# Patient Record
Sex: FEMALE | Race: WHITE | NOT HISPANIC OR LATINO
[De-identification: names, ages, dates, MRNs, and addresses within clinical notes are randomized per-mention and may not be internally consistent; named-entity substitution may affect disease eponyms.]

---

## 2017-09-22 ENCOUNTER — APPOINTMENT (OUTPATIENT)
Dept: RHEUMATOLOGY | Facility: CLINIC | Age: 82
End: 2017-09-22
Payer: MEDICARE

## 2017-09-22 VITALS — HEIGHT: 59.25 IN | BODY MASS INDEX: 31.65 KG/M2 | HEART RATE: 59 BPM | WEIGHT: 157 LBS | OXYGEN SATURATION: 96 %

## 2017-09-22 DIAGNOSIS — Z78.9 OTHER SPECIFIED HEALTH STATUS: ICD-10-CM

## 2017-09-22 DIAGNOSIS — M25.50 PAIN IN UNSPECIFIED JOINT: ICD-10-CM

## 2017-09-22 DIAGNOSIS — Z86.19 PERSONAL HISTORY OF OTHER INFECTIOUS AND PARASITIC DISEASES: ICD-10-CM

## 2017-09-22 DIAGNOSIS — M75.22 BICIPITAL TENDINITIS, LEFT SHOULDER: ICD-10-CM

## 2017-09-22 PROCEDURE — 99204 OFFICE O/P NEW MOD 45 MIN: CPT | Mod: 25

## 2017-09-22 PROCEDURE — 20610 DRAIN/INJ JOINT/BURSA W/O US: CPT | Mod: LT

## 2017-09-24 PROBLEM — M75.22 BICEPS TENDINITIS OF LEFT UPPER EXTREMITY: Status: ACTIVE | Noted: 2017-09-24

## 2017-09-25 ENCOUNTER — OUTPATIENT (OUTPATIENT)
Dept: OUTPATIENT SERVICES | Facility: HOSPITAL | Age: 82
LOS: 1 days | End: 2017-09-25
Payer: MEDICARE

## 2017-09-25 ENCOUNTER — APPOINTMENT (OUTPATIENT)
Dept: RADIOLOGY | Facility: CLINIC | Age: 82
End: 2017-09-25
Payer: MEDICARE

## 2017-09-25 DIAGNOSIS — Z00.8 ENCOUNTER FOR OTHER GENERAL EXAMINATION: ICD-10-CM

## 2017-09-25 PROCEDURE — 73030 X-RAY EXAM OF SHOULDER: CPT

## 2017-09-25 PROCEDURE — 73030 X-RAY EXAM OF SHOULDER: CPT | Mod: 26,LT

## 2017-10-24 ENCOUNTER — APPOINTMENT (OUTPATIENT)
Dept: RHEUMATOLOGY | Facility: CLINIC | Age: 82
End: 2017-10-24
Payer: MEDICARE

## 2017-10-24 VITALS
WEIGHT: 158 LBS | HEIGHT: 59.2 IN | OXYGEN SATURATION: 96 % | HEART RATE: 55 BPM | SYSTOLIC BLOOD PRESSURE: 159 MMHG | BODY MASS INDEX: 31.85 KG/M2 | DIASTOLIC BLOOD PRESSURE: 77 MMHG

## 2017-10-24 DIAGNOSIS — M79.1 MYALGIA: ICD-10-CM

## 2017-10-24 DIAGNOSIS — Z00.00 ENCOUNTER FOR GENERAL ADULT MEDICAL EXAMINATION W/OUT ABNORMAL FINDINGS: ICD-10-CM

## 2017-10-24 PROCEDURE — 20610 DRAIN/INJ JOINT/BURSA W/O US: CPT | Mod: 50

## 2017-10-24 PROCEDURE — 99214 OFFICE O/P EST MOD 30 MIN: CPT | Mod: 25

## 2017-10-24 RX ORDER — METHYLPREDNISOLONE ACETATE 80 MG/ML
80 INJECTION, SUSPENSION INTRA-ARTICULAR; INTRALESIONAL; INTRAMUSCULAR; SOFT TISSUE
Refills: 0 | Status: COMPLETED | OUTPATIENT
Start: 2017-10-24

## 2017-10-24 RX ADMIN — METHYLPREDNISOLONE ACETATE MG/ML: 80 INJECTION, SUSPENSION INTRA-ARTICULAR; INTRALESIONAL; INTRAMUSCULAR; SOFT TISSUE at 00:00

## 2017-12-04 ENCOUNTER — OUTPATIENT (OUTPATIENT)
Dept: OUTPATIENT SERVICES | Facility: HOSPITAL | Age: 82
LOS: 1 days | End: 2017-12-04
Payer: MEDICARE

## 2017-12-04 ENCOUNTER — APPOINTMENT (OUTPATIENT)
Dept: RADIOLOGY | Facility: CLINIC | Age: 82
End: 2017-12-04
Payer: MEDICARE

## 2017-12-04 DIAGNOSIS — Z00.8 ENCOUNTER FOR OTHER GENERAL EXAMINATION: ICD-10-CM

## 2017-12-04 PROCEDURE — 77080 DXA BONE DENSITY AXIAL: CPT

## 2017-12-04 PROCEDURE — 77080 DXA BONE DENSITY AXIAL: CPT | Mod: 26

## 2017-12-07 ENCOUNTER — APPOINTMENT (OUTPATIENT)
Dept: RHEUMATOLOGY | Facility: CLINIC | Age: 82
End: 2017-12-07

## 2018-01-26 ENCOUNTER — APPOINTMENT (OUTPATIENT)
Dept: RHEUMATOLOGY | Facility: CLINIC | Age: 83
End: 2018-01-26

## 2018-06-01 ENCOUNTER — APPOINTMENT (OUTPATIENT)
Dept: RHEUMATOLOGY | Facility: CLINIC | Age: 83
End: 2018-06-01
Payer: MEDICARE

## 2018-06-01 ENCOUNTER — RX RENEWAL (OUTPATIENT)
Age: 83
End: 2018-06-01

## 2018-06-01 VITALS
HEIGHT: 59 IN | WEIGHT: 158 LBS | OXYGEN SATURATION: 95 % | HEART RATE: 56 BPM | SYSTOLIC BLOOD PRESSURE: 142 MMHG | BODY MASS INDEX: 31.85 KG/M2 | DIASTOLIC BLOOD PRESSURE: 68 MMHG

## 2018-06-01 PROCEDURE — 20610 DRAIN/INJ JOINT/BURSA W/O US: CPT | Mod: LT

## 2018-06-01 PROCEDURE — 99214 OFFICE O/P EST MOD 30 MIN: CPT | Mod: 25

## 2018-06-01 RX ORDER — TRAMADOL HYDROCHLORIDE 50 MG/1
50 TABLET, COATED ORAL
Qty: 60 | Refills: 0 | Status: DISCONTINUED | COMMUNITY
Start: 2018-06-01 | End: 2018-06-01

## 2018-06-02 RX ORDER — VALSARTAN 40 MG/1
40 TABLET, COATED ORAL
Qty: 90 | Refills: 0 | Status: DISCONTINUED | COMMUNITY
Start: 2017-01-04 | End: 2018-06-02

## 2018-06-02 RX ORDER — METHYLPREDNISOLONE ACETATE 40 MG/ML
40 INJECTION, SUSPENSION INTRA-ARTICULAR; INTRALESIONAL; INTRAMUSCULAR; SOFT TISSUE
Qty: 1 | Refills: 0 | Status: COMPLETED | OUTPATIENT
Start: 2018-06-02

## 2018-06-02 RX ORDER — DOCUSATE SODIUM 100 MG/1
CAPSULE ORAL
Refills: 0 | Status: DISCONTINUED | COMMUNITY
End: 2018-06-02

## 2018-06-02 RX ORDER — METHYLPREDNISOLONE ACETATE 80 MG/ML
80 INJECTION, SUSPENSION INTRA-ARTICULAR; INTRALESIONAL; INTRAMUSCULAR; SOFT TISSUE
Qty: 1 | Refills: 0 | Status: COMPLETED | OUTPATIENT
Start: 2018-06-02

## 2018-06-02 RX ORDER — FUROSEMIDE 20 MG/1
20 TABLET ORAL
Qty: 180 | Refills: 0 | Status: DISCONTINUED | COMMUNITY
Start: 2017-01-17 | End: 2018-06-02

## 2018-06-03 ENCOUNTER — FORM ENCOUNTER (OUTPATIENT)
Age: 83
End: 2018-06-03

## 2018-06-04 ENCOUNTER — APPOINTMENT (OUTPATIENT)
Dept: RADIOLOGY | Facility: CLINIC | Age: 83
End: 2018-06-04
Payer: MEDICARE

## 2018-06-04 ENCOUNTER — OUTPATIENT (OUTPATIENT)
Dept: OUTPATIENT SERVICES | Facility: HOSPITAL | Age: 83
LOS: 1 days | End: 2018-06-04
Payer: MEDICARE

## 2018-06-04 DIAGNOSIS — Z00.8 ENCOUNTER FOR OTHER GENERAL EXAMINATION: ICD-10-CM

## 2018-06-04 PROCEDURE — 73564 X-RAY EXAM KNEE 4 OR MORE: CPT | Mod: 26,50

## 2018-06-04 PROCEDURE — 73502 X-RAY EXAM HIP UNI 2-3 VIEWS: CPT

## 2018-06-04 PROCEDURE — 73564 X-RAY EXAM KNEE 4 OR MORE: CPT

## 2018-06-04 PROCEDURE — 73502 X-RAY EXAM HIP UNI 2-3 VIEWS: CPT | Mod: 26,RT

## 2018-06-04 PROCEDURE — 73502 X-RAY EXAM HIP UNI 2-3 VIEWS: CPT | Mod: 26,76,LT

## 2018-06-14 ENCOUNTER — MEDICATION RENEWAL (OUTPATIENT)
Age: 83
End: 2018-06-14

## 2018-06-18 ENCOUNTER — OTHER (OUTPATIENT)
Age: 83
End: 2018-06-18

## 2018-06-28 ENCOUNTER — LABORATORY RESULT (OUTPATIENT)
Age: 83
End: 2018-06-28

## 2018-06-28 ENCOUNTER — APPOINTMENT (OUTPATIENT)
Dept: RHEUMATOLOGY | Facility: CLINIC | Age: 83
End: 2018-06-28
Payer: MEDICARE

## 2018-06-28 VITALS
SYSTOLIC BLOOD PRESSURE: 118 MMHG | HEART RATE: 58 BPM | DIASTOLIC BLOOD PRESSURE: 64 MMHG | HEIGHT: 59 IN | BODY MASS INDEX: 31.85 KG/M2 | OXYGEN SATURATION: 93 % | WEIGHT: 158 LBS

## 2018-06-28 PROCEDURE — 20610 DRAIN/INJ JOINT/BURSA W/O US: CPT | Mod: 50

## 2018-06-28 PROCEDURE — 99214 OFFICE O/P EST MOD 30 MIN: CPT | Mod: 25

## 2018-06-28 RX ORDER — SODIUM HYALURONATE INTRA-ARTICULAR SOLN PREF SYR 25 MG/2.5ML 25/2.5 MG/ML
25 SOLUTION PREFILLED SYRINGE INTRAARTICULAR
Refills: 0 | Status: COMPLETED | OUTPATIENT
Start: 2018-06-28

## 2018-06-28 RX ADMIN — METHYLPREDNISOLONE ACETATE 0 MG/ML: 80 INJECTION, SUSPENSION INTRA-ARTICULAR; INTRALESIONAL; INTRAMUSCULAR; SOFT TISSUE at 00:00

## 2018-06-29 LAB
B PERT IGG+IGM PNL SER: ABNORMAL
COLOR FLD: YELLOW
CRYSTALS SNV QL MICRO: NORMAL
FLUID INTAKE SUBSTANCE CLASS: NORMAL
LYMPHOCYTES # FLD MANUAL: 20 %
MONOS+MACROS NFR FLD MANUAL: 69 %
NEUTS SEG # FLD MANUAL: 11 %
RBC # FLD MANUAL: 1000 /UL
TOTAL CELLS COUNTED FLD: 376 /UL
TUBE TYPE: NORMAL

## 2018-07-05 ENCOUNTER — APPOINTMENT (OUTPATIENT)
Dept: RHEUMATOLOGY | Facility: CLINIC | Age: 83
End: 2018-07-05
Payer: MEDICARE

## 2018-07-05 VITALS
SYSTOLIC BLOOD PRESSURE: 102 MMHG | HEIGHT: 59 IN | DIASTOLIC BLOOD PRESSURE: 61 MMHG | WEIGHT: 158 LBS | BODY MASS INDEX: 31.85 KG/M2 | HEART RATE: 58 BPM | OXYGEN SATURATION: 94 %

## 2018-07-05 DIAGNOSIS — M81.0 AGE-RELATED OSTEOPOROSIS W/OUT CURRENT PATHOLOGICAL FRACTURE: ICD-10-CM

## 2018-07-05 PROCEDURE — 20610 DRAIN/INJ JOINT/BURSA W/O US: CPT | Mod: 50

## 2018-07-05 PROCEDURE — 99213 OFFICE O/P EST LOW 20 MIN: CPT | Mod: 25

## 2018-07-05 RX ORDER — TOBRAMYCIN AND DEXAMETHASONE 3; 1 MG/ML; MG/ML
0.3-0.1 SUSPENSION/ DROPS OPHTHALMIC
Qty: 5 | Refills: 0 | Status: DISCONTINUED | COMMUNITY
Start: 2017-06-15 | End: 2018-07-05

## 2018-07-05 RX ORDER — SODIUM HYALURONATE INTRA-ARTICULAR SOLN PREF SYR 25 MG/2.5ML 25/2.5 MG/ML
25 SOLUTION PREFILLED SYRINGE INTRAARTICULAR
Refills: 0 | Status: COMPLETED | OUTPATIENT
Start: 2018-07-05

## 2018-07-05 RX ORDER — ECONAZOLE NITRATE 10 MG/G
1 CREAM TOPICAL
Qty: 85 | Refills: 0 | Status: DISCONTINUED | COMMUNITY
Start: 2018-02-20 | End: 2018-07-05

## 2018-07-12 ENCOUNTER — APPOINTMENT (OUTPATIENT)
Dept: RHEUMATOLOGY | Facility: CLINIC | Age: 83
End: 2018-07-12
Payer: MEDICARE

## 2018-07-12 VITALS
BODY MASS INDEX: 31.85 KG/M2 | WEIGHT: 158 LBS | SYSTOLIC BLOOD PRESSURE: 122 MMHG | HEART RATE: 55 BPM | HEIGHT: 59 IN | DIASTOLIC BLOOD PRESSURE: 50 MMHG | OXYGEN SATURATION: 99 %

## 2018-07-12 PROCEDURE — 20610 DRAIN/INJ JOINT/BURSA W/O US: CPT | Mod: 50

## 2018-07-12 RX ORDER — SODIUM HYALURONATE INTRA-ARTICULAR SOLN PREF SYR 25 MG/2.5ML 25/2.5 MG/ML
25 SOLUTION PREFILLED SYRINGE INTRAARTICULAR
Refills: 0 | Status: COMPLETED | OUTPATIENT
Start: 2018-07-12

## 2018-07-12 RX ADMIN — SODIUM HYALURONATE INTRA-ARTICULAR SOLN PREF SYR 25 MG/2.5ML MG/2.5ML: 25/2.5 SOLUTION PREFILLED SYRINGE at 00:00

## 2018-07-17 RX ORDER — METHYLPRED ACET/NACL,ISO-OS/PF 80 MG/ML
80 VIAL (ML) INJECTION
Qty: 1 | Refills: 0 | Status: COMPLETED | OUTPATIENT
Start: 2018-06-28

## 2018-07-26 ENCOUNTER — APPOINTMENT (OUTPATIENT)
Dept: RHEUMATOLOGY | Facility: CLINIC | Age: 83
End: 2018-07-26
Payer: MEDICARE

## 2018-07-26 VITALS
BODY MASS INDEX: 31.25 KG/M2 | HEART RATE: 65 BPM | HEIGHT: 59 IN | WEIGHT: 155 LBS | OXYGEN SATURATION: 95 % | DIASTOLIC BLOOD PRESSURE: 74 MMHG | SYSTOLIC BLOOD PRESSURE: 144 MMHG

## 2018-07-26 PROCEDURE — 20610 DRAIN/INJ JOINT/BURSA W/O US: CPT | Mod: LT

## 2018-07-26 RX ORDER — SODIUM HYALURONATE INTRA-ARTICULAR SOLN PREF SYR 25 MG/2.5ML 25/2.5 MG/ML
25 SOLUTION PREFILLED SYRINGE INTRAARTICULAR
Refills: 0 | Status: COMPLETED | OUTPATIENT
Start: 2018-07-26

## 2018-07-26 RX ADMIN — SODIUM HYALURONATE INTRA-ARTICULAR SOLN PREF SYR 25 MG/2.5ML MG/2.5ML: 25/2.5 SOLUTION PREFILLED SYRINGE at 00:00

## 2018-08-13 ENCOUNTER — APPOINTMENT (OUTPATIENT)
Dept: ULTRASOUND IMAGING | Facility: CLINIC | Age: 83
End: 2018-08-13
Payer: MEDICARE

## 2018-08-13 ENCOUNTER — OUTPATIENT (OUTPATIENT)
Dept: OUTPATIENT SERVICES | Facility: HOSPITAL | Age: 83
LOS: 1 days | End: 2018-08-13
Payer: MEDICARE

## 2018-08-13 DIAGNOSIS — Z00.8 ENCOUNTER FOR OTHER GENERAL EXAMINATION: ICD-10-CM

## 2018-08-13 PROCEDURE — 76700 US EXAM ABDOM COMPLETE: CPT

## 2018-08-13 PROCEDURE — 76700 US EXAM ABDOM COMPLETE: CPT | Mod: 26

## 2018-09-06 ENCOUNTER — APPOINTMENT (OUTPATIENT)
Dept: RHEUMATOLOGY | Facility: CLINIC | Age: 83
End: 2018-09-06
Payer: MEDICARE

## 2018-09-06 VITALS
BODY MASS INDEX: 32.46 KG/M2 | SYSTOLIC BLOOD PRESSURE: 152 MMHG | DIASTOLIC BLOOD PRESSURE: 72 MMHG | HEART RATE: 58 BPM | WEIGHT: 161 LBS | OXYGEN SATURATION: 95 % | HEIGHT: 59 IN

## 2018-09-06 DIAGNOSIS — M70.62 TROCHANTERIC BURSITIS, LEFT HIP: ICD-10-CM

## 2018-09-06 PROCEDURE — 20610 DRAIN/INJ JOINT/BURSA W/O US: CPT | Mod: LT

## 2018-09-06 RX ORDER — TRAMADOL HYDROCHLORIDE 50 MG/1
50 TABLET, COATED ORAL
Qty: 60 | Refills: 0 | Status: DISCONTINUED | COMMUNITY
Start: 2018-06-01 | End: 2018-09-06

## 2018-09-06 RX ORDER — TIZANIDINE 2 MG/1
2 TABLET ORAL
Qty: 180 | Refills: 0 | Status: DISCONTINUED | COMMUNITY
Start: 2017-10-24 | End: 2018-09-06

## 2018-09-06 RX ORDER — METHYLPREDNISOLONE ACETATE 80 MG/ML
80 INJECTION, SUSPENSION INTRA-ARTICULAR; INTRALESIONAL; INTRAMUSCULAR; SOFT TISSUE
Qty: 1 | Refills: 0 | Status: COMPLETED | OUTPATIENT
Start: 2018-09-06

## 2018-10-09 ENCOUNTER — APPOINTMENT (OUTPATIENT)
Dept: HEMATOLOGY ONCOLOGY | Facility: CLINIC | Age: 83
End: 2018-10-09

## 2019-01-04 ENCOUNTER — APPOINTMENT (OUTPATIENT)
Dept: RHEUMATOLOGY | Facility: CLINIC | Age: 84
End: 2019-01-04
Payer: MEDICARE

## 2019-01-04 VITALS
BODY MASS INDEX: 32.46 KG/M2 | DIASTOLIC BLOOD PRESSURE: 70 MMHG | HEIGHT: 59 IN | HEART RATE: 54 BPM | OXYGEN SATURATION: 91 % | WEIGHT: 161 LBS | SYSTOLIC BLOOD PRESSURE: 122 MMHG

## 2019-01-04 PROCEDURE — 20610 DRAIN/INJ JOINT/BURSA W/O US: CPT | Mod: LT

## 2019-01-04 PROCEDURE — 99213 OFFICE O/P EST LOW 20 MIN: CPT | Mod: 25

## 2019-01-05 RX ORDER — METHYLPREDNISOLONE ACETATE 40 MG/ML
40 INJECTION, SUSPENSION INTRA-ARTICULAR; INTRALESIONAL; INTRAMUSCULAR; SOFT TISSUE
Refills: 0 | Status: COMPLETED | OUTPATIENT
Start: 2019-01-05

## 2019-01-05 RX ORDER — METHYLPREDNISOLONE ACETATE 80 MG/ML
80 INJECTION, SUSPENSION INTRA-ARTICULAR; INTRALESIONAL; INTRAMUSCULAR; SOFT TISSUE
Refills: 0 | Status: COMPLETED | OUTPATIENT
Start: 2019-01-05

## 2019-01-05 RX ADMIN — METHYLPREDNISOLONE ACETATE MG/ML: 80 INJECTION, SUSPENSION INTRA-ARTICULAR; INTRALESIONAL; INTRAMUSCULAR; SOFT TISSUE at 00:00

## 2019-01-05 RX ADMIN — METHYLPREDNISOLONE ACETATE MG/ML: 40 INJECTION, SUSPENSION INTRA-ARTICULAR; INTRALESIONAL; INTRAMUSCULAR; SOFT TISSUE at 00:00

## 2019-01-24 ENCOUNTER — APPOINTMENT (OUTPATIENT)
Dept: RHEUMATOLOGY | Facility: CLINIC | Age: 84
End: 2019-01-24
Payer: MEDICARE

## 2019-01-24 VITALS
BODY MASS INDEX: 32.46 KG/M2 | SYSTOLIC BLOOD PRESSURE: 147 MMHG | WEIGHT: 161 LBS | HEART RATE: 85 BPM | DIASTOLIC BLOOD PRESSURE: 70 MMHG | HEIGHT: 59 IN | OXYGEN SATURATION: 98 %

## 2019-01-24 PROCEDURE — 20610 DRAIN/INJ JOINT/BURSA W/O US: CPT | Mod: RT

## 2019-01-24 RX ORDER — METHYLPREDNISOLONE ACETATE 80 MG/ML
80 INJECTION, SUSPENSION INTRA-ARTICULAR; INTRALESIONAL; INTRAMUSCULAR; SOFT TISSUE
Refills: 0 | Status: COMPLETED | OUTPATIENT
Start: 2019-01-24

## 2019-01-24 RX ADMIN — METHYLPREDNISOLONE ACETATE MG/ML: 80 INJECTION, SUSPENSION INTRA-ARTICULAR; INTRALESIONAL; INTRAMUSCULAR; SOFT TISSUE at 00:00

## 2019-01-24 NOTE — PROCEDURE
[FreeTextEntry1] : Procedure: Right pes bursa injection\par Date: 01/24/2019\par The procedure risks was discussed with the patient.\par Indications: therapeutic purposes \par #1 site identified in the Right pes anserine bursa . Verbal consent was obtained. \par Anesthesia was with ethyl chloride.\par The patient was prepped with betadine solution. \par A 25 gauge 1.5 inch needle was used.\par Injectables: Depomedrol 80 mg was injected into the site The Depomedrol was mixed with 1mL of xylocaine 1%. \par The patient tolerated the procedure well..\par There were no complications. Handouts/patient instructions were given to patient . patient was instructed to call if redness at site, a decrease in range of motion or an increase in pain is noted after procedure. \par  \par \par

## 2019-02-26 ENCOUNTER — APPOINTMENT (OUTPATIENT)
Dept: RHEUMATOLOGY | Facility: CLINIC | Age: 84
End: 2019-02-26

## 2019-02-26 ENCOUNTER — APPOINTMENT (OUTPATIENT)
Dept: RHEUMATOLOGY | Facility: CLINIC | Age: 84
End: 2019-02-26
Payer: MEDICARE

## 2019-02-26 VITALS
DIASTOLIC BLOOD PRESSURE: 75 MMHG | HEART RATE: 59 BPM | BODY MASS INDEX: 32.25 KG/M2 | WEIGHT: 160 LBS | OXYGEN SATURATION: 95 % | SYSTOLIC BLOOD PRESSURE: 129 MMHG | HEIGHT: 59 IN

## 2019-02-26 PROCEDURE — 20610 DRAIN/INJ JOINT/BURSA W/O US: CPT | Mod: 50

## 2019-02-26 RX ORDER — SODIUM HYALURONATE INTRA-ARTICULAR SOLN PREF SYR 25 MG/2.5ML 25/2.5 MG/ML
25 SOLUTION PREFILLED SYRINGE INTRAARTICULAR
Refills: 0 | Status: COMPLETED | OUTPATIENT
Start: 2019-02-26

## 2019-02-26 RX ADMIN — SODIUM HYALURONATE INTRA-ARTICULAR SOLN PREF SYR 25 MG/2.5ML MG/2.5ML: 25/2.5 SOLUTION PREFILLED SYRINGE at 00:00

## 2019-02-26 NOTE — ASSESSMENT
[FreeTextEntry1] : f/u in 1 week for 2nd of 3 supartz injetctions\par 8ccs of clear yellow fluid aspirated.  Will send for cell count and crystal analysis.  Specimen contaminated in handling, not sent for culture analysis.

## 2019-02-26 NOTE — PROCEDURE
[Today's Date:] : Date: [unfilled] [FreeTextEntry1] : \par Date: 02/26/2019 \par The procedure risks was discussed with the patient. \par \par # 1 site identified in the Left knee \par Procedure Note: \par Anesthesia was with ethyl chloride The patient was prepped with betadine solution. A 21 gauge 1.5 inch needle was used. Injectables: Xylocaine 1% 1mL was injected into the site, 8ccs of clear yellow fluid was aspirated.  Supartz 25mg injected to right knee. the patient tolerated the procedure well. there were no complications. handouts/patient instructions were given to patient. patient was instructed to call if redness at site, a decrease in range of motion or an increase in pain is noted after procedure. \par \par # 2 site identified in the Right knee \par Procedure Note: \par Anesthesia was with ethyl chloride The patient was prepped with betadine solution. A 21 gauge 1.5 inch needle was used. Injectables: Xylocaine 1% 1mL was injected into the site, followed by Supartz 25mg injected to right knee. the patient tolerated the procedure well. there were no complications. handouts/patient instructions were given to patient. patient was instructed to call if redness at site, a decrease in range of motion or an increase in pain is noted after procedure. \par \par

## 2019-02-27 LAB
B PERT IGG+IGM PNL SER: CLEAR
COLOR FLD: YELLOW
EOSINOPHIL # FLD MANUAL: 0 %
FLUID INTAKE SUBSTANCE CLASS: NORMAL
LYMPHOCYTES # FLD MANUAL: 2 %
MESOTHL CELL NFR FLD: 4 %
MONOS+MACROS NFR FLD MANUAL: 93 %
NEUTS SEG # FLD MANUAL: 1 %
RBC # FLD MANUAL: 2000 /UL
SYCRY CLARITY: ABNORMAL
SYCRY COLOR: YELLOW
SYCRY ID: NORMAL
SYCRY TUBE: NORMAL
TOTAL CELLS COUNTED FLD: 126 /UL
TUBE TYPE: NORMAL
UNIDENT CELLS NFR FLD MANUAL: 0 %
VARIANT LYMPHS # FLD MANUAL: 0 %

## 2019-03-05 ENCOUNTER — APPOINTMENT (OUTPATIENT)
Dept: RHEUMATOLOGY | Facility: CLINIC | Age: 84
End: 2019-03-05
Payer: MEDICARE

## 2019-03-05 VITALS
BODY MASS INDEX: 32.25 KG/M2 | SYSTOLIC BLOOD PRESSURE: 154 MMHG | DIASTOLIC BLOOD PRESSURE: 70 MMHG | WEIGHT: 160 LBS | HEIGHT: 59 IN | OXYGEN SATURATION: 92 % | HEART RATE: 58 BPM

## 2019-03-05 PROCEDURE — 20610 DRAIN/INJ JOINT/BURSA W/O US: CPT | Mod: 50

## 2019-03-05 RX ORDER — METHYLPREDNISOLONE ACETATE 40 MG/ML
40 INJECTION, SUSPENSION INTRA-ARTICULAR; INTRALESIONAL; INTRAMUSCULAR; SOFT TISSUE
Refills: 0 | Status: COMPLETED | OUTPATIENT
Start: 2019-03-05

## 2019-03-05 RX ADMIN — METHYLPREDNISOLONE ACETATE MG/ML: 40 INJECTION, SUSPENSION INTRA-ARTICULAR; INTRALESIONAL; INTRAMUSCULAR; SOFT TISSUE at 00:00

## 2019-03-05 NOTE — PROCEDURE
[FreeTextEntry1] : Date: 03/05/2019 \par The procedure risks was discussed with the patient. \par \par # 1 site identified in the Left knee \par Procedure Note: \par Anesthesia was with ethyl chloride The patient was prepped with betadine solution. A 21 gauge 1.5 inch needle was used. Injectables: Xylocaine 1% 1mL was injected into the site, 18ccs of clear yellow fluid was aspirated. Supartz 25mg injected to right knee. the patient tolerated the procedure well. there were no complications. handouts/patient instructions were given to patient. patient was instructed to call if redness at site, a decrease in range of motion or an increase in pain is noted after procedure. \par \par # 2 site identified in the Right knee \par Procedure Note: \par Anesthesia was with ethyl chloride The patient was prepped with betadine solution. A 21 gauge 1.5 inch needle was used. Injectables: Xylocaine 1% 1mL was injected into the site, 5cc of clear yellow fluid was aspirated, followed by Supartz 25mg injected to right knee. the patient tolerated the procedure well. there were no complications. handouts/patient instructions were given to patient. patient was instructed to call if redness at site, a decrease in range of motion or an increase in pain is noted after procedure. \par \par #3 site identified right pes anserine bursa\par The procedure risks was discussed with the patient.\par Indications: therapeutic purposes \par  Verbal consent was obtained. \par Anesthesia was with ethyl chloride.\par The patient was prepped with betadine solution. \par A 25 gauge 1.5 inch needle was used.\par Injectables: Depomedrol 40 mg was injected into the site The Depomedrol was mixed with 1mL of xylocaine 1%. \par The patient tolerated the procedure well..\par There were no complications. Handouts/patient instructions were given to patient . patient was instructed to call if redness at site, a decrease in range of motion or an increase in pain is noted after procedure. \par  \par \par

## 2019-03-05 NOTE — ASSESSMENT
[FreeTextEntry1] : f/u in 1 week for 3rd of 3 supartz injecttions\par Specimen was thrown out prior to being sent for cell count/crystal testing.  If fluid obtained next visit, will send fort testing.  The fluid from last week was non-inflammatory.  \par

## 2019-03-12 ENCOUNTER — APPOINTMENT (OUTPATIENT)
Dept: RHEUMATOLOGY | Facility: CLINIC | Age: 84
End: 2019-03-12
Payer: MEDICARE

## 2019-03-12 VITALS
HEART RATE: 60 BPM | BODY MASS INDEX: 32.25 KG/M2 | SYSTOLIC BLOOD PRESSURE: 128 MMHG | WEIGHT: 160 LBS | OXYGEN SATURATION: 98 % | HEIGHT: 59 IN | DIASTOLIC BLOOD PRESSURE: 60 MMHG

## 2019-03-12 PROCEDURE — 20610 DRAIN/INJ JOINT/BURSA W/O US: CPT | Mod: 50

## 2019-03-12 RX ORDER — SODIUM HYALURONATE INTRA-ARTICULAR SOLN PREF SYR 25 MG/2.5ML 25/2.5 MG/ML
25 SOLUTION PREFILLED SYRINGE INTRAARTICULAR
Refills: 0 | Status: COMPLETED | OUTPATIENT
Start: 2019-03-12

## 2019-03-12 NOTE — PROCEDURE
[FreeTextEntry1] : Date: 03/12/2019 \par The procedure risks was discussed with the patient. \par \par # 1 site identified in the Left knee \par Procedure Note: b/L IA knee injections\par Anesthesia was with ethyl chloride The patient was prepped with betadine solution. A 21 gauge 1.5 inch needle was used. Injectables: Xylocaine 1% 1mL was injected into the site, 13ccs of clear yellow fluid was aspirated. Supartz 25mg injected to right knee. the patient tolerated the procedure well. there were no complications. handouts/patient instructions were given to patient. patient was instructed to call if redness at site, a decrease in range of motion or an increase in pain is noted after procedure. \par \par # 2 site identified in the Right knee \par Procedure Note: \par Anesthesia was with ethyl chloride The patient was prepped with betadine solution. A 21 gauge 1.5 inch needle was used. Injectables: Xylocaine 1% 1mL was injected into the site, 0cc of clear yellow fluid was aspirated, followed by Supartz 25mg injected to right knee. the patient tolerated the procedure well. there were no complications. handouts/patient instructions were given to patient. patient was instructed to call if redness at site, a decrease in range of motion or an increase in pain is noted after procedure. \par \par \par

## 2019-03-12 NOTE — ASSESSMENT
[FreeTextEntry1] : Will send fluid for synovial testing\par Will be able to have next set of HA injections 9/12/19\par f/u 3 months

## 2019-03-18 ENCOUNTER — MEDICATION RENEWAL (OUTPATIENT)
Age: 84
End: 2019-03-18

## 2019-03-18 LAB
B PERT IGG+IGM PNL SER: ABNORMAL
COLOR FLD: YELLOW
EOSINOPHIL # FLD MANUAL: 0 %
FLUID INTAKE SUBSTANCE CLASS: NORMAL
LYMPHOCYTES # FLD MANUAL: 60 %
MESOTHL CELL NFR FLD: 2 %
MONOS+MACROS NFR FLD MANUAL: 34 %
NEUTS SEG # FLD MANUAL: 4 %
NRBC # FLD: 0
RBC # FLD MANUAL: 73 /UL
SYCRY CLARITY: ABNORMAL
SYCRY COLOR: YELLOW
SYCRY ID: ABNORMAL
SYCRY TUBE: NORMAL
TOTAL CELLS COUNTED FLD: 350 /UL
TUBE TYPE: NORMAL
UNIDENT CELLS NFR FLD MANUAL: 0 %
VARIANT LYMPHS # FLD MANUAL: 0 %

## 2019-03-28 LAB — BACTERIA FLD CULT: NORMAL

## 2019-06-12 ENCOUNTER — FORM ENCOUNTER (OUTPATIENT)
Age: 84
End: 2019-06-12

## 2019-06-13 ENCOUNTER — APPOINTMENT (OUTPATIENT)
Dept: RHEUMATOLOGY | Facility: CLINIC | Age: 84
End: 2019-06-13
Payer: MEDICARE

## 2019-06-13 ENCOUNTER — APPOINTMENT (OUTPATIENT)
Dept: RADIOLOGY | Facility: CLINIC | Age: 84
End: 2019-06-13
Payer: MEDICARE

## 2019-06-13 ENCOUNTER — RX RENEWAL (OUTPATIENT)
Age: 84
End: 2019-06-13

## 2019-06-13 ENCOUNTER — OUTPATIENT (OUTPATIENT)
Dept: OUTPATIENT SERVICES | Facility: HOSPITAL | Age: 84
LOS: 1 days | End: 2019-06-13
Payer: MEDICARE

## 2019-06-13 VITALS
OXYGEN SATURATION: 94 % | WEIGHT: 159 LBS | HEIGHT: 59 IN | DIASTOLIC BLOOD PRESSURE: 69 MMHG | HEART RATE: 70 BPM | BODY MASS INDEX: 32.05 KG/M2 | SYSTOLIC BLOOD PRESSURE: 148 MMHG

## 2019-06-13 DIAGNOSIS — M25.50 PAIN IN UNSPECIFIED JOINT: ICD-10-CM

## 2019-06-13 DIAGNOSIS — M10.9 GOUT, UNSPECIFIED: ICD-10-CM

## 2019-06-13 PROCEDURE — 99215 OFFICE O/P EST HI 40 MIN: CPT | Mod: 25

## 2019-06-13 PROCEDURE — 20610 DRAIN/INJ JOINT/BURSA W/O US: CPT | Mod: 50

## 2019-06-13 PROCEDURE — 73630 X-RAY EXAM OF FOOT: CPT

## 2019-06-13 PROCEDURE — 73630 X-RAY EXAM OF FOOT: CPT | Mod: 26,RT

## 2019-06-13 RX ORDER — METHYLPRED ACET/NACL,ISO-OS/PF 80 MG/ML
80 VIAL (ML) INJECTION
Qty: 2 | Refills: 0 | Status: COMPLETED | OUTPATIENT
Start: 2019-06-13

## 2019-06-13 RX ORDER — VALSARTAN 80 MG/1
80 TABLET, COATED ORAL
Qty: 90 | Refills: 0 | Status: DISCONTINUED | COMMUNITY
Start: 2017-12-19 | End: 2019-06-13

## 2019-06-13 RX ADMIN — METHYLPREDNISOLONE ACETATE 0 MG/ML: 80 INJECTION, SUSPENSION INTRA-ARTICULAR; INTRALESIONAL; INTRAMUSCULAR; SOFT TISSUE at 00:00

## 2019-06-14 LAB
B PERT IGG+IGM PNL SER: ABNORMAL
B PERT IGG+IGM PNL SER: ABNORMAL
COLOR FLD: YELLOW
COLOR FLD: YELLOW
EOSINOPHIL # FLD MANUAL: 1 %
FLUID INTAKE SUBSTANCE CLASS: NORMAL
FLUID INTAKE SUBSTANCE CLASS: NORMAL
LYMPHOCYTES # FLD MANUAL: 56 %
LYMPHOCYTES # FLD MANUAL: 64 %
MESOTHL CELL NFR FLD: 13 %
MESOTHL CELL NFR FLD: 6 %
MONOS+MACROS NFR FLD MANUAL: 24 %
MONOS+MACROS NFR FLD MANUAL: 27 %
NEUTS SEG # FLD MANUAL: 4 %
NEUTS SEG # FLD MANUAL: 5 %
RBC # FLD MANUAL: 1000 /UL
RBC # FLD MANUAL: 1000 /UL
SYCRY CLARITY: ABNORMAL
SYCRY CLARITY: ABNORMAL
SYCRY COLOR: YELLOW
SYCRY COLOR: YELLOW
SYCRY ID: NORMAL
SYCRY ID: NORMAL
SYCRY TUBE: NORMAL
SYCRY TUBE: NORMAL
TOTAL CELLS COUNTED FLD: 105 /UL
TOTAL CELLS COUNTED FLD: 61 /UL
TUBE TYPE: NORMAL
TUBE TYPE: NORMAL

## 2019-06-20 LAB — BACTERIA FLD CULT: NORMAL

## 2019-06-28 LAB — BACTERIA FLD CULT: NORMAL

## 2019-08-15 ENCOUNTER — APPOINTMENT (OUTPATIENT)
Dept: RHEUMATOLOGY | Facility: CLINIC | Age: 84
End: 2019-08-15
Payer: MEDICARE

## 2019-08-15 VITALS
WEIGHT: 165 LBS | OXYGEN SATURATION: 92 % | HEART RATE: 59 BPM | DIASTOLIC BLOOD PRESSURE: 72 MMHG | BODY MASS INDEX: 33.26 KG/M2 | SYSTOLIC BLOOD PRESSURE: 125 MMHG | HEIGHT: 59 IN

## 2019-08-15 PROCEDURE — 99214 OFFICE O/P EST MOD 30 MIN: CPT | Mod: 25

## 2019-08-15 PROCEDURE — 20610 DRAIN/INJ JOINT/BURSA W/O US: CPT | Mod: 50

## 2019-08-15 NOTE — HISTORY OF PRESENT ILLNESS
[FreeTextEntry1] : 89 year old woman with hx of HTN, CKD, HLD, shingles, Left GTB/ left bicipital tendinitis presents for f/u of knee pain and for new right 2nd and 3rd toe pain.  \par \par The pain is a 9/10 in intensity, She had gone to an urgent care, and they told her it might have been a stress fracture that hadn't been seen on XR.  She then went to a podiatrist who told her it was gout, and gave her CS injections locally.  It may have helped.  Her uric acid at the time was 9.6.  She thinks her knees might be swollen.  I have aspirated her knees in the past, +MSU intra and extracellular crystals seen. B/L knees actually bother her, but L more than right. She denies fevers, chills, or rash. She denies trauma to the knees, or recent fall. She also has c/o back pain. \par \par She feels she responded well to the Supartz injections previously.  \par

## 2019-08-15 NOTE — ASSESSMENT
[FreeTextEntry1] : \par 88 y/o woman here for f/u of OA and gout. \par \par Plan:\par \par Crystal proven gout, uric acid 9.6\par -Fluid aspirated from both knees today\par -80mg depomedrol injected b/L knees 6/13/19\par -Start colchicine 0.6mg QOD renally dosed.\par -Start allopurinol 100mg daily, check uric acid at 4 weeks.  \par -Patient would like to check with nephrologist first about these 2 meds before starting.  \par -Risks and benefits of both colchicine and allopurinol were discussed, including but not limited to severe allergic reaction/rash, LFT elevation, GI bloating/diarrhea.  Patient was advised to decrease freq of colchicine if diarrhea/abd pain ensues.  \par -Standby Rx of prednisone given in event gout flare occurs upon starting allopurinol.  \par \par OA knees\par -Injected b/L knees with Supartz completed 3/12/19\par -Advised prn analgesics if she has pain after today's procedure. \par -Consider PT for knee osteoarthritis, a Requisition for this was given. \par \par Osteoporosis\par -Patient and her daughter would like to discuss prolia with her son (a physician), and Dr. Collins her PCP. \par -Baseline pre-treatment labs if they change their mind and choose to go ahead with it. \par -XR right foot to eval for fracture\par \par f/u 2 months\par \par \par \par

## 2019-08-15 NOTE — PROCEDURE
[FreeTextEntry1] : Date: 08/15/19\par Right knee aspiration\par The procedure risks was discussed with the patient.\par Indications: therapeutic purposes \par #1 site identified in the Right knee . Verbal consent was obtained. \par Anesthesia was with ethyl chloride.\par The patient was prepped with betadine solution. \par A 21 gauge 1.5 inch needle was used.\par Injectables:  1mL of xylocaine 1% was used for local anesthetic \par Unable to obtain fluid in right knee.  \par The patient tolerated the procedure well..\par There were no complications. Handouts/patient instructions were given to patient . patient was instructed to call if redness at site, a decrease in range of motion or an increase in pain is noted after procedure.\par \par \par Identical procedure repeated in left knee. 10cc of clear yellow fluid aspirated, and sent for testing.  \par cell count, crystals, and culture testing. \par

## 2019-08-15 NOTE — PROCEDURE
[FreeTextEntry1] : Date: 06/13/19\par Right knee aspiration and CS injection\par The procedure risks was discussed with the patient.\par Indications: therapeutic purposes \par #1 site identified in the Right knee . Verbal consent was obtained. \par Anesthesia was with ethyl chloride.\par The patient was prepped with betadine solution. \par A 21 gauge 1.5 inch needle was used.\par 23 cc of straw/ yellow fluid aspirated. \par Injectables: Depomedrol 80 mg was injected into the site The Depomedrol was mixed with 1mL of xylocaine 1%. \par The patient tolerated the procedure well..\par There were no complications. Handouts/patient instructions were given to patient . patient was instructed to call if redness at site, a decrease in range of motion or an increase in pain is noted after procedure.\par \par \par Identical procedure repeated in left knee, but instead 15cc of lighter colored clear yellow fluid was aspirated.\par \par All fluid sent for cell count, crystals, and culture testing.

## 2019-08-15 NOTE — HISTORY OF PRESENT ILLNESS
[FreeTextEntry1] : 89 year old woman with hx of HTN, CKD, HLD, shingles, Left GTB/ left bicipital tendinitis presents for f/u of knee pain and for new right 2nd and 3rd toe pain. \par \par The pain is a 9/10 in intensity.  Her biggest issue today is right knee pain/swelling.  B/L knees actually bother her, but R more than L. She felt the pain in the right back, felt it lock, heard it click.  She has not gone to ortho.  She is asking about a bandage/brace.  She has not gone to PT.  \par She feels she responded well to the Supartz injections previously. \par She did not start the allopurinol or colchicine.  She does not feel she has had a gout attack since last visit.  \par She plans on modifying her diet.  \par \par She probably had gone to an urgent care, and they told her it might have been a stress fracture that hadn't been seen on XR. She then went to a podiatrist who told her it was gout, and gave her CS injections locally. It may have helped. Her uric acid at the time was 9.6. She thinks her knees might be swollen. I have aspirated her knees in the past, +MSU intra and extracellular crystals seen.\par \par She denies fevers, chills, or rash. She denies trauma to the knees, or recent fall. She also has c/o back pain. \par \par \par \par No fractures since last visit.  \par Still does not want osteoporosis therapy.

## 2019-08-15 NOTE — ASSESSMENT
[FreeTextEntry1] : 89 y/o woman here for f/u of OA and gout. \par \par Plan:\par \par Crystal proven gout, uric acid 9.6\par -Fluid aspirated from both knees 6/13/19.  Today, 8/15/19, was only able to obtain fluid from left knee.  Sent for testing.  \par -Rx given for MSK US guided right knee aspiration.  \par -80mg depomedrol injected b/L knees 6/13/19.  As too early, I did not inject CS to knees today.  \par -Never started colchicine 0.6mg QOD renally dosed.\par -She never started allopurinol 100mg daily\par -Patient would like to check with nephrologist first about these 2 meds before starting. \par -Risks and benefits of both colchicine and allopurinol were discussed, including but not limited to severe allergic reaction/rash, LFT elevation, GI bloating/diarrhea. Patient was advised to decrease freq of colchicine if diarrhea/abd pain ensues. \par -Standby Rx of prednisone given in event gout flare occurs upon starting allopurinol. \par \par OA knees\par -Injected b/L knees with Supartz completed 3/12/19.  Next due 9/13/19\par -Rx Voltaren gel for b/L knees TID\par -Advised PT for knee osteoarthritis, a Requisition for this was given. \par -Can use bandage/brace for a few hours if planning to walk around  a lot that day, but advised not to wear it 24/7.  Advised further comment from ortho given likely meniscal damage.  \par \par Osteoporosis\par -Patient and her daughter would like to discuss prolia with her son (a physician), and Dr. Collins her PCP. \par -Baseline pre-treatment labs if they change their mind and choose to go ahead with it. \par -XR right foot to eval for fracture was negative.  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  Small plantar and posterior calcaneal enthesophytes present. \par \par f/u in September for next Supartz injection.  \par next routine follow up 3 months.  \par \par \par

## 2019-08-16 LAB
B PERT IGG+IGM PNL SER: CLEAR
COLOR FLD: YELLOW
FLUID INTAKE SUBSTANCE CLASS: NORMAL
LYMPHOCYTES # FLD MANUAL: 22 %
MESOTHL CELL NFR FLD: 5 %
MONOS+MACROS NFR FLD MANUAL: 72 %
NEUTS SEG # FLD MANUAL: 1 %
RBC # FLD MANUAL: 100 /UL
SYCRY CLARITY: CLEAR
SYCRY COLOR: YELLOW
SYCRY ID: NORMAL
SYCRY TUBE: NORMAL
TOTAL CELLS COUNTED FLD: 62 /UL
TUBE TYPE: NORMAL

## 2019-08-21 LAB — BACTERIA FLD CULT: NORMAL

## 2019-08-27 RX ORDER — HYLAN G-F 20 16MG/2ML
16 SYRINGE (ML) INTRAARTICULAR
Qty: 2 | Refills: 0 | Status: DISCONTINUED | COMMUNITY
Start: 2018-06-14 | End: 2019-08-27

## 2019-09-10 ENCOUNTER — FORM ENCOUNTER (OUTPATIENT)
Age: 84
End: 2019-09-10

## 2019-09-11 ENCOUNTER — APPOINTMENT (OUTPATIENT)
Dept: ULTRASOUND IMAGING | Facility: CLINIC | Age: 84
End: 2019-09-11
Payer: MEDICARE

## 2019-09-11 ENCOUNTER — OUTPATIENT (OUTPATIENT)
Dept: OUTPATIENT SERVICES | Facility: HOSPITAL | Age: 84
LOS: 1 days | End: 2019-09-11
Payer: MEDICARE

## 2019-09-11 ENCOUNTER — APPOINTMENT (OUTPATIENT)
Dept: MRI IMAGING | Facility: CLINIC | Age: 84
End: 2019-09-11
Payer: MEDICARE

## 2019-09-11 DIAGNOSIS — M17.12 UNILATERAL PRIMARY OSTEOARTHRITIS, LEFT KNEE: ICD-10-CM

## 2019-09-11 DIAGNOSIS — Z00.8 ENCOUNTER FOR OTHER GENERAL EXAMINATION: ICD-10-CM

## 2019-09-11 PROCEDURE — 20611 DRAIN/INJ JOINT/BURSA W/US: CPT | Mod: RT

## 2019-09-11 PROCEDURE — 20611 DRAIN/INJ JOINT/BURSA W/US: CPT

## 2019-09-26 ENCOUNTER — MEDICATION RENEWAL (OUTPATIENT)
Age: 84
End: 2019-09-26

## 2019-09-26 ENCOUNTER — APPOINTMENT (OUTPATIENT)
Dept: RHEUMATOLOGY | Facility: CLINIC | Age: 84
End: 2019-09-26
Payer: MEDICARE

## 2019-09-26 VITALS
BODY MASS INDEX: 32.66 KG/M2 | DIASTOLIC BLOOD PRESSURE: 70 MMHG | HEART RATE: 55 BPM | SYSTOLIC BLOOD PRESSURE: 164 MMHG | WEIGHT: 162 LBS | HEIGHT: 59 IN | OXYGEN SATURATION: 95 %

## 2019-09-26 PROCEDURE — 99214 OFFICE O/P EST MOD 30 MIN: CPT | Mod: 25

## 2019-09-26 PROCEDURE — 20610 DRAIN/INJ JOINT/BURSA W/O US: CPT | Mod: 50,59

## 2019-09-26 RX ORDER — SODIUM HYALURONATE INTRA-ARTICULAR SOLN PREF SYR 25 MG/2.5ML 25/2.5 MG/ML
25 SOLUTION PREFILLED SYRINGE INTRAARTICULAR
Refills: 0 | Status: COMPLETED | OUTPATIENT
Start: 2019-09-26

## 2019-09-26 RX ORDER — METHYLPRED ACET/NACL,ISO-OS/PF 80 MG/ML
80 VIAL (ML) INJECTION
Qty: 2 | Refills: 0 | Status: COMPLETED | OUTPATIENT
Start: 2019-09-26

## 2019-09-26 RX ADMIN — SODIUM HYALURONATE INTRA-ARTICULAR SOLN PREF SYR 25 MG/2.5ML 0 MG/2.5ML: 25/2.5 SOLUTION PREFILLED SYRINGE at 00:00

## 2019-09-26 RX ADMIN — METHYLPREDNISOLONE ACETATE 0 MG/ML: 80 INJECTION, SUSPENSION INTRA-ARTICULAR; INTRALESIONAL; INTRAMUSCULAR; SOFT TISSUE at 00:00

## 2019-09-27 LAB
B PERT IGG+IGM PNL SER: CLEAR
COLOR FLD: YELLOW
FLUID INTAKE SUBSTANCE CLASS: NORMAL
LYMPHOCYTES # FLD MANUAL: 30 %
MESOTHL CELL NFR FLD: 4 %
MONOS+MACROS NFR FLD MANUAL: 63 %
NEUTS SEG # FLD MANUAL: 3 %
RBC # FLD MANUAL: 1000 /UL
SYCRY CLARITY: ABNORMAL
SYCRY COLOR: ABNORMAL
SYCRY ID: ABNORMAL
SYCRY TUBE: NORMAL
TOTAL CELLS COUNTED FLD: 150 /UL
TUBE TYPE: NORMAL

## 2019-10-01 ENCOUNTER — MEDICATION RENEWAL (OUTPATIENT)
Age: 84
End: 2019-10-01

## 2019-10-04 ENCOUNTER — APPOINTMENT (OUTPATIENT)
Dept: RHEUMATOLOGY | Facility: CLINIC | Age: 84
End: 2019-10-04
Payer: MEDICARE

## 2019-10-04 VITALS
HEART RATE: 54 BPM | BODY MASS INDEX: 32.66 KG/M2 | HEIGHT: 59 IN | WEIGHT: 162 LBS | OXYGEN SATURATION: 95 % | DIASTOLIC BLOOD PRESSURE: 60 MMHG | SYSTOLIC BLOOD PRESSURE: 130 MMHG

## 2019-10-04 PROCEDURE — 20610 DRAIN/INJ JOINT/BURSA W/O US: CPT | Mod: 50

## 2019-10-04 RX ORDER — SODIUM HYALURONATE INTRA-ARTICULAR SOLN PREF SYR 25 MG/2.5ML 25/2.5 MG/ML
25 SOLUTION PREFILLED SYRINGE INTRAARTICULAR
Refills: 0 | Status: COMPLETED | OUTPATIENT
Start: 2019-10-04

## 2019-10-04 RX ADMIN — SODIUM HYALURONATE INTRA-ARTICULAR SOLN PREF SYR 25 MG/2.5ML 0 MG/2.5ML: 25/2.5 SOLUTION PREFILLED SYRINGE at 00:00

## 2019-10-04 NOTE — PROCEDURE
[FreeTextEntry1] : Date: 09/26/19\par Right knee aspiration, CS and Supartz injection\par The procedure risks was discussed with the patient.\par Indications: therapeutic purposes \par #1 site identified in the Right knee . Verbal consent was obtained. \par Anesthesia was with ethyl chloride.\par The patient was prepped with betadine solution. \par A 21 gauge 1.5 inch needle was used.\par 13 cc of straw/ blood tinged fluid aspirated. \par Injectables: Depomedrol 80 mg was injected into the site The Depomedrol was mixed with 1mL of xylocaine 1%.   Supartz injected.  \par The patient tolerated the procedure well..\par There were no complications. Handouts/patient instructions were given to patient . patient was instructed to call if redness at site, a decrease in range of motion or an increase in pain is noted after procedure.\par \par \par Identical procedure repeated in left knee, but instead 23 cc of clear yellow fluid was aspirated.  \par \par All fluid sent for cell count, crystals testing.  Syringe tips were not maintained sterile on the field during post injection handling, and so fluid was not sent for culture.  I do not suspect any infection in knees.  \par

## 2019-10-04 NOTE — ASSESSMENT
[FreeTextEntry1] : 89 y/o woman here for f/u of OA and gout. \par \par Plan:\par \par Crystal proven gout, uric acid 9.6\par -Fluid aspirated from both knees today 9/26/19. Injected b/L knees with 80mg depomedrol and she received first dose of supartz b/L.\par -MSU crystals were also seen in MSK guided right knee aspiration.   \par -Was advised by Dr. Collins to increase to BID.  She states she tolerated it well.  \par -She was on and off allopurinol 100mg daily.  Has been off for past few weeks.  \par -Check uric acid in 4 weeks.  \par -Patient would like to check with nephrologist first about these 2 meds before starting.  She was given the OK by Dr. Thompson.   \par -Risks and benefits of both colchicine and allopurinol were discussed, including but not limited to severe allergic reaction/rash, LFT elevation, GI bloating/diarrhea. Patient was advised to decrease freq of colchicine if diarrhea/abd pain ensues. \par -Standby Rx of prednisone given in event gout flare occurs upon starting allopurinol. \par \par OA knees\par -Injected b/L knees with Supartz 9/26/19 first dose.  \par -Rx Voltaren gel for b/L knees TID\par -Advised PT for knee osteoarthritis, a Requisition for this was given. \par -Can use bandage/brace for a few hours if planning to walk around a lot that day, but advised not to wear it 24/7. Advised further comment from ortho given likely meniscal damage. \par \par Osteoporosis\par -Patient and her daughter would like to discuss prolia with her son (a physician), and Dr. Collins her PCP. \par -Baseline pre-treatment labs if they change their mind and choose to go ahead with it. \par -XR right foot to eval for fracture was negative. 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 Small plantar and posterior calcaneal enthesophytes present. \par \par \par next routine follow up 3 months. \par \par

## 2019-10-04 NOTE — HISTORY OF PRESENT ILLNESS
[FreeTextEntry1] : 89 year old woman with hx of HTN, CKD, HLD, shingles, Left GTB/ left bicipital tendinitis presents for f/u of knee pain and hyperuricemia.  She has crystal proven gout, +MSU crystals in synovial fluid from knee.\par \par She is here for her Supartz injections, but also wants to discuss gout medications. \par \par She was advised by Dr. Collins to increase colchicine to BID dosing.  She thinks it has helped with the swelling and overall inflammation.  But she has now run out.  She remains on a statin.  She denies any muscle issues.  \par \par She was on the allopurinol for a few days, but then stopped.  \par \par \par She probably had gone to an urgent care, and they told her it might have been a stress fracture that hadn't been seen on XR. She then went to a podiatrist who told her it was gout, and gave her CS injections locally. It may have helped. Her uric acid at the time was 9.6. She thinks her knees might be swollen. I have aspirated her knees in the past, +MSU intra and extracellular crystals seen.\par \par She denies fevers, chills, or rash. She denies trauma to the knees, or recent fall. She also has c/o back pain. \par \par \par \par No fractures since last visit. \par Still does not want osteoporosis therapy. \par  \par \par

## 2019-10-04 NOTE — PROCEDURE
[FreeTextEntry1] : Date: 10/4/19\par Right knee Supartz injection\par The procedure risks was discussed with the patient.\par Indications: therapeutic purposes \par #1 site identified in the Right knee . Verbal consent was obtained. \par Anesthesia was with ethyl chloride.\par The patient was prepped with betadine solution. \par A 21 gauge 1.5 inch needle was used.  1mL 1% xylocaine injected local infiltration . \par Injectables:  Supartz injected. \par The patient tolerated the procedure well..\par There were no complications. Handouts/patient instructions were given to patient . patient was instructed to call if redness at site, a decrease in range of motion or an increase in pain is noted after procedure.\par \par Procedure #2: Left knee\par Identical procedure repeated in left knee, except 4 cc of clear yellow fluid was aspirated before injected Supartz.  \par

## 2019-10-04 NOTE — ASSESSMENT
[FreeTextEntry1] : 89 y/o woman here for f/u of OA and gout. \par \par Plan:\par \par Crystal proven gout, uric acid 9.6\par -Fluid aspirated from both knees today 9/26/19. Injected b/L knees with 80mg depomedrol and she received first dose of supartz b/L.\par -MSU crystals were also seen in MSK guided right knee aspiration.   \par -Was advised by Dr. Collins to increase to BID.  She states she tolerated it well.  \par -She was on and off allopurinol 100mg daily.  Has been off for past few weeks.  \par -Check uric acid in 4 weeks.  \par -Patient would like to check with nephrologist first about these 2 meds before starting.  She was given the OK by Dr. Thompson.   \par -Risks and benefits of both colchicine and allopurinol were discussed, including but not limited to severe allergic reaction/rash, LFT elevation, GI bloating/diarrhea. Patient was advised to decrease freq of colchicine if diarrhea/abd pain ensues. \par -Standby Rx of prednisone given in event gout flare occurs upon starting allopurinol. \par \par OA knees\par -Injected b/L knees with Supartz 9/26/19 first dose.  \par -Rx Voltaren gel for b/L knees TID\par -Advised PT for knee osteoarthritis, a Requisition for this was given. \par -Can use bandage/brace for a few hours if planning to walk around a lot that day, but advised not to wear it 24/7. Advised further comment from ortho given likely meniscal damage. \par \par Osteoporosis\par -Patient and her daughter would like to discuss prolia with her son (a physician), and Dr. Collins her PCP. \par -Baseline pre-treatment labs if they change their mind and choose to go ahead with it. \par -XR right foot to eval for fracture was negative. 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 Small plantar and posterior calcaneal enthesophytes present. \par \par \par next routine follow up 3 months. \par \par

## 2019-10-15 ENCOUNTER — APPOINTMENT (OUTPATIENT)
Dept: RHEUMATOLOGY | Facility: CLINIC | Age: 84
End: 2019-10-15
Payer: MEDICARE

## 2019-10-15 VITALS
SYSTOLIC BLOOD PRESSURE: 100 MMHG | BODY MASS INDEX: 31.45 KG/M2 | HEART RATE: 61 BPM | OXYGEN SATURATION: 93 % | WEIGHT: 156 LBS | DIASTOLIC BLOOD PRESSURE: 60 MMHG | HEIGHT: 59 IN

## 2019-10-15 VITALS — SYSTOLIC BLOOD PRESSURE: 144 MMHG | DIASTOLIC BLOOD PRESSURE: 74 MMHG

## 2019-10-15 PROCEDURE — 90662 IIV NO PRSV INCREASED AG IM: CPT

## 2019-10-15 PROCEDURE — 99214 OFFICE O/P EST MOD 30 MIN: CPT | Mod: 25

## 2019-10-15 PROCEDURE — G0008: CPT

## 2019-10-15 PROCEDURE — 20610 DRAIN/INJ JOINT/BURSA W/O US: CPT | Mod: 50

## 2019-10-15 RX ORDER — SODIUM HYALURONATE INTRA-ARTICULAR SOLN PREF SYR 25 MG/2.5ML 25/2.5 MG/ML
25 SOLUTION PREFILLED SYRINGE INTRAARTICULAR
Refills: 0 | Status: COMPLETED | OUTPATIENT
Start: 2019-10-15

## 2019-10-15 RX ADMIN — SODIUM HYALURONATE INTRA-ARTICULAR SOLN PREF SYR 25 MG/2.5ML 0 MG/2.5ML: 25/2.5 SOLUTION PREFILLED SYRINGE at 00:00

## 2019-10-15 NOTE — PROCEDURE
[FreeTextEntry1] : Date: 10/15/19\par Right knee aspiration, CS and Supartz injection\par The procedure risks was discussed with the patient.\par Indications: therapeutic purposes \par #1 site identified in the Right knee . Verbal consent was obtained. \par Anesthesia was with ethyl chloride.\par The patient was prepped with betadine solution. \par A 21 gauge 1.5 inch needle was used.\par 12 cc of straw/ blood tinged fluid aspirated. \par Injectables:Supartz\par The patient tolerated the procedure well..\par There were no complications. Handouts/patient instructions were given to patient . patient was instructed to call if redness at site, a decrease in range of motion or an increase in pain is noted after procedure.\par \par \par Procedure #2\par Identical procedure repeated in left knee, but instead 17 cc of clear yellow fluid was aspirated. \par \par Fluid sent for cell count, crystals testing. Syringe tips were not maintained sterile on the field during post injection handling, and so fluid was not sent for culture. I do not suspect any infection in knees. \par \par \par Procedure #3\par Fluzone 0.5mL injected to right upper arm after alcohol prep. Patient tolerated injection well.\par

## 2019-10-15 NOTE — ASSESSMENT
[FreeTextEntry1] : 89 y/o woman here for f/u of OA, gout, and pseudogout.\par \par Plan:\par \par Crystal proven gout, uric acid 9.6\par -Fluid aspirated from both knees today 10/15/19\par  Injected b/L knees with 80mg depomedrol 9/26/19\par -She received 3rd dose of supartz b/L.\par -MSU crystals were also seen in MSK guided right knee aspiration. \par -Contiue allopurinol 100mg daily. She is tolerating it well.\par -Check uric acid in 4 weeks. \par -Continue colchicine 0.6mg QOD.  Tolerating this well.  \par -Risks and benefits of both colchicine and allopurinol were discussed, including but not limited to severe allergic reaction/rash, LFT elevation, GI bloating/diarrhea. Patient was advised to decrease freq of colchicine if diarrhea/abd pain ensues. \par -Standby Rx of prednisone given in event gout flare occurs upon starting allopurinol. \par \par OA knees\par -Injected b/L knees with Supartz today 10/15/19\par -Rx Voltaren gel for b/L knees TID\par -Advised PT for knee osteoarthritis, a Requisition for this was given. \par -Can use bandage/brace for a few hours if planning to walk around a lot that day, but advised not to wear it 24/7. Advised further comment from ortho given likely meniscal damage. \par \par Osteoporosis\par -Again declining treatment for osteoporosis.  Understands this is to help prevent fracture.  \par -XR right foot to eval for fracture was negative. 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 Small plantar and posterior calcaneal enthesophytes present. \par \par Need for flu shot\par -Administered high dose fluzone today to right deltoid.  \par \par \par next routine follow up 2 months, sooner if needed.  \par \par  \par

## 2019-10-15 NOTE — HISTORY OF PRESENT ILLNESS
[FreeTextEntry1] : 89 year old woman with hx of HTN, CKD, HLD, shingles, Left GTB/ left bicipital tendinitis presents for f/u of knee pain and hyperuricemia. She has crystal proven gout, +MSU crystals in synovial fluid from knee.\par \par She is currently on colchicine.  She takes it QOD.  \par She denies any abdominal pain or bloating.  \par She is tolerating the allouprinol 100mg well.  She has completed 2 weeks worth of it.  \par She denies any rash or any issues on the allopurinol. \par \par She denies fevers, chills, or rash. She denies trauma to the knees, or recent fall.\par \par No fractures since last visit. \par Still does not want osteoporosis therapy. \par

## 2019-10-16 ENCOUNTER — MEDICATION RENEWAL (OUTPATIENT)
Age: 84
End: 2019-10-16

## 2019-10-16 LAB
B PERT IGG+IGM PNL SER: CLEAR
B PERT IGG+IGM PNL SER: CLEAR
COLOR FLD: YELLOW
COLOR FLD: YELLOW
EOSINOPHIL # FLD MANUAL: 0 %
FLUID INTAKE SUBSTANCE CLASS: NORMAL
FLUID INTAKE SUBSTANCE CLASS: NORMAL
LYMPHOCYTES # FLD MANUAL: 14 %
LYMPHOCYTES # FLD MANUAL: 4 %
MESOTHL CELL NFR FLD: 0 %
MONOS+MACROS NFR FLD MANUAL: 22 %
MONOS+MACROS NFR FLD MANUAL: 84 %
NEUTS SEG # FLD MANUAL: 2 %
NEUTS SEG # FLD MANUAL: 74 %
NRBC # FLD: 0
RBC # FLD MANUAL: 10 /UL
RBC # FLD MANUAL: 44 /UL
SYCRY CLARITY: ABNORMAL
SYCRY COLOR: COLORLESS
SYCRY ID: ABNORMAL
SYCRY TUBE: NORMAL
TOTAL CELLS COUNTED FLD: 19 /UL
TOTAL CELLS COUNTED FLD: 87 /UL
TUBE TYPE: NORMAL
TUBE TYPE: NORMAL
UNIDENT CELLS NFR FLD MANUAL: 0 %
VARIANT LYMPHS # FLD MANUAL: 0 %

## 2019-11-15 ENCOUNTER — APPOINTMENT (OUTPATIENT)
Dept: RHEUMATOLOGY | Facility: CLINIC | Age: 84
End: 2019-11-15
Payer: MEDICARE

## 2019-11-15 VITALS
DIASTOLIC BLOOD PRESSURE: 70 MMHG | BODY MASS INDEX: 32.66 KG/M2 | WEIGHT: 162 LBS | SYSTOLIC BLOOD PRESSURE: 150 MMHG | TEMPERATURE: 94.6 F | HEIGHT: 59 IN | OXYGEN SATURATION: 95 % | HEART RATE: 64 BPM

## 2019-11-15 PROCEDURE — 20610 DRAIN/INJ JOINT/BURSA W/O US: CPT | Mod: RT

## 2019-11-15 RX ORDER — METHYLPRED ACET/NACL,ISO-OS/PF 80 MG/ML
80 VIAL (ML) INJECTION
Qty: 1 | Refills: 0 | Status: COMPLETED | OUTPATIENT
Start: 2019-11-15

## 2019-11-15 RX ADMIN — METHYLPREDNISOLONE ACETATE 0 MG/ML: 80 INJECTION, SUSPENSION INTRA-ARTICULAR; INTRALESIONAL; INTRAMUSCULAR; SOFT TISSUE at 00:00

## 2019-11-15 NOTE — PROCEDURE
[FreeTextEntry1] : Date: 11/15/19\par Right knee aspiration, CS injection\par The procedure risks was discussed with the patient.\par Indications: therapeutic purposes \par #1 site identified in the Right knee . Verbal consent was obtained. \par Anesthesia was with ethyl chloride.\par The patient was prepped with betadine solution. \par A 21 gauge 1.5 inch needle was used.\par 15 cc of straw/ blood tinged fluid aspirated. \par Injectables:depomedrol 80mg\par The patient tolerated the procedure well..\par There were no complications. Handouts/patient instructions were given to patient . patient was instructed to call if redness at site, a decrease in range of motion or an increase in pain is noted after procedure.\par \par Fluid sent for cell count, crystals testing. Syringe tips were not maintained sterile on the field during post injection handling, and so fluid was not sent for culture. I do not suspect any infection in knees. \par \par \par Procedure #2\par \par The procedure risks was discussed with the patient.\par Indications: therapeutic purposes \par #1 site identified in the Right PAB . Verbal consent was obtained. \par Anesthesia was with ethyl chloride.\par The patient was prepped with betadine solution. \par A 25 gauge 1.5 inch needle was used.\par Injectables: Depomedrol 80 mg was injected into the site The Depomedrol was mixed with 1mL of xylocaine 1%. \par The patient tolerated the procedure well..\par There were no complications. Handouts/patient instructions were given to patient . patient was instructed to call if redness at site, a decrease in range of motion or an increase in pain is noted after procedure. \par  \par \par \par \par

## 2019-11-15 NOTE — ASSESSMENT
[FreeTextEntry1] : Sent fluid for testing\par XRs b/L knees\par MRI right knee\par If fluid +for MSU crystals, will increase allopurinol.  Uric acid was 6.4

## 2019-11-16 LAB
B PERT IGG+IGM PNL SER: ABNORMAL
COLOR FLD: NORMAL
FLUID INTAKE SUBSTANCE CLASS: NORMAL
LYMPHOCYTES # FLD MANUAL: 12 %
MONOS+MACROS NFR FLD MANUAL: 82 %
NEUTS SEG # FLD MANUAL: 6 %
RBC # FLD MANUAL: ABNORMAL /UL
SYCRY CLARITY: ABNORMAL
SYCRY COLOR: ABNORMAL
SYCRY ID: ABNORMAL
SYCRY TUBE: NORMAL
TOTAL CELLS COUNTED FLD: 220 /UL
TUBE TYPE: NORMAL

## 2019-11-19 ENCOUNTER — RX RENEWAL (OUTPATIENT)
Age: 84
End: 2019-11-19

## 2019-12-03 ENCOUNTER — MEDICATION RENEWAL (OUTPATIENT)
Age: 84
End: 2019-12-03

## 2019-12-05 ENCOUNTER — FORM ENCOUNTER (OUTPATIENT)
Age: 84
End: 2019-12-05

## 2019-12-06 ENCOUNTER — APPOINTMENT (OUTPATIENT)
Dept: RADIOLOGY | Facility: CLINIC | Age: 84
End: 2019-12-06
Payer: MEDICARE

## 2019-12-06 ENCOUNTER — OUTPATIENT (OUTPATIENT)
Dept: OUTPATIENT SERVICES | Facility: HOSPITAL | Age: 84
LOS: 1 days | End: 2019-12-06
Payer: MEDICARE

## 2019-12-06 ENCOUNTER — APPOINTMENT (OUTPATIENT)
Dept: MRI IMAGING | Facility: CLINIC | Age: 84
End: 2019-12-06
Payer: MEDICARE

## 2019-12-06 DIAGNOSIS — M17.0 BILATERAL PRIMARY OSTEOARTHRITIS OF KNEE: ICD-10-CM

## 2019-12-06 DIAGNOSIS — M25.461 EFFUSION, RIGHT KNEE: ICD-10-CM

## 2019-12-06 DIAGNOSIS — Z00.8 ENCOUNTER FOR OTHER GENERAL EXAMINATION: ICD-10-CM

## 2019-12-06 PROCEDURE — 73721 MRI JNT OF LWR EXTRE W/O DYE: CPT | Mod: 26,RT

## 2019-12-06 PROCEDURE — 73564 X-RAY EXAM KNEE 4 OR MORE: CPT

## 2019-12-06 PROCEDURE — 73564 X-RAY EXAM KNEE 4 OR MORE: CPT | Mod: 26,50

## 2019-12-06 PROCEDURE — 73721 MRI JNT OF LWR EXTRE W/O DYE: CPT

## 2019-12-12 ENCOUNTER — APPOINTMENT (OUTPATIENT)
Dept: RHEUMATOLOGY | Facility: CLINIC | Age: 84
End: 2019-12-12
Payer: MEDICARE

## 2019-12-12 VITALS
HEIGHT: 59 IN | DIASTOLIC BLOOD PRESSURE: 64 MMHG | HEART RATE: 62 BPM | WEIGHT: 159 LBS | OXYGEN SATURATION: 94 % | SYSTOLIC BLOOD PRESSURE: 108 MMHG | BODY MASS INDEX: 32.05 KG/M2

## 2019-12-12 DIAGNOSIS — E79.0 HYPERURICEMIA W/OUT SIGNS OF INFLAMMATORY ARTHRITIS AND TOPHACEOUS DISEASE: ICD-10-CM

## 2019-12-12 PROCEDURE — 99214 OFFICE O/P EST MOD 30 MIN: CPT

## 2019-12-12 RX ORDER — DENOSUMAB 60 MG/ML
60 INJECTION SUBCUTANEOUS
Qty: 1 | Refills: 3 | Status: DISCONTINUED | COMMUNITY
Start: 2018-06-01 | End: 2019-12-12

## 2019-12-13 NOTE — ASSESSMENT
[FreeTextEntry1] : 89 y/o woman here for f/u of OA, gout, and pseudogout.\par \par Plan:\par \par Crystal proven gout, uric acid 9.6\par -Fluid aspirated from both knees  10/15/19\par  Injected left knee with 80mg depomedrol 9/26/19, right knee with 80mg depomedrol 11/15/19.\par -She received 3rd dose of supartz b/L last dose 10/15/19.  Can repeat 4/16/2020\par -MSU crystals were also seen in MSK guided right knee aspiration. \par -Contiue allopurinol 100mg daily. She is tolerating it well.\par -Check uric acid in 3 months\par -Continue colchicine 0.6mg QOD. Tolerating this well. \par -Risks and benefits of both colchicine and allopurinol were discussed, including but not limited to severe allergic reaction/rash, LFT elevation, GI bloating/diarrhea. Patient was advised to decrease freq of colchicine if diarrhea/abd pain ensues. \par -Standby Rx of prednisone given in event gout flare occurs upon starting allopurinol. \par \par OA knees\par -Injected b/L knees with Supartz 10/15/19\par -Reviewed R knee MRI.  Advised orthopedic consultation.  \par -Rx Voltaren gel for b/L knees TID\par -Advised PT for knee osteoarthritis, a Requisition for this was given. \par -Can use bandage/brace for a few hours if planning to walk around a lot that day, but advised not to wear it 24/7. Advised further comment from ortho given likely meniscal damage. \par -Advised to consider pain management.  She reports she had no issue with morphine in the hospital, but did feel loopy with Tramadol.  \par \par Osteoporosis\par -Again declining treatment for osteoporosis. Understands this is to help prevent fracture. \par -XR right foot to eval for fracture was negative. 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 Small plantar and posterior calcaneal enthesophytes present. \par \par Need for flu shot\par -Administered high dose fluzone 10/15/19 to right deltoid. \par \par \par next routine follow up 3months, sooner if needed for injection visit.  \par \par  \par  \par \par \par \par \par

## 2019-12-13 NOTE — HISTORY OF PRESENT ILLNESS
[FreeTextEntry1] : 89 year old woman with hx of HTN, CKD, HLD, shingles, Left GTB/ left bicipital tendinitis presents for f/u of knee pain and hyperuricemia. She has crystal proven gout, +MSU crystals as well as intra/extracellular CPPD crystals in synovial fluid from knee.\par \par She is currently on colchicine. She takes it QOD. \par She denies any abdominal pain or bloating. \par She is tolerating the allouprinol 100mg well. She has completed 2 weeks worth of it. \par She denies any rash or any issues on the allopurinol. \par \par Today, her left knee bothers her more than the right.  Both knees were injected with depomedrol last on 9/26/19.  She had MRI right knee which showed severe arthritis, meniscal tearing, large effusion.  \par Her pain level is 7-8/10 in intensity with 30-45 minutes of AM stiffness.  \par \par She denies fevers, chills, or rash. She denies trauma to the knees, or recent fall.\par \par \par No fractures since last visit. \par Still does not want osteoporosis therapy. \par \par \par

## 2019-12-16 ENCOUNTER — MEDICATION RENEWAL (OUTPATIENT)
Age: 84
End: 2019-12-16

## 2020-01-07 ENCOUNTER — MEDICATION RENEWAL (OUTPATIENT)
Age: 85
End: 2020-01-07

## 2020-01-21 ENCOUNTER — OUTPATIENT (OUTPATIENT)
Dept: OUTPATIENT SERVICES | Facility: HOSPITAL | Age: 85
LOS: 1 days | End: 2020-01-21
Payer: MEDICARE

## 2020-01-21 ENCOUNTER — APPOINTMENT (OUTPATIENT)
Dept: RADIOLOGY | Facility: CLINIC | Age: 85
End: 2020-01-21
Payer: MEDICARE

## 2020-01-21 DIAGNOSIS — Z00.8 ENCOUNTER FOR OTHER GENERAL EXAMINATION: ICD-10-CM

## 2020-01-21 PROCEDURE — 73521 X-RAY EXAM HIPS BI 2 VIEWS: CPT

## 2020-01-21 PROCEDURE — 73521 X-RAY EXAM HIPS BI 2 VIEWS: CPT | Mod: 26

## 2020-01-30 ENCOUNTER — APPOINTMENT (OUTPATIENT)
Dept: RHEUMATOLOGY | Facility: CLINIC | Age: 85
End: 2020-01-30

## 2020-02-07 ENCOUNTER — APPOINTMENT (OUTPATIENT)
Dept: RHEUMATOLOGY | Facility: CLINIC | Age: 85
End: 2020-02-07
Payer: MEDICARE

## 2020-02-07 VITALS
WEIGHT: 159 LBS | HEART RATE: 67 BPM | SYSTOLIC BLOOD PRESSURE: 132 MMHG | OXYGEN SATURATION: 97 % | DIASTOLIC BLOOD PRESSURE: 68 MMHG | BODY MASS INDEX: 32.11 KG/M2 | TEMPERATURE: 97.8 F

## 2020-02-07 DIAGNOSIS — R70.0 ELEVATED ERYTHROCYTE SEDIMENTATION RATE: ICD-10-CM

## 2020-02-07 DIAGNOSIS — R89.4 ABNORMAL IMMUNOLOGICAL FINDINGS IN SPECIMENS FROM OTHER ORGANS, SYSTEMS AND TISSUES: ICD-10-CM

## 2020-02-07 DIAGNOSIS — M16.0 BILATERAL PRIMARY OSTEOARTHRITIS OF HIP: ICD-10-CM

## 2020-02-07 PROCEDURE — 99215 OFFICE O/P EST HI 40 MIN: CPT

## 2020-02-07 RX ORDER — NEBIVOLOL HYDROCHLORIDE 10 MG/1
10 TABLET ORAL
Qty: 90 | Refills: 0 | Status: DISCONTINUED | COMMUNITY
Start: 2017-01-04 | End: 2020-02-07

## 2020-02-07 RX ORDER — SODIUM HYALURONATE INTRA-ARTICULAR SOLN PREF SYR 25 MG/2.5ML 25/2.5 MG/ML
25 SOLUTION PREFILLED SYRINGE INTRAARTICULAR
Qty: 6 | Refills: 0 | Status: DISCONTINUED | COMMUNITY
Start: 2019-01-05 | End: 2020-02-07

## 2020-02-07 RX ORDER — DICLOFENAC SODIUM 10 MG/G
1 GEL TOPICAL
Qty: 2 | Refills: 1 | Status: DISCONTINUED | COMMUNITY
Start: 2019-08-15 | End: 2020-02-07

## 2020-02-07 RX ORDER — CHROMIUM 200 MCG
TABLET ORAL
Refills: 0 | Status: DISCONTINUED | COMMUNITY
End: 2020-02-07

## 2020-02-07 RX ORDER — ATORVASTATIN CALCIUM 20 MG/1
20 TABLET, FILM COATED ORAL
Qty: 90 | Refills: 0 | Status: DISCONTINUED | COMMUNITY
Start: 2016-10-14 | End: 2020-02-07

## 2020-02-07 RX ORDER — SENNOSIDES 8.6 MG
TABLET ORAL
Refills: 0 | Status: DISCONTINUED | COMMUNITY
End: 2020-02-07

## 2020-02-07 RX ORDER — GABAPENTIN 300 MG/1
300 CAPSULE ORAL
Qty: 540 | Refills: 0 | Status: DISCONTINUED | COMMUNITY
Start: 2016-12-27 | End: 2020-02-07

## 2020-02-07 RX ORDER — ASPIRIN 325 MG/1
TABLET, FILM COATED ORAL
Refills: 0 | Status: DISCONTINUED | COMMUNITY
End: 2020-02-07

## 2020-02-07 NOTE — ASSESSMENT
[FreeTextEntry1] : 89 y/o woman with hx of OA, gout, and pseudogout, presents for visit to evaluate for PMR/TA.  Patient has according to labs, EBV IGM.  Her grandaughter currently has an active mononucleosis infection.  The weakness, anemia/thrombocytopenia could very well all be related to EBV infection.  Regarding PMR, she has good ROM in hips and shoulders now, but given the story presented to me, the possibility of PMR exists.  There was question about drug induced lupus in the context of a +JAN 1:80 homogenous and hydralazine use, but my suspicion is lower for this as the story better fits EBV or PMR.  I will, however, further test with histone antibody.  \par \par Plan:\par \par Weakness\par -Possibly related to acute EBV infection.  Will check EBV PCR\par -PMR also on differential, there is consideration for PMR triggered by the viral infection\par -She does not report headache now, but her daughter states she has had some discomfort in head.  \par -We discussed TA biopsy, but for now she would like to hold off.  I also then offered a temporal artery US which they would like to have done first.\par -Remain on prednisone 20mg daily for now, plan to decr to 15mg daily after 1 week.  \par   \par Crystal proven gout, uric acid 9.6.\par -Fluid aspirated from both knees 10/15/19\par  Injected left knee with 80mg depomedrol 9/26/19, right knee with 80mg depomedrol 11/15/19.\par -She received 3rd dose of supartz b/L last dose 10/15/19. Can repeat 4/16/2020\par -MSU crystals were also seen in MSK guided right knee aspiration. \par -Contiue allopurinol 100mg daily. She is tolerating it well.\par -Check uric acid in 3 months\par -Continue colchicine 0.6mg QOD. Tolerating this well. Consider discontinuing if she will remain on prednisone long term.  \par -Risks and benefits of both colchicine and allopurinol were discussed, including but not limited to severe allergic reaction/rash, LFT elevation, GI bloating/diarrhea. Patient was advised to decrease freq of colchicine if diarrhea/abd pain ensues. \par \par \par OA knees\par -Injected b/L knees with Supartz 10/15/19\par -Reviewed R knee MRI. Advised orthopedic consultation. \par -Rx Voltaren gel for b/L knees TID\par -Advised PT for knee osteoarthritis, a Requisition for this was given. \par -Can use bandage/brace for a few hours if planning to walk around a lot that day, but advised not to wear it 24/7. Advised further comment from ortho given likely meniscal damage. \par -Advised to consider pain management. She reports she had no issue with morphine in the hospital, but did feel loopy with Tramadol. \par \par Osteoporosis\par -Again declining treatment for osteoporosis. Understands this is to help prevent fracture. \par -XR right foot to eval for fracture was negative. 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 Small plantar and posterior calcaneal enthesophytes present. \par \par Need for flu shot\par -Administered high dose fluzone 10/15/19 to right deltoid. \par \par f/u after TA US performed and labs drawn

## 2020-02-07 NOTE — HISTORY OF PRESENT ILLNESS
[FreeTextEntry1] : 89 year old woman with hx of HTN, CKD, HLD, shingles, Left GTB/ left bicipital tendinitis presents for f/u of knee pain and hyperuricemia. She has crystal proven gout, +MSU crystals as well as intra/extracellular CPPD crystals in synovial fluid from knee.\par \par She is currently on colchicine. She takes it QOD. \par She denies any abdominal pain or bloating. \par She is tolerating the allouprinol 100mg well.\par She denies any rash or any issues on the allopurinol. \par \par Patient comes to evaluate recent symptoms.  On 1/18/20 at the end of an event, her daughter states she had generalized weakness, legs felt like lead.  She needed to be helped out of the event.  The next morning, she couldn't get out of bed.  \par She doesn't recall joint pain, but did have heavy/stiff feeling in legs.  She had a hard time lifting her arms overhead.  \par \par 2 days later she went to Dr. Collins, was Rx'd macrobid for possible UTI, and prednisone for possible PMR.  She had good response to prednisone.  She took a full week of prednisone, but then she missed 3 days worth and she couldn't get out of bed.  Her daughter resumed the prednisone at 20mg daily (had been Rx'd 10mg daily by Dr. Collins), and she felt well again.  She has some sinus infection and cough, but overall she is physically doing much better. \par \par Patient herself denies headache, scalp sensitivity, jaw tenderness.  She states she has some blurriness when reading at night, and plans to go to eye doctor.  \par \par Labs 1/20/20 show\par Decr H/H, PLT mildly low at 133, ESR 79, \par EBV IgM + and  > 160\par EBV IgG +\par JAN 1:80 homogenous\par \par Patient's grandaughter currently has Mono infection  \par She has some left sided hip pain, about 30 min in morning, then it gets better. \par Denies rash\par \par  She had MRI right knee which showed severe arthritis, meniscal tearing, large effusion. \par Her pain level is 7-8/10 in intensity with 30-45 minutes of AM stiffness. \par \par XR hip 1/20/20: Left hip with mild joint space narrowing

## 2020-02-12 ENCOUNTER — OUTPATIENT (OUTPATIENT)
Dept: OUTPATIENT SERVICES | Facility: HOSPITAL | Age: 85
LOS: 1 days | End: 2020-02-12

## 2020-02-12 ENCOUNTER — APPOINTMENT (OUTPATIENT)
Dept: CV DIAGNOSITCS | Facility: HOSPITAL | Age: 85
End: 2020-02-12
Payer: MEDICARE

## 2020-02-12 ENCOUNTER — APPOINTMENT (OUTPATIENT)
Dept: CARDIOLOGY | Facility: CLINIC | Age: 85
End: 2020-02-12
Payer: MEDICARE

## 2020-02-12 VITALS
BODY MASS INDEX: 32.05 KG/M2 | WEIGHT: 159 LBS | SYSTOLIC BLOOD PRESSURE: 123 MMHG | DIASTOLIC BLOOD PRESSURE: 73 MMHG | HEART RATE: 51 BPM | OXYGEN SATURATION: 97 % | RESPIRATION RATE: 16 BRPM | HEIGHT: 59 IN

## 2020-02-12 DIAGNOSIS — R51 HEADACHE: ICD-10-CM

## 2020-02-12 DIAGNOSIS — I10 ESSENTIAL (PRIMARY) HYPERTENSION: ICD-10-CM

## 2020-02-12 DIAGNOSIS — E78.5 HYPERLIPIDEMIA, UNSPECIFIED: ICD-10-CM

## 2020-02-12 DIAGNOSIS — Z13.6 ENCOUNTER FOR SCREENING FOR CARDIOVASCULAR DISORDERS: ICD-10-CM

## 2020-02-12 PROCEDURE — 93880 EXTRACRANIAL BILAT STUDY: CPT | Mod: 26

## 2020-02-12 PROCEDURE — 99205 OFFICE O/P NEW HI 60 MIN: CPT

## 2020-02-12 PROCEDURE — 93000 ELECTROCARDIOGRAM COMPLETE: CPT

## 2020-02-13 ENCOUNTER — TRANSCRIPTION ENCOUNTER (OUTPATIENT)
Age: 85
End: 2020-02-13

## 2020-02-20 ENCOUNTER — TRANSCRIPTION ENCOUNTER (OUTPATIENT)
Age: 85
End: 2020-02-20

## 2020-02-21 PROBLEM — Z13.6 ENCOUNTER FOR SCREENING FOR VASCULAR DISEASE: Status: ACTIVE | Noted: 2020-02-21

## 2020-02-21 PROBLEM — E78.5 HYPERLIPIDEMIA: Status: ACTIVE | Noted: 2017-09-22

## 2020-02-21 PROBLEM — R51 HEADACHE: Status: ACTIVE | Noted: 2020-02-11

## 2020-02-21 NOTE — PHYSICAL EXAM
[Normal Appearance] : normal appearance [General Appearance - Well Developed] : well developed [Well Groomed] : well groomed [General Appearance - Well Nourished] : well nourished [No Deformities] : no deformities [General Appearance - In No Acute Distress] : no acute distress [Normal Conjunctiva] : the conjunctiva exhibited no abnormalities [Normal Oral Mucosa] : normal oral mucosa [Eyelids - No Xanthelasma] : the eyelids demonstrated no xanthelasmas [No Oral Pallor] : no oral pallor [No Oral Cyanosis] : no oral cyanosis [Normal Jugular Venous A Waves Present] : normal jugular venous A waves present [No Jugular Venous Cavazos A Waves] : no jugular venous cavazos A waves [Normal Jugular Venous V Waves Present] : normal jugular venous V waves present [Heart Rate And Rhythm] : heart rate and rhythm were normal [Heart Sounds] : normal S1 and S2 [Murmurs] : no murmurs present [Respiration, Rhythm And Depth] : normal respiratory rhythm and effort [Exaggerated Use Of Accessory Muscles For Inspiration] : no accessory muscle use [Abdomen Soft] : soft [Abdomen Tenderness] : non-tender [Auscultation Breath Sounds / Voice Sounds] : lungs were clear to auscultation bilaterally [Abdomen Mass (___ Cm)] : no abdominal mass palpated [Abnormal Walk] : normal gait [Gait - Sufficient For Exercise Testing] : the gait was sufficient for exercise testing [Nail Clubbing] : no clubbing of the fingernails [Cyanosis, Localized] : no localized cyanosis [Petechial Hemorrhages (___cm)] : no petechial hemorrhages [Skin Color & Pigmentation] : normal skin color and pigmentation [] : no rash [Skin Lesions] : no skin lesions [No Venous Stasis] : no venous stasis [No Xanthoma] : no  xanthoma was observed [No Skin Ulcers] : no skin ulcer [Affect] : the affect was normal [No Anxiety] : not feeling anxious [Mood] : the mood was normal [Oriented To Time, Place, And Person] : oriented to person, place, and time

## 2020-02-21 NOTE — REASON FOR VISIT
[FreeTextEntry1] : Dear Dr. Silver:\par \par I had the pleasure of evaluating your patient, Ms. Karla Escoto.  As you know, she is a 89 year old woman with HTN, CKD, HLD, prior bout of shingles, knee pain/hyperuricemia/crystal proven gout on colchicine.\par \par Review of recent symptoms include events of 01/18/20 when she had complained of having generalized weakness.  She was treated for a possible UTI with macrobid and predisone for possible PMR.  She took a full week of prednisone, but then she missed 3 days worth and she couldn't get out of bed. Her daughter resumed the prednisone at 20mg daily (had been Rx'd 10mg daily by her PMD Dr. Collins), and she felt well again. She has some sinus infection and cough, but overall she is physically doing much better. \par \par Patient herself denies headache, scalp sensitivity, jaw tenderness. Because she reported having symptoms of visual blurriness, she is now being evaluated for possible GCA/TA. ESR was 40 as of 2/10/20 (while on prednisone).\par \par Physical examination was unremarkable. No temporal or scalp area tenderness.\par \par Carotid artery US performed in our vascular laboratory at [facility] noted mild atherosclerotic plaque in the right ICA and moderate atherosclerotic disease in the left ICA.  WIth regards to the axillary and temporal arteries, no significant intimal medial thickening was noted.  Patient and daughter informed of findings from ultrasound. She was advised to contact and see you for follow-up.\par \par Thank you for entrusting her care with us.\par \par Warmest regards,\par Ulises London\par \par  \par

## 2020-02-25 ENCOUNTER — LABORATORY RESULT (OUTPATIENT)
Age: 85
End: 2020-02-25

## 2020-02-27 ENCOUNTER — RX RENEWAL (OUTPATIENT)
Age: 85
End: 2020-02-27

## 2020-02-28 ENCOUNTER — TRANSCRIPTION ENCOUNTER (OUTPATIENT)
Age: 85
End: 2020-02-28

## 2020-03-03 ENCOUNTER — TRANSCRIPTION ENCOUNTER (OUTPATIENT)
Age: 85
End: 2020-03-03

## 2020-03-09 ENCOUNTER — TRANSCRIPTION ENCOUNTER (OUTPATIENT)
Age: 85
End: 2020-03-09

## 2020-03-09 ENCOUNTER — NON-APPOINTMENT (OUTPATIENT)
Age: 85
End: 2020-03-09

## 2020-03-13 ENCOUNTER — APPOINTMENT (OUTPATIENT)
Dept: RHEUMATOLOGY | Facility: CLINIC | Age: 85
End: 2020-03-13

## 2020-03-16 ENCOUNTER — TRANSCRIPTION ENCOUNTER (OUTPATIENT)
Age: 85
End: 2020-03-16

## 2020-03-19 ENCOUNTER — RX RENEWAL (OUTPATIENT)
Age: 85
End: 2020-03-19

## 2020-04-15 ENCOUNTER — RX RENEWAL (OUTPATIENT)
Age: 85
End: 2020-04-15

## 2020-05-19 ENCOUNTER — RX RENEWAL (OUTPATIENT)
Age: 85
End: 2020-05-19

## 2020-05-19 ENCOUNTER — APPOINTMENT (OUTPATIENT)
Dept: RHEUMATOLOGY | Facility: CLINIC | Age: 85
End: 2020-05-19
Payer: MEDICARE

## 2020-05-19 PROCEDURE — ZZZZZ: CPT

## 2020-05-29 LAB
ALBUMIN SERPL ELPH-MCNC: 4.6 G/DL
ALP BLD-CCNC: 52 U/L
ALT SERPL-CCNC: 18 U/L
ANION GAP SERPL CALC-SCNC: 13 MMOL/L
AST SERPL-CCNC: 22 U/L
BILIRUB SERPL-MCNC: 0.5 MG/DL
BUN SERPL-MCNC: 48 MG/DL
CALCIUM SERPL-MCNC: 9.3 MG/DL
CHLORIDE SERPL-SCNC: 100 MMOL/L
CO2 SERPL-SCNC: 26 MMOL/L
CREAT SERPL-MCNC: 2.16 MG/DL
CRP SERPL-MCNC: 0.2 MG/DL
ERYTHROCYTE [SEDIMENTATION RATE] IN BLOOD BY WESTERGREN METHOD: 40 MM/HR
GLUCOSE SERPL-MCNC: 105 MG/DL
POTASSIUM SERPL-SCNC: 4.1 MMOL/L
PROT SERPL-MCNC: 6.8 G/DL
SODIUM SERPL-SCNC: 140 MMOL/L
URATE SERPL-MCNC: 6.2 MG/DL

## 2020-06-01 LAB
SARS-COV-2 IGG SERPL IA-ACNC: 0.01 INDEX
SARS-COV-2 IGG SERPL QL IA: NEGATIVE

## 2020-06-03 ENCOUNTER — TRANSCRIPTION ENCOUNTER (OUTPATIENT)
Age: 85
End: 2020-06-03

## 2020-06-05 ENCOUNTER — APPOINTMENT (OUTPATIENT)
Dept: RHEUMATOLOGY | Facility: CLINIC | Age: 85
End: 2020-06-05
Payer: MEDICARE

## 2020-06-05 ENCOUNTER — RX RENEWAL (OUTPATIENT)
Age: 85
End: 2020-06-05

## 2020-06-05 VITALS
WEIGHT: 165 LBS | DIASTOLIC BLOOD PRESSURE: 68 MMHG | HEART RATE: 57 BPM | TEMPERATURE: 95.9 F | OXYGEN SATURATION: 97 % | BODY MASS INDEX: 33.33 KG/M2 | SYSTOLIC BLOOD PRESSURE: 120 MMHG

## 2020-06-05 DIAGNOSIS — M48.061 SPINAL STENOSIS, LUMBAR REGION WITHOUT NEUROGENIC CLAUDICATION: ICD-10-CM

## 2020-06-05 PROCEDURE — 99214 OFFICE O/P EST MOD 30 MIN: CPT | Mod: 25

## 2020-06-05 PROCEDURE — 20610 DRAIN/INJ JOINT/BURSA W/O US: CPT | Mod: 50

## 2020-06-05 RX ORDER — METHYLPRED ACET/NACL,ISO-OS/PF 80 MG/ML
80 VIAL (ML) INJECTION
Qty: 2 | Refills: 0 | Status: COMPLETED | OUTPATIENT
Start: 2020-06-05

## 2020-06-05 RX ADMIN — METHYLPREDNISOLONE ACETATE 0 MG/ML: 80 INJECTION, SUSPENSION INTRA-ARTICULAR; INTRALESIONAL; INTRAMUSCULAR; SOFT TISSUE at 00:00

## 2020-06-05 NOTE — PROCEDURE
[FreeTextEntry1] : Date: 6/5/20\par Right knee aspiration\par The procedure risks was discussed with the patient.\par Indications: therapeutic purposes \par #1 site identified in the Right knee . Verbal consent was obtained. \par Anesthesia was with ethyl chloride.\par The patient was prepped with betadine solution. \par A 21 gauge 1.5 inch needle was used.\par Injectables: 1mL of xylocaine 1% was used for local anesthetic \par 3 cc fluid aspirated from right knee. Syringe was mixed with xylocaine, so no fluid aspirated. \par The patient tolerated the procedure well..\par There were no complications. Handouts/patient instructions were given to patient . patient was instructed to call if redness at site, a decrease in range of motion or an increase in pain is noted after procedure.\par \par \par Identical procedure repeated in left knee. 2cc of clear yellow fluid aspirated, and sent for testing. \par cell count, crystals.\par

## 2020-06-05 NOTE — ASSESSMENT
[FreeTextEntry1] : b/L knee 80 depo\par left 2cc, right 3 cc\par decr pred to 6mg daily in 1 week\par f/u eye\par when below 5mg pred, restart colchicine QOD\par f/u 2-3 months\par \par 89 y/o woman with hx of OA, gout, and pseudogout, presents for visit to evaluate for PMR/TA. Patient has according to labs, EBV IGM. Her grandaughter currently had an active mononucleosis infection. The weakness, anemia/thrombocytopenia could very well all be related to EBV infection. Regarding PMR, she had good ROM in hips and shoulders now, but given the story presented to me, the possibility of PMR did exist. There was question about drug induced lupus in the context of a +JAN 1:80 homogenous and hydralazine use, but my suspicion is lower for this as the story better fits EBV or PMR.\par \par Plan:\par \par Weakness\par -Possibly related to acute EBV infection. \par -PMR also on differential, there is consideration for PMR triggered by the viral infection\par -She does not report headache now, but her daughter states she has had some discomfort in head. \par -TA US was negative. They declined TA biopsy.\par -Remain on prednisone 7mg daily.  In one week decrease to 6mg daily.\par  \par Crystal proven gout, uric acid 6.2.  If MSU crystals seen on this synovial fluid, will incr allopurinol.  \par -Fluid aspirated from both knees today 6/5/20.\par  Injected  b/L knee today 6/5/20 with 80mg depomedrol.  \par -She received 3rd dose of supartz b/L last dose 10/15/19. Can repeat 4/16/2020, but they say this wasn't helpful.  \par -MSU crystals were also seen in MSK guided right knee aspiration. \par -Contiue allopurinol 100mg daily. She is tolerating it well.  May need to increase to 200mg daily.\par -Check uric acid in 3 months\par -Restart colchicine 0.6mg QOD once prednisone is down below 5mg daily.  \par -Risks and benefits of both colchicine and allopurinol were discussed, including but not limited to severe allergic reaction/rash, LFT elevation, GI bloating/diarrhea. Patient was advised to decrease freq of colchicine if diarrhea/abd pain ensues. \par \par \par OA knees\par -Injected b/L knees with Supartz 10/15/19. Delcining a new series.  \par -Reviewed R knee MRI. Advised orthopedic consultation. \par -Rx Voltaren gel for b/L knees TID\par -Advised PT for knee osteoarthritis, a Requisition for this was given. \par -Can use bandage/brace for a few hours if planning to walk around a lot that day, but advised not to wear it 24/7. Advised further comment from ortho given likely meniscal damage. \par -Advised to consider pain management. She reports she had no issue with morphine in the hospital, but did feel loopy with Tramadol. \par \par Osteoporosis\par -Declined treatment for osteoporosis. Understands this is to help prevent fracture. \par -XR right foot to eval for fracture was negative. 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 Small plantar and posterior calcaneal enthesophytes present. \par \par Need for flu shot\par -Administered high dose fluzone 10/15/19 to right deltoid. \par \par f/u after TA US performed and labs drawn. \par

## 2020-06-05 NOTE — HISTORY OF PRESENT ILLNESS
[FreeTextEntry1] : 89 year old woman with hx of HTN, CKD, HLD, shingles, Left GTB/ left bicipital tendinitis presents for f/u of knee pain and hyperuricemia. She has crystal proven gout, +MSU crystals as well as intra/extracellular CPPD crystals in synovial fluid from knee.\par \par She is currently off colchicine. She was takin it QOD. \par She denies any abdominal pain or bloating. \par She is tolerating the allouprinol 100mg well.\par She denies any rash or any issues on the allopurinol.\par She currently takes prednisone 7mg daily. \par TA US was negative for GCA changes.  She had previously declined TA biopsy.  \par \par Today she complains of 8/10 pain, b/L knees, hamstrings.  The pain is worsened upon weight bearing.  She feels fullness in left knee.\par She feels malaise/achy around 3pm.  \par AM stiffness is 30-45 minutes.  \par She tries to do stretching exercises which help.  \par She takes tylenol from time to time.  \par States the gel shots didn't really help last time.  \par Patient herself denies headache, scalp sensitivity, jaw tenderness. \par \par ESR 5/29/20 40, CRP WNL\par Uric acid 6.2\par Labs 1/20/20 show\par Decr H/H, PLT mildly low at 133, ESR 79, \par EBV IgM + and > 160\par EBV IgG +\par JAN 1:80 homogenous\par \par

## 2020-06-09 LAB
B PERT IGG+IGM PNL SER: CLEAR
COLOR FLD: NORMAL
FLUID INTAKE SUBSTANCE CLASS: NORMAL
NEUTS SEG # FLD MANUAL: NORMAL
RBC # FLD MANUAL: 206 /UL
SYCRY CLARITY: ABNORMAL
SYCRY COLOR: YELLOW
SYCRY ID: NORMAL
SYCRY TUBE: NORMAL
TOTAL CELLS COUNTED FLD: 30 /UL
TUBE TYPE: NORMAL

## 2020-07-07 LAB
CRP SERPL-MCNC: <0.1 MG/DL
ERYTHROCYTE [SEDIMENTATION RATE] IN BLOOD BY WESTERGREN METHOD: 32 MM/HR
ERYTHROCYTE [SEDIMENTATION RATE] IN BLOOD BY WESTERGREN METHOD: 32 MM/HR
HISTONE AB SER QL: 0.5 UNITS

## 2020-07-28 ENCOUNTER — INPATIENT (INPATIENT)
Facility: HOSPITAL | Age: 85
LOS: 2 days | Discharge: NURSING, REHAB OR LONG-TERM CARE | DRG: 536 | End: 2020-07-31
Attending: INTERNAL MEDICINE | Admitting: HOSPITALIST
Payer: MEDICARE

## 2020-07-28 VITALS
TEMPERATURE: 98 F | HEART RATE: 72 BPM | WEIGHT: 160.06 LBS | DIASTOLIC BLOOD PRESSURE: 84 MMHG | OXYGEN SATURATION: 99 % | RESPIRATION RATE: 17 BRPM | HEIGHT: 61 IN | SYSTOLIC BLOOD PRESSURE: 156 MMHG

## 2020-07-28 DIAGNOSIS — Z88.8 ALLERGY STATUS TO OTHER DRUGS, MEDICAMENTS AND BIOLOGICAL SUBSTANCES STATUS: ICD-10-CM

## 2020-07-28 LAB
ALBUMIN SERPL ELPH-MCNC: 3.9 G/DL — SIGNIFICANT CHANGE UP (ref 3.3–5)
ALP SERPL-CCNC: 62 U/L — SIGNIFICANT CHANGE UP (ref 40–120)
ALT FLD-CCNC: 33 U/L — SIGNIFICANT CHANGE UP (ref 12–78)
ANION GAP SERPL CALC-SCNC: 6 MMOL/L — SIGNIFICANT CHANGE UP (ref 5–17)
APTT BLD: 24.9 SEC — LOW (ref 27.5–35.5)
AST SERPL-CCNC: 26 U/L — SIGNIFICANT CHANGE UP (ref 15–37)
BILIRUB SERPL-MCNC: 0.5 MG/DL — SIGNIFICANT CHANGE UP (ref 0.2–1.2)
BUN SERPL-MCNC: 48 MG/DL — HIGH (ref 7–23)
CALCIUM SERPL-MCNC: 9 MG/DL — SIGNIFICANT CHANGE UP (ref 8.5–10.1)
CHLORIDE SERPL-SCNC: 108 MMOL/L — SIGNIFICANT CHANGE UP (ref 96–108)
CO2 SERPL-SCNC: 27 MMOL/L — SIGNIFICANT CHANGE UP (ref 22–31)
CREAT SERPL-MCNC: 2.34 MG/DL — HIGH (ref 0.5–1.3)
GLUCOSE SERPL-MCNC: 135 MG/DL — HIGH (ref 70–99)
HCT VFR BLD CALC: 31.7 % — LOW (ref 34.5–45)
HGB BLD-MCNC: 10.3 G/DL — LOW (ref 11.5–15.5)
INR BLD: 0.95 RATIO — SIGNIFICANT CHANGE UP (ref 0.88–1.16)
MCHC RBC-ENTMCNC: 30.7 PG — SIGNIFICANT CHANGE UP (ref 27–34)
MCHC RBC-ENTMCNC: 32.5 GM/DL — SIGNIFICANT CHANGE UP (ref 32–36)
MCV RBC AUTO: 94.6 FL — SIGNIFICANT CHANGE UP (ref 80–100)
PLATELET # BLD AUTO: 107 K/UL — LOW (ref 150–400)
POTASSIUM SERPL-MCNC: 3.9 MMOL/L — SIGNIFICANT CHANGE UP (ref 3.5–5.3)
POTASSIUM SERPL-SCNC: 3.9 MMOL/L — SIGNIFICANT CHANGE UP (ref 3.5–5.3)
PROT SERPL-MCNC: 7.6 GM/DL — SIGNIFICANT CHANGE UP (ref 6–8.3)
PROTHROM AB SERPL-ACNC: 11.1 SEC — SIGNIFICANT CHANGE UP (ref 10.6–13.6)
RBC # BLD: 3.35 M/UL — LOW (ref 3.8–5.2)
RBC # FLD: 15.2 % — HIGH (ref 10.3–14.5)
SODIUM SERPL-SCNC: 141 MMOL/L — SIGNIFICANT CHANGE UP (ref 135–145)
WBC # BLD: 7.14 K/UL — SIGNIFICANT CHANGE UP (ref 3.8–10.5)
WBC # FLD AUTO: 7.14 K/UL — SIGNIFICANT CHANGE UP (ref 3.8–10.5)

## 2020-07-28 PROCEDURE — 72125 CT NECK SPINE W/O DYE: CPT | Mod: 26

## 2020-07-28 PROCEDURE — 71045 X-RAY EXAM CHEST 1 VIEW: CPT | Mod: 26

## 2020-07-28 PROCEDURE — 73030 X-RAY EXAM OF SHOULDER: CPT | Mod: 26,RT

## 2020-07-28 PROCEDURE — 73502 X-RAY EXAM HIP UNI 2-3 VIEWS: CPT | Mod: 26,LT

## 2020-07-28 PROCEDURE — 70450 CT HEAD/BRAIN W/O DYE: CPT | Mod: 26

## 2020-07-28 RX ORDER — ACETAMINOPHEN 500 MG
975 TABLET ORAL ONCE
Refills: 0 | Status: COMPLETED | OUTPATIENT
Start: 2020-07-28 | End: 2020-07-28

## 2020-07-28 RX ORDER — FENTANYL CITRATE 50 UG/ML
50 INJECTION INTRAVENOUS ONCE
Refills: 0 | Status: DISCONTINUED | OUTPATIENT
Start: 2020-07-28 | End: 2020-07-31

## 2020-07-28 RX ADMIN — Medication 975 MILLIGRAM(S): at 23:16

## 2020-07-28 NOTE — ED PROVIDER NOTE - CLINICAL SUMMARY MEDICAL DECISION MAKING FREE TEXT BOX
89 y/o female s/p mechanical fall c/o of right shoulder and left hip pain. concern for left hip fracture, will get pre-op labs and imaging.

## 2020-07-28 NOTE — ED PROVIDER NOTE - PHYSICAL EXAMINATION
GENERALIZED APPEARANCE:  Comfortable, no acute distress.  SKIN:  Warm and dry.  HEAD:  Normocephalic.  EYES:  ROCHELLE, Conjunctiva pink, no icterus.  ENMT:  Mucus membranes moist.  CHEST AND RESPIRATORY:  Clear to auscultation with good air entry bilaterally.  HEART AND CARDIOVASCULAR:  Regular rate, no obvious murmur.  ABDOMEN AND GI:  Soft, non-tender, non-distended.  No rebound, no guarding.  EXTREMITIES: (+) tenderness and decreased ROM of right shoulder, (+) Left hip hematoma/Ecchymosis with associated tenderness to palpation, and distal dorsals pedis pulses intact  No deformity, edema, or calf tenderness.  NEURO: AAOx3, gross motor and sensory intact.  PSYCH: Normal affect.  SKIN: (+) Area of ecchymosis over left eyebrow

## 2020-07-28 NOTE — ED ADULT TRIAGE NOTE - CHIEF COMPLAINT QUOTE
BIBEMS from home s/p mechanical fall, denies anticoagulation, hitting her head or LOC. Patient reports L sided hip/elbow pain, bruise to the L eye and R shoulder pain. No S/S of physical injury noted. VSS. BIBEMS from home s/p mechanical fall, denies anticoagulation, hitting her head or LOC. Patient reports L sided hip/elbow pain, bruise to the L eye and R shoulder pain. No S/S of physical injury noted. VSS. Daughter followed the ambulance, Pippa Valdovinosrancho, [phone]

## 2020-07-28 NOTE — ED PROVIDER NOTE - PROGRESS NOTE DETAILS
spoke to daughter danilo Kulkarni  and updated her on pt being admitted. Pt failed ambulation trial. ZEFERINO Mustafa DO spoke with Dr. Rajat orellana to obs. will get ct hip to r/o occult fracture ZEFERINO Mustafa DO pt having pvc's will admit to telemetry ZEFERINO Mustafa DO

## 2020-07-28 NOTE — ED ADULT NURSE NOTE - CHIEF COMPLAINT QUOTE
BIBEMS from home s/p mechanical fall, denies anticoagulation, hitting her head or LOC. Patient reports L sided hip/elbow pain, bruise to the L eye and R shoulder pain. No S/S of physical injury noted. VSS. Daughter followed the ambulance, Pippa Valdovinosrancho, [phone]

## 2020-07-28 NOTE — ED ADULT NURSE NOTE - OBJECTIVE STATEMENT
Pt presents to er with complaints of falling while at the refrigerator, pt was holding the door with her right hand and fell backwards landing on her left hip, pt with right shoulder pain and left hip pain with ecchymosis/swelling, pt also hit her head, ecchymosis to left eye, denies loc or use of blood thinners,   pt with full sensation to upper/lower extremities bilaterally with +2 radial/pedal pulses, respirations unlabored, pt denies chest pain, SOB, safety maintained, will continue to monitor.

## 2020-07-28 NOTE — ED PROVIDER NOTE - OBJECTIVE STATEMENT
89 y/o female with unknown PMHx presents to ED c/o fall. Patient reports she was holding on to the refrigerator door with her right hand, the door opened and she slipped, falling onto her left side. Patient is complaining of right shoulder and left hip.     Denies dizziness, LOC, SOB 89 y/o female with PMHx of [place], and Decrased kidney function presents to ED c/o fall. Patient reports she was holding on to the refrigerator door with her right hand, the door opened and she slipped, falling onto her left side. Patient is complaining of right shoulder and left hip.     Denies dizziness, LOC, SOB

## 2020-07-28 NOTE — ED PROVIDER NOTE - NS_ ATTENDINGSCRIBEDETAILS _ED_A_ED_FT
I, Xiomara Jurado DO,  performed the initial face to face bedside interview with this patient regarding history of present illness, review of symptoms and relevant past medical, social and family history.  I completed an independent physical examination.  I was the initial provider who evaluated this patient.  The history, relevant review of systems, past medical and surgical history, medical decision making, and physical examination was documented by the scribe in my presence and I attest to the accuracy of the documentation.

## 2020-07-28 NOTE — ED PROVIDER NOTE - NS ED ROS FT
Constitutional: No fever.  Eyes: No vision changes.    Ears, Nose, Mouth, Throat: No sore throat.  Cardiovascular: No chest pain.  Respiratory: No difficulty breathing.  Gastrointestinal: No nausea or vomiting.  Genitourinary: No dysuria.  Musculoskeletal: (+) Right shoulder pain, (+) Left hip call, no joint pain.  Integumentary (skin and/or breast): No rash.  Neurological: No headache.  Psychiatric: No depression.  Endocrine:   No heat / cold intolerance.  Hematologic/Lymphatic: No easy bruising    Allergic/Immunologic:   No current allergic reactions.

## 2020-07-28 NOTE — ED PROVIDER NOTE - CARE PLAN
Principal Discharge DX:	Weakness generalized  Secondary Diagnosis:	Fall, initial encounter  Secondary Diagnosis:	CRI (chronic renal insufficiency)

## 2020-07-28 NOTE — ED ADULT NURSE NOTE - ED STAT RN HANDOFF DETAILS
Report given to 3N, made aware pt was straight cathed at 1130 and to monitor for retention and bladder scan in 6 hours. Also made aware pt was given PO lasix. Multiple attempts made to contact adithya Adame on cell and home number-no answer.

## 2020-07-29 DIAGNOSIS — N18.9 CHRONIC KIDNEY DISEASE, UNSPECIFIED: ICD-10-CM

## 2020-07-29 DIAGNOSIS — R53.1 WEAKNESS: ICD-10-CM

## 2020-07-29 LAB
ALBUMIN SERPL ELPH-MCNC: 3.5 G/DL — SIGNIFICANT CHANGE UP (ref 3.3–5)
ALP SERPL-CCNC: 48 U/L — SIGNIFICANT CHANGE UP (ref 40–120)
ALT FLD-CCNC: 25 U/L — SIGNIFICANT CHANGE UP (ref 12–78)
ANION GAP SERPL CALC-SCNC: 6 MMOL/L — SIGNIFICANT CHANGE UP (ref 5–17)
APPEARANCE UR: CLEAR — SIGNIFICANT CHANGE UP
AST SERPL-CCNC: 24 U/L — SIGNIFICANT CHANGE UP (ref 15–37)
BASOPHILS # BLD AUTO: 0.01 K/UL — SIGNIFICANT CHANGE UP (ref 0–0.2)
BASOPHILS # BLD AUTO: 0.01 K/UL — SIGNIFICANT CHANGE UP (ref 0–0.2)
BASOPHILS NFR BLD AUTO: 0.1 % — SIGNIFICANT CHANGE UP (ref 0–2)
BASOPHILS NFR BLD AUTO: 0.1 % — SIGNIFICANT CHANGE UP (ref 0–2)
BILIRUB SERPL-MCNC: 0.4 MG/DL — SIGNIFICANT CHANGE UP (ref 0.2–1.2)
BILIRUB UR-MCNC: NEGATIVE — SIGNIFICANT CHANGE UP
BUN SERPL-MCNC: 45 MG/DL — HIGH (ref 7–23)
CALCIUM SERPL-MCNC: 8.3 MG/DL — LOW (ref 8.5–10.1)
CHLORIDE SERPL-SCNC: 111 MMOL/L — HIGH (ref 96–108)
CO2 SERPL-SCNC: 26 MMOL/L — SIGNIFICANT CHANGE UP (ref 22–31)
COLOR SPEC: YELLOW — SIGNIFICANT CHANGE UP
CREAT SERPL-MCNC: 1.92 MG/DL — HIGH (ref 0.5–1.3)
DIFF PNL FLD: NEGATIVE — SIGNIFICANT CHANGE UP
EOSINOPHIL # BLD AUTO: 0 K/UL — SIGNIFICANT CHANGE UP (ref 0–0.5)
EOSINOPHIL # BLD AUTO: 0.02 K/UL — SIGNIFICANT CHANGE UP (ref 0–0.5)
EOSINOPHIL NFR BLD AUTO: 0 % — SIGNIFICANT CHANGE UP (ref 0–6)
EOSINOPHIL NFR BLD AUTO: 0.3 % — SIGNIFICANT CHANGE UP (ref 0–6)
GLUCOSE SERPL-MCNC: 107 MG/DL — HIGH (ref 70–99)
GLUCOSE UR QL: NEGATIVE MG/DL — SIGNIFICANT CHANGE UP
HCT VFR BLD CALC: 28.4 % — LOW (ref 34.5–45)
HGB BLD-MCNC: 9.1 G/DL — LOW (ref 11.5–15.5)
IMM GRANULOCYTES NFR BLD AUTO: 1.6 % — HIGH (ref 0–1.5)
IMM GRANULOCYTES NFR BLD AUTO: 2.4 % — HIGH (ref 0–1.5)
KETONES UR-MCNC: NEGATIVE — SIGNIFICANT CHANGE UP
LEUKOCYTE ESTERASE UR-ACNC: NEGATIVE — SIGNIFICANT CHANGE UP
LYMPHOCYTES # BLD AUTO: 1.01 K/UL — SIGNIFICANT CHANGE UP (ref 1–3.3)
LYMPHOCYTES # BLD AUTO: 1.06 K/UL — SIGNIFICANT CHANGE UP (ref 1–3.3)
LYMPHOCYTES # BLD AUTO: 14.5 % — SIGNIFICANT CHANGE UP (ref 13–44)
LYMPHOCYTES # BLD AUTO: 14.8 % — SIGNIFICANT CHANGE UP (ref 13–44)
MCHC RBC-ENTMCNC: 30.7 PG — SIGNIFICANT CHANGE UP (ref 27–34)
MCHC RBC-ENTMCNC: 32 GM/DL — SIGNIFICANT CHANGE UP (ref 32–36)
MCV RBC AUTO: 95.9 FL — SIGNIFICANT CHANGE UP (ref 80–100)
MONOCYTES # BLD AUTO: 0.48 K/UL — SIGNIFICANT CHANGE UP (ref 0–0.9)
MONOCYTES # BLD AUTO: 0.66 K/UL — SIGNIFICANT CHANGE UP (ref 0–0.9)
MONOCYTES NFR BLD AUTO: 6.7 % — SIGNIFICANT CHANGE UP (ref 2–14)
MONOCYTES NFR BLD AUTO: 9.5 % — SIGNIFICANT CHANGE UP (ref 2–14)
NEUTROPHILS # BLD AUTO: 5.16 K/UL — SIGNIFICANT CHANGE UP (ref 1.8–7.4)
NEUTROPHILS # BLD AUTO: 5.42 K/UL — SIGNIFICANT CHANGE UP (ref 1.8–7.4)
NEUTROPHILS NFR BLD AUTO: 74 % — SIGNIFICANT CHANGE UP (ref 43–77)
NEUTROPHILS NFR BLD AUTO: 76 % — SIGNIFICANT CHANGE UP (ref 43–77)
NITRITE UR-MCNC: NEGATIVE — SIGNIFICANT CHANGE UP
PH UR: 6.5 — SIGNIFICANT CHANGE UP (ref 5–8)
PLATELET # BLD AUTO: 88 K/UL — LOW (ref 150–400)
POTASSIUM SERPL-MCNC: 3.4 MMOL/L — LOW (ref 3.5–5.3)
POTASSIUM SERPL-SCNC: 3.4 MMOL/L — LOW (ref 3.5–5.3)
PROT SERPL-MCNC: 6.6 GM/DL — SIGNIFICANT CHANGE UP (ref 6–8.3)
PROT UR-MCNC: NEGATIVE MG/DL — SIGNIFICANT CHANGE UP
RBC # BLD: 2.96 M/UL — LOW (ref 3.8–5.2)
RBC # FLD: 14.9 % — HIGH (ref 10.3–14.5)
SARS-COV-2 IGG SERPL QL IA: NEGATIVE — SIGNIFICANT CHANGE UP
SARS-COV-2 IGM SERPL IA-ACNC: 0.09 INDEX — SIGNIFICANT CHANGE UP
SARS-COV-2 RNA SPEC QL NAA+PROBE: SIGNIFICANT CHANGE UP
SARS-COV-2 RNA SPEC QL NAA+PROBE: SIGNIFICANT CHANGE UP
SODIUM SERPL-SCNC: 143 MMOL/L — SIGNIFICANT CHANGE UP (ref 135–145)
SP GR SPEC: 1.01 — SIGNIFICANT CHANGE UP (ref 1.01–1.02)
TSH SERPL-MCNC: 1.28 UU/ML — SIGNIFICANT CHANGE UP (ref 0.34–4.82)
UROBILINOGEN FLD QL: NEGATIVE MG/DL — SIGNIFICANT CHANGE UP
WBC # BLD: 6.97 K/UL — SIGNIFICANT CHANGE UP (ref 3.8–10.5)
WBC # FLD AUTO: 6.97 K/UL — SIGNIFICANT CHANGE UP (ref 3.8–10.5)

## 2020-07-29 PROCEDURE — 80048 BASIC METABOLIC PNL TOTAL CA: CPT

## 2020-07-29 PROCEDURE — 85379 FIBRIN DEGRADATION QUANT: CPT

## 2020-07-29 PROCEDURE — 82607 VITAMIN B-12: CPT

## 2020-07-29 PROCEDURE — 83540 ASSAY OF IRON: CPT

## 2020-07-29 PROCEDURE — 83735 ASSAY OF MAGNESIUM: CPT

## 2020-07-29 PROCEDURE — 72192 CT PELVIS W/O DYE: CPT | Mod: 26

## 2020-07-29 PROCEDURE — 73221 MRI JOINT UPR EXTREM W/O DYE: CPT | Mod: RT

## 2020-07-29 PROCEDURE — 12345: CPT | Mod: NC

## 2020-07-29 PROCEDURE — 72195 MRI PELVIS W/O DYE: CPT

## 2020-07-29 PROCEDURE — 97116 GAIT TRAINING THERAPY: CPT | Mod: GP

## 2020-07-29 PROCEDURE — 36415 COLL VENOUS BLD VENIPUNCTURE: CPT

## 2020-07-29 PROCEDURE — 73221 MRI JOINT UPR EXTREM W/O DYE: CPT | Mod: 26,RT

## 2020-07-29 PROCEDURE — 72195 MRI PELVIS W/O DYE: CPT | Mod: 26

## 2020-07-29 PROCEDURE — 83550 IRON BINDING TEST: CPT

## 2020-07-29 PROCEDURE — 72170 X-RAY EXAM OF PELVIS: CPT

## 2020-07-29 PROCEDURE — 85027 COMPLETE CBC AUTOMATED: CPT

## 2020-07-29 PROCEDURE — 82746 ASSAY OF FOLIC ACID SERUM: CPT

## 2020-07-29 PROCEDURE — 93010 ELECTROCARDIOGRAM REPORT: CPT

## 2020-07-29 PROCEDURE — 97162 PT EVAL MOD COMPLEX 30 MIN: CPT | Mod: GP

## 2020-07-29 PROCEDURE — 82728 ASSAY OF FERRITIN: CPT

## 2020-07-29 PROCEDURE — 76376 3D RENDER W/INTRP POSTPROCES: CPT | Mod: 26

## 2020-07-29 RX ORDER — ACETAMINOPHEN 500 MG
650 TABLET ORAL EVERY 6 HOURS
Refills: 0 | Status: DISCONTINUED | OUTPATIENT
Start: 2020-07-29 | End: 2020-07-31

## 2020-07-29 RX ORDER — POTASSIUM CHLORIDE 20 MEQ
40 PACKET (EA) ORAL ONCE
Refills: 0 | Status: COMPLETED | OUTPATIENT
Start: 2020-07-29 | End: 2020-07-29

## 2020-07-29 RX ORDER — HYDRALAZINE HCL 50 MG
25 TABLET ORAL DAILY
Refills: 0 | Status: DISCONTINUED | OUTPATIENT
Start: 2020-07-29 | End: 2020-07-30

## 2020-07-29 RX ORDER — GABAPENTIN 400 MG/1
300 CAPSULE ORAL
Refills: 0 | Status: DISCONTINUED | OUTPATIENT
Start: 2020-07-29 | End: 2020-07-31

## 2020-07-29 RX ORDER — FUROSEMIDE 40 MG
20 TABLET ORAL DAILY
Refills: 0 | Status: DISCONTINUED | OUTPATIENT
Start: 2020-07-29 | End: 2020-07-31

## 2020-07-29 RX ORDER — ALLOPURINOL 300 MG
100 TABLET ORAL DAILY
Refills: 0 | Status: DISCONTINUED | OUTPATIENT
Start: 2020-07-29 | End: 2020-07-31

## 2020-07-29 RX ORDER — ATORVASTATIN CALCIUM 80 MG/1
20 TABLET, FILM COATED ORAL AT BEDTIME
Refills: 0 | Status: DISCONTINUED | OUTPATIENT
Start: 2020-07-29 | End: 2020-07-31

## 2020-07-29 RX ORDER — OXYCODONE AND ACETAMINOPHEN 5; 325 MG/1; MG/1
1 TABLET ORAL EVERY 4 HOURS
Refills: 0 | Status: DISCONTINUED | OUTPATIENT
Start: 2020-07-29 | End: 2020-07-31

## 2020-07-29 RX ADMIN — Medication 650 MILLIGRAM(S): at 21:41

## 2020-07-29 RX ADMIN — GABAPENTIN 300 MILLIGRAM(S): 400 CAPSULE ORAL at 11:10

## 2020-07-29 RX ADMIN — Medication 5 MILLIGRAM(S): at 11:16

## 2020-07-29 RX ADMIN — Medication 20 MILLIGRAM(S): at 11:10

## 2020-07-29 RX ADMIN — GABAPENTIN 300 MILLIGRAM(S): 400 CAPSULE ORAL at 21:41

## 2020-07-29 RX ADMIN — Medication 975 MILLIGRAM(S): at 00:23

## 2020-07-29 RX ADMIN — Medication 40 MILLIEQUIVALENT(S): at 21:41

## 2020-07-29 RX ADMIN — Medication 100 MILLIGRAM(S): at 11:10

## 2020-07-29 RX ADMIN — Medication 25 MILLIGRAM(S): at 11:11

## 2020-07-29 RX ADMIN — Medication 650 MILLIGRAM(S): at 22:40

## 2020-07-29 RX ADMIN — ATORVASTATIN CALCIUM 20 MILLIGRAM(S): 80 TABLET, FILM COATED ORAL at 21:41

## 2020-07-29 NOTE — PHYSICAL THERAPY INITIAL EVALUATION ADULT - RANGE OF MOTION EXAMINATION, REHAB EVAL
bilateral lower extremity ROM was WFL (within functional limits)/excpet right shoulder elevation limited 0-80 deg due to pain/guarding/bilateral upper extremity ROM was WFL (within functional limits)

## 2020-07-29 NOTE — H&P ADULT - HISTORY OF PRESENT ILLNESS
89 y/o F with PMH of HTN, CKD, dyslipidemia, shingles, OA, osteoporosis, h/o greater tronchanteric bursitis, gout, h/o EBV, p/w fall. Patient states she was going to open the refrigerator door, and she thought she was holding on to the handle and went to open it, and fell on her L side. C/o L hip pain radiating down LLE and RUE shoulder pain radiating to elbow. Patient states she lives at home with her granddaughter and at baseline walks with a cane. However, patient was not able to get up in the ED 2/2 pain. Patient states she also hit her forehead on the floor and has a bruise. Patient denies cough, runny nose, sore throat, nausea, vomiting, abdominal pain, fever, chills, CP, SOB.     PSH: Bladder surgery, cataract surgery     Social Hx: Denies x 3    Family Hx: two sisters had cancer

## 2020-07-29 NOTE — PROGRESS NOTE ADULT - ASSESSMENT
89 y/o F with PMH of HTN, CKD, dyslipidemia, shingles, OA, osteoporosis, h/o greater tronchanteric bursitis, gout, h/o EBV, p/w fall    *Mechanical fall w/ inability to ambulate 2/2 hip pain  -PT consult   -Pain control  -CT - does not report fracture  -WILL GET MRI HIP to R/O OCCULT FRACTURE     *CKD vs ANDREW?  -Will hold lasix temporarily and trend creatinine to check for improvement  -Avoid nephrotoxic agents     *Sinus bradycardia in the 50s at bedside  -Tele monitoring  -TSH   -If stable -> f/u outpatient w/ cardiology    *Incidental finding on CT - enlarged lymph node 2.1x1.8cm in R submandibular gland  -F/u outpatient for further work up, may need biopsy to r/o malignancy. Discussed findings and recommendation with patient     *Frequent PVCs  -Patient admitted to tele for monitoring     *Anemia / Thrombocytopenia   -Trend and if stable -> f/u outpatient for further management    *Medication reconcilliation  -Patient doesn't recall meds she is on, reconciled based on DrFirst.     *Advance Directives  -Patient states HCP is her daughter Pippa.   Patient states she wishes to have cardiac resuscitation and mechanical intubation / ventilation at this point as she still feels well.    *DVT ppx  -SCDs (as patient has thrombocytopenia)     discussed with RN and will call adithya Das Vahid  89 y/o F with PMH of HTN, CKD, dyslipidemia, shingles, OA, osteoporosis, h/o greater tronchanteric bursitis, gout, h/o EBV, p/w fall    *Mechanical fall w/ inability to ambulate 2/2 hip pain  Rt shoulder limited ROM  -PT consult   -Pain control  -CT - does not report fracture  -WILL GET MRI HIP to R/O OCCULT FRACTURE   - will get MRI Rt shoulder r/o RC tear     *CKD vs ANDREW?  -Will hold lasix temporarily and trend creatinine to check for improvement  -Avoid nephrotoxic agents   will discuss with Dr Thompson    *Sinus bradycardia in the 50s at bedside  Freq PVCs, new RBBB per EKG   -Tele monitoring f/u  w/ cardiology    *Incidental finding on CT - enlarged lymph node 2.1x1.8cm in R submandibular gland  -F/u outpatient for further work up, may need biopsy to r/o malignancy. Discussed findings and recommendation with patient  *Anemia / Thrombocytopenia   -Trend and if stable -> f/u outpatient for further management  Family request Dr Crump    *Advance Directives  -Patient states HCP is her daughter Pippa.   Patient states she wishes to have cardiac resuscitation and mechanical intubation / ventilation at this point as she still feels well.    *DVT ppx  -SCDs (as patient has thrombocytopenia)     discussed with RN and daughter Pippa Vahid

## 2020-07-29 NOTE — H&P ADULT - ASSESSMENT
89 y/o F with PMH of HTN, CKD, dyslipidemia, shingles, OA, osteoporosis, h/o greater tronchanteric bursitis, gout, h/o EBV, p/w fall    *Mechanical fall w/ inability to ambulate 2/2 hip pain  -PT consult   -Pain control  -CT - does not report fracture  -If patient continues to have pain -> may need MRI for further evaluation     *CKD vs ANDREW?  -IVF and trend creatinine to check for improvement  -UA  -I/Os   -Avoid nephrotoxic agents     *Incidental finding on CT - enlarged lymph node 2.1x1.8cm in R submandibular gland  -F/u outpatient for further work up, may need biopsy to r/o malignancy. Discussed findings and recommendation with patient     *Frequent PVCs  -Patient admitted to tele for monitoring     *Anemia / Thrombocyotpenia   -Trend and if stable -> f/u outpatient for further management    *Medication reconcilliation  -Patient doesn't recall meds she is on, reconciled based on DrFirst. Will need to confirm in AM     *Advance Directives  -Patient states HCP is her daughter Pippa. Patient states she wishes to have cardiac resuscitation and mechanical intubation / ventilation at this point as she still feels well. Advance care planning <30 mins     *DVT ppx  -SCDs (as patient has thrombocytopenia) 89 y/o F with PMH of HTN, CKD, dyslipidemia, shingles, OA, osteoporosis, h/o greater tronchanteric bursitis, gout, h/o EBV, p/w fall    *Mechanical fall w/ inability to ambulate 2/2 hip pain  -PT consult   -Pain control  -CT - does not report fracture  -If patient continues to have pain -> may need MRI for further evaluation     *CKD vs ANDREW?  -Will hold lasix temporarily and trend creatinine to check for improvement  -UA  -I/Os   -Avoid nephrotoxic agents     *Sinus bradycardia in the 50s at bedside  -Tele monitoring  -TSH   -If stable -> f/u outpatient w/ cardiology    *Incidental finding on CT - enlarged lymph node 2.1x1.8cm in R submandibular gland  -F/u outpatient for further work up, may need biopsy to r/o malignancy. Discussed findings and recommendation with patient     *Frequent PVCs  -Patient admitted to tele for monitoring     *Anemia / Thrombocyotpenia   -Trend and if stable -> f/u outpatient for further management    *Medication reconcilliation  -Patient doesn't recall meds she is on, reconciled based on Fredrick. Will need to confirm in AM     *Advance Directives  -Patient states HCP is her daughter Pippa. Patient states she wishes to have cardiac resuscitation and mechanical intubation / ventilation at this point as she still feels well. Advance care planning <30 mins     *DVT ppx  -SCDs (as patient has thrombocytopenia)

## 2020-07-29 NOTE — PHYSICAL THERAPY INITIAL EVALUATION ADULT - PERTINENT HX OF CURRENT PROBLEM, REHAB EVAL
89 y/o F with PMH of HTN, CKD, dyslipidemia, shingles, OA, osteoporosis, h/o greater tronchanteric bursitis, gout, h/o EBV, p/w fall. Patient states she was going to open the refrigerator door, and she thought she was holding on to the handle and went to open it, and fell on her L side. C/o L hip pain radiating down LLE and RUE shoulder pain radiating to elbow. Patient states she lives at home with her granddaughter and at baseline walks with a cane.

## 2020-07-29 NOTE — ED ADULT NURSE REASSESSMENT NOTE - NS ED NURSE REASSESS COMMENT FT1
Assumed care of patient. Patient resting comfortably. Patient remains on cardiac monitor. Breakfast ordered for patient. No signs of acute distress noted.

## 2020-07-29 NOTE — PHYSICAL THERAPY INITIAL EVALUATION ADULT - MANUAL MUSCLE TESTING RESULTS, REHAB EVAL
except LLE grossly 2+/5, but likely due to apprehension and guarding in  anticipation of pain./no strength deficits were identified

## 2020-07-29 NOTE — ED ADULT NURSE REASSESSMENT NOTE - NS ED NURSE REASSESS COMMENT FT1
Pt straight cathed as per Verbal order from Dr.Beverly JOHNSON, 1100cc's of clear/yellow urine returned, pt states she feels relief after straight cath. Procedure performed under sterile technique.

## 2020-07-29 NOTE — ED ADULT NURSE REASSESSMENT NOTE - NS ED NURSE REASSESS COMMENT FT1
Pt has purewick in place with no output. Per pt  they "straight cathed her" this morning. Pt bladder scanned and >500, Dr. Aleman called and made aware. Pt straight cathed and 620 mls of clear yellow urine removed.

## 2020-07-29 NOTE — ED ADULT NURSE REASSESSMENT NOTE - NS ED NURSE REASSESS COMMENT FT1
Ambulation trial attempted at this time, pt was able to stand and take 2 steps and could no longer walk due to pain, pt assisted back to bed,  notified at this time. Pt's safety maintained and made comfortable in bed, will continue to monitor.

## 2020-07-29 NOTE — PHYSICAL THERAPY INITIAL EVALUATION ADULT - GENERAL OBSERVATIONS, REHAB EVAL
Pt rec'd supine in bed with dtr at bedside, pt endorses minimal left hip pain at rest, mildly apprehensive to move in anticipation of pain, however cooperative with PT.

## 2020-07-29 NOTE — ED ADULT NURSE REASSESSMENT NOTE - NS ED NURSE REASSESS COMMENT FT1
Pt offers no complaints at this time, still awaiting urine sample at this time, pt resting comfortably at this time, NSR on cm, safety maintained, will continue to monitor.

## 2020-07-29 NOTE — ED ADULT NURSE REASSESSMENT NOTE - NS ED NURSE REASSESS COMMENT FT1
reviewing EKG at this time, pt's safety maintained, updated on plan of care, will continue to monitor.
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
## 2020-07-30 DIAGNOSIS — I10 ESSENTIAL (PRIMARY) HYPERTENSION: ICD-10-CM

## 2020-07-30 DIAGNOSIS — R53.1 WEAKNESS: ICD-10-CM

## 2020-07-30 DIAGNOSIS — N18.3 CHRONIC KIDNEY DISEASE, STAGE 3 (MODERATE): ICD-10-CM

## 2020-07-30 DIAGNOSIS — E78.00 PURE HYPERCHOLESTEROLEMIA, UNSPECIFIED: ICD-10-CM

## 2020-07-30 DIAGNOSIS — I45.10 UNSPECIFIED RIGHT BUNDLE-BRANCH BLOCK: ICD-10-CM

## 2020-07-30 LAB
ANION GAP SERPL CALC-SCNC: 10 MMOL/L — SIGNIFICANT CHANGE UP (ref 5–17)
BUN SERPL-MCNC: 45 MG/DL — HIGH (ref 7–23)
CALCIUM SERPL-MCNC: 9 MG/DL — SIGNIFICANT CHANGE UP (ref 8.5–10.1)
CHLORIDE SERPL-SCNC: 108 MMOL/L — SIGNIFICANT CHANGE UP (ref 96–108)
CO2 SERPL-SCNC: 25 MMOL/L — SIGNIFICANT CHANGE UP (ref 22–31)
CREAT SERPL-MCNC: 2.01 MG/DL — HIGH (ref 0.5–1.3)
D DIMER BLD IA.RAPID-MCNC: 1012 NG/ML DDU — HIGH
GLUCOSE SERPL-MCNC: 110 MG/DL — HIGH (ref 70–99)
HCT VFR BLD CALC: 31.8 % — LOW (ref 34.5–45)
HGB BLD-MCNC: 10.2 G/DL — LOW (ref 11.5–15.5)
MAGNESIUM SERPL-MCNC: 2.6 MG/DL — SIGNIFICANT CHANGE UP (ref 1.6–2.6)
MCHC RBC-ENTMCNC: 30.4 PG — SIGNIFICANT CHANGE UP (ref 27–34)
MCHC RBC-ENTMCNC: 32.1 GM/DL — SIGNIFICANT CHANGE UP (ref 32–36)
MCV RBC AUTO: 94.9 FL — SIGNIFICANT CHANGE UP (ref 80–100)
PLATELET # BLD AUTO: 99 K/UL — LOW (ref 150–400)
POTASSIUM SERPL-MCNC: 4.3 MMOL/L — SIGNIFICANT CHANGE UP (ref 3.5–5.3)
POTASSIUM SERPL-SCNC: 4.3 MMOL/L — SIGNIFICANT CHANGE UP (ref 3.5–5.3)
RBC # BLD: 3.35 M/UL — LOW (ref 3.8–5.2)
RBC # FLD: 15.5 % — HIGH (ref 10.3–14.5)
SODIUM SERPL-SCNC: 143 MMOL/L — SIGNIFICANT CHANGE UP (ref 135–145)
WBC # BLD: 6.18 K/UL — SIGNIFICANT CHANGE UP (ref 3.8–10.5)
WBC # FLD AUTO: 6.18 K/UL — SIGNIFICANT CHANGE UP (ref 3.8–10.5)

## 2020-07-30 PROCEDURE — 99232 SBSQ HOSP IP/OBS MODERATE 35: CPT

## 2020-07-30 PROCEDURE — 99233 SBSQ HOSP IP/OBS HIGH 50: CPT

## 2020-07-30 RX ORDER — HEPARIN SODIUM 5000 [USP'U]/ML
5000 INJECTION INTRAVENOUS; SUBCUTANEOUS EVERY 8 HOURS
Refills: 0 | Status: DISCONTINUED | OUTPATIENT
Start: 2020-07-30 | End: 2020-07-31

## 2020-07-30 RX ORDER — HYDRALAZINE HCL 50 MG
25 TABLET ORAL EVERY 12 HOURS
Refills: 0 | Status: DISCONTINUED | OUTPATIENT
Start: 2020-07-30 | End: 2020-07-31

## 2020-07-30 RX ORDER — HYDRALAZINE HCL 50 MG
25 TABLET ORAL ONCE
Refills: 0 | Status: COMPLETED | OUTPATIENT
Start: 2020-07-30 | End: 2020-07-30

## 2020-07-30 RX ADMIN — GABAPENTIN 300 MILLIGRAM(S): 400 CAPSULE ORAL at 21:29

## 2020-07-30 RX ADMIN — GABAPENTIN 300 MILLIGRAM(S): 400 CAPSULE ORAL at 10:32

## 2020-07-30 RX ADMIN — HEPARIN SODIUM 5000 UNIT(S): 5000 INJECTION INTRAVENOUS; SUBCUTANEOUS at 21:29

## 2020-07-30 RX ADMIN — Medication 20 MILLIGRAM(S): at 16:06

## 2020-07-30 RX ADMIN — Medication 5 MILLIGRAM(S): at 10:32

## 2020-07-30 RX ADMIN — Medication 650 MILLIGRAM(S): at 23:30

## 2020-07-30 RX ADMIN — Medication 650 MILLIGRAM(S): at 14:00

## 2020-07-30 RX ADMIN — HEPARIN SODIUM 5000 UNIT(S): 5000 INJECTION INTRAVENOUS; SUBCUTANEOUS at 13:23

## 2020-07-30 RX ADMIN — Medication 650 MILLIGRAM(S): at 13:23

## 2020-07-30 RX ADMIN — Medication 650 MILLIGRAM(S): at 22:55

## 2020-07-30 RX ADMIN — Medication 100 MILLIGRAM(S): at 10:32

## 2020-07-30 RX ADMIN — Medication 25 MILLIGRAM(S): at 21:29

## 2020-07-30 RX ADMIN — ATORVASTATIN CALCIUM 20 MILLIGRAM(S): 80 TABLET, FILM COATED ORAL at 21:29

## 2020-07-30 RX ADMIN — Medication 25 MILLIGRAM(S): at 16:06

## 2020-07-30 NOTE — CONSULT NOTE ADULT - ASSESSMENT
7/30/20:  Pt with above history s/p mechanical fall and no LOC or syncope with resultant occult pelvic fx as above.  No new cardiac issues so far.  Continue as outlined by medicine and ortho etal.  Will follow as treatment progresses.  No indication for further cardiac testing at this time.  Will follow.

## 2020-07-30 NOTE — CONSULT NOTE ADULT - SUBJECTIVE AND OBJECTIVE BOX
CHIEF COMPLAINT:  Patient is a 89y old  Female who presents with a chief complaint of fall (2020 17:39)      HPI: 20:  89 y/o F with well known to me with PMH of HTN, CKD, dyslipidemia, shingles, OA, osteoporosis, h/o greater trochanteric bursitis, gout, h/o EBV, p/w fall. Patient states she was going to open the refrigerator door, and she thought she was holding on to the handle and went to open it, and fell on her L side. C/o L hip pain radiating down LLE and RUE shoulder pain radiating to elbow. Patient states she lives at home with her granddaughter and at baseline walks with a cane. However, patient was not able to get up in the ED 2/2 pain. Patient states she also hit her forehead on the floor and has a bruise. Patient denies cough, runny nose, sore throat, nausea, vomiting, abdominal pain, fever, chills, CP, SOB.  She has maintained NSR on the monitor without AF or heart block seen.  Has chronic RBBB as before.      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 No nausea, vomiting, or hematemesis; No diarrhea or constipation. No melena or hematochezia.  GENITOURINARY: No dysuria, frequency or hematuria  NEUROLOGICAL: No numbness   SKIN: No itching, burning, rashes, or lesions   All other review of systems is negative unless indicated above    Vital Signs Last 24 Hrs  T(C): 36.9 (2020 06:11), Max: 37.7 (2020 23:06)  T(F): 98.5 (2020 06:11), Max: 99.8 (2020 23:06)  HR: 67 (2020 23:06) (53 - 67)  BP: 123/51 (2020 23:06) (123/51 - 179/48)  BP(mean): 84 (2020 07:12) (84 - 84)  RR: 15 (2020 23:06) (13 - 15)  SpO2: 96% (2020 23:06) (96% - 100%)    I&O's Summary    2020 07:01  -  2020 06:55  --------------------------------------------------------  IN: 0 mL / OUT: 620 mL / NET: -620 mL      PHYSICAL EXAM:   Constitutional: NAD, awake and alert, well-developed  HEENT: PERR, EOMI, Normal Hearing, MMM  Neck: Soft and supple, No LAD, No JVD  Respiratory: Breath sounds are clear bilaterally, No wheezing, rales or rhonchi  Cardiovascular: S1 and S2, regular rate and rhythm, soft LIANA at LLSB and base as before, no gallops or rubs  Gastrointestinal: Bowel Sounds present, soft, nontender, nondistended, no guarding, no rebound  Extremities: No peripheral edema  Vascular: 2+ peripheral pulses  Neurological: A/O x 3, no focal deficits  Skin: No rashes      MEDICATIONS  (STANDING):  allopurinol 100 milliGRAM(s) Oral daily  atorvastatin 20 milliGRAM(s) Oral at bedtime  furosemide    Tablet 20 milliGRAM(s) Oral daily  gabapentin 300 milliGRAM(s) Oral two times a day  hydrALAZINE 25 milliGRAM(s) Oral daily  predniSONE   Tablet 5 milliGRAM(s) Oral daily      LABS: All Labs Reviewed:                        9.1    6.97  )-----------( 88       ( 2020 06:31 )             28.4     07-29    143  |  111<H>  |  45<H>  ----------------------------<  107<H>  3.4<L>   |  26  |  1.92<H>    Ca    8.3<L>      2020 06:31    TPro  6.6  /  Alb  3.5  /  TBili  0.4  /  DBili  x   /  AST  24  /  ALT  25  /  AlkPhos  48  07-29    PT/INR - ( 2020 22:28 )   PT: 11.1 sec;   INR: 0.95 ratio         PTT - ( 2020 22:28 )  PTT:24.9 sec      BLOOD CULTURES:   LIPID PROFILE     RADIOLOGY:    MRI of Pelvis: 20:  FINDINGS:  Bladder: Urinary bladder is distended.  Bones/joints: Mildly displaced acute fractures of the left pubic body, inferior pubic ramus and left pubic root/anterior acetabulum. Acute Zone 1 left sacral ala fracture. No widening of the symphysis or sacroiliac joints. No acute fracture or dislocation of the hips. Levo curvature of the partially imaged lumbar spine with multilevel degenerative change.  IMPRESSION:  1. Mildly displaced acute fractures of the left pubic body, inferior pubic ramus and left pubic root/anterior acetabulum.  2. Acute zone 1 left sacral ala fracture.    MRI of Shoulder: 20:   FINDINGS:  Bones and cartilage: No acute fracture is identified. Minimal cystic change along the posterolateral aspect of the humeral head. Minimal cystic change at the acromioclavicular joint. Os acromiale is noted.  Joint spaces: Moderate joint effusion.  Glenoid labrum: No evidence of tear.  Supraspinatus tendon: Near-full-thickness, near-full-width bursal-sided tear.  Infraspinatus tendon: Tendinosis. No evidence of tear.  Subscapularis tendon: Moderate-grade articular-side partial thickness tear in the upper fibers.  Teres minor tendon: Tendinosis. No evidence of tear.  Tendon of biceps brachii: No evidence of tear.  IMPRESSION:  1. No acute fracture is identified.  2. Near-full-thickness, near-full-width bursal-sided tear of the supraspinatus tendon.  3. Moderate-grade articular-sided partial thickness tear in the upper fibers of the subscapularis tendon.    CT of Pelvis: 20:  Findings:  Arterial calcifications. Distended urinary bladder.  No acute pelvic fracture or dislocation. The femora are intact as visualized. Moderate degenerative changes are present visualized lumbar spine.  Impression: No acute osseous findings.    X-Ray of Hip: 20:  Findings:  Degenerative changes. No pelvic or left hip fractures. Vascular calcification.  Impression:  No fracture    CXR: 20:  FINDINGS:  The heart is mildly enlarged. Mild bilateral increased interstitial markings new from prior exam may be chronic in nature versus artifact however clinically correlate for less likely viral pneumonia.. No focal consolidations. Apices and hemidiaphragms are unremarkable. Visualized osseous structures are within normal limits.  IMPRESSION:  Mild bilateral increased interstitial markings new from prior exam may be chronic in nature versus artifact however clinically correlate for less likely viral pneumonia.. No focal consolidations    X-Ray of Shoulder: 20:  Findings:  Degenerative changes. No definite fracture or dislocation. Partially visualized right upper lobe is unremarkable.  Impression:  No fracture or dislocation    CT of C-spine and Head: 20:  FINDINGS:  Head:  Parenchymal volume loss is noted with prominent ventricles and sulci. No acute territorial infarct is demonstrated.  There is no evidence of an acute hemorrhage or mass-effect in the posterior fossa or in the supratentorial region. Senescent left basal ganglia calcifications are noted.  Evaluation of the osseous structures with the appropriate window appears unremarkable. The visualized paranasal sinuses and mastoid air cells are clear.  Cervical spine:  Bones: Straightening of the normal cervical lordosis is seen whichis likely due to muscle spasm versus patient positioning. The cervical vertebral body heights and alignment are maintained. No fracture or spondylolisthesis is demonstrated. Mild to moderate disc space narrowing and marginal osteophyte formation is seen at C5-C6 and C6-C7. Multilevel posterior disc osteophyte complexes are seen without evidence of severe spinal canal stenosis. Multilevel neural foraminal stenosis is noted.  Soft tissues: The prevertebral soft tissues are unremarkable. The thyroid is unremarkable. An enlarged lymph node measuring 2.1 x 1.8 cm is partially visualized inferior to the right submandibular gland.  Lung apices: Clear.  IMPRESSION:  1. No acute intracranial hemorrhage, territorial infarct, mass effect or calvarial fracture.  2. Multilevel degenerative changes of the cervical spine without evidence of a fracture.  3. Partially visualized right submandibular lymphadenopathy of uncertain etiology.      EK20:  Sinus bradycardia  Right bundle branch block    TELEMETRY:  NSR, RBBB    ECHO:

## 2020-07-30 NOTE — PROGRESS NOTE ADULT - ASSESSMENT
89 y/o F with PMH of HTN, CKD, dyslipidemia, shingles, OA, osteoporosis, h/o greater tronchanteric bursitis, gout, h/o EBV, p/w fall    *Mechanical fall w/ inability to ambulate 2/2 hip pain  Rt shoulder limited ROM  -PT consult   -Pain control  -CT - does not report fracture   - MRI HIP with OCCULT FRACTURE and MRI Rt shoulder with supraspinatus tear   -COnservative Mx and WBAT per discussion with Ortho  f/u Dr Andrea in 2 weeks     *CKD vs ANDREW?  -Cr at baseline per Dr Thompson  -cont lasix etc   -Avoid nephrotoxic agents       *Sinus bradycardia in the 50s at bedside  Freq PVCs, new RBBB per EKG   -Tele monitoring f/u  w/ cardiology Dr Collins as OP    *Incidental finding on CT - enlarged lymph node 2.1x1.8cm in R submandibular gland  -F/u outpatient for further work up, may need biopsy to r/o malignancy. Discussed findings and recommendation with patient    *Anemia / Thrombocytopenia   -Trend and if stable -> f/u outpatient for further management  check iron studies b12 fa etc   Family requests Dr Crump    *Advance Directives  -Patient states HCP is her daughter Pippa.   Patient states she wishes to have cardiac resuscitation and mechanical intubation / ventilation at this point as she still feels well.    *DVT ppx  -SCDs (as patient has thrombocytopenia)     discussed with RN and daughter Pippa Redding

## 2020-07-30 NOTE — CONSULT NOTE ADULT - SUBJECTIVE AND OBJECTIVE BOX
89 y/o F with PMH of HTN, CKD, dyslipidemia, shingles, OA, osteoporosis, h/o greater tronchanteric bursitis, gout, h/o EBV, p/w fall. Patient states she was going to open the refrigerator door, and she thought she was holding on to the handle and went to open it, and fell on her L side. C/o L hip pain radiating down LLE and RUE shoulder pain radiating to elbow. Patient states she lives at home with her granddaughter and at baseline walks with a cane. However, patient was not able to get up in the ED 2/2 pain. Patient states she also hit her forehead on the floor and has a bruise. Patient denies cough, runny nose, sore throat, nausea, vomiting, abdominal pain, fever, chills, CP, SOB.   today renal eval called for hx of CKD    pt feels well   c/o pain over fall sites   no sob or cp         PAST MEDICAL & SURGICAL HISTORY:  as above     Home Medications:  allopurinol 100 mg oral tablet: 1 tab(s) orally once a day (2020 16:02)  Bystolic 10 mg oral tablet: 1 tab(s) orally once a day (2020 16:02)  furosemide 20 mg oral tablet: 1 tab(s) orally once a day (2020 16:02)  gabapentin 600 mg oral tablet: 1 tab(s) orally 3 times a day (2020 16:02)  hydrALAZINE 25 mg oral tablet: 1 tab(s) orally once a day (2020 16:02)  predniSONE 5 mg oral tablet: 1 tab(s) orally once a day (2020 16:02)  rosuvastatin 5 mg oral capsule: 1 cap(s) orally once a day (2020 16:02)      MEDICATIONS  (STANDING):  allopurinol 100 milliGRAM(s) Oral daily  atorvastatin 20 milliGRAM(s) Oral at bedtime  furosemide    Tablet 20 milliGRAM(s) Oral daily  gabapentin 300 milliGRAM(s) Oral two times a day  heparin   Injectable 5000 Unit(s) SubCutaneous every 8 hours  hydrALAZINE 25 milliGRAM(s) Oral every 12 hours  predniSONE   Tablet 5 milliGRAM(s) Oral daily      Allergies    Ceftin (Hives; Rash)    Intolerances        SOCIAL HISTORY:  Denies ETOh,Smoking,     FAMILY HISTORY:      REVIEW OF SYSTEMS:    CONSTITUTIONAL: No weakness, fevers or chills  EYES/ENT: No visual changes;  No vertigo or throat pain   NECK: No pain or stiffness  RESPIRATORY: No cough, wheezing, hemoptysis; No shortness of breath  CARDIOVASCULAR: No chest pain or palpitations  GASTROINTESTINAL: No abdominal or epigastric pain. No nausea, vomiting, or hematemesis; No diarrhea or constipation. No melena or hematochezia.  GENITOURINARY: No dysuria, frequency or hematuria  NEUROLOGICAL: No numbness or weakness  SKIN: No itching, burning, rashes, or lesions   All other review of systems is negative unless indicated above.    VITAL:  T(C): , Max: 37.7 (20 @ 23:06)  T(F): , Max: 99.8 (20 @ 23:06)  HR: 64 (20 @ 08:47)  BP: 143/48 (20 @ 08:47)  BP(mean): --  RR: 18 (20 @ 08:47)  SpO2: 99% (20 @ 08:47)  Wt(kg): --    I and O's:     @ :  -   @ 07:00  --------------------------------------------------------  IN: 0 mL / OUT: 620 mL / NET: -620 mL     @ 07:  -   @ 12:40  --------------------------------------------------------  IN: 0 mL / OUT: 700 mL / NET: -700 mL          PHYSICAL EXAM:    Constitutional: NAD  HEENT: , EOMI,  MMM  Neck: No LAD, No JVD  Respiratory: CTAB  Cardiovascular: S1 and S2  Gastrointestinal: BS+, soft, NT/ND  Extremities: No peripheral edema  Neurological: A/O x 3, no focal deficits  Psychiatric: Normal mood, normal affect  : No Perales  Skin: No rashes  Access: Not applicable    LABS:                        10.2   6.18  )-----------( 99       ( 2020 08:11 )             31.8     07    143  |  108  |  45<H>  ----------------------------<  110<H>  4.3   |  25  |  2.01<H>    Ca    9.0      2020 08:11  Mg     2.6         TPro  6.6  /  Alb  3.5  /  TBili  0.4  /  DBili  x   /  AST  24  /  ALT  25  /  AlkPhos  48        Urine Studies:  Urinalysis Basic - ( 2020 05:37 )    Color: Yellow / Appearance: Clear / S.010 / pH: x  Gluc: x / Ketone: Negative  / Bili: Negative / Urobili: Negative mg/dL   Blood: x / Protein: Negative mg/dL / Nitrite: Negative   Leuk Esterase: Negative / RBC: x / WBC x   Sq Epi: x / Non Sq Epi: x / Bacteria: x            RADIOLOGY & ADDITIONAL STUDIES:

## 2020-07-30 NOTE — CONSULT NOTE ADULT - SUBJECTIVE AND OBJECTIVE BOX
Patient seen, chart reviewed, case discussed with hospitalist  89-year-old female with multiple chronic medical issues  Long history of anemia previously treated for anemia related to chronic kidney disease/history intermittent thrombocytopenia  Has previously been treated with packed red blood cell transfusions    Peripheral blood flow cytometry/next generation sequencing, [employer] August 6, 2019 Case number [id number]  No evidence of lymphoproliferative disorder, leukemia or high-grade myelodysplastic syndrome  Previous evaluation for plasma cell dyscrasia negative    Patient now admitted after mechanical fall  Noted to have fracture of left hip, right shoulder rotator cuff tear  Significant ecchymosis left flank and other extremities    Past medical history includes hypertension, dyslipidemia, chronic kidney disease, shingles, osteoporosis, bursitis    Physical examination well-developed 89-year-old female alert and oriented comfortable in bed    Skin warm dry with significant ecchymoses left lower calf upper extremities  Abdomen soft nontender no organomegaly noted    Complete Blood Count (07.30.20 @ 08:11)    WBC Count: 6.18 K/uL    RBC Count: 3.35 M/uL    Hemoglobin: 10.2 g/dL    Hematocrit: 31.8 %    Mean Cell Volume: 94.9 fl    Mean Cell Hemoglobin: 30.4 pg    Mean Cell Hemoglobin Conc: 32.1 gm/dL    Red Cell Distrib Width: 15.5 %    Platelet Count - Automated: 99 K/uL    Complete Blood Count + Automated Diff (07.28.20 @ 22:28)    WBC Count: 7.14 K/uL    RBC Count: 3.35 M/uL    Hemoglobin: 10.3 g/dL    Hematocrit: 31.7 %    Mean Cell Volume: 94.6 fl    Mean Cell Hemoglobin: 30.7 pg    Mean Cell Hemoglobin Conc: 32.5 gm/dL    Red Cell Distrib Width: 15.2 %    Platelet Count - Automated: 107 K/uL

## 2020-07-30 NOTE — CONSULT NOTE ADULT - SUBJECTIVE AND OBJECTIVE BOX
Orthopedics Consult Note:    This is a 89y Female admitted to medical service with left hip/groin pain s/p mechanical fall in her kitchen yesterday. Orthopedics consult placed for evaluation of MRI demonstrating left inferior and superior pubic rami fractures and a left sacral ala fracture. Pt evaluated at bedside reporting pain with ambulation. Pt also reporting pain and LRO Min the right shoulder. Pt reports she hit her head but denies LOC. Pt denies any other injury but reports chronic bilateral knee pain related to arthritis. Pt denies any numbness, tingling or parethesias. Pt denies fever or chills. Pt is a community ambulator with cane at baseline; sometimes walker.    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    Allergies:  Ceftin (Hives; Rash)    Vital Signs:  T(C): 36.7 (07-30-20 @ 15:48), Max: 37.7 (07-29-20 @ 23:06)  HR: 81 (07-30-20 @ 16:04) (64 - 81)  BP: 139/63 (07-30-20 @ 16:04) (123/51 - 143/48)  RR: 18 (07-30-20 @ 15:48) (15 - 18)  SpO2: 92% (07-30-20 @ 15:48) (92% - 99%)    Labs:                        10.2   6.18  )-----------( 99       ( 30 Jul 2020 08:11 )             31.8     07-30    143  |  108  |  45<H>  ----------------------------<  110<H>  4.3   |  25  |  2.01<H>    Ca    9.0      30 Jul 2020 08:11  Mg     2.6     07-30    TPro  6.6  /  Alb  3.5  /  TBili  0.4  /  DBili  x   /  AST  24  /  ALT  25  /  AlkPhos  48  07-29    PT/INR - ( 28 Jul 2020 22:28 )   PT: 11.1 sec;   INR: 0.95 ratio         PTT - ( 28 Jul 2020 22:28 )  PTT:24.9 sec    X-rays and CT scan of the pelvis and left hip demonstrate no obvious fracture or dislocation.  MRI of the pelvis demonstrates nondisplaced inferior pubic rami fracture, superior rami fractures at symphysis and at puboacetabular junction, and sacral ala fracture.  X-rays of the rightt shoulder demonstrate no evidence of fracture or dislocation.  MRI of the right shoulder demonstrate evidence of near-full thickness supraspinatus tear.    PE left hip/groin:  +mild swelling and large ecchymosis lateral hip, no erythema, skin intact, normothermic. +TTP over pubis; no TTP over groin or greater troch. Limited active ROM 2' pain. Able to passively flex hip to 90 without pain, mild pain at limits of IR/ER. Able to SLR. No pain with axial loading or log roll. Moving all toes and ankle, +EHL/FHL/TA/GS. SILT throughout. DP and PT pulses 2+.    PE right shoulder:  No swelling, no ecchymosis, no erythema, skin intact, normothermic. +TTP over anterior and lateral shoulder. LROM 2' pain and weakness. Able to actively flex and abduct to ~30", able to passively flex and abduct to ~85'. Moving wrist and all fingers, +AIN/PIN/Radial nerve/Median nerve/Ulnar nerve. SILT throughout. Radial pulse 2+.    Secondary Survey:  Left elbow with small lateral ecchymosis without bony TTP and full painless ROM, otherwise left knee, left ankle, RLE, LUE, right elbow, right wrist, and right hand with no swelling, no ecchymoses, no abrasions, no lacerations or any other signs of injury with full painless baseline ROM and no bony TTP. Able to SLR B/L LEs. No pain with axial loading or log roll B/L LEs. Sensation intact distally, moving all digits. Distal pulses intact.     Spine and ribs with no bony TTP.     Assessment:  89y Female with Left LC1 pelvis fractures and right RTC tear s/p fall yesterday.    Plan:  No acute orthopedic surgical intervention indicated at this time.  WBAT RUE and LLE.  Pain control.  Ice application.  DVT prophylaxis.  Follow-up with Dr. Andrea in the office in 2 weeks; call office for appointment.     Case discussed with Dr. Andrea who agrees with plan.

## 2020-07-30 NOTE — CONSULT NOTE ADULT - ASSESSMENT
89 yo female with hx of HTN, HLD, CKD 4   admitted after mechanical fall    today no complaints    CKD 4    Cr at baseline 2- 2.2    continue lasix   no NSAID   no ACE or ARB    daily labs     HTN   stable    continue home regimen     Mechanical Fall    per medicine    Thank you for the courtesy of this consult. We will follow this patient with you.   Management is subject to change if new information becomes available or patient condition changes.

## 2020-07-30 NOTE — CONSULT NOTE ADULT - ASSESSMENT
Impression 89-year-old female with history of anemia/thrombocytopenia, chronic kidney disease    Now admitted after mechanical fall fracture left hip damage right upper shoulder    Thrombocytopenia and anemia multifactorial in origin  Trauma/ecchymoses can contribute to a consumptive coagulopathy further impacting on platelets    Patient denies other significant bleeding    Anemia related to chronic kidney disease    For completeness we will check B12 folate and iron studies  Can consider supplementation if abnormal    As hemoglobin is greater than 10 for 2 of the last 3 days she is not a candidate for Procrit    Can follow-up with us in the outpatient setting    Above reviewed with hospitalist

## 2020-07-31 ENCOUNTER — TRANSCRIPTION ENCOUNTER (OUTPATIENT)
Age: 85
End: 2020-07-31

## 2020-07-31 VITALS — DIASTOLIC BLOOD PRESSURE: 45 MMHG | HEART RATE: 71 BPM | SYSTOLIC BLOOD PRESSURE: 156 MMHG

## 2020-07-31 LAB
ANION GAP SERPL CALC-SCNC: 5 MMOL/L — SIGNIFICANT CHANGE UP (ref 5–17)
BUN SERPL-MCNC: 51 MG/DL — HIGH (ref 7–23)
CALCIUM SERPL-MCNC: 8.7 MG/DL — SIGNIFICANT CHANGE UP (ref 8.5–10.1)
CHLORIDE SERPL-SCNC: 110 MMOL/L — HIGH (ref 96–108)
CO2 SERPL-SCNC: 26 MMOL/L — SIGNIFICANT CHANGE UP (ref 22–31)
CREAT SERPL-MCNC: 1.94 MG/DL — HIGH (ref 0.5–1.3)
FERRITIN SERPL-MCNC: 117 NG/ML — SIGNIFICANT CHANGE UP (ref 15–150)
FOLATE SERPL-MCNC: 15.7 NG/ML — SIGNIFICANT CHANGE UP
GLUCOSE SERPL-MCNC: 110 MG/DL — HIGH (ref 70–99)
HCT VFR BLD CALC: 30.8 % — LOW (ref 34.5–45)
HGB BLD-MCNC: 9.5 G/DL — LOW (ref 11.5–15.5)
IRON SATN MFR SERPL: 23 % — SIGNIFICANT CHANGE UP (ref 14–50)
IRON SATN MFR SERPL: 60 UG/DL — SIGNIFICANT CHANGE UP (ref 30–160)
MAGNESIUM SERPL-MCNC: 2.4 MG/DL — SIGNIFICANT CHANGE UP (ref 1.6–2.6)
MCHC RBC-ENTMCNC: 29.9 PG — SIGNIFICANT CHANGE UP (ref 27–34)
MCHC RBC-ENTMCNC: 30.8 GM/DL — LOW (ref 32–36)
MCV RBC AUTO: 96.9 FL — SIGNIFICANT CHANGE UP (ref 80–100)
PLATELET # BLD AUTO: 100 K/UL — LOW (ref 150–400)
POTASSIUM SERPL-MCNC: 3.9 MMOL/L — SIGNIFICANT CHANGE UP (ref 3.5–5.3)
POTASSIUM SERPL-SCNC: 3.9 MMOL/L — SIGNIFICANT CHANGE UP (ref 3.5–5.3)
RBC # BLD: 3.18 M/UL — LOW (ref 3.8–5.2)
RBC # FLD: 15.2 % — HIGH (ref 10.3–14.5)
SODIUM SERPL-SCNC: 141 MMOL/L — SIGNIFICANT CHANGE UP (ref 135–145)
TIBC SERPL-MCNC: 259 UG/DL — SIGNIFICANT CHANGE UP (ref 220–430)
UIBC SERPL-MCNC: 199 UG/DL — SIGNIFICANT CHANGE UP (ref 110–370)
VIT B12 SERPL-MCNC: 421 PG/ML — SIGNIFICANT CHANGE UP (ref 232–1245)
WBC # BLD: 6.5 K/UL — SIGNIFICANT CHANGE UP (ref 3.8–10.5)
WBC # FLD AUTO: 6.5 K/UL — SIGNIFICANT CHANGE UP (ref 3.8–10.5)

## 2020-07-31 PROCEDURE — 99239 HOSP IP/OBS DSCHRG MGMT >30: CPT

## 2020-07-31 PROCEDURE — 72170 X-RAY EXAM OF PELVIS: CPT | Mod: 26

## 2020-07-31 RX ORDER — NEBIVOLOL HYDROCHLORIDE 5 MG/1
1 TABLET ORAL
Qty: 0 | Refills: 0 | DISCHARGE

## 2020-07-31 RX ORDER — HYDRALAZINE HCL 50 MG
1 TABLET ORAL
Qty: 0 | Refills: 0 | DISCHARGE

## 2020-07-31 RX ORDER — HEPARIN SODIUM 5000 [USP'U]/ML
5000 INJECTION INTRAVENOUS; SUBCUTANEOUS
Qty: 0 | Refills: 0 | DISCHARGE
Start: 2020-07-31

## 2020-07-31 RX ORDER — ACETAMINOPHEN 500 MG
2 TABLET ORAL
Qty: 0 | Refills: 0 | DISCHARGE
Start: 2020-07-31

## 2020-07-31 RX ADMIN — OXYCODONE AND ACETAMINOPHEN 1 TABLET(S): 5; 325 TABLET ORAL at 18:31

## 2020-07-31 RX ADMIN — HEPARIN SODIUM 5000 UNIT(S): 5000 INJECTION INTRAVENOUS; SUBCUTANEOUS at 05:21

## 2020-07-31 RX ADMIN — Medication 25 MILLIGRAM(S): at 09:55

## 2020-07-31 RX ADMIN — GABAPENTIN 300 MILLIGRAM(S): 400 CAPSULE ORAL at 09:55

## 2020-07-31 RX ADMIN — Medication 650 MILLIGRAM(S): at 14:47

## 2020-07-31 RX ADMIN — Medication 20 MILLIGRAM(S): at 09:55

## 2020-07-31 RX ADMIN — Medication 100 MILLIGRAM(S): at 09:55

## 2020-07-31 RX ADMIN — Medication 650 MILLIGRAM(S): at 15:30

## 2020-07-31 RX ADMIN — HEPARIN SODIUM 5000 UNIT(S): 5000 INJECTION INTRAVENOUS; SUBCUTANEOUS at 14:47

## 2020-07-31 RX ADMIN — Medication 5 MILLIGRAM(S): at 09:55

## 2020-07-31 NOTE — DISCHARGE NOTE PROVIDER - REASON FOR ADMISSION
PHYSICAL THERAPY DISCHARGE NOTE  09/20/17 1500   Signing Clinician's Name / Credentials   Signing clinician's name / credentials Maria Luisa Milner, PT, DPT, OCS   Session Number   Session Number 10/12     Progress Note/Recertification   Progress Note Due Date 09/05/17   Adult Goals   PT Eval Goals 1;2;3;4   Goal 1   Goal Identifier 1   Goal Description Patient will be able to walk and turn head without symptoms or evidence for imbalance.    Target Date 09/05/17   Date Met 09/20/17   Goal 2   Goal Identifier 2   Goal Description Patient will be able to shop in grocery store without symtpoms.    Target Date 09/05/17   Date Met 09/12/17   Goal 3   Goal Identifier 3   Goal Description Patient will report no symptoms with quick head movements or body position changes.    Target Date 09/05/17   Date Met 09/20/17   Goal 4   Goal Identifier 4   Goal Description Patient will be able to return to dancing with wife without symptoms or imbalance.    Target Date 09/05/17   Date Met 09/12/17   Subjective Report   Subjective Report Patient reports no dizziness in the past week. Still getting headaches now and then.    Objective Measure 1   Objective Measure Pre visit   Details Headache 2/10, Dizziness 0/10    Objective Measure 2   Objective Measure Gait   Details normal gait, gait with head turns horizontal demonstrated no signs of imbalance or symptoms, gait with head turns verticle caused no symptoms or imbalance     Objective Measure 3   Objective Measure Balance   Details Rhomberg eyes closed 30+ seconds, able to tandem walk forwads and back without imbalance, tandem EC 60 seconds   Treatment Interventions   Interventions Neuromuscular Re-education   Neuromuscular Re-education   Minutes 30   Skilled Intervention vestibular, oculomotor and balance exercises to decrease dizziness and risk for falls    Patient Response 0/10 dizziness, 2/10 headache (no change) at end of session   Treatment Detail Tossing ball at rebounder,  catch and turn to pass to therapist x 10 each direction no symptoms, smooth pursuits, saccades, VOR, VOR cancellation, FT EC, tandem stand EC, tandem walk forwards and backwards, gait with head turns horizontal and verticle, convergence     Oculomotor Exam   Smooth Pursuit Normal   Smooth Pursuit Comment no symptoms   Saccades Normal   VOR Normal   VOR Cancellation Normal   Convergence Testing Normal   Convergence Testing Comments 1) 2 cm, 2) 2 cm, 3) 2 cm. no symptoms   Plan   Updates to plan of care pt has met all PT goals, discharge PT, follow up with Aubrey Sotelo as directed   Plan for next session discharge   Total Session Time   Timed Code Treatment Minutes 30   Total Treatment Time (sum of timed and untimed services) 30, nmr2   AMBULATORY CLINICS ONLY-MEDICAL AND TREATMENT DIAGNOSIS   Medical Diagnosis concussion   PT Diagnosis dizziness, imbalance        Please contact me with any questions or concerns.  Thank you for your referral.    Maria Luisa Milner, PT, DPT, OCS  Physical Therapist, Orthopedic Certified Specialist  [facility] - [facility]  [phone]     fall

## 2020-07-31 NOTE — PROGRESS NOTE ADULT - ASSESSMENT
7/30/20:  Pt with above history s/p mechanical fall and no LOC or syncope with resultant occult pelvic fx as above.  No new cardiac issues so far.  Continue as outlined by medicine and ortho etal.  Will follow as treatment progresses.  No indication for further cardiac testing at this time.  Will follow.    7/31/20:  No cardiac complaints.  Still with pelvic pain when walking.  PT to see and will likely need rehab.  Stable on the monitor and VSS on her current meds.  Continue as per medicine etal.  Dr Lopez will be covering over the weekend if needed.  Will otherwise follow intermittently prn.

## 2020-07-31 NOTE — DISCHARGE NOTE NURSING/CASE MANAGEMENT/SOCIAL WORK - PATIENT PORTAL LINK FT
You can access the FollowMyHealth Patient Portal offered by [facility] by registering at the following website: [email or web address]. By joining [facility]’s FollowMyHealth portal, you will also be able to view your health information using other applications (apps) compatible with our system.

## 2020-07-31 NOTE — DISCHARGE NOTE PROVIDER - CARE PROVIDER_API CALL
Clem Andrea  ORTHOPAEDIC SURGERY  [address] [place] [address]  Phone: [phone]  Fax: [phone]  Follow Up Time: 2 weeks    NIYAH COTE  Orthopedic Surgery  Phone: [phone]  Fax: [phone]  Follow Up Time: 2 weeks    Carmine Butt  MEDICAL ONCOLOGY  [address] [place] [address]  Phone: [phone]  Fax: [phone]  Follow Up Time: 2 weeks    Dakota Thompson  INTERNAL MEDICINE  [address] [place] [address]  Phone: [phone]  Fax: [phone]  Follow Up Time: 2 weeks

## 2020-07-31 NOTE — DISCHARGE NOTE PROVIDER - NSDCCPCAREPLAN_GEN_ALL_CORE_FT
PRINCIPAL DISCHARGE DIAGNOSIS  Diagnosis: Weakness generalized  Assessment and Plan of Treatment: with hip fracture and rotator cuff tear after fall - cont pain medication and rehab and follow-up with Dr Andrea in 2 weeks      SECONDARY DISCHARGE DIAGNOSES  Diagnosis: CRI (chronic renal insufficiency)  Assessment and Plan of Treatment: stable    Diagnosis: Fall, initial encounter  Assessment and Plan of Treatment: for rehab today

## 2020-07-31 NOTE — DISCHARGE NOTE PROVIDER - NSDCMRMEDTOKEN_GEN_ALL_CORE_FT
acetaminophen 325 mg oral tablet: 2 tab(s) orally every 6 hours, As needed, Mild Pain (1 - 3)  allopurinol 100 mg oral tablet: 1 tab(s) orally once a day  Bystolic 10 mg oral tablet: 1 tab(s) orally once a day. HOLD FOR SBP LESS THAN 110 or HR LESS THAN 60  furosemide 20 mg oral tablet: 1 tab(s) orally once a day  gabapentin 600 mg oral tablet: 1 tab(s) orally 3 times a day  heparin: 5000 unit(s) subcutaneous every 8 hours. HOLD if platelets or Hb worsening. Patient will need CBC every 3 to 5 days while on heparin sc  hydrALAZINE 25 mg oral tablet: 1 tab(s) orally once a day. HOLD FOR SBP LESS THAN 110 or HR LESS THAN 60  oxycodone-acetaminophen 5 mg-325 mg oral tablet: 1 tab(s) orally every 4 hours, As needed, Moderate Pain (4 - 6)  predniSONE 5 mg oral tablet: 1 tab(s) orally once a day  rosuvastatin 5 mg oral capsule: 1 cap(s) orally once a day

## 2020-07-31 NOTE — PROGRESS NOTE ADULT - ASSESSMENT
89 yo female with hx of HTN, HLD, CKD 4   admitted after mechanical fall    today no complaints    CKD 4    Cr at baseline 2- 2.2    continue lasix   no NSAID        HTN   stable    continue home regimen - titrate per BP response     Mechanical Fall    per medicine    Thank you for the courtesy of this consult. We will follow this patient with you.   Management is subject to change if new information becomes available or patient condition changes.

## 2020-07-31 NOTE — DISCHARGE NOTE PROVIDER - PROVIDER TOKENS
PROVIDER:[TOKEN:[[id number]],FOLLOWUP:[2 weeks]],PROVIDER:[TOKEN:[[id number]],FOLLOWUP:[2 weeks]],PROVIDER:[TOKEN:[[id number]],FOLLOWUP:[2 weeks]],PROVIDER:[TOKEN:[[id number]],FOLLOWUP:[2 weeks]]

## 2020-07-31 NOTE — DISCHARGE NOTE PROVIDER - HOSPITAL COURSE
89 y/o F with PMH of HTN, CKD, dyslipidemia, shingles, OA, osteoporosis, h/o greater tronchanteric bursitis, gout, h/o EBV, p/w fall. Patient states she was going to open the refrigerator door, and she thought she was holding on to the handle and went to open it, and fell on her L side. C/o L hip pain radiating down LLE and RUE shoulder pain radiating to elbow. Patient states she lives at home with her granddaughter and at baseline walks with a cane. However, patient was not able to get up in the ED 2/2 pain. Patient states she also hit her forehead on the floor and has a bruise. Patient denies cough, runny nose, sore throat, nausea, vomiting, abdominal pain, fever, chills, CP, SOB.         HOSPITAL COURSE:    *Mechanical fall w/ inability to ambulate 2/2 hip pain, she also has limited ROM Rt shoulder joint.    CT Hip - does not report fracture but MRI HIP shows fractures  as noted below     Minimally displaced acute fractures of the left pubic body, inferior pubic    ramus and left pubic root/anterior acetabulum; Acute zone 1 left sacral ala fracture    and MRI Rt Shoulder shows a near-full-thickness, near-full-width bursal-sided tear of the supraspinatus    tendon. Moderate-grade articular-sided partial thickness tear in the upper fibers of    the subscapularis tendon. Moderate joint effusion decompressing into the subacromial/subdeltoid bursa.    Conservative Mx and WBAT per discussion with Ortho    f/u Dr Andrea in 2 weeks         *CKD vs ANDREW?    -Cr at baseline per Dr Thompson    -cont lasix etc     -Avoid nephrotoxic agents         *Sinus bradycardia in the 50s at bedside    Freq PVCs, new RBBB per EKG     -Tele monitoring - stable     f/u  w/ cardiology Dr Collins as OP        *Incidental finding on CT - enlarged lymph node 2.1x1.8cm in R submandibular gland    *Anemia / Thrombocytopenia     check iron studies b12 fa etc     F/u outpatient for further work up if persistent    discussed with Dr Crump        *Advance Directives    -Patient states HCP is her daughter Pippa.     Patient states she wishes to have cardiac resuscitation and mechanical intubation / ventilation at this point as she still feels well.        *DVT ppx    -SCDs (as patient has thrombocytopenia) and hep sc when platelets improve        7/31 - no cp palps sob; sitting up in chair and pain under control     PHYSICAL EXAM:    GENERAL: NAD, able to lie flat in bed    HEAD:  Atraumatic, Normocephalic    EYES: EOMI, PERRLA, normal sclera    ENT: Moist mucous membranes    NECK: Supple, No JVD, no nuchal rigidity    CHEST/LUNG: Clear to auscultation bilaterally; No rales, rhonchi, wheezing, or rubs. Unlabored respirations    HEART: Regular rate and rhythm; No murmurs, rubs, or gallops    ABDOMEN: Bowel sounds present; Soft, Nontender, Nondistended. No hepatomegaly    EXTREMITIES:  no pitting bilaterally    NERVOUS SYSTEM:  Alert & Oriented X3, speech clear. No focal motor or sensory deficits    MSK: Rt shoulder and left hip decreased ROM; bruising at left hip         LABS: All Labs Reviewed:                        9.5      6.50  )-----------( 100      ( 31 Jul 2020 07:53 )               30.8     141  |  110<H>  |  51<H>    ----------------------------<  110<H>    3.9   |  26  |  1.94<H>        discussed with RN and daughter Pippa Redding     time spent on discharge - 55 mins

## 2020-07-31 NOTE — PROGRESS NOTE ADULT - SUBJECTIVE AND OBJECTIVE BOX
89 y/o F with PMH of HTN, CKD, dyslipidemia, shingles, OA, osteoporosis, h/o greater tronchanteric bursitis, gout, h/o EBV, p/w fall. Patient states she was going to open the refrigerator door, and she thought she was holding on to the handle and went to open it, and fell on her L side. C/o L hip pain radiating down LLE and RUE shoulder pain radiating to elbow. Patient states she lives at home with her granddaughter and at baseline walks with a cane. However, patient was not able to get up in the ED 2/2 pain. Patient states she also hit her forehead on the floor and has a bruise. Patient denies cough, runny nose, sore throat, nausea, vomiting, abdominal pain, fever, chills, CP, SOB.   today renal eval called for hx of CKD      * seen earlier today   pt feels well   no sob or cp      PAST MEDICAL & SURGICAL HISTORY:  as above     Home Medications:  allopurinol 100 mg oral tablet: 1 tab(s) orally once a day (2020 16:02)  Bystolic 10 mg oral tablet: 1 tab(s) orally once a day (2020 16:02)  furosemide 20 mg oral tablet: 1 tab(s) orally once a day (2020 16:02)  gabapentin 600 mg oral tablet: 1 tab(s) orally 3 times a day (2020 16:02)  hydrALAZINE 25 mg oral tablet: 1 tab(s) orally once a day (2020 16:02)  predniSONE 5 mg oral tablet: 1 tab(s) orally once a day (2020 16:02)  rosuvastatin 5 mg oral capsule: 1 cap(s) orally once a day (2020 16:02)      MEDICATIONS  (STANDING):  allopurinol 100 milliGRAM(s) Oral daily  atorvastatin 20 milliGRAM(s) Oral at bedtime  furosemide    Tablet 20 milliGRAM(s) Oral daily  gabapentin 300 milliGRAM(s) Oral two times a day  heparin   Injectable 5000 Unit(s) SubCutaneous every 8 hours  hydrALAZINE 25 milliGRAM(s) Oral every 12 hours  predniSONE   Tablet 5 milliGRAM(s) Oral daily    MEDICATIONS  (PRN):  acetaminophen   Tablet .. 650 milliGRAM(s) Oral every 6 hours PRN Mild Pain (1 - 3)  fentaNYL    Injectable 50 MICROGram(s) IV Push Once PRN Severe Pain (7 - 10)  oxycodone    5 mG/acetaminophen 325 mG 1 Tablet(s) Oral every 4 hours PRN Moderate Pain (4 - 6)      Allergies    Ceftin (Hives; Rash)    Intolerances        SOCIAL HISTORY:  Denies ETOh,Smoking,     FAMILY HISTORY:      REVIEW OF SYSTEMS:    CONSTITUTIONAL: No weakness, fevers or chills  EYES/ENT: No visual changes;  No vertigo or throat pain   NECK: No pain or stiffness  RESPIRATORY: No cough, wheezing, hemoptysis; No shortness of breath  CARDIOVASCULAR: No chest pain or palpitations  GASTROINTESTINAL: No abdominal or epigastric pain. No nausea, vomiting, or hematemesis; No diarrhea or constipation. No melena or hematochezia.  GENITOURINARY: No dysuria, frequency or hematuria  NEUROLOGICAL: No numbness or weakness  SKIN: No itching, burning, rashes, or lesions   All other review of systems is negative unless indicated above.    Vital Signs Last 24 Hrs  T(C): 36.4 (2020 16:25), Max: 36.6 (2020 21:05)  T(F): 97.6 (2020 16:25), Max: 97.9 (2020 21:05)  HR: 71 (2020 16:25) (56 - 79)  BP: 158/57 (2020 16:25) (116/44 - 163/51)  BP(mean): --  RR: 18 (2020 16:25) (18 - 18)  SpO2: 92% (2020 16:25) (92% - 96%)    I&O's Detail    2020 07:01  -  2020 07:00  --------------------------------------------------------  IN:  Total IN: 0 mL    OUT:    Voided: 700 mL  Total OUT: 700 mL    Total NET: -700 mL        PHYSICAL EXAM:    Constitutional: NAD  HEENT: , EOMI,  MMM  Neck: No LAD, No JVD  Respiratory: CTAB  Cardiovascular: S1 and S2  Gastrointestinal: BS+, soft, NT/ND  Extremities: No peripheral edema  Neurological: A/O x 3, no focal deficits  : No Perales  Skin: No rashes  Access: Not applicable    LABS:                                   9.5    6.50  )-----------( 100      ( 2020 07:53 )             30.8                         10.2   6.18  )-----------( 99       ( 2020 08:11 )             31.8         141    |  110    |  51     ----------------------------<  110       2020 07:53  3.9     |  26     |  1.94     143    |  108    |  45     ----------------------------<  110       2020 08:11  4.3     |  25     |  2.01     143    |  111    |  45     ----------------------------<  107       2020 06:31  3.4     |  26     |  1.92     Ca    8.7        2020 07:53  Ca    9.0        2020 08:11      Mg     2.4       2020 07:53  Mg     2.6       2020 08:11    TPro  6.6    /  Alb  3.5    /  TBili  0.4    /        2020 06:31  DBili  x      /  AST  24     /  ALT  25     /  AlkPhos  48       TPro  7.6    /  Alb  3.9    /  TBili  0.5    /        2020 22:28  DBili  x      /  AST  26     /  ALT  33     /  AlkPhos  62           Urine Studies:  Urinalysis Basic - ( 2020 05:37 )    Color: Yellow / Appearance: Clear / S.010 / pH: x  Gluc: x / Ketone: Negative  / Bili: Negative / Urobili: Negative mg/dL   Blood: x / Protein: Negative mg/dL / Nitrite: Negative   Leuk Esterase: Negative / RBC: x / WBC x   Sq Epi: x / Non Sq Epi: x / Bacteria: x            RADIOLOGY & ADDITIONAL STUDIES:
89 y/o F with PMH of HTN, CKD, dyslipidemia, shingles, OA, osteoporosis, h/o greater tronchanteric bursitis, gout, h/o EBV, p/w fall. Patient states she was going to open the refrigerator door, and she thought she was holding on to the handle and went to open it, and fell on her L side. C/o L hip pain radiating down LLE and RUE shoulder pain radiating to elbow. Patient states she lives at home with her granddaughter and at baseline walks with a cane. However, patient was not able to get up in the ED 2/2 pain. Patient states she also hit her forehead on the floor and has a bruise. Patient denies cough, runny nose, sore throat, nausea, vomiting, abdominal pain, fever, chills, CP, SOB.     7/29 - left hip pain, right shoulder pain; reports that there was no LOC during fall    PHYSICAL EXAM:  GENERAL: NAD, able to lie flat in bed  HEAD:  Atraumatic, Normocephalic  EYES: EOMI, PERRLA, normal sclera  ENT: Moist mucous membranes  NECK: Supple, No JVD, no nuchal rigidity  CHEST/LUNG: Clear to auscultation bilaterally; No rales, rhonchi, wheezing, or rubs. Unlabored respirations  HEART: Regular rate and rhythm; No murmurs, rubs, or gallops  ABDOMEN: Bowel sounds present; Soft, Nontender, Nondistended. No hepatomegaly  EXTREMITIES:  no pitting bilaterally  NERVOUS SYSTEM:  Alert & Oriented X3, speech clear. No focal motor or sensory deficits  MSK: Rt shoulder and left hip decreased ROM; bruising at left hip     LABS: All Labs Reviewed:                  9.1    6.97  )-----------( 88       ( 29 Jul 2020 06:31 )             28.4   143  |  111<H>  |  45<H>  ----------------------------<  107<H>  3.4<L>   |  26  |  1.92<H>  Ca    8.3<L>      29 Jul 2020 06:31  TPro  6.6  /  Alb  3.5  /  TBili  0.4  /  DBili  x   /  AST  24  /  ALT  25  /  AlkPhos  48  07-29  PT/INR - ( 28 Jul 2020 22:28 )   PT: 11.1 sec;   INR: 0.95 ratio          RADIOLOGY/EKG:  < from: CT Pelvis Bony Only No Cont (07.29.20 @ 02:47) >  Arterial calcifications. Distended urinary bladder.  No acute pelvic fracture or dislocation. The femora are intact as visualized. Moderate degenerative changes are present visualized lumbar spine.  Imression: No acute osseousfindings.  < end of copied text >    < from: Xray Shoulder 2 Views, Right (07.28.20 @ 23:11) >  Impression:  No fracture or dislocation  < end of copied text >          acetaminophen   Tablet .. 650 milliGRAM(s) Oral every 6 hours PRN  allopurinol 100 milliGRAM(s) Oral daily  atorvastatin 20 milliGRAM(s) Oral at bedtime  fentaNYL    Injectable 50 MICROGram(s) IV Push Once PRN  furosemide    Tablet 20 milliGRAM(s) Oral daily  gabapentin 300 milliGRAM(s) Oral two times a day  hydrALAZINE 25 milliGRAM(s) Oral daily  oxycodone    5 mG/acetaminophen 325 mG 1 Tablet(s) Oral every 4 hours PRN  predniSONE   Tablet 5 milliGRAM(s) Oral daily
89 y/o F with PMH of HTN, CKD, dyslipidemia, shingles, OA, osteoporosis, h/o greater tronchanteric bursitis, gout, h/o EBV, p/w fall. Patient states she was going to open the refrigerator door, and she thought she was holding on to the handle and went to open it, and fell on her L side. C/o L hip pain radiating down LLE and RUE shoulder pain radiating to elbow. Patient states she lives at home with her granddaughter and at baseline walks with a cane. However, patient was not able to get up in the ED 2/2 pain. Patient states she also hit her forehead on the floor and has a bruise. Patient denies cough, runny nose, sore throat, nausea, vomiting, abdominal pain, fever, chills, CP, SOB.     7/29 - left hip pain, right shoulder pain; reports that there was no LOC during fall  7/30 - disucssed findings with patient, no pain when she is not moving, getting pain meds     PHYSICAL EXAM:  GENERAL: NAD, able to lie flat in bed  HEAD:  Atraumatic, Normocephalic  EYES: EOMI, PERRLA, normal sclera  ENT: Moist mucous membranes  NECK: Supple, No JVD, no nuchal rigidity  CHEST/LUNG: Clear to auscultation bilaterally; No rales, rhonchi, wheezing, or rubs. Unlabored respirations  HEART: Regular rate and rhythm; No murmurs, rubs, or gallops  ABDOMEN: Bowel sounds present; Soft, Nontender, Nondistended. No hepatomegaly  EXTREMITIES:  no pitting bilaterally  NERVOUS SYSTEM:  Alert & Oriented X3, speech clear. No focal motor or sensory deficits  MSK: Rt shoulder and left hip decreased ROM; bruising at left hip     LABS: All Labs Reviewed:                        10.2   6.18  )-----------( 99       ( 30 Jul 2020 08:11 )             31.8     143  |  108  |  45<H>  ----------------------------<  110<H>  4.3   |  25  |  2.01<H>      < from: MR Shoulder Joint No Cont, Right (07.29.20 @ 20:53) >  1. No acute fracture.  2. Near-full-thickness, near-full-width bursal-sided tear of the supraspinatus  tendon.  3. Moderate-grade articular-sided partial thickness tear in the upper fibers of  the subscapularis tendon.  4. Moderate joint effusion decompressing into the subacromial/subdeltoid bursa.    < from: MR Pelvis Bony Only No Cont (07.29.20 @ 20:58) >  1. Minimally displaced acute fractures of the left pubic body, inferior pubic  ramus and left pubic root/anterior acetabulum.  2.Acute zone 1 left sacral ala fracture.      acetaminophen   Tablet .. 650 milliGRAM(s) Oral every 6 hours PRN  allopurinol 100 milliGRAM(s) Oral daily  atorvastatin 20 milliGRAM(s) Oral at bedtime  fentaNYL    Injectable 50 MICROGram(s) IV Push Once PRN  furosemide    Tablet 20 milliGRAM(s) Oral daily  gabapentin 300 milliGRAM(s) Oral two times a day  heparin   Injectable 5000 Unit(s) SubCutaneous every 8 hours  hydrALAZINE 25 milliGRAM(s) Oral every 12 hours  oxycodone    5 mG/acetaminophen 325 mG 1 Tablet(s) Oral every 4 hours PRN  predniSONE   Tablet 5 milliGRAM(s) Oral daily
CHIEF COMPLAINT:  Patient is a 89y old  Female who presents with a chief complaint of fall (2020 17:39)      HPI: 20:  89 y/o F with well known to me with PMH of HTN, CKD, dyslipidemia, shingles, OA, osteoporosis, h/o greater trochanteric bursitis, gout, h/o EBV, p/w fall. Patient states she was going to open the refrigerator door, and she thought she was holding on to the handle and went to open it, and fell on her L side. C/o L hip pain radiating down LLE and RUE shoulder pain radiating to elbow. Patient states she lives at home with her granddaughter and at baseline walks with a cane. However, patient was not able to get up in the ED 2/2 pain. Patient states she also hit her forehead on the floor and has a bruise. Patient denies cough, runny nose, sore throat, nausea, vomiting, abdominal pain, fever, chills, CP, SOB.  She has maintained NSR on the monitor without AF or heart block seen.  Has chronic RBBB as before.      20:  No cardiac complaints.  Still with pelvic pain when walking.  PT to see and will likely need rehab.  Stable on the monitor and VSS on her current meds.    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 No nausea, vomiting, or hematemesis; No diarrhea or constipation. No melena or hematochezia.  GENITOURINARY: No dysuria, frequency or hematuria  NEUROLOGICAL: No numbness   SKIN: No itching, burning, rashes, or lesions   All other review of systems is negative unless indicated above      Vital Signs Last 24 Hrs  T(C): 36.6 (2020 21:05), Max: 36.7 (2020 08:47)  T(F): 97.9 (2020 21:05), Max: 98.1 (2020 08:47)  HR: 79 (2020 21:05) (64 - 81)  BP: 116/44 (2020 21:05) (116/44 - 143/48)  BP(mean): --  RR: 18 (2020 21:05) (18 - 18)  SpO2: 93% (2020 21:05) (92% - 99%)    I&O's Summary    2020 07:01  -  2020 07:00  --------------------------------------------------------  IN: 0 mL / OUT: 700 mL / NET: -700 mL        PHYSICAL EXAM:   Constitutional: NAD, awake and alert, well-developed  HEENT: PERR, EOMI, Normal Hearing, MMM  Neck: Soft and supple, No LAD, No JVD  Respiratory: Breath sounds are clear bilaterally, No wheezing, rales or rhonchi  Cardiovascular: S1 and S2, regular rate and rhythm, soft LIANA at LLSB and base as before, no gallops or rubs  Gastrointestinal: Bowel Sounds present, soft, nontender, nondistended, no guarding, no rebound  Extremities: No peripheral edema  Vascular: 2+ peripheral pulses  Neurological: A/O x 3, no focal deficits  Skin: No rashes      MEDICATIONS  (STANDING):  allopurinol 100 milliGRAM(s) Oral daily  atorvastatin 20 milliGRAM(s) Oral at bedtime  furosemide    Tablet 20 milliGRAM(s) Oral daily  gabapentin 300 milliGRAM(s) Oral two times a day  heparin   Injectable 5000 Unit(s) SubCutaneous every 8 hours  hydrALAZINE 25 milliGRAM(s) Oral every 12 hours  predniSONE   Tablet 5 milliGRAM(s) Oral daily      LABS: All Labs Reviewed:               10.2   6.18  )-----------( 99       ( 2020 08:11 )             31.8                         9.1    6.97  )-----------( 88       ( 2020 06:31 )             28.4           143  |  108  |  45<H>  ----------------------------<  110<H>  4.3   |  25  |  2.01<H>          143  |  111<H>  |  45<H>  ----------------------------<  107<H>  3.4<L>   |  26  |  1.92<H>    Ca    9.0      2020 08:11  Ca    8.3<L>      2020 06:31    Mg     2.6         TPro  6.6  /  Alb  3.5  /  TBili  0.4  /  DBili  x   /  AST  24  /  ALT  25  /  AlkPhos  48      PT/INR - ( 2020 22:28 )   PT: 11.1 sec;   INR: 0.95 ratio       PTT - ( 2020 22:28 )  PTT:24.9 sec    BLOOD CULTURES:   LIPID PROFILE     RADIOLOGY:    MRI of Pelvis: 20:  FINDINGS:  Bladder: Urinary bladder is distended.  Bones/joints: Mildly displaced acute fractures of the left pubic body, inferior pubic ramus and left pubic root/anterior acetabulum. Acute Zone 1 left sacral ala fracture. No widening of the symphysis or sacroiliac joints. No acute fracture or dislocation of the hips. Levo curvature of the partially imaged lumbar spine with multilevel degenerative change.  IMPRESSION:  1. Mildly displaced acute fractures of the left pubic body, inferior pubic ramus and left pubic root/anterior acetabulum.  2. Acute zone 1 left sacral ala fracture.    MRI of Shoulder: 20:   FINDINGS:  Bones and cartilage: No acute fracture is identified. Minimal cystic change along the posterolateral aspect of the humeral head. Minimal cystic change at the acromioclavicular joint. Os acromiale is noted.  Joint spaces: Moderate joint effusion.  Glenoid labrum: No evidence of tear.  Supraspinatus tendon: Near-full-thickness, near-full-width bursal-sided tear.  Infraspinatus tendon: Tendinosis. No evidence of tear.  Subscapularis tendon: Moderate-grade articular-side partial thickness tear in the upper fibers.  Teres minor tendon: Tendinosis. No evidence of tear.  Tendon of biceps brachii: No evidence of tear.  IMPRESSION:  1. No acute fracture is identified.  2. Near-full-thickness, near-full-width bursal-sided tear of the supraspinatus tendon.  3. Moderate-grade articular-sided partial thickness tear in the upper fibers of the subscapularis tendon.    CT of Pelvis: 20:  Findings:  Arterial calcifications. Distended urinary bladder.  No acute pelvic fracture or dislocation. The femora are intact as visualized. Moderate degenerative changes are present visualized lumbar spine.  Impression: No acute osseous findings.    X-Ray of Hip: 20:  Findings:  Degenerative changes. No pelvic or left hip fractures. Vascular calcification.  Impression:  No fracture    CXR: 20:  FINDINGS:  The heart is mildly enlarged. Mild bilateral increased interstitial markings new from prior exam may be chronic in nature versus artifact however clinically correlate for less likely viral pneumonia.. No focal consolidations. Apices and hemidiaphragms are unremarkable. Visualized osseous structures are within normal limits.  IMPRESSION:  Mild bilateral increased interstitial markings new from prior exam may be chronic in nature versus artifact however clinically correlate for less likely viral pneumonia.. No focal consolidations    X-Ray of Shoulder: 20:  Findings:  Degenerative changes. No definite fracture or dislocation. Partially visualized right upper lobe is unremarkable.  Impression:  No fracture or dislocation    CT of C-spine and Head: 20:  FINDINGS:  Head:  Parenchymal volume loss is noted with prominent ventricles and sulci. No acute territorial infarct is demonstrated.  There is no evidence of an acute hemorrhage or mass-effect in the posterior fossa or in the supratentorial region. Senescent left basal ganglia calcifications are noted.  Evaluation of the osseous structures with the appropriate window appears unremarkable. The visualized paranasal sinuses and mastoid air cells are clear.  Cervical spine:  Bones: Straightening of the normal cervical lordosis is seen whichis likely due to muscle spasm versus patient positioning. The cervical vertebral body heights and alignment are maintained. No fracture or spondylolisthesis is demonstrated. Mild to moderate disc space narrowing and marginal osteophyte formation is seen at C5-C6 and C6-C7. Multilevel posterior disc osteophyte complexes are seen without evidence of severe spinal canal stenosis. Multilevel neural foraminal stenosis is noted.  Soft tissues: The prevertebral soft tissues are unremarkable. The thyroid is unremarkable. An enlarged lymph node measuring 2.1 x 1.8 cm is partially visualized inferior to the right submandibular gland.  Lung apices: Clear.  IMPRESSION:  1. No acute intracranial hemorrhage, territorial infarct, mass effect or calvarial fracture.  2. Multilevel degenerative changes of the cervical spine without evidence of a fracture.  3. Partially visualized right submandibular lymphadenopathy of uncertain etiology.      EK20:  Sinus bradycardia  Right bundle branch block    TELEMETRY:  NSR, RBBB    ECHO:

## 2020-08-04 ENCOUNTER — TRANSCRIPTION ENCOUNTER (OUTPATIENT)
Age: 85
End: 2020-08-04
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
## 2020-08-04 DIAGNOSIS — S32.119A UNSPECIFIED ZONE I FRACTURE OF SACRUM, INITIAL ENCOUNTER FOR CLOSED FRACTURE: ICD-10-CM

## 2020-08-04 DIAGNOSIS — D64.9 ANEMIA, UNSPECIFIED: ICD-10-CM

## 2020-08-04 DIAGNOSIS — M19.90 UNSPECIFIED OSTEOARTHRITIS, UNSPECIFIED SITE: ICD-10-CM

## 2020-08-04 DIAGNOSIS — M81.0 AGE-RELATED OSTEOPOROSIS WITHOUT CURRENT PATHOLOGICAL FRACTURE: ICD-10-CM

## 2020-08-04 DIAGNOSIS — S32.502A UNSPECIFIED FRACTURE OF LEFT PUBIS, INITIAL ENCOUNTER FOR CLOSED FRACTURE: ICD-10-CM

## 2020-08-04 DIAGNOSIS — R00.1 BRADYCARDIA, UNSPECIFIED: ICD-10-CM

## 2020-08-04 DIAGNOSIS — N18.4 CHRONIC KIDNEY DISEASE, STAGE 4 (SEVERE): ICD-10-CM

## 2020-08-04 DIAGNOSIS — M10.9 GOUT, UNSPECIFIED: ICD-10-CM

## 2020-08-04 DIAGNOSIS — I45.10 UNSPECIFIED RIGHT BUNDLE-BRANCH BLOCK: ICD-10-CM

## 2020-08-04 DIAGNOSIS — Z88.8 ALLERGY STATUS TO OTHER DRUGS, MEDICAMENTS AND BIOLOGICAL SUBSTANCES STATUS: ICD-10-CM

## 2020-08-04 DIAGNOSIS — D69.6 THROMBOCYTOPENIA, UNSPECIFIED: ICD-10-CM

## 2020-08-04 DIAGNOSIS — I12.9 HYPERTENSIVE CHRONIC KIDNEY DISEASE WITH STAGE 1 THROUGH STAGE 4 CHRONIC KIDNEY DISEASE, OR UNSPECIFIED CHRONIC KIDNEY DISEASE: ICD-10-CM

## 2020-08-04 DIAGNOSIS — R59.0 LOCALIZED ENLARGED LYMPH NODES: ICD-10-CM

## 2020-08-04 DIAGNOSIS — E78.5 HYPERLIPIDEMIA, UNSPECIFIED: ICD-10-CM

## 2020-08-04 DIAGNOSIS — S46.011A STRAIN OF MUSCLE(S) AND TENDON(S) OF THE ROTATOR CUFF OF RIGHT SHOULDER, INITIAL ENCOUNTER: ICD-10-CM
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
## 2020-08-04 DIAGNOSIS — S00.83XA CONTUSION OF OTHER PART OF HEAD, INITIAL ENCOUNTER: ICD-10-CM

## 2020-08-21 ENCOUNTER — RX RENEWAL (OUTPATIENT)
Age: 85
End: 2020-08-21

## 2020-09-11 ENCOUNTER — TRANSCRIPTION ENCOUNTER (OUTPATIENT)
Age: 85
End: 2020-09-11

## 2020-09-15 ENCOUNTER — TRANSCRIPTION ENCOUNTER (OUTPATIENT)
Age: 85
End: 2020-09-15

## 2020-09-17 ENCOUNTER — APPOINTMENT (OUTPATIENT)
Dept: RHEUMATOLOGY | Facility: CLINIC | Age: 85
End: 2020-09-17
Payer: MEDICARE

## 2020-09-17 VITALS
WEIGHT: 158 LBS | HEART RATE: 58 BPM | DIASTOLIC BLOOD PRESSURE: 72 MMHG | TEMPERATURE: 96.2 F | BODY MASS INDEX: 31.85 KG/M2 | HEIGHT: 59 IN | SYSTOLIC BLOOD PRESSURE: 153 MMHG | OXYGEN SATURATION: 97 %

## 2020-09-17 PROCEDURE — 99214 OFFICE O/P EST MOD 30 MIN: CPT | Mod: 25

## 2020-09-17 PROCEDURE — 20610 DRAIN/INJ JOINT/BURSA W/O US: CPT | Mod: 50

## 2020-09-17 RX ORDER — COLCHICINE 0.6 MG/1
0.6 TABLET ORAL
Qty: 15 | Refills: 1 | Status: DISCONTINUED | COMMUNITY
Start: 2019-06-13 | End: 2020-09-17

## 2020-09-17 RX ADMIN — METHYLPREDNISOLONE ACETATE 0 MG/ML: 80 INJECTION, SUSPENSION INTRA-ARTICULAR; INTRALESIONAL; INTRAMUSCULAR; SOFT TISSUE at 00:00

## 2020-09-17 NOTE — PROCEDURE
[FreeTextEntry1] : Date:9/17/20\par Left knee aspiration, CS injection\par The procedure risks was discussed with the patient.\par Indications: therapeutic purposes \par #1 site identified in the left knee . Verbal consent was obtained. \par Anesthesia was with ethyl chloride.\par The patient was prepped with betadine solution. \par A 21 gauge 1.5 inch needle was used.\par 15 cc of clear yellow color fluid aspirated. \par Injectables:depomedrol 80mg\par The patient tolerated the procedure well..\par There were no complications. Handouts/patient instructions were given to patient . patient was instructed to call if redness at site, a decrease in range of motion or an increase in pain is noted after procedure.\par \par Fluid sent for cell count, crystals testing and culture\par \par Procedure #2\par Procedure: Right PAB injection\par Date: 09/17/20\par The procedure risks was discussed with the patient.\par Indications: therapeutic purposes \par #1 site identified in the Right PAB . Verbal consent was obtained. \par Anesthesia was with ethyl chloride.\par The patient was prepped with betadine solution. \par A 25 gauge 1.5 inch needle was used.\par Injectables: Depomedrol 80 mg was injected into the site The Depomedrol was mixed with 1mL of xylocaine 1%. \par The patient tolerated the procedure well..\par There were no complications. Handouts/patient instructions were given to patient . patient was instructed to call if redness at site, a decrease in range of motion or an increase in pain is noted after procedure. \par  \par \par \par

## 2020-09-17 NOTE — HISTORY OF PRESENT ILLNESS
[FreeTextEntry1] : 89 year old woman with hx of HTN, CKD, HLD, shingles, Left GTB/ left bicipital tendinitis presents for f/u of knee pain and hyperuricemia. She has crystal proven gout, +MSU crystals as well as intra/extracellular CPPD crystals in synovial fluid from knee.\par \par She is currently off colchicine. She was takin it QOD. \par She denies any abdominal pain or bloating. \par She is tolerating the allouprinol 100mg well.\par She denies any rash or any issues on the allopurinol.\par She currently takes prednisone 5mg daily. \par TA US was negative for GCA changes. She had previously declined TA biopsy. \par \par Today she complains of 8/10 pain, b/L knees, and right inner medial knee.  \par AM stiffness is 30 minutes  \par \par She recently had a right CS injection from ortho while she was at rehab.  \par \par ESR 5/29/20 40, CRP WNL\par Uric acid 6.2\par Labs 1/20/20 show\par Decr H/H, PLT mildly low at 133, ESR 79, \par EBV IgM + and > 160\par EBV IgG +\par JAN 1:80 homogenous\par \par

## 2020-09-17 NOTE — ASSESSMENT
[FreeTextEntry1] : 89 y/o woman with hx of OA, gout, and pseudogout, hx of +JAN and +histone but low suspicion CTD/Lupus,  presents for b/L knee pain\par \par Plan:. \par elevated esr\par -TA US was negative. They declined TA biopsy.\par -Decr pred to 4mg daily.  \par  \par Crystal proven gout, uric acid 6.2. If MSU crystals seen on this synovial fluid, will incr allopurinol. \par -Fluid aspirated from both knees today 6/5/20.\par  Injected left knee witih 80mg depomedrol today 9/17/20.  15 cc fluid aspirated and sent for testing.  \par -She received 3rd dose of supartz b/L last dose 10/15/19. Can repeat 4/16/2020, but they say this wasn't helpful. \par -MSU crystals were also seen in MSK guided right knee aspiration. \par -Contiue allopurinol 100mg daily. She is tolerating it well. May need to increase to 200mg daily.\par -Check uric acid\par \par Right PAB\par -Injected 80mg depomedrol today 9/17/20\par \par OA knees\par -Injected b/L knees with Supartz 10/15/19. Delcining a new series. \par -Reviewed R knee MRI. Advised orthopedic consultation. \par -Rx Voltaren gel for b/L knees TID\par -Advised to consider pain management. She reports she had no issue with morphine in the hospital, but did feel loopy with Tramadol. \par \par Osteoporosis\par -Declined treatment for osteoporosis. Understands this is to help prevent fracture. \par -XR right foot to eval for fracture was negative. 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 Small plantar and posterior calcaneal enthesophytes present. \par \par f/u 3 months

## 2020-09-18 LAB
B PERT IGG+IGM PNL SER: CLEAR
COLOR FLD: YELLOW
EOSINOPHIL # FLD MANUAL: 0 %
FLUID INTAKE SUBSTANCE CLASS: NORMAL
LYMPHOCYTES # FLD MANUAL: 5 %
MESOTHL CELL NFR FLD: 11 %
MONOS+MACROS NFR FLD MANUAL: 69 %
NEUTS SEG # FLD MANUAL: 15 %
NRBC # FLD: 0
RBC # FLD MANUAL: 42 /UL
SYCRY CLARITY: CLEAR
SYCRY COLOR: YELLOW
SYCRY ID: NORMAL
SYCRY TUBE: NORMAL
TOTAL CELLS COUNTED FLD: 130 /UL
TUBE TYPE: NORMAL
UNIDENT CELLS NFR FLD MANUAL: 0 %
VARIANT LYMPHS # FLD MANUAL: 0 %

## 2020-10-13 ENCOUNTER — TRANSCRIPTION ENCOUNTER (OUTPATIENT)
Age: 85
End: 2020-10-13

## 2020-10-15 ENCOUNTER — TRANSCRIPTION ENCOUNTER (OUTPATIENT)
Age: 85
End: 2020-10-15

## 2020-10-22 ENCOUNTER — TRANSCRIPTION ENCOUNTER (OUTPATIENT)
Age: 85
End: 2020-10-22

## 2020-10-23 ENCOUNTER — RX RENEWAL (OUTPATIENT)
Age: 85
End: 2020-10-23

## 2020-10-23 RX ORDER — PREDNISONE 5 MG/1
5 TABLET ORAL
Qty: 90 | Refills: 0 | Status: DISCONTINUED | COMMUNITY
Start: 2020-02-07 | End: 2020-10-23

## 2020-11-10 ENCOUNTER — NON-APPOINTMENT (OUTPATIENT)
Age: 85
End: 2020-11-10

## 2020-11-17 ENCOUNTER — TRANSCRIPTION ENCOUNTER (OUTPATIENT)
Age: 85
End: 2020-11-17

## 2020-12-18 ENCOUNTER — APPOINTMENT (OUTPATIENT)
Dept: RADIOLOGY | Facility: CLINIC | Age: 85
End: 2020-12-18
Payer: MEDICARE

## 2020-12-18 ENCOUNTER — OUTPATIENT (OUTPATIENT)
Dept: OUTPATIENT SERVICES | Facility: HOSPITAL | Age: 85
LOS: 1 days | End: 2020-12-18
Payer: MEDICARE

## 2020-12-18 ENCOUNTER — APPOINTMENT (OUTPATIENT)
Dept: RHEUMATOLOGY | Facility: CLINIC | Age: 85
End: 2020-12-18
Payer: MEDICARE

## 2020-12-18 VITALS
OXYGEN SATURATION: 90 % | DIASTOLIC BLOOD PRESSURE: 60 MMHG | HEIGHT: 59 IN | HEART RATE: 55 BPM | WEIGHT: 160 LBS | BODY MASS INDEX: 32.25 KG/M2 | SYSTOLIC BLOOD PRESSURE: 120 MMHG

## 2020-12-18 DIAGNOSIS — M25.50 PAIN IN UNSPECIFIED JOINT: ICD-10-CM

## 2020-12-18 PROCEDURE — 99214 OFFICE O/P EST MOD 30 MIN: CPT | Mod: 25

## 2020-12-18 PROCEDURE — 20610 DRAIN/INJ JOINT/BURSA W/O US: CPT | Mod: LT

## 2020-12-18 PROCEDURE — 20553 NJX 1/MLT TRIGGER POINTS 3/>: CPT | Mod: 59

## 2020-12-18 PROCEDURE — 73130 X-RAY EXAM OF HAND: CPT | Mod: 26,50

## 2020-12-18 PROCEDURE — 73130 X-RAY EXAM OF HAND: CPT

## 2020-12-18 RX ORDER — METHYLPRED ACET/NACL,ISO-OS/PF 80 MG/ML
80 VIAL (ML) INJECTION
Qty: 1 | Refills: 0 | Status: COMPLETED | OUTPATIENT
Start: 2020-12-18

## 2020-12-18 RX ORDER — PREDNISONE 10 MG/1
10 TABLET ORAL
Qty: 14 | Refills: 0 | Status: DISCONTINUED | COMMUNITY
Start: 2019-03-18 | End: 2020-12-18

## 2020-12-18 RX ADMIN — METHYLPREDNISOLONE ACETATE 0 MG/ML: 80 INJECTION, SUSPENSION INTRA-ARTICULAR; INTRALESIONAL; INTRAMUSCULAR; SOFT TISSUE at 00:00

## 2020-12-18 NOTE — HISTORY OF PRESENT ILLNESS
[FreeTextEntry1] : 89 year old woman with hx of HTN, CKD, HLD, shingles, Left GTB/ left bicipital tendinitis presents for f/u of knee pain and hyperuricemia. She has crystal proven gout, +MSU crystals as well as intra/extracellular CPPD crystals in synovial fluid from knee.\par \par She is currently on allopruinol 100mg daily.  She is currently off colchicine.  \par \par She fell and tore tendon in right shoulder and hurt left knee.  She had a right shoulder CS injection from ortho while she was at rehab. \par \par She was in rehab.  \par She feels her left knee is swollen again. \par She also has pain in medial knee fat pads.  \par She also reports she sometimes has pain in her hands.  \par \par She currently takes prednisone 5mg daily. \par TA US was negative for GCA changes. She had previously declined TA biopsy.\par \par Uric acid 6.3\par Hx of JAN 1:80 homogenous\par negative RF

## 2020-12-18 NOTE — ASSESSMENT
[FreeTextEntry1] : 89 y/o woman with hx of OA, gout, and pseudogout, hx of +JAN and +histone but low suspicion CTD/Lupus, presents for b/L knee pain and right shoulder pain.  \par \par Plan:. \par elevated esr- resolved.  \par -TA US was negative. They declined TA biopsy.\par -Cont pred to 2mg daily. \par  \par Crystal proven gout, uric acid 6.2. If intracellular MSU crystals seen on this synovial fluid, will incr allopurinol to 200mg daily.  \par -Fluid aspirated from left knee today 12/18/20\par  Injected left knee with 80mg depomedrol today 12/18/20. 33 cc fluid aspirated and sent for testing. \par -She received 3rd dose of supartz b/L last dose 10/15/19. Can repeat 4/16/2020, but they say this wasn't helpful. \par -MSU crystals were also seen in MSK guided right knee aspiration. \par -Contiue allopurinol 100mg daily. She is tolerating it well. May need to increase to 200mg daily.\par -Check uric acid in 4 months\par \par Trigger points b/L medial knee\par -Injected b/L trigger points with 1.5cc xylocaine 1%\par \par Joint pain in hands- patient had TTP over MCPs and states ulnar styloids are tender from time to time.  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 Delcining a new series. \par -Reviewed R knee MRI. Advised orthopedic consultation. \par -Rx Voltaren gel for b/L knees TID\par -Advised to consider pain management. She reports she had no issue with morphine in the hospital, but did feel loopy with Tramadol. \par \par Osteoporosis\par -Declined treatment for osteoporosis. Understands this is to help prevent fracture. \par -XR right foot to eval for fracture was negative. 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 Small plantar and posterior calcaneal enthesophytes present. \par \par f/u 3 months. \par \par

## 2020-12-18 NOTE — PROCEDURE
[FreeTextEntry1] : Date:12/18/20\par Left knee aspiration, CS injection\par The procedure risks was discussed with the patient.\par Indications: therapeutic purposes \par #1 site identified in the left knee . Verbal consent was obtained. \par Anesthesia was with ethyl chloride.\par The patient was prepped with betadine solution. \par A 21 gauge 1.5 inch needle was used.\par 33 cc of clear yellow color fluid aspirated. \par Injectables:depomedrol 80mg\par The patient tolerated the procedure well..\par There were no complications. Handouts/patient instructions were given to patient . patient was instructed to call if redness at site, a decrease in range of motion or an increase in pain is noted after procedure.\par \par Fluid sent for cell count, crystals testing and culture\par \par \par #2 Bilateral medial knee fat pad trigger point injections\par Date: 12/18/20\par The procedure risks was discussed with the patient. \par Indications: therapeutic purposes \par #2 site identified in the left medial knee fat pad.  \par Anesthesia was with ethyl chloride The patient was prepped with betadine solution. \par With 25 G 5/8 inch needle, 1.5cc of xylocaine was injected to trigger point.  \par the patient tolerated the procedure well. there were no complications. handouts/patient instructions were given to patient . patient was instructed to call if redness at site, a decrease in range of motion or an increase in pain is noted after procedure.\par #3 Identical procedure to #2 performed at right medial knee fat pad.  \par

## 2020-12-21 ENCOUNTER — NON-APPOINTMENT (OUTPATIENT)
Age: 85
End: 2020-12-21

## 2020-12-28 ENCOUNTER — NON-APPOINTMENT (OUTPATIENT)
Age: 85
End: 2020-12-28

## 2020-12-28 LAB
B PERT IGG+IGM PNL SER: ABNORMAL
COLOR FLD: YELLOW
EOSINOPHIL # FLD MANUAL: 0 %
FLUID INTAKE SUBSTANCE CLASS: NORMAL
LYMPHOCYTES # FLD MANUAL: 9 %
MESOTHL CELL NFR FLD: 0 %
MONOS+MACROS NFR FLD MANUAL: 89 %
NEUTS SEG # FLD MANUAL: 2 %
NRBC # FLD: 0
RBC # FLD MANUAL: 1000 /UL
SYCRY CLARITY: CLEAR
SYCRY COLOR: YELLOW
SYCRY ID: NORMAL
SYCRY TUBE: NORMAL
TOTAL CELLS COUNTED FLD: 110 /UL
TUBE TYPE: NORMAL
UNIDENT CELLS NFR FLD MANUAL: 0 %
VARIANT LYMPHS # FLD MANUAL: 0 %

## 2021-01-05 LAB — BACTERIA FLD CULT: NORMAL

## 2021-01-06 NOTE — CONSULT NOTE ADULT - I WAS PHYSICALLY PRESENT FOR THE KEY PORTIONS OF THE EVALUATION AND MANAGEMENT (E/M) SERVICE PROVIDED.  I AGREE WITH THE ABOVE HISTORY, PHYSICAL, AND PLAN WHICH I HAVE REVIEWED AND EDITED WHERE APPROPRIATE
[address] [address].    LYMPHEDEMA THERAPY  VISIT: 6    []                  Daily note               [x]                 30 day Reassessment/ progress note    NAME: Jacqueline Hager  DATE: 1/6/2021  GOALS  Time frame: to be met by 12/31/2020,  Goals will be extended to be met by 2/15/20. 1.  Patient will demonstrate knowledge of signs/symptoms of infections/cellulitis and be independent in skin care to prevent cellulitis. Patient has been educated in daily skin care with a low Ph lotion using MLD techniques. Patient's caregiver assists with skin care as needed. Patient has been educated in signs and symptoms of infection. Goal met 12/29/20  2. Patient will demonstrate independence in lymphedema home program of therapeutic exercises to improve circulation and decongest limb to improve ADLs. Patient has been educated in the Active ROM routine and the [employer] routine in sitting. Patient has been instructed to perform a routine each day. Progressing toward goal.   3.  Patient will tolerate multi-layer bandages (MLB) and show measureable decrease in limb volume or participate in the selection process to allow ordering of home compression system (daytime, nighttime garments and pump as needed). Patient's caregiver is applying multi-layer compression bandaging to the lower legs, alternating legs, between visits. The following compression garments have been ordered: B custom [employer] Fit premium lower leg velcro products, Ju[employer] Fit custom foot pieces, and Circ Aid compressive undersocks 15-25 mmHg and patient will bring the garments to the clinic for fitting. Goal met 1/6/2021.       Long term goals  Time frame: to be met by 2/15/2021  1. Pt will obtain a vaso-pneumatic device for management of LE swelling during the restorative phase of care.  Patient tolerated a trial of the basic vaso-pneumatic pump in the clinic today and voiced interest in obtaining a pump for home use. Patient will have the pump and garments by the end of the week. Goal met 1/6/2021.   2.  Patient will be independent with don/doff of compression system and use in order to prevent reaccumulation of fluid at discharge. 3.  Pt will be independent in self-MLD and show stable limb volumes showing decongestion and pt. ready for transition to independent restorative phase of lymphedema therapy. Continued education in self MLD with bathing and skin care. Girth measurements of the L LE were taken pre and post pump trial today. Patient demonstrated a decrease in 5 out of 5 measurements. Progressing toward goal.        SUBJECTIVE REPORT: Patient arrives to the visit today transported dependently in a wheelchair by her caregiver, Shawn Wise. Patient reports that Shawn Wise is doing a great job at home with bandaging the lower leg. She is scheduled for a doctor's appointment next week to have her kidney function assessed. Temperature:  patient: 96.8  degrees, caregiver: 95.7 degrees    Pain:  Patient complains of pain in the L knee during the pump trial.  Resolved with repositioning and placement of rolled towel under knee. Gait: Modified Independent gait with rollator for short community distances,  wheelchair for longer distances  Transfers: Modified independent with sit<>stand transfers, minimal assistance for management of legs on and off mat table. Fall risk: moderate    ADLs: Patient requires occasional assistance for bathing and dressing. Uses a tub transfer bench. Treatment Response: Patient's caregiver is applying the MLB to the lower legs, alternating legs, between visits. Patient is waiting on delivery of custom garments. Patient tolerated a pump trial in the clinic today and the pump/garments should be delivered to patient's home by Friday.      [facility] Lymphedema Assessment Scale:  deferred  TREATMENT AND OBJECTIVE DATA SUMMARY: Therapeutic Exercise/Procedure 0 minutes   Treatment time: N/A  Walking program Patient instructed to avoid sitting for long periods at one time. Patient to change position and ambulate short distances with rollator support every 30 to 45 minutes throughout the day. [employer] exercise program: Patient has been educated in the [employer] routine and has the written instructions to follow. Stick exercise program: N/A    Free exercises/ROM: Patient has been educated in the Active ROM routine. Handout provided. Continued education in deep abdominal breathing techniques. Home program: Patient to perform daily to BID:  Skin care, Deep abdominal breathing, Exercise routine, Walking program, Rest in supine, Compression bandage, Self manual lymph drainage (MLD), Bring supplies to each therapy visit, vaso-pneumatic device. Rationale: Exercise will increase the lymph angiomotoricity and tissue pressure of the skin and thus decrease swelling. Modalities 30 minutes   Treatment time: 2:00-2:30 pm  Vasopneumatic pump: Patient tolerated a trial of the [employer] vaso-pneumatic by the rep from [facility]. Patient was positioned in supine with the [facility] elevated and a rolled towel positioned under the L knee for increased comfort. Patient tolerated a 25 minute trial on the 20-30 mmHg setting without any issues. Patient instructed in daily use of the pump up to one hour per day as tolerated, alternating legs. Patient was educated in removal of bandaging/garments prior to pump use. Patient instructed to defer use of the pump with any heart palpations, shortness of breath, or increased pain. Caregiver was present for education and will assist with pump management at home. Manual Lymphatic Drainage (MLD) 60 minutes   Treatment time: 1:30-2:00 pm, 2:30-3:00 pm  Patient/family education provided in self MLD. Continued education in self MLD technique with bathing and skin care.  Continued education in performing deep abdominal breathing and truncal techniques prior to using the basic pump. Area to decongest: LE's and trunk   Sequence used and effectiveness: Deferred today    Skin/wound care/debridement: Reviewed skin care principles:   Washed the L lower leg and foot and applied Remedy lotion   Performed skin care with low pH lotion following manual lymph principles. Skin care products   Hygiene   Applied multi-layer compression bandaging to: Patient arrived to the clinic with MLB to the L lower leg and foot in place. Patient's caregiver is applying MLB to the lower legs, alternating legs, as needed between visits. The technique has been inspected and is deemed good. MLB to the L LE was applied by therapist prior to leaving the clinic today. L LE from base of toes to below knee    The following multi-layer bandages were applied:  Tricofix      Padding layer:  Fleece 15 cm base of toes to below knee     Foam:  10 cm roll below knee to ankle to build up the ankle     Short stretch bandages:   6 cm  8 cm  10 cm    Education:   Patient/caregiver in multi-layer bandaging donning principles, precautions, supply ordering and types of bandaging, laundering instructions. Handout on laundering instructions and MLB application provided. Patient is not comfortable on mat table so applied while patient was sitting in wheelchair. Patient instructed to remove MLB with any increase in pain, shortness of breath, or heart palpations and/or call the clinic with any issues or questions. Patient instructed to wear full coverage and supportive shoes and use rollator for safe ambulation when MLB in place. Patient has obtained two sets of bandages from the vendor, [employer]. Upper/Lower extremity compression: The following garments have been ordered from the vendor, [employer], and patient will bring the garments to the clinic for fitting. 1. B custom Juxta Fit premium standard calf units   2.  B custom Juxta Fit premium foot pieces  3. Circ Aid compressive undersocks 15-25 mmHg in size large (2 pairs)    Discussed the role and benefit of compression merari pants for management of upper leg swelling and provided truncal measurements to patient's caregiver to assist in sizing of the product. Patient has not purchased a pair of compression pants at this time. Kinesiotaping: deferred. Girth/Volume measurement: Girth measurements of the L LE were taken pre and post pump trial today. Patient demonstrated a decrease in 5 out of 5 measurements. ASSESSMENT:   Patient's caregiver continues to assist with bandaging of the lower legs between visits. Compression garments have been ordered and insurance authorization is pending. Patient tolerated a trial of the basic pump during the visit today. Patient is interested in obtaining a pump for home use for management of swelling during the restorative phase of care. Patient will continue with her home program of CDT and multi-layer compression bandaging with assistance from caregiver and return to the clinic in 2 weeks or sooner if needed. Patient has met STG 1 and STG 3 and is making progress with other goals. PLAN OF CARE:   Continue plan of care as established on evaluation. Frequency:  Weekly to biweekly   Next session will address: Assess volumes  Garment fitting  Exercise   MLD  Education   MLB   Other:  Vendor for bandages:  [employer]  Vendor for garments: [address] TREATMENT 90 mins   John Richards PT, CLT Statement Selected

## 2021-01-08 ENCOUNTER — RX RENEWAL (OUTPATIENT)
Age: 86
End: 2021-01-08

## 2021-01-19 ENCOUNTER — TRANSCRIPTION ENCOUNTER (OUTPATIENT)
Age: 86
End: 2021-01-19

## 2021-01-31 RX ORDER — CIPROFLOXACIN LACTATE 400MG/40ML
1 VIAL (ML) INTRAVENOUS
Qty: 0 | Refills: 0 | DISCHARGE
Start: 2021-01-31 | End: 2021-02-07

## 2021-02-12 ENCOUNTER — APPOINTMENT (OUTPATIENT)
Dept: RHEUMATOLOGY | Facility: CLINIC | Age: 86
End: 2021-02-12
Payer: MEDICARE

## 2021-02-12 VITALS
HEIGHT: 59 IN | WEIGHT: 172 LBS | BODY MASS INDEX: 34.68 KG/M2 | OXYGEN SATURATION: 94 % | DIASTOLIC BLOOD PRESSURE: 80 MMHG | TEMPERATURE: 95.1 F | SYSTOLIC BLOOD PRESSURE: 120 MMHG | HEART RATE: 66 BPM

## 2021-02-12 PROCEDURE — 20610 DRAIN/INJ JOINT/BURSA W/O US: CPT | Mod: 50

## 2021-02-12 PROCEDURE — 99214 OFFICE O/P EST MOD 30 MIN: CPT | Mod: 25

## 2021-02-12 RX ORDER — PREDNISONE 1 MG/1
1 TABLET ORAL DAILY
Qty: 180 | Refills: 0 | Status: DISCONTINUED | COMMUNITY
Start: 2020-04-17 | End: 2021-02-12

## 2021-02-12 RX ORDER — METHYLPRED ACET/NACL,ISO-OS/PF 80 MG/ML
80 VIAL (ML) INJECTION
Qty: 2 | Refills: 0 | Status: COMPLETED | OUTPATIENT
Start: 2021-02-12

## 2021-02-12 RX ADMIN — METHYLPREDNISOLONE ACETATE 0 MG/ML: 80 INJECTION, SUSPENSION INTRA-ARTICULAR; INTRALESIONAL; INTRAMUSCULAR; SOFT TISSUE at 00:00

## 2021-02-12 NOTE — PROCEDURE
[FreeTextEntry1] : Date:2/12/21\par Left knee aspiration, CS injection\par The procedure risks was discussed with the patient.\par Indications: therapeutic purposes \par #1 site identified in the left knee . Verbal consent was obtained. \par Anesthesia was with ethyl chloride.\par The patient was prepped with betadine solution. \par A 21 gauge 1.5 inch needle was used.\par 30 cc of clear yellow color fluid aspirated. \par Injectables:depomedrol 80mg\par The patient tolerated the procedure well..\par There were no complications. Handouts/patient instructions were given to patient . patient was instructed to call if redness at site, a decrease in range of motion or an increase in pain is noted after procedure.\par \par Fluid sent for cell count, crystals testing and culture\par \par #2 identical procedure was performed in right knee, but 12 cc of fluid was aspirated.  \par

## 2021-02-12 NOTE — ASSESSMENT
[FreeTextEntry1] : 89 y/o woman with hx of OA, gout, and pseudogout, hx of +JAN and +histone but low suspicion CTD/Lupus, presents for b/L knee pain and right shoulder pain. \par \par Plan:. \par elevated esr- resolved. \par -TA US was negative. They declined TA biopsy.\par -Currently off prednisone\par  \par Crystal proven gout, uric acid 6.2. If intracellular MSU crystals seen on this synovial fluid, will incr allopurinol to 200mg daily. \par -Fluid aspirated from left knee today 12/18/20\par  Injected b/L knee with 80mg depomedrol today 2/12/21. 30 cc fluid aspirated left knee and 12 cc from right knee, and sent for testing. \par -She received 3rd dose of supartz b/L last dose 10/15/19. Can repeat 4/16/2020, but they say this wasn't helpful. \par -MSU crystals were also seen in MSK guided right knee aspiration. \par -Contiue allopurinol 100mg daily. She is tolerating it well. May need to increase to 200mg daily.\par -Check uric acid in 4/2021\par \par Trigger points b/L medial knee- much improved\par -Injected b/L trigger points with 1.5cc xylocaine 1% 12/18/20- very helpful.  \par \par Right shoulder impingement\par -Rx ultracet 37.5/325 BID PRN\par \par Joint pain in hands- patient had TTP over MCPs and states ulnar styloids are tender from time to time. ?seroneg RA.  currently she does not c/o pain in hands.  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 Delcining a new series. \par -Reviewed R knee MRI. Advised orthopedic consultation. \par -Rx Voltaren gel for b/L knees TID\par -Advised to consider pain management. She reports she had no issue with morphine in the hospital, but did feel loopy with Tramadol. \par \par Osteoporosis\par -Declined treatment for osteoporosis. Understands this is to help prevent fracture. \par -XR right foot to eval for fracture was negative. 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 Small plantar and posterior calcaneal enthesophytes present. \par \par f/u 3 months. \par \par  \par

## 2021-02-12 NOTE — HISTORY OF PRESENT ILLNESS
[FreeTextEntry1] : 89 year old woman with hx of HTN, CKD, HLD, shingles, Left GTB/ left bicipital tendinitis presents for f/u of knee pain and hyperuricemia. 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  She recently went off prednisone.  She is having significant pain, with difficulty walking.  \par She did try tramadol 50mg.  It took edge off. \par She is hoping to have CS injections b/L knees today.  \par She reports the medial knee fat pad trigger point injections were very helpful.  \par \par She is currently on allopruinol 100mg daily. She is currently off colchicine. \par \par She fell and tore tendon in right shoulder and hurt left knee. She had a right shoulder CS injection from ortho while she was at rehab. \par She has appt with her orthopedic next week.  \par \par

## 2021-02-15 RX ORDER — METHENAMINE MANDELATE 1 G
1 TABLET ORAL
Qty: 0 | Refills: 0 | DISCHARGE
Start: 2021-02-15

## 2021-02-21 ENCOUNTER — INPATIENT (INPATIENT)
Facility: HOSPITAL | Age: 86
LOS: 2 days | Discharge: HOME HEALTH | DRG: 291 | End: 2021-02-24
Attending: HOSPITALIST | Admitting: HOSPITALIST
Payer: MEDICARE

## 2021-02-21 VITALS
WEIGHT: 160.06 LBS | OXYGEN SATURATION: 90 % | TEMPERATURE: 101 F | DIASTOLIC BLOOD PRESSURE: 84 MMHG | HEART RATE: 88 BPM | SYSTOLIC BLOOD PRESSURE: 158 MMHG | HEIGHT: 61 IN | RESPIRATION RATE: 18 BRPM

## 2021-02-21 DIAGNOSIS — Z90.89 ACQUIRED ABSENCE OF OTHER ORGANS: Chronic | ICD-10-CM

## 2021-02-21 LAB
ALBUMIN SERPL ELPH-MCNC: 3.8 G/DL — SIGNIFICANT CHANGE UP (ref 3.3–5)
ALP SERPL-CCNC: 59 U/L — SIGNIFICANT CHANGE UP (ref 40–120)
ALT FLD-CCNC: 15 U/L — SIGNIFICANT CHANGE UP (ref 12–78)
ANION GAP SERPL CALC-SCNC: 10 MMOL/L — SIGNIFICANT CHANGE UP (ref 5–17)
APPEARANCE UR: CLEAR — SIGNIFICANT CHANGE UP
APTT BLD: 25.7 SEC — LOW (ref 27.5–35.5)
AST SERPL-CCNC: 14 U/L — LOW (ref 15–37)
BASOPHILS # BLD AUTO: 0.01 K/UL — SIGNIFICANT CHANGE UP (ref 0–0.2)
BASOPHILS NFR BLD AUTO: 0.1 % — SIGNIFICANT CHANGE UP (ref 0–2)
BILIRUB SERPL-MCNC: 0.9 MG/DL — SIGNIFICANT CHANGE UP (ref 0.2–1.2)
BILIRUB UR-MCNC: NEGATIVE — SIGNIFICANT CHANGE UP
BUN SERPL-MCNC: 41 MG/DL — HIGH (ref 7–23)
CALCIUM SERPL-MCNC: 8.8 MG/DL — SIGNIFICANT CHANGE UP (ref 8.5–10.1)
CHLORIDE SERPL-SCNC: 98 MMOL/L — SIGNIFICANT CHANGE UP (ref 96–108)
CO2 SERPL-SCNC: 23 MMOL/L — SIGNIFICANT CHANGE UP (ref 22–31)
COLOR SPEC: YELLOW — SIGNIFICANT CHANGE UP
CREAT SERPL-MCNC: 2.03 MG/DL — HIGH (ref 0.5–1.3)
DIFF PNL FLD: ABNORMAL
EOSINOPHIL # BLD AUTO: 0.08 K/UL — SIGNIFICANT CHANGE UP (ref 0–0.5)
EOSINOPHIL NFR BLD AUTO: 1 % — SIGNIFICANT CHANGE UP (ref 0–6)
GLUCOSE SERPL-MCNC: 110 MG/DL — HIGH (ref 70–99)
GLUCOSE UR QL: NEGATIVE MG/DL — SIGNIFICANT CHANGE UP
HCT VFR BLD CALC: 31.9 % — LOW (ref 34.5–45)
HGB BLD-MCNC: 10.1 G/DL — LOW (ref 11.5–15.5)
IMM GRANULOCYTES NFR BLD AUTO: 2.3 % — HIGH (ref 0–1.5)
INR BLD: 1.09 RATIO — SIGNIFICANT CHANGE UP (ref 0.88–1.16)
KETONES UR-MCNC: NEGATIVE — SIGNIFICANT CHANGE UP
LACTATE SERPL-SCNC: 1.7 MMOL/L — SIGNIFICANT CHANGE UP (ref 0.7–2)
LEUKOCYTE ESTERASE UR-ACNC: ABNORMAL
LYMPHOCYTES # BLD AUTO: 0.75 K/UL — LOW (ref 1–3.3)
LYMPHOCYTES # BLD AUTO: 9.4 % — LOW (ref 13–44)
MCHC RBC-ENTMCNC: 29.4 PG — SIGNIFICANT CHANGE UP (ref 27–34)
MCHC RBC-ENTMCNC: 31.7 GM/DL — LOW (ref 32–36)
MCV RBC AUTO: 92.7 FL — SIGNIFICANT CHANGE UP (ref 80–100)
MONOCYTES # BLD AUTO: 0.83 K/UL — SIGNIFICANT CHANGE UP (ref 0–0.9)
MONOCYTES NFR BLD AUTO: 10.4 % — SIGNIFICANT CHANGE UP (ref 2–14)
NEUTROPHILS # BLD AUTO: 6.15 K/UL — SIGNIFICANT CHANGE UP (ref 1.8–7.4)
NEUTROPHILS NFR BLD AUTO: 76.8 % — SIGNIFICANT CHANGE UP (ref 43–77)
NITRITE UR-MCNC: NEGATIVE — SIGNIFICANT CHANGE UP
NT-PROBNP SERPL-SCNC: 873 PG/ML — HIGH (ref 0–450)
PH UR: 5 — SIGNIFICANT CHANGE UP (ref 5–8)
PLATELET # BLD AUTO: 116 K/UL — LOW (ref 150–400)
POTASSIUM SERPL-MCNC: 4 MMOL/L — SIGNIFICANT CHANGE UP (ref 3.5–5.3)
POTASSIUM SERPL-SCNC: 4 MMOL/L — SIGNIFICANT CHANGE UP (ref 3.5–5.3)
PROT SERPL-MCNC: 7.4 GM/DL — SIGNIFICANT CHANGE UP (ref 6–8.3)
PROT UR-MCNC: 15 MG/DL
PROTHROM AB SERPL-ACNC: 12.7 SEC — SIGNIFICANT CHANGE UP (ref 10.6–13.6)
RAPID RVP RESULT: SIGNIFICANT CHANGE UP
RBC # BLD: 3.44 M/UL — LOW (ref 3.8–5.2)
RBC # FLD: 14.9 % — HIGH (ref 10.3–14.5)
SARS-COV-2 RNA SPEC QL NAA+PROBE: SIGNIFICANT CHANGE UP
SODIUM SERPL-SCNC: 131 MMOL/L — LOW (ref 135–145)
SP GR SPEC: 1.01 — SIGNIFICANT CHANGE UP (ref 1.01–1.02)
UROBILINOGEN FLD QL: NEGATIVE MG/DL — SIGNIFICANT CHANGE UP
WBC # BLD: 8 K/UL — SIGNIFICANT CHANGE UP (ref 3.8–10.5)
WBC # FLD AUTO: 8 K/UL — SIGNIFICANT CHANGE UP (ref 3.8–10.5)

## 2021-02-21 PROCEDURE — 71045 X-RAY EXAM CHEST 1 VIEW: CPT | Mod: 26

## 2021-02-21 PROCEDURE — 71250 CT THORAX DX C-: CPT | Mod: 26

## 2021-02-21 RX ORDER — CEFTRIAXONE 500 MG/1
1000 INJECTION, POWDER, FOR SOLUTION INTRAMUSCULAR; INTRAVENOUS EVERY 24 HOURS
Refills: 0 | Status: COMPLETED | OUTPATIENT
Start: 2021-02-21 | End: 2021-02-23

## 2021-02-21 RX ORDER — HYDRALAZINE HCL 50 MG
25 TABLET ORAL DAILY
Refills: 0 | Status: DISCONTINUED | OUTPATIENT
Start: 2021-02-21 | End: 2021-02-22

## 2021-02-21 RX ORDER — SODIUM CHLORIDE 9 MG/ML
500 INJECTION INTRAMUSCULAR; INTRAVENOUS; SUBCUTANEOUS ONCE
Refills: 0 | Status: COMPLETED | OUTPATIENT
Start: 2021-02-21 | End: 2021-02-21

## 2021-02-21 RX ORDER — CEFTRIAXONE 500 MG/1
INJECTION, POWDER, FOR SOLUTION INTRAMUSCULAR; INTRAVENOUS
Refills: 0 | Status: DISCONTINUED | OUTPATIENT
Start: 2021-02-21 | End: 2021-02-21

## 2021-02-21 RX ORDER — ALLOPURINOL 300 MG
100 TABLET ORAL DAILY
Refills: 0 | Status: DISCONTINUED | OUTPATIENT
Start: 2021-02-21 | End: 2021-02-24

## 2021-02-21 RX ORDER — HEPARIN SODIUM 5000 [USP'U]/ML
5000 INJECTION INTRAVENOUS; SUBCUTANEOUS EVERY 12 HOURS
Refills: 0 | Status: DISCONTINUED | OUTPATIENT
Start: 2021-02-21 | End: 2021-02-24

## 2021-02-21 RX ORDER — FUROSEMIDE 40 MG
20 TABLET ORAL DAILY
Refills: 0 | Status: DISCONTINUED | OUTPATIENT
Start: 2021-02-21 | End: 2021-02-22

## 2021-02-21 RX ORDER — GABAPENTIN 400 MG/1
300 CAPSULE ORAL
Refills: 0 | Status: DISCONTINUED | OUTPATIENT
Start: 2021-02-21 | End: 2021-02-21

## 2021-02-21 RX ORDER — ATORVASTATIN CALCIUM 80 MG/1
20 TABLET, FILM COATED ORAL AT BEDTIME
Refills: 0 | Status: DISCONTINUED | OUTPATIENT
Start: 2021-02-21 | End: 2021-02-24

## 2021-02-21 RX ORDER — DEXAMETHASONE 0.5 MG/5ML
6 ELIXIR ORAL ONCE
Refills: 0 | Status: COMPLETED | OUTPATIENT
Start: 2021-02-21 | End: 2021-02-21

## 2021-02-21 RX ORDER — GABAPENTIN 400 MG/1
1 CAPSULE ORAL
Qty: 0 | Refills: 0 | DISCHARGE

## 2021-02-21 RX ORDER — GABAPENTIN 400 MG/1
300 CAPSULE ORAL
Refills: 0 | Status: DISCONTINUED | OUTPATIENT
Start: 2021-02-21 | End: 2021-02-24

## 2021-02-21 RX ORDER — NEBIVOLOL HYDROCHLORIDE 5 MG/1
10 TABLET ORAL DAILY
Refills: 0 | Status: DISCONTINUED | OUTPATIENT
Start: 2021-02-21 | End: 2021-02-24

## 2021-02-21 RX ORDER — ACETAMINOPHEN 500 MG
650 TABLET ORAL ONCE
Refills: 0 | Status: DISCONTINUED | OUTPATIENT
Start: 2021-02-21 | End: 2021-02-24

## 2021-02-21 RX ORDER — ACETAMINOPHEN 500 MG
1000 TABLET ORAL ONCE
Refills: 0 | Status: COMPLETED | OUTPATIENT
Start: 2021-02-21 | End: 2021-02-21

## 2021-02-21 RX ORDER — NITROFURANTOIN MACROCRYSTAL 50 MG
100 CAPSULE ORAL
Refills: 0 | Status: DISCONTINUED | OUTPATIENT
Start: 2021-02-21 | End: 2021-02-21

## 2021-02-21 RX ADMIN — HEPARIN SODIUM 5000 UNIT(S): 5000 INJECTION INTRAVENOUS; SUBCUTANEOUS at 23:41

## 2021-02-21 RX ADMIN — SODIUM CHLORIDE 500 MILLILITER(S): 9 INJECTION INTRAMUSCULAR; INTRAVENOUS; SUBCUTANEOUS at 20:47

## 2021-02-21 RX ADMIN — CEFTRIAXONE 1000 MILLIGRAM(S): 500 INJECTION, POWDER, FOR SOLUTION INTRAMUSCULAR; INTRAVENOUS at 23:41

## 2021-02-21 RX ADMIN — ATORVASTATIN CALCIUM 20 MILLIGRAM(S): 80 TABLET, FILM COATED ORAL at 23:40

## 2021-02-21 RX ADMIN — Medication 6 MILLIGRAM(S): at 20:47

## 2021-02-21 RX ADMIN — Medication 1000 MILLIGRAM(S): at 19:00

## 2021-02-21 RX ADMIN — Medication 20 MILLIGRAM(S): at 23:41

## 2021-02-21 RX ADMIN — SODIUM CHLORIDE 500 MILLILITER(S): 9 INJECTION INTRAMUSCULAR; INTRAVENOUS; SUBCUTANEOUS at 19:20

## 2021-02-21 RX ADMIN — SODIUM CHLORIDE 500 MILLILITER(S): 9 INJECTION INTRAMUSCULAR; INTRAVENOUS; SUBCUTANEOUS at 18:20

## 2021-02-21 NOTE — H&P ADULT - HISTORY OF PRESENT ILLNESS
89F with HTN, HLD, gout, and OA presents from home for fever, lethargy, weakness, confusion, decreased appetite, and decreased urine output. Patient reports low grade fever 100.0, muscle aches, decreased appetite, and fatigue for 2 days, as well as dysuria. Daughter adds that her mother seemed mildly "loopy" earlier in the day. At baseline, A+Ox3, ambulatory. Per daughter, patient has intermittent LE swelling managed with furosemide, though patient and daughter deny diagnosis of heart failure or CAD. Daughter reports patient was recently diagnosed with UTI and prescribed first ciprofloxacin and then macrobid; would complete macrobid 2/23. Daughter most concerned about decreased urine output.

## 2021-02-21 NOTE — ED PROVIDER NOTE - PROGRESS NOTE DETAILS
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: spoke with daughter who is pt's health care proxy, Pippa Kulkarni, who called 911 because pt appeared dehydrated, has recent poor po intake and poor urine output with background 2 rounds of abx for persistent UTI, daughter reports beginning of February positive dysuria, placed on PO Cipro x 1 round ended 2/10, on 2/12, urine and blood studies done, received phone call on 2/15 still with UTI, urine culture reported positive MRSA, 50K, and was started on Macrobid, pt seemed to responds well to that, 2/14 received first COVID Pfizer vaccination, 2/16 had cortisone injection to b/l knees, yesterday daughter noted pt was excessively tired c/o malaise, poor po intake, shaking chills, low grade fever up to 101.3, generalized weakness, today poor po intake, pulse ox 88-90 percent, positive nasal congestion and dry cough, daughter confirmed pt is full code. I explained to pt because she is hypoxic with abnormal CXR with her complaint, warrants admission to hospital including r/o covid, ordering noncon CT chest

## 2021-02-21 NOTE — ED ADULT NURSE NOTE - OBJECTIVE STATEMENT
Patient brought in by ambulance for fever for 2 days. Patient has temp of 101 F. Patient alert and oriented, c/o weakness Color pale

## 2021-02-21 NOTE — ED ADULT TRIAGE NOTE - CHIEF COMPLAINT QUOTE
Pt a/ox3, BIBEMS from home c/o "generalized weakness, low grad fever, shortness of breath s/p getting Pfizer vaccine on 2/14". pt 90% on room air.

## 2021-02-21 NOTE — ED PROVIDER NOTE - OBJECTIVE STATEMENT
89 year old female with a PMHx of Gout, Osteoarthritis, HTN, HLD BIBA per ems, daughter states pt has had low grade temperatures, feeling generalized weakness and has had poor po intake for the past couple of days. Today, unusually weak and tired. Pt denies all of that, admits to increased urinary frequency. Denies chest pain, cough, abd pain, n/v/d, fevers. Pt is a poor historian, hx taken from EMS.

## 2021-02-21 NOTE — H&P ADULT - ASSESSMENT
89F HTN, HLD, gout, and OA 89F HTN, HLD, gout, and OA presents for fevers, myalgia, fatigue, and weakness.    #Viral syndrome with acute hypoxic respiratory failure  -COVID negative, likely other viral URI  -Oxygen PRN  -Tylenol PRN    #UTI  -Diagnosed in the outpatient setting. Completed ciprofloxacin, partway through macrobid course  -Ceftriaxone 1000mg Q24H    #HTN  -Initially hypertensive in ER, now improved  -Continue outpatient hydralazine 25mg BID and nebivolol 10mg daily    #Neuropathy?  -Outpatient on gabapentin 600mg BID. Decreasing to 300mg BID given renal failure    #HLD - Rosuvastatin 5mg daily  #Gout - Allopurinol 100mg daily    #Advance care planning  -Patient prefers FULL CODE at this time  -HCP : Pippa Almanzar [phone]. Patient states daughter has HCP form and is aware of her wishes    #DVT ppx  -Heparin 5000U q12H    IMPROVE VTE Individual Risk Assessment    RISK                                                                Points    [  ] Previous VTE                                                  3  [  ] Thrombophilia                                               2  [  ] Lower limb paralysis                                      2        (unable to hold up >15 seconds)    [  ] Current Cancer                                              2         (within 6 months)  [ x ] Immobilization > 24 hrs                               1  [  ] ICU/CCU stay > 24 hours                               1  [ x ] Age > 60                                                     1    IMPROVE VTE Score ____2_____    IMPROVE Score 0-1: Low Risk, No VTE prophylaxis required for most patients, encourage ambulation.   IMPROVE Score 2-3: At risk, pharmacologic VTE prophylaxis is indicated for most patients (in the absence of a contraindication)  IMPROVE Score > or = 4: High Risk, pharmacologic VTE prophylaxis is indicated for most patients (in the absence of a contraindication) 89F HTN, HLD, gout, and OA presents for fevers, myalgia, fatigue, and weakness.    #Acute hypoxic respiratory failure secondary to viral syndrome  -COVID negative, likely other viral URI  -Oxygen PRN  -Tylenol PRN  -Mild pleural effusions on CXR, hx of LE swelling managed with furosemide. Given low urine output, will not give extra diuresis at this time. Monitor pulmonary exam; consider ECHO if effusions worsen    #UTI  -Diagnosed in the outpatient setting. Completed ciprofloxacin, partway through macrobid course  -Ceftriaxone 1000mg Q24H    #Esophageal thickening  -Nonemergent EGD    #HTN  -Initially hypertensive in ER, now improved  -Continue outpatient hydralazine 25mg BID and nebivolol 10mg daily    #Neuropathy?  -Outpatient on gabapentin 600mg BID. Decreasing to 300mg BID given renal failure    #HLD - Rosuvastatin 5mg daily  #Gout - Allopurinol 100mg daily    #Advance care planning  -Patient prefers FULL CODE at this time  -HCP : Pippa Almanzar [phone]. Patient states daughter has HCP form and is aware of her wishes    #DVT ppx  -Heparin 5000U q12H    IMPROVE VTE Individual Risk Assessment    RISK                                                                Points    [  ] Previous VTE                                                  3  [  ] Thrombophilia                                               2  [  ] Lower limb paralysis                                      2        (unable to hold up >15 seconds)    [  ] Current Cancer                                              2         (within 6 months)  [ x ] Immobilization > 24 hrs                               1  [  ] ICU/CCU stay > 24 hours                               1  [ x ] Age > 60                                                     1    IMPROVE VTE Score ____2_____    IMPROVE Score 0-1: Low Risk, No VTE prophylaxis required for most patients, encourage ambulation.   IMPROVE Score 2-3: At risk, pharmacologic VTE prophylaxis is indicated for most patients (in the absence of a contraindication)  IMPROVE Score > or = 4: High Risk, pharmacologic VTE prophylaxis is indicated for most patients (in the absence of a contraindication)

## 2021-02-21 NOTE — H&P ADULT - NSHPREVIEWOFSYSTEMS_GEN_ALL_CORE
Gen: Fevers, chills, weakness, muscle aches  ENT: No visual changes or throat pain  Neck: No pain or stiffness  Respiratory: No cough or wheezing  Cardiovascular: No chest pain or palpitations  Gastrointestinal: No abdominal pain, nausea, vomiting, constipation, or diarrhea  Hematologic: No easy bleeding or bruising  Neurologic: No numbness or focal weakness  : Positive dysuria  Psych: No depression or insomnia  Skin: No rash or itching

## 2021-02-21 NOTE — ED PROVIDER NOTE - CARE PLAN
Principal Discharge DX:	Fever and chills  Secondary Diagnosis:	Hypoxia  Secondary Diagnosis:	Cough

## 2021-02-21 NOTE — ED ADULT NURSE NOTE - NSIMPLEMENTINTERV_GEN_ALL_ED
Implemented All Fall with Harm Risk Interventions:  [place] to call system. Call bell, personal items and telephone within reach. Instruct patient to call for assistance. Room bathroom lighting operational. Non-slip footwear when patient is off stretcher. Physically safe environment: no spills, clutter or unnecessary equipment. Stretcher in lowest position, wheels locked, appropriate side rails in place. Provide visual cue, wrist band, yellow gown, etc. Monitor gait and stability. Monitor for mental status changes and reorient to person, place, and time. Review medications for side effects contributing to fall risk. Reinforce activity limits and safety measures with patient and family. Provide visual clues: red socks.

## 2021-02-21 NOTE — H&P ADULT - NSHPPHYSICALEXAM_GEN_ALL_CORE
Physical Exam  Gen: WD, WN, NAD  HENT: EOMI, moist mucous membranes  Neck: Trachea midline  CV: 3/6 systolic murmur at RUSB, radiates to carotids  Pulm: CTAB, no crackles, wheezes, or rhonchi  Abd: Soft, NT, ND  Ext: No LE edema  Neuro: Grossly intact  Psych: Normal affective range Vital Signs Last 24 Hrs  T(C): 37.1 (21 Feb 2021 22:01), Max: 38.3 (21 Feb 2021 17:49)  T(F): 98.8 (21 Feb 2021 22:01), Max: 101 (21 Feb 2021 17:49)  HR: 71 (21 Feb 2021 22:01) (71 - 100)  BP: 131/49 (21 Feb 2021 22:01) (131/49 - 164/78)  BP(mean): 72 (21 Feb 2021 22:01) (72 - 72)  RR: 19 (21 Feb 2021 22:01) (18 - 20)  SpO2: 97% (21 Feb 2021 22:01) (90% - 97%)    Physical Exam  Gen: WD, WN, NAD  HENT: EOMI, moist mucous membranes  Neck: Trachea midline  CV: 3/6 systolic murmur at RUSB, radiates to carotids  Pulm: CTAB, no crackles, wheezes, or rhonchi  Abd: Soft, NT, ND  Ext: No LE edema  Neuro: Grossly intact  Psych: Normal affective range

## 2021-02-21 NOTE — H&P ADULT - NSHPLABSRESULTS_GEN_ALL_CORE
LABS:  cret                        10.1   8.00  )-----------( 116      ( 21 Feb 2021 18:10 )             31.9     02-21    131<L>  |  98  |  41<H>  ----------------------------<  110<H>  4.0   |  23  |  2.03<H>    Ca    8.8      21 Feb 2021 18:10    TPro  7.4  /  Alb  3.8  /  TBili  0.9  /  DBili  x   /  AST  14<L>  /  ALT  15  /  AlkPhos  59  02-21    PT/INR - ( 21 Feb 2021 18:10 )   PT: 12.7 sec;   INR: 1.09 ratio         PTT - ( 21 Feb 2021 18:10 )  PTT:25.7 sec      < from: CT Chest No Cont (02.21.21 @ 21:10) >    IMPRESSION:    Findings concerning for mild pulmonary edema superimposed on chronic interstitial lung disease. Recommend clinical correlation to assess for superimposed atypical viral infection of COVID-19 given current coronavirus pandemic. Short term chest radiographic imaging follow-up is advised.    Small bilateral pleural effusion with adjacent compressive atelectasis.    Small hiatal hernia with mass like wall thickening of mid thoracic esophagus with which non emergent upper endoscopy evaluation is advised to exclude underlying malignancy.    Scattered mildly prominent mediastinal lymph nodes.    < end of copied text >

## 2021-02-21 NOTE — ED PROVIDER NOTE - CLINICAL SUMMARY MEDICAL DECISION MAKING FREE TEXT BOX
89 year old female with a PMHx of Gout, Osteoarthritis, HTN, HLD BIBA from home after reported recent low grade fever, urinary frequency, poor PO intake, apparent failure to thrive at home, pt poor historian, on ED arrival febrile, right lower lobe crackles, respirations nonlabored, no known recent ill contacts. Pt does not meet sepsis criteria upon initial evaluation. Plan fever, hypoxia work up: EKG, CXR, labs inluding urine via straight cath, blood culture, serum lactate, RVP/COVID swab, cautious IV fluids, monitor, observe, reassess, expect admission, discuss with daughter.

## 2021-02-21 NOTE — ED PROVIDER NOTE - CONSTITUTIONAL, MLM
Elderly obese white female. ill appearing though nontoxic, awake, alert, oriented to person, place, time/situation and in no apparent distress. normal...

## 2021-02-21 NOTE — H&P ADULT - ATTENDING COMMENTS
89 year old female patient with fever and respiratory failure    -Admit to [facility]    # Acute Hypoxemic Respiratory Failure  -DDX: Covid, obstructive lung disease, pleural effusion, PNA  -on supplemental oxygen  -get morning echo  -albuterol prn  -f/u covid pcr  -monitor for signs of clinical improvement    #UTI  -pt with urinary symptoms  -give rocephin      #Fever  -possibly due to recent covid vaccine  -f/u blood and urine cx  -tylenol prn  -monitor temps    #Hyponatremia  -give oral hydration  -trend sodium    # HTN  - on hydralazine    #HLD  -on lipitor     #Advanced Directives  -Full code    #DVT ppx  -heparin sq

## 2021-02-22 DIAGNOSIS — R50.9 FEVER, UNSPECIFIED: ICD-10-CM

## 2021-02-22 LAB
B PERT IGG+IGM PNL SER: ABNORMAL
B PERT IGG+IGM PNL SER: CLEAR
COLOR FLD: YELLOW
COLOR FLD: YELLOW
CULTURE RESULTS: NO GROWTH — SIGNIFICANT CHANGE UP
EOSINOPHIL # FLD MANUAL: 0 %
EOSINOPHIL # FLD MANUAL: 0 %
FLUID INTAKE SUBSTANCE CLASS: NORMAL
FLUID INTAKE SUBSTANCE CLASS: NORMAL
LYMPHOCYTES # FLD MANUAL: 11 %
LYMPHOCYTES # FLD MANUAL: 16 %
MESOTHL CELL NFR FLD: 0 %
MESOTHL CELL NFR FLD: 0 %
MONOS+MACROS NFR FLD MANUAL: 84 %
MONOS+MACROS NFR FLD MANUAL: 87 %
NEUTS SEG # FLD MANUAL: 0 %
NEUTS SEG # FLD MANUAL: 2 %
NRBC # FLD: 0
NRBC # FLD: 0
RBC # FLD MANUAL: 1000 /UL
RBC # FLD MANUAL: 1000 /UL
SARS-COV-2 IGG SERPL QL IA: NEGATIVE — SIGNIFICANT CHANGE UP
SARS-COV-2 IGM SERPL IA-ACNC: 0.07 INDEX — SIGNIFICANT CHANGE UP
SPECIMEN SOURCE: SIGNIFICANT CHANGE UP
SYCRY CLARITY: ABNORMAL
SYCRY COLOR: YELLOW
SYCRY ID: NORMAL
SYCRY TUBE: NORMAL
TOTAL CELLS COUNTED FLD: 105 /UL
TOTAL CELLS COUNTED FLD: 210 /UL
TUBE TYPE: NORMAL
TUBE TYPE: NORMAL
UNIDENT CELLS NFR FLD MANUAL: 0 %
UNIDENT CELLS NFR FLD MANUAL: 0 %
VARIANT LYMPHS # FLD MANUAL: 0 %
VARIANT LYMPHS # FLD MANUAL: 0 %

## 2021-02-22 PROCEDURE — 80048 BASIC METABOLIC PNL TOTAL CA: CPT

## 2021-02-22 PROCEDURE — 73030 X-RAY EXAM OF SHOULDER: CPT | Mod: 26,RT

## 2021-02-22 PROCEDURE — 85025 COMPLETE CBC W/AUTO DIFF WBC: CPT

## 2021-02-22 PROCEDURE — 99221 1ST HOSP IP/OBS SF/LOW 40: CPT

## 2021-02-22 PROCEDURE — 93306 TTE W/DOPPLER COMPLETE: CPT | Mod: 26

## 2021-02-22 PROCEDURE — 93306 TTE W/DOPPLER COMPLETE: CPT

## 2021-02-22 PROCEDURE — 36415 COLL VENOUS BLD VENIPUNCTURE: CPT

## 2021-02-22 PROCEDURE — 73030 X-RAY EXAM OF SHOULDER: CPT | Mod: RT

## 2021-02-22 PROCEDURE — 71045 X-RAY EXAM CHEST 1 VIEW: CPT

## 2021-02-22 PROCEDURE — 99232 SBSQ HOSP IP/OBS MODERATE 35: CPT | Mod: GC

## 2021-02-22 RX ORDER — AZITHROMYCIN 500 MG/1
250 TABLET, FILM COATED ORAL DAILY
Refills: 0 | Status: DISCONTINUED | OUTPATIENT
Start: 2021-02-23 | End: 2021-02-24

## 2021-02-22 RX ORDER — FUROSEMIDE 40 MG
40 TABLET ORAL DAILY
Refills: 0 | Status: DISCONTINUED | OUTPATIENT
Start: 2021-02-22 | End: 2021-02-23

## 2021-02-22 RX ORDER — AZITHROMYCIN 500 MG/1
500 TABLET, FILM COATED ORAL ONCE
Refills: 0 | Status: COMPLETED | OUTPATIENT
Start: 2021-02-22 | End: 2021-02-22

## 2021-02-22 RX ADMIN — HEPARIN SODIUM 5000 UNIT(S): 5000 INJECTION INTRAVENOUS; SUBCUTANEOUS at 20:26

## 2021-02-22 RX ADMIN — GABAPENTIN 300 MILLIGRAM(S): 400 CAPSULE ORAL at 00:00

## 2021-02-22 RX ADMIN — AZITHROMYCIN 500 MILLIGRAM(S): 500 TABLET, FILM COATED ORAL at 12:40

## 2021-02-22 RX ADMIN — GABAPENTIN 300 MILLIGRAM(S): 400 CAPSULE ORAL at 12:51

## 2021-02-22 RX ADMIN — HEPARIN SODIUM 5000 UNIT(S): 5000 INJECTION INTRAVENOUS; SUBCUTANEOUS at 12:40

## 2021-02-22 RX ADMIN — Medication 100 MILLIGRAM(S): at 12:41

## 2021-02-22 RX ADMIN — Medication 40 MILLIGRAM(S): at 16:28

## 2021-02-22 RX ADMIN — NEBIVOLOL HYDROCHLORIDE 10 MILLIGRAM(S): 5 TABLET ORAL at 12:41

## 2021-02-22 RX ADMIN — GABAPENTIN 300 MILLIGRAM(S): 400 CAPSULE ORAL at 20:26

## 2021-02-22 RX ADMIN — CEFTRIAXONE 1000 MILLIGRAM(S): 500 INJECTION, POWDER, FOR SOLUTION INTRAMUSCULAR; INTRAVENOUS at 20:26

## 2021-02-22 NOTE — CONSULT NOTE ADULT - SUBJECTIVE AND OBJECTIVE BOX
89y Female RHD admitted with resp failure resided at a Rehab facility prior to admission. She has chronic c/o Right shoulder pain believes was diagnosed as RTC tear under the care of DR Arias. SHe has received an injection in the past does not recall if it helped. She feels her shoulder is actually better than in the past. She has near full AROM with mild soreness currently. No recent fall or trauma. Denies numbness/tingling. Denies fever/chills. Denies pain/injury elsewhere. No other complaints. Pt believes her daughter called DR Arias to evaluate. We are seeing pt on his behalf.    HEALTH ISSUES - PROBLEM Dx:        MEDICATIONS  (STANDING):  allopurinol 100 milliGRAM(s) Oral daily  atorvastatin 20 milliGRAM(s) Oral at bedtime  cefTRIAXone Injectable. 1000 milliGRAM(s) IV Push every 24 hours  furosemide   Injectable 40 milliGRAM(s) IV Push daily  gabapentin 300 milliGRAM(s) Oral two times a day  heparin   Injectable 5000 Unit(s) SubCutaneous every 12 hours  nebivolol 10 milliGRAM(s) Oral daily    Allergies    Ceftin (Hives; Rash)    Intolerances                            10.1   8.00  )-----------( 116      ( 21 Feb 2021 18:10 )             31.9     21 Feb 2021 18:10    131    |  98     |  41     ----------------------------<  110    4.0     |  23     |  2.03     Ca    8.8        21 Feb 2021 18:10    TPro  7.4    /  Alb  3.8    /  TBili  0.9    /  DBili  x      /  AST  14     /  ALT  15     /  AlkPhos  59     21 Feb 2021 18:10    PT/INR - ( 21 Feb 2021 18:10 )   PT: 12.7 sec;   INR: 1.09 ratio         PTT - ( 21 Feb 2021 18:10 )  PTT:25.7 sec  Vital Signs Last 24 Hrs  T(C): 36.5 (02-22-21 @ 15:02), Max: 37.1 (02-21-21 @ 22:01)  T(F): 97.7 (02-22-21 @ 15:02), Max: 98.8 (02-21-21 @ 22:01)  HR: 67 (02-22-21 @ 15:02) (67 - 71)  BP: 161/50 (02-22-21 @ 15:02) (110/92 - 161/50)  BP(mean): 72 (02-21-21 @ 22:01) (72 - 72)  RR: 18 (02-22-21 @ 15:02) (15 - 19)  SpO2: 98% (02-22-21 @ 15:02) (96% - 99%)    Imaging: XR pending Right shoulder    Physical Exam  Gen: NAD, Alert and Awake, Oriented, Follows Commands, sitting up in bed  Musculoskeletal:      RIGHT UE: No open skin. No signs of trauma at shoulder, upper arm, elbow, forearm, wrist or hand.  Near full AROM at shoulder with mild soreness. Full active forward flexion inact/ No pain on resistance with internal/external rotation. Active supination/pronation intact. Full baseline painless AROM at elbow, wrist, and . No bony TTP. SILT to finger tips. + radial pulse palpable with brisk cap refill at distal finger tips. Compartments soft and compressible.  Strength 4+/5.    LEFT UE:  Full baseline painless ROM at shoulder, elbow, wrist, and .  Strength 5/5.    HIPS and PELVIS: Pelvis stable to AP and Lat compression. Able to actively SLR bilaterally. Negative Log Roll, Negative Heel strike bilaterally. No pain on internal/external rotation of the hips.    Bilateral LE: negative log-rolls. Able to actively SLR.         A/P: 89y Female with Right chronic shoulder pain, likely chronic rotator cuff tear.  FU xray for completeness  Pt Does not want or warrant injection at this time  Recommend antiinflammatories as tolerated  WBAT  Follow up with Dr. Arias as outpatient within 1 week, call office for appointment   Advised pt to let us know if her pain worsens this admission, we can reconsult PRN  Ortho stable  for DC.  Discussed with Dr. Arias 89y Female RHD admitted with resp failure resided at a Rehab facility prior to admission. She has chronic c/o Right shoulder pain believes was diagnosed as RTC tear under the care of DR Arias. SHe has received an injection in the past does not recall if it helped. She feels her shoulder is actually better than in the past. She has near full AROM with mild soreness currently. No recent fall or trauma. Denies numbness/tingling. Denies fever/chills. Denies pain/injury elsewhere. No other complaints. Pt believes her daughter called DR Arias to evaluate. We are seeing pt on his behalf.    HEALTH ISSUES - PROBLEM Dx:        MEDICATIONS  (STANDING):  allopurinol 100 milliGRAM(s) Oral daily  atorvastatin 20 milliGRAM(s) Oral at bedtime  cefTRIAXone Injectable. 1000 milliGRAM(s) IV Push every 24 hours  furosemide   Injectable 40 milliGRAM(s) IV Push daily  gabapentin 300 milliGRAM(s) Oral two times a day  heparin   Injectable 5000 Unit(s) SubCutaneous every 12 hours  nebivolol 10 milliGRAM(s) Oral daily    Allergies    Ceftin (Hives; Rash)    Intolerances                            10.1   8.00  )-----------( 116      ( 21 Feb 2021 18:10 )             31.9     21 Feb 2021 18:10    131    |  98     |  41     ----------------------------<  110    4.0     |  23     |  2.03     Ca    8.8        21 Feb 2021 18:10    TPro  7.4    /  Alb  3.8    /  TBili  0.9    /  DBili  x      /  AST  14     /  ALT  15     /  AlkPhos  59     21 Feb 2021 18:10    PT/INR - ( 21 Feb 2021 18:10 )   PT: 12.7 sec;   INR: 1.09 ratio         PTT - ( 21 Feb 2021 18:10 )  PTT:25.7 sec  Vital Signs Last 24 Hrs  T(C): 36.5 (02-22-21 @ 15:02), Max: 37.1 (02-21-21 @ 22:01)  T(F): 97.7 (02-22-21 @ 15:02), Max: 98.8 (02-21-21 @ 22:01)  HR: 67 (02-22-21 @ 15:02) (67 - 71)  BP: 161/50 (02-22-21 @ 15:02) (110/92 - 161/50)  BP(mean): 72 (02-21-21 @ 22:01) (72 - 72)  RR: 18 (02-22-21 @ 15:02) (15 - 19)  SpO2: 98% (02-22-21 @ 15:02) (96% - 99%)    Imaging: XR pending Right shoulder    Physical Exam  Gen: NAD, Alert and Awake, Oriented, Follows Commands, sitting up in bed  Musculoskeletal:      RIGHT UE: No open skin. No signs of trauma at shoulder, upper arm, elbow, forearm, wrist or hand.  Near full PROM at shoulder. Mildly limited AROM with mild soreness/discomfort, most notable in abduction. Near full forward flexion. No limits to IR/ER. No pain on resistance with internal/external rotation. Mild pain with resisted ER with abducted arm. Mild pain with Vesta's test. Active supination/pronation intact. Full baseline painless AROM at elbow, wrist, and . No bony TTP. SILT to finger tips. + radial pulse palpable with brisk cap refill at distal finger tips. Compartments soft and compressible.  Strength 4+/5.    LEFT UE:  Full baseline painless ROM at shoulder, elbow, wrist, and .  Strength 5/5.    HIPS and PELVIS: Pelvis stable to AP and Lat compression. Able to actively SLR bilaterally. Negative Log Roll, Negative Heel strike bilaterally. No pain on internal/external rotation of the hips.    Bilateral LE: negative log-rolls. Able to actively SLR.

## 2021-02-22 NOTE — PROGRESS NOTE ADULT - ASSESSMENT
Pt has been seen and examined with FP resident, resident supervised agree with a/p       Patient is a 89y old  Female who presents with a chief complaint of Acute hypoxic respiratory failure (21 Feb 2021 22:09)        PHYSICAL EXAM:  Vital Signs Last 24 Hrs  T(C): 36.8 (22 Feb 2021 07:54), Max: 38.3 (21 Feb 2021 17:49)  T(F): 98.3 (22 Feb 2021 07:54), Max: 101 (21 Feb 2021 17:49)  HR: 70 (22 Feb 2021 07:54) (70 - 100)  BP: 110/92 (22 Feb 2021 07:54) (110/92 - 164/78)  BP(mean): 72 (21 Feb 2021 22:01) (72 - 72)  RR: 15 (22 Feb 2021 07:54) (15 - 20)  SpO2: 99% (22 Feb 2021 07:54) (90% - 99%)  -rs-aeeb, b/l rales and crackles present   -cvs-s1s2 normal   -p/a-soft,bs+      A/P      #Probably CAP along with URTI- ct abx     #Acute exacerbation of diastolic heart failure -change po to iv lasix, get echo     #Esophageal thickening - GI evaluation

## 2021-02-22 NOTE — CONSULT NOTE ADULT - SUBJECTIVE AND OBJECTIVE BOX
HPI:  89F with HTN, HLD, gout, and OA presents from home for fever, lethargy, weakness, confusion, decreased appetite, and decreased urine output. Patient reports low grade fever 100.0, muscle aches, decreased appetite, and fatigue for 2 days, as well as dysuria. Daughter adds that her mother seemed mildly "loopy" earlier in the day. At baseline, A+Ox3, ambulatory. Per daughter, patient has intermittent LE swelling managed with furosemide, though patient and daughter deny diagnosis of heart failure or CAD. Daughter reports patient was recently diagnosed with UTI and prescribed first ciprofloxacin and then macrobid; would complete macrobid 2/23. Daughter most concerned about decreased urine output. (21 Feb 2021 22:09)      PAST MEDICAL & SURGICAL HISTORY:  Osteoarthritis    Gout    HLD (hyperlipidemia)    HTN (hypertension)    History of tonsillectomy  in childhood        MEDICATIONS  (STANDING):  allopurinol 100 milliGRAM(s) Oral daily  atorvastatin 20 milliGRAM(s) Oral at bedtime  cefTRIAXone Injectable. 1000 milliGRAM(s) IV Push every 24 hours  furosemide   Injectable 40 milliGRAM(s) IV Push daily  gabapentin 300 milliGRAM(s) Oral two times a day  heparin   Injectable 5000 Unit(s) SubCutaneous every 12 hours  nebivolol 10 milliGRAM(s) Oral daily    MEDICATIONS  (PRN):  acetaminophen   Tablet .. 650 milliGRAM(s) Oral once PRN Temp greater or equal to 38C (100.4F), Mild Pain (1 - 3)      Allergies    Ceftin (Hives; Rash)    Intolerances        SOCIAL HISTORY:    FAMILY HISTORY:  No pertinent family history in first degree relatives     Non-contributory    REVIEW OF SYSTEMS      General:	    Respiratory and Thorax:  	  Cardiovascular:	    Gastrointestinal:	    Musculoskeletal:	   Vital Signs Last 24 Hrs  T(C): 36.5 (22 Feb 2021 15:02), Max: 37.1 (21 Feb 2021 22:01)  T(F): 97.7 (22 Feb 2021 15:02), Max: 98.8 (21 Feb 2021 22:01)  HR: 67 (22 Feb 2021 15:02) (67 - 71)  BP: 161/50 (22 Feb 2021 15:02) (110/92 - 161/50)  BP(mean): 72 (21 Feb 2021 22:01) (72 - 72)  RR: 18 (22 Feb 2021 15:02) (15 - 19)  SpO2: 98% (22 Feb 2021 15:02) (96% - 99%)    HEENT :No Pallor.No icterus. EOMI,PERLAA  Chest : B/L Rales  CVS : S1S2 Normal.No murmurs.  Abdomen: Soft.Non tender .Normal bowel sounds.No Organomegaly.  CNS: Alert.Oriented to Time,Place and Person.No focal deficit.  EXT: Normal Range of motion.No pitting edema.    LABS:                        10.1   8.00  )-----------( 116      ( 21 Feb 2021 18:10 )             31.9     02-21    131<L>  |  98  |  41<H>  ----------------------------<  110<H>  4.0   |  23  |  2.03<H>    Ca    8.8      21 Feb 2021 18:10    TPro  7.4  /  Alb  3.8  /  TBili  0.9  /  DBili  x   /  AST  14<L>  /  ALT  15  /  AlkPhos  59  02-21    PT/INR - ( 21 Feb 2021 18:10 )   PT: 12.7 sec;   INR: 1.09 ratio         PTT - ( 21 Feb 2021 18:10 )  PTT:25.7 sec  LIVER FUNCTIONS - ( 21 Feb 2021 18:10 )  Alb: 3.8 g/dL / Pro: 7.4 gm/dL / ALK PHOS: 59 U/L / ALT: 15 U/L / AST: 14 U/L / GGT: x             RADIOLOGY & ADDITIONAL STUDIES:

## 2021-02-22 NOTE — PROGRESS NOTE ADULT - ASSESSMENT
89F w/ HTN, HLD, gout, and OA presented with fever, lethargy, and decreased urine output and admitted for sepsis secondary to UTI as well as acute respiratory failure with hypoxia.     #Acute hypoxic respiratory failure   - secondary to undiagnosed CHF or viral URI (non COVID)  - CXR: mild pleural effusions   - echo performed, f/u read  - oxygen PRN   - CT Chest: mild pulmonary edema superimposed on chronic interstitial lung disease, bilateral pleural effusion with adjacent compressive atelectasis  - consider lasix after echo given pt's renal function     #Sepsis   - secondary to UTI  - pt retaining urine, and at increased risk of UTIs  - continue ceftriaxone     #Urinary retention  - in the setting of remote bladder prolapse repair   - uro consult   - pérez catheter for the time being     #Esophageal thickening  - nonemergent EGD  - GI consulted    #HTN   - chronic, now stable  - continue home meds    #Neuropathy  - given CKD4, continue decreased gabapentin dose    #HLD  - chronic  - continue rosuvastatin     #Gout  - chronic, stable  - continue allopurinol     #CKD4  - chronic, stable  - avoid nephrotoxic drugs  - renally dose meds as necessary     #DVT PPx  - Improve score 2  - continue hep sq   89F w/ HTN, HLD, gout, and OA presented with fever, lethargy, and decreased urine output and admitted for sepsis secondary to UTI as well as acute respiratory failure with hypoxia.     #Acute hypoxic respiratory failure   - secondary to undiagnosed CHF (per pt) and/or CAP  - CXR: mild pleural effusions   - echo performed, f/u read  - oxygen PRN   - CT Chest: mild pulmonary edema superimposed on chronic interstitial lung disease, bilateral pleural effusion with adjacent compressive atelectasis  - lasix IV    #Sepsis   - secondary to UTI  - pt retaining urine, and at increased risk of UTIs  - continue ceftriaxone     #Urinary retention  - in the setting of remote bladder prolapse repair   - uro consult   - pérez catheter for the time being     #Esophageal thickening  - nonemergent EGD  - GI consulted    #HTN   - chronic, now stable  - continue home meds    #Neuropathy  - given CKD4, continue decreased gabapentin dose    #HLD  - chronic  - continue rosuvastatin     #Gout  - chronic, stable  - continue allopurinol     #CKD4  - chronic, stable  - avoid nephrotoxic drugs  - renally dose meds as necessary     #DVT PPx  - Improve score 2  - continue hep sq

## 2021-02-22 NOTE — PROGRESS NOTE ADULT - SUBJECTIVE AND OBJECTIVE BOX
CHIEF COMPLAINT: 89F w/ HTN, HLD, gout, and OA presented with fever, lethargy, and decreased urine output and admitted for sepsis secondary to UTI as well as acute respiratory failure with hypoxia.     SUBJECTIVE: Pt reports feelin well, but she still feels like she's retaining. Reports mild dysuria. She denies cough, SOB, sick contacts, flank pain, suprapubic tenderness. Discussed current plan with daughterPippa over the phone.     REVIEW OF SYSTEMS:  CONSTITUTIONAL: +fever   RESPIRATORY: No cough, wheezing, hemoptysis; No shortness of breath  CARDIOVASCULAR: No chest pain or palpitations  GASTROINTESTINAL: No abdominal or epigastric pain. No nausea, vomiting, or hematemesis; No diarrhea or constipation. No melena or hematochezia.  GENITOURINARY: +dysuria  NEUROLOGICAL: No numbness or weakness  SKIN: No itching, burning, rashes, or lesions   All other review of systems is negative unless indicated above    Vital Signs Last 24 Hrs  T(C): 36.8 (22 Feb 2021 07:54), Max: 38.3 (21 Feb 2021 17:49)  T(F): 98.3 (22 Feb 2021 07:54), Max: 101 (21 Feb 2021 17:49)  HR: 70 (22 Feb 2021 07:54) (70 - 100)  BP: 110/92 (22 Feb 2021 07:54) (110/92 - 164/78)  BP(mean): 72 (21 Feb 2021 22:01) (72 - 72)  RR: 15 (22 Feb 2021 07:54) (15 - 20)  SpO2: 99% (22 Feb 2021 07:54) (90% - 99%)    I&O's Summary    21 Feb 2021 07:01  -  22 Feb 2021 07:00  --------------------------------------------------------  IN: 600 mL / OUT: 1325 mL / NET: -725 mL    PHYSICAL EXAM:  Constitutional: NAD, awake and alert, well-developed  HEENT: EOMI, Normal Hearing, MMM  Neck: possible JVD  Respiratory: normal respiratory effort, +b/l crackles in lower lung fields  Cardiovascular: regular, 3/6 systolic murmur  Gastrointestinal: soft, nontender, nondistended, no guarding, no rebound  Extremities: No peripheral edema  Neurological: A/O x 3, no focal deficits  Musculoskeletal: 5/5 strength b/l upper and lower extremities, LLE mildly weaker than RLE due to injury    MEDICATIONS:  MEDICATIONS  (STANDING):  allopurinol 100 milliGRAM(s) Oral daily  atorvastatin 20 milliGRAM(s) Oral at bedtime  cefTRIAXone Injectable. 1000 milliGRAM(s) IV Push every 24 hours  furosemide   Injectable 40 milliGRAM(s) IV Push daily  gabapentin 300 milliGRAM(s) Oral two times a day  heparin   Injectable 5000 Unit(s) SubCutaneous every 12 hours  nebivolol 10 milliGRAM(s) Oral daily      LABS: All Labs Reviewed:                        10.1   8.00  )-----------( 116      ( 21 Feb 2021 18:10 )             31.9     02-21    131<L>  |  98  |  41<H>  ----------------------------<  110<H>  4.0   |  23  |  2.03<H>    Ca    8.8      21 Feb 2021 18:10    TPro  7.4  /  Alb  3.8  /  TBili  0.9  /  DBili  x   /  AST  14<L>  /  ALT  15  /  AlkPhos  59  02-21    PT/INR - ( 21 Feb 2021 18:10 )   PT: 12.7 sec;   INR: 1.09 ratio         PTT - ( 21 Feb 2021 18:10 )  PTT:25.7 sec

## 2021-02-23 DIAGNOSIS — R33.9 RETENTION OF URINE, UNSPECIFIED: ICD-10-CM

## 2021-02-23 LAB
ANION GAP SERPL CALC-SCNC: 10 MMOL/L — SIGNIFICANT CHANGE UP (ref 5–17)
BUN SERPL-MCNC: 40 MG/DL — HIGH (ref 7–23)
CALCIUM SERPL-MCNC: 9.1 MG/DL — SIGNIFICANT CHANGE UP (ref 8.5–10.1)
CHLORIDE SERPL-SCNC: 109 MMOL/L — HIGH (ref 96–108)
CO2 SERPL-SCNC: 22 MMOL/L — SIGNIFICANT CHANGE UP (ref 22–31)
CREAT SERPL-MCNC: 1.86 MG/DL — HIGH (ref 0.5–1.3)
GLUCOSE SERPL-MCNC: 126 MG/DL — HIGH (ref 70–99)
POTASSIUM SERPL-MCNC: 3.7 MMOL/L — SIGNIFICANT CHANGE UP (ref 3.5–5.3)
POTASSIUM SERPL-SCNC: 3.7 MMOL/L — SIGNIFICANT CHANGE UP (ref 3.5–5.3)
SODIUM SERPL-SCNC: 141 MMOL/L — SIGNIFICANT CHANGE UP (ref 135–145)

## 2021-02-23 PROCEDURE — 99222 1ST HOSP IP/OBS MODERATE 55: CPT

## 2021-02-23 PROCEDURE — 99232 SBSQ HOSP IP/OBS MODERATE 35: CPT | Mod: GC

## 2021-02-23 RX ORDER — POLYETHYLENE GLYCOL 3350 17 G/17G
17 POWDER, FOR SOLUTION ORAL DAILY
Refills: 0 | Status: DISCONTINUED | OUTPATIENT
Start: 2021-02-23 | End: 2021-02-24

## 2021-02-23 RX ORDER — HYDRALAZINE HCL 50 MG
1 TABLET ORAL
Qty: 0 | Refills: 0 | DISCHARGE

## 2021-02-23 RX ORDER — PHENYLEPHRINE-SHARK LIVER OIL-MINERAL OIL-PETROLATUM RECTAL OINTMENT
1 OINTMENT (GRAM) RECTAL ONCE
Refills: 0 | Status: COMPLETED | OUTPATIENT
Start: 2021-02-23 | End: 2021-02-23

## 2021-02-23 RX ORDER — PHENYLEPHRINE-SHARK LIVER OIL-MINERAL OIL-PETROLATUM RECTAL OINTMENT
OINTMENT (GRAM) RECTAL
Refills: 0 | Status: COMPLETED | OUTPATIENT
Start: 2021-02-23 | End: 2021-02-23

## 2021-02-23 RX ORDER — FUROSEMIDE 40 MG
40 TABLET ORAL THREE TIMES A DAY
Refills: 0 | Status: COMPLETED | OUTPATIENT
Start: 2021-02-23 | End: 2021-02-23

## 2021-02-23 RX ADMIN — PHENYLEPHRINE-SHARK LIVER OIL-MINERAL OIL-PETROLATUM RECTAL OINTMENT 1 APPLICATION(S): at 16:20

## 2021-02-23 RX ADMIN — Medication 40 MILLIGRAM(S): at 16:21

## 2021-02-23 RX ADMIN — GABAPENTIN 300 MILLIGRAM(S): 400 CAPSULE ORAL at 10:06

## 2021-02-23 RX ADMIN — POLYETHYLENE GLYCOL 3350 17 GRAM(S): 17 POWDER, FOR SOLUTION ORAL at 16:20

## 2021-02-23 RX ADMIN — Medication 100 MILLIGRAM(S): at 10:06

## 2021-02-23 RX ADMIN — HEPARIN SODIUM 5000 UNIT(S): 5000 INJECTION INTRAVENOUS; SUBCUTANEOUS at 10:07

## 2021-02-23 RX ADMIN — Medication 40 MILLIGRAM(S): at 20:50

## 2021-02-23 RX ADMIN — ATORVASTATIN CALCIUM 20 MILLIGRAM(S): 80 TABLET, FILM COATED ORAL at 20:45

## 2021-02-23 RX ADMIN — CEFTRIAXONE 1000 MILLIGRAM(S): 500 INJECTION, POWDER, FOR SOLUTION INTRAMUSCULAR; INTRAVENOUS at 20:45

## 2021-02-23 RX ADMIN — Medication 40 MILLIGRAM(S): at 10:06

## 2021-02-23 RX ADMIN — PHENYLEPHRINE-SHARK LIVER OIL-MINERAL OIL-PETROLATUM RECTAL OINTMENT 1 APPLICATION(S): at 20:53

## 2021-02-23 RX ADMIN — NEBIVOLOL HYDROCHLORIDE 10 MILLIGRAM(S): 5 TABLET ORAL at 10:08

## 2021-02-23 RX ADMIN — GABAPENTIN 300 MILLIGRAM(S): 400 CAPSULE ORAL at 20:45

## 2021-02-23 RX ADMIN — AZITHROMYCIN 250 MILLIGRAM(S): 500 TABLET, FILM COATED ORAL at 10:06

## 2021-02-23 NOTE — PROGRESS NOTE ADULT - ASSESSMENT
No Dysphagia  Pt tolerating a Regular diet  The pt is refusing an EGD ' i feel fine -i dont want it at this time'  All riska and benefits discussed in detail  Office visit after discharge

## 2021-02-23 NOTE — PROGRESS NOTE ADULT - SUBJECTIVE AND OBJECTIVE BOX
Interval History:    MEDICATIONS  (STANDING):  allopurinol 100 milliGRAM(s) Oral daily  atorvastatin 20 milliGRAM(s) Oral at bedtime  azithromycin   Tablet 250 milliGRAM(s) Oral daily  cefTRIAXone Injectable. 1000 milliGRAM(s) IV Push every 24 hours  furosemide   Injectable 40 milliGRAM(s) IV Push three times a day  gabapentin 300 milliGRAM(s) Oral two times a day  heparin   Injectable 5000 Unit(s) SubCutaneous every 12 hours  nebivolol 10 milliGRAM(s) Oral daily    MEDICATIONS  (PRN):  acetaminophen   Tablet .. 650 milliGRAM(s) Oral once PRN Temp greater or equal to 38C (100.4F), Mild Pain (1 - 3)      Daily     Daily   BMI: 32.2 (02-21 @ 22:48)  Change in Weight:  Vital Signs Last 24 Hrs  T(C): 37.2 (23 Feb 2021 08:31), Max: 37.2 (23 Feb 2021 08:31)  T(F): 99 (23 Feb 2021 08:31), Max: 99 (23 Feb 2021 08:31)  HR: 65 (23 Feb 2021 08:31) (63 - 65)  BP: 155/65 (23 Feb 2021 08:31) (135/65 - 155/65)  BP(mean): --  RR: 17 (23 Feb 2021 08:31) (17 - 17)  SpO2: 95% (23 Feb 2021 08:31) (93% - 95%)  I&O's Detail    22 Feb 2021 07:01  -  23 Feb 2021 07:00  --------------------------------------------------------  IN:  Total IN: 0 mL    OUT:    Indwelling Catheter - Urethral (mL): 2400 mL    Intermittent Catheterization - Urethral (mL): 900 mL  Total OUT: 3300 mL    Total NET: -3300 mL      23 Feb 2021 07:01  -  23 Feb 2021 15:47  --------------------------------------------------------  IN:  Total IN: 0 mL    OUT:    Indwelling Catheter - Urethral (mL): 900 mL  Total OUT: 900 mL    Total NET: -900 mL          PHYSICAL EXAM  General:  Well developed, well nourished, alert and active, no pallor, NAD.  HEENT:    Normal appearance of conjunctiva, ears, nose, lips, oropharynx, and oral mucosa, anicteric.  Neck:  No masses, no asymmetry.  Lymph Nodes:  No lymphadenopathy.   Cardiovascular:  RRR normal S1/S2, no murmur.  Respiratory:  CTA B/L, normal respiratory effort.   Abdominal:   soft, no masses or tenderness, normoactive BS, NT/ND, no HSM.  Extremities:   No clubbing or cyanosis, normal capillary refill, no edema.   Skin:   No rash, jaundice, lesions, eczema.   Musculoskeletal:  No joint swelling, erythema or tenderness.   Other:     Lab Results:                        10.1   8.00  )-----------( 116      ( 21 Feb 2021 18:10 )             31.9     02-23    141  |  109<H>  |  40<H>  ----------------------------<  126<H>  3.7   |  22  |  1.86<H>    Ca    9.1      23 Feb 2021 08:38    TPro  7.4  /  Alb  3.8  /  TBili  0.9  /  DBili  x   /  AST  14<L>  /  ALT  15  /  AlkPhos  59  02-21    LIVER FUNCTIONS - ( 21 Feb 2021 18:10 )  Alb: 3.8 g/dL / Pro: 7.4 gm/dL / ALK PHOS: 59 U/L / ALT: 15 U/L / AST: 14 U/L / GGT: x           PT/INR - ( 21 Feb 2021 18:10 )   PT: 12.7 sec;   INR: 1.09 ratio         PTT - ( 21 Feb 2021 18:10 )  PTT:25.7 sec      Stool Results:          RADIOLOGY RESULTS:    SURGICAL PATHOLOGY:

## 2021-02-23 NOTE — PROVIDER CONTACT NOTE (OTHER) - DATE AND TIME:
22-Feb-2021 11:50
22-Feb-2021 14:33
22-Feb-2021 18:27
23-Feb-2021 09:55
23-Feb-2021 17:16
23-Feb-2021 20:59

## 2021-02-23 NOTE — PROGRESS NOTE ADULT - SUBJECTIVE AND OBJECTIVE BOX
CHIEF COMPLAINT:   89F w/ HTN, HLD, gout, and OA presented with fever, lethargy, and decreased urine output and admitted for sepsis secondary to UTI as well as acute respiratory failure with hypoxia.     SUBJECTIVE:  Patient seen and examined at bedside, she reports feeling better after taking lasix.  It was explained to the patient that we will continue with the lasix and Abx for now. Plan and hospital update was discussed with daughterPippa over the phone.      REVIEW OF SYSTEMS:  CONSTITUTIONAL: no fever   RESPIRATORY: No cough, wheezing, hemoptysis; No shortness of breath  CARDIOVASCULAR: No chest pain or palpitations  GASTROINTESTINAL: No abdominal or epigastric pain. No nausea, vomiting, or hematemesis; No diarrhea or constipation. No melena or hematochezia.  GENITOURINARY: no dysuria  NEUROLOGICAL: No numbness or weakness  SKIN: No itching, burning, rashes, or lesions   All other review of systems is negative unless indicated above    Vital Signs Last 24 Hrs  T(C): 36.6 (23 Feb 2021 16:17), Max: 37.2 (23 Feb 2021 08:31)  T(F): 97.8 (23 Feb 2021 16:17), Max: 99 (23 Feb 2021 08:31)  HR: 58 (23 Feb 2021 16:17) (58 - 65)  BP: 128/41 (23 Feb 2021 16:17) (128/41 - 155/65)  RR: 16 (23 Feb 2021 16:17) (16 - 17)  SpO2: 96% (23 Feb 2021 16:17) (93% - 96%)    I&O's Summary    22 Feb 2021 07:01  -  23 Feb 2021 07:00  --------------------------------------------------------  IN: 0 mL / OUT: 3300 mL / NET: -3300 mL    23 Feb 2021 07:01  -  23 Feb 2021 16:33  --------------------------------------------------------  IN: 0 mL / OUT: 900 mL / NET: -900 mL      PHYSICAL EXAM:  Constitutional: NAD, awake and alert, well-developed  HEENT: EOMI, Normal Hearing, MMM  Neck: possible JVD  Respiratory: normal respiratory effort, +b/l crackles in lower lung fields  Cardiovascular: regular, 3/6 systolic murmur  Gastrointestinal: soft, nontender, nondistended, no guarding, no rebound  Extremities: No peripheral edema  Neurological: A/O x 3, no focal deficits  Musculoskeletal: 5/5 strength b/l upper and lower extremities, LLE mildly weaker than RLE due to injury    MEDICATIONS  (STANDING):  allopurinol 100 milliGRAM(s) Oral daily  atorvastatin 20 milliGRAM(s) Oral at bedtime  azithromycin   Tablet 250 milliGRAM(s) Oral daily  cefTRIAXone Injectable. 1000 milliGRAM(s) IV Push every 24 hours  furosemide   Injectable 40 milliGRAM(s) IV Push three times a day  gabapentin 300 milliGRAM(s) Oral two times a day  hemorrhoidal Ointment 1 Application(s) Rectal once  hemorrhoidal Ointment      heparin   Injectable 5000 Unit(s) SubCutaneous every 12 hours  nebivolol 10 milliGRAM(s) Oral daily  polyethylene glycol 3350 17 Gram(s) Oral daily    MEDICATIONS  (PRN):  acetaminophen   Tablet .. 650 milliGRAM(s) Oral once PRN Temp greater or equal to 38C (100.4F), Mild Pain (1 - 3)      LABS: All Labs Reviewed:                            10.1   8.00  )-----------( 116      ( 21 Feb 2021 18:10 )             31.9   02-23    141  |  109<H>  |  40<H>  ----------------------------<  126<H>  3.7   |  22  |  1.86<H>    Ca    9.1      23 Feb 2021 08:38    TPro  7.4  /  Alb  3.8  /  TBili  0.9  /  DBili  x   /  AST  14<L>  /  ALT  15  /  AlkPhos  59  02-21      CHIEF COMPLAINT:   89F w/ HTN, HLD, gout, and OA presented with fever, lethargy, and decreased urine output and admitted for sepsis secondary to UTI as well as acute respiratory failure with hypoxia.     SUBJECTIVE:  Patient seen and examined at bedside, she reports feeling better after taking lasix.  It was explained to the patient that we will continue with the lasix and Abx for now. Plan and hospital update was discussed with daughterPippa over the phone.      REVIEW OF SYSTEMS:  CONSTITUTIONAL: no fever   RESPIRATORY: No cough, wheezing, hemoptysis; No shortness of breath  CARDIOVASCULAR: No chest pain or palpitations.  GASTROINTESTINAL: No abdominal or epigastric pain. No nausea, vomiting, or hematemesis; No diarrhea or constipation. No melena or hematochezia.  GENITOURINARY: no dysuria  NEUROLOGICAL: No numbness or weakness  SKIN: No itching, burning, rashes, or lesions   All other review of systems is negative unless indicated above    Vital Signs Last 24 Hrs  T(C): 36.6 (23 Feb 2021 16:17), Max: 37.2 (23 Feb 2021 08:31)  T(F): 97.8 (23 Feb 2021 16:17), Max: 99 (23 Feb 2021 08:31)  HR: 58 (23 Feb 2021 16:17) (58 - 65)  BP: 128/41 (23 Feb 2021 16:17) (128/41 - 155/65)  RR: 16 (23 Feb 2021 16:17) (16 - 17)  SpO2: 96% (23 Feb 2021 16:17) (93% - 96%)    I&O's Summary    22 Feb 2021 07:01  -  23 Feb 2021 07:00  --------------------------------------------------------  IN: 0 mL / OUT: 3300 mL / NET: -3300 mL    23 Feb 2021 07:01  -  23 Feb 2021 16:33  --------------------------------------------------------  IN: 0 mL / OUT: 900 mL / NET: -900 mL      PHYSICAL EXAM:  Constitutional: NAD, awake and alert, well-developed  HEENT: EOMI, Normal Hearing, MMM  Neck: possible JVD  Respiratory: normal respiratory effort, +b/l crackles in lower lung fields  Cardiovascular: regular, 3/6 systolic murmur  Gastrointestinal: soft, nontender, nondistended, no guarding, no rebound  Extremities: No peripheral edema  Neurological: A/O x 3, no focal deficits  Musculoskeletal: 5/5 strength b/l upper and lower extremities, LLE mildly weaker than RLE due to injury    MEDICATIONS  (STANDING):  allopurinol 100 milliGRAM(s) Oral daily  atorvastatin 20 milliGRAM(s) Oral at bedtime  azithromycin   Tablet 250 milliGRAM(s) Oral daily  cefTRIAXone Injectable. 1000 milliGRAM(s) IV Push every 24 hours  furosemide   Injectable 40 milliGRAM(s) IV Push three times a day  gabapentin 300 milliGRAM(s) Oral two times a day  hemorrhoidal Ointment 1 Application(s) Rectal once  hemorrhoidal Ointment      heparin   Injectable 5000 Unit(s) SubCutaneous every 12 hours  nebivolol 10 milliGRAM(s) Oral daily  polyethylene glycol 3350 17 Gram(s) Oral daily    MEDICATIONS  (PRN):  acetaminophen   Tablet .. 650 milliGRAM(s) Oral once PRN Temp greater or equal to 38C (100.4F), Mild Pain (1 - 3)      LABS: All Labs Reviewed:                            10.1   8.00  )-----------( 116      ( 21 Feb 2021 18:10 )             31.9   02-23    141  |  109<H>  |  40<H>  ----------------------------<  126<H>  3.7   |  22  |  1.86<H>    Ca    9.1      23 Feb 2021 08:38    TPro  7.4  /  Alb  3.8  /  TBili  0.9  /  DBili  x   /  AST  14<L>  /  ALT  15  /  AlkPhos  59  02-21

## 2021-02-23 NOTE — PHARMACOTHERAPY INTERVENTION NOTE - COMMENTS
Confirmed home medications with patient's daughter Pippa and Dr. [facility]. Updated patient allergies and preferred outpatient pharmacy and documented in Outpatient Medication Review.

## 2021-02-23 NOTE — PROGRESS NOTE ADULT - ASSESSMENT
Pt has been seen and examined with FP resident, resident supervised agree with a/p       Patient is a 89y old  Female who presents with a chief complaint of Acute hypoxic respiratory failure (21 Feb 2021 22:09)        PHYSICAL EXAM:    -rs-aeeb, b/l rales and crackles present   -cvs-s1s2 normal   -p/a-soft,bs+      A/P      #Probably CAP along with URTI- ct abx     #Acute exacerbation of diastolic heart failure -improved clinically- increase lasix today and monitor her, get echo and cardio evaluation     #Esophageal thickening - GI evaluation

## 2021-02-23 NOTE — PROGRESS NOTE ADULT - ASSESSMENT
89F w/ HTN, HLD, gout, and OA presented with fever, lethargy, and decreased urine output and admitted for sepsis secondary to UTI as well as acute respiratory failure with hypoxia.     #Acute hypoxic respiratory failure   - secondary to undiagnosed CHF (per pt) and/or CAP  - CXR: mild pleural effusions   - echo performed, f/u read  - oxygen PRN   - CT Chest: mild pulmonary edema superimposed on chronic interstitial lung disease, bilateral pleural effusion with adjacent compressive atelectasis  - continue lasix IV    #Sepsis   - 2/2 UTI  - pt retaining urine, and at increased risk of UTIs  - continue ceftriaxone     #Urinary retention  - in the setting of remote bladder prolapse repair   - uro consult appreciated   - s/p pérez catheter, Consider placing again if patient still retaining.     #Esophageal thickening  - nonemergent EGD  - GI consult appreciated    #HTN   - chronic, now stable  - continue home meds    #Neuropathy  - given CKD4, continue decreased gabapentin dose    #HLD  - chronic  - continue rosuvastatin     #Gout  - chronic, stable  - continue allopurinol     #CKD4  - chronic, stable  - avoid nephrotoxic drugs  - renally dose meds as necessary     #DVT PPx  - Improve score 2  - continue hep sq    *Case discussed with Dr. Hensley

## 2021-02-23 NOTE — CONSULT NOTE ADULT - SUBJECTIVE AND OBJECTIVE BOX
HPI:  89F with HTN, HLD, gout, and OA presents from home for fever, lethargy, weakness, confusion, decreased appetite, and decreased urine output. Patient reports low grade fever 100.0, muscle aches, decreased appetite, and fatigue for 2 days, as well as dysuria. Daughter adds that her mother seemed mildly "loopy" earlier in the day. At baseline, A+Ox3, ambulatory. Per daughter, patient has intermittent LE swelling managed with furosemide, though patient and daughter deny diagnosis of heart failure or CAD. Daughter reports patient was recently diagnosed with UTI and prescribed first ciprofloxacin and then macrobid; would complete macrobid . Daughter most concerned about decreased urine output. (2021 22:09)    89 yo female with PMH as above including cystocele repair over 10 years ago as per pt, admitted for acute hypoxic resp failure. Urology consulted for pt with concern for urinary retention after her striaght catheterized volumes were 425 and 900 cc. Pt with indwelling pérez overnight and the pérez was d/dionisio this am for a trail of void. Pt seen at bedside. Pt reports she never had voiding problems prior to this admission. She reports her cystocele was repaired over 10 years ago. She denies recurrent UTIS, dysuria, hematuria, abd/flank pain.     PAST MEDICAL & SURGICAL HISTORY:  Osteoarthritis    Gout    HLD (hyperlipidemia)    HTN (hypertension)    History of tonsillectomy  in childhood        REVIEW OF SYSTEMS    All other review of systems neg, except as noted in HPI    MEDICATIONS  (STANDING):  allopurinol 100 milliGRAM(s) Oral daily  atorvastatin 20 milliGRAM(s) Oral at bedtime  azithromycin   Tablet 250 milliGRAM(s) Oral daily  cefTRIAXone Injectable. 1000 milliGRAM(s) IV Push every 24 hours  furosemide   Injectable 40 milliGRAM(s) IV Push daily  gabapentin 300 milliGRAM(s) Oral two times a day  heparin   Injectable 5000 Unit(s) SubCutaneous every 12 hours  nebivolol 10 milliGRAM(s) Oral daily    MEDICATIONS  (PRN):  acetaminophen   Tablet .. 650 milliGRAM(s) Oral once PRN Temp greater or equal to 38C (100.4F), Mild Pain (1 - 3)      Allergies    Ceftin (Hives; Rash)    Intolerances        SOCIAL HISTORY:    FAMILY HISTORY:  No pertinent family history in first degree relatives        Vital Signs Last 24 Hrs  T(C): 37.2 (2021 08:31), Max: 37.2 (2021 08:31)  T(F): 99 (2021 08:31), Max: 99 (2021 08:31)  HR: 65 (2021 08:31) (63 - 67)  BP: 155/65 (2021 08:31) (135/65 - 161/50)  BP(mean): --  RR: 17 (2021 08:31) (17 - 18)  SpO2: 95% (2021 08:31) (93% - 98%)    PHYSICAL EXAM:    General: No distress, No anxiety  VITALS  T(C): 37.2 (21 @ 08:31), Max: 37.2 (21 @ 08:31)  HR: 65 (21 @ 08:31) (63 - 67)  BP: 155/65 (21 @ 08:31) (135/65 - 161/50)  RR: 17 (21 @ 08:31) (17 - 18)  SpO2: 95% (21 @ 08:31) (93% - 98%)            Skin     : No jaundice, No lesions, No swelling  HEENT: No icterus , EOM full , No epistaxis  Lung    : No resp distress  Abdo:   : Soft, Non tender, No guarding, No distension    Back    : No CVAT b/l  Extremity: No calf tenderness, No edema  Genitalia Female: No Pérez, undergoing TOV. Pt without any evidence of grade 3 cystocele on physical exam,however she did not allow a full pelvic exam to determine if she has a grade 1 or 2 cystocele.   Neuro   : A&Ox3      LABS:                        10.1   8.00  )-----------( 116      ( 2021 18:10 )             31.9         141  |  109<H>  |  40<H>  ----------------------------<  126<H>  3.7   |  22  |  1.86<H>    Ca    9.1      2021 08:38    TPro  7.4  /  Alb  3.8  /  TBili  0.9  /  DBili  x   /  AST  14<L>  /  ALT  15  /  AlkPhos  59  02-21    PT/INR - ( 2021 18:10 )   PT: 12.7 sec;   INR: 1.09 ratio         PTT - ( 2021 18:10 )  PTT:25.7 sec  Urinalysis Basic - ( 2021 18:10 )    Color: Yellow / Appearance: Clear / S.010 / pH: x  Gluc: x / Ketone: Negative  / Bili: Negative / Urobili: Negative mg/dL   Blood: x / Protein: 15 mg/dL / Nitrite: Negative   Leuk Esterase: Small / RBC: 0-2 /HPF / WBC 3-5   Sq Epi: x / Non Sq Epi: Occasional / Bacteria: Occasional

## 2021-02-23 NOTE — PROVIDER CONTACT NOTE (OTHER) - SITUATION
Kavitha Bhardwaj at answering service of consult
Tele MD service spoke with Tasha to advise MD of routine consult.
notified Dr Cervantes' office of consult
notified Dr Collins's office of admission
notified Dr Hopkins's office of consult spoke to Pearl
PLTS on 2/21 115

## 2021-02-24 ENCOUNTER — TRANSCRIPTION ENCOUNTER (OUTPATIENT)
Age: 86
End: 2021-02-24

## 2021-02-24 VITALS
RESPIRATION RATE: 18 BRPM | DIASTOLIC BLOOD PRESSURE: 49 MMHG | TEMPERATURE: 97 F | HEART RATE: 62 BPM | SYSTOLIC BLOOD PRESSURE: 127 MMHG | OXYGEN SATURATION: 95 %

## 2021-02-24 DIAGNOSIS — I50.33 ACUTE ON CHRONIC DIASTOLIC (CONGESTIVE) HEART FAILURE: ICD-10-CM

## 2021-02-24 DIAGNOSIS — E78.00 PURE HYPERCHOLESTEROLEMIA, UNSPECIFIED: ICD-10-CM

## 2021-02-24 DIAGNOSIS — R09.02 HYPOXEMIA: ICD-10-CM

## 2021-02-24 DIAGNOSIS — I35.0 NONRHEUMATIC AORTIC (VALVE) STENOSIS: ICD-10-CM

## 2021-02-24 DIAGNOSIS — I10 ESSENTIAL (PRIMARY) HYPERTENSION: ICD-10-CM

## 2021-02-24 LAB
ANION GAP SERPL CALC-SCNC: 7 MMOL/L — SIGNIFICANT CHANGE UP (ref 5–17)
BASOPHILS # BLD AUTO: 0.01 K/UL — SIGNIFICANT CHANGE UP (ref 0–0.2)
BASOPHILS NFR BLD AUTO: 0.1 % — SIGNIFICANT CHANGE UP (ref 0–2)
BUN SERPL-MCNC: 48 MG/DL — HIGH (ref 7–23)
CALCIUM SERPL-MCNC: 9.5 MG/DL — SIGNIFICANT CHANGE UP (ref 8.5–10.1)
CHLORIDE SERPL-SCNC: 104 MMOL/L — SIGNIFICANT CHANGE UP (ref 96–108)
CO2 SERPL-SCNC: 28 MMOL/L — SIGNIFICANT CHANGE UP (ref 22–31)
CREAT SERPL-MCNC: 2.07 MG/DL — HIGH (ref 0.5–1.3)
EOSINOPHIL # BLD AUTO: 0.23 K/UL — SIGNIFICANT CHANGE UP (ref 0–0.5)
EOSINOPHIL NFR BLD AUTO: 2.7 % — SIGNIFICANT CHANGE UP (ref 0–6)
GLUCOSE SERPL-MCNC: 103 MG/DL — HIGH (ref 70–99)
HCT VFR BLD CALC: 29.7 % — LOW (ref 34.5–45)
HGB BLD-MCNC: 9.4 G/DL — LOW (ref 11.5–15.5)
IMM GRANULOCYTES NFR BLD AUTO: 1.6 % — HIGH (ref 0–1.5)
LYMPHOCYTES # BLD AUTO: 2.74 K/UL — SIGNIFICANT CHANGE UP (ref 1–3.3)
LYMPHOCYTES # BLD AUTO: 31.7 % — SIGNIFICANT CHANGE UP (ref 13–44)
MCHC RBC-ENTMCNC: 29.2 PG — SIGNIFICANT CHANGE UP (ref 27–34)
MCHC RBC-ENTMCNC: 31.6 GM/DL — LOW (ref 32–36)
MCV RBC AUTO: 92.2 FL — SIGNIFICANT CHANGE UP (ref 80–100)
MONOCYTES # BLD AUTO: 0.78 K/UL — SIGNIFICANT CHANGE UP (ref 0–0.9)
MONOCYTES NFR BLD AUTO: 9 % — SIGNIFICANT CHANGE UP (ref 2–14)
NEUTROPHILS # BLD AUTO: 4.75 K/UL — SIGNIFICANT CHANGE UP (ref 1.8–7.4)
NEUTROPHILS NFR BLD AUTO: 54.9 % — SIGNIFICANT CHANGE UP (ref 43–77)
PLATELET # BLD AUTO: 156 K/UL — SIGNIFICANT CHANGE UP (ref 150–400)
POTASSIUM SERPL-MCNC: 4.2 MMOL/L — SIGNIFICANT CHANGE UP (ref 3.5–5.3)
POTASSIUM SERPL-SCNC: 4.2 MMOL/L — SIGNIFICANT CHANGE UP (ref 3.5–5.3)
RBC # BLD: 3.22 M/UL — LOW (ref 3.8–5.2)
RBC # FLD: 14.9 % — HIGH (ref 10.3–14.5)
SODIUM SERPL-SCNC: 139 MMOL/L — SIGNIFICANT CHANGE UP (ref 135–145)
WBC # BLD: 8.65 K/UL — SIGNIFICANT CHANGE UP (ref 3.8–10.5)
WBC # FLD AUTO: 8.65 K/UL — SIGNIFICANT CHANGE UP (ref 3.8–10.5)

## 2021-02-24 PROCEDURE — 99232 SBSQ HOSP IP/OBS MODERATE 35: CPT | Mod: GC

## 2021-02-24 PROCEDURE — 71045 X-RAY EXAM CHEST 1 VIEW: CPT | Mod: 26

## 2021-02-24 RX ORDER — GABAPENTIN 400 MG/1
1 CAPSULE ORAL
Qty: 30 | Refills: 0
Start: 2021-02-24 | End: 2021-03-10

## 2021-02-24 RX ORDER — FUROSEMIDE 40 MG
20 TABLET ORAL DAILY
Refills: 0 | Status: DISCONTINUED | OUTPATIENT
Start: 2021-02-24 | End: 2021-02-24

## 2021-02-24 RX ORDER — GABAPENTIN 400 MG/1
2 CAPSULE ORAL
Qty: 0 | Refills: 0 | DISCHARGE
Start: 2021-02-24 | End: 2021-03-10

## 2021-02-24 RX ORDER — GABAPENTIN 400 MG/1
1 CAPSULE ORAL
Qty: 0 | Refills: 0 | DISCHARGE

## 2021-02-24 RX ADMIN — HEPARIN SODIUM 5000 UNIT(S): 5000 INJECTION INTRAVENOUS; SUBCUTANEOUS at 10:42

## 2021-02-24 RX ADMIN — AZITHROMYCIN 250 MILLIGRAM(S): 500 TABLET, FILM COATED ORAL at 10:43

## 2021-02-24 RX ADMIN — Medication 20 MILLIGRAM(S): at 15:17

## 2021-02-24 RX ADMIN — GABAPENTIN 300 MILLIGRAM(S): 400 CAPSULE ORAL at 10:42

## 2021-02-24 RX ADMIN — Medication 100 MILLIGRAM(S): at 10:43

## 2021-02-24 RX ADMIN — NEBIVOLOL HYDROCHLORIDE 10 MILLIGRAM(S): 5 TABLET ORAL at 10:43

## 2021-02-24 NOTE — CONSULT NOTE ADULT - SUBJECTIVE AND OBJECTIVE BOX
CHIEF COMPLAINT:  Patient is a 89y old  Female who presents with a chief complaint of Acute hypoxic respiratory failure (2021 16:20)      HPI: /:  89F well known to me with moderate AS and chronic diastolic LV dysfunction (HFpEF), HTN, HLD, gout, and OA presents from home for fever, lethargy, weakness, confusion, decreased appetite, and decreased urine output. Patient reports low grade fever 100.0, muscle aches, decreased appetite, and fatigue for 2 days, as well as dysuria. Daughter adds that her mother seemed mildly "loopy" earlier in the day. At baseline, A+Ox3, ambulatory. Per daughter, patient has intermittent LE swelling managed with furosemide, though patient and daughter deny diagnosis of heart failure or CAD. Daughter reports patient was recently diagnosed with UTI and prescribed first ciprofloxacin and then Macrobid would complete Macrobid . Daughter most concerned about decreased urine output.  She developed increased SOB and resp distress with acute on chronic diastolic CHF, treated with Lasix with improvement.  Currently she has no increased SOB or anginal chest pains.  She had a repeat echo here showing moderate AS with MARVA=1.0-1.1cm2 as before.  She is feeling much better and eager to go home.        PMHx:  PAST MEDICAL & SURGICAL HISTORY:  Osteoarthritis  Gout  HLD (hyperlipidemia)  HTN (hypertension)  History of tonsillectomy-in childhood      FAMILY HISTORY:   FAMILY HISTORY:  No pertinent family history in first degree relatives      ALLERGIES:  Allergies  Ceftin (Hives; Rash)      REVIEW OF SYSTEMS:  10 point ROS was obtained  Pertinent positives and negatives are as above  All other review of systems is negative unless indicated above      Vital Signs Last 24 Hrs  T(C): 36.2 (2021 20:41), Max: 37.2 (2021 08:31)  T(F): 97.2 (2021 20:41), Max: 99 (2021 08:31)  HR: 64 (2021 20:41) (58 - 65)  BP: 148/54 (2021 20:41) (128/41 - 155/65)  BP(mean): --  RR: 17 (2021 20:41) (16 - 17)  SpO2: 97% (2021 20:41) (95% - 97%)    I&O's Summary    2021 07:01  -  2021 07:00  --------------------------------------------------------  IN: 0 mL / OUT: 900 mL / NET: -900 mL        PHYSICAL EXAM:   Constitutional: NAD, awake and alert, well-developed  HEENT: PERR, EOMI, Normal Hearing, MMM  Neck: Soft and supple, No LAD, No JVD  Respiratory: Breath sounds are clear bilaterally, No wheezing, rales or rhonchi  Cardiovascular: S1 and S2, regular rate and rhythm, soft LIANA at base and soft murmur LLSB as before, soft S4 but no S3 gallop or rubs  Gastrointestinal: Bowel Sounds present, soft, nontender, nondistended, no guarding, no rebound  Extremities: No peripheral edema  Vascular: 2+ peripheral pulses  Neurological: no focal deficits  Musculoskeletal: 5/5 strength b/l upper and lower extremities  Skin: No rashes      MEDICATIONS  (STANDING):  allopurinol 100 milliGRAM(s) Oral daily  atorvastatin 20 milliGRAM(s) Oral at bedtime  azithromycin   Tablet 250 milliGRAM(s) Oral daily  gabapentin 300 milliGRAM(s) Oral two times a day  heparin   Injectable 5000 Unit(s) SubCutaneous every 12 hours  nebivolol 10 milliGRAM(s) Oral daily  polyethylene glycol 3350 17 Gram(s) Oral daily      LABS: All Labs Reviewed:        141  |  109<H>  |  40<H>  ----------------------------<  126<H>  3.7   |  22  |  1.86<H>    Ca    9.1      2021 08:38        Serum Pro-Brain Natriuretic Peptide: 873 pg/mL ( @ 18:10)    BLOOD CULTURES: Organism --  Gram Stain Blood -- Gram Stain --  Specimen Source .Blood None  Culture-Blood --      LIPID PROFILE     RADIOLOGY:    CXR: 21:  FINDINGS:   No prior examinations are available for review.  There is haziness overlying RIGHT lung base likely indicating pleural effusion and/or interstitial infiltrate.  The osseous structures of the shoulder are intact. No fracture or destructive bone lesion.  The humeral head is located within glenoid fossa. The acromioclavicular joint is aligned and intact.  No pathologic calcifications or soft tissue abnormalities are identified./.  IMPRESSION:    No acute radiographic osseous pathology.  RIGHT lower lobe interstitial infiltrate and/or effusion.  If pain persists despite conservative therapy and soft tissue internal derangement or occult fracture is clinically suspected follow-up MRI recommended.    CT of Chest: 21:  FINDINGS:  CHEST:  LUNGS AND LARGE AIRWAYS: Patent central airways. Bilateral interstitial reticulation. Interlobular septal thickening with mild ground glass attenuation, most pronounced in bilateral lower lobes. Findings are nonspecific, this may represent mild pulmonary edema superimposed on chronic interstitial lung disease. Recommend clinical correlation to assess for superimposed atypical viral infection of COVID-19 given current corona virus pandemic.  PLEURA: Small bilateral pleural effusion with adjacent compressive atelectasis.  VESSELS: Atherosclerotic disease of the aorta and its branches.  HEART: Cardiomegaly. No pericardial effusion. Valvular and coronary artery calcifications..  MEDIASTINUM AND DIRK: Scattered mildly prominent mediastinal lymph nodes largest measuring 1.5 x 1.2 cm in the prevascular space on image 37 of series 2.  CHEST WALL AND LOWER NECK: Within normal limits.  VISUALIZED UPPER ABDOMEN: Small hiatal hernia with masslike wall thickening of mid thoracic esophagus which which nonemergent upper endoscopy evaluation is advised to exclude underlying malignancy.  BONES: Multilevel degenerative changes of the spine.  IMPRESSION:  Findings concerning for mild pulmonary edema superimposed on chronic interstitial lung disease. Recommend clinical correlation to assess for superimposed atypical viral infection of COVID-19 given current corona virus pandemic. Short term chest radiographic imaging follow-up is advised.  Small bilateral pleural effusion with adjacent compressive atelectasis.  Small hiatal hernia with masslike wall thickening of mid thoracic esophagus which which nonemergentupper endoscopy evaluation is advised to exclude underlying malignancy.  Scattered mildly prominent mediastinal lymph nodes.    X-ray of Right Shoulder: 21:  FINDINGS:   No prior examinations are available for review.  There is haziness overlying RIGHT lung base likely indicating pleural effusion and/or interstitial infiltrate.  The osseous structures of the shoulder are intact. No fracture or destructive bone lesion.  The humeral head is located within glenoid fossa. The acromioclavicular joint is aligned and intact.  No pathologic calcifications or soft tissue abnormalities are identified./.  IMPRESSION:    No acute radiographic osseous pathology.  RIGHT lower lobe interstitial infiltrate and/or effusion.  If pain persists despite conservative therapy and soft tissue internal derangement or occult fracture is clinically suspected follow-up MRI recommended.      EK21:  Sinus rythm  Right bundle branch block      TELEMETRY:      ECHO:  21:  M-Mode Measurements (cm):   LVEDd: 4.18 cm            LVESd: 2.86 cm   IVSEd: 1.37 cm   LVPWd: 1.1 cm             AO Root Dimension: 2.8 cm               ACS: 1.1 cm                             LA: 3.3 cm                             LVOT: 1.8 cm  Doppler Measurements:   AV Velocity:349 cm/s                 MV Peak E-Wave: 91.3 cm/s   AV Peak Gradient: 48.72 mmHg         MV Peak A-Wave: 118 cm/s   AV Mean Gradient: 30 mmHg            MV E/A Ratio: 0.77 %   AV Area (Continuity):1.09 cm^2       MV Peak Gradient: 3.33 mmHg   TR Velocity:259 cm/s   TR Gradient:26.8324 mmHg   Estimated RAP:5 mmHg   RVSP:32 mmHg     Findings:   Mitral Valve:   Fibrocalcific changes noted to the mitral valve leaflets with preserved leaflet excursion.   EA reversal of the mitral inflow consistent with reduced compliance of the left ventricle.   Mild mitral annular calcification is present.   Trace mitral regurgitation is present.     Aortic Valve:   Significant fibrocalcific changes noted to the aortic valve leaflets with restriction in leaflet excursion. Peak and mean transaortic gradients are 49 and 30 mmHg respectively; this finding is consistent with moderate aortic stenosis.     Tricuspid Valve:   The tricuspid valve leaflets are thin and pliable; valve motion is normal.   Trace tricuspid valve regurgitation is present.     Pulmonic Valve:   Mild pulmonic valvular regurgitation (1+)is present.     Left Atrium:   Normal appearing left atrium.     Left Ventricle:   Mild concentric left ventricular hypertrophy is present.   The left ventricle is normal in size.   Estimated left ventricular ejection fraction is 65-70 %.     Right Atrium:   Normal appearing right atrium.     Right Ventricle:   Normal appearing right ventricle structure and function.     Pericardial Effusion:   No evidence of pericardial effusion.     Pleural Effusion:   No evidence of pleural effusion.     Miscellaneous:   The IVC appears normal.     Summary:   Normal appearing left atrium.   Mild concentric left ventricular hypertrophy is present.   The left ventricle is normal in size.   Estimated left ventricular ejection fraction is 65-70 %.   Fibrocalcific changes noted to the mitral valve leaflets with preserved leaflet excursion.   EA reversal of the mitral inflow consistent with reduced compliance of the left ventricle.   Mild mitral annular calcification is present.   Trace mitral regurgitation is present.   Significant fibrocalcific changes noted to the aortic valve leaflets with restriction in leaflet excursion. Peak and mean transaortic gradients are 49 and 30 mmHg respectively; this finding is consistent with moderate aortic stenosis.     Signature   ----------------------------------------------------------------   Electronically signed by RADHA RodgersInterpreting   physician) on 2021 05:58 PM   ----------------------------------------------------------------

## 2021-02-24 NOTE — DISCHARGE NOTE PROVIDER - NSDCHHCONTACT_GEN_ALL_CORE_FT
----- Message from Ashok Amaya MD sent at 1/5/2020  2:32 PM EST -----  Enzymes continue to improve, repeat labs 6 months  
Called pt and advised of the note from Dr Amaya. Pt verb understanding.    
Results sent to pt via a message on "[employer].    
As certified below, I, or a nurse practitioner or physician assistant working with me, had a face-to-face encounter that meets the physician face-to-face encounter requirements.

## 2021-02-24 NOTE — CONSULT NOTE ADULT - ASSESSMENT
89 yo female with PMH as above including cystocele repair over 10 years ago as per pt, admitted for acute hypoxic resp failure. Urology consulted for pt with concern for urinary retention after her straight catheterized volumes were 425 and 900 cc. Pt with indwelling pérez overnight and the pérez was d/dionisio this am for a trail of void.   Recommend  - F/U urine c/s and treat accordingly   - Check PVR x2 if significant place indwelling pérez and d/c pt with pérez to leg bag for outpatient trial of void  - Patient should follow up with Dr. Hopkins outpatient for further work up    Case discussed with Dr. Hopkins
Pneumonia  B/LLung effusions and edema  Advanced age  Mid esophageal thickening on CT  REC  EGD after resolution of Pneumonia and when pt is optimized  for an EGD with sedation -ideally prior to discharge
A/P: 89y Female with Right shoulder OA with chronic pain and likely chronic rotator cuff tear.  FU xray for completeness  No need for inpatient MRI   Pt Does not want or warrant injection at this time  Recommend tylenol and antiinflammatories PRN   WBAT  Follow up with Dr. Andrea as outpatient within 1 week, call office for appointment   Advised pt to let us know if her pain worsens this admission, we can reconsult PRN  Ortho stable  for DC.  Discussed with Dr. Andrea
2/24/21:  Pt with above history and moderate AS with MARVA=1.0-1.1 cm2 with acute on chronic diastolic CHF with HFpEF but clinically better after gentle diuresis.  No indication for invasive cardiac testing at this time. If AS should worsen in the future and MARVA<0.08cm2 one might consider a TAVR, not indicated at this time.  Careful and gentle diuresis with daily low dose Lasix is probably needed, avoiding high sodium foods monitoring her renal function given her renal insufficiency.  Home when ok with medicine and follow up in my office as an outpt.  Will follow.

## 2021-02-24 NOTE — DISCHARGE NOTE PROVIDER - NSDCCPCAREPLAN_GEN_ALL_CORE_FT
PRINCIPAL DISCHARGE DIAGNOSIS  Diagnosis: Acute on chronic diastolic heart failure  Assessment and Plan of Treatment: -During this admission you were found to have diastolic heart failure.    -You were given a one time dose of lasix, which improved your symptoms.  -It is important that you follow up in 3-5 days as outpatient with your cardiologist for further managment and treatment.   -If you feel shortness of breath, chest pain, pain radiating to your mandible or arms or any worsening symptoms please go to the ER or call 911      SECONDARY DISCHARGE DIAGNOSES  Diagnosis: Community acquired pneumonia  Assessment and Plan of Treatment: During this admission you also had symptoms concernig a respiratory infection and you were started on antibiotic (azithromycin) once a day.    It is important to continue taking your medication as prescribed.    Diagnosis: Aortic stenosis, moderate  Assessment and Plan of Treatment: You were found to have Aortic stenosis during Echocardiogram, it is important to follow up with your cardiologist for further managment and treatmetn.    Diagnosis: Cough  Assessment and Plan of Treatment: Your cough was secondary to your heart failure and pneumonia and was resolved during this admission.    Diagnosis: Hypoxia  Assessment and Plan of Treatment: Your shortness of breath was secondary to your heart failure and pneumonia.     PRINCIPAL DISCHARGE DIAGNOSIS  Diagnosis: Acute on chronic diastolic heart failure  Assessment and Plan of Treatment: -During this admission you were found to have diastolic heart failure.    -You were given a one time dose of lasix, which improved your symptoms.  -It is important that you follow up in 3-5 days as outpatient with your cardiologist for further managment and treatment.   -If you feel shortness of breath, chest pain, pain radiating to your mandible or arms or any worsening symptoms please go to the ER or call 911      SECONDARY DISCHARGE DIAGNOSES  Diagnosis: Pharyngitis  Assessment and Plan of Treatment: You had erythema of your throat. You were started on azithromycin. Please finish the azithromycin.    Diagnosis: Urinary retention  Assessment and Plan of Treatment: Please follow up with urology outpatient concerning urinary retention.    Diagnosis: Aortic stenosis, moderate  Assessment and Plan of Treatment: You were found to have Aortic stenosis during Echocardiogram, it is important to follow up with your cardiologist for further managment and treatmetn.    Diagnosis: Cough  Assessment and Plan of Treatment: Your cough was secondary to your heart failure and pneumonia and was resolved during this admission.    Diagnosis: Hypoxia  Assessment and Plan of Treatment: Your shortness of breath was secondary to your heart failure and pneumonia.

## 2021-02-24 NOTE — DISCHARGE NOTE NURSING/CASE MANAGEMENT/SOCIAL WORK - PATIENT PORTAL LINK FT
You can access the FollowMyHealth Patient Portal offered by [facility] by registering at the following website: [email or web address]. By joining [employer]’s FollowMyHealth portal, you will also be able to view your health information using other applications (apps) compatible with our system.

## 2021-02-24 NOTE — DISCHARGE NOTE NURSING/CASE MANAGEMENT/SOCIAL WORK - NSDPLANG ASIS_GEN_ALL_CORE
I saw a patient of yours who presented today for medication refills. Her migraines are not well controlled as she is taking flexeril and Fioricet 3-4 times per week. She is scheduled to see you next month, but in the meantime, I wanted to get your thoughts on starting her on a CGRP antagonist to see if we can get her polypharmacy under control (in addition to the medications above, she is also on clonazepam, prozac, wellbutrin, and Adderall). She is scheduled to see psychiatry to hopefully find her a better/safer medication regimen, but if we can get her insurance to cover one of the new injectables, she is open to trying them.Thanks for your input!Danielle Foley MD
No

## 2021-02-24 NOTE — DISCHARGE NOTE PROVIDER - NSDCMRMEDTOKEN_GEN_ALL_CORE_FT
allopurinol 100 mg oral tablet: 1 tab(s) orally once a day  azithromycin 250 mg oral tablet: 1 tab(s) orally once a day  Bystolic 10 mg oral tablet: 1 tab(s) orally once a day. HOLD FOR SBP LESS THAN 110 or HR LESS THAN 60  Co-Q10 100 mg oral capsule: 1 cap(s) orally once a day  furosemide 20 mg oral tablet: 1 tab(s) orally once a day  gabapentin 600 mg oral tablet: 1 tab(s) orally 2 times a day  hydrALAZINE 25 mg oral tablet: 1 tab(s) orally 2 times a day  rosuvastatin 5 mg oral capsule: 1 cap(s) orally once a day  Vitamin D3 1000 intl units (25 mcg) oral tablet: 1 cap(s) orally 2 times a day   allopurinol 100 mg oral tablet: 1 tab(s) orally once a day  azithromycin 250 mg oral tablet: 1 tab(s) orally once a day  Bystolic 10 mg oral tablet: 1 tab(s) orally once a day. HOLD FOR SBP LESS THAN 110 or HR LESS THAN 60  Co-Q10 100 mg oral capsule: 1 cap(s) orally once a day  furosemide 20 mg oral tablet: 1 tab(s) orally once a day  hydrALAZINE 25 mg oral tablet: 1 tab(s) orally 2 times a day  Neurontin 300 mg oral capsule: 1 cap(s) orally 2 times a day MDD:2 caps  rosuvastatin 5 mg oral capsule: 1 cap(s) orally once a day  Vitamin D3 1000 intl units (25 mcg) oral tablet: 1 cap(s) orally 2 times a day

## 2021-02-24 NOTE — CONSULT NOTE ADULT - CONSULT REASON
acute on chronic HFpEF with moderate AS on echo
Right chronic shoulder pain
urinary retention
Thickened Esophagus

## 2021-02-24 NOTE — PROGRESS NOTE ADULT - ASSESSMENT
Pt has been seen and examined with FP resident, resident supervised agree with a/p       Patient is a 89y old  Female who presents with a chief complaint of Acute hypoxic respiratory failure (21 Feb 2021 22:09)        PHYSICAL EXAM:    -rs-aeeb, b/l rales and crackles present   -cvs-s1s2 normal   -p/a-soft,bs+      A/P      #Probably CAP along with URTI- change to oral abx     #Acute exacerbation of diastolic heart failure -improved clinically- change lasix to po and monitor today     #Esophageal thickening - pt refused EGD here, follow up with GI as an outpt for ongoing management  Pt has been seen and examined with FP resident, resident supervised agree with a/p       Patient is a 89y old  Female who presents with a chief complaint of Acute hypoxic respiratory failure (21 Feb 2021 22:09)        PHYSICAL EXAM:    -rs-aeeb, b/l rales and crackles present   -cvs-s1s2 normal   -p/a-soft,bs+      A/P      #Probably CAP along with URTI- change to oral abx     #Acute exacerbation of diastolic heart failure -improved clinically- change lasix to po and monitor today     #follow up chest xray to follow     #Esophageal thickening - pt refused EGD here, follow up with GI as an outpt for ongoing management

## 2021-02-24 NOTE — PROGRESS NOTE ADULT - REASON FOR ADMISSION
Acute hypoxic respiratory failure

## 2021-02-24 NOTE — DISCHARGE NOTE PROVIDER - CARE PROVIDER_API CALL
Olvin Collins J  CARDIOVASCULAR DISEASE  [address], [facility] [address]  [place] [address]  Phone: [phone]  Fax: [phone]  Follow Up Time:   
(4) excellent

## 2021-02-24 NOTE — CONSULT NOTE ADULT - REASON FOR ADMISSION
Acute hypoxic respiratory failure

## 2021-02-24 NOTE — DISCHARGE NOTE PROVIDER - HOSPITAL COURSE
89F with HTN, HLD, gout, and OA presented to the ED from home for fever, lethargy, weakness, confusion, decreased appetite, and decreased urine output. Patient reported low grade fever 100.0, muscle aches, decreased appetite, and fatigue for 2 days before admission.    A chest Xray performed on day of admission showed: patchy infiltrates and follow up CT showed:  findings concerning for mild pulmonary edema, small bilateral pleural effusion with adjacent compressive atelectasias and small hiatal hernia with masslike wall thickening of mid thoracic esophagus.   Patient was found to have crackles on both lungs.  Her SOB and b/l crackles were improved with one time dose IV lasix.   Patient was evaluated with cardio and was diagnosed with acute on chronic diastolic heart failure with HFpEF and mild aortic stenosis.   Patient will need to follow up outpatient with her cardiologist for further management and treatment.  Patient is hemodynamically stable and optimized to be d/c today to home.       -Patient was seen by gastro regarding her masslike wall thickening of mid thoracic esophagus and she refused to have an EGD during this admission.  She mentions she will like to follow up as outpatient with her gastro.  -Patient was diagnosed as outpatient with UTI,  she was started on ciprofloxacin and then macrobid.  Her UTI resolved and patient was evaluated by urology for urinary retention.    SUBJECTIVE:   Patient was seen and evaluated at bedside.  She mentions her SOB improved after taking Lasix and she is able to ambulate without having SOB.  The importance of following up with her cardiologist and taking her medications as prescribed were discussed with the patient.  She refuses to have an EGD during this admission and will like to follow up as outpatient.     REVIEW OF SYSTEMS:  CONSTITUTIONAL: No weakness, fevers or chills  EYES/ENT: No visual changes;  No vertigo or throat pain   NECK: No pain or stiffness  RESPIRATORY: No cough, wheezing, hemoptysis; No shortness of breath  CARDIOVASCULAR: No chest pain or palpitations  GASTROINTESTINAL: No abdominal or epigastric pain. No nausea, vomiting, or hematemesis; No diarrhea or constipation. No melena or hematochezia.  GENITOURINARY: No dysuria, frequency or hematuria  NEUROLOGICAL: No numbness or weakness  SKIN: No itching, burning, rashes, or lesions   All other review of systems is negative unless indicated above    Vital Signs Last 24 Hrs  T(C): 36.2 (24 Feb 2021 08:22), Max: 36.6 (23 Feb 2021 16:17)  T(F): 97.1 (24 Feb 2021 08:22), Max: 97.8 (23 Feb 2021 16:17)  HR: 60 (24 Feb 2021 08:22) (58 - 64)  BP: 136/65 (24 Feb 2021 08:22) (128/41 - 148/54)  RR: 17 (24 Feb 2021 08:22) (16 - 17)  SpO2: 93% (24 Feb 2021 08:22) (93% - 97%)    I&O's Summary  23 Feb 2021 07:01  -  24 Feb 2021 07:00  --------------------------------------------------------  IN: 0 mL / OUT: 900 mL / NET: -900 mL    PHYSICAL EXAM:  Constitutional: NAD, awake and alert, well-developed  HEENT: PERR, EOMI, Normal Hearing, MMM  Neck: Soft and supple, No LAD, No JVD  Respiratory: +b/l mild crackles on the bases   Cardiovascular: S1 and S2, regular rate and rhythm, soft murmur at the left lower sternal border.   Gastrointestinal: Bowel Sounds present, soft, nontender, nondistended, no guarding, no rebound  Extremities: No peripheral edema  Vascular: 2+ peripheral pulses  Neurological: A/O x 3, no focal deficits    MEDICATIONS:  MEDICATIONS  (STANDING):  allopurinol 100 milliGRAM(s) Oral daily  atorvastatin 20 milliGRAM(s) Oral at bedtime  azithromycin   Tablet 250 milliGRAM(s) Oral daily  gabapentin 300 milliGRAM(s) Oral two times a day  heparin   Injectable 5000 Unit(s) SubCutaneous every 12 hours  nebivolol 10 milliGRAM(s) Oral daily  polyethylene glycol 3350 17 Gram(s) Oral daily      LABS: All Labs Reviewed:                        9.4    8.65  )-----------( 156      ( 24 Feb 2021 08:26 )             29.7     02-24    139  |  104  |  48<H>  ----------------------------<  103<H>  4.2   |  28  |  2.07<H>    Ca    9.5      24 Feb 2021 08:26    Blood Culture: 02-21 @ 18:10  Organism --  Gram Stain Blood -- Gram Stain --  Specimen Source .Blood None  Culture-Blood --   89F with HTN, HLD, gout, and OA presented to the ED from home for fever, lethargy, weakness, confusion, decreased appetite, and decreased urine output. Patient reported low grade fever 100.0, muscle aches, decreased appetite, and fatigue for 2 days before admission.    A chest Xray performed on day of admission showed: patchy infiltrates and follow up CT showed:  findings concerning for mild pulmonary edema, small bilateral pleural effusion with adjacent compressive atelectasias and small hiatal hernia with masslike wall thickening of mid thoracic esophagus.   Patient was found to have crackles on both lungs.  Her SOB and b/l crackles were improved with one time dose IV lasix.   Patient was evaluated with cardio and was diagnosed with acute on chronic diastolic heart failure with HFpEF and mild aortic stenosis.   Patient's gabapentin was also decreased to 300 mg twice a day given renal function. Patient will need to follow up outpatient with her cardiologist for further management and treatment.  Patient is hemodynamically stable and optimized to be d/c today to home.       -Patient was seen by gastro regarding her masslike wall thickening of mid thoracic esophagus and she refused to have an EGD during this admission.  She mentions she will like to follow up as outpatient with her gastro.  -Patient was diagnosed as outpatient with UTI,  she was started on ciprofloxacin and then macrobid.  Her UTI resolved and patient was evaluated by urology for urinary retention.    SUBJECTIVE:   Patient was seen and evaluated at bedside.  She mentions her SOB improved after taking Lasix and she is able to ambulate without having SOB.  The importance of following up with her cardiologist and taking her medications as prescribed were discussed with the patient.  She refuses to have an EGD during this admission and will like to follow up as outpatient.     REVIEW OF SYSTEMS:  CONSTITUTIONAL: No weakness, fevers or chills  EYES/ENT: No visual changes;  No vertigo or throat pain   NECK: No pain or stiffness  RESPIRATORY: No cough, wheezing, hemoptysis; No shortness of breath  CARDIOVASCULAR: No chest pain or palpitations  GASTROINTESTINAL: No abdominal or epigastric pain. No nausea, vomiting, or hematemesis; No diarrhea or constipation. No melena or hematochezia.  GENITOURINARY: No dysuria, frequency or hematuria  NEUROLOGICAL: No numbness or weakness  SKIN: No itching, burning, rashes, or lesions   All other review of systems is negative unless indicated above    Vital Signs Last 24 Hrs  T(C): 36.2 (24 Feb 2021 08:22), Max: 36.6 (23 Feb 2021 16:17)  T(F): 97.1 (24 Feb 2021 08:22), Max: 97.8 (23 Feb 2021 16:17)  HR: 60 (24 Feb 2021 08:22) (58 - 64)  BP: 136/65 (24 Feb 2021 08:22) (128/41 - 148/54)  RR: 17 (24 Feb 2021 08:22) (16 - 17)  SpO2: 93% (24 Feb 2021 08:22) (93% - 97%)    I&O's Summary  23 Feb 2021 07:01  -  24 Feb 2021 07:00  --------------------------------------------------------  IN: 0 mL / OUT: 900 mL / NET: -900 mL    PHYSICAL EXAM:  Constitutional: NAD, awake and alert, well-developed  HEENT: PERR, EOMI, Normal Hearing, MMM  Neck: Soft and supple, No LAD, No JVD  Respiratory: +b/l mild crackles on the bases   Cardiovascular: S1 and S2, regular rate and rhythm, soft murmur at the left lower sternal border.   Gastrointestinal: Bowel Sounds present, soft, nontender, nondistended, no guarding, no rebound  Extremities: No peripheral edema  Vascular: 2+ peripheral pulses  Neurological: A/O x 3, no focal deficits    MEDICATIONS:  MEDICATIONS  (STANDING):  allopurinol 100 milliGRAM(s) Oral daily  atorvastatin 20 milliGRAM(s) Oral at bedtime  azithromycin   Tablet 250 milliGRAM(s) Oral daily  gabapentin 300 milliGRAM(s) Oral two times a day  heparin   Injectable 5000 Unit(s) SubCutaneous every 12 hours  nebivolol 10 milliGRAM(s) Oral daily  polyethylene glycol 3350 17 Gram(s) Oral daily      LABS: All Labs Reviewed:                        9.4    8.65  )-----------( 156      ( 24 Feb 2021 08:26 )             29.7     02-24    139  |  104  |  48<H>  ----------------------------<  103<H>  4.2   |  28  |  2.07<H>    Ca    9.5      24 Feb 2021 08:26    Blood Culture: 02-21 @ 18:10  Organism --  Gram Stain Blood -- Gram Stain --  Specimen Source .Blood None  Culture-Blood --

## 2021-02-25 RX ORDER — AZITHROMYCIN 500 MG/1
1 TABLET, FILM COATED ORAL
Qty: 2 | Refills: 0
Start: 2021-02-25 | End: 2021-02-26

## 2021-02-27 LAB
CULTURE RESULTS: SIGNIFICANT CHANGE UP
CULTURE RESULTS: SIGNIFICANT CHANGE UP
SPECIMEN SOURCE: SIGNIFICANT CHANGE UP
SPECIMEN SOURCE: SIGNIFICANT CHANGE UP

## 2021-02-27 NOTE — CDI QUERY NOTE - NSCDIOTHERTXTBX_GEN_ALL_CORE_HH
ED PROVIDER  Pt does not meet sepsis criteria upon initial evaluation. Plan fever, hypoxia work up: EKG, CXR, labs inluding urine via straight cath, blood culture, serum lactate, RVP/COVID swab, cautious IV fluids, monitor, observe, reassess, expect admission, discuss with daughter.    FEVER - CHILLS    T(C): 37.1 (21 Feb 2021 22:01), Max: 38.3 (21 Feb 2021 17:49)  T(F): 98.8 (21 Feb 2021 22:01), Max: 101 (21 Feb 2021 17:49)  HR: 71 (21 Feb 2021 22:01) (71 - 100)  BP: 131/49 (21 Feb 2021 22:01) (131/49 - 164/78)  BP(mean): 72 (21 Feb 2021 22:01) (72 - 72)  RR: 19 (21 Feb 2021 22:01) (18 - 20)  SpO2: 97% (21 Feb 2021 22:01) (90% - 97%)      WBC 8    Lactate 1.7    Rocephin IV - 1 liter fluid bolus    H&P:  # Acute Hypoxemic Respiratory Failure  -DDX: Covid, obstructive lung disease, pleural effusion, PNA    #Fever  -possibly due to recent covid vaccine  -f/u blood and urine cx      Hospitalist documentation on 2/23/21  89F w/ HTN, HLD, gout, and OA presented with fever, lethargy, and decreased urine output and admitted for sepsis secondary to UTI as well as acute respiratory failure with hypoxia.     #Acute hypoxic respiratory failure   - secondary to undiagnosed CHF (per pt) and/or CAP    #Sepsis   - 2/2 UTI  - pt retaining urine, and at increased risk of UTIs  - continue ceftriaxone     DISCHARGE SUMMARY:  ED from home for fever, lethargy, weakness, confusion, decreased appetite, and decreased urine output. Patient reported low grade fever 100.0, muscle aches, decreased appetite, and fatigue for 2 days before admission.    A chest Xray performed on day of admission showed: patchy infiltrates and follow up CT showed:  findings concerning for mild pulmonary edema, small bilateral pleural effusion with adjacent compressive atelectasias and small hiatal hernia with masslike wall thickening of mid thoracic esophagus.   Patient was found to have crackles on both lungs.  Her SOB and b/l crackles were improved with one time dose IV lasix.   Patient was evaluated with cardio and was diagnosed with acute on chronic diastolic heart failure with HFpEF and mild aortic stenosis.     Please clarify if Sepsis ruled in or ruled out?  A) Sepsis ruled out  B) Sepsis resolved and was present on admission ( document clinical indicators)   C) Unable to determine  D) Other ( Please specify)

## 2021-02-27 NOTE — CDI QUERY NOTE - NSCDIOTHERTXTBX2_GEN_ALL_CORE_FT
H&P:  # Acute Hypoxemic Respiratory Failure  -DDX: Covid, obstructive lung disease, pleural effusion, PNA      Hospitalist documentation on 2/23/21  #Acute hypoxic respiratory failure   - secondary to undiagnosed CHF (per pt) and/or CAP    DISCHARGE SUMMARY:  ED from home for fever, lethargy, weakness, confusion, decreased appetite, and decreased urine output. Patient reported low grade fever 100.0, muscle aches, decreased appetite, and fatigue for 2 days before admission.    A chest Xray performed on day of admission showed: patchy infiltrates and follow up CT showed:  findings concerning for mild pulmonary edema, small bilateral pleural effusion with adjacent compressive atelectasias and small hiatal hernia with masslike wall thickening of mid thoracic esophagus.   Patient was found to have crackles on both lungs.  Her SOB and b/l crackles were improved with one time dose IV lasix.   Patient was evaluated with cardio and was diagnosed with acute on chronic diastolic heart failure with HFpEF and mild aortic stenosis.     Rocpehin IV for UTI    Please clarify if Pneumonia ruled in or ruled out?  A) Pneumonia ruled out  B) Pneumonia ruled in and was present on admission  C) Unable to determine  D) other ( Please specify condition)

## 2021-03-02 DIAGNOSIS — I13.0 HYPERTENSIVE HEART AND CHRONIC KIDNEY DISEASE WITH HEART FAILURE AND STAGE 1 THROUGH STAGE 4 CHRONIC KIDNEY DISEASE, OR UNSPECIFIED CHRONIC KIDNEY DISEASE: ICD-10-CM

## 2021-03-02 DIAGNOSIS — E86.0 DEHYDRATION: ICD-10-CM

## 2021-03-02 DIAGNOSIS — J84.9 INTERSTITIAL PULMONARY DISEASE, UNSPECIFIED: ICD-10-CM

## 2021-03-02 DIAGNOSIS — E87.1 HYPO-OSMOLALITY AND HYPONATREMIA: ICD-10-CM

## 2021-03-02 DIAGNOSIS — J02.9 ACUTE PHARYNGITIS, UNSPECIFIED: ICD-10-CM

## 2021-03-02 DIAGNOSIS — M75.101 UNSPECIFIED ROTATOR CUFF TEAR OR RUPTURE OF RIGHT SHOULDER, NOT SPECIFIED AS TRAUMATIC: ICD-10-CM

## 2021-03-02 DIAGNOSIS — M19.011 PRIMARY OSTEOARTHRITIS, RIGHT SHOULDER: ICD-10-CM

## 2021-03-02 DIAGNOSIS — M10.9 GOUT, UNSPECIFIED: ICD-10-CM

## 2021-03-02 DIAGNOSIS — R33.8 OTHER RETENTION OF URINE: ICD-10-CM

## 2021-03-02 DIAGNOSIS — I35.0 NONRHEUMATIC AORTIC (VALVE) STENOSIS: ICD-10-CM

## 2021-03-02 DIAGNOSIS — K22.8 OTHER SPECIFIED DISEASES OF ESOPHAGUS: ICD-10-CM

## 2021-03-02 DIAGNOSIS — E78.5 HYPERLIPIDEMIA, UNSPECIFIED: ICD-10-CM

## 2021-03-02 DIAGNOSIS — N39.0 URINARY TRACT INFECTION, SITE NOT SPECIFIED: ICD-10-CM

## 2021-03-02 DIAGNOSIS — J98.11 ATELECTASIS: ICD-10-CM

## 2021-03-02 DIAGNOSIS — N18.4 CHRONIC KIDNEY DISEASE, STAGE 4 (SEVERE): ICD-10-CM

## 2021-03-02 DIAGNOSIS — I45.2 BIFASCICULAR BLOCK: ICD-10-CM

## 2021-03-02 DIAGNOSIS — Z88.1 ALLERGY STATUS TO OTHER ANTIBIOTIC AGENTS STATUS: ICD-10-CM

## 2021-03-02 DIAGNOSIS — J96.01 ACUTE RESPIRATORY FAILURE WITH HYPOXIA: ICD-10-CM

## 2021-03-02 DIAGNOSIS — I50.33 ACUTE ON CHRONIC DIASTOLIC (CONGESTIVE) HEART FAILURE: ICD-10-CM

## 2021-03-02 DIAGNOSIS — G62.9 POLYNEUROPATHY, UNSPECIFIED: ICD-10-CM

## 2021-03-02 DIAGNOSIS — F03.90 UNSPECIFIED DEMENTIA WITHOUT BEHAVIORAL DISTURBANCE: ICD-10-CM

## 2021-03-02 LAB
BACTERIA FLD CULT: NORMAL
BACTERIA FLD CULT: NORMAL

## 2021-03-03 ENCOUNTER — NON-APPOINTMENT (OUTPATIENT)
Age: 86
End: 2021-03-03

## 2021-03-11 ENCOUNTER — NON-APPOINTMENT (OUTPATIENT)
Age: 86
End: 2021-03-11

## 2021-03-23 ENCOUNTER — NON-APPOINTMENT (OUTPATIENT)
Age: 86
End: 2021-03-23

## 2021-05-14 ENCOUNTER — APPOINTMENT (OUTPATIENT)
Dept: RHEUMATOLOGY | Facility: CLINIC | Age: 86
End: 2021-05-14
Payer: MEDICARE

## 2021-05-14 ENCOUNTER — NON-APPOINTMENT (OUTPATIENT)
Age: 86
End: 2021-05-14

## 2021-05-14 VITALS
HEIGHT: 59 IN | OXYGEN SATURATION: 96 % | HEART RATE: 59 BPM | BODY MASS INDEX: 33.47 KG/M2 | SYSTOLIC BLOOD PRESSURE: 153 MMHG | TEMPERATURE: 96.8 F | WEIGHT: 166 LBS | DIASTOLIC BLOOD PRESSURE: 56 MMHG

## 2021-05-14 PROBLEM — M19.90 UNSPECIFIED OSTEOARTHRITIS, UNSPECIFIED SITE: Chronic | Status: ACTIVE | Noted: 2021-02-21

## 2021-05-14 PROBLEM — E78.5 HYPERLIPIDEMIA, UNSPECIFIED: Chronic | Status: ACTIVE | Noted: 2021-02-21

## 2021-05-14 PROBLEM — I10 ESSENTIAL (PRIMARY) HYPERTENSION: Chronic | Status: ACTIVE | Noted: 2021-02-21

## 2021-05-14 PROBLEM — M10.9 GOUT, UNSPECIFIED: Chronic | Status: ACTIVE | Noted: 2021-02-21

## 2021-05-14 PROCEDURE — 20610 DRAIN/INJ JOINT/BURSA W/O US: CPT | Mod: RT

## 2021-05-14 PROCEDURE — 99214 OFFICE O/P EST MOD 30 MIN: CPT | Mod: 25

## 2021-05-14 PROCEDURE — 20552 NJX 1/MLT TRIGGER POINT 1/2: CPT | Mod: 59

## 2021-05-14 NOTE — ASSESSMENT
[FreeTextEntry1] : Aspirate/inject right knee with CS, 12.5cc aspirated.\par Trigger point injections to b/L medial knee fat pads\par Patient also requested left knee be aspirated, even though she knows we cannot injected CS because she had left knee injected in 3/2021 by ortho, Dr. Andrea\par Pain management referral\par Cont allopurinol, no change to this.\par Obtain labs from Dr. Collins's office.

## 2021-05-14 NOTE — PROCEDURE
[FreeTextEntry1] : Date 5/14/21\par Right knee aspiration, CS injection\par The procedure risks was discussed with the patient.\par Indications: therapeutic purposes \par #1 site identified in the right knee . Verbal consent was obtained. \par Anesthesia was with ethyl chloride.\par The patient was prepped with betadine solution. \par A 21 gauge 1.5 inch needle was used.\par 12.5 cc of clear yellow color fluid aspirated. \par Injectables:depomedrol 80mg\par The patient tolerated the procedure well..\par There were no complications. Handouts/patient instructions were given to patient . patient was instructed to call if redness at site, a decrease in range of motion or an increase in pain is noted after procedure.\par \par \par #2 Bilateral medial knee fat pad trigger point injections\par Date: 5/14/21\par The procedure risks was discussed with the patient. \par Indications: therapeutic purposes \par #2 site identified in the left medial knee fat pad. \par Anesthesia was with ethyl chloride The patient was prepped with betadine solution. \par With 25 G 5/8 inch needle, 1.5cc of xylocaine was injected to trigger point. \par the patient tolerated the procedure well. there were no complications. handouts/patient instructions were given to patient . patient was instructed to call if redness at site, a decrease in range of motion or an increase in pain is noted after procedure.\par #3 Identical procedure to #2 performed at right medial knee fat pad. \par \par \par #4 Left knee fluid aspiration WITHOUT CS injection\par Left site identified. Verbal consent was obtained. \par Anesthesia was with ethyl chloride.\par The patient was prepped with betadine solution. \par A 21 gauge 1.5 inch needle was used.\par 20 cc of clear yellow color fluid aspirated. \par Injectables:0 \par \par

## 2021-05-14 NOTE — HISTORY OF PRESENT ILLNESS
[FreeTextEntry1] : 89 year old woman with hx of HTN, CKD, HLD, shingles, Left GTB/ left bicipital tendinitis presents for f/u of knee pain and hyperuricemia. 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 She recently went off prednisone. She is having significant pain, with difficulty walking. \par \par She reports the medial knee fat pad trigger point injections were very helpful. \par \par She is currently on allopruinol 100mg daily. She is currently off colchicine. \par \par She fell and tore tendon in right shoulder and hurt left knee. She had a right shoulder CS injection from ortho while she was at rehab. \par

## 2021-05-18 LAB
B PERT IGG+IGM PNL SER: ABNORMAL
B PERT IGG+IGM PNL SER: ABNORMAL
COLOR FLD: YELLOW
COLOR FLD: YELLOW
EOSINOPHIL # FLD MANUAL: 0 %
EOSINOPHIL # FLD MANUAL: 0 %
FLUID INTAKE SUBSTANCE CLASS: NORMAL
FLUID INTAKE SUBSTANCE CLASS: NORMAL
LYMPHOCYTES # FLD MANUAL: 63 %
LYMPHOCYTES # FLD MANUAL: 72 %
MESOTHL CELL NFR FLD: 0 %
MESOTHL CELL NFR FLD: 0 %
MONOS+MACROS NFR FLD MANUAL: 25 %
MONOS+MACROS NFR FLD MANUAL: 34 %
NEUTS SEG # FLD MANUAL: 3 %
NEUTS SEG # FLD MANUAL: 3 %
NRBC # FLD: 0
NRBC # FLD: 0
RBC # FLD MANUAL: 1000 /UL
RBC # FLD MANUAL: 286 /UL
SYCRY CLARITY: ABNORMAL
SYCRY COLOR: YELLOW
SYCRY ID: NORMAL
SYCRY TUBE: NORMAL
TOTAL CELLS COUNTED FLD: 185 /UL
TOTAL CELLS COUNTED FLD: 218 /UL
TUBE TYPE: NORMAL
TUBE TYPE: NORMAL
UNIDENT CELLS NFR FLD MANUAL: 0 %
UNIDENT CELLS NFR FLD MANUAL: 0 %
VARIANT LYMPHS # FLD MANUAL: 0 %
VARIANT LYMPHS # FLD MANUAL: 0 %

## 2021-05-25 ENCOUNTER — RX RENEWAL (OUTPATIENT)
Age: 86
End: 2021-05-25

## 2021-07-09 ENCOUNTER — APPOINTMENT (OUTPATIENT)
Dept: ORTHOPEDIC SURGERY | Facility: CLINIC | Age: 86
End: 2021-07-09

## 2021-08-17 ENCOUNTER — APPOINTMENT (OUTPATIENT)
Dept: RHEUMATOLOGY | Facility: CLINIC | Age: 86
End: 2021-08-17
Payer: MEDICARE

## 2021-08-17 VITALS
BODY MASS INDEX: 33.47 KG/M2 | TEMPERATURE: 98.1 F | OXYGEN SATURATION: 94 % | DIASTOLIC BLOOD PRESSURE: 70 MMHG | HEIGHT: 59 IN | WEIGHT: 166 LBS | HEART RATE: 64 BPM | SYSTOLIC BLOOD PRESSURE: 110 MMHG

## 2021-08-17 PROCEDURE — 20552 NJX 1/MLT TRIGGER POINT 1/2: CPT | Mod: 59

## 2021-08-17 PROCEDURE — 20610 DRAIN/INJ JOINT/BURSA W/O US: CPT | Mod: 50

## 2021-08-17 RX ORDER — FUROSEMIDE 80 MG/1
TABLET ORAL
Refills: 0 | Status: ACTIVE | COMMUNITY

## 2021-08-17 RX ORDER — METHYLPRED ACET/NACL,ISO-OS/PF 80 MG/ML
80 VIAL (ML) INJECTION
Qty: 2 | Refills: 0 | Status: COMPLETED | OUTPATIENT
Start: 2021-08-17

## 2021-08-17 RX ORDER — ROSUVASTATIN CALCIUM 5 MG/1
TABLET, FILM COATED ORAL
Refills: 0 | Status: ACTIVE | COMMUNITY

## 2021-08-17 RX ORDER — GABAPENTIN 100 MG/1
CAPSULE ORAL
Refills: 0 | Status: ACTIVE | COMMUNITY

## 2021-08-17 RX ORDER — TRAMADOL HYDROCHLORIDE AND ACETAMINOPHEN 37.5; 325 MG/1; MG/1
37.5-325 TABLET, FILM COATED ORAL
Qty: 60 | Refills: 0 | Status: DISCONTINUED | COMMUNITY
Start: 2021-02-12 | End: 2021-08-17

## 2021-08-17 RX ADMIN — METHYLPREDNISOLONE ACETATE 0 MG/ML: 80 INJECTION, SUSPENSION INTRA-ARTICULAR; INTRALESIONAL; INTRAMUSCULAR; SOFT TISSUE at 00:00

## 2021-08-17 NOTE — PROCEDURE
[FreeTextEntry1] : Date 08/17/21\par Right knee aspiration, CS injection\par The procedure risks was discussed with the patient.\par Indications: therapeutic purposes \par #1 site identified in the right knee . Verbal consent was obtained. \par Anesthesia was with ethyl chloride.\par The patient was prepped with betadine solution. \par A 21 gauge 1.5 inch needle was used.\par 0cc of fluid aspirated. \par Injectables:depomedrol 80mg\par The patient tolerated the procedure well..\par There were no complications. Handouts/patient instructions were given to patient . patient was instructed to call if redness at site, a decrease in range of motion or an increase in pain is noted after procedure.\par \par #2: Identical Procedure repeated in left knee,  however 24 cc of clear yellow fluid aspirated.\par \par #3 Bilateral medial knee fat pad trigger point injections\par Date: 08/17/21\par The procedure risks was discussed with the patient. \par Indications: therapeutic purposes \par #2 site identified in the left medial knee fat pad. \par Anesthesia was with ethyl chloride The patient was prepped with betadine solution. \par With 25 G 5/8 inch needle, 1.5cc of xylocaine was injected to trigger point. \par the patient tolerated the procedure well. there were no complications. handouts/patient instructions were given to patient . patient was instructed to call if redness at site, a decrease in range of motion or an increase in pain is noted after procedure.\par #3 Identical procedure to #2 performed at right medial knee fat pad. \par \par \par f/u 3 months rpa/visit

## 2021-08-17 NOTE — ASSESSMENT
[FreeTextEntry1] : Injected b/L knee intra-articularly with 80mg depomedrol\par Injected b/L medial knee fat pad trigger points with 1mL lidocaine.\par Fluid from left knee sent for testing.\par f/u 3 months

## 2021-08-18 LAB
B PERT IGG+IGM PNL SER: ABNORMAL
COLOR FLD: YELLOW
FLUID INTAKE SUBSTANCE CLASS: NORMAL
LYMPHOCYTES # FLD MANUAL: 73 %
MONOS+MACROS NFR FLD MANUAL: 19 %
NEUTS SEG # FLD MANUAL: 8 %
RBC # FLD MANUAL: 1000 /UL
SYCRY CLARITY: ABNORMAL
SYCRY COLOR: YELLOW
SYCRY ID: ABNORMAL
SYCRY TUBE: NORMAL
TOTAL CELLS COUNTED FLD: 234 /UL
TUBE TYPE: NORMAL

## 2021-08-27 ENCOUNTER — TRANSCRIPTION ENCOUNTER (OUTPATIENT)
Age: 86
End: 2021-08-27

## 2021-08-27 ENCOUNTER — EMERGENCY (EMERGENCY)
Facility: HOSPITAL | Age: 86
LOS: 0 days | Discharge: HOME | End: 2021-08-27
Attending: STUDENT IN AN ORGANIZED HEALTH CARE EDUCATION/TRAINING PROGRAM
Payer: MEDICARE

## 2021-08-27 VITALS
HEART RATE: 64 BPM | HEIGHT: 61 IN | WEIGHT: 110.01 LBS | RESPIRATION RATE: 18 BRPM | TEMPERATURE: 99 F | DIASTOLIC BLOOD PRESSURE: 35 MMHG | OXYGEN SATURATION: 90 % | SYSTOLIC BLOOD PRESSURE: 116 MMHG

## 2021-08-27 VITALS
OXYGEN SATURATION: 98 % | DIASTOLIC BLOOD PRESSURE: 50 MMHG | HEART RATE: 49 BPM | TEMPERATURE: 97 F | SYSTOLIC BLOOD PRESSURE: 128 MMHG | RESPIRATION RATE: 16 BRPM

## 2021-08-27 DIAGNOSIS — Z88.1 ALLERGY STATUS TO OTHER ANTIBIOTIC AGENTS STATUS: ICD-10-CM

## 2021-08-27 DIAGNOSIS — Z90.89 ACQUIRED ABSENCE OF OTHER ORGANS: ICD-10-CM

## 2021-08-27 DIAGNOSIS — J12.82 PNEUMONIA DUE TO CORONAVIRUS DISEASE 2019: ICD-10-CM

## 2021-08-27 DIAGNOSIS — R05 COUGH: ICD-10-CM

## 2021-08-27 DIAGNOSIS — R09.81 NASAL CONGESTION: ICD-10-CM

## 2021-08-27 DIAGNOSIS — E78.5 HYPERLIPIDEMIA, UNSPECIFIED: ICD-10-CM

## 2021-08-27 DIAGNOSIS — M19.90 UNSPECIFIED OSTEOARTHRITIS, UNSPECIFIED SITE: ICD-10-CM

## 2021-08-27 DIAGNOSIS — M10.9 GOUT, UNSPECIFIED: ICD-10-CM

## 2021-08-27 DIAGNOSIS — Z79.02 LONG TERM (CURRENT) USE OF ANTITHROMBOTICS/ANTIPLATELETS: ICD-10-CM

## 2021-08-27 DIAGNOSIS — Z90.89 ACQUIRED ABSENCE OF OTHER ORGANS: Chronic | ICD-10-CM

## 2021-08-27 DIAGNOSIS — U07.1 COVID-19: ICD-10-CM

## 2021-08-27 DIAGNOSIS — Z79.899 OTHER LONG TERM (CURRENT) DRUG THERAPY: ICD-10-CM

## 2021-08-27 DIAGNOSIS — N39.0 URINARY TRACT INFECTION, SITE NOT SPECIFIED: ICD-10-CM

## 2021-08-27 DIAGNOSIS — I10 ESSENTIAL (PRIMARY) HYPERTENSION: ICD-10-CM

## 2021-08-27 LAB
ALBUMIN SERPL ELPH-MCNC: 3.3 G/DL — SIGNIFICANT CHANGE UP (ref 3.3–5)
ALP SERPL-CCNC: 77 U/L — SIGNIFICANT CHANGE UP (ref 40–120)
ALT FLD-CCNC: 30 U/L — SIGNIFICANT CHANGE UP (ref 12–78)
ANION GAP SERPL CALC-SCNC: 8 MMOL/L — SIGNIFICANT CHANGE UP (ref 5–17)
APPEARANCE UR: CLEAR — SIGNIFICANT CHANGE UP
AST SERPL-CCNC: 30 U/L — SIGNIFICANT CHANGE UP (ref 15–37)
BACTERIA # UR AUTO: ABNORMAL
BASOPHILS # BLD AUTO: 0 K/UL — SIGNIFICANT CHANGE UP (ref 0–0.2)
BASOPHILS NFR BLD AUTO: 0 % — SIGNIFICANT CHANGE UP (ref 0–2)
BILIRUB SERPL-MCNC: 0.3 MG/DL — SIGNIFICANT CHANGE UP (ref 0.2–1.2)
BILIRUB UR-MCNC: NEGATIVE — SIGNIFICANT CHANGE UP
BUN SERPL-MCNC: 44 MG/DL — HIGH (ref 7–23)
CALCIUM SERPL-MCNC: 8 MG/DL — LOW (ref 8.5–10.1)
CHLORIDE SERPL-SCNC: 110 MMOL/L — HIGH (ref 96–108)
CO2 SERPL-SCNC: 22 MMOL/L — SIGNIFICANT CHANGE UP (ref 22–31)
COLOR SPEC: YELLOW — SIGNIFICANT CHANGE UP
COMMENT - URINE: SIGNIFICANT CHANGE UP
CREAT SERPL-MCNC: 1.93 MG/DL — HIGH (ref 0.5–1.3)
CRP SERPL-MCNC: 9 MG/L — HIGH
D DIMER BLD IA.RAPID-MCNC: 350 NG/ML DDU — HIGH
DIFF PNL FLD: NEGATIVE — SIGNIFICANT CHANGE UP
EOSINOPHIL # BLD AUTO: 0.06 K/UL — SIGNIFICANT CHANGE UP (ref 0–0.5)
EOSINOPHIL NFR BLD AUTO: 1 % — SIGNIFICANT CHANGE UP (ref 0–6)
EPI CELLS # UR: SIGNIFICANT CHANGE UP
FERRITIN SERPL-MCNC: 56 NG/ML — SIGNIFICANT CHANGE UP (ref 15–150)
GLUCOSE SERPL-MCNC: 136 MG/DL — HIGH (ref 70–99)
GLUCOSE UR QL: NEGATIVE MG/DL — SIGNIFICANT CHANGE UP
HCT VFR BLD CALC: 27 % — LOW (ref 34.5–45)
HGB BLD-MCNC: 8.4 G/DL — LOW (ref 11.5–15.5)
HYALINE CASTS # UR AUTO: ABNORMAL /LPF
KETONES UR-MCNC: NEGATIVE — SIGNIFICANT CHANGE UP
LACTATE SERPL-SCNC: 1.3 MMOL/L — SIGNIFICANT CHANGE UP (ref 0.7–2)
LEUKOCYTE ESTERASE UR-ACNC: ABNORMAL
LYMPHOCYTES # BLD AUTO: 2.15 K/UL — SIGNIFICANT CHANGE UP (ref 1–3.3)
LYMPHOCYTES # BLD AUTO: 36 % — SIGNIFICANT CHANGE UP (ref 13–44)
MANUAL SMEAR VERIFICATION: SIGNIFICANT CHANGE UP
MCHC RBC-ENTMCNC: 29.2 PG — SIGNIFICANT CHANGE UP (ref 27–34)
MCHC RBC-ENTMCNC: 31.1 GM/DL — LOW (ref 32–36)
MCV RBC AUTO: 93.8 FL — SIGNIFICANT CHANGE UP (ref 80–100)
MONOCYTES # BLD AUTO: 0.24 K/UL — SIGNIFICANT CHANGE UP (ref 0–0.9)
MONOCYTES NFR BLD AUTO: 4 % — SIGNIFICANT CHANGE UP (ref 2–14)
NEUTROPHILS # BLD AUTO: 3.52 K/UL — SIGNIFICANT CHANGE UP (ref 1.8–7.4)
NEUTROPHILS NFR BLD AUTO: 59 % — SIGNIFICANT CHANGE UP (ref 43–77)
NITRITE UR-MCNC: NEGATIVE — SIGNIFICANT CHANGE UP
NRBC # BLD: 0 /100 — SIGNIFICANT CHANGE UP (ref 0–0)
NRBC # BLD: SIGNIFICANT CHANGE UP /100 WBCS (ref 0–0)
PH UR: 5 — SIGNIFICANT CHANGE UP (ref 5–8)
PLAT MORPH BLD: NORMAL — SIGNIFICANT CHANGE UP
PLATELET # BLD AUTO: 111 K/UL — LOW (ref 150–400)
POTASSIUM SERPL-MCNC: 4 MMOL/L — SIGNIFICANT CHANGE UP (ref 3.5–5.3)
POTASSIUM SERPL-SCNC: 4 MMOL/L — SIGNIFICANT CHANGE UP (ref 3.5–5.3)
PROCALCITONIN SERPL-MCNC: 0.12 NG/ML — HIGH (ref 0.02–0.1)
PROT SERPL-MCNC: 6.4 GM/DL — SIGNIFICANT CHANGE UP (ref 6–8.3)
PROT UR-MCNC: NEGATIVE MG/DL — SIGNIFICANT CHANGE UP
RBC # BLD: 2.88 M/UL — LOW (ref 3.8–5.2)
RBC # FLD: 15.8 % — HIGH (ref 10.3–14.5)
RBC BLD AUTO: SIGNIFICANT CHANGE UP
RBC CASTS # UR COMP ASSIST: SIGNIFICANT CHANGE UP /HPF (ref 0–4)
SARS-COV-2 RNA SPEC QL NAA+PROBE: DETECTED
SODIUM SERPL-SCNC: 140 MMOL/L — SIGNIFICANT CHANGE UP (ref 135–145)
SP GR SPEC: 1.01 — SIGNIFICANT CHANGE UP (ref 1.01–1.02)
UROBILINOGEN FLD QL: NEGATIVE MG/DL — SIGNIFICANT CHANGE UP
WBC # BLD: 5.97 K/UL — SIGNIFICANT CHANGE UP (ref 3.8–10.5)
WBC # FLD AUTO: 5.97 K/UL — SIGNIFICANT CHANGE UP (ref 3.8–10.5)
WBC UR QL: ABNORMAL

## 2021-08-27 PROCEDURE — 82728 ASSAY OF FERRITIN: CPT

## 2021-08-27 PROCEDURE — 93010 ELECTROCARDIOGRAM REPORT: CPT

## 2021-08-27 PROCEDURE — 86140 C-REACTIVE PROTEIN: CPT

## 2021-08-27 PROCEDURE — U0003: CPT

## 2021-08-27 PROCEDURE — 83605 ASSAY OF LACTIC ACID: CPT

## 2021-08-27 PROCEDURE — U0005: CPT

## 2021-08-27 PROCEDURE — 71045 X-RAY EXAM CHEST 1 VIEW: CPT | Mod: 26

## 2021-08-27 PROCEDURE — 81001 URINALYSIS AUTO W/SCOPE: CPT

## 2021-08-27 PROCEDURE — 85025 COMPLETE CBC W/AUTO DIFF WBC: CPT

## 2021-08-27 PROCEDURE — 93005 ELECTROCARDIOGRAM TRACING: CPT

## 2021-08-27 PROCEDURE — 71045 X-RAY EXAM CHEST 1 VIEW: CPT

## 2021-08-27 PROCEDURE — 87040 BLOOD CULTURE FOR BACTERIA: CPT | Mod: 59

## 2021-08-27 PROCEDURE — 80053 COMPREHEN METABOLIC PANEL: CPT

## 2021-08-27 PROCEDURE — 36415 COLL VENOUS BLD VENIPUNCTURE: CPT

## 2021-08-27 PROCEDURE — 85379 FIBRIN DEGRADATION QUANT: CPT

## 2021-08-27 PROCEDURE — 84145 PROCALCITONIN (PCT): CPT

## 2021-08-27 PROCEDURE — 99283 EMERGENCY DEPT VISIT LOW MDM: CPT | Mod: 25

## 2021-08-27 PROCEDURE — 99285 EMERGENCY DEPT VISIT HI MDM: CPT | Mod: CS

## 2021-08-27 RX ORDER — SODIUM CHLORIDE 9 MG/ML
30 INJECTION INTRAMUSCULAR; INTRAVENOUS; SUBCUTANEOUS ONCE
Refills: 0 | Status: DISCONTINUED | OUTPATIENT
Start: 2021-08-27 | End: 2021-08-27

## 2021-08-27 RX ORDER — IBUPROFEN 200 MG
600 TABLET ORAL ONCE
Refills: 0 | Status: COMPLETED | OUTPATIENT
Start: 2021-08-27 | End: 2021-08-27

## 2021-08-27 RX ADMIN — Medication 1 TABLET(S): at 15:58

## 2021-08-27 NOTE — ED PROVIDER NOTE - NS ED ROS FT
Constitutional: No fever.  Neurological: No headache.  Eyes: No vision changes.   Ears, Nose, Mouth, Throat: +congestion.  Cardiovascular: No chest pain.  Respiratory: No difficulty breathing. +cough  Gastrointestinal: No nausea or vomiting.  Genitourinary: No dysuria.  Musculoskeletal: No joint pain.  Integumentary (skin and/or breast): No rash.

## 2021-08-27 NOTE — ED PROVIDER NOTE - PATIENT PORTAL LINK FT
You can access the FollowMyHealth Patient Portal offered by [facility] by registering at the following website: [email or web address]. By joining [employer]’s FollowMyHealth portal, you will also be able to view your health information using other applications (apps) compatible with our system. You can access the FollowMyHealth Patient Portal offered by [facility] by registering at the following website: [email or web address]. By joining [employer]’s FollowMyHealth portal, you will also be able to view your health information using other applications (apps) compatible with our system.

## 2021-08-27 NOTE — ED PROVIDER NOTE - CARE PROVIDER_API CALL
Olvin Collins  CARDIOVASCULAR DISEASE  [address], [address]  [place] [address]  Phone: [phone]  Fax: [phone]  Established Patient  Follow Up Time: Urgent

## 2021-08-27 NOTE — ED ADULT NURSE REASSESSMENT NOTE - NS ED NURSE REASSESS COMMENT FT1
Pt. is resting in bed- No acute or respiratory distress at this time- Pending Re evaluation by ER Doctor ( Dr. Fernando) Pt. remains in cardiac monitor and safety in place

## 2021-08-27 NOTE — ED PROVIDER NOTE - PROGRESS NOTE DETAILS
Rodrigo Maradiaga for attending Dr. Jurado: Pt ambulated in room O2 lowest 92 % but rebounds to higher numbers. results discussed with patient and family- MAB not available here at this time. admission offered to patient and family but patient prefers to go home- patient has been saturating well with spO2s >95. will complete MAB form and discharge. Rodrigo Maradiaga for attending Dr. Jurado: Pt ambulated in room O2 lowest 92 % but quickly rebounds to higher numbers. patient was for discharge but family called, they feel they cannot care for her at home. will admit. family called again, they now want to take her home and get angie MAB elsewhere. will discharge patient.

## 2021-08-27 NOTE — ED ADULT NURSE NOTE - OBJECTIVE STATEMENT
Pt. to the ED C/O Cough Pt. to the ED C/O Cough, Fever x 2 days- Pt. states she tested + Covid at Rapid Test at Home- Pt. sent to ED by Family for further evaluation- Pt. in no acute respiratory distress-- IV and Labs as ordered. Pt. placed on Cardiac Monitor- Will cont to monitor patient closely- Safety maintained

## 2021-08-27 NOTE — ED PROVIDER NOTE - CARE PLAN
Principal Discharge DX:	Real time reverse transcriptase PCR positive for COVID-19 virus  Secondary Diagnosis:	Acute UTI   1 Principal Discharge DX:	Real time reverse transcriptase PCR positive for COVID-19 virus  Secondary Diagnosis:	Acute UTI  Secondary Diagnosis:	Pneumonia due to COVID-19 virus

## 2021-08-27 NOTE — ED PROVIDER NOTE - NSFOLLOWUPINSTRUCTIONS_ED_ALL_ED_FT
You have been referred to the COVID-19 Antibody Infusion Team. You should expect a call from then if not see the resources below.  Please refer to the following for more information:  [email or web address]	  [phone].    You were also diagnosed with a UTI.    ====================================================================   Urinary Tract Infection, Adult  ImageA urinary tract infection (UTI) is an infection of any part of the urinary tract, which includes the kidneys, ureters, bladder, and urethra. These organs make, store, and get rid of urine in the body. UTI can be a bladder infection (cystitis) or kidney infection (pyelonephritis).    What are the causes?  This infection may be caused by fungi, viruses, or bacteria. Bacteria are the most common cause of UTIs. This condition can also be caused by repeated incomplete emptying of the bladder during urination.    What increases the risk?  This condition is more likely to develop if:    You ignore your need to urinate or hold urine for long periods of time.  You do not empty your bladder completely during urination.  You wipe back to front after urinating or having a bowel movement, if you are female.  You are uncircumcised, if you are male.  You are constipated.  You have a urinary catheter that stays in place (indwelling).  You have a weak defense (immune) system.  You have a medical condition that affects your bowels, kidneys, or bladder.  You have diabetes.  You take antibiotic medicines frequently or for long periods of time, and the antibiotics no longer work well against certain types of infections (antibiotic resistance).  You take medicines that irritate your urinary tract.  You are exposed to chemicals that irritate your urinary tract.  You are female.    What are the signs or symptoms?  Symptoms of this condition include:    Fever.  Frequent urination or passing small amounts of urine frequently.  Needing to urinate urgently.  Pain or burning with urination.  Urine that smells bad or unusual.  Cloudy urine.  Pain in the lower abdomen or back.  Trouble urinating.  Blood in the urine.  Vomiting or being less hungry than normal.  Diarrhea or abdominal pain.  Vaginal discharge, if you are female.    How is this diagnosed?  This condition is diagnosed with a medical history and physical exam. You will also need to provide a urine sample to test your urine. Other tests may be done, including:    Blood tests.  Sexually transmitted disease (STD) testing.    If you have had more than one UTI, a cystoscopy or imaging studies may be done to determine the cause of the infections.    How is this treated?  Treatment for this condition often includes a combination of two or more of the following:    Antibiotic medicine.  Other medicines to treat less common causes of UTI.  Over-the-counter medicines to treat pain.  Drinking enough water to stay hydrated.    Follow these instructions at home:  Take over-the-counter and prescription medicines only as told by your health care provider.  If you were prescribed an antibiotic, take it as told by your health care provider. Do not stop taking the antibiotic even if you start to feel better.  Avoid alcohol, caffeine, tea, and carbonated beverages. They can irritate your bladder.  Drink enough fluid to keep your urine clear or pale yellow.  Keep all follow-up visits as told by your health care provider. This is important.  ImageMake sure to:    Empty your bladder often and completely. Do not hold urine for long periods of time.  Empty your bladder before and after sex.  Wipe from front to back after a bowel movement if you are female. Use each tissue one time when you wipe.    Contact a health care provider if:  You have back pain.  You have a fever.  You feel nauseous or vomit.  Your symptoms do not get better after 3 days.  Your symptoms go away and then return.  Get help right away if:  You have severe back pain or lower abdominal pain.  You are vomiting and cannot keep down any medicines or water.  This information is not intended to replace advice given to you by your health care provider. Make sure you discuss any questions you have with your health care provider.

## 2021-08-27 NOTE — ED ADULT NURSE NOTE - CAS EDN DISCHARGE ASSESSMENT
Report received from Cecile LAUREN, plan of care discussed, safety precautions on place.   Alert and oriented to person, place and time

## 2021-08-27 NOTE — ED ADULT TRIAGE NOTE - CHIEF COMPLAINT QUOTE
PT to ED for c/o of flu like symptoms x3 days. Reports cough, weakness and fever. No fever in ED. 02 sat 90%. Denies SOB. PT Covid + as of this morning.

## 2021-08-27 NOTE — ED PROVIDER NOTE - PHYSICAL EXAMINATION
Vital signs as available reviewed.  General:  Comfortable, no acute distress.  Head:  Normocephalic, atraumatic.  Eyes:  Conjunctiva pink, no icterus.  Cardiovascular:  Regular rate, no obvious murmur.  Respiratory:  b/l rales   Abdomen:  Soft, non-tender.  Musculoskeletal:  No deformity or calf tenderness.  Neurologic: Alert and oriented, moving all extremities.  Skin:  Warm and dry.

## 2021-08-27 NOTE — ED PROVIDER NOTE - OBJECTIVE STATEMENT
89 y/o female with a PMHx of OA, Gout, HLD, HTN presents to the ED c/o flu-like symptoms x3 days. Reports cough and congestion. Did at home COVID test and was positive so was  brought  in  for evaluation. Denies CP, SOB.

## 2021-08-27 NOTE — ED PROVIDER NOTE - NSICDXPASTMEDICALHX_GEN_ALL_CORE_FT
PAST MEDICAL HISTORY:  Gout     HLD (hyperlipidemia)     HTN (hypertension)     Osteoarthritis

## 2021-08-27 NOTE — ED PROVIDER NOTE - CLINICAL SUMMARY MEDICAL DECISION MAKING FREE TEXT BOX
89 y/o female with a PMHx of OA, Gout, HLD, HTN presents to the ED  with URI symptoms and COVID positive at home. Will do COVID w/o and consider ADM and monoclonal antibodies.

## 2021-09-19 NOTE — DISCHARGE NOTE PROVIDER - CARE PROVIDERS DIRECT ADDRESSES
Patient presents to the ER with complaints of worsening pain from her sacral decubitus ulcer. Is bedbound secondary to multiple sclerosis. Has home health nurse. Apparently she also has hospice involved. Reports worsening pain the past 2 days. The history is provided by the patient. Skin Problem   This is a new problem. The current episode started more than 2 days ago. The problem has not changed since onset. There has been no fever. Affected Location: Right buttocks and sacral area. The pain is at a severity of 6/10. The pain is moderate. Pertinent negatives include no itching and no weeping. She has tried nothing for the symptoms.         Past Medical History:   Diagnosis Date    Decubitus ulcer of ischial area     Hypertension     Kidney infection     Menopause     MS (multiple sclerosis) ([facility] [address]        Past Surgical History:   Procedure Laterality Date    COLONOSCOPY N/A 6/29/2020    COLONOSCOPY /Room 424 performed by [facility]et [facility]el [facility] at [facility] ENDOSCOPY    ERCP  5/15/2020         HX CHOLECYSTECTOMY  05/18/2020         Family History:   Problem Relation Age of Onset    Breast Cancer Mother        Social History     Socioeconomic History    Marital status:      Spouse name: Not on file    Number of children: Not on file    Years of education: Not on file    Highest education level: Not on file   Occupational History    Not on file   Tobacco Use    Smoking status: Former Smoker    Smokeless tobacco: Never Used   Substance and Sexual Activity    Alcohol use: Not Currently    Drug use: Not Currently    Sexual activity: Not on file   Other Topics Concern   [address] Service Not Asked    Blood Transfusions Not Asked    Caffeine Concern Not Asked    Occupational Exposure Not Asked    Hobby Hazards Not Asked    Sleep Concern Not Asked    Stress Concern Not Asked    Weight Concern Not Asked    Special Diet Not Asked    Back Care Not Asked    Exercise Not Asked    Bike Helmet Not Asked   [address] [facility] Not Asked    Self-Exams Not Asked   Social History Narrative    Not on file     Social Determinants of Health     Financial Resource Strain:     Difficulty of Paying Living Expenses:    Food Insecurity:     Worried About Running Out of Food in the Last Year:     [address] in the Last Year:    Transportation Needs:     Lack of Transportation (Medical):  Lack of Transportation (Non-Medical):    Physical Activity:     Days of Exercise per Week:     Minutes of Exercise per Session:    Stress:     Feeling of Stress :    Social Connections:     Frequency of Communication with Friends and Family:     Frequency of Social Gatherings with Friends and Family:     Attends [employer] Services:     Active Member of Clubs or Organizations:     Attends Club or Organization Meetings:     Marital Status:    Intimate Partner Violence:     Fear of Current or Ex-Partner:     Emotionally Abused:     Physically Abused:     Sexually Abused: ALLERGIES: Latex, Norco [hydrocodone-acetaminophen], and Pcn [penicillins]    Review of Systems   Constitutional: Negative for fatigue and unexpected weight change. HENT: Negative for congestion and dental problem. Eyes: Negative for redness. Respiratory: Negative for choking, chest tightness and shortness of breath. Cardiovascular: Negative for palpitations. Gastrointestinal: Negative for blood in stool and vomiting. Endocrine: Negative for polyuria. Genitourinary: Negative for decreased urine volume, flank pain, frequency and urgency. Musculoskeletal: Negative for back pain, gait problem and neck pain. Skin: Positive for wound. Negative for itching, pallor and rash. Neurological: Negative for weakness, light-headedness and numbness. Hematological: Negative for adenopathy. Does not bruise/bleed easily. Psychiatric/Behavioral: Negative for behavioral problems. All other systems reviewed and are negative.       Vitals: 09/19/21 1836   BP: (!) 163/97   Pulse: 94   Resp: 22   Temp: 97.5 °F (36.4 °C)   SpO2: 100%   Weight: 45.4 kg (100 lb)   Height: 5' 7\" (1.702 m)            Physical Exam  Vitals and nursing note reviewed. Constitutional:       General: She is not in acute distress. Appearance: Normal appearance. She is not ill-appearing. HENT:      Head: Normocephalic and atraumatic. Right Ear: External ear normal.      Left Ear: External ear normal.   Cardiovascular:      Rate and Rhythm: Normal rate. Pulses: Normal pulses. Heart sounds: Normal heart sounds. Pulmonary:      Effort: Pulmonary effort is normal. No respiratory distress. Breath sounds: Normal breath sounds. No stridor. Abdominal:      General: There is no distension. Palpations: There is no mass. Musculoskeletal:         General: No swelling, tenderness or deformity. Cervical back: Normal range of motion and neck supple. No rigidity. Skin:         Neurological:      Mental Status: She is alert. MDM  Number of Diagnoses or Management Options  Diagnosis management comments: No gross signs of infection. Patient's main complaint is pain. This appears to be a chronic issue for patient. We will get some IM fentanyl here. 8:48 PM  Patient is resting upon my reassessment. Reports some improvement in pain here. None dictation for any other imaging or testing. Will discharge home. Refer back to her PCP/hospice for further pain management.        Amount and/or Complexity of Data Reviewed  Decide to obtain previous medical records or to obtain history from someone other than the patient: yes  Review and summarize past medical records: yes    Risk of Complications, Morbidity, and/or Mortality  Presenting problems: moderate  Diagnostic procedures: moderate  Management options: moderate    Patient Progress  Patient progress: stable         Procedures               Voice dictation software was used during the making of this note.  This software is not perfect and grammatical and other typographical errors may be present. This note has been proofread, but may still contain errors.   Joslyn Baehna MD; 9/19/2021 @8:48 PM   =================================================================== ,DirectAddress_Unknown,DirectAddress_Unknown,[email or web address]

## 2021-10-06 PROBLEM — I10 ESSENTIAL HYPERTENSION: Status: ACTIVE | Noted: 2017-09-22

## 2021-10-14 ENCOUNTER — APPOINTMENT (OUTPATIENT)
Dept: CT IMAGING | Facility: CLINIC | Age: 86
End: 2021-10-14
Payer: MEDICARE

## 2021-10-14 ENCOUNTER — APPOINTMENT (OUTPATIENT)
Dept: RADIOLOGY | Facility: CLINIC | Age: 86
End: 2021-10-14
Payer: MEDICARE

## 2021-10-14 ENCOUNTER — OUTPATIENT (OUTPATIENT)
Dept: OUTPATIENT SERVICES | Facility: HOSPITAL | Age: 86
LOS: 1 days | End: 2021-10-14
Payer: MEDICARE

## 2021-10-14 DIAGNOSIS — S09.90XA UNSPECIFIED INJURY OF HEAD, INITIAL ENCOUNTER: ICD-10-CM

## 2021-10-14 DIAGNOSIS — Z00.8 ENCOUNTER FOR OTHER GENERAL EXAMINATION: ICD-10-CM

## 2021-10-14 DIAGNOSIS — Z90.89 ACQUIRED ABSENCE OF OTHER ORGANS: Chronic | ICD-10-CM

## 2021-10-14 PROCEDURE — 73502 X-RAY EXAM HIP UNI 2-3 VIEWS: CPT

## 2021-10-14 PROCEDURE — 70450 CT HEAD/BRAIN W/O DYE: CPT | Mod: 26,MH

## 2021-10-14 PROCEDURE — 73502 X-RAY EXAM HIP UNI 2-3 VIEWS: CPT | Mod: 26,RT

## 2021-10-14 PROCEDURE — 70450 CT HEAD/BRAIN W/O DYE: CPT | Mod: MH

## 2021-10-18 ENCOUNTER — OUTPATIENT (OUTPATIENT)
Dept: OUTPATIENT SERVICES | Facility: HOSPITAL | Age: 86
LOS: 1 days | End: 2021-10-18
Payer: MEDICARE

## 2021-10-18 ENCOUNTER — APPOINTMENT (OUTPATIENT)
Dept: CT IMAGING | Facility: CLINIC | Age: 86
End: 2021-10-18
Payer: MEDICARE

## 2021-10-18 DIAGNOSIS — Z90.89 ACQUIRED ABSENCE OF OTHER ORGANS: Chronic | ICD-10-CM

## 2021-10-18 DIAGNOSIS — Z00.8 ENCOUNTER FOR OTHER GENERAL EXAMINATION: ICD-10-CM

## 2021-10-18 PROCEDURE — 70450 CT HEAD/BRAIN W/O DYE: CPT | Mod: MH

## 2021-10-18 PROCEDURE — 70450 CT HEAD/BRAIN W/O DYE: CPT | Mod: 26,MH

## 2021-10-29 ENCOUNTER — APPOINTMENT (OUTPATIENT)
Dept: CT IMAGING | Facility: CLINIC | Age: 86
End: 2021-10-29
Payer: MEDICARE

## 2021-10-29 ENCOUNTER — OUTPATIENT (OUTPATIENT)
Dept: OUTPATIENT SERVICES | Facility: HOSPITAL | Age: 86
LOS: 1 days | End: 2021-10-29
Payer: MEDICARE

## 2021-10-29 DIAGNOSIS — Z00.8 ENCOUNTER FOR OTHER GENERAL EXAMINATION: ICD-10-CM

## 2021-10-29 DIAGNOSIS — Z90.89 ACQUIRED ABSENCE OF OTHER ORGANS: Chronic | ICD-10-CM

## 2021-10-29 PROCEDURE — 70450 CT HEAD/BRAIN W/O DYE: CPT | Mod: MH

## 2021-10-29 PROCEDURE — 70450 CT HEAD/BRAIN W/O DYE: CPT | Mod: 26,MH

## 2021-11-02 ENCOUNTER — APPOINTMENT (OUTPATIENT)
Dept: RHEUMATOLOGY | Facility: CLINIC | Age: 86
End: 2021-11-02
Payer: MEDICARE

## 2021-11-02 VITALS
SYSTOLIC BLOOD PRESSURE: 146 MMHG | BODY MASS INDEX: 32.66 KG/M2 | OXYGEN SATURATION: 96 % | DIASTOLIC BLOOD PRESSURE: 50 MMHG | HEART RATE: 64 BPM | WEIGHT: 162 LBS | TEMPERATURE: 96 F | HEIGHT: 59 IN

## 2021-11-02 PROCEDURE — 20610 DRAIN/INJ JOINT/BURSA W/O US: CPT | Mod: 50

## 2021-11-02 PROCEDURE — 20552 NJX 1/MLT TRIGGER POINT 1/2: CPT | Mod: 59

## 2021-11-02 RX ORDER — METHYLPRED ACET/NACL,ISO-OS/PF 80 MG/ML
80 VIAL (ML) INJECTION
Qty: 1 | Refills: 0 | Status: COMPLETED | OUTPATIENT
Start: 2021-11-02

## 2021-11-02 RX ADMIN — METHYLPREDNISOLONE ACETATE 0 MG/ML: 80 INJECTION, SUSPENSION INTRA-ARTICULAR; INTRALESIONAL; INTRAMUSCULAR; SOFT TISSUE at 00:00

## 2021-11-02 NOTE — ASSESSMENT
[FreeTextEntry1] : Injected L knee intra-articularly with 80mg depomedrol\par Injected b/L medial knee fat pad trigger points with 1mL lidocaine.\par f/u after 11/18/21 for right knee CS injection.\par \par  \par

## 2021-11-02 NOTE — PROCEDURE
[FreeTextEntry1] : Date 11/2/21\par Right knee aspiration\par The procedure risks was discussed with the patient.\par Indications: therapeutic purposes \par #1 site identified in the right knee . Verbal consent was obtained. \par Anesthesia was with ethyl chloride.\par The patient was prepped with betadine solution. \par A 21 gauge 1.5 inch needle was used.\par 8cc of fluid aspirated. \par Injectables:None --too early for CS.\par The patient tolerated the procedure well..\par There were no complications. Handouts/patient instructions were given to patient . patient was instructed to call if redness at site, a decrease in range of motion or an increase in pain is noted after procedure.\par \par #2: Identical Procedure repeated in left knee, however 13 cc of clear yellow fluid aspirated, and then 80mg depomedrol injected.  \par \par #3 Bilateral medial knee fat pad trigger point injections\par Date: 11/2/21\par The procedure risks was discussed with the patient. \par Indications: therapeutic purposes \par #2 site identified in the left medial knee fat pad. \par Anesthesia was with ethyl chloride The patient was prepped with betadine solution. \par With 25 G 5/8 inch needle, 1 cc of xylocaine was injected to trigger point. \par the patient tolerated the procedure well. there were no complications. handouts/patient instructions were given to patient . patient was instructed to call if redness at site, a decrease in range of motion or an increase in pain is noted after procedure.\par #3 Identical procedure to #2 performed at right medial knee fat pad. \par \par \par

## 2021-12-02 ENCOUNTER — APPOINTMENT (OUTPATIENT)
Dept: RHEUMATOLOGY | Facility: CLINIC | Age: 86
End: 2021-12-02

## 2021-12-21 ENCOUNTER — APPOINTMENT (OUTPATIENT)
Dept: RHEUMATOLOGY | Facility: CLINIC | Age: 86
End: 2021-12-21
Payer: MEDICARE

## 2021-12-21 VITALS
WEIGHT: 157 LBS | BODY MASS INDEX: 31.65 KG/M2 | OXYGEN SATURATION: 96 % | TEMPERATURE: 96.6 F | HEIGHT: 59 IN | HEART RATE: 66 BPM | DIASTOLIC BLOOD PRESSURE: 62 MMHG | SYSTOLIC BLOOD PRESSURE: 150 MMHG

## 2021-12-21 PROCEDURE — 20610 DRAIN/INJ JOINT/BURSA W/O US: CPT | Mod: 50

## 2021-12-21 PROCEDURE — 99213 OFFICE O/P EST LOW 20 MIN: CPT | Mod: 25

## 2021-12-21 RX ADMIN — METHYLPREDNISOLONE ACETATE 0 MG/ML: 80 INJECTION, SUSPENSION INTRA-ARTICULAR; INTRALESIONAL; INTRAMUSCULAR; SOFT TISSUE at 00:00

## 2021-12-21 NOTE — PROCEDURE
[FreeTextEntry1] : Date 12/21/21\par Right knee aspiration\par The procedure risks was discussed with the patient.\par Indications: therapeutic purposes \par #1 site identified in the right knee . Verbal consent was obtained. \par Anesthesia was with ethyl chloride.\par The patient was prepped with betadine solution. \par A 21 gauge 1.5 inch needle was used.\par 9cc of fluid aspirated. \par Injectables:80 mg depomedrol\par The patient tolerated the procedure well..\par There were no complications. Handouts/patient instructions were given to patient . patient was instructed to call if redness at site, a decrease in range of motion or an increase in pain is noted after procedure.\par \par #2: Identical Procedure repeated in left knee, however 17 cc of clear yellow fluid aspirated, and NO steroid injected.  \par

## 2021-12-21 NOTE — HISTORY OF PRESENT ILLNESS
[FreeTextEntry1] : Comes in for acute visit,knee pain.\par \par She reports she has pain in the back of knees and around the knees.  \par The steroid always helps.  \par \par No other new symptoms to report.  \par \par

## 2022-01-19 ENCOUNTER — RX RENEWAL (OUTPATIENT)
Age: 87
End: 2022-01-19

## 2022-03-24 ENCOUNTER — APPOINTMENT (OUTPATIENT)
Dept: RHEUMATOLOGY | Facility: CLINIC | Age: 87
End: 2022-03-24
Payer: MEDICARE

## 2022-03-24 VITALS
TEMPERATURE: 97.4 F | BODY MASS INDEX: 32.66 KG/M2 | OXYGEN SATURATION: 96 % | HEART RATE: 58 BPM | DIASTOLIC BLOOD PRESSURE: 70 MMHG | SYSTOLIC BLOOD PRESSURE: 120 MMHG | HEIGHT: 59 IN | WEIGHT: 162 LBS

## 2022-03-24 PROCEDURE — 20610 DRAIN/INJ JOINT/BURSA W/O US: CPT | Mod: 50

## 2022-03-24 PROCEDURE — 20552 NJX 1/MLT TRIGGER POINT 1/2: CPT | Mod: 59

## 2022-03-24 PROCEDURE — 99214 OFFICE O/P EST MOD 30 MIN: CPT | Mod: 25

## 2022-03-24 RX ORDER — FOLIC ACID 20 MG
CAPSULE ORAL
Refills: 0 | Status: ACTIVE | COMMUNITY

## 2022-03-24 RX ORDER — TRIAMCINOLONE DIACET/0.9% NACL 80 MG/ML
80 VIAL (ML) INJECTION
Refills: 0 | Status: COMPLETED | OUTPATIENT
Start: 2022-03-24

## 2022-03-24 NOTE — PROCEDURE
[FreeTextEntry1] : Date 3/24/22\par Right knee aspiration\par The procedure risks was discussed with the patient.\par Indications: therapeutic purposes \par #1 site identified in the right knee . Verbal consent was obtained. \par Anesthesia was with ethyl chloride.\par The patient was prepped with betadine solution. \par A 21 gauge 1.5 inch needle was used.\par 14cc of fluid aspirated. \par Injectables:80 mg Kenalog\par The patient tolerated the procedure well..\par There were no complications. Handouts/patient instructions were given to patient . patient was instructed to call if redness at site, a decrease in range of motion or an increase in pain is noted after procedure.\par #2: Identical Procedure repeated in left knee, however 37 cc of clear yellow fluid aspirated.\par \par #3 Bilateral medial knee fat pad trigger point injections\par Date: 3/24/22\par The procedure risks was discussed with the patient. \par Indications: therapeutic purposes \par  site identified in the left medial knee fat pad. \par Anesthesia was with ethyl chloride The patient was prepped with betadine solution. \par With 25 G 5/8 inch needle, 1 cc of xylocaine was injected to trigger point. \par the patient tolerated the procedure well. there were no complications. handouts/patient instructions were given to patient . patient was instructed to call if redness at site, a decrease in range of motion or an increase in pain is noted after procedure.\par Identical procedure performed in right medial knee fat pad.\par \par \par \par \par

## 2022-03-24 NOTE — HISTORY OF PRESENT ILLNESS
[FreeTextEntry1] : 89 year old woman with hx of HTN, CKD, HLD, shingles, Left GTB/ left bicipital tendinitis presents for f/u of knee pain and hyperuricemia. 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  She is looking to have injections for her knees again.  Pain is worse upon walking.  \par Most recent fluid analysis in december 2021 showed CPPD cyrstals. \par \par She reports the medial knee fat pad trigger point injections were very helpful. \par \par She is currently on allopurinol 100mg daily. She is currently off colchicine. \par \par She fell and tore tendon in right shoulder and hurt left knee. She had a right shoulder CS injection from ortho while she was at rehab. \par

## 2022-03-24 NOTE — ASSESSMENT
[FreeTextEntry1] : b/L knee aspiration and kenalog injections\par b/L medial knee fat pad trigger point injections performed\par continue allopurinol for gout\par f/u 1 month for b/L PAB injections if still needed

## 2022-03-25 ENCOUNTER — TRANSCRIPTION ENCOUNTER (OUTPATIENT)
Age: 87
End: 2022-03-25

## 2022-03-25 LAB
B PERT IGG+IGM PNL SER: ABNORMAL
B PERT IGG+IGM PNL SER: ABNORMAL
COLOR FLD: YELLOW
COLOR FLD: YELLOW
EOSINOPHIL # FLD MANUAL: 0 %
EOSINOPHIL # FLD MANUAL: 0 %
FLUID INTAKE SUBSTANCE CLASS: NORMAL
FLUID INTAKE SUBSTANCE CLASS: NORMAL
LYMPHOCYTES # FLD MANUAL: 69 %
LYMPHOCYTES # FLD MANUAL: 70 %
MESOTHL CELL NFR FLD: 0 %
MESOTHL CELL NFR FLD: 0 %
MONOS+MACROS NFR FLD MANUAL: 27 %
MONOS+MACROS NFR FLD MANUAL: 29 %
NEUTS SEG # FLD MANUAL: 2 %
NEUTS SEG # FLD MANUAL: 3 %
NRBC # FLD: 0
NRBC # FLD: 0
RBC # FLD MANUAL: 1000 /UL
RBC # FLD MANUAL: 328 /UL
SYCRY CLARITY: ABNORMAL
SYCRY COLOR: YELLOW
SYCRY ID: NORMAL
SYCRY TUBE: NORMAL
TOTAL CELLS COUNTED FLD: 142 /UL
TOTAL CELLS COUNTED FLD: 396 /UL
TUBE TYPE: NORMAL
TUBE TYPE: NORMAL
UNIDENT CELLS NFR FLD MANUAL: 0 %
UNIDENT CELLS NFR FLD MANUAL: 0 %
VARIANT LYMPHS # FLD MANUAL: 0 %
VARIANT LYMPHS # FLD MANUAL: 0 %

## 2022-03-25 RX ORDER — LIDOCAINE 5% 5 G/100G
5 CREAM TOPICAL
Qty: 1 | Refills: 5 | Status: ACTIVE | COMMUNITY
Start: 2022-03-25 | End: 1900-01-01

## 2022-03-25 RX ORDER — LIDOCAINE 5 G/100G
5 OINTMENT TOPICAL
Qty: 1 | Refills: 3 | Status: ACTIVE | COMMUNITY
Start: 2022-03-25 | End: 1900-01-01

## 2022-04-21 ENCOUNTER — NON-APPOINTMENT (OUTPATIENT)
Age: 87
End: 2022-04-21

## 2022-04-22 ENCOUNTER — APPOINTMENT (OUTPATIENT)
Dept: RHEUMATOLOGY | Facility: CLINIC | Age: 87
End: 2022-04-22
Payer: MEDICARE

## 2022-04-22 VITALS
HEART RATE: 69 BPM | BODY MASS INDEX: 31.91 KG/M2 | SYSTOLIC BLOOD PRESSURE: 132 MMHG | OXYGEN SATURATION: 96 % | WEIGHT: 158 LBS | TEMPERATURE: 97.6 F | DIASTOLIC BLOOD PRESSURE: 61 MMHG

## 2022-04-22 PROCEDURE — 20610 DRAIN/INJ JOINT/BURSA W/O US: CPT | Mod: 50

## 2022-04-22 RX ORDER — TRIAMCINOLONE ACETONIDE 80 MG/ML
80 INJECTION, SUSPENSION INTRA-ARTICULAR; INTRAMUSCULAR
Qty: 2 | Refills: 0 | Status: COMPLETED | OUTPATIENT
Start: 2022-04-22

## 2022-04-22 RX ADMIN — TRIAMCINOLONE ACETONIDE 0 MG/ML: 80 INJECTION, SUSPENSION INTRA-ARTICULAR; INTRAMUSCULAR at 00:00

## 2022-04-22 NOTE — PROCEDURE
[FreeTextEntry1] : Procedure #1\par Procedure: Right PAB injection\par Date: 4/22/22\par The procedure risks was discussed with the patient.\par Indications: therapeutic purposes \par #1 site identified in the Right PAB . Verbal consent was obtained. \par Anesthesia was with ethyl chloride.\par The patient was prepped with betadine solution. \par A 25 gauge 1.5 inch needle was used.\par Injectables: knealogl 80 mg was injected into the site The Depomedrol was mixed with 1mL of xylocaine 1%. \par The patient tolerated the procedure well..\par There were no complications. Handouts/patient instructions were given to patient . patient was instructed to call if redness at site, a decrease in range of motion or an increase in pain is noted after procedure. \par  \par #2 Identical PAB injection performed on left side.\par \par \par #3 Left knee fluid aspiration WITHOUT CS injection\par Left site identified. Verbal consent was obtained. \par Anesthesia was with ethyl chloride.\par The patient was prepped with betadine solution. \par A 21 gauge 1.5 inch needle was used.\par 9 cc of clear yellow color fluid aspirated. \par Injectables:0 \par \par \par #4 right knee fluid aspiration WITHOUT CS injection\par Left site identified. Verbal consent was obtained. \par Anesthesia was with ethyl chloride.\par The patient was prepped with betadine solution. \par A 21 gauge 1.5 inch needle was used.\par 0.5 cc of clear yellow color fluid aspirated. \par Injectables:0

## 2022-06-10 NOTE — DISCHARGE NOTE PROVIDER - NSDCHHATTENDCERT_GEN_ALL_CORE
Culture Follow-up My signature below certifies that the above stated patient is homebound and upon completion of the Face-To-Face encounter, has the need for intermittent skilled nursing, physical therapy and/or speech or occupational therapy services in their home for their current diagnosis as outlined in their initial plan of care. These services will continue to be monitored by myself or another physician.

## 2022-06-30 ENCOUNTER — APPOINTMENT (OUTPATIENT)
Dept: RHEUMATOLOGY | Facility: CLINIC | Age: 87
End: 2022-06-30

## 2022-07-01 ENCOUNTER — TRANSCRIPTION ENCOUNTER (OUTPATIENT)
Age: 87
End: 2022-07-01

## 2022-07-14 ENCOUNTER — APPOINTMENT (OUTPATIENT)
Dept: RHEUMATOLOGY | Facility: CLINIC | Age: 87
End: 2022-07-14

## 2022-07-14 VITALS
DIASTOLIC BLOOD PRESSURE: 70 MMHG | SYSTOLIC BLOOD PRESSURE: 140 MMHG | HEART RATE: 60 BPM | OXYGEN SATURATION: 99 % | WEIGHT: 158 LBS | TEMPERATURE: 97.7 F | BODY MASS INDEX: 31.85 KG/M2 | HEIGHT: 59 IN

## 2022-07-14 PROCEDURE — 20610 DRAIN/INJ JOINT/BURSA W/O US: CPT | Mod: 50

## 2022-07-14 PROCEDURE — 99213 OFFICE O/P EST LOW 20 MIN: CPT | Mod: 25

## 2022-07-14 RX ORDER — TRIAMCINOLONE ACETONIDE 80 MG/ML
80 INJECTION, SUSPENSION INTRA-ARTICULAR; INTRAMUSCULAR
Qty: 16 | Refills: 0 | Status: COMPLETED | OUTPATIENT
Start: 2022-07-14

## 2022-07-14 RX ORDER — CHROMIUM 200 MCG
TABLET ORAL
Refills: 0 | Status: ACTIVE | COMMUNITY

## 2022-07-14 RX ORDER — HYDRALAZINE HYDROCHLORIDE 25 MG/1
25 TABLET ORAL
Qty: 90 | Refills: 0 | Status: DISCONTINUED | COMMUNITY
Start: 2021-06-29

## 2022-07-14 RX ORDER — NEBIVOLOL HYDROCHLORIDE 20 MG/1
TABLET ORAL
Refills: 0 | Status: ACTIVE | COMMUNITY

## 2022-07-14 RX ORDER — LIDOCAINE 5% 700 MG/1
5 PATCH TOPICAL
Qty: 60 | Refills: 0 | Status: ACTIVE | COMMUNITY
Start: 2022-01-04

## 2022-07-14 RX ORDER — COVID-19 ANTIGEN TEST
KIT MISCELLANEOUS
Qty: 6 | Refills: 0 | Status: DISCONTINUED | COMMUNITY
Start: 2022-04-29

## 2022-07-14 RX ADMIN — TRIAMCINOLONE ACETONIDE 0 MG/ML: 80 INJECTION, SUSPENSION INTRA-ARTICULAR; INTRAMUSCULAR at 00:00

## 2022-07-14 NOTE — HISTORY OF PRESENT ILLNESS
[FreeTextEntry1] : year old woman with hx of HTN, CKD, HLD, shingles, Left GTB/ left bicipital tendinitis presents for f/u of knee pain and hyperuricemia. 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  \par Pain in left knee has more or less been persistent since last visit . \par \par Fluid analysis in december 2021 showed CPPD cyrstals. \par \par \par She is currently on allopurinol 100mg daily. She is currently off colchicine. \par \par She fell and tore tendon in right shoulder and hurt left knee. She had a right shoulder CS injection from ortho while she was at rehab. \par Since last visit she had shoulder and epicondyle injection from ortho. Didn't feel epicondyle injection helped.  \par

## 2022-07-14 NOTE — ASSESSMENT
[FreeTextEntry1] : b/L knee aspiration and Kenalog 80 injections.  \par Patient tolerated well\par f/u 3 months

## 2022-07-14 NOTE — PROCEDURE
[FreeTextEntry1] : Date 7/14/22\par Right knee aspiration\par The procedure risks was discussed with the patient.\par Indications: therapeutic purposes \par #1 site identified in the right knee . Verbal consent was obtained. \par Anesthesia was with ethyl chloride.\par The patient was prepped with betadine solution. \par A 21 gauge 1.5 inch needle was used.\par 6cc of fluid aspirated. \par Injectables:80 mg Kenalog\par The patient tolerated the procedure well..\par There were no complications. Handouts/patient instructions were given to patient . patient was instructed to call if redness at site, a decrease in range of motion or an increase in pain is noted after procedure.\par #2: Identical Procedure repeated in left knee, however 20 cc of clear yellow fluid aspirated.\par \par

## 2022-07-20 LAB
B PERT IGG+IGM PNL SER: ABNORMAL
COLOR FLD: YELLOW
EOSINOPHIL # FLD MANUAL: 0 %
FLUID INTAKE SUBSTANCE CLASS: NORMAL
LYMPHOCYTES # FLD MANUAL: 37 %
MESOTHL CELL NFR FLD: 0 %
MONOS+MACROS NFR FLD MANUAL: 44 %
NEUTS SEG # FLD MANUAL: 19 %
NRBC # FLD: 0 %
RBC # FLD MANUAL: 443 /UL
SYCRY CLARITY: ABNORMAL
SYCRY COLOR: YELLOW
SYCRY ID: NORMAL
SYCRY TUBE: NORMAL
TOTAL CELLS COUNTED FLD: 39 /UL
TUBE TYPE: NORMAL
UNIDENT CELLS NFR FLD MANUAL: 0 %
VARIANT LYMPHS # FLD MANUAL: 0 %

## 2022-07-26 ENCOUNTER — APPOINTMENT (OUTPATIENT)
Dept: ULTRASOUND IMAGING | Facility: CLINIC | Age: 87
End: 2022-07-26

## 2022-07-26 ENCOUNTER — OUTPATIENT (OUTPATIENT)
Dept: OUTPATIENT SERVICES | Facility: HOSPITAL | Age: 87
LOS: 1 days | End: 2022-07-26
Payer: MEDICARE

## 2022-07-26 DIAGNOSIS — Z00.8 ENCOUNTER FOR OTHER GENERAL EXAMINATION: ICD-10-CM

## 2022-07-26 DIAGNOSIS — Z90.89 ACQUIRED ABSENCE OF OTHER ORGANS: Chronic | ICD-10-CM

## 2022-07-26 PROCEDURE — 93970 EXTREMITY STUDY: CPT | Mod: 26

## 2022-07-26 PROCEDURE — 93970 EXTREMITY STUDY: CPT

## 2022-08-01 LAB — BACTERIA FLD CULT: NORMAL

## 2022-09-08 ENCOUNTER — TRANSCRIPTION ENCOUNTER (OUTPATIENT)
Age: 87
End: 2022-09-08

## 2022-09-09 ENCOUNTER — TRANSCRIPTION ENCOUNTER (OUTPATIENT)
Age: 87
End: 2022-09-09

## 2022-09-13 ENCOUNTER — APPOINTMENT (OUTPATIENT)
Dept: RHEUMATOLOGY | Facility: CLINIC | Age: 87
End: 2022-09-13

## 2022-09-13 VITALS
DIASTOLIC BLOOD PRESSURE: 71 MMHG | SYSTOLIC BLOOD PRESSURE: 153 MMHG | WEIGHT: 160 LBS | HEART RATE: 66 BPM | TEMPERATURE: 97.7 F | BODY MASS INDEX: 32.32 KG/M2 | OXYGEN SATURATION: 96 %

## 2022-09-13 DIAGNOSIS — M79.10 MYALGIA, UNSPECIFIED SITE: ICD-10-CM

## 2022-09-13 PROCEDURE — 20552 NJX 1/MLT TRIGGER POINT 1/2: CPT

## 2022-09-13 PROCEDURE — 99213 OFFICE O/P EST LOW 20 MIN: CPT | Mod: 25

## 2022-09-13 RX ADMIN — METHYLPREDNISOLONE ACETATE 0 MG/ML: 40 INJECTION, SUSPENSION INTRA-ARTICULAR; INTRALESIONAL; INTRAMUSCULAR; SOFT TISSUE at 00:00

## 2022-09-13 NOTE — ASSESSMENT
[FreeTextEntry1] : Injected b/L medial knee fat pads today\par PAB injection not needed today in either knee\par Will likely knee IA injections next month.\par f/u for IA knee injections 10/2022\par

## 2022-09-20 ENCOUNTER — RX RENEWAL (OUTPATIENT)
Age: 87
End: 2022-09-20

## 2022-10-25 ENCOUNTER — APPOINTMENT (OUTPATIENT)
Dept: RHEUMATOLOGY | Facility: CLINIC | Age: 87
End: 2022-10-25

## 2022-10-25 VITALS
OXYGEN SATURATION: 96 % | WEIGHT: 155 LBS | TEMPERATURE: 96.3 F | HEIGHT: 59 IN | HEART RATE: 62 BPM | SYSTOLIC BLOOD PRESSURE: 140 MMHG | DIASTOLIC BLOOD PRESSURE: 70 MMHG | BODY MASS INDEX: 31.25 KG/M2

## 2022-10-25 DIAGNOSIS — Z23 ENCOUNTER FOR IMMUNIZATION: ICD-10-CM

## 2022-10-25 PROCEDURE — 20610 DRAIN/INJ JOINT/BURSA W/O US: CPT | Mod: 50

## 2022-10-25 PROCEDURE — G0008: CPT

## 2022-10-25 PROCEDURE — 90662 IIV NO PRSV INCREASED AG IM: CPT

## 2022-10-25 PROCEDURE — 99214 OFFICE O/P EST MOD 30 MIN: CPT | Mod: 25

## 2022-10-25 RX ORDER — HYDRALAZINE HYDROCHLORIDE 50 MG/1
TABLET ORAL
Refills: 0 | Status: ACTIVE | COMMUNITY

## 2022-10-25 RX ORDER — TRIAMCINOLONE ACETONIDE 80 MG/ML
80 INJECTION, SUSPENSION INTRA-ARTICULAR; INTRAMUSCULAR
Qty: 16 | Refills: 0 | Status: COMPLETED | OUTPATIENT
Start: 2022-10-25

## 2022-10-25 RX ORDER — TRIAMCINOLONE ACETONIDE 80 MG/ML
80 INJECTION, SUSPENSION INTRA-ARTICULAR; INTRAMUSCULAR
Qty: 2 | Refills: 0 | Status: COMPLETED | OUTPATIENT
Start: 2022-10-25

## 2022-10-25 RX ORDER — NYSTATIN 100000 [USP'U]/ML
100000 SUSPENSION ORAL
Qty: 473 | Refills: 0 | Status: DISCONTINUED | COMMUNITY
Start: 2022-05-02

## 2022-10-25 RX ADMIN — TRIAMCINOLONE ACETONIDE 0 MG/ML: 80 INJECTION, SUSPENSION INTRA-ARTICULAR; INTRAMUSCULAR at 00:00

## 2022-10-25 NOTE — HISTORY OF PRESENT ILLNESS
[FreeTextEntry1] : Patient with OA, Pseudogout, presents for f/u\par \par \par She has  pain in b/L knees.  9-10/10 in intensity.  Has been using ACE bandage for knee.  Thinks it helps.  \par Has not been as swollen as it has been previously. \par \par They have a wedding they plan to attend soon and would like to have CS shots to both knees.  \par They feel that even though there isn't total relief, they feel the CS injection to knees are helpful and worth it.  \par \par Also requesting flu shot.  \par

## 2022-10-25 NOTE — ASSESSMENT
[FreeTextEntry1] : b/L knee aspiration and Kenalog 80 injections 10/25/22\par Patient tolerated well\par \par Flu shot given today 10/25/22\par \par f/u 4 months. \par \par

## 2022-10-25 NOTE — PROCEDURE
[FreeTextEntry1] : Date 10/25/22\par Right knee aspiration\par The procedure risks was discussed with the patient.\par Indications: therapeutic purposes \par #1 site identified in the right knee . Verbal consent was obtained. \par Anesthesia was with ethyl chloride.\par The patient was prepped with betadine solution. \par A 21 gauge 1.5 inch needle was used.\par 0cc of fluid aspirated. \par Injectables:80 mg Kenalog\par The patient tolerated the procedure well..\par There were no complications. Handouts/patient instructions were given to patient . patient was instructed to call if redness at site, a decrease in range of motion or an increase in pain is noted after procedure.\par #2: Identical Procedure repeated in left knee, however 5 cc of clear yellow fluid aspirated.\par \par #3\par Fluzone 0.7mL injected to left upper arm after alcohol prep. Patient tolerated injection well.\par \par \par  \par

## 2022-11-30 ENCOUNTER — NON-APPOINTMENT (OUTPATIENT)
Age: 87
End: 2022-11-30

## 2022-11-30 DIAGNOSIS — S32.810A MULTIPLE FRACTURES OF PELVIS WITH STABLE DISRUPTION OF PELVIC RING, INITIAL ENCOUNTER FOR CLOSED FRACTURE: ICD-10-CM

## 2022-11-30 DIAGNOSIS — Z87.891 PERSONAL HISTORY OF NICOTINE DEPENDENCE: ICD-10-CM

## 2022-11-30 DIAGNOSIS — M77.11 LATERAL EPICONDYLITIS, RIGHT ELBOW: ICD-10-CM

## 2022-11-30 DIAGNOSIS — M25.562 PAIN IN LEFT KNEE: ICD-10-CM

## 2022-11-30 DIAGNOSIS — Z87.448 PERSONAL HISTORY OF OTHER DISEASES OF URINARY SYSTEM: ICD-10-CM

## 2022-11-30 DIAGNOSIS — M25.511 PAIN IN RIGHT SHOULDER: ICD-10-CM

## 2022-11-30 DIAGNOSIS — M75.121 COMPLETE ROTATOR CUFF TEAR OR RUPTURE OF RIGHT SHOULDER, NOT SPECIFIED AS TRAUMATIC: ICD-10-CM

## 2022-11-30 DIAGNOSIS — M25.521 PAIN IN RIGHT ELBOW: ICD-10-CM

## 2022-11-30 RX ORDER — ROSUVASTATIN CALCIUM 5 MG/1
5 TABLET, FILM COATED ORAL DAILY
Refills: 0 | Status: ACTIVE | COMMUNITY

## 2022-11-30 RX ORDER — HYDRALAZINE HYDROCHLORIDE 25 MG/1
25 TABLET ORAL
Refills: 0 | Status: ACTIVE | COMMUNITY

## 2022-11-30 RX ORDER — NEBIVOLOL HYDROCHLORIDE 10 MG/1
10 TABLET ORAL DAILY
Refills: 0 | Status: ACTIVE | COMMUNITY

## 2023-01-07 ENCOUNTER — INPATIENT (INPATIENT)
Facility: HOSPITAL | Age: 88
LOS: 3 days | Discharge: HOME HEALTH | DRG: 871 | End: 2023-01-11
Attending: INTERNAL MEDICINE
Payer: MEDICARE

## 2023-01-07 VITALS
RESPIRATION RATE: 18 BRPM | HEART RATE: 77 BPM | DIASTOLIC BLOOD PRESSURE: 58 MMHG | WEIGHT: 145.06 LBS | OXYGEN SATURATION: 88 % | TEMPERATURE: 100 F | SYSTOLIC BLOOD PRESSURE: 145 MMHG | HEIGHT: 63 IN

## 2023-01-07 DIAGNOSIS — A41.9 SEPSIS, UNSPECIFIED ORGANISM: ICD-10-CM

## 2023-01-07 DIAGNOSIS — Z90.89 ACQUIRED ABSENCE OF OTHER ORGANS: Chronic | ICD-10-CM

## 2023-01-07 LAB
ALBUMIN SERPL ELPH-MCNC: 3.7 G/DL — SIGNIFICANT CHANGE UP (ref 3.3–5)
ALP SERPL-CCNC: 83 U/L — SIGNIFICANT CHANGE UP (ref 40–120)
ALT FLD-CCNC: 72 U/L — SIGNIFICANT CHANGE UP (ref 12–78)
ANION GAP SERPL CALC-SCNC: 6 MMOL/L — SIGNIFICANT CHANGE UP (ref 5–17)
APPEARANCE UR: ABNORMAL
APTT BLD: 26.8 SEC — LOW (ref 27.5–35.5)
AST SERPL-CCNC: 96 U/L — HIGH (ref 15–37)
BACTERIA # UR AUTO: ABNORMAL
BASOPHILS # BLD AUTO: 0.01 K/UL — SIGNIFICANT CHANGE UP (ref 0–0.2)
BASOPHILS NFR BLD AUTO: 0.1 % — SIGNIFICANT CHANGE UP (ref 0–2)
BILIRUB SERPL-MCNC: 0.6 MG/DL — SIGNIFICANT CHANGE UP (ref 0.2–1.2)
BILIRUB UR-MCNC: NEGATIVE — SIGNIFICANT CHANGE UP
BUN SERPL-MCNC: 40 MG/DL — HIGH (ref 7–23)
CALCIUM SERPL-MCNC: 9.6 MG/DL — SIGNIFICANT CHANGE UP (ref 8.5–10.1)
CHLORIDE SERPL-SCNC: 109 MMOL/L — HIGH (ref 96–108)
CO2 BLDV-SCNC: 27 MMOL/L — HIGH (ref 22–26)
CO2 SERPL-SCNC: 24 MMOL/L — SIGNIFICANT CHANGE UP (ref 22–31)
COLOR SPEC: YELLOW — SIGNIFICANT CHANGE UP
COMMENT - URINE: SIGNIFICANT CHANGE UP
CREAT SERPL-MCNC: 1.79 MG/DL — HIGH (ref 0.5–1.3)
DIFF PNL FLD: ABNORMAL
EGFR: 26 ML/MIN/1.73M2 — LOW
EOSINOPHIL # BLD AUTO: 0 K/UL — SIGNIFICANT CHANGE UP (ref 0–0.5)
EOSINOPHIL NFR BLD AUTO: 0 % — SIGNIFICANT CHANGE UP (ref 0–6)
EPI CELLS # UR: SIGNIFICANT CHANGE UP
GAS PNL BLDV: SIGNIFICANT CHANGE UP
GLUCOSE SERPL-MCNC: 168 MG/DL — HIGH (ref 70–99)
GLUCOSE UR QL: NEGATIVE — SIGNIFICANT CHANGE UP
HCO3 BLDV-SCNC: 25 MMOL/L — SIGNIFICANT CHANGE UP (ref 22–29)
HCT VFR BLD CALC: 34.3 % — LOW (ref 34.5–45)
HGB BLD-MCNC: 11.2 G/DL — LOW (ref 11.5–15.5)
IMM GRANULOCYTES NFR BLD AUTO: 0.8 % — SIGNIFICANT CHANGE UP (ref 0–0.9)
INR BLD: 1.03 RATIO — SIGNIFICANT CHANGE UP (ref 0.88–1.16)
KETONES UR-MCNC: NEGATIVE — SIGNIFICANT CHANGE UP
LACTATE SERPL-SCNC: 1.5 MMOL/L — SIGNIFICANT CHANGE UP (ref 0.7–2)
LACTATE SERPL-SCNC: 1.9 MMOL/L — SIGNIFICANT CHANGE UP (ref 0.7–2)
LACTATE SERPL-SCNC: 2.6 MMOL/L — HIGH (ref 0.7–2)
LEUKOCYTE ESTERASE UR-ACNC: ABNORMAL
LYMPHOCYTES # BLD AUTO: 0.58 K/UL — LOW (ref 1–3.3)
LYMPHOCYTES # BLD AUTO: 5.1 % — LOW (ref 13–44)
MCHC RBC-ENTMCNC: 30.9 PG — SIGNIFICANT CHANGE UP (ref 27–34)
MCHC RBC-ENTMCNC: 32.7 GM/DL — SIGNIFICANT CHANGE UP (ref 32–36)
MCV RBC AUTO: 94.5 FL — SIGNIFICANT CHANGE UP (ref 80–100)
MONOCYTES # BLD AUTO: 0.94 K/UL — HIGH (ref 0–0.9)
MONOCYTES NFR BLD AUTO: 8.3 % — SIGNIFICANT CHANGE UP (ref 2–14)
NEUTROPHILS # BLD AUTO: 9.7 K/UL — HIGH (ref 1.8–7.4)
NEUTROPHILS NFR BLD AUTO: 85.7 % — HIGH (ref 43–77)
NITRITE UR-MCNC: POSITIVE
PCO2 BLDV: 43 MMHG — HIGH (ref 39–42)
PH BLDV: 7.38 — SIGNIFICANT CHANGE UP (ref 7.32–7.43)
PH UR: 6 — SIGNIFICANT CHANGE UP (ref 5–8)
PLATELET # BLD AUTO: 91 K/UL — LOW (ref 150–400)
PO2 BLDV: 40 MMHG — SIGNIFICANT CHANGE UP
POTASSIUM SERPL-MCNC: 4.3 MMOL/L — SIGNIFICANT CHANGE UP (ref 3.5–5.3)
POTASSIUM SERPL-SCNC: 4.3 MMOL/L — SIGNIFICANT CHANGE UP (ref 3.5–5.3)
PROT SERPL-MCNC: 7.6 GM/DL — SIGNIFICANT CHANGE UP (ref 6–8.3)
PROT UR-MCNC: NEGATIVE — SIGNIFICANT CHANGE UP
PROTHROM AB SERPL-ACNC: 12 SEC — SIGNIFICANT CHANGE UP (ref 10.5–13.4)
RAPID RVP RESULT: SIGNIFICANT CHANGE UP
RBC # BLD: 3.63 M/UL — LOW (ref 3.8–5.2)
RBC # FLD: 15.9 % — HIGH (ref 10.3–14.5)
RBC CASTS # UR COMP ASSIST: SIGNIFICANT CHANGE UP /HPF (ref 0–4)
SAO2 % BLDV: 73.6 % — SIGNIFICANT CHANGE UP
SARS-COV-2 RNA SPEC QL NAA+PROBE: SIGNIFICANT CHANGE UP
SODIUM SERPL-SCNC: 139 MMOL/L — SIGNIFICANT CHANGE UP (ref 135–145)
SP GR SPEC: 1.01 — SIGNIFICANT CHANGE UP (ref 1.01–1.02)
TROPONIN I, HIGH SENSITIVITY RESULT: 11.72 NG/L — SIGNIFICANT CHANGE UP
TROPONIN I, HIGH SENSITIVITY RESULT: 18.71 NG/L — SIGNIFICANT CHANGE UP
UROBILINOGEN FLD QL: NEGATIVE — SIGNIFICANT CHANGE UP
WBC # BLD: 11.32 K/UL — HIGH (ref 3.8–10.5)
WBC # FLD AUTO: 11.32 K/UL — HIGH (ref 3.8–10.5)
WBC UR QL: ABNORMAL /HPF (ref 0–5)

## 2023-01-07 PROCEDURE — 84484 ASSAY OF TROPONIN QUANT: CPT

## 2023-01-07 PROCEDURE — 99223 1ST HOSP IP/OBS HIGH 75: CPT

## 2023-01-07 PROCEDURE — 87186 SC STD MICRODIL/AGAR DIL: CPT

## 2023-01-07 PROCEDURE — 99285 EMERGENCY DEPT VISIT HI MDM: CPT

## 2023-01-07 PROCEDURE — 81001 URINALYSIS AUTO W/SCOPE: CPT

## 2023-01-07 PROCEDURE — 36415 COLL VENOUS BLD VENIPUNCTURE: CPT

## 2023-01-07 PROCEDURE — 71045 X-RAY EXAM CHEST 1 VIEW: CPT | Mod: 26

## 2023-01-07 PROCEDURE — 97163 PT EVAL HIGH COMPLEX 45 MIN: CPT | Mod: GP

## 2023-01-07 PROCEDURE — 87086 URINE CULTURE/COLONY COUNT: CPT

## 2023-01-07 PROCEDURE — 93010 ELECTROCARDIOGRAM REPORT: CPT | Mod: 76

## 2023-01-07 PROCEDURE — 97116 GAIT TRAINING THERAPY: CPT | Mod: GP

## 2023-01-07 PROCEDURE — 80053 COMPREHEN METABOLIC PANEL: CPT

## 2023-01-07 PROCEDURE — 93005 ELECTROCARDIOGRAM TRACING: CPT

## 2023-01-07 PROCEDURE — 83605 ASSAY OF LACTIC ACID: CPT

## 2023-01-07 PROCEDURE — 97530 THERAPEUTIC ACTIVITIES: CPT | Mod: GP

## 2023-01-07 PROCEDURE — 83880 ASSAY OF NATRIURETIC PEPTIDE: CPT

## 2023-01-07 PROCEDURE — 80048 BASIC METABOLIC PNL TOTAL CA: CPT

## 2023-01-07 PROCEDURE — 85025 COMPLETE CBC W/AUTO DIFF WBC: CPT

## 2023-01-07 PROCEDURE — 85027 COMPLETE CBC AUTOMATED: CPT

## 2023-01-07 RX ORDER — UBIDECARENONE 100 MG
1 CAPSULE ORAL
Qty: 0 | Refills: 0 | DISCHARGE

## 2023-01-07 RX ORDER — HYDRALAZINE HCL 50 MG
12.5 TABLET ORAL
Refills: 0 | Status: DISCONTINUED | OUTPATIENT
Start: 2023-01-07 | End: 2023-01-07

## 2023-01-07 RX ORDER — SODIUM CHLORIDE 9 MG/ML
1000 INJECTION INTRAMUSCULAR; INTRAVENOUS; SUBCUTANEOUS
Refills: 0 | Status: DISCONTINUED | OUTPATIENT
Start: 2023-01-07 | End: 2023-01-08

## 2023-01-07 RX ORDER — ALLOPURINOL 300 MG
100 TABLET ORAL DAILY
Refills: 0 | Status: DISCONTINUED | OUTPATIENT
Start: 2023-01-07 | End: 2023-01-11

## 2023-01-07 RX ORDER — SODIUM CHLORIDE 9 MG/ML
500 INJECTION, SOLUTION INTRAVENOUS ONCE
Refills: 0 | Status: DISCONTINUED | OUTPATIENT
Start: 2023-01-07 | End: 2023-01-07

## 2023-01-07 RX ORDER — SODIUM CHLORIDE 9 MG/ML
1000 INJECTION, SOLUTION INTRAVENOUS
Refills: 0 | Status: DISCONTINUED | OUTPATIENT
Start: 2023-01-07 | End: 2023-01-07

## 2023-01-07 RX ORDER — ATORVASTATIN CALCIUM 80 MG/1
20 TABLET, FILM COATED ORAL AT BEDTIME
Refills: 0 | Status: DISCONTINUED | OUTPATIENT
Start: 2023-01-07 | End: 2023-01-11

## 2023-01-07 RX ORDER — VANCOMYCIN HCL 1 G
1000 VIAL (EA) INTRAVENOUS ONCE
Refills: 0 | Status: COMPLETED | OUTPATIENT
Start: 2023-01-07 | End: 2023-01-07

## 2023-01-07 RX ORDER — FUROSEMIDE 40 MG
20 TABLET ORAL DAILY
Refills: 0 | Status: DISCONTINUED | OUTPATIENT
Start: 2023-01-07 | End: 2023-01-07

## 2023-01-07 RX ORDER — ACETAMINOPHEN 500 MG
325 TABLET ORAL ONCE
Refills: 0 | Status: COMPLETED | OUTPATIENT
Start: 2023-01-07 | End: 2023-01-07

## 2023-01-07 RX ORDER — SODIUM CHLORIDE 9 MG/ML
2000 INJECTION, SOLUTION INTRAVENOUS ONCE
Refills: 0 | Status: COMPLETED | OUTPATIENT
Start: 2023-01-07 | End: 2023-01-07

## 2023-01-07 RX ORDER — PIPERACILLIN AND TAZOBACTAM 4; .5 G/20ML; G/20ML
3.38 INJECTION, POWDER, LYOPHILIZED, FOR SOLUTION INTRAVENOUS ONCE
Refills: 0 | Status: COMPLETED | OUTPATIENT
Start: 2023-01-07 | End: 2023-01-07

## 2023-01-07 RX ORDER — GABAPENTIN 400 MG/1
300 CAPSULE ORAL
Refills: 0 | Status: DISCONTINUED | OUTPATIENT
Start: 2023-01-07 | End: 2023-01-11

## 2023-01-07 RX ORDER — ACETAMINOPHEN 500 MG
650 TABLET ORAL ONCE
Refills: 0 | Status: COMPLETED | OUTPATIENT
Start: 2023-01-07 | End: 2023-01-07

## 2023-01-07 RX ORDER — HYDRALAZINE HCL 50 MG
1 TABLET ORAL
Qty: 0 | Refills: 0 | DISCHARGE

## 2023-01-07 RX ORDER — SODIUM CHLORIDE 9 MG/ML
500 INJECTION INTRAMUSCULAR; INTRAVENOUS; SUBCUTANEOUS ONCE
Refills: 0 | Status: COMPLETED | OUTPATIENT
Start: 2023-01-07 | End: 2023-01-07

## 2023-01-07 RX ORDER — ACETAMINOPHEN 500 MG
650 TABLET ORAL EVERY 6 HOURS
Refills: 0 | Status: DISCONTINUED | OUTPATIENT
Start: 2023-01-07 | End: 2023-01-11

## 2023-01-07 RX ORDER — PIPERACILLIN AND TAZOBACTAM 4; .5 G/20ML; G/20ML
3.38 INJECTION, POWDER, LYOPHILIZED, FOR SOLUTION INTRAVENOUS EVERY 8 HOURS
Refills: 0 | Status: DISCONTINUED | OUTPATIENT
Start: 2023-01-07 | End: 2023-01-11

## 2023-01-07 RX ORDER — ONDANSETRON 8 MG/1
4 TABLET, FILM COATED ORAL EVERY 8 HOURS
Refills: 0 | Status: DISCONTINUED | OUTPATIENT
Start: 2023-01-07 | End: 2023-01-11

## 2023-01-07 RX ADMIN — Medication 650 MILLIGRAM(S): at 06:53

## 2023-01-07 RX ADMIN — PIPERACILLIN AND TAZOBACTAM 25 GRAM(S): 4; .5 INJECTION, POWDER, LYOPHILIZED, FOR SOLUTION INTRAVENOUS at 21:49

## 2023-01-07 RX ADMIN — Medication 250 MILLIGRAM(S): at 07:02

## 2023-01-07 RX ADMIN — Medication 1000 MILLIGRAM(S): at 08:02

## 2023-01-07 RX ADMIN — GABAPENTIN 300 MILLIGRAM(S): 400 CAPSULE ORAL at 12:20

## 2023-01-07 RX ADMIN — SODIUM CHLORIDE 40 MILLILITER(S): 9 INJECTION, SOLUTION INTRAVENOUS at 11:04

## 2023-01-07 RX ADMIN — SODIUM CHLORIDE 2000 MILLILITER(S): 9 INJECTION, SOLUTION INTRAVENOUS at 08:14

## 2023-01-07 RX ADMIN — PIPERACILLIN AND TAZOBACTAM 3.38 GRAM(S): 4; .5 INJECTION, POWDER, LYOPHILIZED, FOR SOLUTION INTRAVENOUS at 06:47

## 2023-01-07 RX ADMIN — SODIUM CHLORIDE 50 MILLILITER(S): 9 INJECTION INTRAMUSCULAR; INTRAVENOUS; SUBCUTANEOUS at 12:11

## 2023-01-07 RX ADMIN — PIPERACILLIN AND TAZOBACTAM 200 GRAM(S): 4; .5 INJECTION, POWDER, LYOPHILIZED, FOR SOLUTION INTRAVENOUS at 06:17

## 2023-01-07 RX ADMIN — Medication 325 MILLIGRAM(S): at 09:49

## 2023-01-07 RX ADMIN — Medication 100 MILLIGRAM(S): at 12:19

## 2023-01-07 RX ADMIN — Medication 650 MILLIGRAM(S): at 07:23

## 2023-01-07 RX ADMIN — PIPERACILLIN AND TAZOBACTAM 25 GRAM(S): 4; .5 INJECTION, POWDER, LYOPHILIZED, FOR SOLUTION INTRAVENOUS at 13:14

## 2023-01-07 RX ADMIN — SODIUM CHLORIDE 1000 MILLILITER(S): 9 INJECTION INTRAMUSCULAR; INTRAVENOUS; SUBCUTANEOUS at 11:49

## 2023-01-07 RX ADMIN — Medication 325 MILLIGRAM(S): at 12:11

## 2023-01-07 NOTE — CONSULT NOTE ADULT - SUBJECTIVE AND OBJECTIVE BOX
Patient is a 89y old  Female who presents with a chief complaint of Chills, fever, confusion (2023 11:52)    HPI:  89 y/o F with PMH of COPD (not on home O2), HTN, HLD, CKD, aortic stenosis admitted on  for evaluation of urinary complaints that began about one week ago for which a culture was sent and she started macrobid on the day of admission; the family noted that the patient was more confused and holding onto her chest and was hypoxic; history per medical record and family at the bedside.         PMH: as above  PSH: as above  Meds: per reconciliation sheet, noted below  MEDICATIONS  (STANDING):  allopurinol 100 milliGRAM(s) Oral daily  atorvastatin 20 milliGRAM(s) Oral at bedtime  gabapentin 300 milliGRAM(s) Oral two times a day  piperacillin/tazobactam IVPB.. 3.375 Gram(s) IV Intermittent every 8 hours  sodium chloride 0.9%. 1000 milliLiter(s) (50 mL/Hr) IV Continuous <Continuous>    MEDICATIONS  (PRN):  acetaminophen     Tablet .. 650 milliGRAM(s) Oral every 6 hours PRN Temp greater or equal to 38C (100.4F), Mild Pain (1 - 3)  aluminum hydroxide/magnesium hydroxide/simethicone Suspension 30 milliLiter(s) Oral every 4 hours PRN Dyspepsia  ondansetron Injectable 4 milliGRAM(s) IV Push every 8 hours PRN Nausea and/or Vomiting    Allergies    Ceftin (Hives; Rash)    Intolerances      Social: no smoking, no alcohol, no illegal drugs; no recent travel, no exposure to TB  FAMILY HISTORY: unable to obtain secondary to patient medical condition      ROS unable to obtain secondary to patient medical condition     Vital Signs Last 24 Hrs  T(C): 36.7 (2023 10:40), Max: 39.2 (2023 08:45)  T(F): 98.1 (2023 10:40), Max: 102.6 (2023 08:45)  HR: 74 (2023 12:54) (67 - 77)  BP: 119/37 (2023 12:54) (90/32 - 145/58)  BP(mean): 58 (2023 09:45) (58 - 58)  RR: 18 (2023 10:40) (18 - 18)  SpO2: 92% (2023 10:40) (88% - 94%)    Parameters below as of 2023 10:40  Patient On (Oxygen Delivery Method): nasal cannula      Daily Height in cm: 160.02 (2023 04:50)    Daily     PE:    Constitutional: frail looking  HEENT: NC/AT, EOMI, PERRLA, conjunctivae clear; ears and nose atraumatic; pharynx clear  Neck: supple; thyroid not palpable  Back: no tenderness  Respiratory: respiratory effort normal; clear to auscultation with scattered coarse breath sounds  Cardiovascular: S1S2 regular, no murmurs  Abdomen: soft, not tender, not distended, positive BS; no liver or spleen organomegaly  Genitourinary: no suprapubic tenderness  Musculoskeletal: no muscle tenderness, no joint swelling or tenderness  Neurological/ Psychiatric: AxOx3, judgement and insight normal;  moving all extremities  Skin: no rashes; no palpable lesions    Labs: all available labs reviewed                        11.2   11.32 )-----------( 91       ( 2023 05:42 )             34.3     01-07    139  |  109<H>  |  40<H>  ----------------------------<  168<H>  4.3   |  24  |  1.79<H>    Ca    9.6      2023 05:42    TPro  7.6  /  Alb  3.7  /  TBili  0.6  /  DBili  x   /  AST  96<H>  /  ALT  72  /  AlkPhos  83  01-07     LIVER FUNCTIONS - ( 2023 05:42 )  Alb: 3.7 g/dL / Pro: 7.6 gm/dL / ALK PHOS: 83 U/L / ALT: 72 U/L / AST: 96 U/L / GGT: x           Urinalysis Basic - ( 2023 09:40 )    Color: Yellow / Appearance: very cloudy / S.010 / pH: x  Gluc: x / Ketone: Negative  / Bili: Negative / Urobili: Negative   Blood: x / Protein: Negative / Nitrite: Positive   Leuk Esterase: Moderate / RBC: 0-2 /HPF / WBC 26-50 /HPF   Sq Epi: x / Non Sq Epi: Occasional / Bacteria: Few          Radiology: all available radiological tests reviewed    Advanced directives addressed: full resuscitation

## 2023-01-07 NOTE — PROVIDER CONTACT NOTE (OTHER) - BACKGROUND
Informed IGNACIO Lora who ordered straight cath once. Order followed and I will continue to monitor pt.

## 2023-01-07 NOTE — PATIENT PROFILE ADULT - FUNCTIONAL ASSESSMENT - BASIC MOBILITY 6.
2-calculated by average/Not able to assess (calculate score using [facility] averaging method)

## 2023-01-07 NOTE — H&P ADULT - NSHPREVIEWOFSYSTEMS_GEN_ALL_CORE
Review of Systems:  CONSTITUTIONAL: No weakness, fevers ; Positive chills  EYES/ENT: No visual changes;  No vertigo or throat pain   NECK: No pain or stiffness  RESPIRATORY: No cough, wheezing, hemoptysis; No shortness of breath,   CARDIOVASCULAR: No  palpitations; Initial chest pressure now resolved  GASTROINTESTINAL: No abdominal or epigastric pain. No nausea, vomiting, or hematemesis; No diarrhea or constipation.   GENITOURINARY: Currently no dysuria, frequency or hematuria  NEUROLOGICAL: No numbness or weakness  SKIN: No itching, burning, rashes, or lesions   All other review of systems is negative unless indicated above

## 2023-01-07 NOTE — ED PROVIDER NOTE - PHYSICAL EXAMINATION
***GEN - NAD; well appearing; A+O x3 ***HEAD - NC/AT ***EYES/NOSE - PERRL, EOMI, mucous membranes moist, no discharge ***THROAT: Oral cavity and pharynx normal. No inflammation, swelling, exudate, or lesions.  ***NECK: Neck supple, non-tender without lymphadenopathy, no masses, no thyromegaly.   ***PULMONARY - CTA b/l, symmetric breath sounds. ***CARDIAC -s1s2, RRR, + systolic murmur, no,G,R  ***ABDOMEN - +BS, ND, NT, soft, no guarding, no rebound, no masses   ***BACK - no CVA tenderness, Normal  spine ***EXTREMITIES - symmetric pulses, 2+ dp, capillary refill < 2 seconds, no clubbing, no cyanosis, no edema ***SKIN - no rash or bruising   ***NEUROLOGIC - alert, CN 2-12 intact

## 2023-01-07 NOTE — CONSULT NOTE ADULT - ASSESSMENT
89 y/o F with PMH of COPD (not on home O2), HTN, HLD, CKD, aortic stenosis admitted on 1/7 for evaluation of urinary complaints that began about one week ago for which a culture was sent and she started macrobid on the day of admission; the family noted that the patient was more confused and holding onto her chest and was hypoxic; history per medical record and family at the bedside.     1. Patient admitted with urinary tract infection, change in mental status and pneumonia; also noted with leukocytosis most likely reactive to infection  - follow up cultures   - serial cbc and monitor temperature   - oxygen and nebs as needed   - iv hydration and supportive care   - reviewed prior medical records to evaluate for resistant or atypical pathogens   - will continue zosyn as ordered as this will cover lungs and urine  - hold on further vancomycin to avoid potential nephrotoxicity  2. other issues; per medicine

## 2023-01-07 NOTE — ED PROVIDER NOTE - OBJECTIVE STATEMENT
89-year-old female with history of COPD not on home O2, hypertension, hyperlipidemia, aortic stenosis presents with fever and rigors with chills and episode of difficulty breathing.  Patient has been having dysuria since Monday.  Send urine culture by her doctor and was called today to let her know she has urinary tract infection.  She was prescribed Macrobid and took her first dose last night.  Last night patient's daughter states she had an episode of shaking chills and difficulty breathing.  She looked very pale at the time but noted her oxygen level to be in the 80s..  She also had clutching chest pain at the time.  Patient currently states she is feeling much better than she did earlier.  No cough or fever.  Denies back pain.

## 2023-01-07 NOTE — CONSULT NOTE ADULT - SUBJECTIVE AND OBJECTIVE BOX
CCU Consult Note    HPI:    S:    Pt seen and examined  HD # 1  FULL CODE  89F  PMHx of COPD (not on home O2), HTN, HLD, CKD, aortic stenosis presented to the ED with c/o fever, chills and episode of chest pressure. History provided by daughter. Reports that pt began to have mild dysuria and urinary urgency last Friday. Pt went to [facility] to submit urine sample on Tuesday and was notified of a positive urine culture (E coli, pan sens). Pt was started on macrobid by her PCP and took her first dose last night. Daughter states pt is always cold but had notably increased chills during the afternoon. She noted that the pt clutched her chest and was c/o pressure. Pt was having notable confusion as per daughter. Her O2 was reportedly in the 80s. Denies any cough, fever, abdominal pain, N/V/D. Daughter states that pt is still not at her complete baseline.    1/7 AM: Hypotension on floor, IVF running, responding with improvement in mental status. BP improved.     ROS: + confusion, weakness  Remainder of system reviewed, negative    Allergies    Ceftin (Hives; Rash)    Intolerances    MEDICATIONS  (STANDING):    allopurinol 100 milliGRAM(s) Oral daily  atorvastatin 20 milliGRAM(s) Oral at bedtime  gabapentin 300 milliGRAM(s) Oral two times a day  piperacillin/tazobactam IVPB.. 3.375 Gram(s) IV Intermittent every 8 hours  sodium chloride 0.9%. 1000 milliLiter(s) (50 mL/Hr) IV Continuous <Continuous>    MEDICATIONS  (PRN):    acetaminophen     Tablet .. 650 milliGRAM(s) Oral every 6 hours PRN Temp greater or equal to 38C (100.4F), Mild Pain (1 - 3)  aluminum hydroxide/magnesium hydroxide/simethicone Suspension 30 milliLiter(s) Oral every 4 hours PRN Dyspepsia  ondansetron Injectable 4 milliGRAM(s) IV Push every 8 hours PRN Nausea and/or Vomiting    Drug Dosing Weight    Height (cm): 160 (2023 04:50)  Weight (kg): 65.8 (2023 04:50)  BMI (kg/m2): 25.7 (2023 04:50)  BSA (m2): 1.69 (2023 04:50)    PAST MEDICAL & SURGICAL HISTORY:    HTN (hypertension)  HLD (hyperlipidemia)  Gout  Osteoarthritis  History of tonsillectomy  in childhood    FAMILY HISTORY:    ROS: See HPI; otherwise, all systems reviewed and negative.    O:    ICU Vital Signs Last 24 Hrs  T(C): 36.7 (2023 10:40), Max: 39.2 (2023 08:45)  T(F): 98.1 (2023 10:40), Max: 102.6 (2023 08:45)  HR: 67 (2023 10:40) (67 - 77)  BP: 90/32 (2023 10:40) (90/32 - 145/58)  BP(mean): 58 (2023 09:45) (58 - 58)  ABP: --  ABP(mean): --  RR: 18 (2023 10:40) (18 - 18)  SpO2: 92% (2023 10:40) (88% - 94%)    O2 Parameters below as of 2023 10:40  Patient On (Oxygen Delivery Method): nasal cannula    I&O's Detail    PE:    Elderly F lying in bed; appears younger then stated age  No JVD trachea midline  S1S2+  CTA B/L  Abd soft NTND  1+ edema B/L LE  Awake and alert  Skin pink and warm    LABS:    CBC Full  -  ( 2023 05:42 )  WBC Count : 11.32 K/uL  RBC Count : 3.63 M/uL  Hemoglobin : 11.2 g/dL  Hematocrit : 34.3 %  Platelet Count - Automated : 91 K/uL  Mean Cell Volume : 94.5 fl  Mean Cell Hemoglobin : 30.9 pg  Mean Cell Hemoglobin Concentration : 32.7 gm/dL  Auto Neutrophil # : 9.70 K/uL  Auto Lymphocyte # : 0.58 K/uL  Auto Monocyte # : 0.94 K/uL  Auto Eosinophil # : 0.00 K/uL  Auto Basophil # : 0.01 K/uL  Auto Neutrophil % : 85.7 %  Auto Lymphocyte % : 5.1 %  Auto Monocyte % : 8.3 %  Auto Eosinophil % : 0.0 %  Auto Basophil % : 0.1 %    -07    139  |  109<H>  |  40<H>  ----------------------------<  168<H>  4.3   |  24  |  1.79<H>    Ca    9.6      2023 05:42    TPro  7.6  /  Alb  3.7  /  TBili  0.6  /  DBili  x   /  AST  96<H>  /  ALT  72  /  AlkPhos  83  01-07    PT/INR - ( 2023 05:42 )   PT: 12.0 sec;   INR: 1.03 ratio         PTT - ( 2023 05:42 )  PTT:26.8 sec  Urinalysis Basic - ( 2023 09:40 )    Color: Yellow / Appearance: very cloudy / S.010 / pH: x  Gluc: x / Ketone: Negative  / Bili: Negative / Urobili: Negative   Blood: x / Protein: Negative / Nitrite: Positive   Leuk Esterase: Moderate / RBC: 0-2 /HPF / WBC 26-50 /HPF   Sq Epi: x / Non Sq Epi: Occasional / Bacteria: Few      CAPILLARY BLOOD GLUCOSE            LIVER FUNCTIONS - ( 2023 05:42 )  Alb: 3.7 g/dL / Pro: 7.6 gm/dL / ALK PHOS: 83 U/L / ALT: 72 U/L / AST: 96 U/L / GGT: x

## 2023-01-07 NOTE — H&P ADULT - ASSESSMENT
89 y/o F with PMH of COPD (not on home O2), HTN, HLD, CKD, aortic stenosis presented to the ED with c/o fever, chills and episode of chest pressure. Currently no chest pain/dyspnea. Febrile.    #) Sepsis 2/2 UTI & PNA: Previous Ucx reviewed with pt's daughter (E coli, pan sens) from 1/3. Received 1 dose of Macrobid at home. Currently on Vanco/Zosyn started in ED. Single CXR with LLL infiltrate. Pt received 2L of IVF in ED (30 cc/kg). WBC 11.32, lactate 1.5 -> 1.9. RVP negative.  - Admit to Medicine  - VS q4h  - IVF  - Hold Furosemide  - Continue IV Zosyn (tolerating without rxn)  - BNP  - ID consult  - Blood cx  - Urine Cx  - Tylenol prn fever  - AM labs    #) Thrombocytopenia, normocytic anemia: PLT 91k, Hgb 11.2  - CBC in AM    #) HTN  - Continue Bystolic & hydralazine, hold parameters    #) CKD: Last available baseline Cr 1.93  - Avoid nephrotoxic medications, NSAIDs    #) Hx of COPD: No current dyspnea or productive cough; Not on home maintenance therapy  - Albuterol/ipratropium prn wheezing/sob    #) VTE ppx:  - Pneumatic compression

## 2023-01-07 NOTE — PATIENT PROFILE ADULT - FALL HARM RISK - HARM RISK INTERVENTIONS
Assistance with ambulation/Assistance OOB with selected safe patient handling equipment/Communicate Risk of Fall with Harm to all staff/Discuss with provider need for PT consult/Monitor for mental status changes/Monitor gait and stability/Provide patient with walking aids - walker, cane, crutches/Reinforce activity limits and safety measures with patient and family/Reorient to person, place and time as needed/Tailored Fall Risk Interventions/Use of alarms - bed, chair and/or voice tab/Visual Cue: Yellow wristband and red socks/Bed in lowest position, wheels locked, appropriate side rails in place/Call bell, personal items and telephone in reach/Instruct patient to call for assistance before getting out of bed or chair/Non-slip footwear when patient is out of bed/Satin to call system/Physically safe environment - no spills, clutter or unnecessary equipment/Purposeful Proactive Rounding/Room/bathroom lighting operational, light cord in reach

## 2023-01-07 NOTE — H&P ADULT - NSHPPHYSICALEXAM_GEN_ALL_CORE
Vital Signs Last 24 Hrs  T(C): 38.3 (07 Jan 2023 09:45), Max: 39.2 (07 Jan 2023 08:45)  T(F): 100.9 (07 Jan 2023 09:45), Max: 102.6 (07 Jan 2023 08:45)  HR: 67 (07 Jan 2023 09:45) (67 - 77)  BP: 118/37 (07 Jan 2023 09:45) (118/37 - 145/58)  BP(mean): 58 (07 Jan 2023 09:45) (58 - 58)  RR: 18 (07 Jan 2023 09:45) (18 - 18)  SpO2: 94% (07 Jan 2023 09:45) (88% - 94%)    Parameters below as of 07 Jan 2023 09:45  Patient On (Oxygen Delivery Method): nasal cannula  O2 Flow (L/min): 2      General: WN/WD NAD  Head- Atraumatic, normocephalic   Neurology: A&Ox3, nonfocal, CN II to XII intact, power intact 5/5 in all muscle group  HEENT- PERRLA, moist mucous membrane  Neck-supple, no JVD  Respiratory: No respiratory distress, No wheezing, no rhonci or rales, Air entry equal b/l, CTA   CVS:  S1S2, + systolic murmur, no rubs or gallops  Abdominal: Soft, NT, ND +BS,   Genitourinary- voiding, non palpable bladder  Extremities: No edema, + peripheral pulses  Skin- no rash, no ulcer  Psychiatric- mood stable, poor historian  LN- no lymphadenopathy

## 2023-01-07 NOTE — ED ADULT NURSE NOTE - OBJECTIVE STATEMENT
Pt biba to ED for reports of respiratory distress and fever at home. Pt dx with UTI yesterday and started on abx at 4pm 1/6 . . RR 18-20. Oral temp 99.5. Denies pain and discomfort. Denies SOB. PT unable to state why she is in the ED. Pt has hx of kidney issues and sees a urologist. Pt has IV access. Rectal temp currently being taken. Pt placed on 2L of oxygen for sating 88% and put on cardiac monitor. Pt uses walker at home.

## 2023-01-07 NOTE — ED PROVIDER NOTE - CARE PLAN
1 Principal Discharge DX:	Sepsis secondary to UTI  Secondary Diagnosis:	Acute respiratory failure with hypoxia

## 2023-01-07 NOTE — ED PROVIDER NOTE - CLINICAL SUMMARY MEDICAL DECISION MAKING FREE TEXT BOX
89-year-old female with episode of difficulty breathing and shaking chills.  Diagnosed with UTI and started on medications and only took her first dose last night.  Here her oxygen saturation in the high 80s.  Will rule out other infection such as pneumonia or viral URI in addition to COPD exacerbation.  Respiratory status here is stable and no signs of respiratory distress.  Will obtain labs, chest x-ray, blood cultures x2 sets, will give empiric antibiotics and hydrate.  Afebrile here

## 2023-01-07 NOTE — H&P ADULT - HISTORY OF PRESENT ILLNESS
89 y/o F with PMH of COPD (not on home O2), HTN, HLD, CKD, aortic stenosis presented to the ED with c/o fever, chills and episode of chest pressure. History provided by daughter. Reports that pt began to have mild dysuria and urinary urgency last Friday. Pt went to [facility] to submit urine sample on Tuesday and was notified of a positive urine culture (E coli, pan sens). Pt was started on macrobid by her PCP and took her first dose last night. Daughter states pt is always cold but had notably increased chills during the afternoon. She noted that the pt clutched her chest and was c/o pressure. Pt was having notable confusion as per daughter. Her O2 was reportedly in the 80s. Denies any cough, fever, abdominal pain, N/V/D. Daughter states that pt is still not at her complete baseline.

## 2023-01-07 NOTE — ED ADULT TRIAGE NOTE - CHIEF COMPLAINT QUOTE
biba to ed for reports of respiratory distress and fever at home. Pt dx with UTI yesterday and started on abx. No respiratory distress noted in triage. RR 18-20. Oral temp 99.5. Denies pain and discomfort. Denies SOB. PT unable to state why she is in the ED.

## 2023-01-07 NOTE — ED PROVIDER NOTE - NS ED ROS FT
Constitutional: + chilld  Eyes: No visual changes  HEENT: No throat pain  CV: No chest pain  Resp: + sob and episode of difficulty breathing.  GI: No abd pain, nausea or vomiting  : + dysuria  MSK: No musculoskeletal pain  Skin: No rash  Neuro: No headache

## 2023-01-07 NOTE — CONSULT NOTE ADULT - ASSESSMENT
A:    89F  HD # 1  FULL CODE    Here for:    1. Sepsis 2/2  2. UTI  3. Anemia  4. CKD  5. Dehydration    P:    Sepsis 2/2 UTI  Now responding to IVF    s/p 30cc/kg IVF bolus  Sepsis bundle  Another 500cc bolus finishing up now  Continue gentle IV hydration after  Broad spectrum abx zosyn, continue  f/u Cx's    Dispo: OK to remain on medical floor.    If Pt condition changes please call.     Thank you.    Discussed with Dr Chung.

## 2023-01-07 NOTE — PHARMACOTHERAPY INTERVENTION NOTE - COMMENTS
Medication history complete. Medications and allergies reviewed with patient's daughter, Pippa at bedside and confirmed with .

## 2023-01-08 LAB
ALBUMIN SERPL ELPH-MCNC: 2.9 G/DL — LOW (ref 3.3–5)
ALP SERPL-CCNC: 59 U/L — SIGNIFICANT CHANGE UP (ref 40–120)
ALT FLD-CCNC: 203 U/L — HIGH (ref 12–78)
ANION GAP SERPL CALC-SCNC: 4 MMOL/L — LOW (ref 5–17)
AST SERPL-CCNC: 176 U/L — HIGH (ref 15–37)
BASOPHILS # BLD AUTO: 0.01 K/UL — SIGNIFICANT CHANGE UP (ref 0–0.2)
BASOPHILS NFR BLD AUTO: 0.1 % — SIGNIFICANT CHANGE UP (ref 0–2)
BILIRUB SERPL-MCNC: 0.7 MG/DL — SIGNIFICANT CHANGE UP (ref 0.2–1.2)
BUN SERPL-MCNC: 38 MG/DL — HIGH (ref 7–23)
CALCIUM SERPL-MCNC: 8.6 MG/DL — SIGNIFICANT CHANGE UP (ref 8.5–10.1)
CHLORIDE SERPL-SCNC: 110 MMOL/L — HIGH (ref 96–108)
CO2 SERPL-SCNC: 24 MMOL/L — SIGNIFICANT CHANGE UP (ref 22–31)
CREAT SERPL-MCNC: 1.98 MG/DL — HIGH (ref 0.5–1.3)
EGFR: 23 ML/MIN/1.73M2 — LOW
EOSINOPHIL # BLD AUTO: 0.01 K/UL — SIGNIFICANT CHANGE UP (ref 0–0.5)
EOSINOPHIL NFR BLD AUTO: 0.1 % — SIGNIFICANT CHANGE UP (ref 0–6)
GLUCOSE SERPL-MCNC: 121 MG/DL — HIGH (ref 70–99)
HCT VFR BLD CALC: 28.7 % — LOW (ref 34.5–45)
HGB BLD-MCNC: 9.2 G/DL — LOW (ref 11.5–15.5)
IMM GRANULOCYTES NFR BLD AUTO: 0.6 % — SIGNIFICANT CHANGE UP (ref 0–0.9)
LYMPHOCYTES # BLD AUTO: 0.73 K/UL — LOW (ref 1–3.3)
LYMPHOCYTES # BLD AUTO: 8.6 % — LOW (ref 13–44)
MCHC RBC-ENTMCNC: 30.2 PG — SIGNIFICANT CHANGE UP (ref 27–34)
MCHC RBC-ENTMCNC: 32.1 GM/DL — SIGNIFICANT CHANGE UP (ref 32–36)
MCV RBC AUTO: 94.1 FL — SIGNIFICANT CHANGE UP (ref 80–100)
MONOCYTES # BLD AUTO: 0.61 K/UL — SIGNIFICANT CHANGE UP (ref 0–0.9)
MONOCYTES NFR BLD AUTO: 7.2 % — SIGNIFICANT CHANGE UP (ref 2–14)
NEUTROPHILS # BLD AUTO: 7.06 K/UL — SIGNIFICANT CHANGE UP (ref 1.8–7.4)
NEUTROPHILS NFR BLD AUTO: 83.4 % — HIGH (ref 43–77)
PLATELET # BLD AUTO: 78 K/UL — LOW (ref 150–400)
POTASSIUM SERPL-MCNC: 4.3 MMOL/L — SIGNIFICANT CHANGE UP (ref 3.5–5.3)
POTASSIUM SERPL-SCNC: 4.3 MMOL/L — SIGNIFICANT CHANGE UP (ref 3.5–5.3)
PROT SERPL-MCNC: 6.3 GM/DL — SIGNIFICANT CHANGE UP (ref 6–8.3)
RBC # BLD: 3.05 M/UL — LOW (ref 3.8–5.2)
RBC # FLD: 16.4 % — HIGH (ref 10.3–14.5)
SODIUM SERPL-SCNC: 138 MMOL/L — SIGNIFICANT CHANGE UP (ref 135–145)
WBC # BLD: 8.47 K/UL — SIGNIFICANT CHANGE UP (ref 3.8–10.5)
WBC # FLD AUTO: 8.47 K/UL — SIGNIFICANT CHANGE UP (ref 3.8–10.5)

## 2023-01-08 PROCEDURE — 99233 SBSQ HOSP IP/OBS HIGH 50: CPT

## 2023-01-08 RX ORDER — HYDRALAZINE HCL 50 MG
12.5 TABLET ORAL
Refills: 0 | Status: DISCONTINUED | OUTPATIENT
Start: 2023-01-08 | End: 2023-01-09

## 2023-01-08 RX ADMIN — Medication 650 MILLIGRAM(S): at 11:56

## 2023-01-08 RX ADMIN — PIPERACILLIN AND TAZOBACTAM 25 GRAM(S): 4; .5 INJECTION, POWDER, LYOPHILIZED, FOR SOLUTION INTRAVENOUS at 15:16

## 2023-01-08 RX ADMIN — Medication 100 MILLIGRAM(S): at 09:59

## 2023-01-08 RX ADMIN — PIPERACILLIN AND TAZOBACTAM 25 GRAM(S): 4; .5 INJECTION, POWDER, LYOPHILIZED, FOR SOLUTION INTRAVENOUS at 22:01

## 2023-01-08 RX ADMIN — Medication 12.5 MILLIGRAM(S): at 23:01

## 2023-01-08 RX ADMIN — Medication 650 MILLIGRAM(S): at 12:56

## 2023-01-08 RX ADMIN — Medication 12.5 MILLIGRAM(S): at 22:02

## 2023-01-08 RX ADMIN — ATORVASTATIN CALCIUM 20 MILLIGRAM(S): 80 TABLET, FILM COATED ORAL at 22:02

## 2023-01-08 RX ADMIN — GABAPENTIN 300 MILLIGRAM(S): 400 CAPSULE ORAL at 09:59

## 2023-01-08 RX ADMIN — GABAPENTIN 300 MILLIGRAM(S): 400 CAPSULE ORAL at 22:02

## 2023-01-08 RX ADMIN — PIPERACILLIN AND TAZOBACTAM 25 GRAM(S): 4; .5 INJECTION, POWDER, LYOPHILIZED, FOR SOLUTION INTRAVENOUS at 05:19

## 2023-01-08 NOTE — PROGRESS NOTE ADULT - SUBJECTIVE AND OBJECTIVE BOX
CC: Chills, fever, confusion  History of Present Illness:   89 y/o F with PMH of COPD (not on home O2), HTN, HLD, CKD, aortic stenosis presented to the ED with c/o fever, chills and episode of chest pressure. History provided by daughter. Reports that pt began to have mild dysuria and urinary urgency last Friday. Pt went to [facility] to submit urine sample on Tuesday and was notified of a positive urine culture (E coli, pan sens). Pt was started on macrobid by her PCP and took her first dose last night. Daughter states pt is always cold but had notably increased chills during the afternoon. She noted that the pt clutched her chest and was c/o pressure. Pt was having notable confusion as per daughter. Her O2 was reportedly in the 80s. Denies any cough, fever, abdominal pain, N/V/D. Daughter states that pt is still not at her complete baseline.    S:  : Mentation better, alert, comfortable and offers no specific complaints. Overnight hypotensive, received multiple boluses of IVFs.  Today BP better.  Spoke to daughter at bedside and updated on plan of care.    REVIEW OF SYSTEMS: All other review of systems is negative unless indicated above.      Physical Exam:   Vital Signs Last 24 Hrs  T(C): 36.7 (2023 07:51), Max: 37.1 (2023 22:00)  T(F): 98 (2023 07:51), Max: 98.7 (2023 22:00)  HR: 58 (2023 07:51) (58 - 65)  BP: 152/45 (2023 07:51) (124/46 - 153/48)  BP(mean): --  RR: 18 (2023 07:51) (18 - 18)  SpO2: 97% (2023 07:51) (96% - 97%)    Parameters below as of 2023 07:51  Patient On (Oxygen Delivery Method): nasal cannula      PHYSICAL EXAM:    Constitutional: NAD, awake and alert, eldery   HEENT: PERR, EOMI, Normal Hearing, MMM  Neck: Soft and supple  Respiratory: Breath sounds are decreased b/l  Cardiovascular: S1 and S2, regular rate and rhythm, + Murmur, gallops or rubs  Gastrointestinal: Bowel Sounds present, soft, nontender, nondistended, no guarding, no rebound  Extremities: No peripheral edema  Neurological: A/O x 3, no focal deficits in my limited exam    MEDICATIONS  (STANDING):  allopurinol 100 milliGRAM(s) Oral daily  atorvastatin 20 milliGRAM(s) Oral at bedtime  gabapentin 300 milliGRAM(s) Oral two times a day  piperacillin/tazobactam IVPB.. 3.375 Gram(s) IV Intermittent every 8 hours    MEDICATIONS  (PRN):  acetaminophen     Tablet .. 650 milliGRAM(s) Oral every 6 hours PRN Temp greater or equal to 38C (100.4F), Mild Pain (1 - 3)  aluminum hydroxide/magnesium hydroxide/simethicone Suspension 30 milliLiter(s) Oral every 4 hours PRN Dyspepsia  ondansetron Injectable 4 milliGRAM(s) IV Push every 8 hours PRN Nausea and/or Vomiting                                9.2    8.47  )-----------( 78       ( 2023 07:59 )             28.7     01-08    138  |  110<H>  |  38<H>  ----------------------------<  121<H>  4.3   |  24  |  1.98<H>    Ca    8.6      2023 07:59    TPro  6.3  /  Alb  2.9<L>  /  TBili  0.7  /  DBili  x   /  AST  176<H>  /  ALT  203<H>  /  AlkPhos  59  01-08    CAPILLARY BLOOD GLUCOSE        LIVER FUNCTIONS - ( 2023 07:59 )  Alb: 2.9 g/dL / Pro: 6.3 gm/dL / ALK PHOS: 59 U/L / ALT: 203 U/L / AST: 176 U/L / GGT: x           PT/INR - ( 2023 05:42 )   PT: 12.0 sec;   INR: 1.03 ratio         PTT - ( 2023 05:42 )  PTT:26.8 sec  Urinalysis Basic - ( 2023 09:40 )    Color: Yellow / Appearance: very cloudy / S.010 / pH: x  Gluc: x / Ketone: Negative  / Bili: Negative / Urobili: Negative   Blood: x / Protein: Negative / Nitrite: Positive   Leuk Esterase: Moderate / RBC: 0-2 /HPF / WBC 26-50 /HPF   Sq Epi: x / Non Sq Epi: Occasional / Bacteria: Few           Assessment/Plan:  89 y/o F with PMH of COPD (not on home O2), HTN, HLD, CKD, aortic stenosis presented to the ED with c/o fever, chills and episode of chest pressure. Currently no chest pain/dyspnea. Febrile.    Metabolic encephalopathy due to Sepsis 2/2 UTI & PNA:  - leukocytosis resolved  - s/p IVFs  - Hold Furosemide  - Continue IV Zosyn per ID  - BCx neg x 2  - f/u urine cultures      Thrombocytopenia, normocytic anemia: Chronic  - monitor      HTN / AS:  - hold Bystolic & hydralazine in setting of sepsis, if BP remains stable will restart slowly   - follows with Dr. Collins      CKD 4:   - Avoid nephrotoxic medications, NSAIDs    COPD:   - Albuterol/ipratropium prn wheezing/sob    VTE ppx:  - Pneumatic compression

## 2023-01-09 LAB
ANION GAP SERPL CALC-SCNC: 7 MMOL/L — SIGNIFICANT CHANGE UP (ref 5–17)
BUN SERPL-MCNC: 32 MG/DL — HIGH (ref 7–23)
CALCIUM SERPL-MCNC: 8.9 MG/DL — SIGNIFICANT CHANGE UP (ref 8.5–10.1)
CHLORIDE SERPL-SCNC: 113 MMOL/L — HIGH (ref 96–108)
CO2 SERPL-SCNC: 23 MMOL/L — SIGNIFICANT CHANGE UP (ref 22–31)
CREAT SERPL-MCNC: 1.58 MG/DL — HIGH (ref 0.5–1.3)
EGFR: 30 ML/MIN/1.73M2 — LOW
GLUCOSE SERPL-MCNC: 143 MG/DL — HIGH (ref 70–99)
HCT VFR BLD CALC: 31.3 % — LOW (ref 34.5–45)
HGB BLD-MCNC: 10.3 G/DL — LOW (ref 11.5–15.5)
MCHC RBC-ENTMCNC: 30.4 PG — SIGNIFICANT CHANGE UP (ref 27–34)
MCHC RBC-ENTMCNC: 32.9 GM/DL — SIGNIFICANT CHANGE UP (ref 32–36)
MCV RBC AUTO: 92.3 FL — SIGNIFICANT CHANGE UP (ref 80–100)
PLATELET # BLD AUTO: 83 K/UL — LOW (ref 150–400)
POTASSIUM SERPL-MCNC: 3.7 MMOL/L — SIGNIFICANT CHANGE UP (ref 3.5–5.3)
POTASSIUM SERPL-SCNC: 3.7 MMOL/L — SIGNIFICANT CHANGE UP (ref 3.5–5.3)
RBC # BLD: 3.39 M/UL — LOW (ref 3.8–5.2)
RBC # FLD: 16.1 % — HIGH (ref 10.3–14.5)
SODIUM SERPL-SCNC: 143 MMOL/L — SIGNIFICANT CHANGE UP (ref 135–145)
WBC # BLD: 7.75 K/UL — SIGNIFICANT CHANGE UP (ref 3.8–10.5)
WBC # FLD AUTO: 7.75 K/UL — SIGNIFICANT CHANGE UP (ref 3.8–10.5)

## 2023-01-09 PROCEDURE — 99232 SBSQ HOSP IP/OBS MODERATE 35: CPT

## 2023-01-09 RX ORDER — SUCRALFATE 1 G
1 TABLET ORAL
Refills: 0 | Status: DISCONTINUED | OUTPATIENT
Start: 2023-01-09 | End: 2023-01-11

## 2023-01-09 RX ORDER — HYDRALAZINE HCL 50 MG
10 TABLET ORAL ONCE
Refills: 0 | Status: COMPLETED | OUTPATIENT
Start: 2023-01-09 | End: 2023-01-09

## 2023-01-09 RX ORDER — METOPROLOL TARTRATE 50 MG
12.5 TABLET ORAL
Refills: 0 | Status: DISCONTINUED | OUTPATIENT
Start: 2023-01-09 | End: 2023-01-11

## 2023-01-09 RX ORDER — HYDRALAZINE HCL 50 MG
25 TABLET ORAL
Refills: 0 | Status: DISCONTINUED | OUTPATIENT
Start: 2023-01-09 | End: 2023-01-11

## 2023-01-09 RX ORDER — AMLODIPINE BESYLATE 2.5 MG/1
2.5 TABLET ORAL ONCE
Refills: 0 | Status: COMPLETED | OUTPATIENT
Start: 2023-01-09 | End: 2023-01-09

## 2023-01-09 RX ADMIN — Medication 12.5 MILLIGRAM(S): at 22:17

## 2023-01-09 RX ADMIN — PIPERACILLIN AND TAZOBACTAM 25 GRAM(S): 4; .5 INJECTION, POWDER, LYOPHILIZED, FOR SOLUTION INTRAVENOUS at 22:15

## 2023-01-09 RX ADMIN — ATORVASTATIN CALCIUM 20 MILLIGRAM(S): 80 TABLET, FILM COATED ORAL at 22:16

## 2023-01-09 RX ADMIN — GABAPENTIN 300 MILLIGRAM(S): 400 CAPSULE ORAL at 22:16

## 2023-01-09 RX ADMIN — PIPERACILLIN AND TAZOBACTAM 25 GRAM(S): 4; .5 INJECTION, POWDER, LYOPHILIZED, FOR SOLUTION INTRAVENOUS at 15:49

## 2023-01-09 RX ADMIN — Medication 12.5 MILLIGRAM(S): at 09:27

## 2023-01-09 RX ADMIN — PIPERACILLIN AND TAZOBACTAM 25 GRAM(S): 4; .5 INJECTION, POWDER, LYOPHILIZED, FOR SOLUTION INTRAVENOUS at 05:19

## 2023-01-09 RX ADMIN — GABAPENTIN 300 MILLIGRAM(S): 400 CAPSULE ORAL at 09:36

## 2023-01-09 RX ADMIN — Medication 100 MILLIGRAM(S): at 09:27

## 2023-01-09 RX ADMIN — Medication 25 MILLIGRAM(S): at 10:35

## 2023-01-09 RX ADMIN — Medication 25 MILLIGRAM(S): at 13:43

## 2023-01-09 RX ADMIN — Medication 25 MILLIGRAM(S): at 13:25

## 2023-01-09 RX ADMIN — Medication 10 MILLIGRAM(S): at 03:20

## 2023-01-09 RX ADMIN — AMLODIPINE BESYLATE 2.5 MILLIGRAM(S): 2.5 TABLET ORAL at 15:53

## 2023-01-09 RX ADMIN — Medication 12.5 MILLIGRAM(S): at 12:34

## 2023-01-09 RX ADMIN — Medication 25 MILLIGRAM(S): at 22:17

## 2023-01-09 RX ADMIN — Medication 30 MILLILITER(S): at 12:37

## 2023-01-09 NOTE — PROGRESS NOTE ADULT - SUBJECTIVE AND OBJECTIVE BOX
CC: Chills, fever, confusion  History of Present Illness:   89 y/o F with PMH of COPD (not on home O2), HTN, HLD, CKD, aortic stenosis presented to the ED with c/o fever, chills and episode of chest pressure. History provided by daughter. Reports that pt began to have mild dysuria and urinary urgency last Friday. Pt went to [facility] to submit urine sample on Tuesday and was notified of a positive urine culture (E coli, pan sens). Pt was started on macrobid by her PCP and took her first dose last night. Daughter states pt is always cold but had notably increased chills during the afternoon. She noted that the pt clutched her chest and was c/o pressure. Pt was having notable confusion as per daughter. Her O2 was reportedly in the 80s. Denies any cough, fever, abdominal pain, N/V/D. Daughter states that pt is still not at her complete baseline.    S:  1/8: Mentation better, alert, comfortable and offers no specific complaints. Overnight hypotensive, received multiple boluses of IVFs.  Today BP better.  Spoke to daughter at bedside and updated on plan of care.  1/9: sitting up in chair, daughter at bedside. restarted on BP meds. discharge and results of labs/imaging discussed with pt and daughter     REVIEW OF SYSTEMS: All other review of systems is negative unless indicated above.      Physical Exam:   Vital Signs Last 24 Hrs  T(C): 36.4 (09 Jan 2023 08:20), Max: 36.4 (09 Jan 2023 08:20)  T(F): 97.5 (09 Jan 2023 08:20), Max: 97.5 (09 Jan 2023 08:20)  HR: 71 (09 Jan 2023 08:20) (60 - 71)  BP: 192/74 (09 Jan 2023 08:20) (163/64 - 193/50)  BP(mean): --  RR: 18 (08 Jan 2023 21:28) (18 - 18)  SpO2: 96% (09 Jan 2023 08:20) (96% - 98%)    Parameters below as of 09 Jan 2023 08:20  Patient On (Oxygen Delivery Method): room air        PHYSICAL EXAM:    Constitutional: NAD, awake and alert, eldery   HEENT: PERR, EOMI, Normal Hearing, MMM  Neck: Soft and supple  Respiratory: Breath sounds are decreased b/l  Cardiovascular: S1 and S2, regular rate and rhythm, + Murmur, gallops or rubs  Gastrointestinal: Bowel Sounds present, soft, nontender, nondistended, no guarding, no rebound  Extremities: No peripheral edema  Neurological: A/O x 3, no focal deficits in my limited exam    MEDICATIONS  (STANDING):  allopurinol 100 milliGRAM(s) Oral daily  atorvastatin 20 milliGRAM(s) Oral at bedtime  gabapentin 300 milliGRAM(s) Oral two times a day  hydrALAZINE 25 milliGRAM(s) Oral two times a day  metoprolol tartrate 12.5 milliGRAM(s) Oral two times a day  piperacillin/tazobactam IVPB.. 3.375 Gram(s) IV Intermittent every 8 hours    MEDICATIONS  (PRN):  acetaminophen     Tablet .. 650 milliGRAM(s) Oral every 6 hours PRN Temp greater or equal to 38C (100.4F), Mild Pain (1 - 3)  aluminum hydroxide/magnesium hydroxide/simethicone Suspension 30 milliLiter(s) Oral every 4 hours PRN Dyspepsia  ondansetron Injectable 4 milliGRAM(s) IV Push every 8 hours PRN Nausea and/or Vomiting                                10.3   7.75  )-----------( 83       ( 09 Jan 2023 08:52 )             31.3     01-09    143  |  113<H>  |  32<H>  ----------------------------<  143<H>  3.7   |  23  |  1.58<H>    Ca    8.9      09 Jan 2023 08:52    TPro  6.3  /  Alb  2.9<L>  /  TBili  0.7  /  DBili  x   /  AST  176<H>  /  ALT  203<H>  /  AlkPhos  59  01-08        LIVER FUNCTIONS - ( 08 Jan 2023 07:59 )  Alb: 2.9 g/dL / Pro: 6.3 gm/dL / ALK PHOS: 59 U/L / ALT: 203 U/L / AST: 176 U/L / GGT: x                Assessment/Plan:  89 y/o F with PMH of COPD (not on home O2), HTN, HLD, CKD, aortic stenosis presented to the ED with c/o fever, chills and episode of chest pressure. Currently no chest pain/dyspnea. Febrile.    Metabolic encephalopathy due to Sepsis 2/2 UTI & PNA:  - leukocytosis resolved  - s/p IVFs  - Hold Furosemide  - Continue IV Zosyn per ID  - BCx neg x 2  - f/u urine cultures      Thrombocytopenia, normocytic anemia: Chronic  - monitor      HTN / AS:  - restarted hydralizine and       CKD 4:   - Avoid nephrotoxic medications, NSAIDs    COPD:   - Albuterol/ipratropium prn wheezing/sob    VTE ppx:  - Pneumatic compression           CC: Chills, fever, confusion  History of Present Illness:   89 y/o F with PMH of COPD (not on home O2), HTN, HLD, CKD, aortic stenosis presented to the ED with c/o fever, chills and episode of chest pressure. History provided by daughter. Reports that pt began to have mild dysuria and urinary urgency last Friday. Pt went to [facility] to submit urine sample on Tuesday and was notified of a positive urine culture (E coli, pan sens). Pt was started on macrobid by her PCP and took her first dose last night. Daughter states pt is always cold but had notably increased chills during the afternoon. She noted that the pt clutched her chest and was c/o pressure. Pt was having notable confusion as per daughter. Her O2 was reportedly in the 80s. Denies any cough, fever, abdominal pain, N/V/D. Daughter states that pt is still not at her complete baseline.    S:  1/8: Mentation better, alert, comfortable and offers no specific complaints. Overnight hypotensive, received multiple boluses of IVFs.  Today BP better.  Spoke to daughter at bedside and updated on plan of care.  1/9: sitting up in chair, daughter at bedside. restarted on BP meds. discharge and results of labs/imaging discussed with pt and daughter     REVIEW OF SYSTEMS: All other review of systems is negative unless indicated above.      Physical Exam:   Vital Signs Last 24 Hrs  T(C): 36.4 (09 Jan 2023 08:20), Max: 36.4 (09 Jan 2023 08:20)  T(F): 97.5 (09 Jan 2023 08:20), Max: 97.5 (09 Jan 2023 08:20)  HR: 71 (09 Jan 2023 08:20) (60 - 71)  BP: 192/74 (09 Jan 2023 08:20) (163/64 - 193/50)  BP(mean): --  RR: 18 (08 Jan 2023 21:28) (18 - 18)  SpO2: 96% (09 Jan 2023 08:20) (96% - 98%)    Parameters below as of 09 Jan 2023 08:20  Patient On (Oxygen Delivery Method): room air        PHYSICAL EXAM:    Constitutional: NAD, awake and alert, eldery   HEENT: PERR, EOMI, Normal Hearing, MMM  Neck: Soft and supple  Respiratory: Breath sounds are decreased b/l  Cardiovascular: S1 and S2, regular rate and rhythm, + Murmur, gallops or rubs  Gastrointestinal: Bowel Sounds present, soft, nontender, nondistended, no guarding, no rebound  Extremities: No peripheral edema  Neurological: A/O x 3, no focal deficits in my limited exam    MEDICATIONS  (STANDING):  allopurinol 100 milliGRAM(s) Oral daily  atorvastatin 20 milliGRAM(s) Oral at bedtime  gabapentin 300 milliGRAM(s) Oral two times a day  hydrALAZINE 25 milliGRAM(s) Oral two times a day  metoprolol tartrate 12.5 milliGRAM(s) Oral two times a day  piperacillin/tazobactam IVPB.. 3.375 Gram(s) IV Intermittent every 8 hours    MEDICATIONS  (PRN):  acetaminophen     Tablet .. 650 milliGRAM(s) Oral every 6 hours PRN Temp greater or equal to 38C (100.4F), Mild Pain (1 - 3)  aluminum hydroxide/magnesium hydroxide/simethicone Suspension 30 milliLiter(s) Oral every 4 hours PRN Dyspepsia  ondansetron Injectable 4 milliGRAM(s) IV Push every 8 hours PRN Nausea and/or Vomiting                                10.3   7.75  )-----------( 83       ( 09 Jan 2023 08:52 )             31.3     01-09    143  |  113<H>  |  32<H>  ----------------------------<  143<H>  3.7   |  23  |  1.58<H>    Ca    8.9      09 Jan 2023 08:52    TPro  6.3  /  Alb  2.9<L>  /  TBili  0.7  /  DBili  x   /  AST  176<H>  /  ALT  203<H>  /  AlkPhos  59  01-08        LIVER FUNCTIONS - ( 08 Jan 2023 07:59 )  Alb: 2.9 g/dL / Pro: 6.3 gm/dL / ALK PHOS: 59 U/L / ALT: 203 U/L / AST: 176 U/L / GGT: x               Assessment/Plan:  89 y/o F with PMH of COPD (not on home O2), HTN, HLD, CKD, aortic stenosis presented to the ED with c/o fever, chills and episode of chest pressure. Currently no chest pain/dyspnea. Febrile.    Metabolic encephalopathy due to Sepsis 2/2 UTI & PNA:  - leukocytosis resolved  - s/p IVFs  - Hold Furosemide  - Continue IV Zosyn per ID day # 3  - BCx neg x 2  - urine cultures Escherichia coli      Thrombocytopenia, normocytic anemia: Chronic, stable  - monitor      HTN / AS:  - restarted hydralazine at 25mg BID and bystolic interchange with metoprolol  - monitor BP      CKD 4:   - Avoid nephrotoxic medications, NSAIDs    COPD:   - Albuterol/ipratropium prn wheezing/sob    VTE ppx:  - Pneumatic compression      Dispo:  -PT

## 2023-01-09 NOTE — PROGRESS NOTE ADULT - SUBJECTIVE AND OBJECTIVE BOX
Date of service: 01-09-23 @ 12:35      Patient sitting in chair; afebrile, no complaints, feeling markedly better      ROs unable to obtain secondary to patient medical condition     MEDICATIONS  (STANDING):  allopurinol 100 milliGRAM(s) Oral daily  atorvastatin 20 milliGRAM(s) Oral at bedtime  gabapentin 300 milliGRAM(s) Oral two times a day  hydrALAZINE 25 milliGRAM(s) Oral two times a day  metoprolol tartrate 12.5 milliGRAM(s) Oral two times a day  piperacillin/tazobactam IVPB.. 3.375 Gram(s) IV Intermittent every 8 hours    MEDICATIONS  (PRN):  acetaminophen     Tablet .. 650 milliGRAM(s) Oral every 6 hours PRN Temp greater or equal to 38C (100.4F), Mild Pain (1 - 3)  aluminum hydroxide/magnesium hydroxide/simethicone Suspension 30 milliLiter(s) Oral every 4 hours PRN Dyspepsia  ondansetron Injectable 4 milliGRAM(s) IV Push every 8 hours PRN Nausea and/or Vomiting  sucralfate suspension 1 Gram(s) Oral two times a day PRN dyspepsia      Vital Signs Last 24 Hrs  T(C): 36.4 (09 Jan 2023 08:20), Max: 36.4 (09 Jan 2023 08:20)  T(F): 97.5 (09 Jan 2023 08:20), Max: 97.5 (09 Jan 2023 08:20)  HR: 71 (09 Jan 2023 08:20) (60 - 71)  BP: 192/74 (09 Jan 2023 08:20) (163/64 - 193/50)  BP(mean): --  RR: 18 (08 Jan 2023 21:28) (18 - 18)  SpO2: 96% (09 Jan 2023 08:20) (96% - 98%)    Parameters below as of 09 Jan 2023 08:20  Patient On (Oxygen Delivery Method): room air            Physical Exam:        PE:    Constitutional: frail looking  HEENT: NC/AT, EOMI, PERRLA, conjunctivae clear; ears and nose atraumatic; pharynx clear  Neck: supple; thyroid not palpable  Back: no tenderness  Respiratory: respiratory effort normal; clear to auscultation with scattered coarse breath sounds  Cardiovascular: S1S2 regular, no murmurs  Abdomen: soft, not tender, not distended, positive BS; no liver or spleen organomegaly  Genitourinary: no suprapubic tenderness  Musculoskeletal: no muscle tenderness, no joint swelling or tenderness  Neurological/ Psychiatric: AxOx3, judgement and insight normal;  moving all extremities  Skin: no rashes; no palpable lesions    Labs: all available labs reviewed                       Labs:                        10.3   7.75  )-----------( 83       ( 09 Jan 2023 08:52 )             31.3     01-09    143  |  113<H>  |  32<H>  ----------------------------<  143<H>  3.7   |  23  |  1.58<H>    Ca    8.9      09 Jan 2023 08:52    TPro  6.3  /  Alb  2.9<L>  /  TBili  0.7  /  DBili  x   /  AST  176<H>  /  ALT  203<H>  /  AlkPhos  59  01-08           Cultures:       Culture - Urine (collected 01-07-23 @ 09:40)  Source: Clean Catch None  Preliminary Report (01-08-23 @ 21:24):    50,000 - 99,000 CFU/mL Escherichia coli    Culture - Blood (collected 01-07-23 @ 05:42)  Source: .Blood None  Preliminary Report (01-08-23 @ 09:02):    No growth to date.    Culture - Blood (collected 01-07-23 @ 05:42)  Source: .Blood None  Preliminary Report (01-08-23 @ 09:02):    No growth to date.              Radiology: all available radiological tests reviewed    Advanced directives addressed: full resuscitation

## 2023-01-09 NOTE — PROVIDER CONTACT NOTE (OTHER) - SITUATION
notified Dr Collins's office of admission
Pt was straight cath in the ED and has not voided since arrival to the unit. DAy shift RN bladder  scanned pt and there was greater than 200ml. I bladder scanned pt around this time and 363 ml.

## 2023-01-09 NOTE — PROGRESS NOTE ADULT - ASSESSMENT
89 y/o F with PMH of COPD (not on home O2), HTN, HLD, CKD, aortic stenosis admitted on 1/7 for evaluation of urinary complaints that began about one week ago for which a culture was sent and she started macrobid on the day of admission; the family noted that the patient was more confused and holding onto her chest and was hypoxic; history per medical record and family at the bedside.     1. Patient admitted with urinary tract infection, change in mental status and pneumonia; also noted with leukocytosis most likely reactive to infection  - follow up cultures   - serial cbc and monitor temperature   - oxygen and nebs as needed   - iv hydration and supportive care   - reviewed prior medical records to evaluate for resistant or atypical pathogens   - will continue zosyn as ordered as this will cover lungs and urine, day #3  - tolerating antibiotics without rashes or side effects   - hold on further vancomycin to avoid potential nephrotoxicity  2. other issues; per medicine

## 2023-01-10 LAB
-  AMIKACIN: SIGNIFICANT CHANGE UP
-  AMOXICILLIN/CLAVULANIC ACID: SIGNIFICANT CHANGE UP
-  AMPICILLIN/SULBACTAM: SIGNIFICANT CHANGE UP
-  AMPICILLIN: SIGNIFICANT CHANGE UP
-  AZTREONAM: SIGNIFICANT CHANGE UP
-  CEFAZOLIN: SIGNIFICANT CHANGE UP
-  CEFEPIME: SIGNIFICANT CHANGE UP
-  CEFOXITIN: SIGNIFICANT CHANGE UP
-  CEFTRIAXONE: SIGNIFICANT CHANGE UP
-  CEFUROXIME: SIGNIFICANT CHANGE UP
-  CIPROFLOXACIN: SIGNIFICANT CHANGE UP
-  ERTAPENEM: SIGNIFICANT CHANGE UP
-  GENTAMICIN: SIGNIFICANT CHANGE UP
-  IMIPENEM: SIGNIFICANT CHANGE UP
-  LEVOFLOXACIN: SIGNIFICANT CHANGE UP
-  MEROPENEM: SIGNIFICANT CHANGE UP
-  NITROFURANTOIN: SIGNIFICANT CHANGE UP
-  PIPERACILLIN/TAZOBACTAM: SIGNIFICANT CHANGE UP
-  TOBRAMYCIN: SIGNIFICANT CHANGE UP
-  TRIMETHOPRIM/SULFAMETHOXAZOLE: SIGNIFICANT CHANGE UP
ANION GAP SERPL CALC-SCNC: 8 MMOL/L — SIGNIFICANT CHANGE UP (ref 5–17)
BUN SERPL-MCNC: 28 MG/DL — HIGH (ref 7–23)
CALCIUM SERPL-MCNC: 8.7 MG/DL — SIGNIFICANT CHANGE UP (ref 8.5–10.1)
CHLORIDE SERPL-SCNC: 111 MMOL/L — HIGH (ref 96–108)
CO2 SERPL-SCNC: 22 MMOL/L — SIGNIFICANT CHANGE UP (ref 22–31)
CREAT SERPL-MCNC: 1.63 MG/DL — HIGH (ref 0.5–1.3)
CULTURE RESULTS: SIGNIFICANT CHANGE UP
EGFR: 29 ML/MIN/1.73M2 — LOW
GLUCOSE SERPL-MCNC: 125 MG/DL — HIGH (ref 70–99)
METHOD TYPE: SIGNIFICANT CHANGE UP
ORGANISM # SPEC MICROSCOPIC CNT: SIGNIFICANT CHANGE UP
ORGANISM # SPEC MICROSCOPIC CNT: SIGNIFICANT CHANGE UP
POTASSIUM SERPL-MCNC: 4.3 MMOL/L — SIGNIFICANT CHANGE UP (ref 3.5–5.3)
POTASSIUM SERPL-SCNC: 4.3 MMOL/L — SIGNIFICANT CHANGE UP (ref 3.5–5.3)
SODIUM SERPL-SCNC: 141 MMOL/L — SIGNIFICANT CHANGE UP (ref 135–145)
SPECIMEN SOURCE: SIGNIFICANT CHANGE UP

## 2023-01-10 PROCEDURE — 99233 SBSQ HOSP IP/OBS HIGH 50: CPT

## 2023-01-10 RX ORDER — PANTOPRAZOLE SODIUM 20 MG/1
40 TABLET, DELAYED RELEASE ORAL
Refills: 0 | Status: DISCONTINUED | OUTPATIENT
Start: 2023-01-10 | End: 2023-01-11

## 2023-01-10 RX ADMIN — Medication 12.5 MILLIGRAM(S): at 09:28

## 2023-01-10 RX ADMIN — GABAPENTIN 300 MILLIGRAM(S): 400 CAPSULE ORAL at 22:54

## 2023-01-10 RX ADMIN — GABAPENTIN 300 MILLIGRAM(S): 400 CAPSULE ORAL at 09:29

## 2023-01-10 RX ADMIN — PANTOPRAZOLE SODIUM 40 MILLIGRAM(S): 20 TABLET, DELAYED RELEASE ORAL at 15:11

## 2023-01-10 RX ADMIN — PIPERACILLIN AND TAZOBACTAM 25 GRAM(S): 4; .5 INJECTION, POWDER, LYOPHILIZED, FOR SOLUTION INTRAVENOUS at 15:07

## 2023-01-10 RX ADMIN — ATORVASTATIN CALCIUM 20 MILLIGRAM(S): 80 TABLET, FILM COATED ORAL at 22:54

## 2023-01-10 RX ADMIN — PIPERACILLIN AND TAZOBACTAM 25 GRAM(S): 4; .5 INJECTION, POWDER, LYOPHILIZED, FOR SOLUTION INTRAVENOUS at 05:36

## 2023-01-10 RX ADMIN — PIPERACILLIN AND TAZOBACTAM 25 GRAM(S): 4; .5 INJECTION, POWDER, LYOPHILIZED, FOR SOLUTION INTRAVENOUS at 22:52

## 2023-01-10 RX ADMIN — Medication 12.5 MILLIGRAM(S): at 22:54

## 2023-01-10 RX ADMIN — Medication 100 MILLIGRAM(S): at 09:29

## 2023-01-10 RX ADMIN — Medication 25 MILLIGRAM(S): at 22:54

## 2023-01-10 NOTE — DIETITIAN INITIAL EVALUATION ADULT - ADD RECOMMEND
Record PO intake in EMR after each meal (nursing.) MVI w/ minerals daily to ensure 100% RDA met Monitor bowel movements, if no BM for >3 days, consider implementing bowel regimen. Monitor PO intake, tolerance, labs and weight.

## 2023-01-10 NOTE — PROGRESS NOTE ADULT - SUBJECTIVE AND OBJECTIVE BOX
CC: Chills, fever, confusion  History of Present Illness:   89 y/o F with PMH of COPD (not on home O2), HTN, HLD, CKD, aortic stenosis presented to the ED with c/o fever, chills and episode of chest pressure. History provided by daughter. Reports that pt began to have mild dysuria and urinary urgency last Friday. Pt went to [facility] to submit urine sample on Tuesday and was notified of a positive urine culture (E coli, pan sens). Pt was started on macrobid by her PCP and took her first dose last night. Daughter states pt is always cold but had notably increased chills during the afternoon. She noted that the pt clutched her chest and was c/o pressure. Pt was having notable confusion as per daughter. Her O2 was reportedly in the 80s. Denies any cough, fever, abdominal pain, N/V/D. Daughter states that pt is still not at her complete baseline.    S:  1/8: Mentation better, alert, comfortable and offers no specific complaints. Overnight hypotensive, received multiple boluses of IVFs.  Today BP better.  Spoke to daughter at bedside and updated on plan of care.  1/9: sitting up in chair, daughter at bedside. restarted on BP meds. discharge and results of labs/imaging discussed with pt and daughter   1/10: Comfortable, complaining of indigestion.  No fever, chills, n, v.  HD stable, satting well on room air.     REVIEW OF SYSTEMS: All other review of systems is negative unless indicated above.      Physical Exam:   Vital Signs Last 24 Hrs  T(C): 36.8 (10 Hermilo 2023 07:46), Max: 36.8 (10 Hermilo 2023 07:46)  T(F): 98.2 (10 Hermilo 2023 07:46), Max: 98.2 (10 Hermilo 2023 07:46)  HR: 62 (10 Hermilo 2023 07:46) (62 - 66)  BP: 162/52 (10 Hermilo 2023 07:46) (133/55 - 180/62)  BP(mean): --  RR: 18 (09 Jan 2023 21:00) (18 - 18)  SpO2: 97% (10 Hermilo 2023 07:46) (95% - 98%)    Parameters below as of 10 Hermilo 2023 07:46  Patient On (Oxygen Delivery Method): room air        PHYSICAL EXAM:    Constitutional: NAD, awake and alert, eldery   HEENT: PERR, EOMI, Normal Hearing, MMM  Neck: Soft and supple  Respiratory: Breath sounds are decreased b/l  Cardiovascular: S1 and S2, regular rate and rhythm, + Murmur, gallops or rubs  Gastrointestinal: Bowel Sounds present, soft, nontender, nondistended, no guarding, no rebound  Extremities: No peripheral edema  Neurological: A/O x 3, no focal deficits in my limited exam  MEDICATIONS  (STANDING):  allopurinol 100 milliGRAM(s) Oral daily  atorvastatin 20 milliGRAM(s) Oral at bedtime  gabapentin 300 milliGRAM(s) Oral two times a day  hydrALAZINE 25 milliGRAM(s) Oral two times a day  metoprolol tartrate 12.5 milliGRAM(s) Oral two times a day  pantoprazole    Tablet 40 milliGRAM(s) Oral before breakfast  piperacillin/tazobactam IVPB.. 3.375 Gram(s) IV Intermittent every 8 hours    MEDICATIONS  (PRN):  acetaminophen     Tablet .. 650 milliGRAM(s) Oral every 6 hours PRN Temp greater or equal to 38C (100.4F), Mild Pain (1 - 3)  aluminum hydroxide/magnesium hydroxide/simethicone Suspension 30 milliLiter(s) Oral every 4 hours PRN Dyspepsia  ondansetron Injectable 4 milliGRAM(s) IV Push every 8 hours PRN Nausea and/or Vomiting  sucralfate suspension 1 Gram(s) Oral two times a day PRN dyspepsia                                10.3   7.75  )-----------( 83       ( 09 Jan 2023 08:52 )             31.3     01-10    141  |  111<H>  |  28<H>  ----------------------------<  125<H>  4.3   |  22  |  1.63<H>    Ca    8.7      10 Hermilo 2023 08:28      CAPILLARY BLOOD GLUCOSE        Assessment/Plan:  89 y/o F with PMH of COPD (not on home O2), HTN, HLD, CKD, aortic stenosis presented to the ED with c/o fever, chills and episode of chest pressure. Currently no chest pain/dyspnea. Febrile.      Metabolic encephalopathy due to Sepsis 2/2 UTI & PNA:  - leukocytosis resolved  - s/p IVFs  - Hold Furosemide  - Continue IV Zosyn per ID day # 4  - BCx neg x 2  - urine cultures Escherichia coli      dyspepsia / indigestion:  -trial of protonix      Thrombocytopenia, normocytic anemia: Chronic, stable  - monitor      HTN / AS:  - restarted hydralazine at 25mg BID and bystolic interchange with metoprolol  - monitor BP      CKD 4:   - Avoid nephrotoxic medications, NSAIDs      COPD:   - Albuterol/ipratropium prn wheezing/sob      VTE ppx:  - Pneumatic compression      Dispo:  -PT    d/c planning for tomorrow

## 2023-01-10 NOTE — DIETITIAN INITIAL EVALUATION ADULT - PERTINENT LABORATORY DATA
01-10    141  |  111<H>  |  28<H>  ----------------------------<  125<H>  4.3   |  22  |  1.63<H>    Ca    8.7      10 Hermilo 2023 08:28

## 2023-01-10 NOTE — DIETITIAN INITIAL EVALUATION ADULT - PERTINENT MEDS FT
MEDICATIONS  (STANDING):  allopurinol 100 milliGRAM(s) Oral daily  atorvastatin 20 milliGRAM(s) Oral at bedtime  gabapentin 300 milliGRAM(s) Oral two times a day  hydrALAZINE 25 milliGRAM(s) Oral two times a day  metoprolol tartrate 12.5 milliGRAM(s) Oral two times a day  piperacillin/tazobactam IVPB.. 3.375 Gram(s) IV Intermittent every 8 hours    MEDICATIONS  (PRN):  acetaminophen     Tablet .. 650 milliGRAM(s) Oral every 6 hours PRN Temp greater or equal to 38C (100.4F), Mild Pain (1 - 3)  aluminum hydroxide/magnesium hydroxide/simethicone Suspension 30 milliLiter(s) Oral every 4 hours PRN Dyspepsia  ondansetron Injectable 4 milliGRAM(s) IV Push every 8 hours PRN Nausea and/or Vomiting  sucralfate suspension 1 Gram(s) Oral two times a day PRN dyspepsia

## 2023-01-10 NOTE — DIETITIAN INITIAL EVALUATION ADULT - OTHER INFO
89 y/o F with PMH of COPD (not on home O2), HTN, HLD, CKD, aortic stenosis presented to the ED with c/o fever, chills and episode of chest pressure. History provided by daughter. Reports that pt began to have mild dysuria and urinary urgency last Friday. Pt went to [facility] to submit urine sample on Tuesday and was notified of a positive urine culture (E coli, pan sens). Pt was started on macrobid by her PCP and took her first dose last night. Daughter states pt is always cold but had notably increased chills during the afternoon. She noted that the pt clutched her chest and was c/o pressure. Pt was having notable confusion as per daughter. Her O2 was reportedly in the 80s. Denies any cough, fever, abdominal pain, N/V/D. Daughter states that pt is still not at her complete baseline.      Admit dx     sepsis  Pt visited at bedside  Good PO intake at   RD bedscale wt is 73 kg    161 #  NFPE performed, some moderate muscle wasting, fat wasting   Good PO intake  Elevated glucose,   elevated SPBNP

## 2023-01-10 NOTE — PHYSICAL THERAPY INITIAL EVALUATION ADULT - PLANNED THERAPY INTERVENTIONS, PT EVAL
Eval, amb, transfers, bed mobility x 15'. Pt left OOB in chair with all observation section equipment/material intact and pad alarm intact/activated. Pt with no c/o pain. Will cont to follow pt and increase as tolerated./bed mobility training/gait training/strengthening/transfer training

## 2023-01-10 NOTE — PROGRESS NOTE ADULT - NUTRITIONAL ASSESSMENT
This patient has been assessed with a concern for Malnutrition and has been determined to have a diagnosis/diagnoses of Moderate protein-calorie malnutrition.    This patient is being managed with:   Diet Regular-  Entered: Jan 7 2023 10:34AM

## 2023-01-10 NOTE — DIETITIAN INITIAL EVALUATION ADULT - NAME AND PHONE
Nette Powell RDN, CDN, [facility]      [phone]   [email or web address]

## 2023-01-11 ENCOUNTER — TRANSCRIPTION ENCOUNTER (OUTPATIENT)
Age: 88
End: 2023-01-11

## 2023-01-11 VITALS
SYSTOLIC BLOOD PRESSURE: 157 MMHG | HEART RATE: 64 BPM | DIASTOLIC BLOOD PRESSURE: 65 MMHG | TEMPERATURE: 98 F | OXYGEN SATURATION: 94 %

## 2023-01-11 PROCEDURE — 99239 HOSP IP/OBS DSCHRG MGMT >30: CPT

## 2023-01-11 RX ORDER — PANTOPRAZOLE SODIUM 20 MG/1
1 TABLET, DELAYED RELEASE ORAL
Qty: 30 | Refills: 0
Start: 2023-01-11 | End: 2023-02-09

## 2023-01-11 RX ADMIN — Medication 12.5 MILLIGRAM(S): at 10:13

## 2023-01-11 RX ADMIN — Medication 25 MILLIGRAM(S): at 10:13

## 2023-01-11 RX ADMIN — GABAPENTIN 300 MILLIGRAM(S): 400 CAPSULE ORAL at 10:13

## 2023-01-11 RX ADMIN — Medication 100 MILLIGRAM(S): at 10:13

## 2023-01-11 RX ADMIN — PIPERACILLIN AND TAZOBACTAM 25 GRAM(S): 4; .5 INJECTION, POWDER, LYOPHILIZED, FOR SOLUTION INTRAVENOUS at 05:17

## 2023-01-11 RX ADMIN — PANTOPRAZOLE SODIUM 40 MILLIGRAM(S): 20 TABLET, DELAYED RELEASE ORAL at 05:16

## 2023-01-11 NOTE — DISCHARGE NOTE PROVIDER - NSDCFUSCHEDAPPT_GEN_ALL_CORE_FT
[facility]  [facility] [id number] [facility]  Scheduled Appointment: 02/28/2023

## 2023-01-11 NOTE — DISCHARGE NOTE PROVIDER - NSDCMRMEDTOKEN_GEN_ALL_CORE_FT
allopurinol 100 mg oral tablet: 1 tab(s) orally once a day  amoxicillin-clavulanate 250 mg-125 mg oral tablet: 1 tab(s) orally 2 times a day   Bystolic 10 mg oral tablet: 1 tab(s) orally once a day  Colace 100 mg oral capsule: 1 cap(s) orally 2 times a day, As Needed  folic acid 1 mg oral tablet: 1 tab(s) orally once a day  furosemide 20 mg oral tablet: 1 tab(s) orally once a day  hydrALAZINE 25 mg oral tablet: 0.5 tab(s) orally 2 times a day  lidocaine 5% patch: 1 patch transdermal once a day, As Needed  Neurontin 300 mg oral capsule: 2 cap(s) orally 2 times a day  pantoprazole 40 mg oral delayed release tablet: 1 tab(s) orally once a day (before a meal)  rosuvastatin 5 mg oral capsule: 1 cap(s) orally once a day  Vitamin D3 1000 intl units (25 mcg) oral tablet: 1 cap(s) orally 2 times a day

## 2023-01-11 NOTE — DISCHARGE NOTE PROVIDER - DETAILS OF MALNUTRITION DIAGNOSIS/DIAGNOSES
This patient has been assessed with a concern for Malnutrition and was treated during this hospitalization for the following Nutrition diagnosis/diagnoses:     -  01/10/2023: Moderate protein-calorie malnutrition

## 2023-01-11 NOTE — DISCHARGE NOTE PROVIDER - HOSPITAL COURSE
CC: Chills, fever, confusion  History of Present Illness:   89 y/o F with PMH of COPD (not on home O2), HTN, HLD, CKD, aortic stenosis presented to the ED with c/o fever, chills and episode of chest pressure. History provided by daughter. Reports that pt began to have mild dysuria and urinary urgency last Friday. Pt went to [facility] to submit urine sample on Tuesday and was notified of a positive urine culture (E coli, pan sens). Pt was started on macrobid by her PCP and took her first dose last night. Daughter states pt is always cold but had notably increased chills during the afternoon. She noted that the pt clutched her chest and was c/o pressure. Pt was having notable confusion as per daughter. Her O2 was reportedly in the 80s. Denies any cough, fever, abdominal pain, N/V/D. Daughter states that pt is still not at her complete baseline.    Hospital course:  Pt admitted with metabolic encephalopathy due to Sepsis 2/2 UTI & PNA.  Pt placed on zosyn x 5 days.  Feeling better, HD stable, satting well on room air.  Pt at her baseline.  Had indigestion on this admission, improved with protonix.  Pt can go home with out patient follow up.    REVIEW OF SYSTEMS: All other review of systems is negative unless indicated above.    Vital Signs Last 24 Hrs  T(C): 36.9 (11 Jan 2023 08:09), Max: 36.9 (11 Jan 2023 08:09)  T(F): 98.5 (11 Jan 2023 08:09), Max: 98.5 (11 Jan 2023 08:09)  HR: 64 (11 Jan 2023 08:09) (62 - 64)  BP: 157/65 (11 Jan 2023 08:09) (154/55 - 157/65)  BP(mean): --  RR: 18 (10 Hermilo 2023 20:00) (18 - 18)  SpO2: 94% (11 Jan 2023 08:09) (94% - 96%)    Parameters below as of 11 Jan 2023 08:09  Patient On (Oxygen Delivery Method): room air    PHYSICAL EXAM:    Constitutional: NAD, awake and alert, eldery   HEENT: PERR, EOMI, Normal Hearing, MMM  Neck: Soft and supple  Respiratory: Breath sounds are decreased b/l  Cardiovascular: S1 and S2, regular rate and rhythm, + Murmur, gallops or rubs  Gastrointestinal: Bowel Sounds present, soft, nontender, nondistended, no guarding, no rebound  Extremities: No peripheral edema  Neurological: A/O x 3, no focal deficits in my limited exam    med/labs: Reviewed and interpreted       Assessment/Plan:  89 y/o F with PMH of COPD (not on home O2), HTN, HLD, CKD, aortic stenosis presented to the ED with c/o fever, chills and episode of chest pressure. Currently no chest pain/dyspnea. Febrile.      Metabolic encephalopathy due to Sepsis 2/2 UTI & PNA:  - leukocytosis resolved  - s/p IVFs  - Hold Furosemide  - Continue IV Zosyn per ID day # 5, d/c on oral augmentin to complete 7 day course.  - BCx neg x 2  - urine cultures Escherichia coli      dyspepsia / indigestion:  -trial of protonix - continue out patient.       Thrombocytopenia, normocytic anemia: Chronic, stable    HTN / AS:  - continue home meds on d/c      CKD 4:   - Avoid nephrotoxic medications, NSAIDs      COPD:   - Albuterol/ipratropium prn wheezing/sob      d/c home with home care    Attending Statement: 40 minutes spent on total encounter and discharge planning.

## 2023-01-11 NOTE — DISCHARGE NOTE NURSING/CASE MANAGEMENT/SOCIAL WORK - PATIENT PORTAL LINK FT
You can access the FollowMyHealth Patient Portal offered by [facility] by registering at the following website: [email or web address]. By joining [facility]’s FollowMyHealth portal, you will also be able to view your health information using other applications (apps) compatible with our system.

## 2023-01-11 NOTE — DISCHARGE NOTE PROVIDER - NSDCCPCAREPLAN_GEN_ALL_CORE_FT
PRINCIPAL DISCHARGE DIAGNOSIS  Diagnosis: Sepsis secondary to UTI  Assessment and Plan of Treatment: and pneumonia - take antibiotics as prescribed to complete course (antibiotics sent to pharmacy)  Indigestion:  Take protonix as prescribed with pcp follow up.

## 2023-01-11 NOTE — DISCHARGE NOTE NURSING/CASE MANAGEMENT/SOCIAL WORK - NSDCPEFALRISK_GEN_ALL_CORE
For information on Fall & Injury Prevention, visit: [email or web address] OR  [email or web address] OR  https://www.cdc.gov/steadi/patient.html

## 2023-01-11 NOTE — DISCHARGE NOTE PROVIDER - DISCHARGE DATE
Discussed the importance of performing a biopsy, for definitive diagnosis from a dermatopathologist. If results show malignancy, Moh's surgery is recommended. 11-Jan-2023 Discussed the importance of self examination of the nose to monitor for new skin lesions. Detail Level: Detailed

## 2023-01-15 DIAGNOSIS — Z28.09 IMMUNIZATION NOT CARRIED OUT BECAUSE OF OTHER CONTRAINDICATION: ICD-10-CM

## 2023-01-15 DIAGNOSIS — I12.9 HYPERTENSIVE CHRONIC KIDNEY DISEASE WITH STAGE 1 THROUGH STAGE 4 CHRONIC KIDNEY DISEASE, OR UNSPECIFIED CHRONIC KIDNEY DISEASE: ICD-10-CM

## 2023-01-15 DIAGNOSIS — I35.2 NONRHEUMATIC AORTIC (VALVE) STENOSIS WITH INSUFFICIENCY: ICD-10-CM

## 2023-01-15 DIAGNOSIS — N39.0 URINARY TRACT INFECTION, SITE NOT SPECIFIED: ICD-10-CM

## 2023-01-15 DIAGNOSIS — J96.01 ACUTE RESPIRATORY FAILURE WITH HYPOXIA: ICD-10-CM

## 2023-01-15 DIAGNOSIS — N18.4 CHRONIC KIDNEY DISEASE, STAGE 4 (SEVERE): ICD-10-CM

## 2023-01-15 DIAGNOSIS — Z88.1 ALLERGY STATUS TO OTHER ANTIBIOTIC AGENTS STATUS: ICD-10-CM

## 2023-01-15 DIAGNOSIS — Z90.89 ACQUIRED ABSENCE OF OTHER ORGANS: ICD-10-CM

## 2023-01-15 DIAGNOSIS — E86.0 DEHYDRATION: ICD-10-CM

## 2023-01-15 DIAGNOSIS — M10.9 GOUT, UNSPECIFIED: ICD-10-CM

## 2023-01-15 DIAGNOSIS — D69.6 THROMBOCYTOPENIA, UNSPECIFIED: ICD-10-CM

## 2023-01-15 DIAGNOSIS — Z99.81 DEPENDENCE ON SUPPLEMENTAL OXYGEN: ICD-10-CM

## 2023-01-15 DIAGNOSIS — R10.13 EPIGASTRIC PAIN: ICD-10-CM

## 2023-01-15 DIAGNOSIS — J44.0 CHRONIC OBSTRUCTIVE PULMONARY DISEASE WITH (ACUTE) LOWER RESPIRATORY INFECTION: ICD-10-CM

## 2023-01-15 DIAGNOSIS — A41.51 SEPSIS DUE TO ESCHERICHIA COLI [E. COLI]: ICD-10-CM

## 2023-01-15 DIAGNOSIS — E44.0 MODERATE PROTEIN-CALORIE MALNUTRITION: ICD-10-CM

## 2023-01-15 DIAGNOSIS — E78.5 HYPERLIPIDEMIA, UNSPECIFIED: ICD-10-CM

## 2023-01-15 DIAGNOSIS — G93.41 METABOLIC ENCEPHALOPATHY: ICD-10-CM

## 2023-01-15 DIAGNOSIS — J18.9 PNEUMONIA, UNSPECIFIED ORGANISM: ICD-10-CM

## 2023-01-15 DIAGNOSIS — D63.1 ANEMIA IN CHRONIC KIDNEY DISEASE: ICD-10-CM

## 2023-01-19 ENCOUNTER — LABORATORY RESULT (OUTPATIENT)
Age: 88
End: 2023-01-19

## 2023-01-19 ENCOUNTER — APPOINTMENT (OUTPATIENT)
Dept: RHEUMATOLOGY | Facility: CLINIC | Age: 88
End: 2023-01-19
Payer: MEDICARE

## 2023-01-19 VITALS
HEART RATE: 64 BPM | OXYGEN SATURATION: 95 % | WEIGHT: 155 LBS | TEMPERATURE: 96.3 F | BODY MASS INDEX: 31.25 KG/M2 | SYSTOLIC BLOOD PRESSURE: 161 MMHG | HEIGHT: 59 IN | DIASTOLIC BLOOD PRESSURE: 66 MMHG

## 2023-01-19 PROCEDURE — 99213 OFFICE O/P EST LOW 20 MIN: CPT | Mod: 25

## 2023-01-19 PROCEDURE — 20610 DRAIN/INJ JOINT/BURSA W/O US: CPT | Mod: 50

## 2023-01-19 RX ORDER — UBIDECARENONE 200 MG
200 CAPSULE ORAL DAILY
Refills: 0 | Status: DISCONTINUED | COMMUNITY
End: 2023-01-19

## 2023-01-19 RX ORDER — FOLIC ACID 1 MG/1
1 TABLET ORAL DAILY
Refills: 0 | Status: DISCONTINUED | COMMUNITY
End: 2023-01-19

## 2023-01-19 NOTE — ASSESSMENT
[FreeTextEntry1] : Aspirated b/L knees today 1/19/23\par No CS injected as it is too early.  She is eligible for a CS injection after 1/26/23\par PT already in plan\par She finds bandage support for knee helpful.  To use as needed\par keep regular rpa, sooner if needed for CS injections
19

## 2023-01-19 NOTE — HISTORY OF PRESENT ILLNESS
[FreeTextEntry1] : Patient with OA, Pseudogout, presents for f/u\par \par She has pain in b/L knees\par She felt the cortisone shots helped for a few days last time she had them done. \par She was in the hospital last week for UTI, has completed antibiotics.  \par She had an episode of knee buckling in left knee. \par Left knee hurts more than right knee today.  They are aware that they are early for CS injection.  \par \par They have plan to start PT next week. \par \par She still finds wrapping it with ACE bandage is helpful.  \par \par \par

## 2023-01-19 NOTE — PROCEDURE
[FreeTextEntry1] : #1 Left knee fluid aspiration WITHOUT CS injection\par Left site identified. Verbal consent was obtained. \par Anesthesia was with ethyl chloride.\par The patient was prepped with betadine solution. \par A 21 gauge 1.5 inch needle was used.\par 12 cc of clear yellow color fluid aspirated. \par Injectables:0 \par \par \par #2 right knee fluid aspiration WITHOUT CS injection\par Right site identified. Verbal consent was obtained. \par Anesthesia was with ethyl chloride.\par The patient was prepped with betadine solution. \par A 21 gauge 1.5 inch needle was used.\par 10 cc of clear yellow color fluid aspirated. \par Injectables:0 \par \par Fluid sent for testing

## 2023-01-20 LAB
B PERT IGG+IGM PNL SER: CLEAR
B PERT IGG+IGM PNL SER: CLEAR
COLOR FLD: YELLOW
COLOR FLD: YELLOW
FLUID INTAKE SUBSTANCE CLASS: NORMAL
FLUID INTAKE SUBSTANCE CLASS: NORMAL
NEUTS SEG # FLD MANUAL: NORMAL
NEUTS SEG # FLD MANUAL: NORMAL
RBC # FLD MANUAL: < 2000 /UL
RBC # FLD MANUAL: < 2000 /UL
TOTAL CELLS COUNTED FLD: < 3 /UL
TOTAL CELLS COUNTED FLD: < 3 /UL
TUBE TYPE: NORMAL
TUBE TYPE: NORMAL

## 2023-01-23 LAB
SYCRY CLARITY: CLEAR
SYCRY COLOR: YELLOW
SYCRY ID: NORMAL
SYCRY TUBE: NORMAL

## 2023-02-20 ENCOUNTER — OFFICE (OUTPATIENT)
Dept: URBAN - METROPOLITAN AREA CLINIC 102 | Facility: CLINIC | Age: 88
Setting detail: OPHTHALMOLOGY
End: 2023-02-20
Payer: MEDICARE

## 2023-02-20 DIAGNOSIS — H01.005: ICD-10-CM

## 2023-02-20 DIAGNOSIS — H52.13: ICD-10-CM

## 2023-02-20 DIAGNOSIS — H43.393: ICD-10-CM

## 2023-02-20 DIAGNOSIS — H01.004: ICD-10-CM

## 2023-02-20 DIAGNOSIS — H33.312: ICD-10-CM

## 2023-02-20 DIAGNOSIS — H01.002: ICD-10-CM

## 2023-02-20 DIAGNOSIS — H01.001: ICD-10-CM

## 2023-02-20 DIAGNOSIS — H35.373: ICD-10-CM

## 2023-02-20 PROCEDURE — 92015 DETERMINE REFRACTIVE STATE: CPT | Performed by: OPHTHALMOLOGY

## 2023-02-20 PROCEDURE — 92134 CPTRZ OPH DX IMG PST SGM RTA: CPT | Performed by: OPHTHALMOLOGY

## 2023-02-20 PROCEDURE — 92201 OPSCPY EXTND RTA DRAW UNI/BI: CPT | Performed by: OPHTHALMOLOGY

## 2023-02-20 PROCEDURE — 92004 COMPRE OPH EXAM NEW PT 1/>: CPT | Performed by: OPHTHALMOLOGY

## 2023-02-20 ASSESSMENT — REFRACTION_AUTOREFRACTION
OS_AXIS: 065
OD_SPHERE: 0.00
OD_AXIS: 132
OS_CYLINDER: -1.25
OD_CYLINDER: -0.50
OS_SPHERE: +0.50

## 2023-02-20 ASSESSMENT — AXIALLENGTH_DERIVED
OD_AL: 23.1135
OS_AL: 22.9254
OS_AL: 22.9254
OD_AL: 22.928
OD_AL: 22.928
OS_AL: 22.8338

## 2023-02-20 ASSESSMENT — REFRACTION_MANIFEST
OD_VA1: 20/25
OD_SPHERE: -0.50
OS_VA1: 20/25
OS_VA1: 20/25
OS_AXIS: 065
OS_CYLINDER: -0.75
OD_ADD: +3.00
OS_AXIS: 065
OD_AXIS: 135
OD_CYLINDER: -0.50
OS_CYLINDER: -1.25
OD_VA1: 20/25
OS_ADD: +3.00
OD_AXIS: 132
OD_CYLINDER: -0.50
OS_SPHERE: +0.50
OS_SPHERE: +0.50
OD_SPHERE: 0.00

## 2023-02-20 ASSESSMENT — SPHEQUIV_DERIVED
OD_SPHEQUIV: -0.25
OD_SPHEQUIV: -0.75
OS_SPHEQUIV: -0.125
OS_SPHEQUIV: 0.125
OD_SPHEQUIV: -0.25
OS_SPHEQUIV: -0.125

## 2023-02-20 ASSESSMENT — KERATOMETRY
OS_K2POWER_DIOPTERS: 45.75
OS_AXISANGLE_DEGREES: 144
OD_K2POWER_DIOPTERS: 45.75
OS_K1POWER_DIOPTERS: 45.25
OD_K1POWER_DIOPTERS: 45.50
OD_AXISANGLE_DEGREES: 082

## 2023-02-20 ASSESSMENT — TONOMETRY
OS_IOP_MMHG: 18
OD_IOP_MMHG: 20

## 2023-02-20 ASSESSMENT — LID EXAM ASSESSMENTS
OD_BLEPHARITIS: 2+
OS_BLEPHARITIS: 2+

## 2023-02-20 ASSESSMENT — VISUAL ACUITY
OS_BCVA: 20/40
OD_BCVA: 20/40+1

## 2023-02-20 ASSESSMENT — CONFRONTATIONAL VISUAL FIELD TEST (CVF)
OS_FINDINGS: FULL
OD_FINDINGS: FULL

## 2023-02-22 ENCOUNTER — APPOINTMENT (OUTPATIENT)
Dept: UROLOGY | Facility: CLINIC | Age: 88
End: 2023-02-22
Payer: MEDICARE

## 2023-02-22 VITALS
DIASTOLIC BLOOD PRESSURE: 57 MMHG | HEIGHT: 60 IN | WEIGHT: 156 LBS | OXYGEN SATURATION: 98 % | TEMPERATURE: 97.3 F | BODY MASS INDEX: 30.63 KG/M2 | HEART RATE: 65 BPM | RESPIRATION RATE: 14 BRPM | SYSTOLIC BLOOD PRESSURE: 145 MMHG

## 2023-02-22 PROCEDURE — 51798 US URINE CAPACITY MEASURE: CPT

## 2023-02-22 PROCEDURE — 99214 OFFICE O/P EST MOD 30 MIN: CPT | Mod: 25

## 2023-02-22 PROCEDURE — 51701 INSERT BLADDER CATHETER: CPT

## 2023-02-23 ENCOUNTER — APPOINTMENT (OUTPATIENT)
Dept: ULTRASOUND IMAGING | Facility: CLINIC | Age: 88
End: 2023-02-23
Payer: MEDICARE

## 2023-02-23 ENCOUNTER — OUTPATIENT (OUTPATIENT)
Dept: OUTPATIENT SERVICES | Facility: HOSPITAL | Age: 88
LOS: 1 days | End: 2023-02-23
Payer: MEDICARE

## 2023-02-23 DIAGNOSIS — R33.9 RETENTION OF URINE, UNSPECIFIED: ICD-10-CM

## 2023-02-23 DIAGNOSIS — Z90.89 ACQUIRED ABSENCE OF OTHER ORGANS: Chronic | ICD-10-CM

## 2023-02-23 PROCEDURE — 76770 US EXAM ABDO BACK WALL COMP: CPT | Mod: 26

## 2023-02-23 PROCEDURE — 76770 US EXAM ABDO BACK WALL COMP: CPT

## 2023-02-24 ENCOUNTER — APPOINTMENT (OUTPATIENT)
Dept: UROLOGY | Facility: CLINIC | Age: 88
End: 2023-02-24
Payer: MEDICARE

## 2023-02-24 LAB — URINE CYTOLOGY: NORMAL

## 2023-02-24 PROCEDURE — 51702 INSERT TEMP BLADDER CATH: CPT

## 2023-02-24 PROCEDURE — 99213 OFFICE O/P EST LOW 20 MIN: CPT | Mod: 25

## 2023-02-24 NOTE — END OF VISIT
[FreeTextEntry3] : A catheter was placed and drained well.  She will get a urine done in 10 days and will have a urodynamic study in through

## 2023-02-24 NOTE — PHYSICAL EXAM

## 2023-02-24 NOTE — END OF VISIT
[FreeTextEntry3] : Patient was inspected and intermittent catheterization would be the best option. Her daughter was trained in this and will do this. She will finish up her current antibiotic and will start Bethanecol. She will follow up with a urodynamic study in three weeks and they can call with any problems.

## 2023-02-24 NOTE — ADDENDUM
[FreeTextEntry1] : I, John Santos, acted as a scribe on behalf of Dr. Da Bonilla 2/23/2023. All medical entries made by the scribe were at my, Dr. Da Bonilla's, direction and personally dictated by me on 2/23/2023. I have reviewed the chart and agree that the record accurately reflects my personal performance of the history, physical exam, assessment and plan. I have personally directed, reviewed, and agreed with the chat.\par

## 2023-02-24 NOTE — HISTORY OF PRESENT ILLNESS
[FreeTextEntry1] : Chief Complaint: Recurrent Infections including on for hospitalization last month.\par Past medical history: this patient has had increasing problems with urinary infections over the past month or two. A post-void sonogram was done today showing her to have a residual urine of 400 cc's. Her last urine showed Methicillin-resistant Staphylococcus aureus. She notes that she does have to strain. Her daughter is with her today.

## 2023-02-24 NOTE — HISTORY OF PRESENT ILLNESS
[FreeTextEntry1] : This patient has been placed on intermittent catheterization but her daughter found she was unable to do this.  I had spoken with her son who is a physician and reviewed entire case.  We felt that regular catheterization with continuance of the bethanechol and antibiotic was the best thing to do for her at this point

## 2023-02-25 LAB
APPEARANCE: ABNORMAL
BACTERIA: NEGATIVE
BILIRUBIN URINE: NEGATIVE
BLOOD URINE: ABNORMAL
COLOR: NORMAL
GLUCOSE QUALITATIVE U: NEGATIVE
HYALINE CASTS: 1 /LPF
KETONES URINE: NEGATIVE
LEUKOCYTE ESTERASE URINE: ABNORMAL
MICROSCOPIC-UA: NORMAL
NITRITE URINE: NEGATIVE
PH URINE: 6.5
PROTEIN URINE: NORMAL
RED BLOOD CELLS URINE: 3 /HPF
SPECIFIC GRAVITY URINE: 1.01
SQUAMOUS EPITHELIAL CELLS: 0 /HPF
UROBILINOGEN URINE: NORMAL
WHITE BLOOD CELLS URINE: 485 /HPF

## 2023-02-27 ENCOUNTER — NON-APPOINTMENT (OUTPATIENT)
Age: 88
End: 2023-02-27

## 2023-02-27 ENCOUNTER — INPATIENT (INPATIENT)
Facility: HOSPITAL | Age: 88
LOS: 16 days | Discharge: NURSING, REHAB OR LONG-TERM CARE | DRG: 177 | End: 2023-03-16
Attending: HOSPITALIST | Admitting: SURGERY
Payer: MEDICARE

## 2023-02-27 VITALS
WEIGHT: 149.91 LBS | RESPIRATION RATE: 18 BRPM | OXYGEN SATURATION: 92 % | SYSTOLIC BLOOD PRESSURE: 170 MMHG | HEIGHT: 63 IN | HEART RATE: 73 BPM | DIASTOLIC BLOOD PRESSURE: 53 MMHG | TEMPERATURE: 98 F

## 2023-02-27 DIAGNOSIS — Z90.89 ACQUIRED ABSENCE OF OTHER ORGANS: Chronic | ICD-10-CM

## 2023-02-27 DIAGNOSIS — E87.1 HYPO-OSMOLALITY AND HYPONATREMIA: ICD-10-CM

## 2023-02-27 LAB
ALBUMIN SERPL ELPH-MCNC: 3 G/DL — LOW (ref 3.3–5)
ALBUMIN SERPL ELPH-MCNC: 3.7 G/DL — SIGNIFICANT CHANGE UP (ref 3.3–5)
ALP SERPL-CCNC: 51 U/L — SIGNIFICANT CHANGE UP (ref 40–120)
ALP SERPL-CCNC: 64 U/L — SIGNIFICANT CHANGE UP (ref 40–120)
ALT FLD-CCNC: 21 U/L — SIGNIFICANT CHANGE UP (ref 12–78)
ALT FLD-CCNC: 26 U/L — SIGNIFICANT CHANGE UP (ref 12–78)
ANION GAP SERPL CALC-SCNC: 6 MMOL/L — SIGNIFICANT CHANGE UP (ref 5–17)
ANION GAP SERPL CALC-SCNC: 7 MMOL/L — SIGNIFICANT CHANGE UP (ref 5–17)
ANION GAP SERPL CALC-SCNC: 8 MMOL/L — SIGNIFICANT CHANGE UP (ref 5–17)
ANION GAP SERPL CALC-SCNC: 9 MMOL/L — SIGNIFICANT CHANGE UP (ref 5–17)
ANION GAP SERPL CALC-SCNC: 9 MMOL/L — SIGNIFICANT CHANGE UP (ref 5–17)
APPEARANCE UR: CLEAR — SIGNIFICANT CHANGE UP
AST SERPL-CCNC: 32 U/L — SIGNIFICANT CHANGE UP (ref 15–37)
AST SERPL-CCNC: 39 U/L — HIGH (ref 15–37)
BACTERIA UR CULT: ABNORMAL
BASOPHILS # BLD AUTO: 0.01 K/UL — SIGNIFICANT CHANGE UP (ref 0–0.2)
BASOPHILS NFR BLD AUTO: 0.1 % — SIGNIFICANT CHANGE UP (ref 0–2)
BILIRUB SERPL-MCNC: 0.4 MG/DL — SIGNIFICANT CHANGE UP (ref 0.2–1.2)
BILIRUB SERPL-MCNC: 0.4 MG/DL — SIGNIFICANT CHANGE UP (ref 0.2–1.2)
BILIRUB UR-MCNC: NEGATIVE — SIGNIFICANT CHANGE UP
BUN SERPL-MCNC: 19 MG/DL — SIGNIFICANT CHANGE UP (ref 7–23)
BUN SERPL-MCNC: 19 MG/DL — SIGNIFICANT CHANGE UP (ref 7–23)
BUN SERPL-MCNC: 21 MG/DL — SIGNIFICANT CHANGE UP (ref 7–23)
BUN SERPL-MCNC: 21 MG/DL — SIGNIFICANT CHANGE UP (ref 7–23)
BUN SERPL-MCNC: 23 MG/DL — SIGNIFICANT CHANGE UP (ref 7–23)
CALCIUM SERPL-MCNC: 8.5 MG/DL — SIGNIFICANT CHANGE UP (ref 8.5–10.1)
CALCIUM SERPL-MCNC: 8.7 MG/DL — SIGNIFICANT CHANGE UP (ref 8.5–10.1)
CALCIUM SERPL-MCNC: 8.8 MG/DL — SIGNIFICANT CHANGE UP (ref 8.5–10.1)
CALCIUM SERPL-MCNC: 8.9 MG/DL — SIGNIFICANT CHANGE UP (ref 8.5–10.1)
CALCIUM SERPL-MCNC: 8.9 MG/DL — SIGNIFICANT CHANGE UP (ref 8.5–10.1)
CHLORIDE SERPL-SCNC: 86 MMOL/L — LOW (ref 96–108)
CHLORIDE SERPL-SCNC: 89 MMOL/L — LOW (ref 96–108)
CHLORIDE SERPL-SCNC: 90 MMOL/L — LOW (ref 96–108)
CHLORIDE SERPL-SCNC: 92 MMOL/L — LOW (ref 96–108)
CHLORIDE SERPL-SCNC: 92 MMOL/L — LOW (ref 96–108)
CK SERPL-CCNC: 403 U/L — HIGH (ref 26–192)
CO2 SERPL-SCNC: 21 MMOL/L — LOW (ref 22–31)
CO2 SERPL-SCNC: 21 MMOL/L — LOW (ref 22–31)
CO2 SERPL-SCNC: 22 MMOL/L — SIGNIFICANT CHANGE UP (ref 22–31)
CO2 SERPL-SCNC: 23 MMOL/L — SIGNIFICANT CHANGE UP (ref 22–31)
CO2 SERPL-SCNC: 24 MMOL/L — SIGNIFICANT CHANGE UP (ref 22–31)
COLOR SPEC: YELLOW — SIGNIFICANT CHANGE UP
CREAT SERPL-MCNC: 1.87 MG/DL — HIGH (ref 0.5–1.3)
CREAT SERPL-MCNC: 1.89 MG/DL — HIGH (ref 0.5–1.3)
CREAT SERPL-MCNC: 1.91 MG/DL — HIGH (ref 0.5–1.3)
CREAT SERPL-MCNC: 1.93 MG/DL — HIGH (ref 0.5–1.3)
CREAT SERPL-MCNC: 2.09 MG/DL — HIGH (ref 0.5–1.3)
DIFF PNL FLD: ABNORMAL
EGFR: 22 ML/MIN/1.73M2 — LOW
EGFR: 24 ML/MIN/1.73M2 — LOW
EGFR: 25 ML/MIN/1.73M2 — LOW
EOSINOPHIL # BLD AUTO: 0.02 K/UL — SIGNIFICANT CHANGE UP (ref 0–0.5)
EOSINOPHIL NFR BLD AUTO: 0.2 % — SIGNIFICANT CHANGE UP (ref 0–6)
FLUAV AG NPH QL: SIGNIFICANT CHANGE UP
FLUBV AG NPH QL: SIGNIFICANT CHANGE UP
GLUCOSE SERPL-MCNC: 112 MG/DL — HIGH (ref 70–99)
GLUCOSE SERPL-MCNC: 115 MG/DL — HIGH (ref 70–99)
GLUCOSE SERPL-MCNC: 116 MG/DL — HIGH (ref 70–99)
GLUCOSE SERPL-MCNC: 118 MG/DL — HIGH (ref 70–99)
GLUCOSE SERPL-MCNC: 130 MG/DL — HIGH (ref 70–99)
GLUCOSE UR QL: NEGATIVE — SIGNIFICANT CHANGE UP
HCT VFR BLD CALC: 25.6 % — LOW (ref 34.5–45)
HCT VFR BLD CALC: 28.9 % — LOW (ref 34.5–45)
HGB BLD-MCNC: 10 G/DL — LOW (ref 11.5–15.5)
HGB BLD-MCNC: 8.9 G/DL — LOW (ref 11.5–15.5)
IMM GRANULOCYTES NFR BLD AUTO: 1.1 % — HIGH (ref 0–0.9)
KETONES UR-MCNC: NEGATIVE — SIGNIFICANT CHANGE UP
LEUKOCYTE ESTERASE UR-ACNC: ABNORMAL
LYMPHOCYTES # BLD AUTO: 1.08 K/UL — SIGNIFICANT CHANGE UP (ref 1–3.3)
LYMPHOCYTES # BLD AUTO: 8.8 % — LOW (ref 13–44)
MAGNESIUM SERPL-MCNC: 2 MG/DL — SIGNIFICANT CHANGE UP (ref 1.6–2.6)
MCHC RBC-ENTMCNC: 30.7 PG — SIGNIFICANT CHANGE UP (ref 27–34)
MCHC RBC-ENTMCNC: 31.1 PG — SIGNIFICANT CHANGE UP (ref 27–34)
MCHC RBC-ENTMCNC: 34.6 GM/DL — SIGNIFICANT CHANGE UP (ref 32–36)
MCHC RBC-ENTMCNC: 34.8 GM/DL — SIGNIFICANT CHANGE UP (ref 32–36)
MCV RBC AUTO: 88.7 FL — SIGNIFICANT CHANGE UP (ref 80–100)
MCV RBC AUTO: 89.5 FL — SIGNIFICANT CHANGE UP (ref 80–100)
MONOCYTES # BLD AUTO: 1.15 K/UL — HIGH (ref 0–0.9)
MONOCYTES NFR BLD AUTO: 9.3 % — SIGNIFICANT CHANGE UP (ref 2–14)
NEUTROPHILS # BLD AUTO: 9.95 K/UL — HIGH (ref 1.8–7.4)
NEUTROPHILS NFR BLD AUTO: 80.5 % — HIGH (ref 43–77)
NITRITE UR-MCNC: NEGATIVE — SIGNIFICANT CHANGE UP
OSMOLALITY SERPL: 250 MOSMOL/KG — LOW (ref 280–301)
PH UR: 6 — SIGNIFICANT CHANGE UP (ref 5–8)
PHOSPHATE SERPL-MCNC: 3.2 MG/DL — SIGNIFICANT CHANGE UP (ref 2.5–4.5)
PLATELET # BLD AUTO: 100 K/UL — LOW (ref 150–400)
PLATELET # BLD AUTO: 92 K/UL — LOW (ref 150–400)
POTASSIUM SERPL-MCNC: 4 MMOL/L — SIGNIFICANT CHANGE UP (ref 3.5–5.3)
POTASSIUM SERPL-MCNC: 4.3 MMOL/L — SIGNIFICANT CHANGE UP (ref 3.5–5.3)
POTASSIUM SERPL-MCNC: 4.5 MMOL/L — SIGNIFICANT CHANGE UP (ref 3.5–5.3)
POTASSIUM SERPL-SCNC: 4 MMOL/L — SIGNIFICANT CHANGE UP (ref 3.5–5.3)
POTASSIUM SERPL-SCNC: 4.3 MMOL/L — SIGNIFICANT CHANGE UP (ref 3.5–5.3)
POTASSIUM SERPL-SCNC: 4.5 MMOL/L — SIGNIFICANT CHANGE UP (ref 3.5–5.3)
PROT SERPL-MCNC: 6.1 GM/DL — SIGNIFICANT CHANGE UP (ref 6–8.3)
PROT SERPL-MCNC: 7.2 GM/DL — SIGNIFICANT CHANGE UP (ref 6–8.3)
PROT UR-MCNC: 100
RBC # BLD: 2.86 M/UL — LOW (ref 3.8–5.2)
RBC # BLD: 3.26 M/UL — LOW (ref 3.8–5.2)
RBC # FLD: 14.2 % — SIGNIFICANT CHANGE UP (ref 10.3–14.5)
RBC # FLD: 14.3 % — SIGNIFICANT CHANGE UP (ref 10.3–14.5)
RSV RNA NPH QL NAA+NON-PROBE: SIGNIFICANT CHANGE UP
SARS-COV-2 RNA SPEC QL NAA+PROBE: SIGNIFICANT CHANGE UP
SODIUM SERPL-SCNC: 116 MMOL/L — CRITICAL LOW (ref 135–145)
SODIUM SERPL-SCNC: 118 MMOL/L — CRITICAL LOW (ref 135–145)
SODIUM SERPL-SCNC: 119 MMOL/L — CRITICAL LOW (ref 135–145)
SODIUM SERPL-SCNC: 122 MMOL/L — LOW (ref 135–145)
SODIUM SERPL-SCNC: 124 MMOL/L — LOW (ref 135–145)
SP GR SPEC: 1.01 — SIGNIFICANT CHANGE UP (ref 1.01–1.02)
T3 SERPL-MCNC: 69 NG/DL — LOW (ref 80–200)
T4 AB SER-ACNC: 8.2 UG/DL — SIGNIFICANT CHANGE UP (ref 4.6–12)
TROPONIN I, HIGH SENSITIVITY RESULT: 44.62 NG/L — SIGNIFICANT CHANGE UP
TSH SERPL-MCNC: 1.58 UU/ML — SIGNIFICANT CHANGE UP (ref 0.34–4.82)
URATE SERPL-MCNC: 4.6 MG/DL — SIGNIFICANT CHANGE UP (ref 2.5–7)
UROBILINOGEN FLD QL: NEGATIVE — SIGNIFICANT CHANGE UP
WBC # BLD: 12.34 K/UL — HIGH (ref 3.8–10.5)
WBC # BLD: 8.99 K/UL — SIGNIFICANT CHANGE UP (ref 3.8–10.5)
WBC # FLD AUTO: 12.34 K/UL — HIGH (ref 3.8–10.5)
WBC # FLD AUTO: 8.99 K/UL — SIGNIFICANT CHANGE UP (ref 3.8–10.5)

## 2023-02-27 PROCEDURE — 85045 AUTOMATED RETICULOCYTE COUNT: CPT

## 2023-02-27 PROCEDURE — 74018 RADEX ABDOMEN 1 VIEW: CPT

## 2023-02-27 PROCEDURE — 99222 1ST HOSP IP/OBS MODERATE 55: CPT

## 2023-02-27 PROCEDURE — 86140 C-REACTIVE PROTEIN: CPT

## 2023-02-27 PROCEDURE — 86900 BLOOD TYPING SEROLOGIC ABO: CPT

## 2023-02-27 PROCEDURE — 97530 THERAPEUTIC ACTIVITIES: CPT | Mod: GP

## 2023-02-27 PROCEDURE — P9016: CPT

## 2023-02-27 PROCEDURE — 71250 CT THORAX DX C-: CPT

## 2023-02-27 PROCEDURE — 84480 ASSAY TRIIODOTHYRONINE (T3): CPT

## 2023-02-27 PROCEDURE — 85025 COMPLETE CBC W/AUTO DIFF WBC: CPT

## 2023-02-27 PROCEDURE — 83550 IRON BINDING TEST: CPT

## 2023-02-27 PROCEDURE — 82607 VITAMIN B-12: CPT

## 2023-02-27 PROCEDURE — 83540 ASSAY OF IRON: CPT

## 2023-02-27 PROCEDURE — 85652 RBC SED RATE AUTOMATED: CPT

## 2023-02-27 PROCEDURE — 87086 URINE CULTURE/COLONY COUNT: CPT

## 2023-02-27 PROCEDURE — 87070 CULTURE OTHR SPECIMN AEROBIC: CPT

## 2023-02-27 PROCEDURE — 83735 ASSAY OF MAGNESIUM: CPT

## 2023-02-27 PROCEDURE — 85027 COMPLETE CBC AUTOMATED: CPT

## 2023-02-27 PROCEDURE — 80048 BASIC METABOLIC PNL TOTAL CA: CPT

## 2023-02-27 PROCEDURE — U0003: CPT

## 2023-02-27 PROCEDURE — 93010 ELECTROCARDIOGRAM REPORT: CPT

## 2023-02-27 PROCEDURE — 86923 COMPATIBILITY TEST ELECTRIC: CPT

## 2023-02-27 PROCEDURE — 97162 PT EVAL MOD COMPLEX 30 MIN: CPT | Mod: GP

## 2023-02-27 PROCEDURE — 73521 X-RAY EXAM HIPS BI 2 VIEWS: CPT | Mod: 26

## 2023-02-27 PROCEDURE — 84100 ASSAY OF PHOSPHORUS: CPT

## 2023-02-27 PROCEDURE — 85379 FIBRIN DEGRADATION QUANT: CPT

## 2023-02-27 PROCEDURE — 71045 X-RAY EXAM CHEST 1 VIEW: CPT

## 2023-02-27 PROCEDURE — 83935 ASSAY OF URINE OSMOLALITY: CPT

## 2023-02-27 PROCEDURE — 84300 ASSAY OF URINE SODIUM: CPT

## 2023-02-27 PROCEDURE — 82436 ASSAY OF URINE CHLORIDE: CPT

## 2023-02-27 PROCEDURE — 71045 X-RAY EXAM CHEST 1 VIEW: CPT | Mod: 26

## 2023-02-27 PROCEDURE — 97116 GAIT TRAINING THERAPY: CPT | Mod: GP

## 2023-02-27 PROCEDURE — 36415 COLL VENOUS BLD VENIPUNCTURE: CPT

## 2023-02-27 PROCEDURE — 83930 ASSAY OF BLOOD OSMOLALITY: CPT

## 2023-02-27 PROCEDURE — 86901 BLOOD TYPING SEROLOGIC RH(D): CPT

## 2023-02-27 PROCEDURE — 80053 COMPREHEN METABOLIC PANEL: CPT

## 2023-02-27 PROCEDURE — 82746 ASSAY OF FOLIC ACID SERUM: CPT

## 2023-02-27 PROCEDURE — 81001 URINALYSIS AUTO W/SCOPE: CPT

## 2023-02-27 PROCEDURE — 84145 PROCALCITONIN (PCT): CPT

## 2023-02-27 PROCEDURE — 87040 BLOOD CULTURE FOR BACTERIA: CPT

## 2023-02-27 PROCEDURE — 74018 RADEX ABDOMEN 1 VIEW: CPT | Mod: 26

## 2023-02-27 PROCEDURE — 93306 TTE W/DOPPLER COMPLETE: CPT

## 2023-02-27 PROCEDURE — 84443 ASSAY THYROID STIM HORMONE: CPT

## 2023-02-27 PROCEDURE — 36430 TRANSFUSION BLD/BLD COMPNT: CPT

## 2023-02-27 PROCEDURE — U0005: CPT

## 2023-02-27 PROCEDURE — 86850 RBC ANTIBODY SCREEN: CPT

## 2023-02-27 PROCEDURE — 85014 HEMATOCRIT: CPT

## 2023-02-27 PROCEDURE — 82728 ASSAY OF FERRITIN: CPT

## 2023-02-27 PROCEDURE — 84550 ASSAY OF BLOOD/URIC ACID: CPT

## 2023-02-27 PROCEDURE — 84436 ASSAY OF TOTAL THYROXINE: CPT

## 2023-02-27 PROCEDURE — 85018 HEMOGLOBIN: CPT

## 2023-02-27 PROCEDURE — 82533 TOTAL CORTISOL: CPT

## 2023-02-27 PROCEDURE — 99291 CRITICAL CARE FIRST HOUR: CPT

## 2023-02-27 RX ORDER — GABAPENTIN 400 MG/1
300 CAPSULE ORAL
Refills: 0 | Status: DISCONTINUED | OUTPATIENT
Start: 2023-02-27 | End: 2023-03-16

## 2023-02-27 RX ORDER — DOCUSATE SODIUM 100 MG
1 CAPSULE ORAL
Qty: 0 | Refills: 0 | DISCHARGE

## 2023-02-27 RX ORDER — CHOLECALCIFEROL (VITAMIN D3) 125 MCG
1 CAPSULE ORAL
Qty: 0 | Refills: 0 | DISCHARGE

## 2023-02-27 RX ORDER — PIPERACILLIN AND TAZOBACTAM 4; .5 G/20ML; G/20ML
3.38 INJECTION, POWDER, LYOPHILIZED, FOR SOLUTION INTRAVENOUS EVERY 12 HOURS
Refills: 0 | Status: COMPLETED | OUTPATIENT
Start: 2023-02-27 | End: 2023-02-27

## 2023-02-27 RX ORDER — ROSUVASTATIN CALCIUM 5 MG/1
1 TABLET ORAL
Qty: 0 | Refills: 0 | DISCHARGE

## 2023-02-27 RX ORDER — ATORVASTATIN CALCIUM 80 MG/1
20 TABLET, FILM COATED ORAL AT BEDTIME
Refills: 0 | Status: DISCONTINUED | OUTPATIENT
Start: 2023-02-27 | End: 2023-03-16

## 2023-02-27 RX ORDER — FOLIC ACID 0.8 MG
1 TABLET ORAL DAILY
Refills: 0 | Status: DISCONTINUED | OUTPATIENT
Start: 2023-02-27 | End: 2023-03-16

## 2023-02-27 RX ORDER — SODIUM CHLORIDE 9 MG/ML
500 INJECTION INTRAMUSCULAR; INTRAVENOUS; SUBCUTANEOUS ONCE
Refills: 0 | Status: COMPLETED | OUTPATIENT
Start: 2023-02-27 | End: 2023-02-27

## 2023-02-27 RX ORDER — ACETAMINOPHEN 500 MG
650 TABLET ORAL EVERY 6 HOURS
Refills: 0 | Status: DISCONTINUED | OUTPATIENT
Start: 2023-02-27 | End: 2023-03-16

## 2023-02-27 RX ORDER — HYDRALAZINE HCL 50 MG
0.5 TABLET ORAL
Qty: 0 | Refills: 0 | DISCHARGE

## 2023-02-27 RX ORDER — ALLOPURINOL 300 MG
50 TABLET ORAL DAILY
Refills: 0 | Status: DISCONTINUED | OUTPATIENT
Start: 2023-02-27 | End: 2023-03-16

## 2023-02-27 RX ORDER — ACETAMINOPHEN 500 MG
1000 TABLET ORAL ONCE
Refills: 0 | Status: COMPLETED | OUTPATIENT
Start: 2023-02-27 | End: 2023-02-27

## 2023-02-27 RX ORDER — LIDOCAINE 4 G/100G
1 CREAM TOPICAL
Qty: 0 | Refills: 0 | DISCHARGE

## 2023-02-27 RX ORDER — PIPERACILLIN AND TAZOBACTAM 4; .5 G/20ML; G/20ML
3.38 INJECTION, POWDER, LYOPHILIZED, FOR SOLUTION INTRAVENOUS ONCE
Refills: 0 | Status: COMPLETED | OUTPATIENT
Start: 2023-02-27 | End: 2023-02-27

## 2023-02-27 RX ORDER — NEBIVOLOL HYDROCHLORIDE 5 MG/1
1 TABLET ORAL
Qty: 0 | Refills: 0 | DISCHARGE

## 2023-02-27 RX ORDER — SODIUM CHLORIDE 9 MG/ML
1000 INJECTION INTRAMUSCULAR; INTRAVENOUS; SUBCUTANEOUS
Refills: 0 | Status: DISCONTINUED | OUTPATIENT
Start: 2023-02-27 | End: 2023-02-27

## 2023-02-27 RX ORDER — BETHANECHOL CHLORIDE 25 MG
50 TABLET ORAL
Refills: 0 | Status: DISCONTINUED | OUTPATIENT
Start: 2023-02-27 | End: 2023-03-16

## 2023-02-27 RX ORDER — HYDRALAZINE HCL 50 MG
25 TABLET ORAL
Refills: 0 | Status: DISCONTINUED | OUTPATIENT
Start: 2023-02-27 | End: 2023-03-04

## 2023-02-27 RX ORDER — HEPARIN SODIUM 5000 [USP'U]/ML
5000 INJECTION INTRAVENOUS; SUBCUTANEOUS EVERY 12 HOURS
Refills: 0 | Status: DISCONTINUED | OUTPATIENT
Start: 2023-02-27 | End: 2023-03-16

## 2023-02-27 RX ORDER — PANTOPRAZOLE SODIUM 20 MG/1
40 TABLET, DELAYED RELEASE ORAL
Refills: 0 | Status: DISCONTINUED | OUTPATIENT
Start: 2023-02-27 | End: 2023-03-16

## 2023-02-27 RX ADMIN — Medication 1 TABLET(S): at 00:57

## 2023-02-27 RX ADMIN — Medication 50 MILLIGRAM(S): at 09:46

## 2023-02-27 RX ADMIN — PIPERACILLIN AND TAZOBACTAM 200 GRAM(S): 4; .5 INJECTION, POWDER, LYOPHILIZED, FOR SOLUTION INTRAVENOUS at 04:13

## 2023-02-27 RX ADMIN — GABAPENTIN 300 MILLIGRAM(S): 400 CAPSULE ORAL at 21:10

## 2023-02-27 RX ADMIN — GABAPENTIN 300 MILLIGRAM(S): 400 CAPSULE ORAL at 09:45

## 2023-02-27 RX ADMIN — SODIUM CHLORIDE 500 MILLILITER(S): 9 INJECTION INTRAMUSCULAR; INTRAVENOUS; SUBCUTANEOUS at 02:05

## 2023-02-27 RX ADMIN — ATORVASTATIN CALCIUM 20 MILLIGRAM(S): 80 TABLET, FILM COATED ORAL at 21:10

## 2023-02-27 RX ADMIN — SODIUM CHLORIDE 50 MILLILITER(S): 9 INJECTION INTRAMUSCULAR; INTRAVENOUS; SUBCUTANEOUS at 13:01

## 2023-02-27 RX ADMIN — Medication 25 MILLIGRAM(S): at 21:11

## 2023-02-27 RX ADMIN — Medication 25 MILLIGRAM(S): at 09:45

## 2023-02-27 RX ADMIN — PIPERACILLIN AND TAZOBACTAM 25 GRAM(S): 4; .5 INJECTION, POWDER, LYOPHILIZED, FOR SOLUTION INTRAVENOUS at 13:01

## 2023-02-27 RX ADMIN — HEPARIN SODIUM 5000 UNIT(S): 5000 INJECTION INTRAVENOUS; SUBCUTANEOUS at 21:11

## 2023-02-27 RX ADMIN — PIPERACILLIN AND TAZOBACTAM 25 GRAM(S): 4; .5 INJECTION, POWDER, LYOPHILIZED, FOR SOLUTION INTRAVENOUS at 06:28

## 2023-02-27 RX ADMIN — PANTOPRAZOLE SODIUM 40 MILLIGRAM(S): 20 TABLET, DELAYED RELEASE ORAL at 06:07

## 2023-02-27 RX ADMIN — Medication 400 MILLIGRAM(S): at 02:05

## 2023-02-27 RX ADMIN — Medication 50 MILLIGRAM(S): at 21:11

## 2023-02-27 RX ADMIN — HEPARIN SODIUM 5000 UNIT(S): 5000 INJECTION INTRAVENOUS; SUBCUTANEOUS at 09:46

## 2023-02-27 NOTE — ED ADULT NURSE NOTE - NS ED PATIENT SAFETY CONCERN
----- Message from Edmond Emerson MD sent at 11/19/2021  6:56 AM CST -----  Please contact patientHis labs look good and he should continue with his current medications.     His PSA remains borderline elevated and has slowly trended higher over the last three years.  I would recommend that we recheck this in six months to show that it is stable.  I will put in a lab order.  He does not need to fast.   No

## 2023-02-27 NOTE — ED ADULT TRIAGE NOTE - CHIEF COMPLAINT QUOTE
as per EMS patients daughter called 911 for patient vomiting x 1 and r/o UTI.  patient c/o pelvic pain, Perales catheter in place and draining clear, yellow urine. patient denies nausea at this time.

## 2023-02-27 NOTE — PATIENT PROFILE ADULT - FALL HARM RISK - HARM RISK INTERVENTIONS

## 2023-02-27 NOTE — H&P ADULT - NSHPREVIEWOFSYSTEMS_GEN_ALL_CORE
CONSTITUTIONAL: No fever, chills, or fatigue. Body aches  EYES: No eye pain, visual disturbances, or discharge.  ENMT:  No difficulty hearing, tinnitus, or vertigo. No sinus or throat pain.  NECK: No pain or stiffness.  RESPIRATORY: No shortness of breath, cough, or wheezing.  CARDIOVASCULAR: No chest pain, palpitations, dizziness, or leg swelling.  GASTROINTESTINAL: No abdominal or epigastric pain. No nausea, vomiting,  diarrhea, or constipation. No hematemesis, melena, or hematochezia.  GENITOURINARY: No dysuria, frequency, hematuria, or incontinence.  NEUROLOGICAL: No headaches, memory loss, loss of strength, numbness, or tremors.  SKIN: No itching, burning, rashes, or lesions.  MUSCULOSKELETAL: No joint pain or swelling; No muscle, back, or extremity pain.  PSYCHIATRIC: No depression, anxiety, mood swings, or difficulty sleeping.

## 2023-02-27 NOTE — H&P ADULT - NSHPPHYSICALEXAM_GEN_ALL_CORE
General: Well appearing, lying in bed in NAD.    HEENT: Pupils equal, reactive to light. Symmetric. No scleral icterus or injection.    PULM: Clear to auscultation B/L. No wheezes, rales, or rhonchi appreciated. No significant sputum production or increased respiratory effort.    NECK: Supple, no lymphadenopathy, trachea midline.    CVS: Regular rate and rhythm, no murmurs appreciated, +s1/s2.    ABD: Soft, nondistended, nontender, normoactive bowel sounds. Perales    EXT: +1 edema, nontender.    SKIN: Warm and well perfused, no rashes noted.    NEURO: Alert, oriented, interactive, nonfocal.

## 2023-02-27 NOTE — PROGRESS NOTE ADULT - ASSESSMENT
ASSESSMENT  89 yo female with PMHx arthritis, AS, HTN, gout, hyperlipidemia, hx of pelvic fracture, hx of chronic UTI with some urinary retention after post void trials in Dr. Carvalho office presents with bilateral hip groin pain, lethargy X 2 days.  Of note patient was seen recently by urology Dr. Bonilla was being treated for UTI with bactrim, had issues with urinary retention. straight cath was initially recommended but patient daughter unable to do, so pérez was placed ~3 days prior to arrival. As per daughter pt also taking laxatives for constipation and having diarrhea now. 1 episode of vomiting     Admitted for  1. Hyponatremia 2/2 hypovolemia, lasix use  2. UTI  3. ANDREW on CKD    PLAN  - lethargic but AAOx 3 once woken up. denies pain  - BP stable. home med includes lasix, will hold diuretics for now as most likely contributing to hypoNa  - on room air sating 97-99%  - PO diet as tolerated.   - pérez replaced in ED. monitor I & Os. s/p 500cc bolus NS yesterday. Na 116-> 118. Urine Na and Urine osm pending. repeat BMP 1100. Goal Na correction 6-8mEq in 24 hours. pt appears dry will start gentle IV fluids  - Cr elevated from baseline on admission, now trending down. Nephro consulted.  - TSH and uric acid wnl. check AM cortisol  - cont IV zosyn for UTI. f/u blood and urine cultures. monitor temps. trend WBC  - DVT ppx hep sq.     Dispo: CICU. Trend Na. f/u BMP 1100. IV abx for UTI f/u cultures    Will discuss wwverónica [place]

## 2023-02-27 NOTE — H&P ADULT - ASSESSMENT
89 yo F with arthritis, hx of HTN, gout, hyperlipidemia, hx of pelvic fracture, COPD not on home O2, chronic UTI 2/2 urinary retention with outpt Pérez 2/24 admitted for    # Hyponatremia  # ANDREW on CKD 4    Neuro:  - No Active issues  - Avoid Neuro Deliriogenic / sedative medications  - Aspiration Precautions, HOB > 30 degrees    CV:  - No active issues, Normotensive  - restart home antihypertensive regimen    Pulm:  - Supplemental oxygen as needed to maintain SpO2 > 92%,  - Incentive spirometry                  GI:  - Protonix 40mg Daily  - NPO except meds  - Hold bowel regimen, pt took laxatives and has been having multiple BM x 2 days    Renal:  - Continue to monitor Bun/Cr and UOP  - Replacing electrolytes as needed with Goal K> 4, PO> 3, Mg> 2               - Strict I&O's, Pérez change  - Avoid Nephro toxic medication  - Renally dose meds  - Fluid restrict  - Serum Osmo, urine Osmo, urine Sodium, Thyroid panel, Uric Acid, Cortisol  - CMP, Mag, Phos Q4H  - Goal Na increase 8-10 in 24hr, Hyponatremia Multifactorial: GI Loss, Low solute intake and ANDREW. Could also be component of Post Obstructive Diuresis as pt was having urinary retention until pérez placement on 2/24    Heme:  - Heparin for DVT prophylaxis    ID:  - Pan cx'd, Pérez change start zosyn, Pt UTI last visit sensitive  - Microbiology and Radiology reviewed   - trend CBC with diff, CMP  and fever curve    Endo:  - ISS for aggressive glycemic control to limit FS glucose to < 180mg/dl.  - Keep Euthyroid    Plan discussed with Dr Divya ROSAS Attending

## 2023-02-27 NOTE — PROGRESS NOTE ADULT - SUBJECTIVE AND OBJECTIVE BOX
Patient seen and examined, full note to follow    Serial Na  Goal 6-8 in 24 hours  Low dose NS ok for now as current rise is 2 in 6 hours

## 2023-02-27 NOTE — PROVIDER CONTACT NOTE (EICU) - RECOMMENDATIONS
fluid restrict, send uric acid, urine lytes, tsh, repeat sodium in 6 hours, goal correction is 8-10 meq/day alhtough suspect this is more of an acute process

## 2023-02-27 NOTE — ED PROVIDER NOTE - OTHER FREE TEXT FOR MDM DISCUSSED CASE WITH QUESTION
Hospitalist Dr. Nieves; who suggests patient needing higher level of care due to hyponatremia and need for frequent labs and vital signs.

## 2023-02-27 NOTE — PROGRESS NOTE ADULT - SUBJECTIVE AND OBJECTIVE BOX
Patient is a 89y old  Female who presents with a chief complaint of     BRIEF HOSPITAL COURSE: 89 yo female with PMHx arthritis, HTN, gout, hyperlipidemia, hx of pelvic fracture, hx of chronic UTI with some urinary retention after post void trials in Dr. Carvalho office presents with bilateral hip and groin pain X 2 days.  Patient's daughter states she had recent urology evaluation and straight cath urines were recommended but they were difficult after only one day and she thinks patient maybe strained her hips trying to hold positioning during straight cath.  Patient then had indwelling pérez placed 3 days ago which has been draining well.  Patient is currently on bactrim for UTI.  Daughter states patient was also fatigued at home, not speaking much, and had malaise.  Patient refused to walk yesterday due to bilateral hip and pelvis pain. Denies falls.  Daughter sent patient via ambulance when she vomited once and could not take her PM dose of bactrim.  Patient denies abdominal pain, nausea, or chest pain.  O2 on arrival was 91% and nasal canula placed for comfort but patient denies SOB.  Daughter states mother was recently treated at home with laxatives for constipation and has been having multiple bowel movements.          PAST MEDICAL & SURGICAL HISTORY:  HTN (hypertension)  HLD (hyperlipidemia)  Gout  Osteoarthritis  History of tonsillectomy  in childhood      Medications:  piperacillin/tazobactam IVPB.. 3.375 Gram(s) IV Intermittent every 12 hours  hydrALAZINE 25 milliGRAM(s) Oral two times a day  gabapentin 300 milliGRAM(s) Oral two times a day  heparin   Injectable 5000 Unit(s) SubCutaneous every 12 hours  pantoprazole    Tablet 40 milliGRAM(s) Oral before breakfast  allopurinol 50 milliGRAM(s) Oral daily  atorvastatin 20 milliGRAM(s) Oral at bedtime    ICU Vital Signs Last 24 Hrs  T(C): 36.6 (2023 08:41), Max: 37.1 (2023 05:43)  T(F): 97.8 (2023 08:41), Max: 98.7 (2023 05:43)  HR: 52 (2023 09:00) (51 - 73)  BP: 148/46 (2023 09:00) (101/42 - 170/53)  BP(mean): 69 (2023 09:00) (56 - 85)  ABP: --  ABP(mean): --  RR: 9 (2023 09:00) (9 - 18)  SpO2: 95% (2023 08:00) (92% - 95%)    O2 Parameters below as of 2023 08:00  Patient On (Oxygen Delivery Method): room air    I&O's Detail      LABS:                        10.0   12.34 )-----------( 100      ( 2023 00:50 )             28.9     02-    118<LL>  |  89<L>  |  21  ----------------------------<  118<H>  4.3   |  21<L>  |  1.89<H>    Ca    8.5      2023 06:14  Phos  3.2       Mg     2.0         TPro  6.1  /  Alb  3.0<L>  /  TBili  0.4  /  DBili  x   /  AST  32  /  ALT  21  /  AlkPhos  51  02-27      CARDIAC MARKERS ( 2023 00:50 )  x     / x     / 403 U/L / x     / x          CAPILLARY BLOOD GLUCOSE  Urinalysis Basic - ( 2023 04:29 )    Color: Yellow / Appearance: Clear / S.010 / pH: x  Gluc: x / Ketone: Negative  / Bili: Negative / Urobili: Negative   Blood: x / Protein: 100 / Nitrite: Negative   Leuk Esterase: Moderate / RBC: 11-25 /HPF / WBC 3-5 /HPF   Sq Epi: x / Non Sq Epi: Few / Bacteria: Few      CULTURES:    Physical Examination:    General: lethargic but arousable. AAOx 3  HEENT: Pupils equal, reactive to light.  Symmetric. dry mucous membranes  PULM: Clear to auscultation bilaterally, no crackles or wheezing  NECK: Supple, no lymphadenopathy, trachea midline  CVS: Regular rate and rhythm, + systolic murmur  ABD: Soft, globular, nondistended, nontender, normoactive bowel sounds  EXT: 1+ pitting pedal edema LE b/l, nontender  SKIN: Warm and well perfused, no rashes noted.  NEURO: Alert, oriented, but lethargic    DEVICES:     RADIOLOGY:      Patient is a 89y old  Female who presents with a chief complaint of     BRIEF HOSPITAL COURSE: 89 yo female with PMHx arthritis, HTN, gout, hyperlipidemia, hx of pelvic fracture, hx of chronic UTI with some urinary retention after post void trials in Dr. Carvalho office presents with bilateral hip and groin pain X 2 days.  Patient's daughter states she had recent urology evaluation and straight cath urines were recommended but they were difficult after only one day and she thinks patient maybe strained her hips trying to hold positioning during straight cath.  Patient then had indwelling pérez placed 3 days ago which has been draining well.  Patient is currently on bactrim for UTI.  Daughter states patient was also fatigued at home, not speaking much, and had malaise.  Patient refused to walk yesterday due to bilateral hip and pelvis pain. Denies falls.  Daughter sent patient via ambulance when she vomited once and could not take her PM dose of bactrim.  Patient denies abdominal pain, nausea, or chest pain.  O2 on arrival was 91% and nasal canula placed for comfort but patient denies SOB.  Daughter states mother was recently treated at home with laxatives for constipation and has been having multiple bowel movements.      lethargic but AAOx3 when i woke her up, denies pain    PAST MEDICAL & SURGICAL HISTORY:  HTN (hypertension)  HLD (hyperlipidemia)  Gout  Osteoarthritis  History of tonsillectomy  in childhood      Medications:  piperacillin/tazobactam IVPB.. 3.375 Gram(s) IV Intermittent every 12 hours  hydrALAZINE 25 milliGRAM(s) Oral two times a day  gabapentin 300 milliGRAM(s) Oral two times a day  heparin   Injectable 5000 Unit(s) SubCutaneous every 12 hours  pantoprazole    Tablet 40 milliGRAM(s) Oral before breakfast  allopurinol 50 milliGRAM(s) Oral daily  atorvastatin 20 milliGRAM(s) Oral at bedtime    ICU Vital Signs Last 24 Hrs  T(C): 36.6 (2023 08:41), Max: 37.1 (2023 05:43)  T(F): 97.8 (2023 08:41), Max: 98.7 (2023 05:43)  HR: 52 (2023 09:00) (51 - 73)  BP: 148/46 (2023 09:00) (101/42 - 170/53)  BP(mean): 69 (2023 09:00) (56 - 85)  ABP: --  ABP(mean): --  RR: 9 (2023 09:00) (9 - 18)  SpO2: 95% (2023 08:00) (92% - 95%)    O2 Parameters below as of 2023 08:00  Patient On (Oxygen Delivery Method): room air    I&O's Detail      LABS:                        10.0   12.34 )-----------( 100      ( 2023 00:50 )             28.9     02    118<LL>  |  89<L>  |  21  ----------------------------<  118<H>  4.3   |  21<L>  |  1.89<H>    Ca    8.5      2023 06:14  Phos  3.2       Mg     2.0         TPro  6.1  /  Alb  3.0<L>  /  TBili  0.4  /  DBili  x   /  AST  32  /  ALT  21  /  AlkPhos  51  0227      CARDIAC MARKERS ( 2023 00:50 )  x     / x     / 403 U/L / x     / x          CAPILLARY BLOOD GLUCOSE  Urinalysis Basic - ( 2023 04:29 )    Color: Yellow / Appearance: Clear / S.010 / pH: x  Gluc: x / Ketone: Negative  / Bili: Negative / Urobili: Negative   Blood: x / Protein: 100 / Nitrite: Negative   Leuk Esterase: Moderate / RBC: 11-25 /HPF / WBC 3-5 /HPF   Sq Epi: x / Non Sq Epi: Few / Bacteria: Few      CULTURES:    Physical Examination:    General: lethargic but arousable. AAOx 3  HEENT: Pupils equal, reactive to light.  Symmetric. dry mucous membranes  PULM: Clear to auscultation bilaterally, no crackles or wheezing  NECK: Supple, no lymphadenopathy, trachea midline  CVS: Regular rate and rhythm, + systolic murmur  ABD: Soft, globular, nondistended, nontender, normoactive bowel sounds  EXT: 1+ pitting pedal edema LE b/l, nontender  SKIN: Warm and well perfused, no rashes noted.  NEURO: Alert, oriented, but lethargic    DEVICES:     RADIOLOGY:

## 2023-02-27 NOTE — ED PROVIDER NOTE - OBJECTIVE STATEMENT
Pt. is a 89 yo F with arthritis, hx of HTN, gout, hyperlipidemia, hx of pelvic fracture, hx of chronic UTI with some urinary retention after post void trials in Dr. Carvalho office presents with bilateral hip and groin pain X 2 days.  Patient's daughter states she had recent urology evaluation and straight cath urines were recommended but they were difficult after only one day and she thinks patient maybe strained her hips trying to hold positioning during straight cath.  Patient then had indwelling pérez placed 3 days ago which has been draining well.  Patient is currently on bactrim for UTI.  Daughter states patient was also fatigued at home, not speaking much, and had malaise.  Patient refused to walk yesterday due to bilateral hip and pelvis pain. Denies falls.  Daughter sent patient via ambulance when she vomitted once and could not take her PM dose of bactrim.  Patient denies abdominal pain, nausea, or chest pain.  O2 on arrival was 91% and nasal canula placed for comfort but patient denies SOB.

## 2023-02-27 NOTE — H&P ADULT - HISTORY OF PRESENT ILLNESS
Pt. is a 89 yo F with arthritis, hx of HTN, gout, hyperlipidemia, hx of pelvic fracture, hx of chronic UTI with some urinary retention after post void trials in Dr. Carvalho office presents with bilateral hip and groin pain X 2 days.  Patient's daughter states she had recent urology evaluation and straight cath urines were recommended but they were difficult after only one day and she thinks patient maybe strained her hips trying to hold positioning during straight cath.  Patient then had indwelling pérez placed 3 days ago which has been draining well.  Patient is currently on bactrim for UTI.  Daughter states patient was also fatigued at home, not speaking much, and had malaise.  Patient refused to walk yesterday due to bilateral hip and pelvis pain. Denies falls.  Daughter sent patient via ambulance when she vomited once and could not take her PM dose of bactrim.  Patient denies abdominal pain, nausea, or chest pain.  O2 on arrival was 91% and nasal canula placed for comfort but patient denies SOB.    Upon my initial evaluation pt alert and lethargic with daughter at bedside. Daughter states mother was recently treated at home with laxatives for constipation and has been having multiple bowel movements.

## 2023-02-27 NOTE — ED ADULT NURSE NOTE - BREATH SOUNDS, MLM
----- Message from Alanna Lema sent at 1/29/2018 10:34 AM CST -----  Contact: self/[phone]  Pt in regards to asking the office what med's are he allergic to.      Please advise   Clear

## 2023-02-27 NOTE — ED PROVIDER NOTE - PROGRESS NOTE DETAILS
JG:  Discussed Xrays and lab results with patient and daughter. Daughter states patient had low sodium in the past but never this low.  Patient has had constipation and had been using laxatives in the past week to have a bowel movement.  Daughter concerned this could have caused the hyponatremia. Babar, ICU PA admitting patient to CICU.

## 2023-02-27 NOTE — ED PROVIDER NOTE - FAMILY DETAILS FREE TEXT FOR MDM ADDL HISTORY OBTAINED FROM QUESTION
Daughter gave recent med list, recent history of pérez and UTI.  Discussed all results with daughter.

## 2023-02-27 NOTE — PROGRESS NOTE ADULT - NS PANP COMMENT GEN_ALL_CORE FT
Patient seen with ARI Tejeda  Patient admitted with weakness found to be hyponatremic  Sodium increased from 116-118  Patient on lasix  was also drinking water and taking lots of laxatives for constipation  urine Na, osmolality sent  will give normal saline IV fluid  trend sodiums  will need PT given weakness

## 2023-02-27 NOTE — PHARMACOTHERAPY INTERVENTION NOTE - COMMENTS
Medication history complete, reviewed medication with list provided by patients family and confirmed with Tiffanie-called daughter Pippa at [phone] to verify Hydralazine and Gabapentin dose

## 2023-02-27 NOTE — ED PROVIDER NOTE - CLINICAL SUMMARY MEDICAL DECISION MAKING FREE TEXT BOX
89 yo F with UTI and pérez catheter, now with weakness, vomiting, pain in legs, refusal to walk.  Labs, EKG, xrays of hips, pelvis, chest and will re-evaluate after tylenol for pain.     ICU consulted due to hyponatremia.  Family and patient updated.

## 2023-02-27 NOTE — ED ADULT NURSE REASSESSMENT NOTE - NS ED NURSE REASSESS COMMENT FT1
Perales catheter changed, UCx and UA collected. IV abx administered as per MAR. Transferred to CICU via stretcher, safety maintained.

## 2023-02-27 NOTE — CONSULT NOTE ADULT - ASSESSMENT
89 yo female with PMHx arthritis, HTN, gout, hyperlipidemia, hx of pelvic fracture, hx of chronic UTI, urinary retention presents with bilateral hip and groin pain X 2 days with renal evaluation of hyponatremia.  Per notes patient had indwelling pérez placed 3 days ago which has been draining well.  Patient is currently on bactrim for UTI on admit. Noted with hyponatremia wi mild to moderate symptoms so renal evaluation.     Hyponatremia  -LIkely pre renal in nature as noted by response to NS hydration  -Goal rise is 6-8 over 24 hours  -NS ok for now, serial Na's  -TSH and coritsol if not checked  -Urine studies, lytes    CKD III b  -Stable function, near apparent baseline  -IVF as above  -Encourage po  -Avoid nsaids/contrast    d/c with RN staff, team

## 2023-02-28 ENCOUNTER — APPOINTMENT (OUTPATIENT)
Dept: RHEUMATOLOGY | Facility: CLINIC | Age: 88
End: 2023-02-28

## 2023-02-28 DIAGNOSIS — E87.1 HYPO-OSMOLALITY AND HYPONATREMIA: ICD-10-CM

## 2023-02-28 DIAGNOSIS — I35.0 NONRHEUMATIC AORTIC (VALVE) STENOSIS: ICD-10-CM

## 2023-02-28 DIAGNOSIS — I50.32 CHRONIC DIASTOLIC (CONGESTIVE) HEART FAILURE: ICD-10-CM

## 2023-02-28 DIAGNOSIS — I34.0 NONRHEUMATIC MITRAL (VALVE) INSUFFICIENCY: ICD-10-CM

## 2023-02-28 DIAGNOSIS — I35.1 NONRHEUMATIC AORTIC (VALVE) INSUFFICIENCY: ICD-10-CM

## 2023-02-28 LAB
ALBUMIN SERPL ELPH-MCNC: 3.1 G/DL — LOW (ref 3.3–5)
ALBUMIN SERPL ELPH-MCNC: 3.2 G/DL — LOW (ref 3.3–5)
ALP SERPL-CCNC: 57 U/L — SIGNIFICANT CHANGE UP (ref 40–120)
ALP SERPL-CCNC: 59 U/L — SIGNIFICANT CHANGE UP (ref 40–120)
ALT FLD-CCNC: 21 U/L — SIGNIFICANT CHANGE UP (ref 12–78)
ALT FLD-CCNC: 23 U/L — SIGNIFICANT CHANGE UP (ref 12–78)
ANION GAP SERPL CALC-SCNC: 6 MMOL/L — SIGNIFICANT CHANGE UP (ref 5–17)
ANION GAP SERPL CALC-SCNC: 7 MMOL/L — SIGNIFICANT CHANGE UP (ref 5–17)
ANION GAP SERPL CALC-SCNC: 7 MMOL/L — SIGNIFICANT CHANGE UP (ref 5–17)
ANION GAP SERPL CALC-SCNC: 8 MMOL/L — SIGNIFICANT CHANGE UP (ref 5–17)
AST SERPL-CCNC: 38 U/L — HIGH (ref 15–37)
AST SERPL-CCNC: 40 U/L — HIGH (ref 15–37)
BILIRUB SERPL-MCNC: 0.4 MG/DL — SIGNIFICANT CHANGE UP (ref 0.2–1.2)
BILIRUB SERPL-MCNC: 0.5 MG/DL — SIGNIFICANT CHANGE UP (ref 0.2–1.2)
BUN SERPL-MCNC: 18 MG/DL — SIGNIFICANT CHANGE UP (ref 7–23)
BUN SERPL-MCNC: 19 MG/DL — SIGNIFICANT CHANGE UP (ref 7–23)
CALCIUM SERPL-MCNC: 9 MG/DL — SIGNIFICANT CHANGE UP (ref 8.5–10.1)
CALCIUM SERPL-MCNC: 9.1 MG/DL — SIGNIFICANT CHANGE UP (ref 8.5–10.1)
CALCIUM SERPL-MCNC: 9.2 MG/DL — SIGNIFICANT CHANGE UP (ref 8.5–10.1)
CALCIUM SERPL-MCNC: 9.3 MG/DL — SIGNIFICANT CHANGE UP (ref 8.5–10.1)
CHLORIDE SERPL-SCNC: 93 MMOL/L — LOW (ref 96–108)
CHLORIDE SERPL-SCNC: 94 MMOL/L — LOW (ref 96–108)
CHLORIDE SERPL-SCNC: 94 MMOL/L — LOW (ref 96–108)
CHLORIDE SERPL-SCNC: 95 MMOL/L — LOW (ref 96–108)
CO2 SERPL-SCNC: 20 MMOL/L — LOW (ref 22–31)
CO2 SERPL-SCNC: 22 MMOL/L — SIGNIFICANT CHANGE UP (ref 22–31)
CORTIS AM PEAK SERPL-MCNC: 9.6 UG/DL — SIGNIFICANT CHANGE UP (ref 6–18.4)
CREAT SERPL-MCNC: 1.69 MG/DL — HIGH (ref 0.5–1.3)
CREAT SERPL-MCNC: 1.79 MG/DL — HIGH (ref 0.5–1.3)
CREAT SERPL-MCNC: 1.8 MG/DL — HIGH (ref 0.5–1.3)
CREAT SERPL-MCNC: 1.83 MG/DL — HIGH (ref 0.5–1.3)
CULTURE RESULTS: NO GROWTH — SIGNIFICANT CHANGE UP
EGFR: 25 ML/MIN/1.73M2 — LOW
EGFR: 26 ML/MIN/1.73M2 — LOW
EGFR: 26 ML/MIN/1.73M2 — LOW
EGFR: 28 ML/MIN/1.73M2 — LOW
GLUCOSE SERPL-MCNC: 104 MG/DL — HIGH (ref 70–99)
GLUCOSE SERPL-MCNC: 112 MG/DL — HIGH (ref 70–99)
GLUCOSE SERPL-MCNC: 115 MG/DL — HIGH (ref 70–99)
GLUCOSE SERPL-MCNC: 129 MG/DL — HIGH (ref 70–99)
HCT VFR BLD CALC: 28.1 % — LOW (ref 34.5–45)
HGB BLD-MCNC: 9.6 G/DL — LOW (ref 11.5–15.5)
MAGNESIUM SERPL-MCNC: 2.2 MG/DL — SIGNIFICANT CHANGE UP (ref 1.6–2.6)
MAGNESIUM SERPL-MCNC: 2.4 MG/DL — SIGNIFICANT CHANGE UP (ref 1.6–2.6)
MCHC RBC-ENTMCNC: 30.9 PG — SIGNIFICANT CHANGE UP (ref 27–34)
MCHC RBC-ENTMCNC: 34.2 GM/DL — SIGNIFICANT CHANGE UP (ref 32–36)
MCV RBC AUTO: 90.4 FL — SIGNIFICANT CHANGE UP (ref 80–100)
OSMOLALITY UR: 134 MOSM/KG — SIGNIFICANT CHANGE UP (ref 50–1200)
PHOSPHATE SERPL-MCNC: 2.9 MG/DL — SIGNIFICANT CHANGE UP (ref 2.5–4.5)
PHOSPHATE SERPL-MCNC: 3.2 MG/DL — SIGNIFICANT CHANGE UP (ref 2.5–4.5)
PLATELET # BLD AUTO: 94 K/UL — LOW (ref 150–400)
POTASSIUM SERPL-MCNC: 4.2 MMOL/L — SIGNIFICANT CHANGE UP (ref 3.5–5.3)
POTASSIUM SERPL-MCNC: 4.3 MMOL/L — SIGNIFICANT CHANGE UP (ref 3.5–5.3)
POTASSIUM SERPL-SCNC: 4.2 MMOL/L — SIGNIFICANT CHANGE UP (ref 3.5–5.3)
POTASSIUM SERPL-SCNC: 4.3 MMOL/L — SIGNIFICANT CHANGE UP (ref 3.5–5.3)
PROT SERPL-MCNC: 6.5 GM/DL — SIGNIFICANT CHANGE UP (ref 6–8.3)
PROT SERPL-MCNC: 6.7 GM/DL — SIGNIFICANT CHANGE UP (ref 6–8.3)
RBC # BLD: 3.11 M/UL — LOW (ref 3.8–5.2)
RBC # FLD: 14.6 % — HIGH (ref 10.3–14.5)
SODIUM SERPL-SCNC: 122 MMOL/L — LOW (ref 135–145)
SODIUM SERPL-SCNC: 124 MMOL/L — LOW (ref 135–145)
SODIUM UR-SCNC: 28 MMOL/L — SIGNIFICANT CHANGE UP
SPECIMEN SOURCE: SIGNIFICANT CHANGE UP
WBC # BLD: 8.73 K/UL — SIGNIFICANT CHANGE UP (ref 3.8–10.5)
WBC # FLD AUTO: 8.73 K/UL — SIGNIFICANT CHANGE UP (ref 3.8–10.5)

## 2023-02-28 PROCEDURE — 99232 SBSQ HOSP IP/OBS MODERATE 35: CPT

## 2023-02-28 RX ORDER — SODIUM CHLORIDE 9 MG/ML
1000 INJECTION INTRAMUSCULAR; INTRAVENOUS; SUBCUTANEOUS
Refills: 0 | Status: DISCONTINUED | OUTPATIENT
Start: 2023-02-28 | End: 2023-03-02

## 2023-02-28 RX ADMIN — GABAPENTIN 300 MILLIGRAM(S): 400 CAPSULE ORAL at 09:22

## 2023-02-28 RX ADMIN — Medication 50 MILLIGRAM(S): at 09:22

## 2023-02-28 RX ADMIN — Medication 25 MILLIGRAM(S): at 09:21

## 2023-02-28 RX ADMIN — PANTOPRAZOLE SODIUM 40 MILLIGRAM(S): 20 TABLET, DELAYED RELEASE ORAL at 06:44

## 2023-02-28 RX ADMIN — Medication 1 MILLIGRAM(S): at 09:21

## 2023-02-28 RX ADMIN — HEPARIN SODIUM 5000 UNIT(S): 5000 INJECTION INTRAVENOUS; SUBCUTANEOUS at 09:21

## 2023-02-28 RX ADMIN — Medication 650 MILLIGRAM(S): at 19:59

## 2023-02-28 RX ADMIN — Medication 650 MILLIGRAM(S): at 20:30

## 2023-02-28 RX ADMIN — SODIUM CHLORIDE 50 MILLILITER(S): 9 INJECTION INTRAMUSCULAR; INTRAVENOUS; SUBCUTANEOUS at 10:41

## 2023-02-28 NOTE — PHYSICAL THERAPY INITIAL EVALUATION ADULT - MODALITIES TREATMENT COMMENTS
Patient very weak, deconditioned. Not able to safely get into the chair. Recommend Jamee Pineda . Patient returned to bed, call bell in reach and bed alarm active.  RN informed of session/status.

## 2023-02-28 NOTE — DIETITIAN INITIAL EVALUATION ADULT - PERTINENT MEDS FT
MEDICATIONS  (STANDING):  allopurinol 50 milliGRAM(s) Oral daily  atorvastatin 20 milliGRAM(s) Oral at bedtime  bethanechol 50 milliGRAM(s) Oral two times a day  folic acid 1 milliGRAM(s) Oral daily  gabapentin 300 milliGRAM(s) Oral two times a day  heparin   Injectable 5000 Unit(s) SubCutaneous every 12 hours  hydrALAZINE 25 milliGRAM(s) Oral two times a day  pantoprazole    Tablet 40 milliGRAM(s) Oral before breakfast  sodium chloride 0.9%. 1000 milliLiter(s) (50 mL/Hr) IV Continuous <Continuous>    MEDICATIONS  (PRN):  acetaminophen     Tablet .. 650 milliGRAM(s) Oral every 6 hours PRN Temp greater or equal to 38C (100.4F), Mild Pain (1 - 3)

## 2023-02-28 NOTE — DIETITIAN INITIAL EVALUATION ADULT - PERTINENT LABORATORY DATA
02-28    124<L>  |  95<L>  |  18  ----------------------------<  115<H>  4.3   |  22  |  1.80<H>    Ca    9.3      28 Feb 2023 06:44  Phos  3.2     02-28  Mg     2.4     02-28    TPro  6.7  /  Alb  3.2<L>  /  TBili  0.4  /  DBili  x   /  AST  38<H>  /  ALT  23  /  AlkPhos  57  02-28

## 2023-02-28 NOTE — PROGRESS NOTE ADULT - SUBJECTIVE AND OBJECTIVE BOX
Patient is a 89y old  Female who presents with a chief complaint of hyponatremia, lethargy (2023 10:06)      BRIEF HOSPITAL COURSE: Pt. is a 89 yo F with arthritis, hx of HTN, gout, hyperlipidemia, hx of pelvic fracture, hx of chronic UTI with some urinary retention after post void trials in Dr. Carvalho office presents with bilateral hip and groin pain X 2 days.  Patient's daughter states she had recent urology evaluation and straight cath urines were recommended but they were difficult after only one day and she thinks patient maybe strained her hips trying to hold positioning during straight cath.  Patient then had indwelling pérez placed 3 days ago which has been draining well.  Patient is currently on bactrim for UTI.  Daughter states patient was also fatigued at home, not speaking much, and had malaise.  Patient refused to walk yesterday due to bilateral hip and pelvis pain. Denies falls.  Daughter sent patient via ambulance when she vomited once and could not take her PM dose of bactrim.  Patient denies abdominal pain, nausea, or chest pain.  O2 on arrival was 91% and nasal canula placed for comfort but patient denies SOB.    Events last 24 hours: Na118->124 post NS 50mL x 5 hours. NS dc'd f/u Na 122->122 continue fluid restriction till am    PAST MEDICAL & SURGICAL HISTORY:  HTN (hypertension)      HLD (hyperlipidemia)      Gout      Osteoarthritis      History of tonsillectomy  in childhood          Review of Systems:  CONSTITUTIONAL: No fever, chills, or fatigue. Body aches  EYES: No eye pain, visual disturbances, or discharge.  ENMT:  No difficulty hearing, tinnitus, or vertigo. No sinus or throat pain.  NECK: No pain or stiffness.  RESPIRATORY: No shortness of breath, cough, or wheezing.  CARDIOVASCULAR: No chest pain, palpitations, dizziness, or leg swelling.  GASTROINTESTINAL: No abdominal or epigastric pain. No nausea, vomiting,  diarrhea, or constipation. No hematemesis, melena, or hematochezia.  GENITOURINARY: No dysuria, frequency, hematuria, or incontinence.  NEUROLOGICAL: No headaches, memory loss, loss of strength, numbness, or tremors.  SKIN: No itching, burning, rashes, or lesions.  MUSCULOSKELETAL: No joint pain or swelling; No muscle, back, or extremity pain.  PSYCHIATRIC: No depression, anxiety, mood swings, or difficulty sleeping.  Medications:    hydrALAZINE 25 milliGRAM(s) Oral two times a day      acetaminophen     Tablet .. 650 milliGRAM(s) Oral every 6 hours PRN  gabapentin 300 milliGRAM(s) Oral two times a day      heparin   Injectable 5000 Unit(s) SubCutaneous every 12 hours    pantoprazole    Tablet 40 milliGRAM(s) Oral before breakfast    bethanechol 50 milliGRAM(s) Oral two times a day    allopurinol 50 milliGRAM(s) Oral daily  atorvastatin 20 milliGRAM(s) Oral at bedtime    folic acid 1 milliGRAM(s) Oral daily                ICU Vital Signs Last 24 Hrs  T(C): 36.7 (2023 13:25), Max: 37.1 (2023 05:43)  T(F): 98 (2023 13:25), Max: 98.7 (2023 05:43)  HR: 77 (2023 18:00) (51 - 77)  BP: 134/41 (2023 18:00) (101/42 - 164/59)  BP(mean): 65 (2023 18:00) (56 - 85)  ABP: --  ABP(mean): --  RR: 16 (2023 18:00) (9 - 21)  SpO2: 93% (2023 18:00) (93% - 95%)    O2 Parameters below as of 2023 18:00  Patient On (Oxygen Delivery Method): room air                I&O's Detail    2023 07:01  -  2023 01:50  --------------------------------------------------------  IN:    sodium chloride 0.9%: 250 mL  Total IN: 250 mL    OUT:    Voided (mL): 1000 mL  Total OUT: 1000 mL    Total NET: -750 mL          LABS:                        8.9    8.99  )-----------( 92       ( 2023 10:57 )             25.6         122<L>  |  93<L>  |  19  ----------------------------<  129<H>  4.3   |  22  |  1.83<H>    Ca    9.2      2023 00:17  Phos  2.9       Mg     2.2         TPro  6.5  /  Alb  3.1<L>  /  TBili  0.5  /  DBili  x   /  AST  40<H>  /  ALT  21  /  AlkPhos  59        CARDIAC MARKERS ( 2023 00:50 )  x     / x     / 403 U/L / x     / x          CAPILLARY BLOOD GLUCOSE          Urinalysis Basic - ( 2023 04:29 )    Color: Yellow / Appearance: Clear / S.010 / pH: x  Gluc: x / Ketone: Negative  / Bili: Negative / Urobili: Negative   Blood: x / Protein: 100 / Nitrite: Negative   Leuk Esterase: Moderate / RBC: 11-25 /HPF / WBC 3-5 /HPF   Sq Epi: x / Non Sq Epi: Few / Bacteria: Few      CULTURES:      Physical Examination:    General: Well appearing, lying in bed in NAD.    HEENT: Pupils equal, reactive to light. Symmetric. No scleral icterus or injection.    PULM: Clear to auscultation B/L. No wheezes, rales, or rhonchi appreciated. No significant sputum production or increased respiratory effort.    NECK: Supple, no lymphadenopathy, trachea midline.    CVS: Regular rate and rhythm, no murmurs appreciated, +s1/s2.    ABD: Soft, nondistended, nontender, normoactive bowel sounds. Pérez    EXT: +1 edema, nontender.    SKIN: Warm and well perfused, no rashes noted.    NEURO: Alert, oriented, interactive, nonfocal.    RADIOLOGY: < from: Xray Chest 1 View- PORTABLE-Urgent (Xray Chest 1 View- PORTABLE-Urgent .) (23 @ 02:30) >    ACC: [id number] EXAM:  XR HIPS BI WITH PELV 2V   ORDERED BY: SAMREEN RAMIREZ     ACC: [id number] EXAM:  XR CHEST PORTABLE URGENT 1V   ORDERED BY: SAMREEN RAMIREZ     PROCEDURE DATE:  2023          INTERPRETATION:  Lateral hips and pelvis and chest.Patient has hip pain.    Patient has vomiting.    Bilateral hips with pelvis. 5 views.    Arterial calcifications are noted.    Degenerative loss of disc height at multiple lower lumbar levels with   slight right lower lumbar curve noted.    There ismild symmetric hip degeneration. No fracture.    AP chest on 2023 at 1:45 AM.    Heart magnified by technique.    There are bilateral perihilar infiltrates right greater than left which   are new since  this year. Configuration suggests infection.    IMPRESSION: No acute fracture. Extensive arterial calcifications.    Bilateral perihilar infiltrates new since prior suggesting infection.    --- End of Report ---    < end of copied text >

## 2023-02-28 NOTE — CONSULT NOTE ADULT - ASSESSMENT
2/28/23:  Pt with above history with h/o HFpEF on Lasix developing severe hyponatremia better after current treatment but still hyponatremic.  Will have to avoid diuretics but continue to gently replete NA watching for diastolic CHF given her stiff heart and moderate-severe AS.  Will repeat an echo.  Continue as outlined by medicine etal.  Will follow as treatment progresses.

## 2023-02-28 NOTE — PROGRESS NOTE ADULT - NS ATTEND AMEND GEN_ALL_CORE FT
Patient with hx. of recent UTI, admitted with hyponatremia  felt likely secondary to dehydration.  slowly improving with dehydration  has been slowly increasing, now 124  Patient awake and alert, still weak  got Zosyn for uti repeat culture was negative.  needs PT. generalized weakness  CKD stable  Patient seen with daughter at bedside

## 2023-02-28 NOTE — PHYSICAL THERAPY INITIAL EVALUATION ADULT - ACTIVE RANGE OF MOTION EXAMINATION, REHAB EVAL
R shoulder to 30* from a "rotator cuff"/bilateral upper extremity Active ROM was WFL (within functional limits)/deficits as listed below

## 2023-02-28 NOTE — PHYSICAL THERAPY INITIAL EVALUATION ADULT - PERTINENT HX OF CURRENT PROBLEM, REHAB EVAL
89 yo F admitted bilateral hip and groin pain X 2 days.  Patient's daughter states she had recent urology evaluation and straight cath urines were recommended but they were difficult after only one day and she thinks patient maybe strained her hips trying to hold positioning during straight cath.  Patient then had indwelling pérez placed 3 days ago which has been draining well.  Daughter states patient was also fatigued at home, not speaking much, and had malaise.  Patient refused to walk yesterday due to bilateral hip and pelvis pain.  X-Rays (-) for fx.

## 2023-02-28 NOTE — PROGRESS NOTE ADULT - ASSESSMENT
89 yo female with PMHx arthritis, HTN, gout, hyperlipidemia, hx of pelvic fracture, hx of chronic UTI, urinary retention presents with bilateral hip and groin pain X 2 days with renal evaluation of hyponatremia.       Hyponatremia  -LIkely pre renal in nature as noted by response to NS hydration  -Goal rise is 6-8 over 24 hours which is currently well within goal  -NS ok for now, agree with resumption  -Can lower frequency of bmp checks  -optimize intake    CKD III b  -Stable function, near apparent baseline  -IVF as above  -Encourage po  -Avoid nsaids/contrast    d/c with RN staff, team

## 2023-02-28 NOTE — DIETITIAN INITIAL EVALUATION ADULT - NSFNSGIIOFT_GEN_A_CORE
I&O's Detail    27 Feb 2023 07:01  -  28 Feb 2023 07:00  --------------------------------------------------------  IN:    sodium chloride 0.9%: 250 mL  Total IN: 250 mL    OUT:    Indwelling Catheter - Urethral (mL): 1000 mL    Voided (mL): 1000 mL  Total OUT: 2000 mL    Total NET: -1750 mL

## 2023-02-28 NOTE — PROGRESS NOTE ADULT - ASSESSMENT
ASSESSMENT  89 yo female with PMHx arthritis, AS, HTN, gout, hyperlipidemia, hx of pelvic fracture, hx of chronic UTI with some urinary retention after post void trials in Dr. Carvalho office presents with bilateral hip groin pain, lethargy X 2 days.  Of note patient was seen recently by urology Dr. Bonilla was being treated for UTI with bactrim, had issues with urinary retention. straight cath was initially recommended but patient daughter unable to do, so pérez was placed ~3 days prior to arrival. As per daughter pt also taking laxatives for constipation and having diarrhea now. 1 episode of vomiting     Admitted for  1. Hyponatremia 2/2 hypovolemia, lasix use  2. UTI  3. ANDREW on CKD    PLAN  -   - BP elevated started on hydralazine 25mg bid. home med includes lasix, will hold diuretics for now as most likely contributing to hypoNa  - on room air sating 97-99%  - PO diet as tolerated.   - pérez replaced in ED. monitor I & Os. Na 122-> 124 Urine Na and Urine osm pending. repeat BMP 1200. Goal Na correction 6-8mEq in 24 hours. continue gentle IV fluids.  - Cr trending down. Nephro consulted.  - TSH and uric acid wnl. F/U AM cortisol  - dcd zosyn. Urine cx neg. Blood cx pending.  monitor temps. trend WBC  - DVT ppx hep sq.     Dispo: CICU. Trend Na. f/u BMP 1200    Will discuss wwith Dr. Boston ASSESSMENT  89 yo female with PMHx arthritis, AS, HTN, gout, hyperlipidemia, hx of pelvic fracture, hx of chronic UTI with some urinary retention after post void trials in Dr. Carvalho office presents with bilateral hip groin pain, lethargy X 2 days.  Of note patient was seen recently by urology Dr. Bonilla was being treated for UTI with bactrim, had issues with urinary retention. straight cath was initially recommended but patient daughter unable to do, so pérez was placed ~3 days prior to arrival. As per daughter pt also taking laxatives for constipation and having diarrhea now. 1 episode of vomiting     Admitted for  1. Hyponatremia 2/2 hypovolemia, lasix use  2. UTI  3. ANDREW on CKD    PLAN  -   - BP elevated started on hydralazine 25mg bid. home med includes lasix, will hold diuretics for now as most likely contributing to hypoNa  - on room air sating 97-99%  - PO diet as tolerated.   - pérez replaced in ED. monitor I & Os. Na 122-> 124 Urine Na and Urine osm pending. repeat BMP 1200. Goal Na correction 6-8mEq in 24 hours. continue gentle IV fluids.  - Cr trending down. Nephro consulted.  - TSH and uric acid wnl. F/U AM cortisol  - dcd zosyn. Urine cx neg. Blood cx pending.  monitor temps. trend WBC  - DVT ppx hep sq.     Dispo: CICU. Trend Na. f/u BMP 1200. If Na improves then downgrade    Will discuss joe Boston

## 2023-02-28 NOTE — PROGRESS NOTE ADULT - SUBJECTIVE AND OBJECTIVE BOX
Patient is a 89y old  Female who presents with a chief complaint of     BRIEF HOSPITAL COURSE: 89 yo female with PMHx arthritis, HTN, gout, hyperlipidemia, hx of pelvic fracture, hx of chronic UTI with some urinary retention after post void trials in Dr. Carvalho office presents with bilateral hip and groin pain X 2 days.  Patient's daughter states she had recent urology evaluation and straight cath urines were recommended but they were difficult after only one day and she thinks patient maybe strained her hips trying to hold positioning during straight cath.  Patient then had indwelling pérez placed 3 days ago which has been draining well.  Patient is currently on bactrim for UTI.  Daughter states patient was also fatigued at home, not speaking much, and had malaise.  Patient refused to walk yesterday due to bilateral hip and pelvis pain. Denies falls.  Daughter sent patient via ambulance when she vomited once and could not take her PM dose of bactrim.  Patient denies abdominal pain, nausea, or chest pain.  O2 on arrival was 91% and nasal canula placed for comfort but patient denies SOB.  Daughter states mother was recently treated at home with laxatives for constipation and has been having multiple bowel movements.      lethargic but AAOx3 when i woke her up, denies pain    PAST MEDICAL & SURGICAL HISTORY:  HTN (hypertension)  HLD (hyperlipidemia)  Gout  Osteoarthritis  History of tonsillectomy  in childhood      MEDICATIONS  (STANDING):  allopurinol 50 milliGRAM(s) Oral daily  atorvastatin 20 milliGRAM(s) Oral at bedtime  bethanechol 50 milliGRAM(s) Oral two times a day  folic acid 1 milliGRAM(s) Oral daily  gabapentin 300 milliGRAM(s) Oral two times a day  heparin   Injectable 5000 Unit(s) SubCutaneous every 12 hours  hydrALAZINE 25 milliGRAM(s) Oral two times a day  pantoprazole    Tablet 40 milliGRAM(s) Oral before breakfast    Vital Signs Last 24 Hrs  T(C): 36.7 (2023 06:09), Max: 36.7 (2023 13:25)  T(F): 98.1 (2023 06:09), Max: 98.1 (2023 06:09)  HR: 72 (2023 06:00) (52 - 91)  BP: 156/60 (2023 06:00) (104/80 - 160/53)  BP(mean): 81 (2023 06:00) (63 - 86)  RR: 16 (2023 06:00) (9 - 21)  SpO2: 95% (2023 06:00) (93% - 98%)    Parameters below as of 2023 06:00  Patient On (Oxygen Delivery Method): room air      LABS:                            9.6    8.73  )-----------( 94       ( 2023 06:44 )             28.1     02-    124<L>  |  95<L>  |  18  ----------------------------<  115<H>  4.3   |  22  |  1.80<H>    Ca    9.3      2023 06:44  Phos  2.9       Mg     2.2         TPro  6.7  /  Alb  3.2<L>  /  TBili  0.4  /  DBili  x   /  AST  38<H>  /  ALT  23  /  AlkPhos  57  02-28    CARDIAC MARKERS ( 2023 00:50 )  x     / x     / 403 U/L / x     / x          LIVER FUNCTIONS - ( 2023 06:44 )  Alb: 3.2 g/dL / Pro: 6.7 gm/dL / ALK PHOS: 57 U/L / ALT: 23 U/L / AST: 38 U/L / GGT: x             Urinalysis Basic - ( 2023 04:29 )    Color: Yellow / Appearance: Clear / S.010 / pH: x  Gluc: x / Ketone: Negative  / Bili: Negative / Urobili: Negative   Blood: x / Protein: 100 / Nitrite: Negative   Leuk Esterase: Moderate / RBC: 11-25 /HPF / WBC 3-5 /HPF   Sq Epi: x / Non Sq Epi: Few / Bacteria: Few    CULTURES:    Physical Examination:    General: lethargic but arousable. AAOx 3  HEENT: Pupils equal, reactive to light.  Symmetric. dry mucous membranes  PULM: Clear to auscultation bilaterally, no crackles or wheezing  NECK: Supple, no lymphadenopathy, trachea midline  CVS: Regular rate and rhythm, + systolic murmur  ABD: Soft, globular, nondistended, nontender, normoactive bowel sounds  EXT: 1+ pitting pedal edema LE b/l, nontender  SKIN: Warm and well perfused, no rashes noted.  NEURO: Alert, oriented, but lethargic    DEVICES:     RADIOLOGY:      Patient is a 89y old  Female who presents with a chief complaint of     BRIEF HOSPITAL COURSE: 89 yo female with PMHx arthritis, HTN, gout, hyperlipidemia, hx of pelvic fracture, hx of chronic UTI with some urinary retention after post void trials in Dr. Carvalho office presents with bilateral hip and groin pain X 2 days.  Patient's daughter states she had recent urology evaluation and straight cath urines were recommended but they were difficult after only one day and she thinks patient maybe strained her hips trying to hold positioning during straight cath.  Patient then had indwelling pérez placed 3 days ago which has been draining well.  Patient is currently on bactrim for UTI.  Daughter states patient was also fatigued at home, not speaking much, and had malaise.  Patient refused to walk yesterday due to bilateral hip and pelvis pain. Denies falls.  Daughter sent patient via ambulance when she vomited once and could not take her PM dose of bactrim.  Patient denies abdominal pain, nausea, or chest pain.  O2 on arrival was 91% and nasal canula placed for comfort but patient denies SOB.  Daughter states mother was recently treated at home with laxatives for constipation and has been having multiple bowel movements.      lethargic but AAOx3 when i woke her up, denies pain   pt awake and alert, eating breakfast    PAST MEDICAL & SURGICAL HISTORY:  HTN (hypertension)  HLD (hyperlipidemia)  Gout  Osteoarthritis  History of tonsillectomy  in childhood      MEDICATIONS  (STANDING):  allopurinol 50 milliGRAM(s) Oral daily  atorvastatin 20 milliGRAM(s) Oral at bedtime  bethanechol 50 milliGRAM(s) Oral two times a day  folic acid 1 milliGRAM(s) Oral daily  gabapentin 300 milliGRAM(s) Oral two times a day  heparin   Injectable 5000 Unit(s) SubCutaneous every 12 hours  hydrALAZINE 25 milliGRAM(s) Oral two times a day  pantoprazole    Tablet 40 milliGRAM(s) Oral before breakfast    Vital Signs Last 24 Hrs  T(C): 36.7 (2023 06:09), Max: 36.7 (2023 13:25)  T(F): 98.1 (2023 06:09), Max: 98.1 (2023 06:09)  HR: 72 (2023 06:00) (52 - 91)  BP: 156/60 (2023 06:00) (104/80 - 160/53)  BP(mean): 81 (2023 06:00) (63 - 86)  RR: 16 (2023 06:00) (9 - 21)  SpO2: 95% (2023 06:00) (93% - 98%)    Parameters below as of 2023 06:00  Patient On (Oxygen Delivery Method): room air      LABS:                            9.6    8.73  )-----------( 94       ( 2023 06:44 )             28.1     02-    124<L>  |  95<L>  |  18  ----------------------------<  115<H>  4.3   |  22  |  1.80<H>    Ca    9.3      2023 06:44  Phos  2.9       Mg     2.2         TPro  6.7  /  Alb  3.2<L>  /  TBili  0.4  /  DBili  x   /  AST  38<H>  /  ALT  23  /  AlkPhos  57      CARDIAC MARKERS ( 2023 00:50 )  x     / x     / 403 U/L / x     / x          LIVER FUNCTIONS - ( 2023 06:44 )  Alb: 3.2 g/dL / Pro: 6.7 gm/dL / ALK PHOS: 57 U/L / ALT: 23 U/L / AST: 38 U/L / GGT: x             Urinalysis Basic - ( 2023 04:29 )    Color: Yellow / Appearance: Clear / S.010 / pH: x  Gluc: x / Ketone: Negative  / Bili: Negative / Urobili: Negative   Blood: x / Protein: 100 / Nitrite: Negative   Leuk Esterase: Moderate / RBC: 11-25 /HPF / WBC 3-5 /HPF   Sq Epi: x / Non Sq Epi: Few / Bacteria: Few    CULTURES:    Physical Examination:    General: lethargic but arousable. AAOx 3  HEENT: Pupils equal, reactive to light.  Symmetric. dry mucous membranes  PULM: Clear to auscultation bilaterally, no crackles or wheezing  NECK: Supple, no lymphadenopathy, trachea midline  CVS: Regular rate and rhythm, + systolic murmur  ABD: Soft, globular, nondistended, nontender, normoactive bowel sounds  EXT: 1+ pitting pedal edema LE b/l, nontender  SKIN: Warm and well perfused, no rashes noted.  NEURO: Alert, oriented, but lethargic    DEVICES:     RADIOLOGY:

## 2023-02-28 NOTE — DIETITIAN INITIAL EVALUATION ADULT - OTHER INFO
89 yo F with arthritis, hx of HTN, gout, hyperlipidemia, hx of pelvic fracture, hx of chronic UTI with some urinary retention after post void trials in Dr. Carvalho office presents with bilateral hip and groin pain X 2 days. Daughter states patient was also fatigued at home, not speaking much, and had malaise.     Pt known to RD service. At time of RD visit, was NPO now advanced to regular diet. Noted w/ hyponatremia, holding Lasix for now. As per last RD note (1/10): dx w/ moderate PCM and wt doc'd at 161#. UBW stated at 155#, stable wt x "several months." Bed scale wt of 159# taken by RD on 2/27 - no significant wt change noted at this time. Reports of vomiting episode last night before admission, however vomiting episodes resolved since admission. NFPE reveals mild to moderate muscle/fat wasting. Encourage protein-rich foods and maximize food preferences. See recommendations below.

## 2023-02-28 NOTE — PROGRESS NOTE ADULT - SUBJECTIVE AND OBJECTIVE BOX
Patient is a 89y Female who reports no complaints as new.        MEDICATIONS  (STANDING):  allopurinol 50 milliGRAM(s) Oral daily  atorvastatin 20 milliGRAM(s) Oral at bedtime  bethanechol 50 milliGRAM(s) Oral two times a day  folic acid 1 milliGRAM(s) Oral daily  gabapentin 300 milliGRAM(s) Oral two times a day  heparin   Injectable 5000 Unit(s) SubCutaneous every 12 hours  hydrALAZINE 25 milliGRAM(s) Oral two times a day  pantoprazole    Tablet 40 milliGRAM(s) Oral before breakfast  sodium chloride 0.9%. 1000 milliLiter(s) (50 mL/Hr) IV Continuous <Continuous>    MEDICATIONS  (PRN):  acetaminophen     Tablet .. 650 milliGRAM(s) Oral every 6 hours PRN Temp greater or equal to 38C (100.4F), Mild Pain (1 - 3)        T(C): , Max: 36.7 (23 @ 13:25)  T(F): , Max: 98.1 (23 @ 06:09)  HR: 66 (23 @ 11:00)  BP: 146/51 (23 @ 11:00)  BP(mean): 76 (23 @ 11:00)  RR: 17 (23 @ 11:00)  SpO2: 97% (23 @ 11:00)  Wt(kg): --     @ 07:01  -   @ 07:00  --------------------------------------------------------  IN: 250 mL / OUT: 2000 mL / NET: -1750 mL          PHYSICAL EXAM:    Constitutional: NAD   HEENT:  MM  dist  Cardiovascular: S1 and S2   Gastrointestinal: BS+, soft, NT/ND  Extremities:  peripheral edema  Neurological: Alert, pleasent           LABS:                        9.6    8.73  )-----------( 94       ( 2023 06:44 )             28.1     2023 06:44    124    |  95     |  18     ----------------------------<  115    4.3     |  22     |  1.80   2023 00:17    122    |  93     |  19     ----------------------------<  129    4.3     |  22     |  1.83   2023 20:35    122    |  92     |  19     ----------------------------<  115    4.5     |  24     |  1.91   2023 16:36    124    |  92     |  21     ----------------------------<  130    4.0     |  23     |  1.93   2023 10:57    119    |  90     |  19     ----------------------------<  112    4.3     |  22     |  1.87     Ca    9.3        2023 06:44  Ca    9.2        2023 00:17  Ca    8.9        2023 20:35  Ca    8.9        2023 16:36  Ca    8.7        2023 10:57  Phos  3.2       2023 06:44  Phos  2.9       2023 00:17  Phos  3.2       2023 06:14  Mg     2.4       2023 06:44  Mg     2.2       2023 00:17  Mg     2.0       2023 06:14    TPro  6.7    /  Alb  3.2    /  TBili  0.4    /  DBili  x      /  AST  38     /  ALT  23     /  AlkPhos  57     2023 06:44  TPro  6.5    /  Alb  3.1    /  TBili  0.5    /  DBili  x      /  AST  40     /  ALT  21     /  AlkPhos  59     2023 00:17  TPro  6.1    /  Alb  3.0    /  TBili  0.4    /  DBili  x      /  AST  32     /  ALT  21     /  AlkPhos  51     2023 06:14  TPro  7.2    /  Alb  3.7    /  TBili  0.4    /  DBili  x      /  AST  39     /  ALT  26     /  AlkPhos  64     2023 00:50          Urine Studies:  Urinalysis Basic - ( 2023 04:29 )    Color: Yellow / Appearance: Clear / S.010 / pH: x  Gluc: x / Ketone: Negative  / Bili: Negative / Urobili: Negative   Blood: x / Protein: 100 / Nitrite: Negative   Leuk Esterase: Moderate / RBC: 11-25 /HPF / WBC 3-5 /HPF   Sq Epi: x / Non Sq Epi: Few / Bacteria: Few      Osmolality, Random Urine: 134 mosm/Kg ( @ 13:00)  Sodium, Random Urine: 28 mmol/L ( @ 13:00)        RADIOLOGY & ADDITIONAL STUDIES:

## 2023-02-28 NOTE — PHYSICAL THERAPY INITIAL EVALUATION ADULT - GENERAL OBSERVATIONS, REHAB EVAL
Patient received in bed in CICU, +ICU monitors, No O2 in use, +Perales. Patient c/o leg pain B in groin, not able to quantify.

## 2023-02-28 NOTE — DIETITIAN INITIAL EVALUATION ADULT - HEIGHT FOR BMI (CENTIMETERS)
GIOVANNA [employer], I tried to call pt but the phone is not working properly. I mailed her a appt for 6/28/17   160.02

## 2023-02-28 NOTE — PHYSICAL THERAPY INITIAL EVALUATION ADULT - PLANNED THERAPY INTERVENTIONS, PT EVAL
today: therapeutic exercises x 12 min, gait training x 12 min/bed mobility training/gait training/transfer training

## 2023-02-28 NOTE — DIETITIAN INITIAL EVALUATION ADULT - ADD RECOMMEND
1. C/w current PO diet to maximize caloric and protein intake  2. Encourage protein-rich foods, maximize food preferences   3. Monitor bowel movements, if no BM for >3 days, consider implementing bowel regimen.   4. MVI w/ minerals daily to ensure 100% RDA met   5. In v/o malnutrition, consider adding thiamine 100 mg daily  6. Monitor daily wt to track/trend changes; strict I/Os   7. Confirm goals of care regarding nutrition support   RD will continue to monitor PO intake, labs, hydration, and wt prn.

## 2023-02-28 NOTE — DIETITIAN INITIAL EVALUATION ADULT - ORAL INTAKE PTA/DIET HISTORY
Lives at home w/ daughter who assists w/ cooking and shopping. Reports of fair to good appetite PTA, however reports of poor appetite x 2 days

## 2023-02-28 NOTE — PROGRESS NOTE ADULT - ASSESSMENT
89 yo F with arthritis, hx of HTN, gout, hyperlipidemia, hx of pelvic fracture, COPD not on home O2, chronic UTI 2/2 urinary retention with outpt Pérez 2/24 admitted for    # Hyponatremia  # ANDREW on CKD 4    Neuro:  - No Active issues  - Avoid Neuro Deliriogenic / sedative medications  - Aspiration Precautions, HOB > 30 degrees    CV:  - No active issues, Normotensive  - restart home antihypertensive regimen    Pulm:  - Supplemental oxygen as needed to maintain SpO2 > 92%,  - Incentive spirometry                  GI:  - Protonix 40mg Daily  - NPO except meds  - Hold bowel regimen, pt took laxatives and has been having multiple BM x 2 days    Renal:  - Hyponatremia Multifactorial: GI Loss, Low solute intake, Lasix use and ANDREW. Could also be component of Post Obstructive Diuresis as pt was having urinary retention until pérez placement on 2/24  - Continue to monitor Bun/Cr and UOP, ANDREW improving  - Replacing electrolytes as needed with Goal K> 4, PO> 3, Mg> 2               - Strict I&O's, Pérez change  - Avoid Nephro toxic medication  - Renally dose meds  - Fluid restrict  - CMP, Mag, Phos Q4H  - Na 118->124 post NS 50mL x 5 hours. NS dc'd f/u Na 122->122 continue fluid restriction till am  - If Na drops below 122 will give another dose of NS @ 50mL x 3 hours  - Goal Na by am 122-126, Goal next 24 hours 128-130  - UA with protein of 100, possible concern for Nephrotic Syndrome  - Serum Osmo 250 awaiting urine OSMO, Urine Sodium, Cortisol    Heme:  - Heparin for DVT prophylaxis    ID:  - Pan cx'd, Pérez change start zosyn, Pt UTI last visit sensitive  - Microbiology and Radiology reviewed   - trend CBC with diff, CMP  and fever curve    Endo:  - ISS for aggressive glycemic control to limit FS glucose to < 180mg/dl.  - Keep Euthyroid, TSH 1.58, T4 8.2

## 2023-02-28 NOTE — PHYSICAL THERAPY INITIAL EVALUATION ADULT - PATIENT PROFILE REVIEW, REHAB EVAL
PMHx arthritis, HTN, gout, hyperlipidemia, hx of pelvic fracture, hx of chronic UTI with some urinary retention after post void trials in Dr. Carvalho office/yes

## 2023-02-28 NOTE — PHYSICAL THERAPY INITIAL EVALUATION ADULT - NSACTIVITYREC_GEN_A_PT
Maximal Assistance Lift, such as Antolin, Jamee Flex or Jamee Stedy, is recommended for OOB transfers for safe staff and patient handling.

## 2023-03-01 LAB
ALBUMIN SERPL ELPH-MCNC: 2.8 G/DL — LOW (ref 3.3–5)
ALP SERPL-CCNC: 60 U/L — SIGNIFICANT CHANGE UP (ref 40–120)
ALT FLD-CCNC: 22 U/L — SIGNIFICANT CHANGE UP (ref 12–78)
ANION GAP SERPL CALC-SCNC: 3 MMOL/L — LOW (ref 5–17)
ANION GAP SERPL CALC-SCNC: 6 MMOL/L — SIGNIFICANT CHANGE UP (ref 5–17)
ANION GAP SERPL CALC-SCNC: 8 MMOL/L — SIGNIFICANT CHANGE UP (ref 5–17)
AST SERPL-CCNC: 35 U/L — SIGNIFICANT CHANGE UP (ref 15–37)
BILIRUB SERPL-MCNC: 0.4 MG/DL — SIGNIFICANT CHANGE UP (ref 0.2–1.2)
BUN SERPL-MCNC: 19 MG/DL — SIGNIFICANT CHANGE UP (ref 7–23)
BUN SERPL-MCNC: 21 MG/DL — SIGNIFICANT CHANGE UP (ref 7–23)
BUN SERPL-MCNC: 21 MG/DL — SIGNIFICANT CHANGE UP (ref 7–23)
CALCIUM SERPL-MCNC: 8.2 MG/DL — LOW (ref 8.5–10.1)
CALCIUM SERPL-MCNC: 8.5 MG/DL — SIGNIFICANT CHANGE UP (ref 8.5–10.1)
CALCIUM SERPL-MCNC: 8.5 MG/DL — SIGNIFICANT CHANGE UP (ref 8.5–10.1)
CHLORIDE SERPL-SCNC: 96 MMOL/L — SIGNIFICANT CHANGE UP (ref 96–108)
CHLORIDE SERPL-SCNC: 98 MMOL/L — SIGNIFICANT CHANGE UP (ref 96–108)
CHLORIDE SERPL-SCNC: 98 MMOL/L — SIGNIFICANT CHANGE UP (ref 96–108)
CO2 SERPL-SCNC: 20 MMOL/L — LOW (ref 22–31)
CO2 SERPL-SCNC: 23 MMOL/L — SIGNIFICANT CHANGE UP (ref 22–31)
CO2 SERPL-SCNC: 23 MMOL/L — SIGNIFICANT CHANGE UP (ref 22–31)
CREAT SERPL-MCNC: 1.69 MG/DL — HIGH (ref 0.5–1.3)
CREAT SERPL-MCNC: 1.85 MG/DL — HIGH (ref 0.5–1.3)
CREAT SERPL-MCNC: 1.9 MG/DL — HIGH (ref 0.5–1.3)
EGFR: 24 ML/MIN/1.73M2 — LOW
EGFR: 25 ML/MIN/1.73M2 — LOW
EGFR: 28 ML/MIN/1.73M2 — LOW
GLUCOSE SERPL-MCNC: 105 MG/DL — HIGH (ref 70–99)
GLUCOSE SERPL-MCNC: 108 MG/DL — HIGH (ref 70–99)
GLUCOSE SERPL-MCNC: 134 MG/DL — HIGH (ref 70–99)
HCT VFR BLD CALC: 25.5 % — LOW (ref 34.5–45)
HGB BLD-MCNC: 8.5 G/DL — LOW (ref 11.5–15.5)
MAGNESIUM SERPL-MCNC: 2.2 MG/DL — SIGNIFICANT CHANGE UP (ref 1.6–2.6)
MAGNESIUM SERPL-MCNC: 2.2 MG/DL — SIGNIFICANT CHANGE UP (ref 1.6–2.6)
MCHC RBC-ENTMCNC: 30.5 PG — SIGNIFICANT CHANGE UP (ref 27–34)
MCHC RBC-ENTMCNC: 33.3 GM/DL — SIGNIFICANT CHANGE UP (ref 32–36)
MCV RBC AUTO: 91.4 FL — SIGNIFICANT CHANGE UP (ref 80–100)
PHOSPHATE SERPL-MCNC: 2.9 MG/DL — SIGNIFICANT CHANGE UP (ref 2.5–4.5)
PHOSPHATE SERPL-MCNC: 3.3 MG/DL — SIGNIFICANT CHANGE UP (ref 2.5–4.5)
PLATELET # BLD AUTO: 90 K/UL — LOW (ref 150–400)
POTASSIUM SERPL-MCNC: 4.2 MMOL/L — SIGNIFICANT CHANGE UP (ref 3.5–5.3)
POTASSIUM SERPL-MCNC: 4.5 MMOL/L — SIGNIFICANT CHANGE UP (ref 3.5–5.3)
POTASSIUM SERPL-MCNC: 5.1 MMOL/L — SIGNIFICANT CHANGE UP (ref 3.5–5.3)
POTASSIUM SERPL-SCNC: 4.2 MMOL/L — SIGNIFICANT CHANGE UP (ref 3.5–5.3)
POTASSIUM SERPL-SCNC: 4.5 MMOL/L — SIGNIFICANT CHANGE UP (ref 3.5–5.3)
POTASSIUM SERPL-SCNC: 5.1 MMOL/L — SIGNIFICANT CHANGE UP (ref 3.5–5.3)
PROT SERPL-MCNC: 6.3 G/DL — SIGNIFICANT CHANGE UP (ref 6–8.3)
RBC # BLD: 2.79 M/UL — LOW (ref 3.8–5.2)
RBC # FLD: 14.4 % — SIGNIFICANT CHANGE UP (ref 10.3–14.5)
SODIUM SERPL-SCNC: 124 MMOL/L — LOW (ref 135–145)
SODIUM SERPL-SCNC: 124 MMOL/L — LOW (ref 135–145)
SODIUM SERPL-SCNC: 127 MMOL/L — LOW (ref 135–145)
WBC # BLD: 5.08 K/UL — SIGNIFICANT CHANGE UP (ref 3.8–10.5)
WBC # FLD AUTO: 5.08 K/UL — SIGNIFICANT CHANGE UP (ref 3.8–10.5)

## 2023-03-01 PROCEDURE — 93306 TTE W/DOPPLER COMPLETE: CPT | Mod: 26

## 2023-03-01 PROCEDURE — 99233 SBSQ HOSP IP/OBS HIGH 50: CPT

## 2023-03-01 RX ADMIN — Medication 50 MILLIGRAM(S): at 21:25

## 2023-03-01 RX ADMIN — GABAPENTIN 300 MILLIGRAM(S): 400 CAPSULE ORAL at 21:05

## 2023-03-01 RX ADMIN — HEPARIN SODIUM 5000 UNIT(S): 5000 INJECTION INTRAVENOUS; SUBCUTANEOUS at 21:05

## 2023-03-01 RX ADMIN — GABAPENTIN 300 MILLIGRAM(S): 400 CAPSULE ORAL at 09:03

## 2023-03-01 RX ADMIN — Medication 25 MILLIGRAM(S): at 06:07

## 2023-03-01 RX ADMIN — Medication 25 MILLIGRAM(S): at 21:05

## 2023-03-01 RX ADMIN — Medication 50 MILLIGRAM(S): at 09:03

## 2023-03-01 RX ADMIN — Medication 650 MILLIGRAM(S): at 20:21

## 2023-03-01 RX ADMIN — Medication 1 MILLIGRAM(S): at 09:03

## 2023-03-01 RX ADMIN — PANTOPRAZOLE SODIUM 40 MILLIGRAM(S): 20 TABLET, DELAYED RELEASE ORAL at 05:50

## 2023-03-01 RX ADMIN — ATORVASTATIN CALCIUM 20 MILLIGRAM(S): 80 TABLET, FILM COATED ORAL at 21:05

## 2023-03-01 RX ADMIN — Medication 650 MILLIGRAM(S): at 20:02

## 2023-03-01 RX ADMIN — HEPARIN SODIUM 5000 UNIT(S): 5000 INJECTION INTRAVENOUS; SUBCUTANEOUS at 09:02

## 2023-03-01 NOTE — PROGRESS NOTE ADULT - SUBJECTIVE AND OBJECTIVE BOX
CC: severe hyponatremia (01 Mar 2023 07:23)    HPI:  89 yo F with arthritis, hx of HTN, gout, hyperlipidemia, hx of pelvic fracture,  UTIs  with urinary retention after post void trials in Dr. Bonilla's office presented with bilateral hip and groin pain X 2 days.  Patient's daughter stated  Pt  had recent urology evaluation and to  straight cath urine were recommended but they were difficult after only one day and she thinks patient maybe strained her hips trying to hold positioning during straight cath.  Patient then had indwelling pérez placed 3 days PTA  which has been draining well.  Patient is currently on bactrim for UTI.  Daughter stated  patient was also fatigued at home, not speaking much, and had malaise.  Patient refused to walk  day PTA due to bilateral hip and pelvis pain. No  falls.  +  vomiting  once and could not take her PM dose of bactrim.  Patient denies abdominal pain, nausea, or chest pain.  O2 on arrival was 91% and nasal canula placed for comfort but patient denies SOB.      INTERVAL HPI/ OVERNIGHT EVENTS:    Vital Signs Last 24 Hrs  T(C): 36.5 (01 Mar 2023 14:26), Max: 36.7 (28 Feb 2023 16:43)  T(F): 97.7 (01 Mar 2023 14:26), Max: 98 (28 Feb 2023 16:43)  HR: 68 (01 Mar 2023 12:00) (60 - 72)  BP: 147/39 (01 Mar 2023 12:00) (124/41 - 186/58)  BP(mean): 66 (01 Mar 2023 12:00) (62 - 85)  RR: 16 (28 Feb 2023 20:46) (16 - 16)  SpO2: 93% (01 Mar 2023 12:00) (93% - 95%)    Parameters below as of 01 Mar 2023 12:00  Patient On (Oxygen Delivery Method): room air        REVIEW OF SYSTEMS:  All other review of systems is negative unless indicated above.      PHYSICAL EXAM:  General: Well developed; well nourished; in no acute distress  Eyes: PERRLA, EOMI; conjunctiva and sclera clear  Head: Normocephalic; atraumatic  ENMT: No nasal discharge; airway clear  Neck: Supple; non tender; no masses  Respiratory: No wheezes, rales or rhonchi  Cardiovascular: Regular rate and rhythm. S1 and S2 Normal; No murmurs, gallops or rubs  Gastrointestinal: Soft non-tender non-distended; Normal bowel sounds  Genitourinary: No  suprapubic  tenderness  Extremities: Normal range of motion, No clubbing, cyanosis or edema  Vascular: Peripheral pulses palpable 2+ bilaterally  Neurological: Alert and oriented x4  Skin: Warm and dry. No acute rash  Lymph Nodes: No acute cervical adenopathy  Musculoskeletal: Normal muscle tone, without deformities  Psychiatric: Cooperative and appropriate    LABS:                         8.5    5.08  )-----------( 90       ( 01 Mar 2023 07:05 )             25.5     01 Mar 2023 07:05    127    |  98     |  19     ----------------------------<  105    4.2     |  23     |  1.69     Ca    8.5        01 Mar 2023 07:05  Phos  2.9       01 Mar 2023 07:05  Mg     2.2       01 Mar 2023 07:05    TPro  6.3    /  Alb  2.8    /  TBili  0.4    /  DBili  x      /  AST  35     /  ALT  22     /  AlkPhos  60     01 Mar 2023 00:32    LIVER FUNCTIONS - ( 01 Mar 2023 00:32 )  Alb: 2.8 g/dL / Pro: 6.3 g/dL / ALK PHOS: 60 U/L / ALT: 22 U/L / AST: 35 U/L / GGT: x               MEDICATIONS  (STANDING):  allopurinol 50 milliGRAM(s) Oral daily  atorvastatin 20 milliGRAM(s) Oral at bedtime  bethanechol 50 milliGRAM(s) Oral two times a day  folic acid 1 milliGRAM(s) Oral daily  gabapentin 300 milliGRAM(s) Oral two times a day  heparin   Injectable 5000 Unit(s) SubCutaneous every 12 hours  hydrALAZINE 25 milliGRAM(s) Oral two times a day  pantoprazole    Tablet 40 milliGRAM(s) Oral before breakfast  sodium chloride 0.9%. 1000 milliLiter(s) (50 mL/Hr) IV Continuous <Continuous>    MEDICATIONS  (PRN):  acetaminophen     Tablet .. 650 milliGRAM(s) Oral every 6 hours PRN Temp greater or equal to 38C (100.4F), Mild Pain (1 - 3)      RADIOLOGY & ADDITIONAL TESTS:  < from: Xray Abdomen 1 View (02.27.23 @ 14:38) >    ACC: [id number] EXAM:  XR ABDOMEN 1 VIEW   ORDERED BY: GRAY CAIN     PROCEDURE DATE:  02/27/2023          INTERPRETATION:  KUB: AP    COMPARISON: None.    CLINICAL INFORMATION: Vomiting.    FINDINGS:  Mild air distended colon and mid abdominal small bowel.    No free intra-abdominal air.  No obvious intra-abdominal masses.  LEFT upper quadrant and iliac vasculature all calcifications. The osseous   structures are intact.    IMPRESSION:    Mild air distended transverse colon and mid abdominal small bowel.. No   radiographic evidence of bowel obstruction.        ACC: [id number] EXAM:  XR HIPS BI WITH PELV 2V   ORDERED BY: SAMREEN RAMIREZ   ACC: [id number] EXAM:  XR CHEST PORTABLE URGENT 1V   ORDERED BY: SAMREEN RAMIREZ   PROCEDURE DATE:  02/27/2023        INTERPRETATION:  Lateral hips and pelvis and chest.Patient has hip pain.    Patient has vomiting.    Bilateral hips with pelvis. 5 views.    Arterial calcifications are noted.    Degenerative loss of disc height at multiple lower lumbar levels with   slight right lower lumbar curve noted.    There ismild symmetric hip degeneration. No fracture.    AP chest on February 27, 2023 at 1:45 AM.    Heart magnified by technique.    There are bilateral perihilar infiltrates right greater than left which   are new since January 7 this year. Configuration suggests infection.    IMPRESSION: No acute fracture. Extensive arterial calcifications.    Bilateral perihilar infiltrates new since prior suggesting infection.   CC: severe hyponatremia (01 Mar 2023 07:23)    HPI:  89 yo F with arthritis, hx of HTN, gout, hyperlipidemia, hx of pelvic fracture,  UTIs  with urinary retention after post void trials in Dr. Bonilla's office presented with bilateral hip and groin pain X 2 days.  Patient's daughter stated  Pt  had recent urology evaluation and to  straight cath urine were recommended but they were difficult after only one day and she thinks patient maybe strained her hips trying to hold positioning during straight cath.  Patient then had indwelling pérez placed 3 days PTA  which has been draining well.  Patient is currently on bactrim for UTI.  Daughter stated  patient was also fatigued at home, not speaking much, and had malaise.  Patient refused to walk  day PTA due to bilateral hip and pelvis pain. No  falls.  +  vomiting  once and could not take her PM dose of bactrim.  Patient denies abdominal pain, nausea, or chest pain.  O2 on arrival was 91% and nasal canula placed for comfort but patient denies SOB.      INTERVAL HPI/ OVERNIGHT EVENTS:  Chart reviewed, Pt was seen and examined, pleasantly confused,  knows in the hospital but unsure exactly why. Reports no Cough, no SOB, no CP.     Vital Signs Last 24 Hrs  T(C): 36.5 (01 Mar 2023 14:26), Max: 36.7 (28 Feb 2023 16:43)  T(F): 97.7 (01 Mar 2023 14:26), Max: 98 (28 Feb 2023 16:43)  HR: 68 (01 Mar 2023 12:00) (60 - 72)  BP: 147/39 (01 Mar 2023 12:00) (124/41 - 186/58)  BP(mean): 66 (01 Mar 2023 12:00) (62 - 85)  RR: 16 (28 Feb 2023 20:46) (16 - 16)  SpO2: 93% (01 Mar 2023 12:00) (93% - 95%)    Parameters below as of 01 Mar 2023 12:00  Patient On (Oxygen Delivery Method): room air        REVIEW OF SYSTEMS:  All other review of systems is negative unless indicated above.      PHYSICAL EXAM:  General: Well developed; in no acute distress  Eyes: EOMI; conjunctiva and sclera clear  Head: Normocephalic; atraumatic  ENMT: No nasal discharge; airway clear  Neck: Supple; non tender; no masses  Respiratory: Decreased BS at bases,  No wheezes, rales or rhonchi  Cardiovascular: Regular rate and rhythm. S1 and S2 Normal; + LIANA  Gastrointestinal: Soft non-tender non-distended; Normal bowel sounds  Genitourinary: No  suprapubic  tenderness  Extremities: No  edema  Vascular: Peripheral pulses palpable 2+ bilaterally  Neurological: Alert and oriented x2, non focal   Musculoskeletal: Normal muscle tone, without deformities  Psychiatric: Cooperative and appropriate    LABS:                         8.5    5.08  )-----------( 90       ( 01 Mar 2023 07:05 )             25.5     01 Mar 2023 07:05    127    |  98     |  19     ----------------------------<  105    4.2     |  23     |  1.69     Ca    8.5        01 Mar 2023 07:05  Phos  2.9       01 Mar 2023 07:05  Mg     2.2       01 Mar 2023 07:05    TPro  6.3    /  Alb  2.8    /  TBili  0.4    /  DBili  x      /  AST  35     /  ALT  22     /  AlkPhos  60     01 Mar 2023 00:32    LIVER FUNCTIONS - ( 01 Mar 2023 00:32 )  Alb: 2.8 g/dL / Pro: 6.3 g/dL / ALK PHOS: 60 U/L / ALT: 22 U/L / AST: 35 U/L / GGT: x               MEDICATIONS  (STANDING):  allopurinol 50 milliGRAM(s) Oral daily  atorvastatin 20 milliGRAM(s) Oral at bedtime  bethanechol 50 milliGRAM(s) Oral two times a day  folic acid 1 milliGRAM(s) Oral daily  gabapentin 300 milliGRAM(s) Oral two times a day  heparin   Injectable 5000 Unit(s) SubCutaneous every 12 hours  hydrALAZINE 25 milliGRAM(s) Oral two times a day  pantoprazole    Tablet 40 milliGRAM(s) Oral before breakfast  sodium chloride 0.9%. 1000 milliLiter(s) (50 mL/Hr) IV Continuous <Continuous>    MEDICATIONS  (PRN):  acetaminophen     Tablet .. 650 milliGRAM(s) Oral every 6 hours PRN Temp greater or equal to 38C (100.4F), Mild Pain (1 - 3)      RADIOLOGY & ADDITIONAL TESTS:  < from: Xray Abdomen 1 View (02.27.23 @ 14:38) >    ACC: [id number] EXAM:  XR ABDOMEN 1 VIEW   ORDERED BY: GARY CAIN     PROCEDURE DATE:  02/27/2023          INTERPRETATION:  KUB: AP    COMPARISON: None.    CLINICAL INFORMATION: Vomiting.    FINDINGS:  Mild air distended colon and mid abdominal small bowel.    No free intra-abdominal air.  No obvious intra-abdominal masses.  LEFT upper quadrant and iliac vasculature all calcifications. The osseous   structures are intact.    IMPRESSION:    Mild air distended transverse colon and mid abdominal small bowel.. No   radiographic evidence of bowel obstruction.        ACC: [id number] EXAM:  XR HIPS BI WITH PELV 2V   ORDERED BY: SAMREEN RAMIREZ   ACC: [id number] EXAM:  XR CHEST PORTABLE URGENT 1V   ORDERED BY: SAMREEN RAMIREZ   PROCEDURE DATE:  02/27/2023        INTERPRETATION:  Lateral hips and pelvis and chest.Patient has hip pain.    Patient has vomiting.    Bilateral hips with pelvis. 5 views.    Arterial calcifications are noted.    Degenerative loss of disc height at multiple lower lumbar levels with   slight right lower lumbar curve noted.    There ismild symmetric hip degeneration. No fracture.    AP chest on February 27, 2023 at 1:45 AM.    Heart magnified by technique.    There are bilateral perihilar infiltrates right greater than left which   are new since January 7 this year. Configuration suggests infection.    IMPRESSION: No acute fracture. Extensive arterial calcifications.    Bilateral perihilar infiltrates new since prior suggesting infection.

## 2023-03-01 NOTE — PROGRESS NOTE ADULT - SUBJECTIVE AND OBJECTIVE BOX
CHIEF COMPLAINT:  Patient is a 89y old  Female who presents with a chief complaint of hyponatremia, lethargy (2023 10:06)      HPI: 23:  Pt. is a 89 yo F with arthritis, hx of HTN, gout, hyperlipidemia, hx of pelvic fracture, hx of chronic UTI with some urinary retention after post void trials in Dr. Carvalho office presents with bilateral hip and groin pain X 2 days.  Patient's daughter states she had recent urology evaluation and straight cath urines were recommended but they were difficult after only one day and she thinks patient maybe strained her hips trying to hold positioning during straight cath.  Patient then had indwelling pérez placed 3 days ago which has been draining well.  Patient is currently on bactrim for UTI.  Daughter states patient was also fatigued at home, not speaking much, and had malaise.  Patient refused to walk yesterday due to bilateral hip and pelvis pain. Denies falls.  Daughter sent patient via ambulance when she vomited once and could not take her PM dose of bactrim.  Patient denies abdominal pain, nausea, or chest pain.  O2 on arrival was 91% and nasal canula placed for comfort but patient denies SOB.    Initially pt was alert and lethargic. Daughter states mother was recently treated at home with laxatives for constipation and has been having multiple bowel movements.  In the ER she was found to have severe hyponatremia with N+=116, treated with hypertonic Saline with improvement and N+ up to 124 but last labs with N+=122.  Awaiting AM labs today.  She is more alert and less lethargic and less confused.  She had no cardiac symptoms at this time denying anginal chest pains or increased SOB.  She dose have HFpEF and moderate-severe AS by echo.  Echo in my office on 3/3/2022 showed normal LV systolic function with LVEF=65-70% with moderate LVH and diastolic LV dysfunction  Moderate-severe AS with MARVA=1.0-1.1cm2 with mild AI, mild MAC with mild MR and mild TR.      3/1/23:  More alert and less lethargic this AM.  Na+=124 this AM.  No clinical CHF or anginal chest pains.  Awaiting echo.  No new cardiac complaints.        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  HFpEF  HLD (hyperlipidemia)  Gout  Osteoarthritis  History of tonsillectomy-in childhood      FAMILY HISTORY:   FAMILY HISTORY:  non-contributory      ALLERGIES:  Allergies  Ceftin (Hives; Rash)      REVIEW OF SYSTEMS:  10 point ROS was obtained  Pertinent positives and negatives are as above  All other review of systems is negative unless indicated above      Vital Signs Last 24 Hrs  T(C): 36.6 (2023 21:18), Max: 36.7 (2023 16:43)  T(F): 97.9 (2023 21:18), Max: 98 (2023 16:43)  HR: 72 (2023 20:46) (60 - 72)  BP: 159/47 (01 Mar 2023 06:41) (122/40 - 186/58)  BP(mean): 77 (01 Mar 2023 06:41) (61 - 85)  RR: 16 (2023 20:46) (12 - 18)  SpO2: 95% (01 Mar 2023 05:55) (93% - 97%): room air      I&O's Summary    2023 07:01  -  2023 07:00  --------------------------------------------------------  IN: 250 mL / OUT: 2000 mL / NET: -1750 mL        PHYSICAL EXAM:   Constitutional: NAD, awake and alert, well-developed  HEENT: PERR, EOMI, Normal Hearing, MMM  Neck: Soft and supple, No LAD, No JVD  Respiratory: Breath sounds are clear bilaterally, No wheezing, rales or rhonchi  Cardiovascular: S1 and S2, regular rate and rhythm, 2-3/6 LIANA at base and soft systolic murmur LLSB as before, +S4, No S3 gallops or rubs  Gastrointestinal: Bowel Sounds present, soft, nontender, nondistended, no guarding, no rebound  Extremities: No peripheral edema  Vascular: 2+ peripheral pulses  Neurological: no focal deficits      MEDICATIONS  (STANDING):  allopurinol 50 milliGRAM(s) Oral daily  atorvastatin 20 milliGRAM(s) Oral at bedtime  bethanechol 50 milliGRAM(s) Oral two times a day  folic acid 1 milliGRAM(s) Oral daily  gabapentin 300 milliGRAM(s) Oral two times a day  heparin   Injectable 5000 Unit(s) SubCutaneous every 12 hours  hydrALAZINE 25 milliGRAM(s) Oral two times a day  pantoprazole    Tablet 40 milliGRAM(s) Oral before breakfast  sodium chloride 0.9%. 1000 milliLiter(s) (50 mL/Hr) IV Continuous <Continuous>    MEDICATIONS  (PRN):  acetaminophen     Tablet .. 650 milliGRAM(s) Oral every 6 hours PRN Temp greater or equal to 38C (100.4F), Mild Pain (1 - 3)      LABS: All Labs Reviewed:                        9.6    8.73  )-----------( 94       ( 2023 06:44 )             28.1                           8.9    8.99  )-----------( 92       ( 2023 10:57 )             25.6       Sodium, Serum: 124 mmol/L (23 @ 00:32)   Sodium, Serum: 122 mmol/L (23 @ 00:17)   Sodium, Serum: 122 mmol/L (23 @ 20:35)   Sodium, Serum: 124 mmol/L (.23 @ 16:36)     Sodium, Serum: 116 mmol/L  (.23 @ 00:50)           124<L>  |  96  |  21  ----------------------------<  108<H>  4.5   |  20<L>  |  1.90<H>    Ca    8.2<L>      01 Mar 2023 00:32  Phos  3.3       Mg     2.2         TPro  6.3  /  Alb  2.8<L>  /  TBili  0.4  /  DBili  x   /  AST  35  /  ALT  22  /  AlkPhos  60      122<L>  |  93<L>  |  19  ----------------------------<  129<H>  4.3   |  22  |  1.83<H>    Ca    9.2      2023 00:17  Phos  2.9       Mg     2.2         TPro  6.5  /  Alb  3.1<L>  /  TBili  0.5  /  DBili  x   /  AST  40<H>  /  ALT  21  /  AlkPhos  59        CARDIAC MARKERS ( 2023 00:50 )  x     / x     / 403 U/L / x     / x          BLOOD CULTURES: Organism --  Gram Stain Urine -- Gram Stain --  Specimen Source Catheterized None  Culture-Urine --  Culture Results: No growth (23 @ 04:29)       LIPID PROFILE     RADIOLOGY:    X-Ray of Abd: 23:  FINDINGS:  Mild air distended colon and mid abdominal small bowel.  No free intra-abdominal air.  No obvious intra-abdominal masses.  LEFT upper quadrant and iliac vasculature all calcifications. The osseous structures are intact.  IMPRESSION:  Mild air distended transverse colon and mid abdominal small bowel.. No radiographic evidence of bowel obstruction.    X-Ray of Hips and CXR:  23:  INTERPRETATION:  Lateral hips and pelvis and chest.  Patient has hip pain.  Patient has vomiting.  Bilateral hips with pelvis. 5 views.  Arterial calcifications are noted.  Degenerative loss of disc height at multiple lower lumbar levels with slight right lower lumbar curve noted.  There is mild symmetric hip degeneration. No fracture.  AP chest on 2023 at 1:45 AM.  Heart magnified by technique.  There are bilateral perihilar infiltrates right greater than left which are new since  this year. Configuration suggests infection.  IMPRESSION: No acute fracture. Extensive arterial calcifications.  Bilateral perihilar infiltrates new since prior suggesting infection.      EK23:  Normal sinus rhythm  Normal ECG      TELEMETRY:  NSR    ECHO:  Echo in my office on 3/3/2022 showed normal LV systolic function with LVEF=65-70% with moderate LVH and diastolic LV dysfunction  Moderate-severe AS with MARVA=1.0-1.1cm2 with mild AI, mild MAC with mild MR and mild TR.    ECHO:  Pending

## 2023-03-01 NOTE — PROGRESS NOTE ADULT - SUBJECTIVE AND OBJECTIVE BOX
Patient is a 89y Female who reports no complaints as new.        MEDICATIONS  (STANDING):  allopurinol 50 milliGRAM(s) Oral daily  atorvastatin 20 milliGRAM(s) Oral at bedtime  bethanechol 50 milliGRAM(s) Oral two times a day  folic acid 1 milliGRAM(s) Oral daily  gabapentin 300 milliGRAM(s) Oral two times a day  heparin   Injectable 5000 Unit(s) SubCutaneous every 12 hours  hydrALAZINE 25 milliGRAM(s) Oral two times a day  pantoprazole    Tablet 40 milliGRAM(s) Oral before breakfast  sodium chloride 0.9%. 1000 milliLiter(s) (50 mL/Hr) IV Continuous <Continuous>    MEDICATIONS  (PRN):  acetaminophen     Tablet .. 650 milliGRAM(s) Oral every 6 hours PRN Temp greater or equal to 38C (100.4F), Mild Pain (1 - 3)        T(C): , Max: 36.7 (23 @ 13:25)  T(F): , Max: 98.1 (23 @ 06:09)  HR: 66 (23 @ 11:00)  BP: 146/51 (23 @ 11:00)  BP(mean): 76 (23 @ 11:00)  RR: 17 (23 @ 11:00)  SpO2: 97% (23 @ 11:00)  Wt(kg): --     @ 07:01  -   @ 07:00  --------------------------------------------------------  IN: 250 mL / OUT: 2000 mL / NET: -1750 mL          PHYSICAL EXAM:    Constitutional: NAD   HEENT:  MM  dist  Cardiovascular: S1 and S2   Gastrointestinal: BS+, soft, NT/ND  Extremities:  peripheral edema  Neurological: Alert, pleasent           LABS:                                                8.5    5.08  )-----------( 90       ( 01 Mar 2023 07:05 )             25.5         127    |  98     |  19     ----------------------------<  105       01 Mar 2023 07:05  4.2     |  23     |  1.69     Ca    8.6        02 Mar 2023 05:52  Ca    8.5        01 Mar 2023 19:46    Phos  2.9       01 Mar 2023 07:05  Phos  3.3       01 Mar 2023 00:32    Mg     2.2       01 Mar 2023 07:05  Mg     2.2       01 Mar 2023 00:32    TPro  6.3    /  Alb  2.8    /  TBili  0.4    /        01 Mar 2023 00:32  DBili  x      /  AST  35     /  ALT  22     /  AlkPhos  60       TPro  6.7    /  Alb  3.2    /  TBili  0.4    /        2023 06:44  DBili  x      /  AST  38     /  ALT  23     /  AlkPhos  57               Urine Studies:  Urinalysis Basic - ( 2023 04:29 )    Color: Yellow / Appearance: Clear / S.010 / pH: x  Gluc: x / Ketone: Negative  / Bili: Negative / Urobili: Negative   Blood: x / Protein: 100 / Nitrite: Negative   Leuk Esterase: Moderate / RBC: 11-25 /HPF / WBC 3-5 /HPF   Sq Epi: x / Non Sq Epi: Few / Bacteria: Few      Osmolality, Random Urine: 134 mosm/Kg ( @ 13:00)  Sodium, Random Urine: 28 mmol/L ( @ 13:00)        RADIOLOGY & ADDITIONAL STUDIES:

## 2023-03-01 NOTE — PROGRESS NOTE ADULT - ASSESSMENT
89 yo female with PMHx arthritis,  AS,  HTN, gout, hyperlipidemia, hx of pelvic fracture,  UTIs ,  urinary retention , s/p Pérez placement a admitted  for  1. Hyponatremia 2/2 hypovolemia, lasix use  2. UTI  3. ANDREW on CKD    PLAN  -   - BP elevated started on hydralazine 25mg bid. home med includes lasix, will hold diuretics for now as most likely contributing to hypoNa  - on room air sating 97-99%  - PO diet as tolerated.   - pérez replaced in ED. monitor I & Os. Na 122-> 124 Urine Na and Urine osm pending. repeat BMP 1200. Goal Na correction 6-8mEq in 24 hours. continue gentle IV fluids.  - Cr trending down. Nephro consulted.  - TSH and uric acid wnl. F/U AM cortisol  - dcd zosyn. Urine cx neg. Blood cx pending.  monitor temps. trend WBC  - DVT ppx hep sq.  89 yo female with PMHx arthritis,  AS,  HTN, gout, hyperlipidemia, hx of pelvic fracture,  UTIs ,  urinary retention , s/p Perales p admitted  for:       1. Hyponatremia 2/2 hypovolemia  Lasix  use and GI losses  S/p IVF with improvement of NA at 127 today   Repeat labs tonight   continue to hold Lasix   Strict Is and OS  TSH WNL, cortisol wnl   renal f/u     2.  ANDREW on CKD. H/o Urinary retention   Cr improved with IVF  Off lasix, no NSAIDs,    C/w bethanechol   Monitor UO  repeat labs       3. Abnormal UA   UCX neg for UTI  Off Abxs as per CICU team       4.  HTN   restarted on Hydralazine   Also on Bystolic outPt was held in admission     5. H/o HFpEF,  not in failure   S/p IVF, no signs of overload  Daily weight   Continue to hold lasix   ECHO done, pending report  Cardio recs appreciated     5. H/o AS with loud Murmur   F/u ECHO report     6. DVT PPx: Heparin SQ    PT

## 2023-03-01 NOTE — PROGRESS NOTE ADULT - ASSESSMENT
89 yo female with PMHx arthritis, HTN, gout, hyperlipidemia, hx of pelvic fracture, hx of chronic UTI, urinary retention presents with bilateral hip and groin pain X 2 days with renal evaluation of hyponatremia.       Hyponatremia  -LIkely pre renal in nature as noted by response to NS hydration  -Goal rise is 6-8 over 24 hours which is currently well within goal  -NS ok for now, agree with resumption  -Can lower frequency of bmp checks  -optimize intake    CKD III b  -Stable function, near apparent baseline  -IVF as above  -Encourage po  -Avoid nsaids/contrast    * pt seen earlier, note now

## 2023-03-01 NOTE — PROGRESS NOTE ADULT - ASSESSMENT
2/28/23:  Pt with above history with h/o HFpEF on Lasix developing severe hyponatremia better after current treatment but still hyponatremic.  Will have to avoid diuretics but continue to gently replete NA watching for diastolic CHF given her stiff heart and moderate-severe AS.  Will repeat an echo.  Continue as outlined by medicine etal.  Will follow as treatment progresses.    3/1/23:  More alert and less lethargic this AM.  Na+=124 this AM.  No clinical CHF or anginal chest pains.  Awaiting echo.  No new cardiac complaints.  Continue as outlined by medicine etal.  Will follow as treatment progresses.

## 2023-03-02 LAB
ADD ON TEST-SPECIMEN IN LAB: SIGNIFICANT CHANGE UP
ANION GAP SERPL CALC-SCNC: 7 MMOL/L — SIGNIFICANT CHANGE UP (ref 5–17)
BLD GP AB SCN SERPL QL: SIGNIFICANT CHANGE UP
BUN SERPL-MCNC: 19 MG/DL — SIGNIFICANT CHANGE UP (ref 7–23)
CALCIUM SERPL-MCNC: 8.6 MG/DL — SIGNIFICANT CHANGE UP (ref 8.5–10.1)
CHLORIDE SERPL-SCNC: 100 MMOL/L — SIGNIFICANT CHANGE UP (ref 96–108)
CHLORIDE UR-SCNC: 23 MMOL/L — SIGNIFICANT CHANGE UP
CO2 SERPL-SCNC: 22 MMOL/L — SIGNIFICANT CHANGE UP (ref 22–31)
CREAT SERPL-MCNC: 1.56 MG/DL — HIGH (ref 0.5–1.3)
EGFR: 31 ML/MIN/1.73M2 — LOW
FERRITIN SERPL-MCNC: 176 NG/ML — HIGH (ref 15–150)
FOLATE SERPL-MCNC: >20 NG/ML — SIGNIFICANT CHANGE UP
GLUCOSE SERPL-MCNC: 116 MG/DL — HIGH (ref 70–99)
HCT VFR BLD CALC: 23.6 % — LOW (ref 34.5–45)
HCT VFR BLD CALC: 26.5 % — LOW (ref 34.5–45)
HGB BLD-MCNC: 7.9 G/DL — LOW (ref 11.5–15.5)
HGB BLD-MCNC: 8.8 G/DL — LOW (ref 11.5–15.5)
IRON SATN MFR SERPL: 21 % — SIGNIFICANT CHANGE UP (ref 14–50)
IRON SATN MFR SERPL: 57 UG/DL — SIGNIFICANT CHANGE UP (ref 30–160)
MCHC RBC-ENTMCNC: 30.7 PG — SIGNIFICANT CHANGE UP (ref 27–34)
MCHC RBC-ENTMCNC: 33.5 GM/DL — SIGNIFICANT CHANGE UP (ref 32–36)
MCV RBC AUTO: 91.8 FL — SIGNIFICANT CHANGE UP (ref 80–100)
OSMOLALITY UR: 198 MOSM/KG — SIGNIFICANT CHANGE UP (ref 50–1200)
PLATELET # BLD AUTO: 107 K/UL — LOW (ref 150–400)
POTASSIUM SERPL-MCNC: 4.4 MMOL/L — SIGNIFICANT CHANGE UP (ref 3.5–5.3)
POTASSIUM SERPL-SCNC: 4.4 MMOL/L — SIGNIFICANT CHANGE UP (ref 3.5–5.3)
RBC # BLD: 2.57 M/UL — LOW (ref 3.8–5.2)
RBC # FLD: 14.3 % — SIGNIFICANT CHANGE UP (ref 10.3–14.5)
RETICS #: 32.9 K/UL — SIGNIFICANT CHANGE UP (ref 25–125)
RETICS/RBC NFR: 1.3 % — SIGNIFICANT CHANGE UP (ref 0.5–2.5)
SODIUM SERPL-SCNC: 129 MMOL/L — LOW (ref 135–145)
SODIUM UR-SCNC: 29 MMOL/L — SIGNIFICANT CHANGE UP
TIBC SERPL-MCNC: 274 UG/DL — SIGNIFICANT CHANGE UP (ref 220–430)
UIBC SERPL-MCNC: 218 UG/DL — SIGNIFICANT CHANGE UP (ref 110–370)
VIT B12 SERPL-MCNC: 522 PG/ML — SIGNIFICANT CHANGE UP (ref 232–1245)
WBC # BLD: 5.29 K/UL — SIGNIFICANT CHANGE UP (ref 3.8–10.5)
WBC # FLD AUTO: 5.29 K/UL — SIGNIFICANT CHANGE UP (ref 3.8–10.5)

## 2023-03-02 PROCEDURE — 99233 SBSQ HOSP IP/OBS HIGH 50: CPT

## 2023-03-02 RX ORDER — METOPROLOL TARTRATE 50 MG
25 TABLET ORAL
Refills: 0 | Status: DISCONTINUED | OUTPATIENT
Start: 2023-03-02 | End: 2023-03-16

## 2023-03-02 RX ADMIN — HEPARIN SODIUM 5000 UNIT(S): 5000 INJECTION INTRAVENOUS; SUBCUTANEOUS at 09:57

## 2023-03-02 RX ADMIN — Medication 25 MILLIGRAM(S): at 09:58

## 2023-03-02 RX ADMIN — Medication 50 MILLIGRAM(S): at 09:58

## 2023-03-02 RX ADMIN — Medication 25 MILLIGRAM(S): at 21:21

## 2023-03-02 RX ADMIN — Medication 1 MILLIGRAM(S): at 09:57

## 2023-03-02 RX ADMIN — Medication 50 MILLIGRAM(S): at 09:57

## 2023-03-02 RX ADMIN — Medication 50 MILLIGRAM(S): at 21:20

## 2023-03-02 RX ADMIN — GABAPENTIN 300 MILLIGRAM(S): 400 CAPSULE ORAL at 21:20

## 2023-03-02 RX ADMIN — ATORVASTATIN CALCIUM 20 MILLIGRAM(S): 80 TABLET, FILM COATED ORAL at 21:20

## 2023-03-02 RX ADMIN — GABAPENTIN 300 MILLIGRAM(S): 400 CAPSULE ORAL at 09:57

## 2023-03-02 RX ADMIN — Medication 25 MILLIGRAM(S): at 17:26

## 2023-03-02 RX ADMIN — Medication 25 MILLIGRAM(S): at 21:20

## 2023-03-02 RX ADMIN — PANTOPRAZOLE SODIUM 40 MILLIGRAM(S): 20 TABLET, DELAYED RELEASE ORAL at 07:57

## 2023-03-02 RX ADMIN — HEPARIN SODIUM 5000 UNIT(S): 5000 INJECTION INTRAVENOUS; SUBCUTANEOUS at 21:21

## 2023-03-02 NOTE — PROGRESS NOTE ADULT - ASSESSMENT
2/28/23:  Pt with above history with h/o HFpEF on Lasix developing severe hyponatremia better after current treatment but still hyponatremic.  Will have to avoid diuretics but continue to gently replete NA watching for diastolic CHF given her stiff heart and moderate-severe AS.  Will repeat an echo.  Continue as outlined by medicine etal.  Will follow as treatment progresses.    3/1/23:  More alert and less lethargic this AM.  Na+=124 this AM.  No clinical CHF or anginal chest pains.  Awaiting echo.  No new cardiac complaints.  Continue as outlined by medicine etal.  Will follow as treatment progresses.    3/2/23:  Resting comfortably without CHF or other cardiac symptoms.  Na+=129.  ECHO with severe AS (MARVA=0.8-0.9cm2).  May be time to consider a TAVR when more stable.  Would suggest consulting Dr Cui and Evan to evaluate.  Continue as outlined by medicine etal.  Will follow as treatment progresses.   2/28/23:  Pt with above history with h/o HFpEF on Lasix developing severe hyponatremia better after current treatment but still hyponatremic.  Will have to avoid diuretics but continue to gently replete NA watching for diastolic CHF given her stiff heart and moderate-severe AS.  Will repeat an echo.  Continue as outlined by medicine etal.  Will follow as treatment progresses.    3/1/23:  More alert and less lethargic this AM.  Na+=124 this AM.  No clinical CHF or anginal chest pains.  Awaiting echo.  No new cardiac complaints.  Continue as outlined by medicine etal.  Will follow as treatment progresses.    3/2/23:  Resting comfortably without CHF or other cardiac symptoms.  Na+=129.  ECHO with severe AS (MARVA=0.8-0.9cm2) but no clinical symptoms yet.  Continue as outlined by medicine etal.  Will follow as treatment progresses.

## 2023-03-02 NOTE — PROGRESS NOTE ADULT - ASSESSMENT
89 yo female with PMHx arthritis,  AS,  HTN, gout, hyperlipidemia, hx of pelvic fracture,  UTIs ,  urinary retention , s/p Perales p admitted  for:       1. Hyponatremia 2/2 hypovolemia  Lasix  use and GI losses  S/p IVF with improvement of NA at 129 today   continue to hold Lasix   Encourage oral intake   Strict Is and OS  TSH WNL, cortisol wnl   D/w Dr Thompson     2.  ANDREW on CKD. H/o Urinary retention   Cr improved with IVF  Off lasix, no NSAIDs,    C/w bethanechol   D/c Perales, voiding trial   Monitor UO  repeat labs in am       3. Abnormal UA   UCX neg for UTI  Off Abxs as per CICU team       4.  HTN   restarted on Hydralazine   Also on Bystolic outPt was held in admission   BP elevated start Lopressor   Monitor BP     5. H/o HFpEF,  not in failure   S/p IVF, no signs of overload  Daily weight   Continue to hold Lasix   ECHO: preserved EF, severe AS  Cardio recs appreciated     5. H/o AS with loud Murmur   ECHO: preserved EF   Cardio recs appreciated, pt is not symptomatic will follow outPt for further evaluation     6.  Acute on Chronic Anemia  H/H trended down, no signs of active bleeding  Possibly dilutional Pt was on IVF  Trend H/H, type and screen  Check FOBT, Iron studies, retic count   Transfuse pRN     7. DVT PPx: Heparin SQ    PT eval     Dispo; C/w current care, repeat labs. D/c planning

## 2023-03-02 NOTE — PROGRESS NOTE ADULT - SUBJECTIVE AND OBJECTIVE BOX
CC: severe hyponatremia (01 Mar 2023 07:23)    HPI:  89 yo F with arthritis, hx of HTN, gout, hyperlipidemia, hx of pelvic fracture,  UTIs  with urinary retention after post void trials in Dr. Bonilla's office presented with bilateral hip and groin pain X 2 days.  Patient's daughter stated  Pt  had recent urology evaluation and to  straight cath urine were recommended but they were difficult after only one day and she thinks patient maybe strained her hips trying to hold positioning during straight cath.  Patient then had indwelling pérez placed 3 days PTA  which has been draining well.  Patient is currently on bactrim for UTI.  Daughter stated  patient was also fatigued at home, not speaking much, and had malaise.  Patient refused to walk  day PTA due to bilateral hip and pelvis pain. No  falls.  +  vomiting  once and could not take her PM dose of bactrim.  Patient denies abdominal pain, nausea, or chest pain.  O2 on arrival was 91% and nasal canula placed for comfort but patient denies SOB.      INTERVAL HPI/ OVERNIGHT EVENTS:  Pt was seen and examined,  OOb to chair, reports feels better, denies SOB or Cough. No dizziness. Plan discussed     Vital Signs Last 24 Hrs  T(C): 36.6 (02 Mar 2023 12:46), Max: 36.8 (01 Mar 2023 18:38)  T(F): 97.8 (02 Mar 2023 12:46), Max: 98.3 (01 Mar 2023 18:38)  HR: 72 (02 Mar 2023 09:43) (72 - 72)  BP: 167/51 (02 Mar 2023 09:43) (136/55 - 167/51)  BP(mean): 82 (02 Mar 2023 09:43) (70 - 84)  SpO2: 97% (02 Mar 2023 09:43) (94% - 97%)    Parameters below as of 02 Mar 2023 09:43  Patient On (Oxygen Delivery Method): room air        REVIEW OF SYSTEMS:  All other review of systems is negative unless indicated above.      PHYSICAL EXAM:  General: Well developed; in no acute distress  Eyes: EOMI; conjunctiva and sclera clear  Head: Normocephalic; atraumatic  ENMT: No nasal discharge; airway clear  Neck: Supple; non tender; no masses  Respiratory: Decreased BS at bases,  No wheezes, rales or rhonchi  Cardiovascular: Regular rate and rhythm. S1 and S2 Normal; + LIANA  Gastrointestinal: Soft non-tender non-distended; Normal bowel sounds  Genitourinary: No  suprapubic  tenderness  Extremities: No  edema  Vascular: Peripheral pulses palpable 2+ bilaterally  Neurological: Alert and oriented x2, non focal   Musculoskeletal: Normal muscle tone, without deformities  Psychiatric: Cooperative and appropriate    LABS:                         7.9    5.29  )-----------( 107      ( 02 Mar 2023 05:52 )             23.6     03-02    129<L>  |  100  |  19  ----------------------------<  116<H>  4.4   |  22  |  1.56<H>    Ca    8.6      02 Mar 2023 05:52  Phos  2.9     03-01  Mg     2.2     03-01    TPro  6.3  /  Alb  2.8<L>  /  TBili  0.4  /  DBili  x   /  AST  35  /  ALT  22  /  AlkPhos  60  03-01    LIVER FUNCTIONS - ( 01 Mar 2023 00:32 )  Alb: 2.8 g/dL / Pro: 6.3 g/dL / ALK PHOS: 60 U/L / ALT: 22 U/L / AST: 35 U/L / GGT: x                                   8.5    5.08  )-----------( 90       ( 01 Mar 2023 07:05 )             25.5     01 Mar 2023 07:05    127    |  98     |  19     ----------------------------<  105    4.2     |  23     |  1.69     Ca    8.5        01 Mar 2023 07:05  Phos  2.9       01 Mar 2023 07:05  Mg     2.2       01 Mar 2023 07:05    TPro  6.3    /  Alb  2.8    /  TBili  0.4    /  DBili  x      /  AST  35     /  ALT  22     /  AlkPhos  60     01 Mar 2023 00:32    LIVER FUNCTIONS - ( 01 Mar 2023 00:32 )  Alb: 2.8 g/dL / Pro: 6.3 g/dL / ALK PHOS: 60 U/L / ALT: 22 U/L / AST: 35 U/L / GGT: x             MEDICATIONS  (STANDING):  allopurinol 50 milliGRAM(s) Oral daily  atorvastatin 20 milliGRAM(s) Oral at bedtime  bethanechol 50 milliGRAM(s) Oral two times a day  folic acid 1 milliGRAM(s) Oral daily  gabapentin 300 milliGRAM(s) Oral two times a day  heparin   Injectable 5000 Unit(s) SubCutaneous every 12 hours  hydrALAZINE 25 milliGRAM(s) Oral two times a day  pantoprazole    Tablet 40 milliGRAM(s) Oral before breakfast    MEDICATIONS  (PRN):  acetaminophen     Tablet .. 650 milliGRAM(s) Oral every 6 hours PRN Temp greater or equal to 38C (100.4F), Mild Pain (1 - 3)      RADIOLOGY & ADDITIONAL TESTS:      ACC: [id number] EXAM:  XR ABDOMEN 1 VIEW   ORDERED BY: GARY CAIN   PROCEDURE DATE:  02/27/2023      FINDINGS:  Mild air distended colon and mid abdominal small bowel.    No free intra-abdominal air.  No obvious intra-abdominal masses.  LEFT upper quadrant and iliac vasculature all calcifications. The osseous   structures are intact.    IMPRESSION:    Mild air distended transverse colon and mid abdominal small bowel.. No   radiographic evidence of bowel obstruction.        ACC: [id number] EXAM:  XR HIPS BI WITH PELV 2V   ORDERED BY: SAMREEN RAMIREZ   ACC: [id number] EXAM:  XR CHEST PORTABLE URGENT 1V   ORDERED BY: SAMREEN RAMIREZ   PROCEDURE DATE:  02/27/2023        INTERPRETATION:  Lateral hips and pelvis and chest.Patient has hip pain.    Patient has vomiting.    Bilateral hips with pelvis. 5 views.    Arterial calcifications are noted.    Degenerative loss of disc height at multiple lower lumbar levels with   slight right lower lumbar curve noted.    There ismild symmetric hip degeneration. No fracture.    AP chest on February 27, 2023 at 1:45 AM.    Heart magnified by technique.    There are bilateral perihilar infiltrates right greater than left which   are new since January 7 this year. Configuration suggests infection.    IMPRESSION: No acute fracture. Extensive arterial calcifications.    Bilateral perihilar infiltrates new since prior suggesting infection.

## 2023-03-02 NOTE — PROGRESS NOTE ADULT - SUBJECTIVE AND OBJECTIVE BOX
Patient is a 89y Female who reports no complaints as new.      MEDICATIONS  (STANDING):  allopurinol 50 milliGRAM(s) Oral daily  atorvastatin 20 milliGRAM(s) Oral at bedtime  bethanechol 50 milliGRAM(s) Oral two times a day  folic acid 1 milliGRAM(s) Oral daily  gabapentin 300 milliGRAM(s) Oral two times a day  heparin   Injectable 5000 Unit(s) SubCutaneous every 12 hours  hydrALAZINE 25 milliGRAM(s) Oral two times a day  pantoprazole    Tablet 40 milliGRAM(s) Oral before breakfast      Vital Signs Last 24 Hrs  T(C): 36.8 (01 Mar 2023 22:11), Max: 36.8 (01 Mar 2023 18:38)  T(F): 98.2 (01 Mar 2023 22:11), Max: 98.3 (01 Mar 2023 18:38)  HR: 68 (01 Mar 2023 12:00) (68 - 68)  BP: 152/54 (01 Mar 2023 21:03) (136/55 - 163/55)  BP(mean): 78 (01 Mar 2023 21:03) (66 - 84)  RR: --  SpO2: 95% (01 Mar 2023 21:03) (93% - 95%)    Parameters below as of 01 Mar 2023 12:00  Patient On (Oxygen Delivery Method): room air      I&O's Detail    01 Mar 2023 07:01  -  02 Mar 2023 07:00  --------------------------------------------------------  IN:  Total IN: 0 mL    OUT:    Indwelling Catheter - Urethral (mL): 950 mL    Voided (mL): 1000 mL  Total OUT: 1950 mL    Total NET: -1950 mL        PHYSICAL EXAM:    Constitutional: NAD   HEENT:  MM  dist  Cardiovascular: S1 and S2   Gastrointestinal: BS+, soft, NT/ND  Extremities: No  peripheral edema  Neurological: Alert, pleasent  Perales  +           LABS:                                              7.9    5.29  )-----------( 107      ( 02 Mar 2023 05:52 )             23.6                         8.5    5.08  )-----------( 90       ( 01 Mar 2023 07:05 )             25.5         129    |  100    |  19     ----------------------------<  116       02 Mar 2023 05:52  4.4     |  22     |  1.56     124    |  98     |  21     ----------------------------<  134       01 Mar 2023 19:46  5.1     |  23     |  1.85     127    |  98     |  19     ----------------------------<  105       01 Mar 2023 07:05  4.2     |  23     |  1.69     Ca    8.6        02 Mar 2023 05:52  Ca    8.5        01 Mar 2023 19:46    Phos  2.9       01 Mar 2023 07:05  Phos  3.3       01 Mar 2023 00:32    Mg     2.2       01 Mar 2023 07:05  Mg     2.2       01 Mar 2023 00:32    TPro  6.3    /  Alb  2.8    /  TBili  0.4    /        01 Mar 2023 00:32  DBili  x      /  AST  35     /  ALT  22     /  AlkPhos  60       TPro  6.7    /  Alb  3.2    /  TBili  0.4    /        2023 06:44  DBili  x      /  AST  38     /  ALT  23     /  AlkPhos  57                 Urine Studies:  Urinalysis Basic - ( 2023 04:29 )    Color: Yellow / Appearance: Clear / S.010 / pH: x  Gluc: x / Ketone: Negative  / Bili: Negative / Urobili: Negative   Blood: x / Protein: 100 / Nitrite: Negative   Leuk Esterase: Moderate / RBC: 11-25 /HPF / WBC 3-5 /HPF   Sq Epi: x / Non Sq Epi: Few / Bacteria: Few      Osmolality, Random Urine: 134 mosm/Kg ( @ 13:00)  Sodium, Random Urine: 28 mmol/L ( @ 13:00)      RADIOLOGY & ADDITIONAL STUDIES:

## 2023-03-02 NOTE — PROGRESS NOTE ADULT - SUBJECTIVE AND OBJECTIVE BOX
CHIEF COMPLAINT:  Patient is a 89y old  Female who presents with a chief complaint of hyponatremia, lethargy (2023 10:06)      HPI: 23:  Pt. is a 89 yo F with arthritis, hx of HTN, gout, hyperlipidemia, hx of pelvic fracture, hx of chronic UTI with some urinary retention after post void trials in Dr. Carvalho office presents with bilateral hip and groin pain X 2 days.  Patient's daughter states she had recent urology evaluation and straight cath urines were recommended but they were difficult after only one day and she thinks patient maybe strained her hips trying to hold positioning during straight cath.  Patient then had indwelling pérez placed 3 days ago which has been draining well.  Patient is currently on bactrim for UTI.  Daughter states patient was also fatigued at home, not speaking much, and had malaise.  Patient refused to walk yesterday due to bilateral hip and pelvis pain. Denies falls.  Daughter sent patient via ambulance when she vomited once and could not take her PM dose of bactrim.  Patient denies abdominal pain, nausea, or chest pain.  O2 on arrival was 91% and nasal canula placed for comfort but patient denies SOB.    Initially pt was alert and lethargic. Daughter states mother was recently treated at home with laxatives for constipation and has been having multiple bowel movements.  In the ER she was found to have severe hyponatremia with N+=116, treated with hypertonic Saline with improvement and N+ up to 124 but last labs with N+=122.  Awaiting AM labs today.  She is more alert and less lethargic and less confused.  She had no cardiac symptoms at this time denying anginal chest pains or increased SOB.  She dose have HFpEF and moderate-severe AS by echo.  Echo in my office on 3/3/2022 showed normal LV systolic function with LVEF=65-70% with moderate LVH and diastolic LV dysfunction  Moderate-severe AS with MARVA=1.0-1.1cm2 with mild AI, mild MAC with mild MR and mild TR.      3/1/23:  More alert and less lethargic this AM.  Na+=124 this AM.  No clinical CHF or anginal chest pains.  Awaiting echo.  No new cardiac complaints.    3/2/23:  Resting comfortably without CHF or other cardiac symptoms.  Na+=129.  ECHO with severe AS (MARVA=0.8-0.9cm2).  May be time to consider a TAVR when more stable.  Would suggest consulting Dr Cui and Evan to evaluate.        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  HFpEF  HLD (hyperlipidemia)  Gout  Osteoarthritis  History of tonsillectomy-in childhood      FAMILY HISTORY:   FAMILY HISTORY:  non-contributory      ALLERGIES:  Allergies  Ceftin (Hives; Rash)      REVIEW OF SYSTEMS:  10 point ROS was obtained  Pertinent positives and negatives are as above  All other review of systems is negative unless indicated above      Vital Signs Last 24 Hrs  T(C): 36.8 (01 Mar 2023 22:11), Max: 36.8 (01 Mar 2023 18:38)  T(F): 98.2 (01 Mar 2023 22:11), Max: 98.3 (01 Mar 2023 18:38)  HR: 68 (01 Mar 2023 12:00) (68 - 71)  BP: 152/54 (01 Mar 2023 21:03) (136/55 - 163/55)  BP(mean): 78 (01 Mar 2023 21:03) (66 - 84)  SpO2: 95% (01 Mar 2023 21:03) (93% - 95%): room air      I&O's Summary    2023 07:01  -  2023 07:00  --------------------------------------------------------  IN: 250 mL / OUT: 2000 mL / NET: -1750 mL        PHYSICAL EXAM:   Constitutional: NAD, awake and alert, well-developed  HEENT: PERR, EOMI, Normal Hearing, MMM  Neck: Soft and supple, No LAD, No JVD  Respiratory: Breath sounds are clear bilaterally, No wheezing, rales or rhonchi  Cardiovascular: S1 and S2, regular rate and rhythm, 2-3/6 LIANA at base and soft systolic murmur LLSB as before, +S4, No S3 gallops or rubs  Gastrointestinal: Bowel Sounds present, soft, nontender, nondistended, no guarding, no rebound  Extremities: No peripheral edema  Vascular: 2+ peripheral pulses  Neurological: no focal deficits      MEDICATIONS  (STANDING):  allopurinol 50 milliGRAM(s) Oral daily  atorvastatin 20 milliGRAM(s) Oral at bedtime  bethanechol 50 milliGRAM(s) Oral two times a day  folic acid 1 milliGRAM(s) Oral daily  gabapentin 300 milliGRAM(s) Oral two times a day  heparin   Injectable 5000 Unit(s) SubCutaneous every 12 hours  hydrALAZINE 25 milliGRAM(s) Oral two times a day  pantoprazole    Tablet 40 milliGRAM(s) Oral before breakfast  sodium chloride 0.9%. 1000 milliLiter(s) (50 mL/Hr) IV Continuous <Continuous>    MEDICATIONS  (PRN):  acetaminophen     Tablet .. 650 milliGRAM(s) Oral every 6 hours PRN Temp greater or equal to 38C (100.4F), Mild Pain (1 - 3)      LABS: All Labs Reviewed:                        7.9    5.29  )-----------( 107      ( 02 Mar 2023 05:52 )             23.6                           9.6    8.73  )-----------( 94       ( 2023 06:44 )             28.1                           8.9    8.99  )-----------( 92       ( 2023 10:57 )             25.6     Sodium, Serum: 129 mmol/L (23 @ 05:52)   Sodium, Serum: 124 mmol/L (23 @ 00:32)   Sodium, Serum: 122 mmol/L (23 @ 00:17)   Sodium, Serum: 122 mmol/L (23 @ 20:35)   Sodium, Serum: 124 mmol/L (23 @ 16:36)     Sodium, Serum: 116 mmol/L  (. @ 00:50)       03-    129<L>  |  100  |  19  ----------------------------<  116<H>  4.4   |  22  |  1.56<H>    Ca    8.6      02 Mar 2023 05:52  Phos  2.9     03-  Mg     2.2     03-    TPro  6.3  /  Alb  2.8<L>  /  TBili  0.4  /  DBili  x   /  AST  35  /  ALT  22  /  AlkPhos  60      124<L>  |  96  |  21  ----------------------------<  108<H>  4.5   |  20<L>  |  1.90<H>    Ca    8.2<L>      01 Mar 2023 00:32  Phos  3.3       Mg     2.2         TPro  6.3  /  Alb  2.8<L>  /  TBili  0.4  /  DBili  x   /  AST  35  /  ALT  22  /  AlkPhos  60      122<L>  |  93<L>  |  19  ----------------------------<  129<H>  4.3   |  22  |  1.83<H>    Ca    9.2      2023 00:17  Phos  2.9       Mg     2.2         TPro  6.5  /  Alb  3.1<L>  /  TBili  0.5  /  DBili  x   /  AST  40<H>  /  ALT  21  /  AlkPhos  59        CARDIAC MARKERS ( 2023 00:50 )  x     / x     / 403 U/L / x     / x          BLOOD CULTURES: Organism --  Gram Stain Urine -- Gram Stain --  Specimen Source Catheterized None  Culture-Urine --  Culture Results: No growth (23 @ 04:29)       LIPID PROFILE     RADIOLOGY:    X-Ray of Abd: 23:  FINDINGS:  Mild air distended colon and mid abdominal small bowel.  No free intra-abdominal air.  No obvious intra-abdominal masses.  LEFT upper quadrant and iliac vasculature all calcifications. The osseous structures are intact.  IMPRESSION:  Mild air distended transverse colon and mid abdominal small bowel.. No radiographic evidence of bowel obstruction.    X-Ray of Hips and CXR:  23:  INTERPRETATION:  Lateral hips and pelvis and chest.  Patient has hip pain.  Patient has vomiting.  Bilateral hips with pelvis. 5 views.  Arterial calcifications are noted.  Degenerative loss of disc height at multiple lower lumbar levels with slight right lower lumbar curve noted.  There is mild symmetric hip degeneration. No fracture.  AP chest on 2023 at 1:45 AM.  Heart magnified by technique.  There are bilateral perihilar infiltrates right greater than left which are new since  this year. Configuration suggests infection.  IMPRESSION: No acute fracture. Extensive arterial calcifications.  Bilateral perihilar infiltrates new since prior suggesting infection.      EK23:  Normal sinus rhythm  Normal ECG      TELEMETRY:  NSR      ECHO:  3/1/23:  M-Mode Measurements (cm)   LVEDd: 3.97 cm            LVESd: 2.55 cm   IVSEd: 1.1 cm   LVPWd: 1.1 cm             AO Root Dimension: 2.8 cm                             LA: 3.5 cm                LVOT: 2 cm  Doppler Measurements:   AV Velocity:434 cm/s                  MV Peak E-Wave: 98.7 cm/s   AV Peak Gradient: 75.34 mmHg          MV Peak A-Wave: 131 cm/s   AV Mean Gradient: 40 mmHg             MV E/A Ratio: 0.75 %   AV Area (Continuity):0.85 cm^2        MV Peak Gradient: 3.9 mmHg   TR Velocity:190 cm/s   TR Gradient:14.44 mmHg   Estimated RAP:5 mmHg   RVSP:27 mmHg    Findings  Mitral Valve   The mitral valve leaflets appear thickened.   EA reversal of the mitral inflow consistent with reduced compliance of the left ventricle.   Mild mitral annular calcification is present.   Mild mitral regurgitation is present.    Aortic Valve   Peak and mean transaortic gradients are 75 and 40mmHg respectively; this finding is consistent with severe aortic stenosis.   Mild (1+) aortic regurgitation is present.    Tricuspid Valve   Trace tricuspid valve regurgitation is present.    Pulmonic Valve   Mild pulmonic valvular regurgitation (1+) is present.    Left Atrium   Normal appearing left atrium.    Left Ventricle   Mild concentric left ventricular hypertrophy is present.   The left ventricle is normal in size.   Estimated left ventricular ejection fraction is 65-70 %.    Right Atrium   Normal appearing right atrium.    Right Ventricle   Normal appearing right ventricle structure and function.    Pericardial Effusion   No evidence of pericardial effusion.    Pleural Effusion   No evidence of pleural effusion.    Miscellaneous   The IVC appears normal.    Summary   The mitral valve leaflets appear thickened.   EA reversal of the mitral inflow consistent with reduced compliance of the left ventricle.   Mild mitral annular calcification is present.   Mild mitral regurgitation is present.   Peak and mean transaortic gradients are 75 and 40mmHg respectively; this finding is consistent with severe aortic stenosis.   Mild (1+) aortic regurgitation is present.   Trace tricuspid valve regurgitation is present.   Mild pulmonic valvular regurgitation (1+) is present.   Normal appearing left atrium.   Mild concentric left ventricular hypertrophy is present.   The left ventricle is normal in size.   Estimated left ventricular ejection fraction is 65-70 %.   Normal appearing right atrium.   Normal appearing right ventricle structure and function.   The IVC appears normal.   No evidence of pericardial effusion.   No evidence of pleural effusion.    Signature   ----------------------------------------------------------------   Electronically signed by Sakina Cheatham MD, Director of   Cardiac Cath Lab(Interpreting physician) on 2023 06:42   PM   ----------------------------------------------------------------     CHIEF COMPLAINT:  Patient is a 89y old  Female who presents with a chief complaint of hyponatremia, lethargy (2023 10:06)      HPI: 23:  Pt. is a 89 yo F with arthritis, hx of HTN, gout, hyperlipidemia, hx of pelvic fracture, hx of chronic UTI with some urinary retention after post void trials in Dr. Carvalho office presents with bilateral hip and groin pain X 2 days.  Patient's daughter states she had recent urology evaluation and straight cath urines were recommended but they were difficult after only one day and she thinks patient maybe strained her hips trying to hold positioning during straight cath.  Patient then had indwelling pérez placed 3 days ago which has been draining well.  Patient is currently on bactrim for UTI.  Daughter states patient was also fatigued at home, not speaking much, and had malaise.  Patient refused to walk yesterday due to bilateral hip and pelvis pain. Denies falls.  Daughter sent patient via ambulance when she vomited once and could not take her PM dose of bactrim.  Patient denies abdominal pain, nausea, or chest pain.  O2 on arrival was 91% and nasal canula placed for comfort but patient denies SOB.    Initially pt was alert and lethargic. Daughter states mother was recently treated at home with laxatives for constipation and has been having multiple bowel movements.  In the ER she was found to have severe hyponatremia with N+=116, treated with hypertonic Saline with improvement and N+ up to 124 but last labs with N+=122.  Awaiting AM labs today.  She is more alert and less lethargic and less confused.  She had no cardiac symptoms at this time denying anginal chest pains or increased SOB.  She dose have HFpEF and moderate-severe AS by echo.  Echo in my office on 3/3/2022 showed normal LV systolic function with LVEF=65-70% with moderate LVH and diastolic LV dysfunction  Moderate-severe AS with MARVA=1.0-1.1cm2 with mild AI, mild MAC with mild MR and mild TR.      3/1/23:  More alert and less lethargic this AM.  Na+=124 this AM.  No clinical CHF or anginal chest pains.  Awaiting echo.  No new cardiac complaints.    3/2/23:  Resting comfortably without CHF or other cardiac symptoms.  Na+=129.  ECHO with severe AS (MARVA=0.8-0.9cm2) but no clinical symptoms yet.        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  HFpEF  HLD (hyperlipidemia)  Gout  Osteoarthritis  History of tonsillectomy-in childhood      FAMILY HISTORY:   FAMILY HISTORY:  non-contributory      ALLERGIES:  Allergies  Ceftin (Hives; Rash)      REVIEW OF SYSTEMS:  10 point ROS was obtained  Pertinent positives and negatives are as above  All other review of systems is negative unless indicated above      Vital Signs Last 24 Hrs  T(C): 36.8 (01 Mar 2023 22:11), Max: 36.8 (01 Mar 2023 18:38)  T(F): 98.2 (01 Mar 2023 22:11), Max: 98.3 (01 Mar 2023 18:38)  HR: 68 (01 Mar 2023 12:00) (68 - 71)  BP: 152/54 (01 Mar 2023 21:03) (136/55 - 163/55)  BP(mean): 78 (01 Mar 2023 21:03) (66 - 84)  SpO2: 95% (01 Mar 2023 21:03) (93% - 95%): room air      I&O's Summary    2023 07:01  -  2023 07:00  --------------------------------------------------------  IN: 250 mL / OUT: 2000 mL / NET: -1750 mL        PHYSICAL EXAM:   Constitutional: NAD, awake and alert, well-developed  HEENT: PERR, EOMI, Normal Hearing, MMM  Neck: Soft and supple, No LAD, No JVD  Respiratory: Breath sounds are clear bilaterally, No wheezing, rales or rhonchi  Cardiovascular: S1 and S2, regular rate and rhythm, 2-3/6 LIANA at base and soft systolic murmur LLSB as before, +S4, No S3 gallops or rubs  Gastrointestinal: Bowel Sounds present, soft, nontender, nondistended, no guarding, no rebound  Extremities: No peripheral edema  Vascular: 2+ peripheral pulses  Neurological: no focal deficits      MEDICATIONS  (STANDING):  allopurinol 50 milliGRAM(s) Oral daily  atorvastatin 20 milliGRAM(s) Oral at bedtime  bethanechol 50 milliGRAM(s) Oral two times a day  folic acid 1 milliGRAM(s) Oral daily  gabapentin 300 milliGRAM(s) Oral two times a day  heparin   Injectable 5000 Unit(s) SubCutaneous every 12 hours  hydrALAZINE 25 milliGRAM(s) Oral two times a day  pantoprazole    Tablet 40 milliGRAM(s) Oral before breakfast  sodium chloride 0.9%. 1000 milliLiter(s) (50 mL/Hr) IV Continuous <Continuous>    MEDICATIONS  (PRN):  acetaminophen     Tablet .. 650 milliGRAM(s) Oral every 6 hours PRN Temp greater or equal to 38C (100.4F), Mild Pain (1 - 3)      LABS: All Labs Reviewed:                        7.9    5.29  )-----------( 107      ( 02 Mar 2023 05:52 )             23.6                           9.6    8.73  )-----------( 94       ( 2023 06:44 )             28.1                           8.9    8.99  )-----------( 92       ( 2023 10:57 )             25.6     Sodium, Serum: 129 mmol/L (23 @ 05:52)   Sodium, Serum: 124 mmol/L (.23 @ 00:32)   Sodium, Serum: 122 mmol/L (. @ 00:17)   Sodium, Serum: 122 mmol/L (23 @ 20:35)   Sodium, Serum: 124 mmol/L (. @ 16:36)     Sodium, Serum: 116 mmol/L  (. @ 00:50)           129<L>  |  100  |  19  ----------------------------<  116<H>  4.4   |  22  |  1.56<H>    Ca    8.6      02 Mar 2023 05:52  Phos  2.9     -  Mg     2.2     -    TPro  6.3  /  Alb  2.8<L>  /  TBili  0.4  /  DBili  x   /  AST  35  /  ALT  22  /  AlkPhos  60      124<L>  |  96  |  21  ----------------------------<  108<H>  4.5   |  20<L>  |  1.90<H>    Ca    8.2<L>      01 Mar 2023 00:32  Phos  3.3       Mg     2.2         TPro  6.3  /  Alb  2.8<L>  /  TBili  0.4  /  DBili  x   /  AST  35  /  ALT  22  /  AlkPhos  60      122<L>  |  93<L>  |  19  ----------------------------<  129<H>  4.3   |  22  |  1.83<H>    Ca    9.2      2023 00:17  Phos  2.9       Mg     2.2         TPro  6.5  /  Alb  3.1<L>  /  TBili  0.5  /  DBili  x   /  AST  40<H>  /  ALT  21  /  AlkPhos  59        CARDIAC MARKERS ( 2023 00:50 )  x     / x     / 403 U/L / x     / x          BLOOD CULTURES: Organism --  Gram Stain Urine -- Gram Stain --  Specimen Source Catheterized None  Culture-Urine --  Culture Results: No growth (23 @ 04:29)       LIPID PROFILE     RADIOLOGY:    X-Ray of Abd: 23:  FINDINGS:  Mild air distended colon and mid abdominal small bowel.  No free intra-abdominal air.  No obvious intra-abdominal masses.  LEFT upper quadrant and iliac vasculature all calcifications. The osseous structures are intact.  IMPRESSION:  Mild air distended transverse colon and mid abdominal small bowel.. No radiographic evidence of bowel obstruction.    X-Ray of Hips and CXR:  23:  INTERPRETATION:  Lateral hips and pelvis and chest.  Patient has hip pain.  Patient has vomiting.  Bilateral hips with pelvis. 5 views.  Arterial calcifications are noted.  Degenerative loss of disc height at multiple lower lumbar levels with slight right lower lumbar curve noted.  There is mild symmetric hip degeneration. No fracture.  AP chest on 2023 at 1:45 AM.  Heart magnified by technique.  There are bilateral perihilar infiltrates right greater than left which are new since  this year. Configuration suggests infection.  IMPRESSION: No acute fracture. Extensive arterial calcifications.  Bilateral perihilar infiltrates new since prior suggesting infection.      EK23:  Normal sinus rhythm  Normal ECG      TELEMETRY:  NSR      ECHO:  3/1/23:  M-Mode Measurements (cm)   LVEDd: 3.97 cm            LVESd: 2.55 cm   IVSEd: 1.1 cm   LVPWd: 1.1 cm             AO Root Dimension: 2.8 cm                             LA: 3.5 cm                LVOT: 2 cm  Doppler Measurements:   AV Velocity:434 cm/s                  MV Peak E-Wave: 98.7 cm/s   AV Peak Gradient: 75.34 mmHg          MV Peak A-Wave: 131 cm/s   AV Mean Gradient: 40 mmHg             MV E/A Ratio: 0.75 %   AV Area (Continuity):0.85 cm^2        MV Peak Gradient: 3.9 mmHg   TR Velocity:190 cm/s   TR Gradient:14.44 mmHg   Estimated RAP:5 mmHg   RVSP:27 mmHg    Findings  Mitral Valve   The mitral valve leaflets appear thickened.   EA reversal of the mitral inflow consistent with reduced compliance of the left ventricle.   Mild mitral annular calcification is present.   Mild mitral regurgitation is present.    Aortic Valve   Peak and mean transaortic gradients are 75 and 40mmHg respectively; this finding is consistent with severe aortic stenosis.   Mild (1+) aortic regurgitation is present.    Tricuspid Valve   Trace tricuspid valve regurgitation is present.    Pulmonic Valve   Mild pulmonic valvular regurgitation (1+) is present.    Left Atrium   Normal appearing left atrium.    Left Ventricle   Mild concentric left ventricular hypertrophy is present.   The left ventricle is normal in size.   Estimated left ventricular ejection fraction is 65-70 %.    Right Atrium   Normal appearing right atrium.    Right Ventricle   Normal appearing right ventricle structure and function.    Pericardial Effusion   No evidence of pericardial effusion.    Pleural Effusion   No evidence of pleural effusion.    Miscellaneous   The IVC appears normal.    Summary   The mitral valve leaflets appear thickened.   EA reversal of the mitral inflow consistent with reduced compliance of the left ventricle.   Mild mitral annular calcification is present.   Mild mitral regurgitation is present.   Peak and mean transaortic gradients are 75 and 40mmHg respectively; this finding is consistent with severe aortic stenosis.   Mild (1+) aortic regurgitation is present.   Trace tricuspid valve regurgitation is present.   Mild pulmonic valvular regurgitation (1+) is present.   Normal appearing left atrium.   Mild concentric left ventricular hypertrophy is present.   The left ventricle is normal in size.   Estimated left ventricular ejection fraction is 65-70 %.   Normal appearing right atrium.   Normal appearing right ventricle structure and function.   The IVC appears normal.   No evidence of pericardial effusion.   No evidence of pleural effusion.    Signature   ----------------------------------------------------------------   Electronically signed by Sakina Cheatham MD, Director of   Cardiac Cath Lab(Interpreting physician) on 2023 06:42   PM   ----------------------------------------------------------------

## 2023-03-02 NOTE — PROGRESS NOTE ADULT - ASSESSMENT
89 yo female with PMHx arthritis, HTN, gout, hyperlipidemia, hx of pelvic fracture, hx of chronic UTI, urinary retention presents with bilateral hip and groin pain X 2 days with renal evaluation of hyponatremia.       Hyponatremia  -LIkely pre renal in nature as noted by response to NS hydration  -Na correcting , off IVF sine 2/28 - Goal rise is 6-8 over 24 hours which is currently well within goal  - lower frequency of bmp checks  -optimize intake  - no diuretics     ANDREW on CKD III b  -Stable function - improving   -Encourage po  -Avoid nsaids/contrast    Severe AS   as per cardiology        * pt seen earlier, note now

## 2023-03-03 LAB
ANION GAP SERPL CALC-SCNC: 6 MMOL/L — SIGNIFICANT CHANGE UP (ref 5–17)
BUN SERPL-MCNC: 19 MG/DL — SIGNIFICANT CHANGE UP (ref 7–23)
CALCIUM SERPL-MCNC: 8.9 MG/DL — SIGNIFICANT CHANGE UP (ref 8.5–10.1)
CHLORIDE SERPL-SCNC: 101 MMOL/L — SIGNIFICANT CHANGE UP (ref 96–108)
CO2 SERPL-SCNC: 22 MMOL/L — SIGNIFICANT CHANGE UP (ref 22–31)
CREAT SERPL-MCNC: 1.41 MG/DL — HIGH (ref 0.5–1.3)
EGFR: 35 ML/MIN/1.73M2 — LOW
GLUCOSE SERPL-MCNC: 109 MG/DL — HIGH (ref 70–99)
HCT VFR BLD CALC: 24.8 % — LOW (ref 34.5–45)
HGB BLD-MCNC: 8 G/DL — LOW (ref 11.5–15.5)
MCHC RBC-ENTMCNC: 30.4 PG — SIGNIFICANT CHANGE UP (ref 27–34)
MCHC RBC-ENTMCNC: 32.3 GM/DL — SIGNIFICANT CHANGE UP (ref 32–36)
MCV RBC AUTO: 94.3 FL — SIGNIFICANT CHANGE UP (ref 80–100)
PHOSPHATE SERPL-MCNC: 2.5 MG/DL — SIGNIFICANT CHANGE UP (ref 2.5–4.5)
PLATELET # BLD AUTO: 151 K/UL — SIGNIFICANT CHANGE UP (ref 150–400)
POTASSIUM SERPL-MCNC: 4.7 MMOL/L — SIGNIFICANT CHANGE UP (ref 3.5–5.3)
POTASSIUM SERPL-SCNC: 4.7 MMOL/L — SIGNIFICANT CHANGE UP (ref 3.5–5.3)
RBC # BLD: 2.63 M/UL — LOW (ref 3.8–5.2)
RBC # FLD: 14.6 % — HIGH (ref 10.3–14.5)
SODIUM SERPL-SCNC: 129 MMOL/L — LOW (ref 135–145)
WBC # BLD: 5.73 K/UL — SIGNIFICANT CHANGE UP (ref 3.8–10.5)
WBC # FLD AUTO: 5.73 K/UL — SIGNIFICANT CHANGE UP (ref 3.8–10.5)

## 2023-03-03 PROCEDURE — 99232 SBSQ HOSP IP/OBS MODERATE 35: CPT

## 2023-03-03 RX ADMIN — Medication 25 MILLIGRAM(S): at 10:42

## 2023-03-03 RX ADMIN — Medication 1 MILLIGRAM(S): at 10:42

## 2023-03-03 RX ADMIN — PANTOPRAZOLE SODIUM 40 MILLIGRAM(S): 20 TABLET, DELAYED RELEASE ORAL at 10:42

## 2023-03-03 RX ADMIN — GABAPENTIN 300 MILLIGRAM(S): 400 CAPSULE ORAL at 10:41

## 2023-03-03 RX ADMIN — Medication 25 MILLIGRAM(S): at 21:33

## 2023-03-03 RX ADMIN — Medication 50 MILLIGRAM(S): at 10:42

## 2023-03-03 RX ADMIN — Medication 50 MILLIGRAM(S): at 10:43

## 2023-03-03 RX ADMIN — Medication 25 MILLIGRAM(S): at 21:32

## 2023-03-03 RX ADMIN — HEPARIN SODIUM 5000 UNIT(S): 5000 INJECTION INTRAVENOUS; SUBCUTANEOUS at 10:41

## 2023-03-03 RX ADMIN — HEPARIN SODIUM 5000 UNIT(S): 5000 INJECTION INTRAVENOUS; SUBCUTANEOUS at 21:33

## 2023-03-03 RX ADMIN — ATORVASTATIN CALCIUM 20 MILLIGRAM(S): 80 TABLET, FILM COATED ORAL at 21:33

## 2023-03-03 RX ADMIN — GABAPENTIN 300 MILLIGRAM(S): 400 CAPSULE ORAL at 21:33

## 2023-03-03 RX ADMIN — Medication 50 MILLIGRAM(S): at 21:32

## 2023-03-03 NOTE — PROGRESS NOTE ADULT - ASSESSMENT
2/28/23:  Pt with above history with h/o HFpEF on Lasix developing severe hyponatremia better after current treatment but still hyponatremic.  Will have to avoid diuretics but continue to gently replete NA watching for diastolic CHF given her stiff heart and moderate-severe AS.  Will repeat an echo.  Continue as outlined by medicine etal.  Will follow as treatment progresses.    3/1/23:  More alert and less lethargic this AM.  Na+=124 this AM.  No clinical CHF or anginal chest pains.  Awaiting echo.  No new cardiac complaints.  Continue as outlined by medicine etal.  Will follow as treatment progresses.    3/2/23:  Resting comfortably without CHF or other cardiac symptoms.  Na+=129.  ECHO with severe AS (MARVA=0.8-0.9cm2) but no clinical symptoms yet.  Continue as outlined by medicine etal.  Will follow as treatment progresses.      3/3/23:  Resting and feeling better.  No clinical CHF, angina or syncope.  No new cardiac symptoms.  Tolerating IV NS.  Awaiting AM labs.  Continue as outlined by medicine etal.  Will follow.

## 2023-03-03 NOTE — PROGRESS NOTE ADULT - SUBJECTIVE AND OBJECTIVE BOX
CC: severe hyponatremia (01 Mar 2023 07:23)    HPI:  89 yo F with arthritis, hx of HTN, gout, hyperlipidemia, hx of pelvic fracture,  UTIs  with urinary retention after post void trials in Dr. Bonilla's office presented with bilateral hip and groin pain X 2 days.  Patient's daughter stated  Pt  had recent urology evaluation and to  straight cath urine were recommended but they were difficult after only one day and she thinks patient maybe strained her hips trying to hold positioning during straight cath.  Patient then had indwelling pérez placed 3 days PTA  which has been draining well.  Patient is currently on bactrim for UTI.  Daughter stated  patient was also fatigued at home, not speaking much, and had malaise.  Patient refused to walk  day PTA due to bilateral hip and pelvis pain. No  falls.  +  vomiting  once and could not take her PM dose of bactrim.  Patient denies abdominal pain, nausea, or chest pain.  O2 on arrival was 91% and nasal canula placed for comfort but patient denies SOB.    Vital Signs Last 24 Hrs  T(C): 36.6 (03 Mar 2023 14:10), Max: 36.9 (02 Mar 2023 17:06)  T(F): 97.8 (03 Mar 2023 14:10), Max: 98.5 (02 Mar 2023 17:06)  HR: 65 (03 Mar 2023 15:03) (65 - 73)  BP: 134/42 (03 Mar 2023 15:03) (134/42 - 169/46)  BP(mean): 67 (03 Mar 2023 15:03) (67 - 87)  RR: 18 (03 Mar 2023 15:03) (18 - 18)  SpO2: 95% (03 Mar 2023 15:03) (95% - 98%)    Parameters below as of 03 Mar 2023 15:03  Patient On (Oxygen Delivery Method): room air          PHYSICAL EXAM:  General: Well developed; in no acute distress  Eyes: EOMI; conjunctiva and sclera clear  Head: Normocephalic; atraumatic  ENMT: No nasal discharge; airway clear  Neck: Supple; non tender; no masses  Respiratory: Decreased BS at bases,  No wheezes, rales or rhonchi  Cardiovascular: Regular rate and rhythm. S1 and S2 Normal; + LIANA  Gastrointestinal: Soft non-tender non-distended; Normal bowel sounds  Genitourinary: No  suprapubic  tenderness  Extremities: No  edema  Vascular: Peripheral pulses palpable 2+ bilaterally  Neurological: Alert and oriented x2, non focal   Musculoskeletal: Normal muscle tone, without deformities  Psychiatric: Cooperative and appropriate    LABS:                                    8.0    5.73  )-----------( 151      ( 03 Mar 2023 06:27 )             24.8   03-03    129<L>  |  101  |  19  ----------------------------<  109<H>  4.7   |  22  |  1.41<H>    Ca    8.9      03 Mar 2023 06:27  Phos  2.5     03-03           MEDICATIONS  (STANDING):  allopurinol 50 milliGRAM(s) Oral daily  atorvastatin 20 milliGRAM(s) Oral at bedtime  bethanechol 50 milliGRAM(s) Oral two times a day  folic acid 1 milliGRAM(s) Oral daily  gabapentin 300 milliGRAM(s) Oral two times a day  heparin   Injectable 5000 Unit(s) SubCutaneous every 12 hours  hydrALAZINE 25 milliGRAM(s) Oral two times a day  pantoprazole    Tablet 40 milliGRAM(s) Oral before breakfast    MEDICATIONS  (PRN):  acetaminophen     Tablet .. 650 milliGRAM(s) Oral every 6 hours PRN Temp greater or equal to 38C (100.4F), Mild Pain (1 - 3)      RADIOLOGY & ADDITIONAL TESTS:      ACC: [id number] EXAM:  XR ABDOMEN 1 VIEW   ORDERED BY: GARY CAIN   PROCEDURE DATE:  02/27/2023      FINDINGS:  Mild air distended colon and mid abdominal small bowel.    No free intra-abdominal air.  No obvious intra-abdominal masses.  LEFT upper quadrant and iliac vasculature all calcifications. The osseous   structures are intact.    IMPRESSION:    Mild air distended transverse colon and mid abdominal small bowel.. No   radiographic evidence of bowel obstruction.        ACC: [id number] EXAM:  XR HIPS BI WITH PELV 2V   ORDERED BY: SAMREEN RAMIREZ   ACC: [id number] EXAM:  XR CHEST PORTABLE URGENT 1V   ORDERED BY: SAMREEN RAMIREZ   PROCEDURE DATE:  02/27/2023        INTERPRETATION:  Lateral hips and pelvis and chest.Patient has hip pain.    Patient has vomiting.    Bilateral hips with pelvis. 5 views.    Arterial calcifications are noted.    Degenerative loss of disc height at multiple lower lumbar levels with   slight right lower lumbar curve noted.    There ismild symmetric hip degeneration. No fracture.    AP chest on February 27, 2023 at 1:45 AM.    Heart magnified by technique.    There are bilateral perihilar infiltrates right greater than left which   are new since January 7 this year. Configuration suggests infection.    IMPRESSION: No acute fracture. Extensive arterial calcifications.    Bilateral perihilar infiltrates new since prior suggesting infection.

## 2023-03-03 NOTE — PROGRESS NOTE ADULT - ASSESSMENT
89 yo female with PMHx arthritis,  AS,  HTN, gout, hyperlipidemia, hx of pelvic fracture,  UTIs ,  urinary retention , s/p Perales p admitted  for:       1. Hyponatremia 2/2 hypovolemia  Lasix  use and GI losses  S/p IVF with improvement of NA at 129 today   continue to hold Lasix     2.  HTN   restarted on Hydralazine   Also on Bystolic outPt was held in admission   BP elevated start Lopressor   Monitor BP     3. H/o HFpEF,  not in failure   S/p IVF, no signs of overload  Daily weight   Continue to hold Lasix   ECHO: preserved EF, severe AS  Cardio recs appreciated       Abnormal CT, not on abx, this is my first day with her continue to observe off abx

## 2023-03-03 NOTE — PROGRESS NOTE ADULT - ASSESSMENT
89 yo F with arthritis, hx of HTN, gout, hyperlipidemia, hx of pelvic fracture, COPD not on home O2, chronic UTI 2/2 urinary retention with outpt Perales 2/24 admitted for    ANDREW on CKD   no sob  or cp      HD manager   PD as per pt    iron fof CBI    - check blankerts 89 yo female with PMHx arthritis, HTN, gout, hyperlipidemia, hx of pelvic fracture, hx of chronic UTI, urinary retention presents with bilateral hip and groin pain X 2 days with renal evaluation of hyponatremia.       Hyponatremia   Na correcting , off IVF since 2/28 - Goal rise is 6-8 over 24 hours which is currently well within goal  - optimize intake  - no diuretics   - may need rechallenge w IVF  - make sure not still retaining as this can cause hyponateremai     ANDREW on CKD III b  -Stable function - improving   -Encourage po  -Avoid nsaids/contrast    Severe AS   as per cardiology        * pt seen earlier, note now

## 2023-03-03 NOTE — PROGRESS NOTE ADULT - SUBJECTIVE AND OBJECTIVE BOX
Patient is a 89y old  Female who presents with a chief complaint of hyponatremia, lethargy (2023 10:06)      BRIEF HOSPITAL COURSE: Pt. is a 89 yo F with arthritis, hx of HTN, gout, hyperlipidemia, hx of pelvic fracture, hx of chronic UTI with some urinary retention after post void trials in Dr. Carvalho office presents with bilateral hip and groin pain X 2 days.  Patient's daughter states she had recent urology evaluation and straight cath urines were recommended but they were difficult after only one day and she thinks patient maybe strained her hips trying to hold positioning during straight cath.  Patient then had indwelling pérez placed 3 days ago which has been draining well.  Patient is currently on bactrim for UTI.  Daughter states patient was also fatigued at home, not speaking much, and had malaise.  Patient refused to walk yesterday due to bilateral hip and pelvis pain. Denies falls.  Daughter sent patient via ambulance when she vomited once and could not take her PM dose of bactrim.  Patient denies abdominal pain, nausea, or chest pain.  O2 on arrival was 91% and nasal canula placed for comfort but patient denies SOB.    Events last 24 hours: Na118->124 post NS 50mL x 5 hours. NS dc'd f/u Na 122->122 continue fluid restriction till am        following our commands   eliz sob     PAST MEDICAL & SURGICAL HISTORY:  HTN (hypertension)      HLD (hyperlipidemia)      Gout      Osteoarthritis      History of tonsillectomy  in childhood          hydrALAZINE 25 milliGRAM(s) Oral two times a day      acetaminophen     Tablet .. 650 milliGRAM(s) Oral every 6 hours PRN  gabapentin 300 milliGRAM(s) Oral two times a day      heparin   Injectable 5000 Unit(s) SubCutaneous every 12 hours    pantoprazole    Tablet 40 milliGRAM(s) Oral before breakfast    bethanechol 50 milliGRAM(s) Oral two times a day    allopurinol 50 milliGRAM(s) Oral daily  atorvastatin 20 milliGRAM(s) Oral at bedtime    folic acid 1 milliGRAM(s) Oral daily          ICU Vital Signs Last 24 Hrs  T(C): 36.7 (2023 13:25), Max: 37.1 (2023 05:43)  T(F): 98 (2023 13:25), Max: 98.7 (2023 05:43)  HR: 77 (2023 18:00) (51 - 77)  BP: 134/41 (2023 18:00) (101/42 - 164/59)  BP(mean): 65 (2023 18:00) (56 - 85)  ABP: --  ABP(mean): --  RR: 16 (2023 18:00) (9 - 21)  SpO2: 93% (2023 18:00) (93% - 95%)    O2 Parameters below as of 2023 18:00  Patient On (Oxygen Delivery Method): room air                              8.0    5.73  )-----------( 151      ( 03 Mar 2023 06:27 )             24.8                         8.8    x     )-----------( x        ( 02 Mar 2023 16:48 )             26.5           129    |  101    |  19     ----------------------------<  109       03 Mar 2023 06:27  4.7     |  22     |  1.41     129    |  100    |  19     ----------------------------<  116       02 Mar 2023 05:52  4.4     |  22     |  1.56     124    |  98     |  21     ----------------------------<  134       01 Mar 2023 19:46  5.1     |  23     |  1.85     Ca    8.9        03 Mar 2023 06:27  Ca    8.6        02 Mar 2023 05:52    Phos  2.5       03 Mar 2023 06:27  Phos  2.9       01 Mar 2023 07:05    Mg     2.2       01 Mar 2023 07:05  Mg     2.2       01 Mar 2023 00:32    TPro  6.3    /  Alb  2.8    /  TBili  0.4    /        01 Mar 2023 00:32  DBili  x      /  AST  35     /  ALT  22     /  AlkPhos  60       TPro  6.7    /  Alb  3.2    /  TBili  0.4    /        2023 06:44  DBili  x      /  AST  38     /  ALT  23     /  AlkPhos  57                 Urinalysis Basic - ( 2023 04:29 )    Color: Yellow / Appearance: Clear / S.010 / pH: x  Gluc: x / Ketone: Negative  / Bili: Negative / Urobili: Negative   Blood: x / Protein: 100 / Nitrite: Negative   Leuk Esterase: Moderate / RBC: 11-25 /HPF / WBC 3-5 /HPF   Sq Epi: x / Non Sq Epi: Few / Bacteria: Few      CULTURES:      Physical Examination:    General: Well appearing, lying in bed in NAD.    HEENT: Pupils equal, reactive to light. Symmetric. No scleral icterus or injection.    PULM: Clear to auscultation B/L. No wheezes, rales, or rhonchi appreciated. No significant sputum production or increased respiratory effort.    NECK: Supple, no lymphadenopathy, trachea midline.    CVS: Regular rate and rhythm, no murmurs appreciated, +s1/s2.    ABD: Soft, nondistended, nontender, normoactive bowel sounds. Pérez    EXT: +1 edema, nontender.    SKIN: Warm and well perfused, no rashes noted.    NEURO: Alert, oriented, interactive, nonfocal.    RADIOLOGY: < from: Xray Chest 1 View- PORTABLE-Urgent (Xray Chest 1 View- PORTABLE-Urgent .) (23 @ 02:30) >    ACC: [id number] EXAM:  XR HIPS BI WITH PELV 2V   ORDERED BY: SAMREEN RAMIREZ     ACC: [id number] EXAM:  XR CHEST PORTABLE URGENT 1V   ORDERED BY: SAMREEN RAMIREZ     PROCEDURE DATE:  2023          INTERPRETATION:  Lateral hips and pelvis and chest.Patient has hip pain.    Patient has vomiting.    Bilateral hips with pelvis. 5 views.    Arterial calcifications are noted.    Degenerative loss of disc height at multiple lower lumbar levels with   slight right lower lumbar curve noted.    There ismild symmetric hip degeneration. No fracture.    AP chest on 2023 at 1:45 AM.    Heart magnified by technique.    There are bilateral perihilar infiltrates right greater than left which   are new since  this year. Configuration suggests infection.    IMPRESSION: No acute fracture. Extensive arterial calcifications.    Bilateral perihilar infiltrates new since prior suggesting infection.    --- End of Report ---    < end of copied text >     Patient is a 89y Female who reports no complaints as new.      MEDICATIONS  (STANDING):  allopurinol 50 milliGRAM(s) Oral daily  atorvastatin 20 milliGRAM(s) Oral at bedtime  bethanechol 50 milliGRAM(s) Oral two times a day  folic acid 1 milliGRAM(s) Oral daily  gabapentin 300 milliGRAM(s) Oral two times a day  heparin   Injectable 5000 Unit(s) SubCutaneous every 12 hours  hydrALAZINE 25 milliGRAM(s) Oral two times a day  pantoprazole    Tablet 40 milliGRAM(s) Oral before breakfast      Vital Signs Last 24 Hrs  T(C): 36.8 (01 Mar 2023 22:11), Max: 36.8 (01 Mar 2023 18:38)  T(F): 98.2 (01 Mar 2023 22:11), Max: 98.3 (01 Mar 2023 18:38)  HR: 68 (01 Mar 2023 12:00) (68 - 68)  BP: 152/54 (01 Mar 2023 21:03) (136/55 - 163/55)  BP(mean): 78 (01 Mar 2023 21:03) (66 - 84)  RR: --  SpO2: 95% (01 Mar 2023 21:03) (93% - 95%)    Parameters below as of 01 Mar 2023 12:00  Patient On (Oxygen Delivery Method): room air      I&O's Detail    01 Mar 2023 07:01  -  02 Mar 2023 07:00  --------------------------------------------------------  IN:  Total IN: 0 mL    OUT:    Indwelling Catheter - Urethral (mL): 950 mL    Voided (mL): 1000 mL  Total OUT: 1950 mL    Total NET: -1950 mL        PHYSICAL EXAM:    Constitutional: NAD   HEENT:  MM  dist  Cardiovascular: S1 and S2   Gastrointestinal: BS+, soft, NT/ND  Extremities: No  peripheral edema  Neurological: Alert, pleasent  Perales  +           LABS:                            129    |  101    |  19     ----------------------------<  109       03 Mar 2023 06:27  4.7     |  22     |  1.41     129    |  100    |  19     ----------------------------<  116       02 Mar 2023 05:52  4.4     |  22     |  1.56       Ca    8.9        03 Mar 2023 06:27    Phos  2.5       03 Mar 2023 06:27  Phos  2.9       01 Mar 2023 07:05    Mg     2.2       01 Mar 2023 07:05  Mg     2.2       01 Mar 2023 00:32    TPro  6.3    /  Alb  2.8    /  TBili  0.4    /        01 Mar 2023 00:32  DBili  x      /  AST  35     /  ALT  22     /  AlkPhos  60             Urine Studies:  Urinalysis Basic - ( 2023 04:29 )    Color: Yellow / Appearance: Clear / S.010 / pH: x  Gluc: x / Ketone: Negative  / Bili: Negative / Urobili: Negative   Blood: x / Protein: 100 / Nitrite: Negative   Leuk Esterase: Moderate / RBC: 11-25 /HPF / WBC 3-5 /HPF   Sq Epi: x / Non Sq Epi: Few / Bacteria: Few      Osmolality, Random Urine: 134 mosm/Kg ( @ 13:00)  Sodium, Random Urine: 28 mmol/L ( @ 13:00)      RADIOLOGY & ADDITIONAL STUDIES:

## 2023-03-03 NOTE — PROGRESS NOTE ADULT - SUBJECTIVE AND OBJECTIVE BOX
CHIEF COMPLAINT:  Patient is a 89y old  Female who presents with a chief complaint of hyponatremia, lethargy (2023 10:06)      HPI: 23:  Pt. is a 89 yo F with arthritis, hx of HTN, gout, hyperlipidemia, hx of pelvic fracture, hx of chronic UTI with some urinary retention after post void trials in Dr. Carvalho office presents with bilateral hip and groin pain X 2 days.  Patient's daughter states she had recent urology evaluation and straight cath urines were recommended but they were difficult after only one day and she thinks patient maybe strained her hips trying to hold positioning during straight cath.  Patient then had indwelling pérez placed 3 days ago which has been draining well.  Patient is currently on bactrim for UTI.  Daughter states patient was also fatigued at home, not speaking much, and had malaise.  Patient refused to walk yesterday due to bilateral hip and pelvis pain. Denies falls.  Daughter sent patient via ambulance when she vomited once and could not take her PM dose of bactrim.  Patient denies abdominal pain, nausea, or chest pain.  O2 on arrival was 91% and nasal canula placed for comfort but patient denies SOB.    Initially pt was alert and lethargic. Daughter states mother was recently treated at home with laxatives for constipation and has been having multiple bowel movements.  In the ER she was found to have severe hyponatremia with N+=116, treated with hypertonic Saline with improvement and N+ up to 124 but last labs with N+=122.  Awaiting AM labs today.  She is more alert and less lethargic and less confused.  She had no cardiac symptoms at this time denying anginal chest pains or increased SOB.  She dose have HFpEF and moderate-severe AS by echo.  Echo in my office on 3/3/2022 showed normal LV systolic function with LVEF=65-70% with moderate LVH and diastolic LV dysfunction  Moderate-severe AS with MARVA=1.0-1.1cm2 with mild AI, mild MAC with mild MR and mild TR.      3/1/23:  More alert and less lethargic this AM.  Na+=124 this AM.  No clinical CHF or anginal chest pains.  Awaiting echo.  No new cardiac complaints.    3/2/23:  Resting comfortably without CHF or other cardiac symptoms.  Na+=129.  ECHO with severe AS (MARVA=0.8-0.9cm2) but no clinical symptoms yet.    3/3/23:  Resting and feeling better.  No clinical CHF, angina or syncope.  No new cardiac symptoms.  Tolerating IV NS.  Awaiting AM labs.        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  HFpEF  HLD (hyperlipidemia)  Gout  Osteoarthritis  History of tonsillectomy-in childhood      FAMILY HISTORY:   FAMILY HISTORY:  non-contributory      ALLERGIES:  Allergies  Ceftin (Hives; Rash)      REVIEW OF SYSTEMS:  10 point ROS was obtained  Pertinent positives and negatives are as above  All other review of systems is negative unless indicated above      Vital Signs Last 24 Hrs  T(C): 36.1 (02 Mar 2023 21:53), Max: 36.9 (02 Mar 2023 17:06)  T(F): 97 (02 Mar 2023 21:53), Max: 98.5 (02 Mar 2023 17:06)  HR: 67 (02 Mar 2023 17:03) (67 - 72)  BP: 145/44 (03 Mar 2023 04:00) (145/44 - 169/46)  BP(mean): 67 (03 Mar 2023 04:00) (67 - 87)  SpO2: 97% (02 Mar 2023 09:43) (97% - 97%): room air      I&O's Summary    2023 07:01  -  2023 07:00  --------------------------------------------------------  IN: 250 mL / OUT: 2000 mL / NET: -1750 mL        PHYSICAL EXAM:   Constitutional: NAD, awake and alert, well-developed  HEENT: PERR, EOMI, Normal Hearing, MMM  Neck: Soft and supple, No LAD, No JVD  Respiratory: Breath sounds are clear bilaterally, No wheezing, rales or rhonchi  Cardiovascular: S1 and S2, regular rate and rhythm, 2-3/6 LIANA at base and soft systolic murmur LLSB as before, +S4, No S3 gallops or rubs  Gastrointestinal: Bowel Sounds present, soft, nontender, nondistended, no guarding, no rebound  Extremities: No peripheral edema  Vascular: 2+ peripheral pulses  Neurological: no focal deficits      MEDICATIONS  (STANDING):  allopurinol 50 milliGRAM(s) Oral daily  atorvastatin 20 milliGRAM(s) Oral at bedtime  bethanechol 50 milliGRAM(s) Oral two times a day  folic acid 1 milliGRAM(s) Oral daily  gabapentin 300 milliGRAM(s) Oral two times a day  heparin   Injectable 5000 Unit(s) SubCutaneous every 12 hours  hydrALAZINE 25 milliGRAM(s) Oral two times a day  metoprolol tartrate 25 milliGRAM(s) Oral two times a day  pantoprazole    Tablet 40 milliGRAM(s) Oral before breakfast    MEDICATIONS  (PRN):  acetaminophen     Tablet .. 650 milliGRAM(s) Oral every 6 hours PRN Temp greater or equal to 38C (100.4F), Mild Pain (1 - 3)      LABS: All Labs Reviewed:                        7.9    5.29  )-----------( 107      ( 02 Mar 2023 05:52 )             23.6                           9.6    8.73  )-----------( 94       ( 2023 06:44 )             28.1                           8.9    8.99  )-----------( 92       ( 2023 10:57 )             25.6     Sodium, Serum: 129 mmol/L (23 @ 05:52)   Sodium, Serum: 124 mmol/L (23 @ 00:32)   Sodium, Serum: 122 mmol/L (23 @ 00:17)   Sodium, Serum: 122 mmol/L (23 @ 20:35)   Sodium, Serum: 124 mmol/L (23 @ 16:36)     Sodium, Serum: 116 mmol/L  (23 @ 00:50)       03-    129<L>  |  100  |  19  ----------------------------<  116<H>  4.4   |  22  |  1.56<H>    Ca    8.6      02 Mar 2023 05:52  Phos  2.9     03-  Mg     2.2     03-    TPro  6.3  /  Alb  2.8<L>  /  TBili  0.4  /  DBili  x   /  AST  35  /  ALT  22  /  AlkPhos  60  -    124<L>  |  96  |  21  ----------------------------<  108<H>  4.5   |  20<L>  |  1.90<H>    Ca    8.2<L>      01 Mar 2023 00:32  Phos  3.3     -  Mg     2.2     -    TPro  6.3  /  Alb  2.8<L>  /  TBili  0.4  /  DBili  x   /  AST  35  /  ALT  22  /  AlkPhos  60      122<L>  |  93<L>  |  19  ----------------------------<  129<H>  4.3   |  22  |  1.83<H>    Ca    9.2      2023 00:17  Phos  2.9       Mg     2.2         TPro  6.5  /  Alb  3.1<L>  /  TBili  0.5  /  DBili  x   /  AST  40<H>  /  ALT  21  /  AlkPhos  59        CARDIAC MARKERS ( 2023 00:50 )  x     / x     / 403 U/L / x     / x          BLOOD CULTURES: Organism --  Gram Stain Urine -- Gram Stain --  Specimen Source Catheterized None  Culture-Urine --  Culture Results: No growth (23 @ 04:29)       LIPID PROFILE     RADIOLOGY:    X-Ray of Abd: 23:  FINDINGS:  Mild air distended colon and mid abdominal small bowel.  No free intra-abdominal air.  No obvious intra-abdominal masses.  LEFT upper quadrant and iliac vasculature all calcifications. The osseous structures are intact.  IMPRESSION:  Mild air distended transverse colon and mid abdominal small bowel.. No radiographic evidence of bowel obstruction.    X-Ray of Hips and CXR:  23:  INTERPRETATION:  Lateral hips and pelvis and chest.  Patient has hip pain.  Patient has vomiting.  Bilateral hips with pelvis. 5 views.  Arterial calcifications are noted.  Degenerative loss of disc height at multiple lower lumbar levels with slight right lower lumbar curve noted.  There is mild symmetric hip degeneration. No fracture.  AP chest on 2023 at 1:45 AM.  Heart magnified by technique.  There are bilateral perihilar infiltrates right greater than left which are new since  this year. Configuration suggests infection.  IMPRESSION: No acute fracture. Extensive arterial calcifications.  Bilateral perihilar infiltrates new since prior suggesting infection.      EK23:  Normal sinus rhythm  Normal ECG      TELEMETRY:  NSR      ECHO:  3/1/23:  M-Mode Measurements (cm)   LVEDd: 3.97 cm            LVESd: 2.55 cm   IVSEd: 1.1 cm   LVPWd: 1.1 cm             AO Root Dimension: 2.8 cm                             LA: 3.5 cm                LVOT: 2 cm  Doppler Measurements:   AV Velocity:434 cm/s                  MV Peak E-Wave: 98.7 cm/s   AV Peak Gradient: 75.34 mmHg          MV Peak A-Wave: 131 cm/s   AV Mean Gradient: 40 mmHg             MV E/A Ratio: 0.75 %   AV Area (Continuity):0.85 cm^2        MV Peak Gradient: 3.9 mmHg   TR Velocity:190 cm/s   TR Gradient:14.44 mmHg   Estimated RAP:5 mmHg   RVSP:27 mmHg    Findings  Mitral Valve   The mitral valve leaflets appear thickened.   EA reversal of the mitral inflow consistent with reduced compliance of the left ventricle.   Mild mitral annular calcification is present.   Mild mitral regurgitation is present.    Aortic Valve   Peak and mean transaortic gradients are 75 and 40mmHg respectively; this finding is consistent with severe aortic stenosis.   Mild (1+) aortic regurgitation is present.    Tricuspid Valve   Trace tricuspid valve regurgitation is present.    Pulmonic Valve   Mild pulmonic valvular regurgitation (1+) is present.    Left Atrium   Normal appearing left atrium.    Left Ventricle   Mild concentric left ventricular hypertrophy is present.   The left ventricle is normal in size.   Estimated left ventricular ejection fraction is 65-70 %.    Right Atrium   Normal appearing right atrium.    Right Ventricle   Normal appearing right ventricle structure and function.    Pericardial Effusion   No evidence of pericardial effusion.    Pleural Effusion   No evidence of pleural effusion.    Miscellaneous   The IVC appears normal.    Summary   The mitral valve leaflets appear thickened.   EA reversal of the mitral inflow consistent with reduced compliance of the left ventricle.   Mild mitral annular calcification is present.   Mild mitral regurgitation is present.   Peak and mean transaortic gradients are 75 and 40mmHg respectively; this finding is consistent with severe aortic stenosis.   Mild (1+) aortic regurgitation is present.   Trace tricuspid valve regurgitation is present.   Mild pulmonic valvular regurgitation (1+) is present.   Normal appearing left atrium.   Mild concentric left ventricular hypertrophy is present.   The left ventricle is normal in size.   Estimated left ventricular ejection fraction is 65-70 %.   Normal appearing right atrium.   Normal appearing right ventricle structure and function.   The IVC appears normal.   No evidence of pericardial effusion.   No evidence of pleural effusion.    Signature   ----------------------------------------------------------------   Electronically signed by Sakina Cheatham MD, Director of   Cardiac Cath Lab(Interpreting physician) on 2023 06:42   PM   ----------------------------------------------------------------

## 2023-03-04 LAB
ADD ON TEST-SPECIMEN IN LAB: SIGNIFICANT CHANGE UP
ANION GAP SERPL CALC-SCNC: 4 MMOL/L — LOW (ref 5–17)
ANION GAP SERPL CALC-SCNC: 5 MMOL/L — SIGNIFICANT CHANGE UP (ref 5–17)
BUN SERPL-MCNC: 21 MG/DL — SIGNIFICANT CHANGE UP (ref 7–23)
BUN SERPL-MCNC: 21 MG/DL — SIGNIFICANT CHANGE UP (ref 7–23)
CALCIUM SERPL-MCNC: 9.2 MG/DL — SIGNIFICANT CHANGE UP (ref 8.5–10.1)
CALCIUM SERPL-MCNC: 9.3 MG/DL — SIGNIFICANT CHANGE UP (ref 8.5–10.1)
CHLORIDE SERPL-SCNC: 101 MMOL/L — SIGNIFICANT CHANGE UP (ref 96–108)
CHLORIDE SERPL-SCNC: 101 MMOL/L — SIGNIFICANT CHANGE UP (ref 96–108)
CO2 SERPL-SCNC: 22 MMOL/L — SIGNIFICANT CHANGE UP (ref 22–31)
CO2 SERPL-SCNC: 23 MMOL/L — SIGNIFICANT CHANGE UP (ref 22–31)
CREAT SERPL-MCNC: 1.28 MG/DL — SIGNIFICANT CHANGE UP (ref 0.5–1.3)
CREAT SERPL-MCNC: 1.32 MG/DL — HIGH (ref 0.5–1.3)
CULTURE RESULTS: SIGNIFICANT CHANGE UP
CULTURE RESULTS: SIGNIFICANT CHANGE UP
EGFR: 38 ML/MIN/1.73M2 — LOW
EGFR: 39 ML/MIN/1.73M2 — LOW
GLUCOSE SERPL-MCNC: 112 MG/DL — HIGH (ref 70–99)
GLUCOSE SERPL-MCNC: 113 MG/DL — HIGH (ref 70–99)
HCT VFR BLD CALC: 26.4 % — LOW (ref 34.5–45)
HGB BLD-MCNC: 8.8 G/DL — LOW (ref 11.5–15.5)
MCHC RBC-ENTMCNC: 30.9 PG — SIGNIFICANT CHANGE UP (ref 27–34)
MCHC RBC-ENTMCNC: 33.3 GM/DL — SIGNIFICANT CHANGE UP (ref 32–36)
MCV RBC AUTO: 92.6 FL — SIGNIFICANT CHANGE UP (ref 80–100)
OSMOLALITY SERPL: 272 MOSMOL/KG — LOW (ref 280–301)
PLATELET # BLD AUTO: 129 K/UL — LOW (ref 150–400)
POTASSIUM SERPL-MCNC: 4.6 MMOL/L — SIGNIFICANT CHANGE UP (ref 3.5–5.3)
POTASSIUM SERPL-MCNC: 5 MMOL/L — SIGNIFICANT CHANGE UP (ref 3.5–5.3)
POTASSIUM SERPL-SCNC: 4.6 MMOL/L — SIGNIFICANT CHANGE UP (ref 3.5–5.3)
POTASSIUM SERPL-SCNC: 5 MMOL/L — SIGNIFICANT CHANGE UP (ref 3.5–5.3)
RBC # BLD: 2.85 M/UL — LOW (ref 3.8–5.2)
RBC # FLD: 14.4 % — SIGNIFICANT CHANGE UP (ref 10.3–14.5)
SODIUM SERPL-SCNC: 128 MMOL/L — LOW (ref 135–145)
SODIUM SERPL-SCNC: 128 MMOL/L — LOW (ref 135–145)
SPECIMEN SOURCE: SIGNIFICANT CHANGE UP
SPECIMEN SOURCE: SIGNIFICANT CHANGE UP
URATE SERPL-MCNC: 4.7 MG/DL — SIGNIFICANT CHANGE UP (ref 2.5–7)
WBC # BLD: 5.47 K/UL — SIGNIFICANT CHANGE UP (ref 3.8–10.5)
WBC # FLD AUTO: 5.47 K/UL — SIGNIFICANT CHANGE UP (ref 3.8–10.5)

## 2023-03-04 PROCEDURE — 99232 SBSQ HOSP IP/OBS MODERATE 35: CPT

## 2023-03-04 RX ORDER — SODIUM CHLORIDE 9 MG/ML
1000 INJECTION INTRAMUSCULAR; INTRAVENOUS; SUBCUTANEOUS
Refills: 0 | Status: DISCONTINUED | OUTPATIENT
Start: 2023-03-04 | End: 2023-03-06

## 2023-03-04 RX ORDER — HYDRALAZINE HCL 50 MG
25 TABLET ORAL THREE TIMES A DAY
Refills: 0 | Status: DISCONTINUED | OUTPATIENT
Start: 2023-03-04 | End: 2023-03-10

## 2023-03-04 RX ADMIN — PANTOPRAZOLE SODIUM 40 MILLIGRAM(S): 20 TABLET, DELAYED RELEASE ORAL at 05:35

## 2023-03-04 RX ADMIN — HEPARIN SODIUM 5000 UNIT(S): 5000 INJECTION INTRAVENOUS; SUBCUTANEOUS at 09:03

## 2023-03-04 RX ADMIN — GABAPENTIN 300 MILLIGRAM(S): 400 CAPSULE ORAL at 20:26

## 2023-03-04 RX ADMIN — Medication 50 MILLIGRAM(S): at 21:31

## 2023-03-04 RX ADMIN — GABAPENTIN 300 MILLIGRAM(S): 400 CAPSULE ORAL at 09:03

## 2023-03-04 RX ADMIN — Medication 25 MILLIGRAM(S): at 21:31

## 2023-03-04 RX ADMIN — SODIUM CHLORIDE 50 MILLILITER(S): 9 INJECTION INTRAMUSCULAR; INTRAVENOUS; SUBCUTANEOUS at 18:24

## 2023-03-04 RX ADMIN — Medication 50 MILLIGRAM(S): at 09:03

## 2023-03-04 RX ADMIN — Medication 25 MILLIGRAM(S): at 09:03

## 2023-03-04 RX ADMIN — Medication 1 MILLIGRAM(S): at 09:03

## 2023-03-04 RX ADMIN — ATORVASTATIN CALCIUM 20 MILLIGRAM(S): 80 TABLET, FILM COATED ORAL at 21:32

## 2023-03-04 RX ADMIN — HEPARIN SODIUM 5000 UNIT(S): 5000 INJECTION INTRAVENOUS; SUBCUTANEOUS at 21:31

## 2023-03-04 RX ADMIN — Medication 50 MILLIGRAM(S): at 09:02

## 2023-03-04 RX ADMIN — Medication 25 MILLIGRAM(S): at 15:11

## 2023-03-04 NOTE — PROGRESS NOTE ADULT - ASSESSMENT
89 yo female with PMHx arthritis, HTN, gout, hyperlipidemia, hx of pelvic fracture, hx of chronic UTI, urinary retention presents with bilateral hip and groin pain X 2 days with renal evaluation of hyponatremia.       Hyponatremia - persistent   Desire resolved after urinary retention - now Perales removed   repeat urine and serum osm,    repeat bladder scan - pt still retaining ~ 250 ml - straight cath if borderline as persistent retention can cause hyponatremia   Urine osm low - goes agains SIADH and Hypovolemia   ? dilute urine to exesssive fluid intake vs polyuyria post osbructive phase  - optimize intake  - no diuretics   - repeat labs later today   - may need rechallenge w NS IVF vs NaCL tabs     DESIRE on CKD III b - Acute Urine Retention    now monitor after Perales removed   -Stable function - improving   -Encourage po  -Avoid nsaids/contrast    Severe AS   as per cardiology

## 2023-03-04 NOTE — PROGRESS NOTE ADULT - ASSESSMENT
2/28/23:  Pt with above history with h/o HFpEF on Lasix developing severe hyponatremia better after current treatment but still hyponatremic.  Will have to avoid diuretics but continue to gently replete NA watching for diastolic CHF given her stiff heart and moderate-severe AS.  Will repeat an echo.  Continue as outlined by medicine etal.  Will follow as treatment progresses.    3/1/23:  More alert and less lethargic this AM.  Na+=124 this AM.  No clinical CHF or anginal chest pains.  Awaiting echo.  No new cardiac complaints.  Continue as outlined by medicine etal.  Will follow as treatment progresses.    3/2/23:  Resting comfortably without CHF or other cardiac symptoms.  Na+=129.  ECHO with severe AS (MARVA=0.8-0.9cm2) but no clinical symptoms yet.  Continue as outlined by medicine etal.  Will follow as treatment progresses.      3/3/23:  Resting and feeling better.  No clinical CHF, angina or syncope.  No new cardiac symptoms.  Tolerating IV NS.  Awaiting AM labs.  Continue as outlined by medicine etal.  Will follow.    3/4/23:  Feeling good without increased SOB or chest pains.  Eager to go home.  Na+ still low.  Etiology for her hyponatremia still a question.  According to her daughter she does not restrict her Na intake much and does not drink a lot of water.    No new cardiac symptoms.  Continue as outlined by medicine and renal etal.  Will follow.

## 2023-03-04 NOTE — PROGRESS NOTE ADULT - ASSESSMENT
89 yo female with PMHx arthritis,  AS,  HTN, gout, hyperlipidemia, hx of pelvic fracture,  UTIs ,  urinary retention , s/p Perales p admitted  for:       1. Hyponatremia 2/2 hypovolemia  Lasix  use and GI losses  Repeat labs today poss IVF  continue to hold Lasix     2.  HTN   restarted on Hydralazine   Also on Bystolic outPt was held in admission   BP elevated start Lopressor   Monitor BP     3. H/o HFpEF,  not in failure   S/p IVF, no signs of overload  Daily weight   Continue to hold Lasix   ECHO: preserved EF, severe AS  Cardio recs appreciated       Abnormal CT, not on abx, this is my first day with her continue to observe off abx

## 2023-03-04 NOTE — PROGRESS NOTE ADULT - SUBJECTIVE AND OBJECTIVE BOX
CHIEF COMPLAINT:  Patient is a 89y old  Female who presents with a chief complaint of hyponatremia, lethargy (2023 10:06)      HPI: 23:  Pt. is a 89 yo F with arthritis, hx of HTN, gout, hyperlipidemia, hx of pelvic fracture, hx of chronic UTI with some urinary retention after post void trials in Dr. Carvalho office presents with bilateral hip and groin pain X 2 days.  Patient's daughter states she had recent urology evaluation and straight cath urines were recommended but they were difficult after only one day and she thinks patient maybe strained her hips trying to hold positioning during straight cath.  Patient then had indwelling pérez placed 3 days ago which has been draining well.  Patient is currently on bactrim for UTI.  Daughter states patient was also fatigued at home, not speaking much, and had malaise.  Patient refused to walk yesterday due to bilateral hip and pelvis pain. Denies falls.  Daughter sent patient via ambulance when she vomited once and could not take her PM dose of bactrim.  Patient denies abdominal pain, nausea, or chest pain.  O2 on arrival was 91% and nasal canula placed for comfort but patient denies SOB.    Initially pt was alert and lethargic. Daughter states mother was recently treated at home with laxatives for constipation and has been having multiple bowel movements.  In the ER she was found to have severe hyponatremia with N+=116, treated with hypertonic Saline with improvement and N+ up to 124 but last labs with N+=122.  Awaiting AM labs today.  She is more alert and less lethargic and less confused.  She had no cardiac symptoms at this time denying anginal chest pains or increased SOB.  She dose have HFpEF and moderate-severe AS by echo.  Echo in my office on 3/3/2022 showed normal LV systolic function with LVEF=65-70% with moderate LVH and diastolic LV dysfunction  Moderate-severe AS with MARVA=1.0-1.1cm2 with mild AI, mild MAC with mild MR and mild TR.      3/1/23:  More alert and less lethargic this AM.  Na+=124 this AM.  No clinical CHF or anginal chest pains.  Awaiting echo.  No new cardiac complaints.    3/2/23:  Resting comfortably without CHF or other cardiac symptoms.  Na+=129.  ECHO with severe AS (MARVA=0.8-0.9cm2) but no clinical symptoms yet.    3/3/23:  Resting and feeling better.  No clinical CHF, angina or syncope.  No new cardiac symptoms.  Tolerating IV NS.  Awaiting AM labs.    3/4/23:  Feeling good without increased SOB or chest pains.  Eager to go home.  Na+ still low.  Etiology for her hyponatremia still a question.  According to her daughter she does not restrict her Na intake much and does not drink a lot of water.    No new cardiac symptoms.        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  HFpEF  HLD (hyperlipidemia)  Gout  Osteoarthritis  History of tonsillectomy-in childhood      FAMILY HISTORY:   FAMILY HISTORY:  non-contributory      ALLERGIES:  Allergies  Ceftin (Hives; Rash)      REVIEW OF SYSTEMS:  10 point ROS was obtained  Pertinent positives and negatives are as above  All other review of systems is negative unless indicated above      Vital Signs Last 24 Hrs  T(C): 36.5 (03 Mar 2023 16:11), Max: 36.6 (03 Mar 2023 14:10)  T(F): 97.7 (03 Mar 2023 16:11), Max: 97.8 (03 Mar 2023 14:10)  HR: 76 (03 Mar 2023 17:46) (65 - 76)  BP: 148/55 (04 Mar 2023 05:03) (134/42 - 164/53)  BP(mean): 75 (04 Mar 2023 05:03) (67 - 80)  RR: 18 (03 Mar 2023 17:46) (18 - 18)  SpO2: 100% (03 Mar 2023 17:46) (95% - 100%): room air      I&O's Summary    2023 07:01  -  2023 07:00  --------------------------------------------------------  IN: 250 mL / OUT: 2000 mL / NET: -1750 mL        PHYSICAL EXAM:   Constitutional: NAD, awake and alert, well-developed  HEENT: PERR, EOMI, Normal Hearing, MMM  Neck: Soft and supple, No LAD, No JVD  Respiratory: Breath sounds are clear bilaterally, No wheezing, rales or rhonchi  Cardiovascular: S1 and S2, regular rate and rhythm, 2-3/6 LIANA at base and soft systolic murmur LLSB as before, +S4, No S3 gallops or rubs  Gastrointestinal: Bowel Sounds present, soft, nontender, nondistended, no guarding, no rebound  Extremities: No peripheral edema  Vascular: 2+ peripheral pulses  Neurological: no focal deficits      MEDICATIONS  (STANDING):  allopurinol 50 milliGRAM(s) Oral daily  atorvastatin 20 milliGRAM(s) Oral at bedtime  bethanechol 50 milliGRAM(s) Oral two times a day  folic acid 1 milliGRAM(s) Oral daiy  gabapentin 300 milliGRAM(s) Oral two times a day  heparin   Injectable 5000 Unit(s) SubCutaneous every 12 hours  hydrALAZINE 25 milliGRAM(s) Oral two times a day  metoprolol tartrate 25 milliGRAM(s) Oral two times a day  pantoprazole    Tablet 40 milliGRAM(s) Oral before breakfast    MEDICATIONS  (PRN):  acetaminophen     Tablet .. 650 milliGRAM(s) Oral every 6 hours PRN Temp greater or equal to 38C (100.4F), Mild Pain (1 - 3)      LABS: All Labs Reviewed:                        8.8    5.47  )-----------( 129      ( 04 Mar 2023 05:59 )             26.4                           7.9    5.29  )-----------( 107      ( 02 Mar 2023 05:52 )             23.6                           9.6    8.73  )-----------( 94       ( 2023 06:44 )             28.1                           8.9    8.99  )-----------( 92       ( 2023 10:57 )             25.6     Sodium, Serum: 128 mmol/L (23 @ 05:59)   Sodium, Serum: 129 mmol/L (23 @ 05:52)   Sodium, Serum: 124 mmol/L (23 @ 00:32)   Sodium, Serum: 122 mmol/L (23 @ 00:17)   Sodium, Serum: 122 mmol/L (23 @ 20:35)   Sodium, Serum: 124 mmol/L (23 @ 16:36)     Sodium, Serum: 116 mmol/L  (23 @ 00:50)       -04    128<L>  |  101  |  21  ----------------------------<  113<H>  4.6   |  22  |  1.28    Ca    9.2      04 Mar 2023 05:59  Phos  2.5     03-03      -    129<L>  |  100  |  19  ----------------------------<  116<H>  4.4   |  22  |  1.56<H>    Ca    8.6      02 Mar 2023 05:52  Phos  2.9     03-  Mg     2.2     -    TPro  6.3  /  Alb  2.8<L>  /  TBili  0.4  /  DBili  x   /  AST  35  /  ALT  22  /  AlkPhos  60  --    124<L>  |  96  |  21  ----------------------------<  108<H>  4.5   |  20<L>  |  1.90<H>    Ca    8.2<L>      01 Mar 2023 00:32  Phos  3.3     -  Mg     2.2     -    TPro  6.3  /  Alb  2.8<L>  /  TBili  0.4  /  DBili  x   /  AST  35  /  ALT  22  /  AlkPhos  60      122<L>  |  93<L>  |  19  ----------------------------<  129<H>  4.3   |  22  |  1.83<H>    Ca    9.2      2023 00:17  Phos  2.9       Mg     2.2         TPro  6.5  /  Alb  3.1<L>  /  TBili  0.5  /  DBili  x   /  AST  40<H>  /  ALT  21  /  AlkPhos  59        CARDIAC MARKERS ( 2023 00:50 )  x     / x     / 403 U/L / x     / x          BLOOD CULTURES: Organism --  Gram Stain Urine -- Gram Stain --  Specimen Source Catheterized None  Culture-Urine --  Culture Results: No growth (23 @ 04:29)       LIPID PROFILE     RADIOLOGY:    X-Ray of Abd: 23:  FINDINGS:  Mild air distended colon and mid abdominal small bowel.  No free intra-abdominal air.  No obvious intra-abdominal masses.  LEFT upper quadrant and iliac vasculature all calcifications. The osseous structures are intact.  IMPRESSION:  Mild air distended transverse colon and mid abdominal small bowel.. No radiographic evidence of bowel obstruction.    X-Ray of Hips and CXR:  23:  INTERPRETATION:  Lateral hips and pelvis and chest.  Patient has hip pain.  Patient has vomiting.  Bilateral hips with pelvis. 5 views.  Arterial calcifications are noted.  Degenerative loss of disc height at multiple lower lumbar levels with slight right lower lumbar curve noted.  There is mild symmetric hip degeneration. No fracture.  AP chest on 2023 at 1:45 AM.  Heart magnified by technique.  There are bilateral perihilar infiltrates right greater than left which are new since  this year. Configuration suggests infection.  IMPRESSION: No acute fracture. Extensive arterial calcifications.  Bilateral perihilar infiltrates new since prior suggesting infection.      EK23:  Normal sinus rhythm  Normal ECG      TELEMETRY:  NSR      ECHO:  3/1/23:  M-Mode Measurements (cm)   LVEDd: 3.97 cm            LVESd: 2.55 cm   IVSEd: 1.1 cm   LVPWd: 1.1 cm             AO Root Dimension: 2.8 cm                             LA: 3.5 cm                LVOT: 2 cm  Doppler Measurements:   AV Velocity:434 cm/s                  MV Peak E-Wave: 98.7 cm/s   AV Peak Gradient: 75.34 mmHg          MV Peak A-Wave: 131 cm/s   AV Mean Gradient: 40 mmHg             MV E/A Ratio: 0.75 %   AV Area (Continuity):0.85 cm^2        MV Peak Gradient: 3.9 mmHg   TR Velocity:190 cm/s   TR Gradient:14.44 mmHg   Estimated RAP:5 mmHg   RVSP:27 mmHg    Findings  Mitral Valve   The mitral valve leaflets appear thickened.   EA reversal of the mitral inflow consistent with reduced compliance of the left ventricle.   Mild mitral annular calcification is present.   Mild mitral regurgitation is present.    Aortic Valve   Peak and mean transaortic gradients are 75 and 40mmHg respectively; this finding is consistent with severe aortic stenosis.   Mild (1+) aortic regurgitation is present.    Tricuspid Valve   Trace tricuspid valve regurgitation is present.    Pulmonic Valve   Mild pulmonic valvular regurgitation (1+) is present.    Left Atrium   Normal appearing left atrium.    Left Ventricle   Mild concentric left ventricular hypertrophy is present.   The left ventricle is normal in size.   Estimated left ventricular ejection fraction is 65-70 %.    Right Atrium   Normal appearing right atrium.    Right Ventricle   Normal appearing right ventricle structure and function.    Pericardial Effusion   No evidence of pericardial effusion.    Pleural Effusion   No evidence of pleural effusion.    Miscellaneous   The IVC appears normal.    Summary   The mitral valve leaflets appear thickened.   EA reversal of the mitral inflow consistent with reduced compliance of the left ventricle.   Mild mitral annular calcification is present.   Mild mitral regurgitation is present.   Peak and mean transaortic gradients are 75 and 40mmHg respectively; this finding is consistent with severe aortic stenosis.   Mild (1+) aortic regurgitation is present.   Trace tricuspid valve regurgitation is present.   Mild pulmonic valvular regurgitation (1+) is present.   Normal appearing left atrium.   Mild concentric left ventricular hypertrophy is present.   The left ventricle is normal in size.   Estimated left ventricular ejection fraction is 65-70 %.   Normal appearing right atrium.   Normal appearing right ventricle structure and function.   The IVC appears normal.   No evidence of pericardial effusion.   No evidence of pleural effusion.    Signature   ----------------------------------------------------------------   Electronically signed by Sakina Cheatham MD, Director of   Cardiac Cath Lab(Interpreting physician) on 2023 06:42   PM   ----------------------------------------------------------------

## 2023-03-04 NOTE — PROGRESS NOTE ADULT - SUBJECTIVE AND OBJECTIVE BOX
CC: severe hyponatremia (01 Mar 2023 07:23)    HPI:  89 yo F with arthritis, hx of HTN, gout, hyperlipidemia, hx of pelvic fracture,  UTIs  with urinary retention after post void trials in Dr. Bonilla's office presented with bilateral hip and groin pain X 2 days.  Patient's daughter stated  Pt  had recent urology evaluation and to  straight cath urine were recommended but they were difficult after only one day and she thinks patient maybe strained her hips trying to hold positioning during straight cath.  Patient then had indwelling pérez placed 3 days PTA  which has been draining well.  Patient is currently on bactrim for UTI.  Daughter stated  patient was also fatigued at home, not speaking much, and had malaise.  Patient refused to walk  day PTA due to bilateral hip and pelvis pain. No  falls.  +  vomiting  once and could not take her PM dose of bactrim.  Patient denies abdominal pain, nausea, or chest pain.  O2 on arrival was 91% and nasal canula placed for comfort but patient denies SOB.    Na dropped      Vital Signs Last 24 Hrs  T(C): 36.4 (04 Mar 2023 10:14), Max: 36.6 (03 Mar 2023 14:10)  T(F): 97.6 (04 Mar 2023 10:14), Max: 97.8 (03 Mar 2023 14:10)  HR: 51 (04 Mar 2023 12:00) (51 - 76)  BP: 151/45 (04 Mar 2023 12:00) (134/42 - 189/55)  BP(mean): 71 (04 Mar 2023 12:00) (67 - 87)  RR: 16 (04 Mar 2023 12:00) (16 - 18)  SpO2: 92% (04 Mar 2023 12:00) (92% - 100%)    Parameters below as of 04 Mar 2023 12:00  Patient On (Oxygen Delivery Method): room air        PHYSICAL EXAM:  General: Well developed; in no acute distress  Eyes: EOMI; conjunctiva and sclera clear  Head: Normocephalic; atraumatic  ENMT: No nasal discharge; airway clear  Neck: Supple; non tender; no masses  Respiratory: Decreased BS at bases,  No wheezes, rales or rhonchi  Cardiovascular: Regular rate and rhythm. S1 and S2 Normal; + LIANA  Gastrointestinal: Soft non-tender non-distended; Normal bowel sounds  Genitourinary: No  suprapubic  tenderness  Extremities: No  edema  Vascular: Peripheral pulses palpable 2+ bilaterally  Neurological: Alert and oriented x2, non focal   Musculoskeletal: Normal muscle tone, without deformities  Psychiatric: Cooperative and appropriate    LABS:                                    8.0    5.73  )-----------( 151      ( 03 Mar 2023 06:27 )             24.8   03-03    129<L>  |  101  |  19  ----------------------------<  109<H>  4.7   |  22  |  1.41<H>    Ca    8.9      03 Mar 2023 06:27  Phos  2.5     03-03           MEDICATIONS  (STANDING):  allopurinol 50 milliGRAM(s) Oral daily  atorvastatin 20 milliGRAM(s) Oral at bedtime  bethanechol 50 milliGRAM(s) Oral two times a day  folic acid 1 milliGRAM(s) Oral daily  gabapentin 300 milliGRAM(s) Oral two times a day  heparin   Injectable 5000 Unit(s) SubCutaneous every 12 hours  hydrALAZINE 25 milliGRAM(s) Oral two times a day  pantoprazole    Tablet 40 milliGRAM(s) Oral before breakfast    MEDICATIONS  (PRN):  acetaminophen     Tablet .. 650 milliGRAM(s) Oral every 6 hours PRN Temp greater or equal to 38C (100.4F), Mild Pain (1 - 3)      RADIOLOGY & ADDITIONAL TESTS:      ACC: [id number] EXAM:  XR ABDOMEN 1 VIEW   ORDERED BY: GARY CAIN   PROCEDURE DATE:  02/27/2023      FINDINGS:  Mild air distended colon and mid abdominal small bowel.    No free intra-abdominal air.  No obvious intra-abdominal masses.  LEFT upper quadrant and iliac vasculature all calcifications. The osseous   structures are intact.    IMPRESSION:    Mild air distended transverse colon and mid abdominal small bowel.. No   radiographic evidence of bowel obstruction.        ACC: [id number] EXAM:  XR HIPS BI WITH PELV 2V   ORDERED BY: SAMREEN RAMIREZ   ACC: [id number] EXAM:  XR CHEST PORTABLE URGENT 1V   ORDERED BY: SAMREEN RAMIREZ   PROCEDURE DATE:  02/27/2023        INTERPRETATION:  Lateral hips and pelvis and chest.Patient has hip pain.    Patient has vomiting.    Bilateral hips with pelvis. 5 views.    Arterial calcifications are noted.    Degenerative loss of disc height at multiple lower lumbar levels with   slight right lower lumbar curve noted.    There ismild symmetric hip degeneration. No fracture.    AP chest on February 27, 2023 at 1:45 AM.    Heart magnified by technique.    There are bilateral perihilar infiltrates right greater than left which   are new since January 7 this year. Configuration suggests infection.    IMPRESSION: No acute fracture. Extensive arterial calcifications.    Bilateral perihilar infiltrates new since prior suggesting infection.

## 2023-03-04 NOTE — PROGRESS NOTE ADULT - SUBJECTIVE AND OBJECTIVE BOX
Patient is a 89y Female who reports no complaints as new.      MEDICATIONS  (STANDING):  allopurinol 50 milliGRAM(s) Oral daily  atorvastatin 20 milliGRAM(s) Oral at bedtime  bethanechol 50 milliGRAM(s) Oral two times a day  folic acid 1 milliGRAM(s) Oral daily  gabapentin 300 milliGRAM(s) Oral two times a day  heparin   Injectable 5000 Unit(s) SubCutaneous every 12 hours  hydrALAZINE 25 milliGRAM(s) Oral three times a day  metoprolol tartrate 25 milliGRAM(s) Oral two times a day  pantoprazole    Tablet 40 milliGRAM(s) Oral before breakfast    Vital Signs Last 24 Hrs  T(C): 36.4 (04 Mar 2023 10:14), Max: 36.6 (03 Mar 2023 14:10)  T(F): 97.6 (04 Mar 2023 10:14), Max: 97.8 (03 Mar 2023 14:10)  HR: 65 (04 Mar 2023 09:09) (65 - 76)  BP: 151/45 (04 Mar 2023 12:00) (134/42 - 189/55)  BP(mean): 71 (04 Mar 2023 12:00) (67 - 87)  RR: 18 (04 Mar 2023 09:09) (18 - 18)  SpO2: 95% (04 Mar 2023 09:09) (95% - 100%)    Parameters below as of 04 Mar 2023 09:09  Patient On (Oxygen Delivery Method): room air    I&O's Detail    03 Mar 2023 07:01  -  04 Mar 2023 07:00  --------------------------------------------------------  IN:  Total IN: 0 mL    OUT:    Voided (mL): 1275 mL  Total OUT: 1275 mL    Total NET: -1275 mL      04 Mar 2023 07:01  -  04 Mar 2023 12:08  --------------------------------------------------------  IN:  Total IN: 0 mL    OUT:    Voided (mL): 275 mL  Total OUT: 275 mL    Total NET: -275 mL      PHYSICAL EXAM:    Constitutional: NAD   HEENT:  MM  dist  Cardiovascular: S1 and S2   Gastrointestinal: BS+, soft, NT/ND  Extremities: No  peripheral edema  Neurological: Alert, pleasent  Perales  +           LABS:                        128    |  101    |  21     ----------------------------<  113       04 Mar 2023 05:59  4.6     |  22     |  1.28     129    |  101    |  19     ----------------------------<  109       03 Mar 2023 06:27  4.7     |  22     |  1.41     129    |  100    |  19     ----------------------------<  116       02 Mar 2023 05:52  4.4     |  22     |  1.56     Ca    9.2        04 Mar 2023 05:59  Ca    8.9        03 Mar 2023 06:27    Phos  2.5       03 Mar 2023 06:27  Phos  2.9       01 Mar 2023 07:05    Mg     2.2       01 Mar 2023 07:05  Mg     2.2       01 Mar 2023 00:32    TPro  6.3    /  Alb  2.8    /  TBili  0.4    /        01 Mar 2023 00:32  DBili  x      /  AST  35     /  ALT  22     /  AlkPhos  60           `        Urine Studies:  Urinalysis Basic - ( 2023 04:29 )    Color: Yellow / Appearance: Clear / S.010 / pH: x  Gluc: x / Ketone: Negative  / Bili: Negative / Urobili: Negative   Blood: x / Protein: 100 / Nitrite: Negative   Leuk Esterase: Moderate / RBC: 11-25 /HPF / WBC 3-5 /HPF   Sq Epi: x / Non Sq Epi: Few / Bacteria: Few      Osmolality, Random Urine: 134 mosm/Kg ( @ 13:00)  Sodium, Random Urine: 28 mmol/L ( @ 13:00)      RADIOLOGY & ADDITIONAL STUDIES:

## 2023-03-05 LAB
ADD ON TEST-SPECIMEN IN LAB: SIGNIFICANT CHANGE UP
ANION GAP SERPL CALC-SCNC: 4 MMOL/L — LOW (ref 5–17)
APPEARANCE UR: CLEAR — SIGNIFICANT CHANGE UP
BACTERIA # UR AUTO: ABNORMAL
BILIRUB UR-MCNC: NEGATIVE — SIGNIFICANT CHANGE UP
BUN SERPL-MCNC: 19 MG/DL — SIGNIFICANT CHANGE UP (ref 7–23)
CALCIUM SERPL-MCNC: 8.8 MG/DL — SIGNIFICANT CHANGE UP (ref 8.5–10.1)
CHLORIDE SERPL-SCNC: 105 MMOL/L — SIGNIFICANT CHANGE UP (ref 96–108)
CO2 SERPL-SCNC: 23 MMOL/L — SIGNIFICANT CHANGE UP (ref 22–31)
COLOR SPEC: YELLOW — SIGNIFICANT CHANGE UP
CREAT SERPL-MCNC: 1.51 MG/DL — HIGH (ref 0.5–1.3)
DIFF PNL FLD: NEGATIVE — SIGNIFICANT CHANGE UP
EGFR: 32 ML/MIN/1.73M2 — LOW
EPI CELLS # UR: SIGNIFICANT CHANGE UP
GLUCOSE SERPL-MCNC: 135 MG/DL — HIGH (ref 70–99)
GLUCOSE UR QL: NEGATIVE — SIGNIFICANT CHANGE UP
HCT VFR BLD CALC: 24.9 % — LOW (ref 34.5–45)
HGB BLD-MCNC: 8.1 G/DL — LOW (ref 11.5–15.5)
HYALINE CASTS # UR AUTO: ABNORMAL /LPF
KETONES UR-MCNC: NEGATIVE — SIGNIFICANT CHANGE UP
LEUKOCYTE ESTERASE UR-ACNC: NEGATIVE — SIGNIFICANT CHANGE UP
MCHC RBC-ENTMCNC: 31 PG — SIGNIFICANT CHANGE UP (ref 27–34)
MCHC RBC-ENTMCNC: 32.5 GM/DL — SIGNIFICANT CHANGE UP (ref 32–36)
MCV RBC AUTO: 95.4 FL — SIGNIFICANT CHANGE UP (ref 80–100)
NITRITE UR-MCNC: NEGATIVE — SIGNIFICANT CHANGE UP
OSMOLALITY UR: 265 MOSM/KG — SIGNIFICANT CHANGE UP (ref 50–1200)
PH UR: 6 — SIGNIFICANT CHANGE UP (ref 5–8)
PLATELET # BLD AUTO: 118 K/UL — LOW (ref 150–400)
POTASSIUM SERPL-MCNC: 4.7 MMOL/L — SIGNIFICANT CHANGE UP (ref 3.5–5.3)
POTASSIUM SERPL-SCNC: 4.7 MMOL/L — SIGNIFICANT CHANGE UP (ref 3.5–5.3)
PROT UR-MCNC: 30 MG/DL
RBC # BLD: 2.61 M/UL — LOW (ref 3.8–5.2)
RBC # FLD: 14.8 % — HIGH (ref 10.3–14.5)
RBC CASTS # UR COMP ASSIST: ABNORMAL /HPF (ref 0–4)
SARS-COV-2 RNA SPEC QL NAA+PROBE: DETECTED
SODIUM SERPL-SCNC: 132 MMOL/L — LOW (ref 135–145)
SODIUM UR-SCNC: 58 MMOL/L — SIGNIFICANT CHANGE UP
SP GR SPEC: 1 — LOW (ref 1.01–1.02)
UROBILINOGEN FLD QL: NEGATIVE — SIGNIFICANT CHANGE UP
WBC # BLD: 5.86 K/UL — SIGNIFICANT CHANGE UP (ref 3.8–10.5)
WBC # FLD AUTO: 5.86 K/UL — SIGNIFICANT CHANGE UP (ref 3.8–10.5)
WBC UR QL: ABNORMAL /HPF (ref 0–5)

## 2023-03-05 PROCEDURE — 99233 SBSQ HOSP IP/OBS HIGH 50: CPT

## 2023-03-05 PROCEDURE — 71045 X-RAY EXAM CHEST 1 VIEW: CPT | Mod: 26

## 2023-03-05 PROCEDURE — 71250 CT THORAX DX C-: CPT | Mod: 26

## 2023-03-05 RX ORDER — PIPERACILLIN AND TAZOBACTAM 4; .5 G/20ML; G/20ML
3.38 INJECTION, POWDER, LYOPHILIZED, FOR SOLUTION INTRAVENOUS ONCE
Refills: 0 | Status: COMPLETED | OUTPATIENT
Start: 2023-03-05 | End: 2023-03-05

## 2023-03-05 RX ORDER — PIPERACILLIN AND TAZOBACTAM 4; .5 G/20ML; G/20ML
3.38 INJECTION, POWDER, LYOPHILIZED, FOR SOLUTION INTRAVENOUS ONCE
Refills: 0 | Status: COMPLETED | OUTPATIENT
Start: 2023-03-06 | End: 2023-03-06

## 2023-03-05 RX ORDER — PIPERACILLIN AND TAZOBACTAM 4; .5 G/20ML; G/20ML
3.38 INJECTION, POWDER, LYOPHILIZED, FOR SOLUTION INTRAVENOUS EVERY 8 HOURS
Refills: 0 | Status: COMPLETED | OUTPATIENT
Start: 2023-03-06 | End: 2023-03-13

## 2023-03-05 RX ADMIN — Medication 25 MILLIGRAM(S): at 15:56

## 2023-03-05 RX ADMIN — GABAPENTIN 300 MILLIGRAM(S): 400 CAPSULE ORAL at 22:09

## 2023-03-05 RX ADMIN — PANTOPRAZOLE SODIUM 40 MILLIGRAM(S): 20 TABLET, DELAYED RELEASE ORAL at 08:24

## 2023-03-05 RX ADMIN — HEPARIN SODIUM 5000 UNIT(S): 5000 INJECTION INTRAVENOUS; SUBCUTANEOUS at 09:43

## 2023-03-05 RX ADMIN — Medication 25 MILLIGRAM(S): at 22:10

## 2023-03-05 RX ADMIN — PIPERACILLIN AND TAZOBACTAM 25 GRAM(S): 4; .5 INJECTION, POWDER, LYOPHILIZED, FOR SOLUTION INTRAVENOUS at 20:03

## 2023-03-05 RX ADMIN — Medication 50 MILLIGRAM(S): at 09:42

## 2023-03-05 RX ADMIN — GABAPENTIN 300 MILLIGRAM(S): 400 CAPSULE ORAL at 09:43

## 2023-03-05 RX ADMIN — HEPARIN SODIUM 5000 UNIT(S): 5000 INJECTION INTRAVENOUS; SUBCUTANEOUS at 22:10

## 2023-03-05 RX ADMIN — Medication 650 MILLIGRAM(S): at 05:20

## 2023-03-05 RX ADMIN — PIPERACILLIN AND TAZOBACTAM 200 GRAM(S): 4; .5 INJECTION, POWDER, LYOPHILIZED, FOR SOLUTION INTRAVENOUS at 15:57

## 2023-03-05 RX ADMIN — Medication 25 MILLIGRAM(S): at 05:07

## 2023-03-05 RX ADMIN — Medication 25 MILLIGRAM(S): at 12:04

## 2023-03-05 RX ADMIN — Medication 650 MILLIGRAM(S): at 20:14

## 2023-03-05 RX ADMIN — Medication 50 MILLIGRAM(S): at 22:09

## 2023-03-05 RX ADMIN — ATORVASTATIN CALCIUM 20 MILLIGRAM(S): 80 TABLET, FILM COATED ORAL at 22:10

## 2023-03-05 RX ADMIN — Medication 25 MILLIGRAM(S): at 22:09

## 2023-03-05 RX ADMIN — Medication 1 MILLIGRAM(S): at 09:43

## 2023-03-05 RX ADMIN — Medication 650 MILLIGRAM(S): at 12:24

## 2023-03-05 RX ADMIN — Medication 50 MILLIGRAM(S): at 09:43

## 2023-03-05 RX ADMIN — Medication 650 MILLIGRAM(S): at 12:54

## 2023-03-05 NOTE — PROGRESS NOTE ADULT - ASSESSMENT
89 yo female with PMHx arthritis,  AS,  HTN, gout, hyperlipidemia, hx of pelvic fracture,  UTIs ,  urinary retention , s/p Perales p admitted  for:       1. Hyponatremia 2/2 hypovolemia  Lasix  use and GI losses  improved Na    2. Urinary retention- straight cath  pt can have OP f/up, no procedure can restore voiding, c/e present meds, add Flomax      3. H/o HFpEF,    S/p IVF, CT chest with read for fluid overload   nephrology f/up re: diuresis- not overloaded per nephrology; no diuretic for now    Dispo: ID consult for the abnormal CT chest  dc tele     89 yo female with PMHx arthritis,  AS,  HTN, gout, hyperlipidemia, hx of pelvic fracture,  UTIs ,  urinary retention , s/p Perales p admitted  for:       1. Hyponatremia 2/2 hypovolemia  Lasix  use and GI losses  improved Na    2. Urinary retention- straight cath  pt can have OP f/up, no procedure can restore voiding, c/e present meds, add Flomax      3. H/o HFpEF,    S/p IVF, CT chest with read for fluid overload   nephrology f/up re: diuresis- not overloaded per nephrology; no diuretic for now    4. Mild thrombocytopenia  repeat cbc in am    5. Low grade temp abnormal CT  sputum cx  ID consult    Dispo: ID consult for the abnormal CT chest  dc tele

## 2023-03-05 NOTE — PROGRESS NOTE ADULT - SUBJECTIVE AND OBJECTIVE BOX
CHIEF COMPLAINT:  Patient is a 89y old  Female who presents with a chief complaint of hyponatremia, lethargy (2023 10:06)      HPI: 23:  Pt. is a 89 yo F with arthritis, hx of HTN, gout, hyperlipidemia, hx of pelvic fracture, hx of chronic UTI with some urinary retention after post void trials in Dr. Carvalho office presents with bilateral hip and groin pain X 2 days.  Patient's daughter states she had recent urology evaluation and straight cath urines were recommended but they were difficult after only one day and she thinks patient maybe strained her hips trying to hold positioning during straight cath.  Patient then had indwelling pérez placed 3 days ago which has been draining well.  Patient is currently on bactrim for UTI.  Daughter states patient was also fatigued at home, not speaking much, and had malaise.  Patient refused to walk yesterday due to bilateral hip and pelvis pain. Denies falls.  Daughter sent patient via ambulance when she vomited once and could not take her PM dose of bactrim.  Patient denies abdominal pain, nausea, or chest pain.  O2 on arrival was 91% and nasal canula placed for comfort but patient denies SOB.    Initially pt was alert and lethargic. Daughter states mother was recently treated at home with laxatives for constipation and has been having multiple bowel movements.  In the ER she was found to have severe hyponatremia with N+=116, treated with hypertonic Saline with improvement and N+ up to 124 but last labs with N+=122.  Awaiting AM labs today.  She is more alert and less lethargic and less confused.  She had no cardiac symptoms at this time denying anginal chest pains or increased SOB.  She dose have HFpEF and moderate-severe AS by echo.  Echo in my office on 3/3/2022 showed normal LV systolic function with LVEF=65-70% with moderate LVH and diastolic LV dysfunction  Moderate-severe AS with MARVA=1.0-1.1cm2 with mild AI, mild MAC with mild MR and mild TR.      3/1/23:  More alert and less lethargic this AM.  Na+=124 this AM.  No clinical CHF or anginal chest pains.  Awaiting echo.  No new cardiac complaints.    3/2/23:  Resting comfortably without CHF or other cardiac symptoms.  Na+=129.  ECHO with severe AS (MARVA=0.8-0.9cm2) but no clinical symptoms yet.    3/3/23:  Resting and feeling better.  No clinical CHF, angina or syncope.  No new cardiac symptoms.  Tolerating IV NS.  Awaiting AM labs.    3/4/23:  Feeling good without increased SOB or chest pains.  Eager to go home.  Na+ still low.  Etiology for her hyponatremia still a question.  According to her daughter she does not restrict her Na intake much and does not drink a lot of water.    No new cardiac symptoms.    3/5/23:  Spiked a temp last night (100.9 F.) and had urinary retention requiring straight cath x 2.  Resting this AM.  Back on IV NS for Na+=128 again yesterday.  Awaiting AM labs.  No clinical CHF or other new cardiac symptoms.          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  HFpEF  HLD (hyperlipidemia)  Gout  Osteoarthritis  History of tonsillectomy-in childhood      FAMILY HISTORY:   FAMILY HISTORY:  non-contributory      ALLERGIES:  Allergies  Ceftin (Hives; Rash)      REVIEW OF SYSTEMS:  10 point ROS was obtained  Pertinent positives and negatives are as above  All other review of systems is negative unless indicated above      Vital Signs Last 24 Hrs  T(C): 38.3 (05 Mar 2023 05:18), Max: 38.3 (05 Mar 2023 05:18)  T(F): 100.9 (05 Mar 2023 05:18), Max: 100.9 (05 Mar 2023 05:18)  HR: 82 (04 Mar 2023 21:25) (51 - 86)  BP: 154/52 (05 Mar 2023 05:00) (129/72 - 189/55)  BP(mean): 76 (05 Mar 2023 05:00) (71 - 88)  RR: 16 (04 Mar 2023 21:25) (16 - 18)  SpO2: 98% (05 Mar 2023 05:00) (92% - 98%): room air      I&O's Summary    2023 07:01  -  2023 07:00  --------------------------------------------------------  IN: 250 mL / OUT: 2000 mL / NET: -1750 mL        PHYSICAL EXAM:   Constitutional: NAD, awake and alert, well-developed  HEENT: PERR, EOMI, Normal Hearing, MMM  Neck: Soft and supple, No LAD, No JVD  Respiratory: Breath sounds are clear bilaterally, No wheezing, rales or rhonchi  Cardiovascular: S1 and S2, regular rate and rhythm, 2-3/6 LIANA at base and soft systolic murmur LLSB as before, +S4, No S3 gallops or rubs  Gastrointestinal: Bowel Sounds present, soft, nontender, nondistended, no guarding, no rebound  Extremities: No peripheral edema  Vascular: 2+ peripheral pulses  Neurological: no focal deficits      MEDICATIONS  (STANDING):  allopurinol 50 milliGRAM(s) Oral daily  atorvastatin 20 milliGRAM(s) Oral at bedtime  bethanechol 50 milliGRAM(s) Oral two times a day  folic acid 1 milliGRAM(s) Oral daily  gabapentin 300 milliGRAM(s) Oral two times a day  heparin   Injectable 5000 Unit(s) SubCutaneous every 12 hours  hydrALAZINE 25 milliGRAM(s) Oral three times a day  metoprolol tartrate 25 milliGRAM(s) Oral two times a day  pantoprazole    Tablet 40 milliGRAM(s) Oral before breakfast  sodium chloride 0.9%. 1000 milliLiter(s) (50 mL/Hr) IV Continuous <Continuous>    MEDICATIONS  (PRN):  acetaminophen     Tablet .. 650 milliGRAM(s) Oral every 6 hours PRN Temp greater or equal to 38C (100.4F), Mild Pain (1 - 3)      LABS: All Labs Reviewed:                        8.8    5.47  )-----------( 129      ( 04 Mar 2023 05:59 )             26.4                           7.9    5.29  )-----------( 107      ( 02 Mar 2023 05:52 )             23.6                           9.6    8.73  )-----------( 94       ( 2023 06:44 )             28.1                           8.9    8.99  )-----------( 92       ( 2023 10:57 )             25.6     Sodium, Serum: 128 mmol/L (03.04.23 @ 05:59)   Sodium, Serum: 129 mmol/L (23 @ 05:52)   Sodium, Serum: 124 mmol/L (23 @ 00:32)   Sodium, Serum: 122 mmol/L (23 @ 00:17)   Sodium, Serum: 122 mmol/L (23 @ 20:35)   Sodium, Serum: 124 mmol/L (23 @ 16:36)     Sodium, Serum: 116 mmol/L  (23 @ 00:50)           128<L>  |  101  |  21  ----------------------------<  113<H>  4.6   |  22  |  1.28    Ca    9.2      04 Mar 2023 05:59  Phos  2.5     03-03      03-02    129<L>  |  100  |  19  ----------------------------<  116<H>  4.4   |  22  |  1.56<H>    Ca    8.6      02 Mar 2023 05:52  Phos  2.9     03-01  Mg     2.2     03-01    TPro  6.3  /  Alb  2.8<L>  /  TBili  0.4  /  DBili  x   /  AST  35  /  ALT  22  /  AlkPhos  60  -    124<L>  |  96  |  21  ----------------------------<  108<H>  4.5   |  20<L>  |  1.90<H>    Ca    8.2<L>      01 Mar 2023 00:32  Phos  3.3     03-01  Mg     2.2     03-01    TPro  6.3  /  Alb  2.8<L>  /  TBili  0.4  /  DBili  x   /  AST  35  /  ALT  22  /  AlkPhos  60  -    122<L>  |  93<L>  |  19  ----------------------------<  129<H>  4.3   |  22  |  1.83<H>    Ca    9.2      2023 00:17  Phos  2.9       Mg     2.2         TPro  6.5  /  Alb  3.1<L>  /  TBili  0.5  /  DBili  x   /  AST  40<H>  /  ALT  21  /  AlkPhos  59        CARDIAC MARKERS ( 2023 00:50 )  x     / x     / 403 U/L / x     / x          BLOOD CULTURES: Organism --  Gram Stain Urine -- Gram Stain --  Specimen Source Catheterized None  Culture-Urine --  Culture Results: No growth (23 @ 04:29)       LIPID PROFILE     RADIOLOGY:    X-Ray of Abd: 23:  FINDINGS:  Mild air distended colon and mid abdominal small bowel.  No free intra-abdominal air.  No obvious intra-abdominal masses.  LEFT upper quadrant and iliac vasculature all calcifications. The osseous structures are intact.  IMPRESSION:  Mild air distended transverse colon and mid abdominal small bowel.. No radiographic evidence of bowel obstruction.    X-Ray of Hips and CXR:  23:  INTERPRETATION:  Lateral hips and pelvis and chest.  Patient has hip pain.  Patient has vomiting.  Bilateral hips with pelvis. 5 views.  Arterial calcifications are noted.  Degenerative loss of disc height at multiple lower lumbar levels with slight right lower lumbar curve noted.  There is mild symmetric hip degeneration. No fracture.  AP chest on 2023 at 1:45 AM.  Heart magnified by technique.  There are bilateral perihilar infiltrates right greater than left which are new since  this year. Configuration suggests infection.  IMPRESSION: No acute fracture. Extensive arterial calcifications.  Bilateral perihilar infiltrates new since prior suggesting infection.      EK23:  Normal sinus rhythm  Normal ECG      TELEMETRY:  NSR      ECHO:  3/1/23:  M-Mode Measurements (cm)   LVEDd: 3.97 cm            LVESd: 2.55 cm   IVSEd: 1.1 cm   LVPWd: 1.1 cm             AO Root Dimension: 2.8 cm                             LA: 3.5 cm                LVOT: 2 cm  Doppler Measurements:   AV Velocity:434 cm/s                  MV Peak E-Wave: 98.7 cm/s   AV Peak Gradient: 75.34 mmHg          MV Peak A-Wave: 131 cm/s   AV Mean Gradient: 40 mmHg             MV E/A Ratio: 0.75 %   AV Area (Continuity):0.85 cm^2        MV Peak Gradient: 3.9 mmHg   TR Velocity:190 cm/s   TR Gradient:14.44 mmHg   Estimated RAP:5 mmHg   RVSP:27 mmHg    Findings  Mitral Valve   The mitral valve leaflets appear thickened.   EA reversal of the mitral inflow consistent with reduced compliance of the left ventricle.   Mild mitral annular calcification is present.   Mild mitral regurgitation is present.    Aortic Valve   Peak and mean transaortic gradients are 75 and 40mmHg respectively; this finding is consistent with severe aortic stenosis.   Mild (1+) aortic regurgitation is present.    Tricuspid Valve   Trace tricuspid valve regurgitation is present.    Pulmonic Valve   Mild pulmonic valvular regurgitation (1+) is present.    Left Atrium   Normal appearing left atrium.    Left Ventricle   Mild concentric left ventricular hypertrophy is present.   The left ventricle is normal in size.   Estimated left ventricular ejection fraction is 65-70 %.    Right Atrium   Normal appearing right atrium.    Right Ventricle   Normal appearing right ventricle structure and function.    Pericardial Effusion   No evidence of pericardial effusion.    Pleural Effusion   No evidence of pleural effusion.    Miscellaneous   The IVC appears normal.    Summary   The mitral valve leaflets appear thickened.   EA reversal of the mitral inflow consistent with reduced compliance of the left ventricle.   Mild mitral annular calcification is present.   Mild mitral regurgitation is present.   Peak and mean transaortic gradients are 75 and 40mmHg respectively; this finding is consistent with severe aortic stenosis.   Mild (1+) aortic regurgitation is present.   Trace tricuspid valve regurgitation is present.   Mild pulmonic valvular regurgitation (1+) is present.   Normal appearing left atrium.   Mild concentric left ventricular hypertrophy is present.   The left ventricle is normal in size.   Estimated left ventricular ejection fraction is 65-70 %.   Normal appearing right atrium.   Normal appearing right ventricle structure and function.   The IVC appears normal.   No evidence of pericardial effusion.   No evidence of pleural effusion.    Signature   ----------------------------------------------------------------   Electronically signed by Sakina Cheatham MD, Director of   Cardiac Cath Lab(Interpreting physician) on 2023 06:42   PM   ----------------------------------------------------------------

## 2023-03-05 NOTE — CONSULT NOTE ADULT - ASSESSMENT
89 y/o Female with h/o arthritis, HTN, gout, hyperlipidemia, pelvic fracture, chronic UTI with urinary retention after post void trials was admitted on 2/27 for bilateral hip and groin pain X 2 days. Patient's daughter states she had recent urology evaluation and straight cath urines were recommended but they were difficult after only one day and she thinks patient maybe strained her hips trying to hold positioning during straight cath. Patient then had indwelling pérez placed 3 days PTA which has been draining well. Patient received bactrim for UTI. Daughter states patient was also fatigued at home, not speaking much, and had malaise. Patient refused to walk due to bilateral hip and pelvis pain. Daughter sent patient via ambulance when she vomited once and could not take her PM dose of bactrim. Upon admission she received zosyn IV x 1. On 3/5 she was noted febrile to 100.9F. A CT chest noted pulmonary infiltrates suggestive of aspiration.     1. Aspiration pneumonia. Multiple nodular opacities in lower pulmonary lobes. CRF stage 3. Metabolic encephalopathy.   -low grade fever  -no leukocytosis  -BC x 2, urine c/s noted  -start zosyn 3.375 gm IV q8h  -reason for abx use and side effects reviewed with patient; monitor BMP   -aspiration precautions  -old chart reviewed to assess prior cultures  -monitor temps  -f/u CBC  -supportive care  2. Other issues:   -care per medicine   89 y/o Female with h/o arthritis, HTN, gout, hyperlipidemia, pelvic fracture, chronic UTI with urinary retention after post void trials was admitted on 2/27 for bilateral hip and groin pain X 2 days. Patient's daughter states she had recent urology evaluation and straight cath urines were recommended but they were difficult after only one day and she thinks patient maybe strained her hips trying to hold positioning during straight cath. Patient then had indwelling pérez placed 3 days PTA which has been draining well. Patient received bactrim for UTI. Daughter states patient was also fatigued at home, not speaking much, and had malaise. Patient refused to walk due to bilateral hip and pelvis pain. Daughter sent patient via ambulance when she vomited once and could not take her PM dose of bactrim. Upon admission she received zosyn IV x 1. On 3/5 she was noted febrile to 100.9F. A CT chest noted pulmonary infiltrates suggestive of aspiration.     1. Aspiration pneumonia. Multiple nodular opacities in lower pulmonary lobes. COVID-19 viral syndrome. CRF stage 3. Metabolic encephalopathy.   -low grade fever  -no leukocytosis  -BC x 2, urine c/s noted  -start zosyn 3.375 gm IV q8h  -reason for abx use and side effects reviewed with patient; monitor BMP   -aspiration precautions  -no hypoxia  -monitor respiratory status  -old chart reviewed to assess prior cultures  -monitor temps  -f/u CBC  -supportive care  2. Other issues:   -care per medicine

## 2023-03-05 NOTE — PROGRESS NOTE ADULT - SUBJECTIVE AND OBJECTIVE BOX
Patient is a 89y Female    fever overnight now w cough +    CT chest poss infiltrate   Cxr neg for pulm edema   COVID now +    UA , urine cx to be sent ( recurrent sc's)    retained urine and required SC x 2 more overnight and this AM      dtr at bedside earlier today        MEDICATIONS  (STANDING):  allopurinol 50 milliGRAM(s) Oral daily  atorvastatin 20 milliGRAM(s) Oral at bedtime  bethanechol 50 milliGRAM(s) Oral two times a day  folic acid 1 milliGRAM(s) Oral daily  gabapentin 300 milliGRAM(s) Oral two times a day  heparin   Injectable 5000 Unit(s) SubCutaneous every 12 hours  hydrALAZINE 25 milliGRAM(s) Oral three times a day  metoprolol tartrate 25 milliGRAM(s) Oral two times a day  pantoprazole    Tablet 40 milliGRAM(s) Oral before breakfast  piperacillin/tazobactam IVPB.- 3.375 Gram(s) IV Intermittent once  sodium chloride 0.9%. 1000 milliLiter(s) (50 mL/Hr) IV Continuous <Continuous>    Vital Signs Last 24 Hrs  T(C): 38 (05 Mar 2023 12:00), Max: 38.3 (05 Mar 2023 05:18)  T(F): 100.4 (05 Mar 2023 12:00), Max: 100.9 (05 Mar 2023 05:18)  HR: 68 (05 Mar 2023 16:00) (68 - 86)  BP: 136/48 (05 Mar 2023 16:00) (118/56 - 178/53)  BP(mean): 69 (05 Mar 2023 16:00) (60 - 86)  RR: 16 (05 Mar 2023 16:00) (16 - 16)  SpO2: 94% (05 Mar 2023 16:00) (92% - 98%)    Parameters below as of 05 Mar 2023 16:00  Patient On (Oxygen Delivery Method): room air    I&O's Detail    04 Mar 2023 07:01  -  05 Mar 2023 07:00  --------------------------------------------------------  IN:    sodium chloride 0.9%: 650 mL  Total IN: 650 mL    OUT:    Intermittent Catheterization - Urethral (mL): 900 mL    Voided (mL): 706 mL  Total OUT: 1606 mL    Total NET: -956 mL      05 Mar 2023 07:01  -  05 Mar 2023 16:14  --------------------------------------------------------  IN:    sodium chloride 0.9%: 350 mL  Total IN: 350 mL    OUT:    Voided (mL): 0 mL  Total OUT: 0 mL    Total NET: 350 mL      PHYSICAL EXAM:    Constitutional: NAD   HEENT:  MM  Resp: coarse BS distant  Cardiovascular: S1 and S2   Gastrointestinal: BS+, soft, NT/ND  Extremities: No  peripheral edema  Neurological: Alert, pleasent           132    |  105    |  19     ----------------------------<  135       05 Mar 2023 10:00  4.7     |  23     |  1.51     128    |  101    |  21     ----------------------------<  112       04 Mar 2023 13:51  5.0     |  23     |  1.32     128    |  101    |  21     ----------------------------<  113       04 Mar 2023 05:59  4.6     |  22     |  1.28     Ca    8.8        05 Mar 2023 10:00  Ca    9.3        04 Mar 2023 13:51    Phos  2.5       03 Mar 2023 06:27                          8.1    5.86  )-----------( 118      ( 05 Mar 2023 10:00 )             24.9                         8.8    5.47  )-----------( 129      ( 04 Mar 2023 05:59 )             26.4       Urine Studies:  Urinalysis Basic - ( 2023 04:29 )    Color: Yellow / Appearance: Clear / S.010 / pH: x  Gluc: x / Ketone: Negative  / Bili: Negative / Urobili: Negative   Blood: x / Protein: 100 / Nitrite: Negative   Leuk Esterase: Moderate / RBC: 11-25 /HPF / WBC 3-5 /HPF   Sq Epi: x / Non Sq Epi: Few / Bacteria: Few      Osmolality, Random Urine: 134 mosm/Kg ( @ 13:00)  Sodium, Random Urine: 28 mmol/L ( @ 13:00)      RADIOLOGY & ADDITIONAL STUDIES:

## 2023-03-05 NOTE — PROGRESS NOTE ADULT - ASSESSMENT
89 yo female with PMHx arthritis, HTN, gout, hyperlipidemia, hx of pelvic fracture, hx of chronic UTI, urinary retention presents with bilateral hip and groin pain X 2 days with renal evaluation of hyponatremia.       Hyponatremia - improved with IVF NS  - poor osmolar intake   monitor after completed today    encourage osmolar intake   no diuretics     Desire resolved after urinary retention - now Pérez removed  and Cr rising despite IVF , and required SC x 3   pérez to be reinserted today   Urology consult   Avoid nsaids/contrast    d/w dr barney, dtr - lester, and son - Dr khoury ( MD in [place]) at length   d/w CICU RN

## 2023-03-05 NOTE — PROGRESS NOTE ADULT - SUBJECTIVE AND OBJECTIVE BOX
CC: severe hyponatremia (01 Mar 2023 07:23)    HPI:  89 yo F with arthritis, hx of HTN, gout, hyperlipidemia, hx of pelvic fracture,  UTIs  with urinary retention after post void trials in Dr. Bonilla's office presented with bilateral hip and groin pain X 2 days.  Patient's daughter stated  Pt  had recent urology evaluation and to  straight cath urine were recommended but they were difficult after only one day and she thinks patient maybe strained her hips trying to hold positioning during straight cath.  Patient then had indwelling pérez placed 3 days PTA  which has been draining well.  Patient is currently on bactrim for UTI.  Daughter stated  patient was also fatigued at home, not speaking much, and had malaise.  Patient refused to walk  day PTA due to bilateral hip and pelvis pain. No  falls.  +  vomiting  once and could not take her PM dose of bactrim.  Patient denies abdominal pain, nausea, or chest pain.  O2 on arrival was 91% and nasal canula placed for comfort but patient denies SOB.    Na improved  low grade temp and dry cough, continues with urinary retention, borderline BP this morning      Vital Signs Last 24 Hrs  T(C): 37.6 (05 Mar 2023 10:50), Max: 38.3 (05 Mar 2023 05:18)  T(F): 99.6 (05 Mar 2023 10:50), Max: 100.9 (05 Mar 2023 05:18)  HR: 77 (05 Mar 2023 08:00) (77 - 86)  BP: 118/56 (05 Mar 2023 10:50) (118/56 - 178/53)  BP(mean): 60 (05 Mar 2023 08:00) (60 - 88)  RR: 16 (05 Mar 2023 08:00) (16 - 16)  SpO2: 93% (05 Mar 2023 08:00) (92% - 98%)    Parameters below as of 05 Mar 2023 08:00  Patient On (Oxygen Delivery Method): room air          PHYSICAL EXAM:  General: Well developed; in no acute distress  Eyes: EOMI; conjunctiva and sclera clear  Head: Normocephalic; atraumatic  ENMT: No nasal discharge; airway clear  Neck: Supple; non tender; no masses  Respiratory: Decreased BS at bases,  No wheezes, rales or rhonchi  Cardiovascular: Regular rate and rhythm. S1 and S2 Normal; + LIANA  Gastrointestinal: Soft non-tender non-distended; Normal bowel sounds  Genitourinary: No  suprapubic  tenderness  Extremities: No  edema  Vascular: Peripheral pulses palpable 2+ bilaterally  Neurological: Alert and oriented x2, non focal   Musculoskeletal: Normal muscle tone, without deformities  Psychiatric: Cooperative and appropriate    LABS:                                    8.1    5.86  )-----------( 118      ( 05 Mar 2023 10:00 )             24.9   03-05    132<L>  |  105  |  19  ----------------------------<  135<H>  4.7   |  23  |  1.51<H>    Ca    8.8      05 Mar 2023 10:00          MEDICATIONS  (STANDING):  allopurinol 50 milliGRAM(s) Oral daily  atorvastatin 20 milliGRAM(s) Oral at bedtime  bethanechol 50 milliGRAM(s) Oral two times a day  folic acid 1 milliGRAM(s) Oral daily  gabapentin 300 milliGRAM(s) Oral two times a day  heparin   Injectable 5000 Unit(s) SubCutaneous every 12 hours  hydrALAZINE 25 milliGRAM(s) Oral two times a day  pantoprazole    Tablet 40 milliGRAM(s) Oral before breakfast    MEDICATIONS  (PRN):  acetaminophen     Tablet .. 650 milliGRAM(s) Oral every 6 hours PRN Temp greater or equal to 38C (100.4F), Mild Pain (1 - 3)       CT Chest No Cont (03.05.23 @ 10:16) >  IMPRESSION: Bilateral lower lobe clustered nodular opacities, right   greater than left new since February 21, 2021 suggestive of endobronchial   spread of infection. Findings can be seen in the setting of aspiration   given the distribution. 3 month follow-up noncontrast chest CT is   recommended to ensure resolution.    Bilateral interlobular septal thickening can be seen in the setting of   pulmonary edema.    Trace right pleural effusion.    Enlarged azygoesophageal recess lymph node is without significant   interval change since February 21, 2021 can be monitored on the follow-up   chest CT.    Aortic valve calcifications can be seen in the setting of aortic valve   stenosis.

## 2023-03-06 LAB
ANION GAP SERPL CALC-SCNC: 4 MMOL/L — LOW (ref 5–17)
BLD GP AB SCN SERPL QL: SIGNIFICANT CHANGE UP
BUN SERPL-MCNC: 20 MG/DL — SIGNIFICANT CHANGE UP (ref 7–23)
CALCIUM SERPL-MCNC: 8.4 MG/DL — LOW (ref 8.5–10.1)
CHLORIDE SERPL-SCNC: 105 MMOL/L — SIGNIFICANT CHANGE UP (ref 96–108)
CO2 SERPL-SCNC: 21 MMOL/L — LOW (ref 22–31)
CREAT SERPL-MCNC: 1.58 MG/DL — HIGH (ref 0.5–1.3)
CULTURE RESULTS: NO GROWTH — SIGNIFICANT CHANGE UP
EGFR: 30 ML/MIN/1.73M2 — LOW
GLUCOSE SERPL-MCNC: 108 MG/DL — HIGH (ref 70–99)
HCT VFR BLD CALC: 22.5 % — LOW (ref 34.5–45)
HGB BLD-MCNC: 7.3 G/DL — LOW (ref 11.5–15.5)
MCHC RBC-ENTMCNC: 30.5 PG — SIGNIFICANT CHANGE UP (ref 27–34)
MCHC RBC-ENTMCNC: 32.4 GM/DL — SIGNIFICANT CHANGE UP (ref 32–36)
MCV RBC AUTO: 94.1 FL — SIGNIFICANT CHANGE UP (ref 80–100)
PLATELET # BLD AUTO: 100 K/UL — LOW (ref 150–400)
POTASSIUM SERPL-MCNC: 4.5 MMOL/L — SIGNIFICANT CHANGE UP (ref 3.5–5.3)
POTASSIUM SERPL-SCNC: 4.5 MMOL/L — SIGNIFICANT CHANGE UP (ref 3.5–5.3)
RBC # BLD: 2.39 M/UL — LOW (ref 3.8–5.2)
RBC # FLD: 15 % — HIGH (ref 10.3–14.5)
SODIUM SERPL-SCNC: 130 MMOL/L — LOW (ref 135–145)
SPECIMEN SOURCE: SIGNIFICANT CHANGE UP
WBC # BLD: 4.79 K/UL — SIGNIFICANT CHANGE UP (ref 3.8–10.5)
WBC # FLD AUTO: 4.79 K/UL — SIGNIFICANT CHANGE UP (ref 3.8–10.5)

## 2023-03-06 PROCEDURE — 99233 SBSQ HOSP IP/OBS HIGH 50: CPT

## 2023-03-06 RX ORDER — SENNA PLUS 8.6 MG/1
2 TABLET ORAL AT BEDTIME
Refills: 0 | Status: DISCONTINUED | OUTPATIENT
Start: 2023-03-06 | End: 2023-03-16

## 2023-03-06 RX ORDER — POLYETHYLENE GLYCOL 3350 17 G/17G
17 POWDER, FOR SOLUTION ORAL DAILY
Refills: 0 | Status: DISCONTINUED | OUTPATIENT
Start: 2023-03-06 | End: 2023-03-16

## 2023-03-06 RX ADMIN — Medication 100 MILLIGRAM(S): at 22:29

## 2023-03-06 RX ADMIN — GABAPENTIN 300 MILLIGRAM(S): 400 CAPSULE ORAL at 20:31

## 2023-03-06 RX ADMIN — Medication 25 MILLIGRAM(S): at 09:43

## 2023-03-06 RX ADMIN — GABAPENTIN 300 MILLIGRAM(S): 400 CAPSULE ORAL at 09:43

## 2023-03-06 RX ADMIN — PIPERACILLIN AND TAZOBACTAM 25 GRAM(S): 4; .5 INJECTION, POWDER, LYOPHILIZED, FOR SOLUTION INTRAVENOUS at 04:24

## 2023-03-06 RX ADMIN — POLYETHYLENE GLYCOL 3350 17 GRAM(S): 17 POWDER, FOR SOLUTION ORAL at 13:02

## 2023-03-06 RX ADMIN — Medication 1 MILLIGRAM(S): at 09:43

## 2023-03-06 RX ADMIN — PIPERACILLIN AND TAZOBACTAM 25 GRAM(S): 4; .5 INJECTION, POWDER, LYOPHILIZED, FOR SOLUTION INTRAVENOUS at 13:02

## 2023-03-06 RX ADMIN — HEPARIN SODIUM 5000 UNIT(S): 5000 INJECTION INTRAVENOUS; SUBCUTANEOUS at 22:30

## 2023-03-06 RX ADMIN — Medication 650 MILLIGRAM(S): at 14:47

## 2023-03-06 RX ADMIN — Medication 650 MILLIGRAM(S): at 04:32

## 2023-03-06 RX ADMIN — Medication 25 MILLIGRAM(S): at 22:30

## 2023-03-06 RX ADMIN — Medication 650 MILLIGRAM(S): at 14:17

## 2023-03-06 RX ADMIN — PANTOPRAZOLE SODIUM 40 MILLIGRAM(S): 20 TABLET, DELAYED RELEASE ORAL at 05:25

## 2023-03-06 RX ADMIN — Medication 50 MILLIGRAM(S): at 09:41

## 2023-03-06 RX ADMIN — ATORVASTATIN CALCIUM 20 MILLIGRAM(S): 80 TABLET, FILM COATED ORAL at 22:31

## 2023-03-06 RX ADMIN — Medication 25 MILLIGRAM(S): at 14:17

## 2023-03-06 RX ADMIN — HEPARIN SODIUM 5000 UNIT(S): 5000 INJECTION INTRAVENOUS; SUBCUTANEOUS at 09:43

## 2023-03-06 RX ADMIN — Medication 50 MILLIGRAM(S): at 22:31

## 2023-03-06 RX ADMIN — SENNA PLUS 2 TABLET(S): 8.6 TABLET ORAL at 22:30

## 2023-03-06 RX ADMIN — PIPERACILLIN AND TAZOBACTAM 25 GRAM(S): 4; .5 INJECTION, POWDER, LYOPHILIZED, FOR SOLUTION INTRAVENOUS at 20:44

## 2023-03-06 RX ADMIN — Medication 25 MILLIGRAM(S): at 05:24

## 2023-03-06 NOTE — PROGRESS NOTE ADULT - ASSESSMENT
89 y/o Female with h/o arthritis, HTN, gout, hyperlipidemia, pelvic fracture, chronic UTI with urinary retention after post void trials was admitted on 2/27 for bilateral hip and groin pain X 2 days. Patient's daughter states she had recent urology evaluation and straight cath urines were recommended but they were difficult after only one day and she thinks patient maybe strained her hips trying to hold positioning during straight cath. Patient then had indwelling pérez placed 3 days PTA which has been draining well. Patient received bactrim for UTI. Daughter states patient was also fatigued at home, not speaking much, and had malaise. Patient refused to walk due to bilateral hip and pelvis pain. Daughter sent patient via ambulance when she vomited once and could not take her PM dose of bactrim. Upon admission she received zosyn IV x 1. On 3/5 she was noted febrile to 100.9F. A CT chest noted pulmonary infiltrates suggestive of aspiration.     1. Aspiration pneumonia. Multiple nodular opacities in lower pulmonary lobes. COVID-19 viral syndrome. CRF stage 3. Metabolic encephalopathy.   -more alert  -low grade fever  -no leukocytosis  -BC x 2, urine c/s noted  -on zosyn 3.375 gm IV q8h # 2  -tolerating abx well so far; no side effects noted  -continue abx coverage   -aspiration precautions  -no hypoxia  -monitor respiratory status  -continue abx coverage   -monitor temps  -f/u CBC  -supportive care  2. Other issues:   -care per medicine

## 2023-03-06 NOTE — PROGRESS NOTE ADULT - ASSESSMENT
89 yo female with PMHx arthritis,  AS,  HTN, gout, hyperlipidemia, hx of pelvic fracture,  UTIs ,  urinary retention , s/p Perales p admitted  for:     #Hyponatremia 2/2 hypovolemia  Lasix  use and GI losses  improved Na 130's      #Urinary retention- straight cath  pt can have OP f/up, no procedure can restore voiding, c/e present meds, add Flomax    #COVID/ HCAP:    Patient now positive 3/5 for COVID.    Low grade temps/ cough.    CT chest with bilateral lower lobe nodular opacities-- may be infection related.    No hypoxia.    Cont Zosyn day #2.      #Chronic Anemia:    Hgb normally~ 10 per records.    Was on procrit but many years ago.    Hgb down to 7.2.    Transfuse 1 unit pRBC.    Repeat labs in am.    No obvious bleeding.      #H/o HFpEF,    S/p IVF, CT chest with read for fluid overload   nephrology f/up re: diuresis- not overloaded per nephrology; no diuretic for now    #Mild thrombocytopenia  repeat cbc in am    #DVT Proph:

## 2023-03-06 NOTE — PROGRESS NOTE ADULT - SUBJECTIVE AND OBJECTIVE BOX
CC: severe hyponatremia (01 Mar 2023 07:23)    HPI:  89 yo F with arthritis, hx of HTN, gout, hyperlipidemia, hx of pelvic fracture,  UTIs  with urinary retention after post void trials in Dr. Bonilla's office presented with bilateral hip and groin pain X 2 days.  Patient's daughter stated  Pt  had recent urology evaluation and to  straight cath urine were recommended but they were difficult after only one day and she thinks patient maybe strained her hips trying to hold positioning during straight cath.  Patient then had indwelling pérez placed 3 days PTA  which has been draining well.  Patient is currently on bactrim for UTI.  Daughter stated  patient was also fatigued at home, not speaking much, and had malaise.  Patient refused to walk  day PTA due to bilateral hip and pelvis pain. No  falls.  +  vomiting  once and could not take her PM dose of bactrim.  Patient denies abdominal pain, nausea, or chest pain.  O2 on arrival was 91% and nasal canula placed for comfort but patient denies SOB.    3/5:  Na improved  low grade temp and dry cough, continues with urinary retention, borderline BP this morning  3/6:  Pt seen.  No new complaints.  Low grade temps.  COVID + now.  Denies SOB.  + cough    ROS:   All 10 systems reviewed and found to be negative with the exception of what has been described above.    Vital Signs Last 24 Hrs  T(C): 37.1 (06 Mar 2023 16:25), Max: 38 (06 Mar 2023 04:30)  T(F): 98.8 (06 Mar 2023 16:25), Max: 100.4 (06 Mar 2023 04:30)  HR: 78 (06 Mar 2023 16:25) (65 - 78)  BP: 136/100 (06 Mar 2023 16:25) (119/38 - 162/46)  BP(mean): 68 (06 Mar 2023 15:05) (58 - 108)  RR: 18 (06 Mar 2023 16:25) (18 - 18)  SpO2: 96% (06 Mar 2023 16:25) (92% - 96%)    Parameters below as of 06 Mar 2023 16:25  Patient On (Oxygen Delivery Method): room air    PHYSICAL EXAM:  General: Well developed; in no acute distress  Eyes: EOMI; conjunctiva and sclera clear  Head: Normocephalic; atraumatic  ENMT: No nasal discharge; airway clear  Neck: Supple; non tender; no masses  Respiratory: Decreased BS at bases,  No wheezes, rales or rhonchi  Cardiovascular: Regular rate and rhythm. S1 and S2 Normal; + LIANA  Gastrointestinal: Soft non-tender non-distended; Normal bowel sounds  Genitourinary: No  suprapubic  tenderness  Extremities: No  edema  Vascular: Peripheral pulses palpable 2+ bilaterally  Neurological: Alert and oriented x2, non focal   Musculoskeletal: Normal muscle tone, without deformities  Psychiatric: Cooperative and appropriate    LABS:   03-06    130<L>  |  105  |  20  ----------------------------<  108<H>  4.5   |  21<L>  |  1.58<H>    Ca    8.4<L>      06 Mar 2023 05:51                        7.3    4.79  )-----------( 100      ( 06 Mar 2023 05:51 )             22.5       MEDICATIONS  (STANDING):  allopurinol 50 milliGRAM(s) Oral daily  atorvastatin 20 milliGRAM(s) Oral at bedtime  bethanechol 50 milliGRAM(s) Oral two times a day  folic acid 1 milliGRAM(s) Oral daily  gabapentin 300 milliGRAM(s) Oral two times a day  heparin   Injectable 5000 Unit(s) SubCutaneous every 12 hours  hydrALAZINE 25 milliGRAM(s) Oral three times a day  metoprolol tartrate 25 milliGRAM(s) Oral two times a day  pantoprazole    Tablet 40 milliGRAM(s) Oral before breakfast  piperacillin/tazobactam IVPB.. 3.375 Gram(s) IV Intermittent every 8 hours  polyethylene glycol 3350 17 Gram(s) Oral daily  senna 2 Tablet(s) Oral at bedtime    MEDICATIONS  (PRN):  acetaminophen     Tablet .. 650 milliGRAM(s) Oral every 6 hours PRN Temp greater or equal to 38C (100.4F), Mild Pain (1 - 3)     CT Chest No Cont (03.05.23 @ 10:16) >  IMPRESSION: Bilateral lower lobe clustered nodular opacities, right   greater than left new since February 21, 2021 suggestive of endobronchial   spread of infection. Findings can be seen in the setting of aspiration   given the distribution. 3 month follow-up noncontrast chest CT is   recommended to ensure resolution.    Bilateral interlobular septal thickening can be seen in the setting of   pulmonary edema.    Trace right pleural effusion.    Enlarged azygoesophageal recess lymph node is without significant   interval change since February 21, 2021 can be monitored on the follow-up   chest CT.    Aortic valve calcifications can be seen in the setting of aortic valve   stenosis.

## 2023-03-06 NOTE — PROGRESS NOTE ADULT - SUBJECTIVE AND OBJECTIVE BOX
Patient is a 89y Female    still w fever overnight now w cough +    CT chest poss infiltrate   Cxr neg for pulm edema   COVID now +         MEDICATIONS  (STANDING):  allopurinol 50 milliGRAM(s) Oral daily  atorvastatin 20 milliGRAM(s) Oral at bedtime  bethanechol 50 milliGRAM(s) Oral two times a day  folic acid 1 milliGRAM(s) Oral daily  gabapentin 300 milliGRAM(s) Oral two times a day  heparin   Injectable 5000 Unit(s) SubCutaneous every 12 hours  hydrALAZINE 25 milliGRAM(s) Oral three times a day  metoprolol tartrate 25 milliGRAM(s) Oral two times a day  pantoprazole    Tablet 40 milliGRAM(s) Oral before breakfast  piperacillin/tazobactam IVPB.. 3.375 Gram(s) IV Intermittent every 8 hours  sodium chloride 0.9%. 1000 milliLiter(s) (50 mL/Hr) IV Continuous <Continuous>    Vital Signs Last 24 Hrs  T(C): 37.8 (06 Mar 2023 08:29), Max: 38.4 (05 Mar 2023 20:00)  T(F): 100 (06 Mar 2023 08:29), Max: 101.1 (05 Mar 2023 20:00)  HR: 70 (06 Mar 2023 08:00) (68 - 70)  BP: 124/104 (06 Mar 2023 08:00) (119/38 - 145/45)  BP(mean): 108 (06 Mar 2023 08:00) (58 - 108)  RR: 18 (06 Mar 2023 08:00) (16 - 18)  SpO2: 92% (06 Mar 2023 08:00) (92% - 94%)    Parameters below as of 06 Mar 2023 08:00  Patient On (Oxygen Delivery Method): room air    I&O's Detail    05 Mar 2023 07:01  -  06 Mar 2023 07:00  --------------------------------------------------------  IN:    IV PiggyBack: 100 mL    sodium chloride 0.9%: 350 mL  Total IN: 450 mL    OUT:    Indwelling Catheter - Urethral (mL): 600 mL    Voided (mL): 0 mL  Total OUT: 600 mL    Total NET: -150 mL    PHYSICAL EXAM:    Constitutional: NAD   HEENT:  MM  Resp: coarse BS distant  Cardiovascular: S1 and S2   Gastrointestinal: BS+, soft, NT/ND  Extremities: No  peripheral edema  Neurological: Alert, pleasant                             7.3    4.79  )-----------( 100      ( 06 Mar 2023 05:51 )             22.5                         8.1    5.86  )-----------( 118      ( 05 Mar 2023 10:00 )             24.9         130    |  105    |  20     ----------------------------<  108       06 Mar 2023 05:51  4.5     |  21     |  1.58     132    |  105    |  19     ----------------------------<  135       05 Mar 2023 10:00  4.7     |  23     |  1.51     128    |  101    |  21     ----------------------------<  112       04 Mar 2023 13:51  5.0     |  23     |  1.32     Ca    8.4        06 Mar 2023 05:51  Ca    8.8        05 Mar 2023 10:00    Phos  2.5       03 Mar 2023 06:27      Urine Studies:  Urinalysis Basic - ( 2023 04:29 )    Color: Yellow / Appearance: Clear / S.010 / pH: x  Gluc: x / Ketone: Negative  / Bili: Negative / Urobili: Negative   Blood: x / Protein: 100 / Nitrite: Negative   Leuk Esterase: Moderate / RBC: 11-25 /HPF / WBC 3-5 /HPF   Sq Epi: x / Non Sq Epi: Few / Bacteria: Few      Osmolality, Random Urine: 134 mosm/Kg ( @ 13:00)  Sodium, Random Urine: 28 mmol/L ( @ 13:00)      RADIOLOGY & ADDITIONAL STUDIES:

## 2023-03-06 NOTE — PROGRESS NOTE ADULT - ASSESSMENT
2/28/23:  Pt with above history with h/o HFpEF on Lasix developing severe hyponatremia better after current treatment but still hyponatremic.  Will have to avoid diuretics but continue to gently replete NA watching for diastolic CHF given her stiff heart and moderate-severe AS.  Will repeat an echo.  Continue as outlined by medicine etal.  Will follow as treatment progresses.    3/1/23:  More alert and less lethargic this AM.  Na+=124 this AM.  No clinical CHF or anginal chest pains.  Awaiting echo.  No new cardiac complaints.  Continue as outlined by medicine etal.  Will follow as treatment progresses.    3/2/23:  Resting comfortably without CHF or other cardiac symptoms.  Na+=129.  ECHO with severe AS (MARVA=0.8-0.9cm2) but no clinical symptoms yet.  Continue as outlined by medicine etal.  Will follow as treatment progresses.      3/3/23:  Resting and feeling better.  No clinical CHF, angina or syncope.  No new cardiac symptoms.  Tolerating IV NS.  Awaiting AM labs.  Continue as outlined by medicine etal.  Will follow.    3/4/23:  Feeling good without increased SOB or chest pains.  Eager to go home.  Na+ still low.  Etiology for her hyponatremia still a question.  According to her daughter she does not restrict her Na intake much and does not drink a lot of water.    No new cardiac symptoms.  Continue as outlined by medicine and renal etal.  Will follow.    3/5/23:  Spiked a temp last night (100.9 F.) and had urinary retention requiring straight cath x 2.  Resting this AM.  Back on IV NS for Na+=128 again yesterday.  Awaiting AM labs.  No clinical CHF or other new cardiac symptoms.  Continue as outlined by medicine and renal etal.  Will follow.    3/6/23:  Resting this AM.  NA+=132 yesterday and 130 today.  Hgb down to 7.3.  Now COVID-19 (+) with CT of Chest suggest spread of infection???.  No other cardiac symptoms.  Continue as outlined by medicine and renal etal.  Will continue to follow intermittently prn as treatment progresses.

## 2023-03-06 NOTE — PROGRESS NOTE ADULT - ASSESSMENT
89 yo female with PMHx arthritis, HTN, gout, hyperlipidemia, hx of pelvic fracture, hx of chronic UTI, urinary retention presents with bilateral hip and groin pain X 2 days with renal evaluation of hyponatremia.       Hyponatremia - improved with IVF NS in past  - poor osmolar intake   now Na 130    SiADH sec to lung process/covid vs hypovolemia related w ongoing fevers    monitor off IVF for now in setting of COVID    if Na drops and Cr rises then resume IVF NS x 1 liter challenge    monitor labs daily    encourage osmolyte intake     Desire    w urinary retention - now has Perales   Cr risen - remains 1.5 range   Urology consult   Avoid nsaids/contrast  IVF if Cr rises     persistent Fevers in setting of new COVID   IV abx as per ID   ID fup regarding remdesdevir requirement        d/w CICU RN

## 2023-03-06 NOTE — PROGRESS NOTE ADULT - SUBJECTIVE AND OBJECTIVE BOX
Date of service: 03-06-23 @ 14:28    OOB to chair   Has dry cough  Febrile to 101.1F  More alert    ROS: denies dizziness, no HA, no abdominal pain, no diarrhea or constipation; no dysuria, no legs pain, no rashes    MEDICATIONS  (STANDING):  allopurinol 50 milliGRAM(s) Oral daily  atorvastatin 20 milliGRAM(s) Oral at bedtime  bethanechol 50 milliGRAM(s) Oral two times a day  folic acid 1 milliGRAM(s) Oral daily  gabapentin 300 milliGRAM(s) Oral two times a day  heparin   Injectable 5000 Unit(s) SubCutaneous every 12 hours  hydrALAZINE 25 milliGRAM(s) Oral three times a day  metoprolol tartrate 25 milliGRAM(s) Oral two times a day  pantoprazole    Tablet 40 milliGRAM(s) Oral before breakfast  piperacillin/tazobactam IVPB.. 3.375 Gram(s) IV Intermittent every 8 hours  polyethylene glycol 3350 17 Gram(s) Oral daily  senna 2 Tablet(s) Oral at bedtime    Vital Signs Last 24 Hrs  T(C): 37.2 (06 Mar 2023 13:31), Max: 38.4 (05 Mar 2023 20:00)  T(F): 98.9 (06 Mar 2023 13:31), Max: 101.1 (05 Mar 2023 20:00)  HR: 70 (06 Mar 2023 08:00) (68 - 70)  BP: 124/104 (06 Mar 2023 08:00) (119/38 - 145/45)  BP(mean): 108 (06 Mar 2023 08:00) (58 - 108)  RR: 18 (06 Mar 2023 08:00) (16 - 18)  SpO2: 92% (06 Mar 2023 08:00) (92% - 94%)    Parameters below as of 06 Mar 2023 08:00  Patient On (Oxygen Delivery Method): room air     Physical exam:    Constitutional:  No acute distress  HEENT: NC/AT, EOMI, PERRLA, conjunctivae clear; ears and nose atraumatic  Neck: supple; thyroid not palpable  Back: no tenderness  Respiratory: respiratory effort normal; crackles at bases  Cardiovascular: S1S2 regular, no murmurs  Abdomen: soft, not tender, not distended, positive BS  Genitourinary: no suprapubic tenderness  Lymphatic: no LN palpable  Musculoskeletal: no muscle tenderness, no joint swelling or tenderness  Extremities: no pedal edema  Neurological/ Psychiatric: Alert, judgement and insight impaired; moving all extremities  Skin: no rashes; no palpable lesions    Labs: reviewed                        7.3    4.79  )-----------( 100      ( 06 Mar 2023 05:51 )             22.5     03-06    130<L>  |  105  |  20  ----------------------------<  108<H>  4.5   |  21<L>  |  1.58<H>    Ca    8.4<L>      06 Mar 2023 05:51    Ferritin, Serum: 176 ng/mL (03-02-23 @ 10:41)                        8.1    5.86  )-----------( 118      ( 05 Mar 2023 10:00 )             24.9     03-05    132<L>  |  105  |  19  ----------------------------<  135<H>  4.7   |  23  |  1.51<H>    Ca    8.8      05 Mar 2023 10:00    Culture - Urine (collected 27 Feb 2023 04:29)  Source: Catheterized None  Final Report (28 Feb 2023 07:11):    No growth    Culture - Blood (collected 27 Feb 2023 03:27)  Source: .Blood None  Final Report (04 Mar 2023 09:00):    No Growth Final    Culture - Blood (collected 27 Feb 2023 00:50)  Source: .Blood Blood-Peripheral  Final Report (04 Mar 2023 09:00):    No Growth Final    Radiology: all available radiological tests reviewed    < from: CT Chest No Cont (03.05.23 @ 10:16) >  IMPRESSION: Bilateral lower lobe clustered nodular opacities, right   greater than left new since February 21, 2021 suggestive of endobronchial   spread of infection. Findings can be seen in the setting of aspiration given the distribution.     Bilateral interlobular septal thickening can be seen in the setting of pulmonary edema.  Trace right pleural effusion.    Enlarged azygoesophageal recess lymph node is without significant   interval change since February 21, 2021 can be monitored on the follow-up chest CT.  Aortic valve calcifications can be seen in the setting of aortic valve stenosis.  < end of copied text >    Advanced directives addressed: full resuscitation

## 2023-03-06 NOTE — PROGRESS NOTE ADULT - SUBJECTIVE AND OBJECTIVE BOX
CHIEF COMPLAINT:  Patient is a 89y old  Female who presents with a chief complaint of hyponatremia, lethargy (2023 10:06)      HPI: 23:  Pt. is a 89 yo F with arthritis, hx of HTN, gout, hyperlipidemia, hx of pelvic fracture, hx of chronic UTI with some urinary retention after post void trials in Dr. Carvalho office presents with bilateral hip and groin pain X 2 days.  Patient's daughter states she had recent urology evaluation and straight cath urines were recommended but they were difficult after only one day and she thinks patient maybe strained her hips trying to hold positioning during straight cath.  Patient then had indwelling pérez placed 3 days ago which has been draining well.  Patient is currently on bactrim for UTI.  Daughter states patient was also fatigued at home, not speaking much, and had malaise.  Patient refused to walk yesterday due to bilateral hip and pelvis pain. Denies falls.  Daughter sent patient via ambulance when she vomited once and could not take her PM dose of bactrim.  Patient denies abdominal pain, nausea, or chest pain.  O2 on arrival was 91% and nasal canula placed for comfort but patient denies SOB.    Initially pt was alert and lethargic. Daughter states mother was recently treated at home with laxatives for constipation and has been having multiple bowel movements.  In the ER she was found to have severe hyponatremia with N+=116, treated with hypertonic Saline with improvement and N+ up to 124 but last labs with N+=122.  Awaiting AM labs today.  She is more alert and less lethargic and less confused.  She had no cardiac symptoms at this time denying anginal chest pains or increased SOB.  She dose have HFpEF and moderate-severe AS by echo.  Echo in my office on 3/3/2022 showed normal LV systolic function with LVEF=65-70% with moderate LVH and diastolic LV dysfunction  Moderate-severe AS with MARVA=1.0-1.1cm2 with mild AI, mild MAC with mild MR and mild TR.      3/1/23:  More alert and less lethargic this AM.  Na+=124 this AM.  No clinical CHF or anginal chest pains.  Awaiting echo.  No new cardiac complaints.    3/2/23:  Resting comfortably without CHF or other cardiac symptoms.  Na+=129.  ECHO with severe AS (MARVA=0.8-0.9cm2) but no clinical symptoms yet.    3/3/23:  Resting and feeling better.  No clinical CHF, angina or syncope.  No new cardiac symptoms.  Tolerating IV NS.  Awaiting AM labs.    3/4/23:  Feeling good without increased SOB or chest pains.  Eager to go home.  Na+ still low.  Etiology for her hyponatremia still a question.  According to her daughter she does not restrict her Na intake much and does not drink a lot of water.    No new cardiac symptoms.    3/5/23:  Spiked a temp last night (100.9 F.) and had urinary retention requiring straight cath x 2.  Resting this AM.  Back on IV NS for Na+=128 again yesterday.  Awaiting AM labs.  No clinical CHF or other new cardiac symptoms.      3/6/23:  Resting this AM.  NA+=132 yesterday and 130 today.  Hgb down to 7.3.  Now COVID-19 (+) with CT of Chest suggest spread of infection???.  No other cardiac symptoms.          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  HFpEF  HLD (hyperlipidemia)  Gout  Osteoarthritis  History of tonsillectomy-in childhood      FAMILY HISTORY:   FAMILY HISTORY:  non-contributory      ALLERGIES:  Allergies  Ceftin (Hives; Rash)      REVIEW OF SYSTEMS:  10 point ROS was obtained  Pertinent positives and negatives are as above  All other review of systems is negative unless indicated above      Vital Signs Last 24 Hrs  T(C): 38 (06 Mar 2023 04:30), Max: 38.4 (05 Mar 2023 20:00)  T(F): 100.4 (06 Mar 2023 04:30), Max: 101.1 (05 Mar 2023 20:00)  HR: 68 (05 Mar 2023 16:00) (68 - 77)  BP: 145/45 (06 Mar 2023 04:00) (118/56 - 160/55)  BP(mean): 72 (06 Mar 2023 04:00) (58 - 75)  RR: 16 (05 Mar 2023 16:00) (16 - 16)  SpO2: 94% (05 Mar 2023 16:00) (93% - 94%): room air      I&O's Summary    2023 07:01  -  2023 07:00  --------------------------------------------------------  IN: 250 mL / OUT: 2000 mL / NET: -1750 mL        PHYSICAL EXAM:   Constitutional: NAD, awake and alert, well-developed  HEENT: PERR, EOMI, Normal Hearing, MMM  Neck: Soft and supple, No LAD, No JVD  Respiratory: Breath sounds are clear bilaterally, No wheezing, rales or rhonchi  Cardiovascular: S1 and S2, regular rate and rhythm, 2-3/6 LIANA at base and soft systolic murmur LLSB as before, +S4, No S3 gallops or rubs  Gastrointestinal: Bowel Sounds present, soft, nontender, nondistended, no guarding, no rebound  Extremities: No peripheral edema  Vascular: 2+ peripheral pulses  Neurological: no focal deficits      MEDICATIONS  (STANDING):  allopurinol 50 milliGRAM(s) Oral daily  atorvastatin 20 milliGRAM(s) Oral at bedtime  bethanechol 50 milliGRAM(s) Oral two times a day  folic acid 1 milliGRAM(s) Oral daily  gabapentin 300 milliGRAM(s) Oral two times a day  heparin   Injectable 5000 Unit(s) SubCutaneous every 12 hours  hydrALAZINE 25 milliGRAM(s) Oral three times a day  metoprolol tartrate 25 milliGRAM(s) Oral two times a day  pantoprazole    Tablet 40 milliGRAM(s) Oral before breakfast  piperacillin/tazobactam IVPB.. 3.375 Gram(s) IV Intermittent every 8 hours  sodium chloride 0.9%. 1000 milliLiter(s) (50 mL/Hr) IV Continuous <Continuous>    MEDICATIONS  (PRN):  acetaminophen     Tablet .. 650 milliGRAM(s) Oral every 6 hours PRN Temp greater or equal to 38C (100.4F), Mild Pain (1 - 3)      LABS: All Labs Reviewed:  COVID-19 PCR . (23 @ 08:38): Detected                        7.3    4.79  )-----------( 100      ( 06 Mar 2023 05:51 )             22.5                           8.8    5.47  )-----------( 129      ( 04 Mar 2023 05:59 )             26.4                           7.9    5.29  )-----------( 107      ( 02 Mar 2023 05:52 )             23.6                           9.6    8.73  )-----------( 94       ( 2023 06:44 )             28.1                           8.9    8.99  )-----------( 92       ( 2023 10:57 )             25.6     Sodium, Serum: 128 mmol/L (23 @ 05:59)   Sodium, Serum: 129 mmol/L (23 @ 05:52)   Sodium, Serum: 124 mmol/L (23 @ 00:32)   Sodium, Serum: 122 mmol/L (23 @ 00:17)   Sodium, Serum: 122 mmol/L (23 @ 20:35)   Sodium, Serum: 124 mmol/L (23 @ 16:36)     Sodium, Serum: 116 mmol/L  (23 @ 00:50)           128<L>  |  101  |  21  ----------------------------<  113<H>  4.6   |  22  |  1.28    Ca    9.2      04 Mar 2023 05:59  Phos  2.5     03-03      03-02    129<L>  |  100  |  19  ----------------------------<  116<H>  4.4   |  22  |  1.56<H>    Ca    8.6      02 Mar 2023 05:52  Phos  2.9     03-01  Mg     2.2     03-01    TPro  6.3  /  Alb  2.8<L>  /  TBili  0.4  /  DBili  x   /  AST  35  /  ALT  22  /  AlkPhos  60  03-01      03-    124<L>  |  96  |  21  ----------------------------<  108<H>  4.5   |  20<L>  |  1.90<H>    Ca    8.2<L>      01 Mar 2023 00:32  Phos  3.3     03-  Mg     2.2     -    TPro  6.3  /  Alb  2.8<L>  /  TBili  0.4  /  DBili  x   /  AST  35  /  ALT  22  /  AlkPhos  60      122<L>  |  93<L>  |  19  ----------------------------<  129<H>  4.3   |  22  |  1.83<H>    Ca    9.2      2023 00:17  Phos  2.9       Mg     2.2         TPro  6.5  /  Alb  3.1<L>  /  TBili  0.5  /  DBili  x   /  AST  40<H>  /  ALT  21  /  AlkPhos  59        CARDIAC MARKERS ( 2023 00:50 )  x     / x     / 403 U/L / x     / x          BLOOD CULTURES: Organism --  Gram Stain Urine -- Gram Stain --  Specimen Source Catheterized None  Culture-Urine --  Culture Results: No growth (23 @ 04:29)       LIPID PROFILE     RADIOLOGY:    CT of Chest: 3/5/23:  INTERPRETATION:  Clinical indication: Cough, hyponatremia.  Axial CT images of the chest are obtained without intravenous administration of contrast.  Comparison is made with the chest CT of 2021.  No enlarged axillary lymph nodes. Vertically oriented enlarged azygoesophageal recess lymph node adjacent to the lower portion of the esophagus, part of which measures about 1.3 x 2 cm as measured on the axial images is without significant interval change. Multiple other smaller mediastinal lymph nodes are also without significant interval change.  No pericardial effusion. Mitral annular calcifications. Vascular calcifications with involvement of the aorta and the coronary arteries.   Aortic valve calcifications.  Trace right pleural effusion.  Evaluation of the upper abdomen demonstrate cholelithiasis. Small hiatal hernia. Subcentimeter left renal hypodensity which is too small to characterize. Mild cortical atrophy of the left kidney as on the prior study.  Evaluation of the lungs demonstrate bilateral interlobular septal thickening. Bilateral lower lobe clustered nodular opacities with the largest nodular component within the superior segment of the right lower lobe new since the prior study measuring up to about 1.5 cm.  Left calcified pleural plaque is unchanged.  No central endobronchial lesions.  Degenerative changes of the spine.  IMPRESSION: Bilateral lower lobe clustered nodular opacities, right greater than left new since 2021 suggestive of endobronchial spread of infection. Findings can be seen in the setting of aspiration given the distribution. 3 month follow-up noncontrast chest CT is recommended to ensure resolution.  Bilateral interlobular septal thickening can be seen in the setting of pulmonary edema.  Trace right pleural effusion.  Enlarged azygoesophageal recess lymph node is without significant interval change since 2021 can be monitored on the follow-up chest CT.  Aortic valve calcifications can be seen in the setting of aortic valve stenosis.    CXR: 3/5/23:  The cardiomediastinal silhouette is normal and the dalila are not enlarged. Aortic calcification. The trachea is midline. Interval improved aeration. No focal lung consolidation or sizable pleural effusion. No significant osseous abnormality.  IMPRESSION:  No focal lung consolidation or sizable pleural effusion.    X-Ray of Abd: 23:  FINDINGS:  Mild air distended colon and mid abdominal small bowel.  No free intra-abdominal air.  No obvious intra-abdominal masses.  LEFT upper quadrant and iliac vasculature all calcifications. The osseous structures are intact.  IMPRESSION:  Mild air distended transverse colon and mid abdominal small bowel.. No radiographic evidence of bowel obstruction.    X-Ray of Hips and CXR:  23:  INTERPRETATION:  Lateral hips and pelvis and chest.  Patient has hip pain.  Patient has vomiting.  Bilateral hips with pelvis. 5 views.  Arterial calcifications are noted.  Degenerative loss of disc height at multiple lower lumbar levels with slight right lower lumbar curve noted.  There is mild symmetric hip degeneration. No fracture.  AP chest on 2023 at 1:45 AM.  Heart magnified by technique.  There are bilateral perihilar infiltrates right greater than left which are new since  this year. Configuration suggests infection.  IMPRESSION: No acute fracture. Extensive arterial calcifications.  Bilateral perihilar infiltrates new since prior suggesting infection.      EK23:  Normal sinus rhythm  Normal ECG      TELEMETRY:  NSR      ECHO:  3/1/23:  M-Mode Measurements (cm)   LVEDd: 3.97 cm            LVESd: 2.55 cm   IVSEd: 1.1 cm   LVPWd: 1.1 cm             AO Root Dimension: 2.8 cm                             LA: 3.5 cm                LVOT: 2 cm  Doppler Measurements:   AV Velocity:434 cm/s                  MV Peak E-Wave: 98.7 cm/s   AV Peak Gradient: 75.34 mmHg          MV Peak A-Wave: 131 cm/s   AV Mean Gradient: 40 mmHg             MV E/A Ratio: 0.75 %   AV Area (Continuity):0.85 cm^2        MV Peak Gradient: 3.9 mmHg   TR Velocity:190 cm/s   TR Gradient:14.44 mmHg   Estimated RAP:5 mmHg   RVSP:27 mmHg    Findings  Mitral Valve   The mitral valve leaflets appear thickened.   EA reversal of the mitral inflow consistent with reduced compliance of the left ventricle.   Mild mitral annular calcification is present.   Mild mitral regurgitation is present.    Aortic Valve   Peak and mean transaortic gradients are 75 and 40mmHg respectively; this finding is consistent with severe aortic stenosis.   Mild (1+) aortic regurgitation is present.    Tricuspid Valve   Trace tricuspid valve regurgitation is present.    Pulmonic Valve   Mild pulmonic valvular regurgitation (1+) is present.    Left Atrium   Normal appearing left atrium.    Left Ventricle   Mild concentric left ventricular hypertrophy is present.   The left ventricle is normal in size.   Estimated left ventricular ejection fraction is 65-70 %.    Right Atrium   Normal appearing right atrium.    Right Ventricle   Normal appearing right ventricle structure and function.    Pericardial Effusion   No evidence of pericardial effusion.    Pleural Effusion   No evidence of pleural effusion.    Miscellaneous   The IVC appears normal.    Summary   The mitral valve leaflets appear thickened.   EA reversal of the mitral inflow consistent with reduced compliance of the left ventricle.   Mild mitral annular calcification is present.   Mild mitral regurgitation is present.   Peak and mean transaortic gradients are 75 and 40mmHg respectively; this finding is consistent with severe aortic stenosis.   Mild (1+) aortic regurgitation is present.   Trace tricuspid valve regurgitation is present.   Mild pulmonic valvular regurgitation (1+) is present.   Normal appearing left atrium.   Mild concentric left ventricular hypertrophy is present.   The left ventricle is normal in size.   Estimated left ventricular ejection fraction is 65-70 %.   Normal appearing right atrium.   Normal appearing right ventricle structure and function.   The IVC appears normal.   No evidence of pericardial effusion.   No evidence of pleural effusion.    Signature   ----------------------------------------------------------------   Electronically signed by Sakina Cheatham MD, Director of   Cardiac Cath Lab(Interpreting physician) on 2023 06:42   PM   ----------------------------------------------------------------

## 2023-03-07 LAB
ANION GAP SERPL CALC-SCNC: 4 MMOL/L — LOW (ref 5–17)
BUN SERPL-MCNC: 20 MG/DL — SIGNIFICANT CHANGE UP (ref 7–23)
CALCIUM SERPL-MCNC: 8.6 MG/DL — SIGNIFICANT CHANGE UP (ref 8.5–10.1)
CHLORIDE SERPL-SCNC: 108 MMOL/L — SIGNIFICANT CHANGE UP (ref 96–108)
CO2 SERPL-SCNC: 22 MMOL/L — SIGNIFICANT CHANGE UP (ref 22–31)
CREAT SERPL-MCNC: 1.51 MG/DL — HIGH (ref 0.5–1.3)
EGFR: 32 ML/MIN/1.73M2 — LOW
GLUCOSE SERPL-MCNC: 121 MG/DL — HIGH (ref 70–99)
HCT VFR BLD CALC: 28.5 % — LOW (ref 34.5–45)
HGB BLD-MCNC: 9.2 G/DL — LOW (ref 11.5–15.5)
MCHC RBC-ENTMCNC: 29.7 PG — SIGNIFICANT CHANGE UP (ref 27–34)
MCHC RBC-ENTMCNC: 32.3 GM/DL — SIGNIFICANT CHANGE UP (ref 32–36)
MCV RBC AUTO: 91.9 FL — SIGNIFICANT CHANGE UP (ref 80–100)
PLATELET # BLD AUTO: 97 K/UL — LOW (ref 150–400)
POTASSIUM SERPL-MCNC: 4.1 MMOL/L — SIGNIFICANT CHANGE UP (ref 3.5–5.3)
POTASSIUM SERPL-SCNC: 4.1 MMOL/L — SIGNIFICANT CHANGE UP (ref 3.5–5.3)
RBC # BLD: 3.1 M/UL — LOW (ref 3.8–5.2)
RBC # FLD: 15.5 % — HIGH (ref 10.3–14.5)
SODIUM SERPL-SCNC: 134 MMOL/L — LOW (ref 135–145)
WBC # BLD: 3.88 K/UL — SIGNIFICANT CHANGE UP (ref 3.8–10.5)
WBC # FLD AUTO: 3.88 K/UL — SIGNIFICANT CHANGE UP (ref 3.8–10.5)

## 2023-03-07 PROCEDURE — 99232 SBSQ HOSP IP/OBS MODERATE 35: CPT

## 2023-03-07 RX ORDER — AMLODIPINE BESYLATE 2.5 MG/1
5 TABLET ORAL DAILY
Refills: 0 | Status: DISCONTINUED | OUTPATIENT
Start: 2023-03-07 | End: 2023-03-11

## 2023-03-07 RX ADMIN — GABAPENTIN 300 MILLIGRAM(S): 400 CAPSULE ORAL at 20:12

## 2023-03-07 RX ADMIN — Medication 25 MILLIGRAM(S): at 13:37

## 2023-03-07 RX ADMIN — Medication 50 MILLIGRAM(S): at 20:13

## 2023-03-07 RX ADMIN — Medication 25 MILLIGRAM(S): at 20:13

## 2023-03-07 RX ADMIN — Medication 50 MILLIGRAM(S): at 10:57

## 2023-03-07 RX ADMIN — Medication 1 MILLIGRAM(S): at 10:56

## 2023-03-07 RX ADMIN — PIPERACILLIN AND TAZOBACTAM 25 GRAM(S): 4; .5 INJECTION, POWDER, LYOPHILIZED, FOR SOLUTION INTRAVENOUS at 12:02

## 2023-03-07 RX ADMIN — POLYETHYLENE GLYCOL 3350 17 GRAM(S): 17 POWDER, FOR SOLUTION ORAL at 10:57

## 2023-03-07 RX ADMIN — HEPARIN SODIUM 5000 UNIT(S): 5000 INJECTION INTRAVENOUS; SUBCUTANEOUS at 10:54

## 2023-03-07 RX ADMIN — Medication 100 MILLIGRAM(S): at 23:00

## 2023-03-07 RX ADMIN — ATORVASTATIN CALCIUM 20 MILLIGRAM(S): 80 TABLET, FILM COATED ORAL at 20:14

## 2023-03-07 RX ADMIN — GABAPENTIN 300 MILLIGRAM(S): 400 CAPSULE ORAL at 10:54

## 2023-03-07 RX ADMIN — PANTOPRAZOLE SODIUM 40 MILLIGRAM(S): 20 TABLET, DELAYED RELEASE ORAL at 06:46

## 2023-03-07 RX ADMIN — PIPERACILLIN AND TAZOBACTAM 25 GRAM(S): 4; .5 INJECTION, POWDER, LYOPHILIZED, FOR SOLUTION INTRAVENOUS at 04:51

## 2023-03-07 RX ADMIN — SENNA PLUS 2 TABLET(S): 8.6 TABLET ORAL at 20:14

## 2023-03-07 RX ADMIN — Medication 25 MILLIGRAM(S): at 10:54

## 2023-03-07 RX ADMIN — AMLODIPINE BESYLATE 5 MILLIGRAM(S): 2.5 TABLET ORAL at 16:16

## 2023-03-07 RX ADMIN — Medication 50 MILLIGRAM(S): at 10:55

## 2023-03-07 RX ADMIN — PIPERACILLIN AND TAZOBACTAM 25 GRAM(S): 4; .5 INJECTION, POWDER, LYOPHILIZED, FOR SOLUTION INTRAVENOUS at 20:11

## 2023-03-07 RX ADMIN — Medication 100 MILLIGRAM(S): at 10:55

## 2023-03-07 RX ADMIN — Medication 25 MILLIGRAM(S): at 06:46

## 2023-03-07 RX ADMIN — HEPARIN SODIUM 5000 UNIT(S): 5000 INJECTION INTRAVENOUS; SUBCUTANEOUS at 20:13

## 2023-03-07 NOTE — PROGRESS NOTE ADULT - SUBJECTIVE AND OBJECTIVE BOX
Patient is a 89y Female who had no new events reported to me.     MEDICATIONS  (STANDING):  allopurinol 50 milliGRAM(s) Oral daily  atorvastatin 20 milliGRAM(s) Oral at bedtime  bethanechol 50 milliGRAM(s) Oral two times a day  folic acid 1 milliGRAM(s) Oral daily  gabapentin 300 milliGRAM(s) Oral two times a day  heparin   Injectable 5000 Unit(s) SubCutaneous every 12 hours  hydrALAZINE 25 milliGRAM(s) Oral three times a day  metoprolol tartrate 25 milliGRAM(s) Oral two times a day  pantoprazole    Tablet 40 milliGRAM(s) Oral before breakfast  piperacillin/tazobactam IVPB.. 3.375 Gram(s) IV Intermittent every 8 hours  polyethylene glycol 3350 17 Gram(s) Oral daily  senna 2 Tablet(s) Oral at bedtime    MEDICATIONS  (PRN):  acetaminophen     Tablet .. 650 milliGRAM(s) Oral every 6 hours PRN Temp greater or equal to 38C (100.4F), Mild Pain (1 - 3)  benzonatate 100 milliGRAM(s) Oral every 8 hours PRN Cough        T(C): , Max: 37.2 (23 @ 15:05)  T(F): , Max: 99 (23 @ 15:05)  HR: 62 (23 @ 13:39)  BP: 160/60 (23 @ 13:39)  BP(mean): 68 (23 @ 15:05)  RR: 18 (23 @ 13:39)  SpO2: 96% (23 @ 13:39)  Wt(kg): --     @ 07:01  -   @ 07:00  --------------------------------------------------------  IN: 439 mL / OUT: 1775 mL / NET: -1336 mL          PHYSICAL EXAM:  Limited exam on toilet  Constitutional: frail, ill appearing  HEENT: MM  Cardiovascular: S1 and S2   Extremities: No peripheral edema  cathet      LABS:                        9.2    3.88  )-----------( 97       ( 07 Mar 2023 08:08 )             28.5     07 Mar 2023 08:08    134    |  108    |  20     ----------------------------<  121    4.1     |  22     |  1.51   06 Mar 2023 05:51    130    |  105    |  20     ----------------------------<  108    4.5     |  21     |  1.58   05 Mar 2023 10:00    132    |  105    |  19     ----------------------------<  135    4.7     |  23     |  1.51   04 Mar 2023 13:51    128    |  101    |  21     ----------------------------<  112    5.0     |  23     |  1.32   04 Mar 2023 05:59    128    |  101    |  21     ----------------------------<  113    4.6     |  22     |  1.28     Ca    8.6        07 Mar 2023 08:08  Ca    8.4        06 Mar 2023 05:51  Ca    8.8        05 Mar 2023 10:00  Ca    9.3        04 Mar 2023 13:51  Ca    9.2        04 Mar 2023 05:59            Urine Studies:  Urinalysis Basic - ( 05 Mar 2023 16:30 )    Color: Yellow / Appearance: Clear / S.005 / pH: x  Gluc: x / Ketone: Negative  / Bili: Negative / Urobili: Negative   Blood: x / Protein: 30 mg/dL / Nitrite: Negative   Leuk Esterase: Negative / RBC: 6-10 /HPF / WBC 6-10 /HPF   Sq Epi: x / Non Sq Epi: Occasional / Bacteria: Few            RADIOLOGY & ADDITIONAL STUDIES:

## 2023-03-07 NOTE — PROGRESS NOTE ADULT - ASSESSMENT
89 yo female with PMHx arthritis, HTN, gout, hyperlipidemia, hx of pelvic fracture, hx of chronic UTI, urinary retention presents with bilateral hip and groin pain X 2 days with renal evaluation of hyponatremia.       Hyponatremia -    SiADH sec to lung process/covid vs hypovolemia related w ongoing fevers , stabilized   monitor off IVF for now in setting of COVID    monitor labs daily    encourage osmolyte intake     Desire    w urinary retention - now has Perales   Cr stable   Avoid nsaids/contrast  IVF if Cr rises     Fevers/COVID   IV abx as per ID    d/w staff RN

## 2023-03-07 NOTE — PROGRESS NOTE ADULT - ASSESSMENT
89 yo female with PMHx arthritis,  AS,  HTN, gout, hyperlipidemia, hx of pelvic fracture,  UTIs ,  urinary retention , s/p Perales p admitted  for:     #Hyponatremia 2/2 hypovolemia  Lasix  use and GI losses  improved Na 130'-134s      #Urinary retention- straight cath  pt can have OP f/up, no procedure can restore voiding, c/e present meds, add Flomax    #COVID/ HCAP:    Patient now positive 3/5 for COVID.    Low grade temps/ cough.    CT chest with bilateral lower lobe nodular opacities-- may be infection related.    No hypoxia.    Cont Zosyn day #2.      #Chronic Anemia:    Hgb normally~ 10 per records.    Was on procrit but many years ago.    Hgb down to 7.2.    03/06 Transfused 1 unit pRBC.    Repeat labs in am.    No obvious bleeding.      #H/o HFpEF,    S/p IVF, CT chest with read for fluid overload   nephrology f/up re: diuresis- not overloaded per nephrology; no diuretic for now    #Mild thrombocytopenia  repeat cbc in am    #DVT Proph:  on Heparin Sc/        89 yo female with PMHx arthritis,  AS,  HTN, gout, hyperlipidemia, hx of pelvic fracture,  UTIs ,  urinary retention , s/p Perales p admitted  for:     #Hyponatremia 2/2 hypovolemia  Lasix  use and GI losses  improved Na 130'-134s      #Urinary retention- straight cath  pt can have OP f/up, no procedure can restore voiding, c/e present meds, add Flomax    #COVID/ Aspiration? Pneumonia   Patient now positive 3/5 for COVID.    Low grade temps/ cough.    CT chest with bilateral lower lobe nodular opacities-- may be infection related.    No hypoxia.    Cont Zosyn day #2.      #Chronic Anemia:    Hgb normally~ 10 per records.    Was on procrit but many years ago.    Hgb down to 7.2.    03/06 Transfused 1 unit pRBC.    Repeat labs in am.    No obvious bleeding.      #H/o HFpEF,    S/p IVF, CT chest with read for fluid overload   nephrology f/up re: diuresis- not overloaded per nephrology; no diuretic for now    #Mild thrombocytopenia  repeat cbc in am    #DVT Proph:  on Heparin Sc/   I spoke with Patient's Son Evan [phone]. updated about current condition and treatment plan.

## 2023-03-07 NOTE — PROGRESS NOTE ADULT - SUBJECTIVE AND OBJECTIVE BOX
CHIEF COMPLAINT:  Patient is a 89y old  Female who presents with a chief complaint of hyponatremia, lethargy (2023 10:06)      HPI: 23:  Pt. is a 89 yo F with arthritis, hx of HTN, gout, hyperlipidemia, hx of pelvic fracture, hx of chronic UTI with some urinary retention after post void trials in Dr. Carvalho office presents with bilateral hip and groin pain X 2 days.  Patient's daughter states she had recent urology evaluation and straight cath urines were recommended but they were difficult after only one day and she thinks patient maybe strained her hips trying to hold positioning during straight cath.  Patient then had indwelling pérez placed 3 days ago which has been draining well.  Patient is currently on bactrim for UTI.  Daughter states patient was also fatigued at home, not speaking much, and had malaise.  Patient refused to walk yesterday due to bilateral hip and pelvis pain. Denies falls.  Daughter sent patient via ambulance when she vomited once and could not take her PM dose of bactrim.  Patient denies abdominal pain, nausea, or chest pain.  O2 on arrival was 91% and nasal canula placed for comfort but patient denies SOB.    Initially pt was alert and lethargic. Daughter states mother was recently treated at home with laxatives for constipation and has been having multiple bowel movements.  In the ER she was found to have severe hyponatremia with N+=116, treated with hypertonic Saline with improvement and N+ up to 124 but last labs with N+=122.  Awaiting AM labs today.  She is more alert and less lethargic and less confused.  She had no cardiac symptoms at this time denying anginal chest pains or increased SOB.  She dose have HFpEF and moderate-severe AS by echo.  Echo in my office on 3/3/2022 showed normal LV systolic function with LVEF=65-70% with moderate LVH and diastolic LV dysfunction  Moderate-severe AS with MARVA=1.0-1.1cm2 with mild AI, mild MAC with mild MR and mild TR.      3/1/23:  More alert and less lethargic this AM.  Na+=124 this AM.  No clinical CHF or anginal chest pains.  Awaiting echo.  No new cardiac complaints.    3/2/23:  Resting comfortably without CHF or other cardiac symptoms.  Na+=129.  ECHO with severe AS (MARVA=0.8-0.9cm2) but no clinical symptoms yet.    3/3/23:  Resting and feeling better.  No clinical CHF, angina or syncope.  No new cardiac symptoms.  Tolerating IV NS.  Awaiting AM labs.    3/4/23:  Feeling good without increased SOB or chest pains.  Eager to go home.  Na+ still low.  Etiology for her hyponatremia still a question.  According to her daughter she does not restrict her Na intake much and does not drink a lot of water.    No new cardiac symptoms.    3/5/23:  Spiked a temp last night (100.9 F.) and had urinary retention requiring straight cath x 2.  Resting this AM.  Back on IV NS for Na+=128 again yesterday.  Awaiting AM labs.  No clinical CHF or other new cardiac symptoms.      3/6/23:  Resting this AM.  NA+=132 yesterday and 130 today.  Hgb down to 7.3.  Now COVID-19 (+) with CT of Chest suggest spread of infection???.  No other cardiac symptoms.      3/7/23:  Tolerating Zosyn antibiotics.  Temp down.  SOB about the same.  No new cardiac symptoms.  Awaiting AM labs today.        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  HFpEF  HLD (hyperlipidemia)  Gout  Osteoarthritis  History of tonsillectomy-in childhood      FAMILY HISTORY:   FAMILY HISTORY:  non-contributory      ALLERGIES:  Allergies  Ceftin (Hives; Rash)      REVIEW OF SYSTEMS:  10 point ROS was obtained  Pertinent positives and negatives are as above  All other review of systems is negative unless indicated above      Vital Signs Last 24 Hrs  T(C): 36.8 (06 Mar 2023 22:28), Max: 37.8 (06 Mar 2023 08:29)  T(F): 98.2 (06 Mar 2023 22:28), Max: 100 (06 Mar 2023 08:29)  HR: 67 (06 Mar 2023 22:28) (65 - 78)  BP: 152/76 (06 Mar 2023 22:28) (121/68 - 162/46)  BP(mean): 68 (06 Mar 2023 15:05) (68 - 108)  RR: 18 (06 Mar 2023 22:28) (18 - 18)  SpO2: 95% (06 Mar 2023 22:28) (92% - 96%): room air      I&O's Summary    2023 07:01  -  2023 07:00  --------------------------------------------------------  IN: 250 mL / OUT: 2000 mL / NET: -1750 mL        PHYSICAL EXAM:   Constitutional: NAD, awake and alert, well-developed  HEENT: PERR, EOMI, Normal Hearing, MMM  Neck: Soft and supple, No LAD, No JVD  Respiratory: Breath sounds are clear bilaterally, No wheezing, rales or rhonchi  Cardiovascular: S1 and S2, regular rate and rhythm, 2-3/6 LIANA at base and soft systolic murmur LLSB as before, +S4, No S3 gallops or rubs  Gastrointestinal: Bowel Sounds present, soft, nontender, nondistended, no guarding, no rebound  Extremities: No peripheral edema  Vascular: 2+ peripheral pulses  Neurological: no focal deficits      MEDICATIONS  (STANDING):  allopurinol 50 milliGRAM(s) Oral daily  atorvastatin 20 milliGRAM(s) Oral at bedtime  bethanechol 50 milliGRAM(s) Oral two times a day  folic acid 1 milliGRAM(s) Oral daily  gabapentin 300 milliGRAM(s) Oral two times a day  heparin   Injectable 5000 Unit(s) SubCutaneous every 12 hours  hydrALAZINE 25 milliGRAM(s) Oral three times a day  metoprolol tartrate 25 milliGRAM(s) Oral two times a day  pantoprazole    Tablet 40 milliGRAM(s) Oral before breakfast  piperacillin/tazobactam IVPB.. 3.375 Gram(s) IV Intermittent every 8 hours  polyethylene glycol 3350 17 Gram(s) Oral daily  senna 2 Tablet(s) Oral at bedtime    MEDICATIONS  (PRN):  acetaminophen     Tablet .. 650 milliGRAM(s) Oral every 6 hours PRN Temp greater or equal to 38C (100.4F), Mild Pain (1 - 3)  benzonatate 100 milliGRAM(s) Oral every 8 hours PRN Cough      LABS: All Labs Reviewed:  COVID-19 PCR . (03.05.23 @ 08:38): Detected                        7.3    4.79  )-----------( 100      ( 06 Mar 2023 05:51 )             22.5                           8.8    5.47  )-----------( 129      ( 04 Mar 2023 05:59 )             26.4                           7.9    5.29  )-----------( 107      ( 02 Mar 2023 05:52 )             23.6                           9.6    8.73  )-----------( 94       ( 2023 06:44 )             28.1                           8.9    8.99  )-----------( 92       ( 2023 10:57 )             25.6     Sodium, Serum: 130 mmol/L (.23 @ 05:51)   Sodium, Serum: 132 mmol/L (.23 @ 10:00)   Sodium, Serum: 128 mmol/L (.23 @ 05:59)   Sodium, Serum: 129 mmol/L (.23 @ 05:52)   Sodium, Serum: 124 mmol/L (.23 @ 00:32)   Sodium, Serum: 122 mmol/L (.23 @ 00:17)   Sodium, Serum: 122 mmol/L (.23 @ 20:35)   Sodium, Serum: 124 mmol/L (.23 @ 16:36)     Sodium, Serum: 116 mmol/L  (.23 @ 00:50)       -06    130<L>  |  105  |  20  ----------------------------<  108<H>  4.5   |  21<L>  |  1.58<H>    Ca    8.4<L>      06 Mar 2023 05:51      03-04    128<L>  |  101  |  21  ----------------------------<  113<H>  4.6   |  22  |  1.28    Ca    9.2      04 Mar 2023 05:59  Phos  2.5     03-03      03-02    129<L>  |  100  |  19  ----------------------------<  116<H>  4.4   |  22  |  1.56<H>    Ca    8.6      02 Mar 2023 05:52  Phos  2.9     -  Mg     2.2         TPro  6.3  /  Alb  2.8<L>  /  TBili  0.4  /  DBili  x   /  AST  35  /  ALT  22  /  AlkPhos  60      124<L>  |  96  |  21  ----------------------------<  108<H>  4.5   |  20<L>  |  1.90<H>    Ca    8.2<L>      01 Mar 2023 00:32  Phos  3.3       Mg     2.2         TPro  6.3  /  Alb  2.8<L>  /  TBili  0.4  /  DBili  x   /  AST  35  /  ALT  22  /  AlkPhos  60      122<L>  |  93<L>  |  19  ----------------------------<  129<H>  4.3   |  22  |  1.83<H>    Ca    9.2      2023 00:17  Phos  2.9       Mg     2.2         TPro  6.5  /  Alb  3.1<L>  /  TBili  0.5  /  DBili  x   /  AST  40<H>  /  ALT  21  /  AlkPhos  59        CARDIAC MARKERS ( 2023 00:50 )  x     / x     / 403 U/L / x     / x          BLOOD CULTURES: Organism --  Gram Stain Urine -- Gram Stain --  Specimen Source Catheterized None  Culture-Urine --  Culture Results: No growth (23 @ 04:29)       LIPID PROFILE     RADIOLOGY:    CT of Chest: 3/5/23:  INTERPRETATION:  Clinical indication: Cough, hyponatremia.  Axial CT images of the chest are obtained without intravenous administration of contrast.  Comparison is made with the chest CT of 2021.  No enlarged axillary lymph nodes. Vertically oriented enlarged azygoesophageal recess lymph node adjacent to the lower portion of the esophagus, part of which measures about 1.3 x 2 cm as measured on the axial images is without significant interval change. Multiple other smaller mediastinal lymph nodes are also without significant interval change.  No pericardial effusion. Mitral annular calcifications. Vascular calcifications with involvement of the aorta and the coronary arteries.   Aortic valve calcifications.  Trace right pleural effusion.  Evaluation of the upper abdomen demonstrate cholelithiasis. Small hiatal hernia. Subcentimeter left renal hypodensity which is too small to characterize. Mild cortical atrophy of the left kidney as on the prior study.  Evaluation of the lungs demonstrate bilateral interlobular septal thickening. Bilateral lower lobe clustered nodular opacities with the largest nodular component within the superior segment of the right lower lobe new since the prior study measuring up to about 1.5 cm.  Left calcified pleural plaque is unchanged.  No central endobronchial lesions.  Degenerative changes of the spine.  IMPRESSION: Bilateral lower lobe clustered nodular opacities, right greater than left new since 2021 suggestive of endobronchial spread of infection. Findings can be seen in the setting of aspiration given the distribution. 3 month follow-up noncontrast chest CT is recommended to ensure resolution.  Bilateral interlobular septal thickening can be seen in the setting of pulmonary edema.  Trace right pleural effusion.  Enlarged azygoesophageal recess lymph node is without significant interval change since 2021 can be monitored on the follow-up chest CT.  Aortic valve calcifications can be seen in the setting of aortic valve stenosis.    CXR: 3/5/23:  The cardiomediastinal silhouette is normal and the dalila are not enlarged. Aortic calcification. The trachea is midline. Interval improved aeration. No focal lung consolidation or sizable pleural effusion. No significant osseous abnormality.  IMPRESSION:  No focal lung consolidation or sizable pleural effusion.    X-Ray of Abd: 23:  FINDINGS:  Mild air distended colon and mid abdominal small bowel.  No free intra-abdominal air.  No obvious intra-abdominal masses.  LEFT upper quadrant and iliac vasculature all calcifications. The osseous structures are intact.  IMPRESSION:  Mild air distended transverse colon and mid abdominal small bowel.. No radiographic evidence of bowel obstruction.    X-Ray of Hips and CXR:  23:  INTERPRETATION:  Lateral hips and pelvis and chest.  Patient has hip pain.  Patient has vomiting.  Bilateral hips with pelvis. 5 views.  Arterial calcifications are noted.  Degenerative loss of disc height at multiple lower lumbar levels with slight right lower lumbar curve noted.  There is mild symmetric hip degeneration. No fracture.  AP chest on 2023 at 1:45 AM.  Heart magnified by technique.  There are bilateral perihilar infiltrates right greater than left which are new since  this year. Configuration suggests infection.  IMPRESSION: No acute fracture. Extensive arterial calcifications.  Bilateral perihilar infiltrates new since prior suggesting infection.      EK23:  Normal sinus rhythm  Normal ECG      TELEMETRY:  NSR      ECHO:  3/1/23:  M-Mode Measurements (cm)   LVEDd: 3.97 cm            LVESd: 2.55 cm   IVSEd: 1.1 cm   LVPWd: 1.1 cm             AO Root Dimension: 2.8 cm                             LA: 3.5 cm                LVOT: 2 cm  Doppler Measurements:   AV Velocity:434 cm/s                  MV Peak E-Wave: 98.7 cm/s   AV Peak Gradient: 75.34 mmHg          MV Peak A-Wave: 131 cm/s   AV Mean Gradient: 40 mmHg             MV E/A Ratio: 0.75 %   AV Area (Continuity):0.85 cm^2        MV Peak Gradient: 3.9 mmHg   TR Velocity:190 cm/s   TR Gradient:14.44 mmHg   Estimated RAP:5 mmHg   RVSP:27 mmHg    Findings  Mitral Valve   The mitral valve leaflets appear thickened.   EA reversal of the mitral inflow consistent with reduced compliance of the left ventricle.   Mild mitral annular calcification is present.   Mild mitral regurgitation is present.    Aortic Valve   Peak and mean transaortic gradients are 75 and 40mmHg respectively; this finding is consistent with severe aortic stenosis.   Mild (1+) aortic regurgitation is present.    Tricuspid Valve   Trace tricuspid valve regurgitation is present.    Pulmonic Valve   Mild pulmonic valvular regurgitation (1+) is present.    Left Atrium   Normal appearing left atrium.    Left Ventricle   Mild concentric left ventricular hypertrophy is present.   The left ventricle is normal in size.   Estimated left ventricular ejection fraction is 65-70 %.    Right Atrium   Normal appearing right atrium.    Right Ventricle   Normal appearing right ventricle structure and function.    Pericardial Effusion   No evidence of pericardial effusion.    Pleural Effusion   No evidence of pleural effusion.    Miscellaneous   The IVC appears normal.    Summary   The mitral valve leaflets appear thickened.   EA reversal of the mitral inflow consistent with reduced compliance of the left ventricle.   Mild mitral annular calcification is present.   Mild mitral regurgitation is present.   Peak and mean transaortic gradients are 75 and 40mmHg respectively; this finding is consistent with severe aortic stenosis.   Mild (1+) aortic regurgitation is present.   Trace tricuspid valve regurgitation is present.   Mild pulmonic valvular regurgitation (1+) is present.   Normal appearing left atrium.   Mild concentric left ventricular hypertrophy is present.   The left ventricle is normal in size.   Estimated left ventricular ejection fraction is 65-70 %.   Normal appearing right atrium.   Normal appearing right ventricle structure and function.   The IVC appears normal.   No evidence of pericardial effusion.   No evidence of pleural effusion.    Signature   ----------------------------------------------------------------   Electronically signed by Sakina Cheatham MD, Director of   Cardiac Cath Lab(Interpreting physician) on 2023 06:42   PM   ----------------------------------------------------------------

## 2023-03-07 NOTE — PROGRESS NOTE ADULT - ASSESSMENT
2/28/23:  Pt with above history with h/o HFpEF on Lasix developing severe hyponatremia better after current treatment but still hyponatremic.  Will have to avoid diuretics but continue to gently replete NA watching for diastolic CHF given her stiff heart and moderate-severe AS.  Will repeat an echo.  Continue as outlined by medicine etal.  Will follow as treatment progresses.    3/1/23:  More alert and less lethargic this AM.  Na+=124 this AM.  No clinical CHF or anginal chest pains.  Awaiting echo.  No new cardiac complaints.  Continue as outlined by medicine etal.  Will follow as treatment progresses.    3/2/23:  Resting comfortably without CHF or other cardiac symptoms.  Na+=129.  ECHO with severe AS (MARVA=0.8-0.9cm2) but no clinical symptoms yet.  Continue as outlined by medicine etal.  Will follow as treatment progresses.      3/3/23:  Resting and feeling better.  No clinical CHF, angina or syncope.  No new cardiac symptoms.  Tolerating IV NS.  Awaiting AM labs.  Continue as outlined by medicine etal.  Will follow.    3/4/23:  Feeling good without increased SOB or chest pains.  Eager to go home.  Na+ still low.  Etiology for her hyponatremia still a question.  According to her daughter she does not restrict her Na intake much and does not drink a lot of water.    No new cardiac symptoms.  Continue as outlined by medicine and renal etal.  Will follow.    3/5/23:  Spiked a temp last night (100.9 F.) and had urinary retention requiring straight cath x 2.  Resting this AM.  Back on IV NS for Na+=128 again yesterday.  Awaiting AM labs.  No clinical CHF or other new cardiac symptoms.  Continue as outlined by medicine and renal etal.  Will follow.    3/6/23:  Resting this AM.  NA+=132 yesterday and 130 today.  Hgb down to 7.3.  Now COVID-19 (+) with CT of Chest suggest spread of infection???.  No other cardiac symptoms.  Continue as outlined by medicine and renal etal.  Will continue to follow intermittently prn as treatment progresses.    3/7/23:  Tolerating Zosyn antibiotics.  Temp down.  SOB about the same.  No new cardiac symptoms.  Awaiting AM labs today.  Continue as outlined by medicine, ID and renal etal.  Will continue to follow intermittently prn as treatment progresses.

## 2023-03-07 NOTE — PROGRESS NOTE ADULT - SUBJECTIVE AND OBJECTIVE BOX
CC: severe hyponatremia (01 Mar 2023 07:23)    HPI:  89 yo F with arthritis, hx of HTN, gout, hyperlipidemia, hx of pelvic fracture,  UTIs  with urinary retention after post void trials in Dr. Bonilla's office presented with bilateral hip and groin pain X 2 days.  Patient's daughter stated  Pt  had recent urology evaluation and to  straight cath urine were recommended but they were difficult after only one day and she thinks patient maybe strained her hips trying to hold positioning during straight cath.  Patient then had indwelling pérez placed 3 days PTA  which has been draining well.  Patient is currently on bactrim for UTI.  Daughter stated  patient was also fatigued at home, not speaking much, and had malaise.  Patient refused to walk  day PTA due to bilateral hip and pelvis pain. No  falls.  +  vomiting  once and could not take her PM dose of bactrim.  Patient denies abdominal pain, nausea, or chest pain.  O2 on arrival was 91% and nasal canula placed for comfort but patient denies SOB.    3/5:  Na improved  low grade temp and dry cough, continues with urinary retention, borderline BP this morning  3/6:  Pt seen.  No new complaints.  Low grade temps.  COVID + now.  Denies SOB.  + cough    ROS:   All 10 systems reviewed and found to be negative with the exception of what has been described above.    Vital Signs Last 24 Hrs  Vital Signs Last 24 Hrs  T(C): 36.2 (07 Mar 2023 13:39), Max: 37.1 (06 Mar 2023 16:25)  T(F): 97.1 (07 Mar 2023 13:39), Max: 98.8 (06 Mar 2023 16:25)  HR: 62 (07 Mar 2023 13:39) (62 - 78)  BP: 156/65 (07 Mar 2023 14:36) (136/100 - 175/53)  BP(mean): --  RR: 18 (07 Mar 2023 13:39) (18 - 18)  SpO2: 96% (07 Mar 2023 13:39) (93% - 96%)    Parameters below as of 07 Mar 2023 13:39  Patient On (Oxygen Delivery Method): room air    PHYSICAL EXAM:  General: Well developed; in no acute distress  Eyes: EOMI; conjunctiva and sclera clear  Head: Normocephalic; atraumatic  ENMT: No nasal discharge; airway clear  Neck: Supple; non tender; no masses  Respiratory: Decreased BS at bases,  No wheezes, rales or rhonchi  Cardiovascular: Regular rate and rhythm. S1 and S2 Normal; + LIANA  Gastrointestinal: Soft non-tender non-distended; Normal bowel sounds  Genitourinary: No  suprapubic  tenderness  Extremities: No  edema  Vascular: Peripheral pulses palpable 2+ bilaterally  Neurological: Alert and oriented x2, non focal   Musculoskeletal: Normal muscle tone, without deformities  Psychiatric: Cooperative and appropriate                          9.2    3.88  )-----------( 97       ( 07 Mar 2023 08:08 )             28.5     03-07    134<L>  |  108  |  20  ----------------------------<  121<H>  4.1   |  22  |  1.51<H>    Ca    8.6      07 Mar 2023 08:08        Urinalysis Basic - ( 05 Mar 2023 16:30 )    Color: Yellow / Appearance: Clear / S.005 / pH: x  Gluc: x / Ketone: Negative  / Bili: Negative / Urobili: Negative   Blood: x / Protein: 30 mg/dL / Nitrite: Negative   Leuk Esterase: Negative / RBC: 6-10 /HPF / WBC 6-10 /HPF   Sq Epi: x / Non Sq Epi: Occasional / Bacteria: Few        Culture - Urine (collected 05 Mar 2023 16:30)  Source: Clean Catch Clean Catch (Midstream)  Final Report (06 Mar 2023 19:07):    No growth      CAPILLARY BLOOD GLUCOSE          MEDICATIONS  (STANDING):  allopurinol 50 milliGRAM(s) Oral daily  atorvastatin 20 milliGRAM(s) Oral at bedtime  bethanechol 50 milliGRAM(s) Oral two times a day  folic acid 1 milliGRAM(s) Oral daily  gabapentin 300 milliGRAM(s) Oral two times a day  heparin   Injectable 5000 Unit(s) SubCutaneous every 12 hours  hydrALAZINE 25 milliGRAM(s) Oral three times a day  metoprolol tartrate 25 milliGRAM(s) Oral two times a day  pantoprazole    Tablet 40 milliGRAM(s) Oral before breakfast  piperacillin/tazobactam IVPB.. 3.375 Gram(s) IV Intermittent every 8 hours  polyethylene glycol 3350 17 Gram(s) Oral daily  senna 2 Tablet(s) Oral at bedtime    MEDICATIONS  (PRN):  acetaminophen     Tablet .. 650 milliGRAM(s) Oral every 6 hours PRN Temp greater or equal to 38C (100.4F), Mild Pain (1 - 3)  benzonatate 100 milliGRAM(s) Oral every 8 hours PRN Cough      I&O's Summary    06 Mar 2023 07:01  -  07 Mar 2023 07:00  --------------------------------------------------------  IN: 439 mL / OUT: 1775 mL / NET: -1336 mL           CT Chest No Cont (23 @ 10:16) >  IMPRESSION: Bilateral lower lobe clustered nodular opacities, right   greater than left new since 2021 suggestive of endobronchial   spread of infection. Findings can be seen in the setting of aspiration   given the distribution. 3 month follow-up noncontrast chest CT is   recommended to ensure resolution.    Bilateral interlobular septal thickening can be seen in the setting of   pulmonary edema.    Trace right pleural effusion.    Enlarged azygoesophageal recess lymph node is without significant   interval change since 2021 can be monitored on the follow-up   chest CT.    Aortic valve calcifications can be seen in the setting of aortic valve   stenosis.   CC: severe hyponatremia (01 Mar 2023 07:23)    HPI:  89 yo F with arthritis, hx of HTN, gout, hyperlipidemia, hx of pelvic fracture,  UTIs  with urinary retention after post void trials in Dr. Bonilla's office presented with bilateral hip and groin pain X 2 days.  Patient's daughter stated  Pt  had recent urology evaluation and to  straight cath urine were recommended but they were difficult after only one day and she thinks patient maybe strained her hips trying to hold positioning during straight cath.  Patient then had indwelling pérez placed 3 days PTA  which has been draining well.  Patient is currently on bactrim for UTI.  Daughter stated  patient was also fatigued at home, not speaking much, and had malaise.  Patient refused to walk  day PTA due to bilateral hip and pelvis pain. No  falls.  +  vomiting  once and could not take her PM dose of bactrim.  Patient denies abdominal pain, nausea, or chest pain.  O2 on arrival was 91% and nasal canula placed for comfort but patient denies SOB.    3/5:  Na improved  low grade temp and dry cough, continues with urinary retention, borderline BP this morning  3/6:  Pt seen.  No new complaints.  Low grade temps.  COVID + now.  Denies SOB.  + cough   Patient sitting i a chair Sporadic cough , tolerating Diet  on room air     ROS:   All 10 systems reviewed and found to be negative with the exception of what has been described above.    Vital Signs Last 24 Hrs  Vital Signs Last 24 Hrs  T(C): 36.2 (07 Mar 2023 13:39), Max: 37.1 (06 Mar 2023 16:25)  T(F): 97.1 (07 Mar 2023 13:39), Max: 98.8 (06 Mar 2023 16:25)  HR: 62 (07 Mar 2023 13:39) (62 - 78)  BP: 156/65 (07 Mar 2023 14:36) (136/100 - 175/53)  BP(mean): --  RR: 18 (07 Mar 2023 13:39) (18 - 18)  SpO2: 96% (07 Mar 2023 13:39) (93% - 96%)    Parameters below as of 07 Mar 2023 13:39  Patient On (Oxygen Delivery Method): room air    PHYSICAL EXAM:  General: Well developed; in no acute distress  Eyes: EOMI; conjunctiva and sclera clear  Head: Normocephalic; atraumatic  ENMT: No nasal discharge; airway clear  Neck: Supple; non tender; no masses  Respiratory: Decreased BS at bases,  No wheezes, rales or rhonchi  Cardiovascular: Regular rate and rhythm. S1 and S2 Normal; + LIANA  Gastrointestinal: Soft non-tender non-distended; Normal bowel sounds  Genitourinary: No  suprapubic  tenderness  Extremities: No  edema  Vascular: Peripheral pulses palpable 2+ bilaterally  Neurological: Alert and oriented x2, non focal   Musculoskeletal: Normal muscle tone, without deformities  Psychiatric: Cooperative and appropriate                          9.2    3.88  )-----------( 97       ( 07 Mar 2023 08:08 )             28.5     03-07    134<L>  |  108  |  20  ----------------------------<  121<H>  4.1   |  22  |  1.51<H>    Ca    8.6      07 Mar 2023 08:08        Urinalysis Basic - ( 05 Mar 2023 16:30 )    Color: Yellow / Appearance: Clear / S.005 / pH: x  Gluc: x / Ketone: Negative  / Bili: Negative / Urobili: Negative   Blood: x / Protein: 30 mg/dL / Nitrite: Negative   Leuk Esterase: Negative / RBC: 6-10 /HPF / WBC 6-10 /HPF   Sq Epi: x / Non Sq Epi: Occasional / Bacteria: Few        Culture - Urine (collected 05 Mar 2023 16:30)  Source: Clean Catch Clean Catch (Midstream)  Final Report (06 Mar 2023 19:07):    No growth      CAPILLARY BLOOD GLUCOSE          MEDICATIONS  (STANDING):  allopurinol 50 milliGRAM(s) Oral daily  atorvastatin 20 milliGRAM(s) Oral at bedtime  bethanechol 50 milliGRAM(s) Oral two times a day  folic acid 1 milliGRAM(s) Oral daily  gabapentin 300 milliGRAM(s) Oral two times a day  heparin   Injectable 5000 Unit(s) SubCutaneous every 12 hours  hydrALAZINE 25 milliGRAM(s) Oral three times a day  metoprolol tartrate 25 milliGRAM(s) Oral two times a day  pantoprazole    Tablet 40 milliGRAM(s) Oral before breakfast  piperacillin/tazobactam IVPB.. 3.375 Gram(s) IV Intermittent every 8 hours  polyethylene glycol 3350 17 Gram(s) Oral daily  senna 2 Tablet(s) Oral at bedtime    MEDICATIONS  (PRN):  acetaminophen     Tablet .. 650 milliGRAM(s) Oral every 6 hours PRN Temp greater or equal to 38C (100.4F), Mild Pain (1 - 3)  benzonatate 100 milliGRAM(s) Oral every 8 hours PRN Cough      I&O's Summary    06 Mar 2023 07:01  -  07 Mar 2023 07:00  --------------------------------------------------------  IN: 439 mL / OUT: 1775 mL / NET: -1336 mL           CT Chest No Cont (23 @ 10:16) >  IMPRESSION: Bilateral lower lobe clustered nodular opacities, right   greater than left new since 2021 suggestive of endobronchial   spread of infection. Findings can be seen in the setting of aspiration   given the distribution. 3 month follow-up noncontrast chest CT is   recommended to ensure resolution.    Bilateral interlobular septal thickening can be seen in the setting of   pulmonary edema.    Trace right pleural effusion.    Enlarged azygoesophageal recess lymph node is without significant   interval change since 2021 can be monitored on the follow-up   chest CT.    Aortic valve calcifications can be seen in the setting of aortic valve   stenosis.

## 2023-03-08 LAB
ALBUMIN SERPL ELPH-MCNC: 2.8 G/DL — LOW (ref 3.3–5)
ALP SERPL-CCNC: 72 U/L — SIGNIFICANT CHANGE UP (ref 40–120)
ALT FLD-CCNC: 43 U/L — SIGNIFICANT CHANGE UP (ref 12–78)
ANION GAP SERPL CALC-SCNC: 6 MMOL/L — SIGNIFICANT CHANGE UP (ref 5–17)
AST SERPL-CCNC: 37 U/L — SIGNIFICANT CHANGE UP (ref 15–37)
BASOPHILS # BLD AUTO: 0.03 K/UL — SIGNIFICANT CHANGE UP (ref 0–0.2)
BASOPHILS NFR BLD AUTO: 1 % — SIGNIFICANT CHANGE UP (ref 0–2)
BILIRUB SERPL-MCNC: 0.4 MG/DL — SIGNIFICANT CHANGE UP (ref 0.2–1.2)
BUN SERPL-MCNC: 19 MG/DL — SIGNIFICANT CHANGE UP (ref 7–23)
CALCIUM SERPL-MCNC: 8.6 MG/DL — SIGNIFICANT CHANGE UP (ref 8.5–10.1)
CHLORIDE SERPL-SCNC: 106 MMOL/L — SIGNIFICANT CHANGE UP (ref 96–108)
CO2 SERPL-SCNC: 22 MMOL/L — SIGNIFICANT CHANGE UP (ref 22–31)
CREAT SERPL-MCNC: 1.46 MG/DL — HIGH (ref 0.5–1.3)
CRP SERPL-MCNC: 55 MG/L — HIGH
D DIMER BLD IA.RAPID-MCNC: 428 NG/ML DDU — HIGH
DACRYOCYTES BLD QL SMEAR: SLIGHT — SIGNIFICANT CHANGE UP
EGFR: 33 ML/MIN/1.73M2 — LOW
EOSINOPHIL # BLD AUTO: 0 K/UL — SIGNIFICANT CHANGE UP (ref 0–0.5)
EOSINOPHIL NFR BLD AUTO: 0 % — SIGNIFICANT CHANGE UP (ref 0–6)
ERYTHROCYTE [SEDIMENTATION RATE] IN BLOOD: 104 MM/HR — HIGH (ref 0–20)
GLUCOSE SERPL-MCNC: 113 MG/DL — HIGH (ref 70–99)
HCT VFR BLD CALC: 28.7 % — LOW (ref 34.5–45)
HGB BLD-MCNC: 9.7 G/DL — LOW (ref 11.5–15.5)
LG PLATELETS BLD QL AUTO: SLIGHT — SIGNIFICANT CHANGE UP
LYMPHOCYTES # BLD AUTO: 1.6 K/UL — SIGNIFICANT CHANGE UP (ref 1–3.3)
LYMPHOCYTES # BLD AUTO: 46 % — HIGH (ref 13–44)
MANUAL SMEAR VERIFICATION: SIGNIFICANT CHANGE UP
MCHC RBC-ENTMCNC: 30.6 PG — SIGNIFICANT CHANGE UP (ref 27–34)
MCHC RBC-ENTMCNC: 33.8 GM/DL — SIGNIFICANT CHANGE UP (ref 32–36)
MCV RBC AUTO: 90.5 FL — SIGNIFICANT CHANGE UP (ref 80–100)
MICROCYTES BLD QL: SLIGHT — SIGNIFICANT CHANGE UP
MONOCYTES # BLD AUTO: 0.42 K/UL — SIGNIFICANT CHANGE UP (ref 0–0.9)
MONOCYTES NFR BLD AUTO: 12 % — SIGNIFICANT CHANGE UP (ref 2–14)
NEUTROPHILS # BLD AUTO: 1.18 K/UL — LOW (ref 1.8–7.4)
NEUTROPHILS NFR BLD AUTO: 34 % — LOW (ref 43–77)
NRBC # BLD: 0 /100 — SIGNIFICANT CHANGE UP (ref 0–0)
NRBC # BLD: SIGNIFICANT CHANGE UP /100 WBCS (ref 0–0)
PLAT MORPH BLD: NORMAL — SIGNIFICANT CHANGE UP
PLATELET # BLD AUTO: 96 K/UL — LOW (ref 150–400)
POTASSIUM SERPL-MCNC: 4 MMOL/L — SIGNIFICANT CHANGE UP (ref 3.5–5.3)
POTASSIUM SERPL-SCNC: 4 MMOL/L — SIGNIFICANT CHANGE UP (ref 3.5–5.3)
PROCALCITONIN SERPL-MCNC: 0.11 NG/ML — HIGH (ref 0.02–0.1)
PROT SERPL-MCNC: 6.4 GM/DL — SIGNIFICANT CHANGE UP (ref 6–8.3)
RBC # BLD: 3.17 M/UL — LOW (ref 3.8–5.2)
RBC # FLD: 15.2 % — HIGH (ref 10.3–14.5)
RBC BLD AUTO: ABNORMAL
SCHISTOCYTES BLD QL AUTO: SLIGHT — SIGNIFICANT CHANGE UP
SODIUM SERPL-SCNC: 134 MMOL/L — LOW (ref 135–145)
VARIANT LYMPHS # BLD: 7 % — HIGH (ref 0–6)
WBC # BLD: 3.48 K/UL — LOW (ref 3.8–10.5)
WBC # FLD AUTO: 3.48 K/UL — LOW (ref 3.8–10.5)

## 2023-03-08 PROCEDURE — 99232 SBSQ HOSP IP/OBS MODERATE 35: CPT

## 2023-03-08 RX ORDER — GUAIFENESIN/DEXTROMETHORPHAN 600MG-30MG
10 TABLET, EXTENDED RELEASE 12 HR ORAL EVERY 6 HOURS
Refills: 0 | Status: DISCONTINUED | OUTPATIENT
Start: 2023-03-08 | End: 2023-03-16

## 2023-03-08 RX ORDER — DOXAZOSIN MESYLATE 4 MG
2 TABLET ORAL AT BEDTIME
Refills: 0 | Status: DISCONTINUED | OUTPATIENT
Start: 2023-03-08 | End: 2023-03-16

## 2023-03-08 RX ADMIN — Medication 25 MILLIGRAM(S): at 05:59

## 2023-03-08 RX ADMIN — AMLODIPINE BESYLATE 5 MILLIGRAM(S): 2.5 TABLET ORAL at 10:04

## 2023-03-08 RX ADMIN — GABAPENTIN 300 MILLIGRAM(S): 400 CAPSULE ORAL at 21:54

## 2023-03-08 RX ADMIN — Medication 25 MILLIGRAM(S): at 21:56

## 2023-03-08 RX ADMIN — Medication 1 MILLIGRAM(S): at 10:04

## 2023-03-08 RX ADMIN — HEPARIN SODIUM 5000 UNIT(S): 5000 INJECTION INTRAVENOUS; SUBCUTANEOUS at 21:57

## 2023-03-08 RX ADMIN — PIPERACILLIN AND TAZOBACTAM 25 GRAM(S): 4; .5 INJECTION, POWDER, LYOPHILIZED, FOR SOLUTION INTRAVENOUS at 21:52

## 2023-03-08 RX ADMIN — Medication 50 MILLIGRAM(S): at 21:54

## 2023-03-08 RX ADMIN — ATORVASTATIN CALCIUM 20 MILLIGRAM(S): 80 TABLET, FILM COATED ORAL at 21:54

## 2023-03-08 RX ADMIN — Medication 10 MILLILITER(S): at 21:57

## 2023-03-08 RX ADMIN — GABAPENTIN 300 MILLIGRAM(S): 400 CAPSULE ORAL at 10:03

## 2023-03-08 RX ADMIN — Medication 25 MILLIGRAM(S): at 10:04

## 2023-03-08 RX ADMIN — PIPERACILLIN AND TAZOBACTAM 25 GRAM(S): 4; .5 INJECTION, POWDER, LYOPHILIZED, FOR SOLUTION INTRAVENOUS at 04:38

## 2023-03-08 RX ADMIN — Medication 100 MILLIGRAM(S): at 21:55

## 2023-03-08 RX ADMIN — Medication 25 MILLIGRAM(S): at 21:55

## 2023-03-08 RX ADMIN — PANTOPRAZOLE SODIUM 40 MILLIGRAM(S): 20 TABLET, DELAYED RELEASE ORAL at 06:00

## 2023-03-08 RX ADMIN — Medication 50 MILLIGRAM(S): at 10:04

## 2023-03-08 RX ADMIN — Medication 50 MILLIGRAM(S): at 10:03

## 2023-03-08 RX ADMIN — Medication 2 MILLIGRAM(S): at 21:54

## 2023-03-08 RX ADMIN — HEPARIN SODIUM 5000 UNIT(S): 5000 INJECTION INTRAVENOUS; SUBCUTANEOUS at 10:03

## 2023-03-08 RX ADMIN — PIPERACILLIN AND TAZOBACTAM 25 GRAM(S): 4; .5 INJECTION, POWDER, LYOPHILIZED, FOR SOLUTION INTRAVENOUS at 12:25

## 2023-03-08 RX ADMIN — Medication 100 MILLIGRAM(S): at 10:04

## 2023-03-08 NOTE — PROGRESS NOTE ADULT - ASSESSMENT
89 yo female with PMHx arthritis,  AS,  HTN, gout, hyperlipidemia, hx of pelvic fracture,  UTIs ,  urinary retention , s/p Perales p admitted  for:     #Hyponatremia 2/2 hypovolemia  Lasix  use and GI losses  improved Na 130'-134s      #Urinary retention- straight cath  pt can have OP f/up, no procedure can restore voiding, c/e present meds, add Flomax    #COVID/ Aspiration? Pneumonia   Patient now positive 3/5 for COVID.    Low grade temps/ cough.    CT chest with bilateral lower lobe nodular opacities-- may be infection related.    No hypoxia.    Cont Zosyn day #2.      #Chronic Anemia:    Hgb normally~ 10 per records.    Was on procrit but many years ago.    Hgb down to 7.2.    03/06 Transfused 1 unit pRBC.    Repeat labs in am.    No obvious bleeding.      #H/o HFpEF,    S/p IVF, CT chest with read for fluid overload   nephrology f/up re: diuresis- not overloaded per nephrology; no diuretic for now    #Mild thrombocytopenia  repeat cbc in am    #DVT Proph:  on Heparin Sc/   I spoke with Patient's Son Evan [phone]. updated about current condition and treatment plan.

## 2023-03-08 NOTE — PROGRESS NOTE ADULT - SUBJECTIVE AND OBJECTIVE BOX
Patient is a 89y Female who had no new events reported to me.     MEDICATIONS  (STANDING):  allopurinol 50 milliGRAM(s) Oral daily  amLODIPine   Tablet 5 milliGRAM(s) Oral daily  atorvastatin 20 milliGRAM(s) Oral at bedtime  bethanechol 50 milliGRAM(s) Oral two times a day  doxazosin 2 milliGRAM(s) Oral at bedtime  folic acid 1 milliGRAM(s) Oral daily  gabapentin 300 milliGRAM(s) Oral two times a day  heparin   Injectable 5000 Unit(s) SubCutaneous every 12 hours  hydrALAZINE 25 milliGRAM(s) Oral three times a day  metoprolol tartrate 25 milliGRAM(s) Oral two times a day  pantoprazole    Tablet 40 milliGRAM(s) Oral before breakfast  piperacillin/tazobactam IVPB.. 3.375 Gram(s) IV Intermittent every 8 hours  polyethylene glycol 3350 17 Gram(s) Oral daily  senna 2 Tablet(s) Oral at bedtime      Vital Signs Last 24 Hrs  T(C): 36.3 (08 Mar 2023 22:09), Max: 37.1 (08 Mar 2023 07:37)  T(F): 97.3 (08 Mar 2023 22:09), Max: 98.8 (08 Mar 2023 07:37)  HR: 63 (08 Mar 2023 22:09) (61 - 65)  BP: 169/61 (08 Mar 2023 22:09) (114/41 - 170/53)  BP(mean): --  RR: 18 (08 Mar 2023 22:09) (18 - 19)  SpO2: 98% (08 Mar 2023 22:09) (93% - 98%)    Parameters below as of 08 Mar 2023 22:09  Patient On (Oxygen Delivery Method): room air      I&O's Detail    07 Mar 2023 07:01  -  08 Mar 2023 07:00  --------------------------------------------------------  IN:  Total IN: 0 mL    OUT:    Indwelling Catheter - Urethral (mL): 500 mL  Total OUT: 500 mL    Total NET: -500 mL      08 Mar 2023 07:01  -  09 Mar 2023 01:53  --------------------------------------------------------  IN:  Total IN: 0 mL    OUT:    Indwelling Catheter - Urethral (mL): 450 mL  Total OUT: 450 mL    Total NET: -450 mL      PHYSICAL EXAM:  Limited exam   Constitutional: frail, ill appearing  HEENT: MM  Cardiovascular: S1 and S2   Extremities: No peripheral edema  Perales     LABS:               134    |  106    |  19     ----------------------------<  113       08 Mar 2023 06:56  4.0     |  22     |  1.46     134    |  108    |  20     ----------------------------<  121       07 Mar 2023 08:08  4.1     |  22     |  1.51     130    |  105    |  20     ----------------------------<  108       06 Mar 2023 05:51  4.5     |  21     |  1.58     Ca    8.6        08 Mar 2023 06:56  Ca    8.6        07 Mar 2023 08:08        TPro  6.4    /  Alb  2.8    /  TBili  0.4    /        08 Mar 2023 06:56  DBili  x      /  AST  37     /  ALT  43     /  AlkPhos  72                                   9.7    3.48  )-----------( 96       ( 08 Mar 2023 06:56 )             28.7                         9.2    3.88  )-----------( 97       ( 07 Mar 2023 08:08 )             28.5         Urine Studies:  Urinalysis Basic - ( 05 Mar 2023 16:30 )    Color: Yellow / Appearance: Clear / S.005 / pH: x  Gluc: x / Ketone: Negative  / Bili: Negative / Urobili: Negative   Blood: x / Protein: 30 mg/dL / Nitrite: Negative   Leuk Esterase: Negative / RBC: 6-10 /HPF / WBC 6-10 /HPF   Sq Epi: x / Non Sq Epi: Occasional / Bacteria: Few            RADIOLOGY & ADDITIONAL STUDIES:

## 2023-03-08 NOTE — PROGRESS NOTE ADULT - ASSESSMENT
2/28/23:  Pt with above history with h/o HFpEF on Lasix developing severe hyponatremia better after current treatment but still hyponatremic.  Will have to avoid diuretics but continue to gently replete NA watching for diastolic CHF given her stiff heart and moderate-severe AS.  Will repeat an echo.  Continue as outlined by medicine etal.  Will follow as treatment progresses.    3/1/23:  More alert and less lethargic this AM.  Na+=124 this AM.  No clinical CHF or anginal chest pains.  Awaiting echo.  No new cardiac complaints.  Continue as outlined by medicine etal.  Will follow as treatment progresses.    3/2/23:  Resting comfortably without CHF or other cardiac symptoms.  Na+=129.  ECHO with severe AS (MARVA=0.8-0.9cm2) but no clinical symptoms yet.  Continue as outlined by medicine etal.  Will follow as treatment progresses.      3/3/23:  Resting and feeling better.  No clinical CHF, angina or syncope.  No new cardiac symptoms.  Tolerating IV NS.  Awaiting AM labs.  Continue as outlined by medicine etal.  Will follow.    3/4/23:  Feeling good without increased SOB or chest pains.  Eager to go home.  Na+ still low.  Etiology for her hyponatremia still a question.  According to her daughter she does not restrict her Na intake much and does not drink a lot of water.    No new cardiac symptoms.  Continue as outlined by medicine and renal etal.  Will follow.    3/5/23:  Spiked a temp last night (100.9 F.) and had urinary retention requiring straight cath x 2.  Resting this AM.  Back on IV NS for Na+=128 again yesterday.  Awaiting AM labs.  No clinical CHF or other new cardiac symptoms.  Continue as outlined by medicine and renal etal.  Will follow.    3/6/23:  Resting this AM.  NA+=132 yesterday and 130 today.  Hgb down to 7.3.  Now COVID-19 (+) with CT of Chest suggest spread of infection???.  No other cardiac symptoms.  Continue as outlined by medicine and renal etal.  Will continue to follow intermittently prn as treatment progresses.    3/7/23:  Tolerating Zosyn antibiotics.  Temp down.  SOB about the same.  No new cardiac symptoms.  Awaiting AM labs today.  Continue as outlined by medicine, ID and renal etal.  Will continue to follow intermittently prn as treatment progresses.    3/8/23:  Coughing but feeling slightly better.  No chest pains or increased SOB.  Tolerating antibiotics so far.  Na+=134 yesterday.  Awaiting AM labs.  No new cardiac symptoms.  Continue as outlined by medicine, ID and renal etal.  Will continue to follow intermittently prn as treatment progresses.

## 2023-03-08 NOTE — PROGRESS NOTE ADULT - ASSESSMENT
89 yo female with PMHx arthritis, HTN, gout, hyperlipidemia, hx of pelvic fracture, hx of chronic UTI, urinary retention presents with bilateral hip and groin pain X 2 days with renal evaluation of hyponatremia.       Hyponatremia -    SiADH sec to lung process/covid vs hypovolemia related w ongoing fevers , stabilized   monitor off IVF for now in setting of COVID    monitor labs daily    encourage osmolyte intake     Desire    w urinary retention - now has Perales   Cr stable   Avoid nsaids/contrast  IVF if Cr rises     Fevers/COVID   IV abx as per ID    d/w staff RN      * pt seen earlier, note now

## 2023-03-08 NOTE — PROGRESS NOTE ADULT - SUBJECTIVE AND OBJECTIVE BOX
Date of service: 03-08-23 @ 14:33    Sitting in chair in NAD  Has dry cough  SOB is improving  More alert    ROS: no fever or chills; denies dizziness, no HA, no abdominal pain, no diarrhea or constipation; no dysuria, no legs pain, no rashes    MEDICATIONS  (STANDING):  allopurinol 50 milliGRAM(s) Oral daily  amLODIPine   Tablet 5 milliGRAM(s) Oral daily  atorvastatin 20 milliGRAM(s) Oral at bedtime  bethanechol 50 milliGRAM(s) Oral two times a day  doxazosin 2 milliGRAM(s) Oral at bedtime  folic acid 1 milliGRAM(s) Oral daily  gabapentin 300 milliGRAM(s) Oral two times a day  heparin   Injectable 5000 Unit(s) SubCutaneous every 12 hours  hydrALAZINE 25 milliGRAM(s) Oral three times a day  metoprolol tartrate 25 milliGRAM(s) Oral two times a day  pantoprazole    Tablet 40 milliGRAM(s) Oral before breakfast  piperacillin/tazobactam IVPB.. 3.375 Gram(s) IV Intermittent every 8 hours  polyethylene glycol 3350 17 Gram(s) Oral daily  senna 2 Tablet(s) Oral at bedtime    Vital Signs Last 24 Hrs  T(C): 37.1 (08 Mar 2023 07:37), Max: 37.1 (08 Mar 2023 07:37)  T(F): 98.8 (08 Mar 2023 07:37), Max: 98.8 (08 Mar 2023 07:37)  HR: 61 (08 Mar 2023 13:28) (61 - 71)  BP: 114/41 (08 Mar 2023 13:28) (114/41 - 170/53)  BP(mean): --  RR: 19 (08 Mar 2023 07:37) (18 - 19)  SpO2: 93% (08 Mar 2023 07:37) (93% - 98%)    Parameters below as of 08 Mar 2023 07:37  Patient On (Oxygen Delivery Method): room air     Physical exam:    Constitutional:  No acute distress  HEENT: NC/AT, EOMI, PERRLA, conjunctivae clear; ears and nose atraumatic  Neck: supple; thyroid not palpable  Back: no tenderness  Respiratory: respiratory effort normal; crackles at bases  Cardiovascular: S1S2 regular, no murmurs  Abdomen: soft, not tender, not distended, positive BS  Genitourinary: no suprapubic tenderness  Lymphatic: no LN palpable  Musculoskeletal: no muscle tenderness, no joint swelling or tenderness  Extremities: no pedal edema  Neurological/ Psychiatric: Alert, moving all extremities  Skin: no rashes; no palpable lesions    Labs: reviewed                        9.7    3.48  )-----------( 96       ( 08 Mar 2023 06:56 )             28.7     03-08    134<L>  |  106  |  19  ----------------------------<  113<H>  4.0   |  22  |  1.46<H>    Ca    8.6      08 Mar 2023 06:56    TPro  6.4  /  Alb  2.8<L>  /  TBili  0.4  /  DBili  x   /  AST  37  /  ALT  43  /  AlkPhos  72  03-08    C-Reactive Protein, Serum: 55 mg/L (03-08-23 @ 06:56)  D-Dimer Assay, Quantitative: 428 ng/mL DDU (03-08-23 @ 06:56)  Ferritin, Serum: 176 ng/mL (03-02-23 @ 10:41)                        7.3    4.79  )-----------( 100      ( 06 Mar 2023 05:51 )             22.5     03-06    130<L>  |  105  |  20  ----------------------------<  108<H>  4.5   |  21<L>  |  1.58<H>    Ca    8.4<L>      06 Mar 2023 05:51    Ferritin, Serum: 176 ng/mL (03-02-23 @ 10:41)                        8.1    5.86  )-----------( 118      ( 05 Mar 2023 10:00 )             24.9     03-05    132<L>  |  105  |  19  ----------------------------<  135<H>  4.7   |  23  |  1.51<H>    Ca    8.8      05 Mar 2023 10:00    Culture - Urine (collected 27 Feb 2023 04:29)  Source: Catheterized None  Final Report (28 Feb 2023 07:11):    No growth    Culture - Blood (collected 27 Feb 2023 03:27)  Source: .Blood None  Final Report (04 Mar 2023 09:00):    No Growth Final    Culture - Blood (collected 27 Feb 2023 00:50)  Source: .Blood Blood-Peripheral  Final Report (04 Mar 2023 09:00):    No Growth Final    Radiology: all available radiological tests reviewed    < from: CT Chest No Cont (03.05.23 @ 10:16) >  IMPRESSION: Bilateral lower lobe clustered nodular opacities, right   greater than left new since February 21, 2021 suggestive of endobronchial   spread of infection. Findings can be seen in the setting of aspiration given the distribution.     Bilateral interlobular septal thickening can be seen in the setting of pulmonary edema.  Trace right pleural effusion.    Enlarged azygoesophageal recess lymph node is without significant   interval change since February 21, 2021 can be monitored on the follow-up chest CT.  Aortic valve calcifications can be seen in the setting of aortic valve stenosis.  < end of copied text >    Advanced directives addressed: full resuscitation

## 2023-03-08 NOTE — PROGRESS NOTE ADULT - SUBJECTIVE AND OBJECTIVE BOX
CHIEF COMPLAINT:  Patient is a 89y old  Female who presents with a chief complaint of hyponatremia, lethargy (2023 10:06)      HPI: 23:  Pt. is a 89 yo F with arthritis, hx of HTN, gout, hyperlipidemia, hx of pelvic fracture, hx of chronic UTI with some urinary retention after post void trials in Dr. Carvalho office presents with bilateral hip and groin pain X 2 days.  Patient's daughter states she had recent urology evaluation and straight cath urines were recommended but they were difficult after only one day and she thinks patient maybe strained her hips trying to hold positioning during straight cath.  Patient then had indwelling pérez placed 3 days ago which has been draining well.  Patient is currently on bactrim for UTI.  Daughter states patient was also fatigued at home, not speaking much, and had malaise.  Patient refused to walk yesterday due to bilateral hip and pelvis pain. Denies falls.  Daughter sent patient via ambulance when she vomited once and could not take her PM dose of bactrim.  Patient denies abdominal pain, nausea, or chest pain.  O2 on arrival was 91% and nasal canula placed for comfort but patient denies SOB.    Initially pt was alert and lethargic. Daughter states mother was recently treated at home with laxatives for constipation and has been having multiple bowel movements.  In the ER she was found to have severe hyponatremia with N+=116, treated with hypertonic Saline with improvement and N+ up to 124 but last labs with N+=122.  Awaiting AM labs today.  She is more alert and less lethargic and less confused.  She had no cardiac symptoms at this time denying anginal chest pains or increased SOB.  She dose have HFpEF and moderate-severe AS by echo.  Echo in my office on 3/3/2022 showed normal LV systolic function with LVEF=65-70% with moderate LVH and diastolic LV dysfunction  Moderate-severe AS with MARVA=1.0-1.1cm2 with mild AI, mild MAC with mild MR and mild TR.      3/1/23:  More alert and less lethargic this AM.  Na+=124 this AM.  No clinical CHF or anginal chest pains.  Awaiting echo.  No new cardiac complaints.    3/2/23:  Resting comfortably without CHF or other cardiac symptoms.  Na+=129.  ECHO with severe AS (MARVA=0.8-0.9cm2) but no clinical symptoms yet.    3/3/23:  Resting and feeling better.  No clinical CHF, angina or syncope.  No new cardiac symptoms.  Tolerating IV NS.  Awaiting AM labs.    3/4/23:  Feeling good without increased SOB or chest pains.  Eager to go home.  Na+ still low.  Etiology for her hyponatremia still a question.  According to her daughter she does not restrict her Na intake much and does not drink a lot of water.    No new cardiac symptoms.    3/5/23:  Spiked a temp last night (100.9 F.) and had urinary retention requiring straight cath x 2.  Resting this AM.  Back on IV NS for Na+=128 again yesterday.  Awaiting AM labs.  No clinical CHF or other new cardiac symptoms.      3/6/23:  Resting this AM.  NA+=132 yesterday and 130 today.  Hgb down to 7.3.  Now COVID-19 (+) with CT of Chest suggest spread of infection???.  No other cardiac symptoms.      3/7/23:  Tolerating Zosyn antibiotics.  Temp down.  SOB about the same.  No new cardiac symptoms.  Awaiting AM labs today.    3/8/23:  Coughing but feeling slightly better.  No chest pains or increased SOB.  Tolerating antibiotics so far.  Na+=134 yesterday.  Awaiting AM labs.  No new cardiac symptoms.        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  HFpEF  HLD (hyperlipidemia)  Gout  Osteoarthritis  History of tonsillectomy-in childhood      FAMILY HISTORY:   FAMILY HISTORY:  non-contributory      ALLERGIES:  Allergies  Ceftin (Hives; Rash)      REVIEW OF SYSTEMS:  10 point ROS was obtained  Pertinent positives and negatives are as above  All other review of systems is negative unless indicated above      Vital Signs Last 24 Hrs  T(C): 37 (08 Mar 2023 00:31), Max: 37 (08 Mar 2023 00:31)  T(F): 98.6 (08 Mar 2023 00:31), Max: 98.6 (08 Mar 2023 00:31)  HR: 63 (08 Mar 2023 06:35) (62 - 71)  BP: 150/48 (08 Mar 2023 06:35) (136/90 - 175/53)  RR: 18 (08 Mar 2023 00:31) (18 - 18)  SpO2: 94% (08 Mar 2023 00:31) (93% - 98%): room air      I&O's Summary    2023 07:01  -  2023 07:00  --------------------------------------------------------  IN: 250 mL / OUT: 2000 mL / NET: -1750 mL        PHYSICAL EXAM:   Constitutional: NAD, awake and alert, well-developed  HEENT: PERR, EOMI, Normal Hearing, MMM  Neck: Soft and supple, No LAD, No JVD  Respiratory: Breath sounds are clear bilaterally, No wheezing, rales or rhonchi  Cardiovascular: S1 and S2, regular rate and rhythm, 2-3/6 LIANA at base and soft systolic murmur LLSB as before, +S4, No S3 gallops or rubs  Gastrointestinal: Bowel Sounds present, soft, nontender, nondistended, no guarding, no rebound  Extremities: No peripheral edema  Vascular: 2+ peripheral pulses  Neurological: no focal deficits      MEDICATIONS  (STANDING):  allopurinol 50 milliGRAM(s) Oral daily  amLODIPine   Tablet 5 milliGRAM(s) Oral daily  atorvastatin 20 milliGRAM(s) Oral at bedtime  bethanechol 50 milliGRAM(s) Oral two times a day  folic acid 1 milliGRAM(s) Oral daily  gabapentin 300 milliGRAM(s) Oral two times a day  heparin   Injectable 5000 Unit(s) SubCutaneous every 12 hours  hydrALAZINE 25 milliGRAM(s) Oral three times a day  metoprolol tartrate 25 milliGRAM(s) Oral two times a day  pantoprazole    Tablet 40 milliGRAM(s) Oral before breakfast  piperacillin/tazobactam IVPB.. 3.375 Gram(s) IV Intermittent every 8 hours  polyethylene glycol 3350 17 Gram(s) Oral daily  senna 2 Tablet(s) Oral at bedtime    MEDICATIONS  (PRN):  acetaminophen     Tablet .. 650 milliGRAM(s) Oral every 6 hours PRN Temp greater or equal to 38C (100.4F), Mild Pain (1 - 3)  benzonatate 100 milliGRAM(s) Oral every 8 hours PRN Cough      LABS: All Labs Reviewed:  COVID-19 PCR . (23 @ 08:38): Detected              9.2    3.88  )-----------( 97       ( 07 Mar 2023 08:08 )             28.5                          7.3    4.79  )-----------( 100      ( 06 Mar 2023 05:51 )             22.5                           8.8    5.47  )-----------( 129      ( 04 Mar 2023 05:59 )             26.4                           7.9    5.29  )-----------( 107      ( 02 Mar 2023 05:52 )             23.6                           9.6    8.73  )-----------( 94       ( 2023 06:44 )             28.1                           8.9    8.99  )-----------( 92       ( 2023 10:57 )             25.6     Sodium, Serum: 134 mmol/L (23 @ 08:08)   Sodium, Serum: 130 mmol/L (.23 @ 05:51)   Sodium, Serum: 132 mmol/L (23 @ 10:00)   Sodium, Serum: 128 mmol/L (.23 @ 05:59)   Sodium, Serum: 129 mmol/L (..23 @ 05:52)   Sodium, Serum: 124 mmol/L (.23 @ 00:32)   Sodium, Serum: 122 mmol/L (.23 @ 00:17)   Sodium, Serum: 122 mmol/L (. @ 20:35)   Sodium, Serum: 124 mmol/L (. @ 16:36)     Sodium, Serum: 116 mmol/L  (.23 @ 00:50)       03-07    134<L>  |  108  |  20  ----------------------------<  121<H>  4.1   |  22  |  1.51<H>    Ca    8.6      07 Mar 2023 08:08      03-06    130<L>  |  105  |  20  ----------------------------<  108<H>  4.5   |  21<L>  |  1.58<H>    Ca    8.4<L>      06 Mar 2023 05:51      03-04    128<L>  |  101  |  21  ----------------------------<  113<H>  4.6   |  22  |  1.28    Ca    9.2      04 Mar 2023 05:59  Phos  2.5     03-03      -    129<L>  |  100  |  19  ----------------------------<  116<H>  4.4   |  22  |  1.56<H>    Ca    8.6      02 Mar 2023 05:52  Phos  2.9     03-  Mg     2.2         TPro  6.3  /  Alb  2.8<L>  /  TBili  0.4  /  DBili  x   /  AST  35  /  ALT  22  /  AlkPhos  60      124<L>  |  96  |  21  ----------------------------<  108<H>  4.5   |  20<L>  |  1.90<H>    Ca    8.2<L>      01 Mar 2023 00:32  Phos  3.3     -  Mg     2.2         TPro  6.3  /  Alb  2.8<L>  /  TBili  0.4  /  DBili  x   /  AST  35  /  ALT  22  /  AlkPhos  60      122<L>  |  93<L>  |  19  ----------------------------<  129<H>  4.3   |  22  |  1.83<H>    Ca    9.2      2023 00:17  Phos  2.9       Mg     2.2         TPro  6.5  /  Alb  3.1<L>  /  TBili  0.5  /  DBili  x   /  AST  40<H>  /  ALT  21  /  AlkPhos  59        CARDIAC MARKERS ( 2023 00:50 )  x     / x     / 403 U/L / x     / x          BLOOD CULTURES: Organism --  Gram Stain Urine -- Gram Stain --  Specimen Source Catheterized None  Culture-Urine --  Culture Results: No growth (23 @ 04:29)       LIPID PROFILE     RADIOLOGY:    CT of Chest: 3/5/23:  INTERPRETATION:  Clinical indication: Cough, hyponatremia.  Axial CT images of the chest are obtained without intravenous administration of contrast.  Comparison is made with the chest CT of 2021.  No enlarged axillary lymph nodes. Vertically oriented enlarged azygoesophageal recess lymph node adjacent to the lower portion of the esophagus, part of which measures about 1.3 x 2 cm as measured on the axial images is without significant interval change. Multiple other smaller mediastinal lymph nodes are also without significant interval change.  No pericardial effusion. Mitral annular calcifications. Vascular calcifications with involvement of the aorta and the coronary arteries.   Aortic valve calcifications.  Trace right pleural effusion.  Evaluation of the upper abdomen demonstrate cholelithiasis. Small hiatal hernia. Subcentimeter left renal hypodensity which is too small to characterize. Mild cortical atrophy of the left kidney as on the prior study.  Evaluation of the lungs demonstrate bilateral interlobular septal thickening. Bilateral lower lobe clustered nodular opacities with the largest nodular component within the superior segment of the right lower lobe new since the prior study measuring up to about 1.5 cm.  Left calcified pleural plaque is unchanged.  No central endobronchial lesions.  Degenerative changes of the spine.  IMPRESSION: Bilateral lower lobe clustered nodular opacities, right greater than left new since 2021 suggestive of endobronchial spread of infection. Findings can be seen in the setting of aspiration given the distribution. 3 month follow-up noncontrast chest CT is recommended to ensure resolution.  Bilateral interlobular septal thickening can be seen in the setting of pulmonary edema.  Trace right pleural effusion.  Enlarged azygoesophageal recess lymph node is without significant interval change since 2021 can be monitored on the follow-up chest CT.  Aortic valve calcifications can be seen in the setting of aortic valve stenosis.    CXR: 3/5/23:  The cardiomediastinal silhouette is normal and the dalila are not enlarged. Aortic calcification. The trachea is midline. Interval improved aeration. No focal lung consolidation or sizable pleural effusion. No significant osseous abnormality.  IMPRESSION:  No focal lung consolidation or sizable pleural effusion.    X-Ray of Abd: 23:  FINDINGS:  Mild air distended colon and mid abdominal small bowel.  No free intra-abdominal air.  No obvious intra-abdominal masses.  LEFT upper quadrant and iliac vasculature all calcifications. The osseous structures are intact.  IMPRESSION:  Mild air distended transverse colon and mid abdominal small bowel.. No radiographic evidence of bowel obstruction.    X-Ray of Hips and CXR:  23:  INTERPRETATION:  Lateral hips and pelvis and chest.  Patient has hip pain.  Patient has vomiting.  Bilateral hips with pelvis. 5 views.  Arterial calcifications are noted.  Degenerative loss of disc height at multiple lower lumbar levels with slight right lower lumbar curve noted.  There is mild symmetric hip degeneration. No fracture.  AP chest on 2023 at 1:45 AM.  Heart magnified by technique.  There are bilateral perihilar infiltrates right greater than left which are new since  this year. Configuration suggests infection.  IMPRESSION: No acute fracture. Extensive arterial calcifications.  Bilateral perihilar infiltrates new since prior suggesting infection.      EK23:  Normal sinus rhythm  Normal ECG      TELEMETRY:  NSR      ECHO:  3/1/23:  M-Mode Measurements (cm)   LVEDd: 3.97 cm            LVESd: 2.55 cm   IVSEd: 1.1 cm   LVPWd: 1.1 cm             AO Root Dimension: 2.8 cm                             LA: 3.5 cm                LVOT: 2 cm  Doppler Measurements:   AV Velocity:434 cm/s                  MV Peak E-Wave: 98.7 cm/s   AV Peak Gradient: 75.34 mmHg          MV Peak A-Wave: 131 cm/s   AV Mean Gradient: 40 mmHg             MV E/A Ratio: 0.75 %   AV Area (Continuity):0.85 cm^2        MV Peak Gradient: 3.9 mmHg   TR Velocity:190 cm/s   TR Gradient:14.44 mmHg   Estimated RAP:5 mmHg   RVSP:27 mmHg    Findings  Mitral Valve   The mitral valve leaflets appear thickened.   EA reversal of the mitral inflow consistent with reduced compliance of the left ventricle.   Mild mitral annular calcification is present.   Mild mitral regurgitation is present.    Aortic Valve   Peak and mean transaortic gradients are 75 and 40mmHg respectively; this finding is consistent with severe aortic stenosis.   Mild (1+) aortic regurgitation is present.    Tricuspid Valve   Trace tricuspid valve regurgitation is present.    Pulmonic Valve   Mild pulmonic valvular regurgitation (1+) is present.    Left Atrium   Normal appearing left atrium.    Left Ventricle   Mild concentric left ventricular hypertrophy is present.   The left ventricle is normal in size.   Estimated left ventricular ejection fraction is 65-70 %.    Right Atrium   Normal appearing right atrium.    Right Ventricle   Normal appearing right ventricle structure and function.    Pericardial Effusion   No evidence of pericardial effusion.    Pleural Effusion   No evidence of pleural effusion.    Miscellaneous   The IVC appears normal.    Summary   The mitral valve leaflets appear thickened.   EA reversal of the mitral inflow consistent with reduced compliance of the left ventricle.   Mild mitral annular calcification is present.   Mild mitral regurgitation is present.   Peak and mean transaortic gradients are 75 and 40mmHg respectively; this finding is consistent with severe aortic stenosis.   Mild (1+) aortic regurgitation is present.   Trace tricuspid valve regurgitation is present.   Mild pulmonic valvular regurgitation (1+) is present.   Normal appearing left atrium.   Mild concentric left ventricular hypertrophy is present.   The left ventricle is normal in size.   Estimated left ventricular ejection fraction is 65-70 %.   Normal appearing right atrium.   Normal appearing right ventricle structure and function.   The IVC appears normal.   No evidence of pericardial effusion.   No evidence of pleural effusion.    Signature   ----------------------------------------------------------------   Electronically signed by Sakina Cheatham MD, Director of   Cardiac Cath Lab(Interpreting physician) on 2023 06:42   PM   ----------------------------------------------------------------

## 2023-03-08 NOTE — PROGRESS NOTE ADULT - ASSESSMENT
89 y/o Female with h/o arthritis, HTN, gout, hyperlipidemia, pelvic fracture, chronic UTI with urinary retention after post void trials was admitted on 2/27 for bilateral hip and groin pain X 2 days. Patient's daughter states she had recent urology evaluation and straight cath urines were recommended but they were difficult after only one day and she thinks patient maybe strained her hips trying to hold positioning during straight cath. Patient then had indwelling pérez placed 3 days PTA which has been draining well. Patient received bactrim for UTI. Daughter states patient was also fatigued at home, not speaking much, and had malaise. Patient refused to walk due to bilateral hip and pelvis pain. Daughter sent patient via ambulance when she vomited once and could not take her PM dose of bactrim. Upon admission she received zosyn IV x 1. On 3/5 she was noted febrile to 100.9F. A CT chest noted pulmonary infiltrates suggestive of aspiration.     1. Aspiration pneumonia. Multiple nodular opacities in lower pulmonary lobes. COVID-19 viral syndrome. CRF stage 3. Metabolic encephalopathy.   -more alert  -low grade fever  -no leukocytosis  -BC x 2, urine c/s noted  -on zosyn 3.375 gm IV q8h # 4  -tolerating abx well so far; no side effects noted  -continue abx coverage   -aspiration precautions  -no hypoxia  -monitor respiratory status  -continue abx coverage   -monitor temps  -f/u CBC  -supportive care  2. Other issues:   -care per medicine

## 2023-03-08 NOTE — PROGRESS NOTE ADULT - SUBJECTIVE AND OBJECTIVE BOX
CC: severe hyponatremia (01 Mar 2023 07:23)    HPI:  89 yo F with arthritis, hx of HTN, gout, hyperlipidemia, hx of pelvic fracture,  UTIs  with urinary retention after post void trials in Dr. Bonilla's office presented with bilateral hip and groin pain X 2 days.  Patient's daughter stated  Pt  had recent urology evaluation and to  straight cath urine were recommended but they were difficult after only one day and she thinks patient maybe strained her hips trying to hold positioning during straight cath.  Patient then had indwelling pérez placed 3 days PTA  which has been draining well.  Patient is currently on bactrim for UTI.  Daughter stated  patient was also fatigued at home, not speaking much, and had malaise.  Patient refused to walk  day PTA due to bilateral hip and pelvis pain. No  falls.  +  vomiting  once and could not take her PM dose of bactrim.  Patient denies abdominal pain, nausea, or chest pain.  O2 on arrival was 91% and nasal canula placed for comfort but patient denies SOB.    3/5:  Na improved  low grade temp and dry cough, continues with urinary retention, borderline BP this morning  3/6:  Pt seen.  No new complaints.  Low grade temps.  COVID + now.  Denies SOB.  + cough  03/07 Patient sitting i a chair Sporadic cough , tolerating Diet  on room air   07/08 C/o Dry cough specially at night     ROS:   All 10 systems reviewed and found to be negative with the exception of what has been described above.    Vital Signs Last 24 Hrs  Vital Signs Last 24 Hrs  T(C): 36.3 (08 Mar 2023 15:46), Max: 37.1 (08 Mar 2023 07:37)  T(F): 97.3 (08 Mar 2023 15:46), Max: 98.8 (08 Mar 2023 07:37)  HR: 62 (08 Mar 2023 15:46) (61 - 71)  BP: 130/73 (08 Mar 2023 15:46) (114/41 - 170/53)  BP(mean): --  RR: 18 (08 Mar 2023 15:46) (18 - 19)  SpO2: 98% (08 Mar 2023 15:46) (93% - 98%)    Parameters below as of 08 Mar 2023 15:46  Patient On (Oxygen Delivery Method): room air  Parameters below as of 07 Mar 2023 13:39  Patient On (Oxygen Delivery Method): room air  PHYSICAL EXAM:  General: Well developed; in no acute distress  Eyes: EOMI; conjunctiva and sclera clear  Head: Normocephalic; atraumatic  ENMT: No nasal discharge; airway clear  Neck: Supple; non tender; no masses  Respiratory: Decreased BS at bases,  No wheezes, rales or rhonchi  Cardiovascular: Regular rate and rhythm. S1 and S2 Normal; + LIANA  Gastrointestinal: Soft non-tender non-distended; Normal bowel sounds  Genitourinary: No  suprapubic  tenderness  Extremities: No  edema  Vascular: Peripheral pulses palpable 2+ bilaterally  Neurological: Alert and oriented x2, non focal   Musculoskeletal: Normal muscle tone, without deformities  Psychiatric: Cooperative and appropriate                                               9.7    3.48  )-----------( 96       ( 08 Mar 2023 06:56 )             28.7     03-08    134<L>  |  106  |  19  ----------------------------<  113<H>  4.0   |  22  |  1.46<H>    Ca    8.6      08 Mar 2023 06:56    TPro  6.4  /  Alb  2.8<L>  /  TBili  0.4  /  DBili  x   /  AST  37  /  ALT  43  /  AlkPhos  72  03-08              CAPILLARY BLOOD GLUCOSE          MEDICATIONS  (STANDING):  allopurinol 50 milliGRAM(s) Oral daily  amLODIPine   Tablet 5 milliGRAM(s) Oral daily  atorvastatin 20 milliGRAM(s) Oral at bedtime  bethanechol 50 milliGRAM(s) Oral two times a day  doxazosin 2 milliGRAM(s) Oral at bedtime  folic acid 1 milliGRAM(s) Oral daily  gabapentin 300 milliGRAM(s) Oral two times a day  heparin   Injectable 5000 Unit(s) SubCutaneous every 12 hours  hydrALAZINE 25 milliGRAM(s) Oral three times a day  metoprolol tartrate 25 milliGRAM(s) Oral two times a day  pantoprazole    Tablet 40 milliGRAM(s) Oral before breakfast  piperacillin/tazobactam IVPB.. 3.375 Gram(s) IV Intermittent every 8 hours  polyethylene glycol 3350 17 Gram(s) Oral daily  senna 2 Tablet(s) Oral at bedtime    MEDICATIONS  (PRN):  acetaminophen     Tablet .. 650 milliGRAM(s) Oral every 6 hours PRN Temp greater or equal to 38C (100.4F), Mild Pain (1 - 3)  benzonatate 100 milliGRAM(s) Oral every 8 hours PRN Cough      I&O's Summary    07 Mar 2023 07:01  -  08 Mar 2023 07:00  --------------------------------------------------------  IN: 0 mL / OUT: 500 mL / NET: -500 mL             CT Chest No Cont (03.05.23 @ 10:16) >  IMPRESSION: Bilateral lower lobe clustered nodular opacities, right   greater than left new since February 21, 2021 suggestive of endobronchial   spread of infection. Findings can be seen in the setting of aspiration   given the distribution. 3 month follow-up noncontrast chest CT is   recommended to ensure resolution.    Bilateral interlobular septal thickening can be seen in the setting of   pulmonary edema.    Trace right pleural effusion.    Enlarged azygoesophageal recess lymph node is without significant   interval change since February 21, 2021 can be monitored on the follow-up   chest CT.    Aortic valve calcifications can be seen in the setting of aortic valve   stenosis.

## 2023-03-09 LAB
ANION GAP SERPL CALC-SCNC: 5 MMOL/L — SIGNIFICANT CHANGE UP (ref 5–17)
BUN SERPL-MCNC: 18 MG/DL — SIGNIFICANT CHANGE UP (ref 7–23)
CALCIUM SERPL-MCNC: 8.5 MG/DL — SIGNIFICANT CHANGE UP (ref 8.5–10.1)
CHLORIDE SERPL-SCNC: 109 MMOL/L — HIGH (ref 96–108)
CO2 SERPL-SCNC: 22 MMOL/L — SIGNIFICANT CHANGE UP (ref 22–31)
CREAT SERPL-MCNC: 1.54 MG/DL — HIGH (ref 0.5–1.3)
EGFR: 31 ML/MIN/1.73M2 — LOW
GLUCOSE SERPL-MCNC: 109 MG/DL — HIGH (ref 70–99)
POTASSIUM SERPL-MCNC: 4.2 MMOL/L — SIGNIFICANT CHANGE UP (ref 3.5–5.3)
POTASSIUM SERPL-SCNC: 4.2 MMOL/L — SIGNIFICANT CHANGE UP (ref 3.5–5.3)
SODIUM SERPL-SCNC: 136 MMOL/L — SIGNIFICANT CHANGE UP (ref 135–145)

## 2023-03-09 PROCEDURE — 99232 SBSQ HOSP IP/OBS MODERATE 35: CPT

## 2023-03-09 RX ADMIN — ATORVASTATIN CALCIUM 20 MILLIGRAM(S): 80 TABLET, FILM COATED ORAL at 21:20

## 2023-03-09 RX ADMIN — GABAPENTIN 300 MILLIGRAM(S): 400 CAPSULE ORAL at 10:52

## 2023-03-09 RX ADMIN — Medication 650 MILLIGRAM(S): at 13:03

## 2023-03-09 RX ADMIN — Medication 50 MILLIGRAM(S): at 21:21

## 2023-03-09 RX ADMIN — Medication 50 MILLIGRAM(S): at 10:53

## 2023-03-09 RX ADMIN — Medication 650 MILLIGRAM(S): at 13:46

## 2023-03-09 RX ADMIN — Medication 25 MILLIGRAM(S): at 21:19

## 2023-03-09 RX ADMIN — AMLODIPINE BESYLATE 5 MILLIGRAM(S): 2.5 TABLET ORAL at 10:54

## 2023-03-09 RX ADMIN — Medication 10 MILLILITER(S): at 06:24

## 2023-03-09 RX ADMIN — Medication 25 MILLIGRAM(S): at 13:03

## 2023-03-09 RX ADMIN — PIPERACILLIN AND TAZOBACTAM 25 GRAM(S): 4; .5 INJECTION, POWDER, LYOPHILIZED, FOR SOLUTION INTRAVENOUS at 13:02

## 2023-03-09 RX ADMIN — Medication 25 MILLIGRAM(S): at 21:20

## 2023-03-09 RX ADMIN — Medication 2 MILLIGRAM(S): at 21:21

## 2023-03-09 RX ADMIN — HEPARIN SODIUM 5000 UNIT(S): 5000 INJECTION INTRAVENOUS; SUBCUTANEOUS at 21:19

## 2023-03-09 RX ADMIN — Medication 50 MILLIGRAM(S): at 10:51

## 2023-03-09 RX ADMIN — PIPERACILLIN AND TAZOBACTAM 25 GRAM(S): 4; .5 INJECTION, POWDER, LYOPHILIZED, FOR SOLUTION INTRAVENOUS at 06:23

## 2023-03-09 RX ADMIN — GABAPENTIN 300 MILLIGRAM(S): 400 CAPSULE ORAL at 21:20

## 2023-03-09 RX ADMIN — Medication 100 MILLIGRAM(S): at 21:21

## 2023-03-09 RX ADMIN — SENNA PLUS 2 TABLET(S): 8.6 TABLET ORAL at 21:19

## 2023-03-09 RX ADMIN — PANTOPRAZOLE SODIUM 40 MILLIGRAM(S): 20 TABLET, DELAYED RELEASE ORAL at 06:25

## 2023-03-09 RX ADMIN — PIPERACILLIN AND TAZOBACTAM 25 GRAM(S): 4; .5 INJECTION, POWDER, LYOPHILIZED, FOR SOLUTION INTRAVENOUS at 21:19

## 2023-03-09 RX ADMIN — Medication 1 MILLIGRAM(S): at 10:53

## 2023-03-09 RX ADMIN — Medication 25 MILLIGRAM(S): at 06:25

## 2023-03-09 RX ADMIN — Medication 25 MILLIGRAM(S): at 10:53

## 2023-03-09 RX ADMIN — HEPARIN SODIUM 5000 UNIT(S): 5000 INJECTION INTRAVENOUS; SUBCUTANEOUS at 10:50

## 2023-03-09 NOTE — CONSULT NOTE ADULT - CONSULT REQUESTED BY NAME
01/24/23 0001   Pre-Procedure Phone Call   Procedure Time Verified Yes   Arrival Time 0830   Procedure Location Verified Yes   Medical History Reviewed No   NPO Status Reinforced Yes   Ride and Caregiver Arranged Yes   Phone Number for Ride/Caregiver hold aspirin x 5 days prior to procedure. Last dose to be 1/18/23. Pt voiced understanding.   Patient Knows to Bring Current Medications No   Bring Outside Films Requested No       
ARI Chandra
Dr. Adame
Primary team
family requested

## 2023-03-09 NOTE — PROGRESS NOTE ADULT - SUBJECTIVE AND OBJECTIVE BOX
CC: severe hyponatremia (01 Mar 2023 07:23)    HPI:  89 yo F with arthritis, hx of HTN, gout, hyperlipidemia, hx of pelvic fracture,  UTIs  with urinary retention after post void trials in Dr. Bonilla's office presented with bilateral hip and groin pain X 2 days.  Patient's daughter stated  Pt  had recent urology evaluation and to  straight cath urine were recommended but they were difficult after only one day and she thinks patient maybe strained her hips trying to hold positioning during straight cath.  Patient then had indwelling pérez placed 3 days PTA  which has been draining well.  Patient is currently on bactrim for UTI.  Daughter stated  patient was also fatigued at home, not speaking much, and had malaise.  Patient refused to walk  day PTA due to bilateral hip and pelvis pain. No  falls.  +  vomiting  once and could not take her PM dose of bactrim.  Patient denies abdominal pain, nausea, or chest pain.  O2 on arrival was 91% and nasal canula placed for comfort but patient denies SOB.    3/5:  Na improved  low grade temp and dry cough, continues with urinary retention, borderline BP this morning  3/6:  Pt seen.  No new complaints.  Low grade temps.  COVID + now.  Denies SOB.  + cough  03/07 Patient sitting i a chair Sporadic cough , tolerating Diet  on room air   03/08 C/o Dry cough specially at night   03/08 dry cough improving     ROS:   All 10 systems reviewed and found to be negative with the exception of what has been described above.    Vital Signs Last 24 Hrs  Vital Signs Last 24 Hrs  T(C): 36.3 (08 Mar 2023 15:46), Max: 37.1 (08 Mar 2023 07:37)  T(F): 97.3 (08 Mar 2023 15:46), Max: 98.8 (08 Mar 2023 07:37)  HR: 62 (08 Mar 2023 15:46) (61 - 71)  BP: 130/73 (08 Mar 2023 15:46) (114/41 - 170/53)  BP(mean): --  RR: 18 (08 Mar 2023 15:46) (18 - 19)  SpO2: 98% (08 Mar 2023 15:46) (93% - 98%)    Parameters below as of 08 Mar 2023 15:46  Patient On (Oxygen Delivery Method): room air  Parameters below as of 07 Mar 2023 13:39  Patient On (Oxygen Delivery Method): room air  PHYSICAL EXAM:  General: Well developed; in no acute distress  Eyes: EOMI; conjunctiva and sclera clear  Head: Normocephalic; atraumatic  ENMT: No nasal discharge; airway clear  Neck: Supple; non tender; no masses  Respiratory: Decreased BS at bases,  No wheezes, rales or rhonchi  Cardiovascular: Regular rate and rhythm. S1 and S2 Normal; + LIANA  Gastrointestinal: Soft non-tender non-distended; Normal bowel sounds  Genitourinary: No  suprapubic  tenderness  Extremities: No  edema  Vascular: Peripheral pulses palpable 2+ bilaterally  Neurological: Alert and oriented x2, non focal   Musculoskeletal: Normal muscle tone, without deformities  Psychiatric: Cooperative and appropriate                                               9.7    3.48  )-----------( 96       ( 08 Mar 2023 06:56 )             28.7     03-08    134<L>  |  106  |  19  ----------------------------<  113<H>  4.0   |  22  |  1.46<H>    Ca    8.6      08 Mar 2023 06:56    TPro  6.4  /  Alb  2.8<L>  /  TBili  0.4  /  DBili  x   /  AST  37  /  ALT  43  /  AlkPhos  72  03-08              CAPILLARY BLOOD GLUCOSE          MEDICATIONS  (STANDING):  allopurinol 50 milliGRAM(s) Oral daily  amLODIPine   Tablet 5 milliGRAM(s) Oral daily  atorvastatin 20 milliGRAM(s) Oral at bedtime  bethanechol 50 milliGRAM(s) Oral two times a day  doxazosin 2 milliGRAM(s) Oral at bedtime  folic acid 1 milliGRAM(s) Oral daily  gabapentin 300 milliGRAM(s) Oral two times a day  heparin   Injectable 5000 Unit(s) SubCutaneous every 12 hours  hydrALAZINE 25 milliGRAM(s) Oral three times a day  metoprolol tartrate 25 milliGRAM(s) Oral two times a day  pantoprazole    Tablet 40 milliGRAM(s) Oral before breakfast  piperacillin/tazobactam IVPB.. 3.375 Gram(s) IV Intermittent every 8 hours  polyethylene glycol 3350 17 Gram(s) Oral daily  senna 2 Tablet(s) Oral at bedtime    MEDICATIONS  (PRN):  acetaminophen     Tablet .. 650 milliGRAM(s) Oral every 6 hours PRN Temp greater or equal to 38C (100.4F), Mild Pain (1 - 3)  benzonatate 100 milliGRAM(s) Oral every 8 hours PRN Cough      I&O's Summary    07 Mar 2023 07:01  -  08 Mar 2023 07:00  --------------------------------------------------------  IN: 0 mL / OUT: 500 mL / NET: -500 mL             CT Chest No Cont (03.05.23 @ 10:16) >  IMPRESSION: Bilateral lower lobe clustered nodular opacities, right   greater than left new since February 21, 2021 suggestive of endobronchial   spread of infection. Findings can be seen in the setting of aspiration   given the distribution. 3 month follow-up noncontrast chest CT is   recommended to ensure resolution.    Bilateral interlobular septal thickening can be seen in the setting of   pulmonary edema.    Trace right pleural effusion.    Enlarged azygoesophageal recess lymph node is without significant   interval change since February 21, 2021 can be monitored on the follow-up   chest CT.    Aortic valve calcifications can be seen in the setting of aortic valve   stenosis.

## 2023-03-09 NOTE — PROGRESS NOTE ADULT - ASSESSMENT
89 y/o Female with h/o arthritis, HTN, gout, hyperlipidemia, pelvic fracture, chronic UTI with urinary retention after post void trials was admitted on 2/27 for bilateral hip and groin pain X 2 days. Patient's daughter states she had recent urology evaluation and straight cath urines were recommended but they were difficult after only one day and she thinks patient maybe strained her hips trying to hold positioning during straight cath. Patient then had indwelling pérez placed 3 days PTA which has been draining well. Patient received bactrim for UTI. Daughter states patient was also fatigued at home, not speaking much, and had malaise. Patient refused to walk due to bilateral hip and pelvis pain. Daughter sent patient via ambulance when she vomited once and could not take her PM dose of bactrim. Upon admission she received zosyn IV x 1. On 3/5 she was noted febrile to 100.9F. A CT chest noted pulmonary infiltrates suggestive of aspiration.     1. Aspiration pneumonia. Multiple nodular opacities in lower pulmonary lobes. COVID-19 viral syndrome. CRF stage 3. Metabolic encephalopathy.   -more alert  -low grade fever  -no leukocytosis  -BC x 2, urine c/s noted  -on zosyn 3.375 gm IV q8h # 5  -tolerating abx well so far; no side effects noted  -continue abx coverage   -aspiration precautions  -no hypoxia  -monitor respiratory status  -continue abx coverage for now, may complete abx in 1-2 days  -monitor temps  -f/u CBC  -supportive care  2. Other issues:   -care per medicine

## 2023-03-09 NOTE — CONSULT NOTE ADULT - CONSULT REASON
Urinary retention
severe hyponatremia with h/o renal insuff., HFpEF and moderate AS
Hyponatremia
abx management

## 2023-03-09 NOTE — CONSULT NOTE ADULT - ASSESSMENT
89 yo female with PMH as above admitted for hip and pelvic pain/ UTI. Urology consulted for pt with failed trial of void and pérez in place.   Recommend  - D/C pt home with pérez to leg bag  - Follow up with Dr. Bonilla outpatient for pérez management    Case discussed with Dr. Bonilla

## 2023-03-09 NOTE — PROGRESS NOTE ADULT - SUBJECTIVE AND OBJECTIVE BOX
Patient is a 89y Female who had no new events reported to me.  cough  +       MEDICATIONS  (STANDING):  allopurinol 50 milliGRAM(s) Oral daily  amLODIPine   Tablet 5 milliGRAM(s) Oral daily  atorvastatin 20 milliGRAM(s) Oral at bedtime  bethanechol 50 milliGRAM(s) Oral two times a day  doxazosin 2 milliGRAM(s) Oral at bedtime  folic acid 1 milliGRAM(s) Oral daily  gabapentin 300 milliGRAM(s) Oral two times a day  heparin   Injectable 5000 Unit(s) SubCutaneous every 12 hours  hydrALAZINE 25 milliGRAM(s) Oral three times a day  metoprolol tartrate 25 milliGRAM(s) Oral two times a day  pantoprazole    Tablet 40 milliGRAM(s) Oral before breakfast  piperacillin/tazobactam IVPB.. 3.375 Gram(s) IV Intermittent every 8 hours  polyethylene glycol 3350 17 Gram(s) Oral daily  senna 2 Tablet(s) Oral at bedtime    Vital Signs Last 24 Hrs  T(C): 36.9 (09 Mar 2023 07:51), Max: 36.9 (09 Mar 2023 07:51)  T(F): 98.4 (09 Mar 2023 07:51), Max: 98.4 (09 Mar 2023 07:51)  HR: 64 (09 Mar 2023 07:51) (61 - 64)  BP: 151/55 (09 Mar 2023 07:51) (114/41 - 169/61)  BP(mean): --  RR: 20 (09 Mar 2023 07:51) (18 - 20)  SpO2: 92% (09 Mar 2023 07:51) (92% - 98%)    Parameters below as of 09 Mar 2023 07:51  Patient On (Oxygen Delivery Method): room air      I&O's Detail    08 Mar 2023 07:01  -  09 Mar 2023 07:00  --------------------------------------------------------  IN:  Total IN: 0 mL    OUT:    Indwelling Catheter - Urethral (mL): 850 mL  Total OUT: 850 mL    Total NET: -850 mL      PHYSICAL EXAM:  Limited exam   Constitutional: frail, ill appearing  HEENT: MM  Cardiovascular: S1 and S2   Extremities: No peripheral edema  Perales         LABS:                                     9.7    3.48  )-----------( 96       ( 08 Mar 2023 06:56 )             28.7         136    |  109    |  18     ----------------------------<  109       09 Mar 2023 06:28  4.2     |  22     |  1.54     134    |  106    |  19     ----------------------------<  113       08 Mar 2023 06:56  4.0     |  22     |  1.46     134    |  108    |  20     ----------------------------<  121       07 Mar 2023 08:08  4.1     |  22     |  1.51     Ca    8.5        09 Mar 2023 06:28  Ca    8.6        08 Mar 2023 06:56        TPro  6.4    /  Alb  2.8    /  TBili  0.4    /        08 Mar 2023 06:56  DBili  x      /  AST  37     /  ALT  43     /  AlkPhos  72               Urine Studies:  Urinalysis Basic - ( 05 Mar 2023 16:30 )    Color: Yellow / Appearance: Clear / S.005 / pH: x  Gluc: x / Ketone: Negative  / Bili: Negative / Urobili: Negative   Blood: x / Protein: 30 mg/dL / Nitrite: Negative   Leuk Esterase: Negative / RBC: 6-10 /HPF / WBC 6-10 /HPF   Sq Epi: x / Non Sq Epi: Occasional / Bacteria: Few            RADIOLOGY & ADDITIONAL STUDIES:

## 2023-03-09 NOTE — PROGRESS NOTE ADULT - ASSESSMENT
89 yo female with PMHx arthritis,  AS,  HTN, gout, hyperlipidemia, hx of pelvic fracture,  UTIs ,  urinary retention , s/p Perales p admitted  for:     #Hyponatremia 2/2 hypovolemia  Lasix  use and GI losses  improved Na 130'-134s      #Urinary retention- straight cath  pt can have OP f/up, no procedure can restore voiding, c/e present meds, add Flomax    #COVID/ Aspiration? Pneumonia   Patient now positive 3/5 for COVID.    Low grade temps/ cough.    CT chest with bilateral lower lobe nodular opacities-- may be infection related.    No hypoxia.    Cont Zosyn day # 5    #Chronic Anemia:    Hgb normally~ 10 per records.    Was on procrit but many years ago.    Hgb down to 7.2.    03/06 Transfused 1 unit pRBC.    Repeat labs in am.    No obvious bleeding.      #H/o HFpEF,    S/p IVF, CT chest with read for fluid overload   nephrology f/up re: diuresis- not overloaded per nephrology; no diuretic for now    #Mild thrombocytopenia  repeat cbc in am    #CKD  stage 3  monitor creatinine   Nephrology following       #DVT Proph:  on Heparin Sc/   I spoke with Patient's Son Evan [phone]. updated about current condition and treatment plan.

## 2023-03-09 NOTE — PROGRESS NOTE ADULT - ASSESSMENT
89 yo female with PMHx arthritis, HTN, gout, hyperlipidemia, hx of pelvic fracture, hx of chronic UTI, urinary retention presents with bilateral hip and groin pain X 2 days with renal evaluation of hyponatremia.       Hyponatremia -    SiADH sec to lung process/covid vs hypovolemia related w ongoing fevers , stabilized   monitor off IVF for now in setting of COVID    monitor labs daily    encourage osmolyte intake    LE edema - chronic    Compression stockings   elevate legs while in chair and bed     Desire   w urinary retention - now has Perales again - failed voiding trial - would not DC    still awaiting Urology consult  Cr stable   Avoid nsaids/contrast  IVF if Cr rises     Fevers/COVID   IV abx as per ID    d/w staff RN

## 2023-03-09 NOTE — CONSULT NOTE ADULT - SUBJECTIVE AND OBJECTIVE BOX
CHIEF COMPLAINT:  Patient is a 89y old  Female who presents with a chief complaint of hyponatremia, lethargy (2023 10:06)      HPI:  Pt. is a 89 yo F with arthritis, hx of HTN, gout, hyperlipidemia, hx of pelvic fracture, hx of chronic UTI with some urinary retention after post void trials in Dr. Carvalho office presents with bilateral hip and groin pain X 2 days.  Patient's daughter states she had recent urology evaluation and straight cath urines were recommended but they were difficult after only one day and she thinks patient maybe strained her hips trying to hold positioning during straight cath.  Patient then had indwelling pérez placed 3 days ago which has been draining well.  Patient is currently on bactrim for UTI.  Daughter states patient was also fatigued at home, not speaking much, and had malaise.  Patient refused to walk yesterday due to bilateral hip and pelvis pain. Denies falls.  Daughter sent patient via ambulance when she vomited once and could not take her PM dose of bactrim.  Patient denies abdominal pain, nausea, or chest pain.  O2 on arrival was 91% and nasal canula placed for comfort but patient denies SOB.    Initially pt was alert and lethargic. Daughter states mother was recently treated at home with laxatives for constipation and has been having multiple bowel movements.  In the ER she was found to have severe hyponatremia with N+=116, treated with hypertonic Saline with improvement and N+ up to 124 but last labs with N+=122.  Awaiting AM labs today.  She is more alert and less lethargic and less confused.  She had no cardiac symptoms at this time denying anginal chest pains or increased SOB.  She dose have HFpEF and moderate-severe AS by echo.  Echo in my office on 3/3/2022 showed normal LV systolic function with LVEF=65-70% with moderate LVH and diastolic LV dysfunction  Moderate-severe AS with MARVA=1.0-1.1cm2 with mild AI, mild MAC with mild MR and mild TR.  S        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  HFpEF  HLD (hyperlipidemia)  Gout  Osteoarthritis  History of tonsillectomy-in childhood      FAMILY HISTORY:   FAMILY HISTORY:  non-contributory      ALLERGIES:  Allergies  Ceftin (Hives; Rash)      REVIEW OF SYSTEMS:  10 point ROS was obtained  Pertinent positives and negatives are as above  All other review of systems is negative unless indicated above      Vital Signs Last 24 Hrs  T(C): 36.7 (2023 06:09), Max: 36.7 (2023 13:25)  T(F): 98.1 (2023 06:09), Max: 98.1 (2023 06:09)  HR: 72 (2023 06:00) (51 - 91)  BP: 156/60 (2023 06:00) (101/42 - 160/53)  BP(mean): 81 (2023 06:00) (56 - 86)  RR: 16 (2023 06:00) (9 - 21)  SpO2: 95% (2023 06:00) (93% - 98%): room air      I&O's Summary    2023 07:01  -  2023 07:00  --------------------------------------------------------  IN: 250 mL / OUT: 2000 mL / NET: -1750 mL        PHYSICAL EXAM:   Constitutional: NAD, awake and alert, well-developed  HEENT: PERR, EOMI, Normal Hearing, MMM  Neck: Soft and supple, No LAD, No JVD  Respiratory: Breath sounds are clear bilaterally, No wheezing, rales or rhonchi  Cardiovascular: S1 and S2, regular rate and rhythm, 2-3/6 LIANA at base and soft systolic murmur LLSB as before, +S4, No S3 gallops or rubs  Gastrointestinal: Bowel Sounds present, soft, nontender, nondistended, no guarding, no rebound  Extremities: No peripheral edema  Vascular: 2+ peripheral pulses  Neurological: no focal deficits      MEDICATIONS  (STANDING):  allopurinol 50 milliGRAM(s) Oral daily  atorvastatin 20 milliGRAM(s) Oral at bedtime  bethanechol 50 milliGRAM(s) Oral two times a day  folic acid 1 milliGRAM(s) Oral daily  gabapentin 300 milliGRAM(s) Oral two times a day  heparin   Injectable 5000 Unit(s) SubCutaneous every 12 hours  hydrALAZINE 25 milliGRAM(s) Oral two times a day  pantoprazole    Tablet 40 milliGRAM(s) Oral before breakfast    MEDICATIONS  (PRN):  acetaminophen     Tablet .. 650 milliGRAM(s) Oral every 6 hours PRN Temp greater or equal to 38C (100.4F), Mild Pain (1 - 3)      LABS: All Labs Reviewed:                        8.9    8.99  )-----------( 92       ( 2023 10:57 )             25.6       Sodium, Serum: 116 mmol/L  (23 @ 00:50)   Sodium, Serum: 122 mmol/L (23 @ 00:17)   Sodium, Serum: 122 mmol/L (23 @ 20:35)   Sodium, Serum: 124 mmol/L (23 @ 16:36)           122<L>  |  93<L>  |  19  ----------------------------<  129<H>  4.3   |  22  |  1.83<H>    Ca    9.2      2023 00:17  Phos  2.9       Mg     2.2         TPro  6.5  /  Alb  3.1<L>  /  TBili  0.5  /  DBili  x   /  AST  40<H>  /  ALT  21  /  AlkPhos  59        CARDIAC MARKERS ( 2023 00:50 )  x     / x     / 403 U/L / x     / x          BLOOD CULTURES: Organism --  Gram Stain Urine -- Gram Stain --  Specimen Source Catheterized None  Culture-Urine --  Culture Results: No growth (23 @ 04:29)       LIPID PROFILE     RADIOLOGY:  X-Ray of Hips and CXR:  23:  INTERPRETATION:  Lateral hips and pelvis and chest.  Patient has hip pain.  Patient has vomiting.  Bilateral hips with pelvis. 5 views.  Arterial calcifications are noted.  Degenerative loss of disc height at multiple lower lumbar levels with slight right lower lumbar curve noted.  There is mild symmetric hip degeneration. No fracture.  AP chest on 2023 at 1:45 AM.  Heart magnified by technique.  There are bilateral perihilar infiltrates right greater than left which are new since  this year. Configuration suggests infection.  IMPRESSION: No acute fracture. Extensive arterial calcifications.  Bilateral perihilar infiltrates new since prior suggesting infection.      EK23:  Normal sinus rhythm  Normal ECG      TELEMETRY:  NSR    ECHO:  Echo in my office on 3/3/2022 showed normal LV systolic function with LVEF=65-70% with moderate LVH and diastolic LV dysfunction  Moderate-severe AS with MARVA=1.0-1.1cm2 with mild AI, mild MAC with mild MR and mild TR.    
HPI:  Pt. is a 89 yo F with arthritis, hx of HTN, gout, hyperlipidemia, hx of pelvic fracture, hx of chronic UTI with some urinary retention after post void trials in Dr. Carvalho office presents with bilateral hip and groin pain X 2 days.  Patient's daughter states she had recent urology evaluation and straight cath urines were recommended but they were difficult after only one day and she thinks patient maybe strained her hips trying to hold positioning during straight cath.  Patient then had indwelling pérez placed 3 days ago which has been draining well.  Patient is currently on bactrim for UTI.  Daughter states patient was also fatigued at home, not speaking much, and had malaise.  Patient refused to walk yesterday due to bilateral hip and pelvis pain. Denies falls.  Daughter sent patient via ambulance when she vomited once and could not take her PM dose of bactrim.  Patient denies abdominal pain, nausea, or chest pain.  O2 on arrival was 91% and nasal canula placed for comfort but patient denies SOB.    Upon my initial evaluation pt alert and lethargic with daughter at bedside. Daughter states mother was recently treated at home with laxatives for constipation and has been having multiple bowel movements.  (27 Feb 2023 03:02)    89 yo female with PMH as above admitted for hip and pelvic pain/ UTI. Urology consulted for pt with failed trial of void and pérez in place. Pt seen at bedside and is a limited historian. She denies any abd/flank pain, fevers, chills, n/v, dysuria or hematuria.         PAST MEDICAL & SURGICAL HISTORY:  HTN (hypertension)      HLD (hyperlipidemia)      Gout      Osteoarthritis      History of tonsillectomy  in childhood          REVIEW OF SYSTEMS  Pt is a poor historian unable to assess    MEDICATIONS  (STANDING):  allopurinol 50 milliGRAM(s) Oral daily  amLODIPine   Tablet 5 milliGRAM(s) Oral daily  atorvastatin 20 milliGRAM(s) Oral at bedtime  bethanechol 50 milliGRAM(s) Oral two times a day  doxazosin 2 milliGRAM(s) Oral at bedtime  folic acid 1 milliGRAM(s) Oral daily  gabapentin 300 milliGRAM(s) Oral two times a day  heparin   Injectable 5000 Unit(s) SubCutaneous every 12 hours  hydrALAZINE 25 milliGRAM(s) Oral three times a day  metoprolol tartrate 25 milliGRAM(s) Oral two times a day  pantoprazole    Tablet 40 milliGRAM(s) Oral before breakfast  piperacillin/tazobactam IVPB.. 3.375 Gram(s) IV Intermittent every 8 hours  polyethylene glycol 3350 17 Gram(s) Oral daily  senna 2 Tablet(s) Oral at bedtime    MEDICATIONS  (PRN):  acetaminophen     Tablet .. 650 milliGRAM(s) Oral every 6 hours PRN Temp greater or equal to 38C (100.4F), Mild Pain (1 - 3)  benzonatate 100 milliGRAM(s) Oral every 8 hours PRN Cough  guaifenesin/dextromethorphan Oral Liquid 10 milliLiter(s) Oral every 6 hours PRN Cough      Allergies    Ceftin (Hives; Rash)    Intolerances        SOCIAL HISTORY:    FAMILY HISTORY:      Vital Signs Last 24 Hrs  T(C): 36.9 (09 Mar 2023 07:51), Max: 36.9 (09 Mar 2023 07:51)  T(F): 98.4 (09 Mar 2023 07:51), Max: 98.4 (09 Mar 2023 07:51)  HR: 55 (09 Mar 2023 12:59) (55 - 64)  BP: 151/48 (09 Mar 2023 12:59) (130/73 - 169/61)  BP(mean): --  RR: 20 (09 Mar 2023 07:51) (18 - 20)  SpO2: 92% (09 Mar 2023 07:51) (92% - 98%)    Parameters below as of 09 Mar 2023 07:51  Patient On (Oxygen Delivery Method): room air        PHYSICAL EXAM:  General: No distress, No anxiety  VITALS  T(C): 36.9 (03-09-23 @ 07:51), Max: 36.9 (03-09-23 @ 07:51)  HR: 55 (03-09-23 @ 12:59) (55 - 64)  BP: 151/48 (03-09-23 @ 12:59) (130/73 - 169/61)  RR: 20 (03-09-23 @ 07:51) (18 - 20)  SpO2: 92% (03-09-23 @ 07:51) (92% - 98%)            Skin     : No jaundice  HEENT: Normocephalic, no icterus , EOM full , No epistaxis  Lung    : No resp distress  Abdo:   : Soft, Non tender, No guarding, No distension   Back    : No CVAT b/l  Genitalia Female: pérez draining yellow urine           LABS:                        9.7    3.48  )-----------( 96       ( 08 Mar 2023 06:56 )             28.7     03-09    136  |  109<H>  |  18  ----------------------------<  109<H>  4.2   |  22  |  1.54<H>    Ca    8.5      09 Mar 2023 06:28    TPro  6.4  /  Alb  2.8<L>  /  TBili  0.4  /  DBili  x   /  AST  37  /  ALT  43  /  AlkPhos  72  03-08         
Patient is a 89y old  Female who presents with a chief complaint of severe hyponatremia     HPI:  89 y/o Female with h/o arthritis, HTN, gout, hyperlipidemia, pelvic fracture, chronic UTI with urinary retention after post void trials was admitted on  for bilateral hip and groin pain X 2 days. Patient's daughter states she had recent urology evaluation and straight cath urines were recommended but they were difficult after only one day and she thinks patient maybe strained her hips trying to hold positioning during straight cath. Patient then had indwelling pérez placed 3 days PTA which has been draining well. Patient received bactrim for UTI. Daughter states patient was also fatigued at home, not speaking much, and had malaise. Patient refused to walk due to bilateral hip and pelvis pain. Daughter sent patient via ambulance when she vomited once and could not take her PM dose of bactrim. Upon admission she received zosyn IV x 1. On 3/5 she was noted febrile to 100.9F. A CT chest noted pulmonary infiltrates suggestive of aspiration.     PMH: as above  PSH: as above  Meds: per reconciliation sheet, noted below  MEDICATIONS  (STANDING):  allopurinol 50 milliGRAM(s) Oral daily  atorvastatin 20 milliGRAM(s) Oral at bedtime  bethanechol 50 milliGRAM(s) Oral two times a day  folic acid 1 milliGRAM(s) Oral daily  gabapentin 300 milliGRAM(s) Oral two times a day  heparin   Injectable 5000 Unit(s) SubCutaneous every 12 hours  hydrALAZINE 25 milliGRAM(s) Oral three times a day  metoprolol tartrate 25 milliGRAM(s) Oral two times a day  pantoprazole    Tablet 40 milliGRAM(s) Oral before breakfast  sodium chloride 0.9%. 1000 milliLiter(s) (50 mL/Hr) IV Continuous <Continuous>    MEDICATIONS  (PRN):  acetaminophen     Tablet .. 650 milliGRAM(s) Oral every 6 hours PRN Temp greater or equal to 38C (100.4F), Mild Pain (1 - 3)    Allergies    Ceftin (Hives; Rash)    Intolerances      Social: no smoking, no alcohol, no illegal drugs; no recent travel, no exposure to TB  FAMILY HISTORY:    no history of premature cardiovascular disease in first degree relatives    ROS: the patient denies fever, no chills, no HA, no seizures, no dizziness, no sore throat, no nasal congestion, no blurry vision, no CP, no palpitations, no SOB, no cough, no abdominal pain, no diarrhea, no N/V, no dysuria, no leg pain, no claudication, no rash, no joint aches, no rectal pain or bleeding, no night sweats  All other systems reviewed and are negative    Vital Signs Last 24 Hrs  T(C): 38 (05 Mar 2023 12:00), Max: 38.3 (05 Mar 2023 05:18)  T(F): 100.4 (05 Mar 2023 12:00), Max: 100.9 (05 Mar 2023 05:18)  HR: 75 (05 Mar 2023 12:00) (75 - 86)  BP: 160/55 (05 Mar 2023 12:00) (118/56 - 178/53)  BP(mean): 75 (05 Mar 2023 12:00) (60 - 88)  RR: 16 (05 Mar 2023 12:00) (16 - 16)  SpO2: 93% (05 Mar 2023 12:00) (92% - 98%)    Parameters below as of 05 Mar 2023 12:00  Patient On (Oxygen Delivery Method): room air      Daily     Daily Weight in k.3 (05 Mar 2023 04:38)    PE:    Constitutional:  No acute distress  HEENT: NC/AT, EOMI, PERRLA, conjunctivae clear; ears and nose atraumatic; pharynx benign  Neck: supple; thyroid not palpable  Back: no tenderness  Respiratory: respiratory effort normal; crackles at bases  Cardiovascular: S1S2 regular, no murmurs  Abdomen: soft, not tender, not distended, positive BS; no liver or spleen organomegaly  Genitourinary: no suprapubic tenderness  Lymphatic: no LN palpable  Musculoskeletal: no muscle tenderness, no joint swelling or tenderness  Extremities: no pedal edema  Neurological/ Psychiatric: Alert, judgement and insight impaired; moving all extremities  Skin: no rashes; no palpable lesions    Labs: all available labs reviewed                        8.1      )-----------( 118      ( 05 Mar 2023 10:00 )             24.9     03-05    132<L>  |  105  |  19  ----------------------------<  135<H>  4.7   |  23  |  1.51<H>    Ca    8.8      05 Mar 2023 10:00    Culture - Urine (collected 2023 04:29)  Source: Catheterized None  Final Report (2023 07:11):    No growth    Culture - Blood (collected 2023 03:27)  Source: .Blood None  Final Report (04 Mar 2023 09:00):    No Growth Final    Culture - Blood (collected 2023 00:50)  Source: .Blood Blood-Peripheral  Final Report (04 Mar 2023 09:00):    No Growth Final    Radiology: all available radiological tests reviewed    < from: CT Chest No Cont (23 @ 10:16) >  IMPRESSION: Bilateral lower lobe clustered nodular opacities, right   greater than left new since 2021 suggestive of endobronchial   spread of infection. Findings can be seen in the setting of aspiration   given the distribution.     Bilateral interlobular septal thickening can be seen in the setting of   pulmonary edema.  Trace right pleural effusion.    Enlarged azygoesophageal recess lymph node is without significant   interval change since 2021 can be monitored on the follow-up chest CT.  Aortic valve calcifications can be seen in the setting of aortic valve stenosis.  < end of copied text >      Advanced directives addressed: full resuscitation
89 yo female with PMHx arthritis, HTN, gout, hyperlipidemia, hx of pelvic fracture, hx of chronic UTI, urinary retention presents with bilateral hip and groin pain X 2 days with renal evaluation of hyponatremia.  Per notes patient had indwelling pérez placed 3 days ago which has been draining well.  Patient is currently on bactrim for UTI on admit. Noted with hyponatremia wi mild to moderate symptoms so renal evaluation.           REVIEW OF SYSTEMS:    CONSTITUTIONAL: No weakness, fevers or chills  RESPIRATORY: No cough, wheezing, hemoptysis; No shortness of breath  CARDIOVASCULAR: No chest pain or palpitations  GENITOURINARY: No dysuria, frequency or hematuria  All other review of systems is negative unless indicated above.    MEDICATIONS  (STANDING):  allopurinol 50 milliGRAM(s) Oral daily  atorvastatin 20 milliGRAM(s) Oral at bedtime  bethanechol 50 milliGRAM(s) Oral two times a day  folic acid 1 milliGRAM(s) Oral daily  gabapentin 300 milliGRAM(s) Oral two times a day  heparin   Injectable 5000 Unit(s) SubCutaneous every 12 hours  hydrALAZINE 25 milliGRAM(s) Oral two times a day  pantoprazole    Tablet 40 milliGRAM(s) Oral before breakfast    MEDICATIONS  (PRN):  acetaminophen     Tablet .. 650 milliGRAM(s) Oral every 6 hours PRN Temp greater or equal to 38C (100.4F), Mild Pain (1 - 3)        T(C): , Max: 37.1 (23 @ 05:43)  T(F): , Max: 98.7 (23 @ 05:43)  HR: 77 (23 @ 18:00)  BP: 134/41 (23 @ 18:00)  BP(mean): 65 (23 @ 18:00)  RR: 16 (23 @ 18:00)  SpO2: 93% (23 @ 18:00)  Wt(kg): --     @ 07:01  -   @ 22:08  --------------------------------------------------------  IN: 250 mL / OUT: 1000 mL / NET: -750 mL      Height (cm): 160 ( @ 00:37)  Weight (kg): 68 ( @ 00:37)  BMI (kg/m2): 26.6 ( @ 00:37)  BSA (m2): 1.71 ( @ 00:37)    PHYSICAL EXAM:    Constitutional: NAD, frail  HEENT: MM  dist  Cardiovascular: S1 and S2   alert, conversant  no edema  pérez        LABS:                        8.9    8.99  )-----------( 92       ( 2023 10:57 )             25.6     2023 20:35    122    |  92     |  19     ----------------------------<  115    4.5     |  24     |  1.91   2023 16:36    124    |  92     |  21     ----------------------------<  130    4.0     |  23     |  1.93   2023 10:57    119    |  90     |  19     ----------------------------<  112    4.3     |  22     |  1.87   2023 06:14    118    |  89     |  21     ----------------------------<  118    4.3     |  21     |  1.89   2023 00:50    116    |  86     |  23     ----------------------------<  116    4.3     |  21     |  2.09     Ca    8.9        2023 20:35  Ca    8.9        2023 16:36  Ca    8.7        2023 10:57  Ca    8.5        2023 06:14  Ca    8.8        2023 00:50  Phos  3.2       2023 06:14  Mg     2.0       2023 06:14    TPro  6.1    /  Alb  3.0    /  TBili  0.4    /  DBili  x      /  AST  32     /  ALT  21     /  AlkPhos  51     2023 06:14  TPro  7.2    /  Alb  3.7    /  TBili  0.4    /  DBili  x      /  AST  39     /  ALT  26     /  AlkPhos  64     2023 00:50      Osmolality, Serum: 250 mosmol/kg *L* [280 - 301] ( @ 03:27)  Uric Acid, Serum: 4.6 mg/dL [2.5 - 7.0] ( @ 03:27)      Urine Studies:  Urinalysis Basic - ( 2023 04:29 )    Color: Yellow / Appearance: Clear / S.010 / pH: x  Gluc: x / Ketone: Negative  / Bili: Negative / Urobili: Negative   Blood: x / Protein: 100 / Nitrite: Negative   Leuk Esterase: Moderate / RBC: 11-25 /HPF / WBC 3-5 /HPF   Sq Epi: x / Non Sq Epi: Few / Bacteria: Few            RADIOLOGY & ADDITIONAL STUDIES:

## 2023-03-09 NOTE — PROGRESS NOTE ADULT - SUBJECTIVE AND OBJECTIVE BOX
Date of service: 03-09-23 @ 15:09    Lying in bed in NAD  Has dry cough  No SOB at rest    ROS: no fever or chills; no HA, no abdominal pain, no diarrhea or constipation; no dysuria, no legs pain, no rashes    MEDICATIONS  (STANDING):  allopurinol 50 milliGRAM(s) Oral daily  amLODIPine   Tablet 5 milliGRAM(s) Oral daily  atorvastatin 20 milliGRAM(s) Oral at bedtime  bethanechol 50 milliGRAM(s) Oral two times a day  doxazosin 2 milliGRAM(s) Oral at bedtime  folic acid 1 milliGRAM(s) Oral daily  gabapentin 300 milliGRAM(s) Oral two times a day  heparin   Injectable 5000 Unit(s) SubCutaneous every 12 hours  hydrALAZINE 25 milliGRAM(s) Oral three times a day  metoprolol tartrate 25 milliGRAM(s) Oral two times a day  pantoprazole    Tablet 40 milliGRAM(s) Oral before breakfast  piperacillin/tazobactam IVPB.. 3.375 Gram(s) IV Intermittent every 8 hours  polyethylene glycol 3350 17 Gram(s) Oral daily  senna 2 Tablet(s) Oral at bedtime    Vital Signs Last 24 Hrs  T(C): 36.9 (09 Mar 2023 07:51), Max: 36.9 (09 Mar 2023 07:51)  T(F): 98.4 (09 Mar 2023 07:51), Max: 98.4 (09 Mar 2023 07:51)  HR: 55 (09 Mar 2023 12:59) (55 - 64)  BP: 151/48 (09 Mar 2023 12:59) (130/73 - 169/61)  BP(mean): --  RR: 20 (09 Mar 2023 07:51) (18 - 20)  SpO2: 92% (09 Mar 2023 07:51) (92% - 98%)    Parameters below as of 09 Mar 2023 07:51  Patient On (Oxygen Delivery Method): room air     Physical exam:    Constitutional:  No acute distress  HEENT: NC/AT, EOMI, PERRLA, conjunctivae clear; ears and nose atraumatic  Neck: supple; thyroid not palpable  Back: no tenderness  Respiratory: respiratory effort normal; crackles at bases  Cardiovascular: S1S2 regular, no murmurs  Abdomen: soft, not tender, not distended, positive BS  Genitourinary: no suprapubic tenderness  Lymphatic: no LN palpable  Musculoskeletal: no muscle tenderness, no joint swelling or tenderness  Extremities: no pedal edema  Neurological/ Psychiatric: Alert, moving all extremities  Skin: no rashes; no palpable lesions    Labs: reviewed                        9.7    3.48  )-----------( 96       ( 08 Mar 2023 06:56 )             28.7     03-09    136  |  109<H>  |  18  ----------------------------<  109<H>  4.2   |  22  |  1.54<H>    Ca    8.5      09 Mar 2023 06:28    TPro  6.4  /  Alb  2.8<L>  /  TBili  0.4  /  DBili  x   /  AST  37  /  ALT  43  /  AlkPhos  72  03-08    C-Reactive Protein, Serum: 55 mg/L (03-08-23 @ 06:56)  D-Dimer Assay, Quantitative: 428 ng/mL DDU (03-08-23 @ 06:56)  Ferritin, Serum: 176 ng/mL (03-02-23 @ 10:41)                        7.3    4.79  )-----------( 100      ( 06 Mar 2023 05:51 )             22.5     03-06    130<L>  |  105  |  20  ----------------------------<  108<H>  4.5   |  21<L>  |  1.58<H>    Ca    8.4<L>      06 Mar 2023 05:51    Ferritin, Serum: 176 ng/mL (03-02-23 @ 10:41)                        8.1    5.86  )-----------( 118      ( 05 Mar 2023 10:00 )             24.9     03-05    132<L>  |  105  |  19  ----------------------------<  135<H>  4.7   |  23  |  1.51<H>    Ca    8.8      05 Mar 2023 10:00    Culture - Urine (collected 27 Feb 2023 04:29)  Source: Catheterized None  Final Report (28 Feb 2023 07:11):    No growth    Culture - Blood (collected 27 Feb 2023 03:27)  Source: .Blood None  Final Report (04 Mar 2023 09:00):    No Growth Final    Culture - Blood (collected 27 Feb 2023 00:50)  Source: .Blood Blood-Peripheral  Final Report (04 Mar 2023 09:00):    No Growth Final    Radiology: all available radiological tests reviewed    < from: CT Chest No Cont (03.05.23 @ 10:16) >  IMPRESSION: Bilateral lower lobe clustered nodular opacities, right   greater than left new since February 21, 2021 suggestive of endobronchial   spread of infection. Findings can be seen in the setting of aspiration given the distribution.     Bilateral interlobular septal thickening can be seen in the setting of pulmonary edema.  Trace right pleural effusion.    Enlarged azygoesophageal recess lymph node is without significant   interval change since February 21, 2021 can be monitored on the follow-up chest CT.  Aortic valve calcifications can be seen in the setting of aortic valve stenosis.  < end of copied text >    Advanced directives addressed: full resuscitation

## 2023-03-09 NOTE — PROGRESS NOTE ADULT - SUBJECTIVE AND OBJECTIVE BOX
CHIEF COMPLAINT:  Patient is a 89y old  Female who presents with a chief complaint of hyponatremia, lethargy (2023 10:06)      HPI: 23:  Pt. is a 89 yo F with arthritis, hx of HTN, gout, hyperlipidemia, hx of pelvic fracture, hx of chronic UTI with some urinary retention after post void trials in Dr. Carvalho office presents with bilateral hip and groin pain X 2 days.  Patient's daughter states she had recent urology evaluation and straight cath urines were recommended but they were difficult after only one day and she thinks patient maybe strained her hips trying to hold positioning during straight cath.  Patient then had indwelling pérez placed 3 days ago which has been draining well.  Patient is currently on bactrim for UTI.  Daughter states patient was also fatigued at home, not speaking much, and had malaise.  Patient refused to walk yesterday due to bilateral hip and pelvis pain. Denies falls.  Daughter sent patient via ambulance when she vomited once and could not take her PM dose of bactrim.  Patient denies abdominal pain, nausea, or chest pain.  O2 on arrival was 91% and nasal canula placed for comfort but patient denies SOB.    Initially pt was alert and lethargic. Daughter states mother was recently treated at home with laxatives for constipation and has been having multiple bowel movements.  In the ER she was found to have severe hyponatremia with N+=116, treated with hypertonic Saline with improvement and N+ up to 124 but last labs with N+=122.  Awaiting AM labs today.  She is more alert and less lethargic and less confused.  She had no cardiac symptoms at this time denying anginal chest pains or increased SOB.  She dose have HFpEF and moderate-severe AS by echo.  Echo in my office on 3/3/2022 showed normal LV systolic function with LVEF=65-70% with moderate LVH and diastolic LV dysfunction  Moderate-severe AS with MARVA=1.0-1.1cm2 with mild AI, mild MAC with mild MR and mild TR.      3/1/23:  More alert and less lethargic this AM.  Na+=124 this AM.  No clinical CHF or anginal chest pains.  Awaiting echo.  No new cardiac complaints.    3/2/23:  Resting comfortably without CHF or other cardiac symptoms.  Na+=129.  ECHO with severe AS (MARVA=0.8-0.9cm2) but no clinical symptoms yet.    3/3/23:  Resting and feeling better.  No clinical CHF, angina or syncope.  No new cardiac symptoms.  Tolerating IV NS.  Awaiting AM labs.    3/4/23:  Feeling good without increased SOB or chest pains.  Eager to go home.  Na+ still low.  Etiology for her hyponatremia still a question.  According to her daughter she does not restrict her Na intake much and does not drink a lot of water.    No new cardiac symptoms.    3/5/23:  Spiked a temp last night (100.9 F.) and had urinary retention requiring straight cath x 2.  Resting this AM.  Back on IV NS for Na+=128 again yesterday.  Awaiting AM labs.  No clinical CHF or other new cardiac symptoms.      3/6/23:  Resting this AM.  NA+=132 yesterday and 130 today.  Hgb down to 7.3.  Now COVID-19 (+) with CT of Chest suggest spread of infection???.  No other cardiac symptoms.      3/7/23:  Tolerating Zosyn antibiotics.  Temp down.  SOB about the same.  No new cardiac symptoms.  Awaiting AM labs today.    3/8/23:  Coughing but feeling slightly better.  No chest pains or increased SOB.  Tolerating antibiotics so far.  Na+=134 yesterday.  Awaiting AM labs.  No new cardiac symptoms.    3/9/23:  Still coughing but slowly improving.  No clinical CHF or anginal chest pains.  Na+=134 again yesterday with Creat down to 1.46 and BUN=19.  No new cardiac issues.  Pérez catheter still in.  Consider removing that and seeing if she can void on her own.  For rehab placement to [facility] planned for Saturday, 3/11/23.        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  HFpEF  HLD (hyperlipidemia)  Gout  Osteoarthritis  History of tonsillectomy-in childhood      FAMILY HISTORY:   FAMILY HISTORY:  non-contributory      ALLERGIES:  Allergies  Ceftin (Hives; Rash)      REVIEW OF SYSTEMS:  10 point ROS was obtained  Pertinent positives and negatives are as above  All other review of systems is negative unless indicated above      Vital Signs Last 24 Hrs  T(C): 36.3 (08 Mar 2023 22:09), Max: 37.1 (08 Mar 2023 07:37)  T(F): 97.3 (08 Mar 2023 22:09), Max: 98.8 (08 Mar 2023 07:37)  HR: 64 (09 Mar 2023 06:31) (61 - 65)  BP: 150/65 (09 Mar 2023 06:31) (114/41 - 170/53)  RR: 18 (08 Mar 2023 22:09) (18 - 19)  SpO2: 98% (08 Mar 2023 22:09) (93% - 98%): room air      I&O's Summary    2023 07:01  -  2023 07:00  --------------------------------------------------------  IN: 250 mL / OUT: 2000 mL / NET: -1750 mL        PHYSICAL EXAM:   Constitutional: NAD, awake and alert, well-developed  HEENT: PERR, EOMI, Normal Hearing, MMM  Neck: Soft and supple, No LAD, No JVD  Respiratory: Breath sounds are clear bilaterally, No wheezing, rales or rhonchi  Cardiovascular: S1 and S2, regular rate and rhythm, 2-3/6 LIANA at base and soft systolic murmur LLSB as before, +S4, No S3 gallops or rubs  Gastrointestinal: Bowel Sounds present, soft, nontender, nondistended, no guarding, no rebound  Extremities: No peripheral edema  Vascular: 2+ peripheral pulses  Neurological: no focal deficits      MEDICATIONS  (STANDING):  allopurinol 50 milliGRAM(s) Oral daily  amLODIPine   Tablet 5 milliGRAM(s) Oral daily  atorvastatin 20 milliGRAM(s) Oral at bedtime  bethanechol 50 milliGRAM(s) Oral two times a day  doxazosin 2 milliGRAM(s) Oral at bedtime  folic acid 1 milliGRAM(s) Oral daily  gabapentin 300 milliGRAM(s) Oral two times a day  heparin   Injectable 5000 Unit(s) SubCutaneous every 12 hours  hydrALAZINE 25 milliGRAM(s) Oral three times a day  metoprolol tartrate 25 milliGRAM(s) Oral two times a day  pantoprazole    Tablet 40 milliGRAM(s) Oral before breakfast  piperacillin/tazobactam IVPB.. 3.375 Gram(s) IV Intermittent every 8 hours  polyethylene glycol 3350 17 Gram(s) Oral daily  senna 2 Tablet(s) Oral at bedtime    MEDICATIONS  (PRN):  acetaminophen     Tablet .. 650 milliGRAM(s) Oral every 6 hours PRN Temp greater or equal to 38C (100.4F), Mild Pain (1 - 3)  benzonatate 100 milliGRAM(s) Oral every 8 hours PRN Cough  guaifenesin/dextromethorphan Oral Liquid 10 milliLiter(s) Oral every 6 hours PRN Cough      LABS: All Labs Reviewed:  COVID-19 PCR . (23 @ 08:38): Detected   Sedimentation Rate, Erythrocyte: 104 mm/hr (23 @ 06:56)                         9.7    3.48  )-----------( 96       ( 08 Mar 2023 06:56 )             28.7                9.2    3.88  )-----------( 97       ( 07 Mar 2023 08:08 )             28.5                          7.3    4.79  )-----------( 100      ( 06 Mar 2023 05:51 )             22.5                           8.8    5.47  )-----------( 129      ( 04 Mar 2023 05:59 )             26.4                           7.9    5.29  )-----------( 107      ( 02 Mar 2023 05:52 )             23.6                           9.6    8.73  )-----------( 94       ( 2023 06:44 )             28.1                           8.9    8.99  )-----------( 92       ( 2023 10:57 )             25.6     Sodium, Serum: 134 mmol/L (23 @ 06:56)   Sodium, Serum: 134 mmol/L (23 @ 08:08)   Sodium, Serum: 130 mmol/L (23 @ 05:51)   Sodium, Serum: 132 mmol/L (23 @ 10:00)   Sodium, Serum: 128 mmol/L (23 @ 05:59)   Sodium, Serum: 129 mmol/L (23 @ 05:52)   Sodium, Serum: 124 mmol/L (23 @ 00:32)   Sodium, Serum: 122 mmol/L (23 @ 00:17)   Sodium, Serum: 122 mmol/L (23 @ 20:35)   Sodium, Serum: 124 mmol/L (23 @ 16:36)     Sodium, Serum: 116 mmol/L  (23 @ 00:50)           134<L>  |  106  |  19  ----------------------------<  113<H>  4.0   |  22  |  1.46<H>    Ca    8.6      08 Mar 2023 06:56    TPro  6.4  /  Alb  2.8<L>  /  TBili  0.4  /  DBili  x   /  AST  37  /  ALT  43  /  AlkPhos  72  03-08      -07    134<L>  |  108  |  20  ----------------------------<  121<H>  4.1   |  22  |  1.51<H>    Ca    8.6      07 Mar 2023 08:08      03-06    130<L>  |  105  |  20  ----------------------------<  108<H>  4.5   |  21<L>  |  1.58<H>    Ca    8.4<L>      06 Mar 2023 05:51      03-04    128<L>  |  101  |  21  ----------------------------<  113<H>  4.6   |  22  |  1.28    Ca    9.2      04 Mar 2023 05:59  Phos  2.5     03-03      -02    129<L>  |  100  |  19  ----------------------------<  116<H>  4.4   |  22  |  1.56<H>    Ca    8.6      02 Mar 2023 05:52  Phos  2.9     -  Mg     2.2         TPro  6.3  /  Alb  2.8<L>  /  TBili  0.4  /  DBili  x   /  AST  35  /  ALT  22  /  AlkPhos  60      124<L>  |  96  |  21  ----------------------------<  108<H>  4.5   |  20<L>  |  1.90<H>    Ca    8.2<L>      01 Mar 2023 00:32  Phos  3.3     -  Mg     2.2         TPro  6.3  /  Alb  2.8<L>  /  TBili  0.4  /  DBili  x   /  AST  35  /  ALT  22  /  AlkPhos  60      122<L>  |  93<L>  |  19  ----------------------------<  129<H>  4.3   |  22  |  1.83<H>    Ca    9.2      2023 00:17  Phos  2.9       Mg     2.2         TPro  6.5  /  Alb  3.1<L>  /  TBili  0.5  /  DBili  x   /  AST  40<H>  /  ALT  21  /  AlkPhos  59        CARDIAC MARKERS ( 2023 00:50 )  x     / x     / 403 U/L / x     / x          BLOOD CULTURES: Organism --  Gram Stain Urine -- Gram Stain --  Specimen Source Catheterized None  Culture-Urine --  Culture Results: No growth (23 @ 04:29)       LIPID PROFILE     RADIOLOGY:    CT of Chest: 3/5/23:  INTERPRETATION:  Clinical indication: Cough, hyponatremia.  Axial CT images of the chest are obtained without intravenous administration of contrast.  Comparison is made with the chest CT of 2021.  No enlarged axillary lymph nodes. Vertically oriented enlarged azygoesophageal recess lymph node adjacent to the lower portion of the esophagus, part of which measures about 1.3 x 2 cm as measured on the axial images is without significant interval change. Multiple other smaller mediastinal lymph nodes are also without significant interval change.  No pericardial effusion. Mitral annular calcifications. Vascular calcifications with involvement of the aorta and the coronary arteries.   Aortic valve calcifications.  Trace right pleural effusion.  Evaluation of the upper abdomen demonstrate cholelithiasis. Small hiatal hernia. Subcentimeter left renal hypodensity which is too small to characterize. Mild cortical atrophy of the left kidney as on the prior study.  Evaluation of the lungs demonstrate bilateral interlobular septal thickening. Bilateral lower lobe clustered nodular opacities with the largest nodular component within the superior segment of the right lower lobe new since the prior study measuring up to about 1.5 cm.  Left calcified pleural plaque is unchanged.  No central endobronchial lesions.  Degenerative changes of the spine.  IMPRESSION: Bilateral lower lobe clustered nodular opacities, right greater than left new since 2021 suggestive of endobronchial spread of infection. Findings can be seen in the setting of aspiration given the distribution. 3 month follow-up noncontrast chest CT is recommended to ensure resolution.  Bilateral interlobular septal thickening can be seen in the setting of pulmonary edema.  Trace right pleural effusion.  Enlarged azygoesophageal recess lymph node is without significant interval change since 2021 can be monitored on the follow-up chest CT.  Aortic valve calcifications can be seen in the setting of aortic valve stenosis.    CXR: 3/5/23:  The cardiomediastinal silhouette is normal and the dalila are not enlarged. Aortic calcification. The trachea is midline. Interval improved aeration. No focal lung consolidation or sizable pleural effusion. No significant osseous abnormality.  IMPRESSION:  No focal lung consolidation or sizable pleural effusion.    X-Ray of Abd: 23:  FINDINGS:  Mild air distended colon and mid abdominal small bowel.  No free intra-abdominal air.  No obvious intra-abdominal masses.  LEFT upper quadrant and iliac vasculature all calcifications. The osseous structures are intact.  IMPRESSION:  Mild air distended transverse colon and mid abdominal small bowel.. No radiographic evidence of bowel obstruction.    X-Ray of Hips and CXR:  23:  INTERPRETATION:  Lateral hips and pelvis and chest.  Patient has hip pain.  Patient has vomiting.  Bilateral hips with pelvis. 5 views.  Arterial calcifications are noted.  Degenerative loss of disc height at multiple lower lumbar levels with slight right lower lumbar curve noted.  There is mild symmetric hip degeneration. No fracture.  AP chest on 2023 at 1:45 AM.  Heart magnified by technique.  There are bilateral perihilar infiltrates right greater than left which are new since  this year. Configuration suggests infection.  IMPRESSION: No acute fracture. Extensive arterial calcifications.  Bilateral perihilar infiltrates new since prior suggesting infection.      EK23:  Normal sinus rhythm  Normal ECG      TELEMETRY:  NSR      ECHO:  3/1/23:  M-Mode Measurements (cm)   LVEDd: 3.97 cm            LVESd: 2.55 cm   IVSEd: 1.1 cm   LVPWd: 1.1 cm             AO Root Dimension: 2.8 cm                             LA: 3.5 cm                LVOT: 2 cm  Doppler Measurements:   AV Velocity:434 cm/s                  MV Peak E-Wave: 98.7 cm/s   AV Peak Gradient: 75.34 mmHg          MV Peak A-Wave: 131 cm/s   AV Mean Gradient: 40 mmHg             MV E/A Ratio: 0.75 %   AV Area (Continuity):0.85 cm^2        MV Peak Gradient: 3.9 mmHg   TR Velocity:190 cm/s   TR Gradient:14.44 mmHg   Estimated RAP:5 mmHg   RVSP:27 mmHg    Findings  Mitral Valve   The mitral valve leaflets appear thickened.   EA reversal of the mitral inflow consistent with reduced compliance of the left ventricle.   Mild mitral annular calcification is present.   Mild mitral regurgitation is present.    Aortic Valve   Peak and mean transaortic gradients are 75 and 40mmHg respectively; this finding is consistent with severe aortic stenosis.   Mild (1+) aortic regurgitation is present.    Tricuspid Valve   Trace tricuspid valve regurgitation is present.    Pulmonic Valve   Mild pulmonic valvular regurgitation (1+) is present.    Left Atrium   Normal appearing left atrium.    Left Ventricle   Mild concentric left ventricular hypertrophy is present.   The left ventricle is normal in size.   Estimated left ventricular ejection fraction is 65-70 %.    Right Atrium   Normal appearing right atrium.    Right Ventricle   Normal appearing right ventricle structure and function.    Pericardial Effusion   No evidence of pericardial effusion.    Pleural Effusion   No evidence of pleural effusion.    Miscellaneous   The IVC appears normal.    Summary   The mitral valve leaflets appear thickened.   EA reversal of the mitral inflow consistent with reduced compliance of the left ventricle.   Mild mitral annular calcification is present.   Mild mitral regurgitation is present.   Peak and mean transaortic gradients are 75 and 40mmHg respectively; this finding is consistent with severe aortic stenosis.   Mild (1+) aortic regurgitation is present.   Trace tricuspid valve regurgitation is present.   Mild pulmonic valvular regurgitation (1+) is present.   Normal appearing left atrium.   Mild concentric left ventricular hypertrophy is present.   The left ventricle is normal in size.   Estimated left ventricular ejection fraction is 65-70 %.   Normal appearing right atrium.   Normal appearing right ventricle structure and function.   The IVC appears normal.   No evidence of pericardial effusion.   No evidence of pleural effusion.    Signature   ----------------------------------------------------------------   Electronically signed by Sakina Cheatham MD, Director of   Cardiac Cath Lab(Interpreting physician) on 2023 06:42   PM   ----------------------------------------------------------------

## 2023-03-10 PROCEDURE — 99232 SBSQ HOSP IP/OBS MODERATE 35: CPT

## 2023-03-10 RX ORDER — HYDRALAZINE HCL 50 MG
50 TABLET ORAL THREE TIMES A DAY
Refills: 0 | Status: DISCONTINUED | OUTPATIENT
Start: 2023-03-10 | End: 2023-03-12

## 2023-03-10 RX ADMIN — PANTOPRAZOLE SODIUM 40 MILLIGRAM(S): 20 TABLET, DELAYED RELEASE ORAL at 06:11

## 2023-03-10 RX ADMIN — Medication 50 MILLIGRAM(S): at 11:02

## 2023-03-10 RX ADMIN — PIPERACILLIN AND TAZOBACTAM 25 GRAM(S): 4; .5 INJECTION, POWDER, LYOPHILIZED, FOR SOLUTION INTRAVENOUS at 11:20

## 2023-03-10 RX ADMIN — Medication 25 MILLIGRAM(S): at 22:06

## 2023-03-10 RX ADMIN — POLYETHYLENE GLYCOL 3350 17 GRAM(S): 17 POWDER, FOR SOLUTION ORAL at 11:09

## 2023-03-10 RX ADMIN — Medication 25 MILLIGRAM(S): at 06:11

## 2023-03-10 RX ADMIN — HEPARIN SODIUM 5000 UNIT(S): 5000 INJECTION INTRAVENOUS; SUBCUTANEOUS at 11:00

## 2023-03-10 RX ADMIN — HEPARIN SODIUM 5000 UNIT(S): 5000 INJECTION INTRAVENOUS; SUBCUTANEOUS at 22:07

## 2023-03-10 RX ADMIN — PIPERACILLIN AND TAZOBACTAM 25 GRAM(S): 4; .5 INJECTION, POWDER, LYOPHILIZED, FOR SOLUTION INTRAVENOUS at 06:10

## 2023-03-10 RX ADMIN — PIPERACILLIN AND TAZOBACTAM 25 GRAM(S): 4; .5 INJECTION, POWDER, LYOPHILIZED, FOR SOLUTION INTRAVENOUS at 22:05

## 2023-03-10 RX ADMIN — Medication 650 MILLIGRAM(S): at 11:49

## 2023-03-10 RX ADMIN — Medication 25 MILLIGRAM(S): at 14:38

## 2023-03-10 RX ADMIN — AMLODIPINE BESYLATE 5 MILLIGRAM(S): 2.5 TABLET ORAL at 11:02

## 2023-03-10 RX ADMIN — GABAPENTIN 300 MILLIGRAM(S): 400 CAPSULE ORAL at 11:02

## 2023-03-10 RX ADMIN — Medication 1 MILLIGRAM(S): at 11:02

## 2023-03-10 RX ADMIN — Medication 50 MILLIGRAM(S): at 11:01

## 2023-03-10 RX ADMIN — SENNA PLUS 2 TABLET(S): 8.6 TABLET ORAL at 22:07

## 2023-03-10 RX ADMIN — Medication 650 MILLIGRAM(S): at 11:19

## 2023-03-10 RX ADMIN — ATORVASTATIN CALCIUM 20 MILLIGRAM(S): 80 TABLET, FILM COATED ORAL at 22:05

## 2023-03-10 RX ADMIN — Medication 650 MILLIGRAM(S): at 23:27

## 2023-03-10 RX ADMIN — Medication 50 MILLIGRAM(S): at 22:07

## 2023-03-10 RX ADMIN — Medication 2 MILLIGRAM(S): at 22:07

## 2023-03-10 RX ADMIN — GABAPENTIN 300 MILLIGRAM(S): 400 CAPSULE ORAL at 22:06

## 2023-03-10 RX ADMIN — Medication 50 MILLIGRAM(S): at 23:27

## 2023-03-10 NOTE — PROGRESS NOTE ADULT - SUBJECTIVE AND OBJECTIVE BOX
Date of service: 03-10-23 @ 16:29    Lying in bed in NAD  Has dry cough  No fever    ROS: no fever or chills; no HA, no abdominal pain, no diarrhea or constipation; no dysuria, no legs pain, no rashes    MEDICATIONS  (STANDING):  allopurinol 50 milliGRAM(s) Oral daily  amLODIPine   Tablet 5 milliGRAM(s) Oral daily  atorvastatin 20 milliGRAM(s) Oral at bedtime  bethanechol 50 milliGRAM(s) Oral two times a day  doxazosin 2 milliGRAM(s) Oral at bedtime  folic acid 1 milliGRAM(s) Oral daily  gabapentin 300 milliGRAM(s) Oral two times a day  heparin   Injectable 5000 Unit(s) SubCutaneous every 12 hours  hydrALAZINE 25 milliGRAM(s) Oral three times a day  metoprolol tartrate 25 milliGRAM(s) Oral two times a day  pantoprazole    Tablet 40 milliGRAM(s) Oral before breakfast  piperacillin/tazobactam IVPB.. 3.375 Gram(s) IV Intermittent every 8 hours  polyethylene glycol 3350 17 Gram(s) Oral daily  senna 2 Tablet(s) Oral at bedtime    Vital Signs Last 24 Hrs  T(C): 36.3 (10 Mar 2023 16:12), Max: 37 (10 Mar 2023 08:45)  T(F): 97.3 (10 Mar 2023 16:12), Max: 98.6 (10 Mar 2023 08:45)  HR: 58 (10 Mar 2023 16:12) (54 - 66)  BP: 169/51 (10 Mar 2023 16:12) (137/47 - 179/57)  BP(mean): --  RR: 18 (10 Mar 2023 16:12) (18 - 19)  SpO2: 98% (10 Mar 2023 16:12) (94% - 99%)    Parameters below as of 10 Mar 2023 16:12  Patient On (Oxygen Delivery Method): room air         Physical exam:    Constitutional:  No acute distress  HEENT: NC/AT, EOMI, PERRLA, conjunctivae clear; ears and nose atraumatic  Neck: supple; thyroid not palpable  Back: no tenderness  Respiratory: respiratory effort normal; crackles at bases  Cardiovascular: S1S2 regular, no murmurs  Abdomen: soft, not tender, not distended, positive BS  Genitourinary: no suprapubic tenderness  Lymphatic: no LN palpable  Musculoskeletal: no muscle tenderness, no joint swelling or tenderness  Extremities: no pedal edema  Neurological/ Psychiatric: Alert, moving all extremities  Skin: no rashes; no palpable lesions    Labs: reviewed                        9.7    3.48  )-----------( 96       ( 08 Mar 2023 06:56 )             28.7     03-09    136  |  109<H>  |  18  ----------------------------<  109<H>  4.2   |  22  |  1.54<H>    Ca    8.5      09 Mar 2023 06:28    TPro  6.4  /  Alb  2.8<L>  /  TBili  0.4  /  DBili  x   /  AST  37  /  ALT  43  /  AlkPhos  72  03-08    C-Reactive Protein, Serum: 55 mg/L (03-08-23 @ 06:56)  D-Dimer Assay, Quantitative: 428 ng/mL DDU (03-08-23 @ 06:56)  Ferritin, Serum: 176 ng/mL (03-02-23 @ 10:41)                        7.3    4.79  )-----------( 100      ( 06 Mar 2023 05:51 )             22.5     03-06    130<L>  |  105  |  20  ----------------------------<  108<H>  4.5   |  21<L>  |  1.58<H>    Ca    8.4<L>      06 Mar 2023 05:51    Ferritin, Serum: 176 ng/mL (03-02-23 @ 10:41)                        8.1    5.86  )-----------( 118      ( 05 Mar 2023 10:00 )             24.9     03-05    132<L>  |  105  |  19  ----------------------------<  135<H>  4.7   |  23  |  1.51<H>    Ca    8.8      05 Mar 2023 10:00    Culture - Urine (collected 27 Feb 2023 04:29)  Source: Catheterized None  Final Report (28 Feb 2023 07:11):    No growth    Culture - Blood (collected 27 Feb 2023 03:27)  Source: .Blood None  Final Report (04 Mar 2023 09:00):    No Growth Final    Culture - Blood (collected 27 Feb 2023 00:50)  Source: .Blood Blood-Peripheral  Final Report (04 Mar 2023 09:00):    No Growth Final    Radiology: all available radiological tests reviewed    < from: CT Chest No Cont (03.05.23 @ 10:16) >  IMPRESSION: Bilateral lower lobe clustered nodular opacities, right   greater than left new since February 21, 2021 suggestive of endobronchial   spread of infection. Findings can be seen in the setting of aspiration given the distribution.     Bilateral interlobular septal thickening can be seen in the setting of pulmonary edema.  Trace right pleural effusion.    Enlarged azygoesophageal recess lymph node is without significant   interval change since February 21, 2021 can be monitored on the follow-up chest CT.  Aortic valve calcifications can be seen in the setting of aortic valve stenosis.  < end of copied text >    Advanced directives addressed: full resuscitation

## 2023-03-10 NOTE — PROGRESS NOTE ADULT - PROBLEM SELECTOR PROBLEM 2
Severe aortic stenosis

## 2023-03-10 NOTE — PROGRESS NOTE ADULT - ASSESSMENT
89 yo female with PMHx arthritis,  AS,  HTN, gout, hyperlipidemia, hx of pelvic fracture,  UTIs ,  urinary retention , s/p Perales p admitted  for:     #COVID/ Aspiration? Pneumonia   Patient now positive 3/5 for COVID.    Low grade temps/ cough.    CT chest with bilateral lower lobe nodular opacities-- may be infection related.    No hypoxia.    Cont Zosyn day # ^  Elevated CRP/ ESR  likeoy due to above     #Chronic Anemia:    Hgb normally~ 10 per records.    Was on procrit but many years ago.    Hgb down to 7.2.    03/06 Transfused 1 unit pRBC.    Repeat labs in am.    No obvious bleeding.      #Hyponatremia 2/2 hypovolemia improving   Lasix  use and GI losses  improved Na 130'-134s      #Urinary retention- straight cath  pt can have OP f/up, no procedure can restore voiding, c/e present meds, add Flomax  Urology planning to DC on Perales, follow up as outpatient     #H/o HFpEF,    S/p IVF, CT chest with read for fluid overload   nephrology f/up re: diuresis- not overloaded per nephrology; no diuretic for now    #Mild thrombocytopenia  repeat cbc in am    #HTN   Patient on Hydralazine 25mg tid   Amlodipine 5mg   Metoprolol 25mg bid  I will increase hydralazine to 50mg tid     #CKD  stage 3  monitor creatinine   Nephrology following     # Age/illness  related muscle weakness deconditioning  Consulted PT   Planning dc to [facility] / vs Home with Home Care   I called for [facility] at [phone], Where close , I left my call back number .     #DVT Proph:  on Heparin Sc  I spoke with Patient's Son Evan [phone]. updated about current condition and treatment plan.      89 yo female with PMHx arthritis,  AS,  HTN, gout, hyperlipidemia, hx of pelvic fracture,  UTIs ,  urinary retention , s/p Perales p admitted  for:     #COVID/ Aspiration? Pneumonia   Patient now positive 3/5 for COVID.    Low grade temps/ cough.    CT chest with bilateral lower lobe nodular opacities-- may be infection related.    No hypoxia.    Cont Zosyn day # ^  Elevated CRP/ ESR  likeoy due to above     #Chronic Anemia:    Hgb normally~ 10 per records.    Was on procrit but many years ago.    Hgb down to 7.2.    03/06 Transfused 1 unit pRBC.    Repeat labs in am.    No obvious bleeding.      #Hyponatremia 2/2 hypovolemia improving   Lasix  use and GI losses  improved Na 130'-134s      #Urinary retention- straight cath  pt can have OP f/up, no procedure can restore voiding, c/e present meds, add Flomax  Urology planning to DC on Perales, follow up as outpatient     #H/o HFpEF,    S/p IVF, CT chest with read for fluid overload   nephrology f/up re: diuresis- not overloaded per nephrology; no diuretic for now    #Mild thrombocytopenia  repeat cbc in am    #HTN   Patient on Hydralazine 25mg tid   Amlodipine 5mg   Metoprolol 25mg bid  I will increase hydralazine to 50mg tid     #CKD  stage 3  monitor creatinine   Nephrology following     # Age/illness  related muscle weakness deconditioning  Consulted PT   Planning dc to [facility] / vs Home with Home Care   I called for [facility] at [phone], Where close , I left my call back number .   Waiting Insurance auth    #DVT Proph:  on Heparin Sc  I spoke with Patient's Son Dr Gordon [phone]. updated about current condition and treatment plan.

## 2023-03-10 NOTE — PROGRESS NOTE ADULT - PROBLEM SELECTOR PROBLEM 1
Acute hyponatremia

## 2023-03-10 NOTE — PROGRESS NOTE ADULT - SUBJECTIVE AND OBJECTIVE BOX
CC: severe hyponatremia (01 Mar 2023 07:23)    HPI:  89 yo F with arthritis, hx of HTN, gout, hyperlipidemia, hx of pelvic fracture,  UTIs  with urinary retention after post void trials in Dr. Bonilla's office presented with bilateral hip and groin pain X 2 days.  Patient's daughter stated  Pt  had recent urology evaluation and to  straight cath urine were recommended but they were difficult after only one day and she thinks patient maybe strained her hips trying to hold positioning during straight cath.  Patient then had indwelling pérez placed 3 days PTA  which has been draining well.  Patient is currently on bactrim for UTI.  Daughter stated  patient was also fatigued at home, not speaking much, and had malaise.  Patient refused to walk  day PTA due to bilateral hip and pelvis pain. No  falls.  +  vomiting  once and could not take her PM dose of bactrim.  Patient denies abdominal pain, nausea, or chest pain.  O2 on arrival was 91% and nasal canula placed for comfort but patient denies SOB.    3/5:  Na improved  low grade temp and dry cough, continues with urinary retention, borderline BP this morning  3/6:  Pt seen.  No new complaints.  Low grade temps.  COVID + now.  Denies SOB.  + cough  03/07 Patient sitting i a chair Sporadic cough , tolerating Diet  on room air   03/08 C/o Dry cough specially at night   03/09 dry cough improving   03/10 Sitting  up the chair , cough improving , on room air , afebrile     ROS:   All 10 systems reviewed and found to be negative with the exception of what has been described above.    Vital Signs Last 24 Hrs  PHYSICAL EXAM:  Vital Signs Last 24 Hrs  T(C): 37 (10 Mar 2023 08:45), Max: 37 (10 Mar 2023 08:45)  T(F): 98.6 (10 Mar 2023 08:45), Max: 98.6 (10 Mar 2023 08:45)  HR: 54 (10 Mar 2023 14:46) (54 - 66)  BP: 139/49 (10 Mar 2023 14:46) (132/46 - 179/57)  BP(mean): --  RR: 18 (10 Mar 2023 14:46) (18 - 19)  SpO2: 99% (10 Mar 2023 14:46) (94% - 99%)  Parameters below as of 10 Mar 2023 14:46  Patient On (Oxygen Delivery Method): room air  General: Well developed; in no acute distress  Eyes: EOMI; conjunctiva and sclera clear  Head: Normocephalic; atraumatic  ENMT: No nasal discharge; airway clear  Neck: Supple; non tender; no masses  Respiratory: Decreased BS at bases,  No wheezes, rales or rhonchi  Cardiovascular: Regular rate and rhythm. S1 and S2 Normal; + LIANA  Gastrointestinal: Soft non-tender non-distended; Normal bowel sounds  Genitourinary: No  suprapubic  tenderness  Extremities: No  edema  Vascular: Peripheral pulses palpable 2+ bilaterally  Neurological: Alert and oriented x2, non focal   Musculoskeletal: Normal muscle tone, without deformities  Psychiatric: Cooperative and appropriate      03-09    136  |  109<H>  |  18  ----------------------------<  109<H>  4.2   |  22  |  1.54<H>    Ca    8.5      09 Mar 2023 06:28      CAPILLARY BLOOD GLUCOSE          MEDICATIONS  (STANDING):  allopurinol 50 milliGRAM(s) Oral daily  amLODIPine   Tablet 5 milliGRAM(s) Oral daily  atorvastatin 20 milliGRAM(s) Oral at bedtime  bethanechol 50 milliGRAM(s) Oral two times a day  doxazosin 2 milliGRAM(s) Oral at bedtime  folic acid 1 milliGRAM(s) Oral daily  gabapentin 300 milliGRAM(s) Oral two times a day  heparin   Injectable 5000 Unit(s) SubCutaneous every 12 hours  hydrALAZINE 25 milliGRAM(s) Oral three times a day  metoprolol tartrate 25 milliGRAM(s) Oral two times a day  pantoprazole    Tablet 40 milliGRAM(s) Oral before breakfast  piperacillin/tazobactam IVPB.. 3.375 Gram(s) IV Intermittent every 8 hours  polyethylene glycol 3350 17 Gram(s) Oral daily  senna 2 Tablet(s) Oral at bedtime    MEDICATIONS  (PRN):  acetaminophen     Tablet .. 650 milliGRAM(s) Oral every 6 hours PRN Temp greater or equal to 38C (100.4F), Mild Pain (1 - 3)  benzonatate 100 milliGRAM(s) Oral every 8 hours PRN Cough  guaifenesin/dextromethorphan Oral Liquid 10 milliLiter(s) Oral every 6 hours PRN Cough      I&O's Summary    09 Mar 2023 07:01  -  10 Mar 2023 07:00  --------------------------------------------------------  IN: 0 mL / OUT: 700 mL / NET: -700 mL    10 Mar 2023 07:01  -  10 Mar 2023 15:03  --------------------------------------------------------  IN: 0 mL / OUT: 300 mL / NET: -300 mL                    CT Chest No Cont (03.05.23 @ 10:16) >  IMPRESSION: Bilateral lower lobe clustered nodular opacities, right   greater than left new since February 21, 2021 suggestive of endobronchial   spread of infection. Findings can be seen in the setting of aspiration   given the distribution. 3 month follow-up noncontrast chest CT is   recommended to ensure resolution.    Bilateral interlobular septal thickening can be seen in the setting of   pulmonary edema.    Trace right pleural effusion.    Enlarged azygoesophageal recess lymph node is without significant   interval change since February 21, 2021 can be monitored on the follow-up   chest CT.    Aortic valve calcifications can be seen in the setting of aortic valve   stenosis.

## 2023-03-10 NOTE — PROGRESS NOTE ADULT - ASSESSMENT
89 y/o Female with h/o arthritis, HTN, gout, hyperlipidemia, pelvic fracture, chronic UTI with urinary retention after post void trials was admitted on 2/27 for bilateral hip and groin pain X 2 days. Patient's daughter states she had recent urology evaluation and straight cath urines were recommended but they were difficult after only one day and she thinks patient maybe strained her hips trying to hold positioning during straight cath. Patient then had indwelling pérez placed 3 days PTA which has been draining well. Patient received bactrim for UTI. Daughter states patient was also fatigued at home, not speaking much, and had malaise. Patient refused to walk due to bilateral hip and pelvis pain. Daughter sent patient via ambulance when she vomited once and could not take her PM dose of bactrim. Upon admission she received zosyn IV x 1. On 3/5 she was noted febrile to 100.9F. A CT chest noted pulmonary infiltrates suggestive of aspiration.     1. Aspiration pneumonia. Multiple nodular opacities in lower pulmonary lobes. COVID-19 viral syndrome. CRF stage 3. Metabolic encephalopathy.   -more alert  -low grade fever  -no leukocytosis  -BC x 2, urine c/s noted  -on zosyn 3.375 gm IV q8h # 6  -tolerating abx well so far; no side effects noted  -continue abx coverage for 1-2 more days  -aspiration precautions  -no hypoxia  -monitor respiratory status  -continue abx coverage for now, may complete abx in 1-2 days  -monitor temps  -f/u CBC  -supportive care  2. Other issues:   -care per medicine

## 2023-03-10 NOTE — PROGRESS NOTE ADULT - SUBJECTIVE AND OBJECTIVE BOX
CHIEF COMPLAINT:  Patient is a 89y old  Female who presents with a chief complaint of hyponatremia, lethargy (2023 10:06)      HPI: 23:  Pt. is a 89 yo F with arthritis, hx of HTN, gout, hyperlipidemia, hx of pelvic fracture, hx of chronic UTI with some urinary retention after post void trials in Dr. Carvalho office presents with bilateral hip and groin pain X 2 days.  Patient's daughter states she had recent urology evaluation and straight cath urines were recommended but they were difficult after only one day and she thinks patient maybe strained her hips trying to hold positioning during straight cath.  Patient then had indwelling pérez placed 3 days ago which has been draining well.  Patient is currently on bactrim for UTI.  Daughter states patient was also fatigued at home, not speaking much, and had malaise.  Patient refused to walk yesterday due to bilateral hip and pelvis pain. Denies falls.  Daughter sent patient via ambulance when she vomited once and could not take her PM dose of bactrim.  Patient denies abdominal pain, nausea, or chest pain.  O2 on arrival was 91% and nasal canula placed for comfort but patient denies SOB.    Initially pt was alert and lethargic. Daughter states mother was recently treated at home with laxatives for constipation and has been having multiple bowel movements.  In the ER she was found to have severe hyponatremia with N+=116, treated with hypertonic Saline with improvement and N+ up to 124 but last labs with N+=122.  Awaiting AM labs today.  She is more alert and less lethargic and less confused.  She had no cardiac symptoms at this time denying anginal chest pains or increased SOB.  She dose have HFpEF and moderate-severe AS by echo.  Echo in my office on 3/3/2022 showed normal LV systolic function with LVEF=65-70% with moderate LVH and diastolic LV dysfunction  Moderate-severe AS with MARVA=1.0-1.1cm2 with mild AI, mild MAC with mild MR and mild TR.      3/1/23:  More alert and less lethargic this AM.  Na+=124 this AM.  No clinical CHF or anginal chest pains.  Awaiting echo.  No new cardiac complaints.    3/2/23:  Resting comfortably without CHF or other cardiac symptoms.  Na+=129.  ECHO with severe AS (MARVA=0.8-0.9cm2) but no clinical symptoms yet.    3/3/23:  Resting and feeling better.  No clinical CHF, angina or syncope.  No new cardiac symptoms.  Tolerating IV NS.  Awaiting AM labs.    3/4/23:  Feeling good without increased SOB or chest pains.  Eager to go home.  Na+ still low.  Etiology for her hyponatremia still a question.  According to her daughter she does not restrict her Na intake much and does not drink a lot of water.    No new cardiac symptoms.    3/5/23:  Spiked a temp last night (100.9 F.) and had urinary retention requiring straight cath x 2.  Resting this AM.  Back on IV NS for Na+=128 again yesterday.  Awaiting AM labs.  No clinical CHF or other new cardiac symptoms.      3/6/23:  Resting this AM.  NA+=132 yesterday and 130 today.  Hgb down to 7.3.  Now COVID-19 (+) with CT of Chest suggest spread of infection???.  No other cardiac symptoms.      3/7/23:  Tolerating Zosyn antibiotics.  Temp down.  SOB about the same.  No new cardiac symptoms.  Awaiting AM labs today.    3/8/23:  Coughing but feeling slightly better.  No chest pains or increased SOB.  Tolerating antibiotics so far.  Na+=134 yesterday.  Awaiting AM labs.  No new cardiac symptoms.    3/9/23:  Still coughing but slowly improving.  No clinical CHF or anginal chest pains.  Na+=134 again yesterday with Creat down to 1.46 and BUN=19.  No new cardiac issues.  Pérez catheter still in.  Consider removing that and seeing if she can void on her own.  For rehab placement to [facility] planned for Saturday, 3/11/23.    3/10/23:  Failed voiding trial and Pérez in place followed by urology.  Cough improving.  Na+=136 (yesterday).  Awaiting AM labs.  No new cardiac issues.  For possible transfer to rehab tomorrow.        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  HFpEF  HLD (hyperlipidemia)  Gout  Osteoarthritis  History of tonsillectomy-in childhood      FAMILY HISTORY:   FAMILY HISTORY:  non-contributory      ALLERGIES:  Allergies  Ceftin (Hives; Rash)      REVIEW OF SYSTEMS:  10 point ROS was obtained  Pertinent positives and negatives are as above  All other review of systems is negative unless indicated above      Vital Signs Last 24 Hrs  T(C): 36.5 (09 Mar 2023 21:18), Max: 36.9 (09 Mar 2023 07:51)  T(F): 97.7 (09 Mar 2023 21:18), Max: 98.4 (09 Mar 2023 07:51)  HR: 64 (09 Mar 2023 21:18) (55 - 64)  BP: 164/47 (09 Mar 2023 21:18) (132/46 - 164/47)  RR: 19 (09 Mar 2023 21:18) (18 - 20)  SpO2: 95% (09 Mar 2023 21:18) (92% - 97%): room air      I&O's Summary    2023 07:01  -  2023 07:00  --------------------------------------------------------  IN: 250 mL / OUT: 2000 mL / NET: -1750 mL        PHYSICAL EXAM:   Constitutional: NAD, awake and alert, well-developed  HEENT: PERR, EOMI, Normal Hearing, MMM  Neck: Soft and supple, No LAD, No JVD  Respiratory: Breath sounds are clear bilaterally, No wheezing, rales or rhonchi  Cardiovascular: S1 and S2, regular rate and rhythm, 2-3/6 LIANA at base and soft systolic murmur LLSB as before, +S4, No S3 gallops or rubs  Gastrointestinal: Bowel Sounds present, soft, nontender, nondistended, no guarding, no rebound  Extremities: No peripheral edema  Vascular: 2+ peripheral pulses  Neurological: no focal deficits      MEDICATIONS  (STANDING):  allopurinol 50 milliGRAM(s) Oral daily  amLODIPine   Tablet 5 milliGRAM(s) Oral daily  atorvastatin 20 milliGRAM(s) Oral at bedtime  bethanechol 50 milliGRAM(s) Oral two times a day  doxazosin 2 milliGRAM(s) Oral at bedtime  folic acid 1 milliGRAM(s) Oral daily  gabapentin 300 milliGRAM(s) Oral two times a day  heparin   Injectable 5000 Unit(s) SubCutaneous every 12 hours  hydrALAZINE 25 milliGRAM(s) Oral three times a day  metoprolol tartrate 25 milliGRAM(s) Oral two times a day  pantoprazole    Tablet 40 milliGRAM(s) Oral before breakfast  piperacillin/tazobactam IVPB.. 3.375 Gram(s) IV Intermittent every 8 hours  polyethylene glycol 3350 17 Gram(s) Oral daily  senna 2 Tablet(s) Oral at bedtime    MEDICATIONS  (PRN):  acetaminophen     Tablet .. 650 milliGRAM(s) Oral every 6 hours PRN Temp greater or equal to 38C (100.4F), Mild Pain (1 - 3)  benzonatate 100 milliGRAM(s) Oral every 8 hours PRN Cough  guaifenesin/dextromethorphan Oral Liquid 10 milliLiter(s) Oral every 6 hours PRN Cough      LABS: All Labs Reviewed:  COVID-19 PCR . (23 @ 08:38): Detected   Sedimentation Rate, Erythrocyte: 104 mm/hr (23 @ 06:56)                         9.7    3.48  )-----------( 96       ( 08 Mar 2023 06:56 )             28.7                9.2    3.88  )-----------( 97       ( 07 Mar 2023 08:08 )             28.5                          7.3    4.79  )-----------( 100      ( 06 Mar 2023 05:51 )             22.5                           8.8    5.47  )-----------( 129      ( 04 Mar 2023 05:59 )             26.4                           7.9    5.29  )-----------( 107      ( 02 Mar 2023 05:52 )             23.6                           9.6    8.73  )-----------( 94       ( 2023 06:44 )             28.1                           8.9    8.99  )-----------( 92       ( 2023 10:57 )             25.6     Sodium, Serum: 136 mmol/L (23 @ 06:28)   Sodium, Serum: 134 mmol/L (23 @ 06:56)   Sodium, Serum: 134 mmol/L (23 @ 08:08)   Sodium, Serum: 130 mmol/L (23 @ 05:51)   Sodium, Serum: 132 mmol/L (23 @ 10:00)   Sodium, Serum: 128 mmol/L (23 @ 05:59)   Sodium, Serum: 129 mmol/L (23 @ 05:52)   Sodium, Serum: 124 mmol/L (23 @ 00:32)   Sodium, Serum: 122 mmol/L (23 @ 00:17)   Sodium, Serum: 122 mmol/L (23 @ 20:35)   Sodium, Serum: 124 mmol/L (23 @ 16:36)     Sodium, Serum: 116 mmol/L  (23 @ 00:50)           136  |  109<H>  |  18  ----------------------------<  109<H>  4.2   |  22  |  1.54<H>    Ca    8.5      09 Mar 2023 06:28      03-08    134<L>  |  106  |  19  ----------------------------<  113<H>  4.0   |  22  |  1.46<H>    Ca    8.6      08 Mar 2023 06:56    TPro  6.4  /  Alb  2.8<L>  /  TBili  0.4  /  DBili  x   /  AST  37  /  ALT  43  /  AlkPhos  72  03-08      03-07    134<L>  |  108  |  20  ----------------------------<  121<H>  4.1   |  22  |  1.51<H>    Ca    8.6      07 Mar 2023 08:08      03-06    130<L>  |  105  |  20  ----------------------------<  108<H>  4.5   |  21<L>  |  1.58<H>    Ca    8.4<L>      06 Mar 2023 05:51      03-04    128<L>  |  101  |  21  ----------------------------<  113<H>  4.6   |  22  |  1.28    Ca    9.2      04 Mar 2023 05:59  Phos  2.5     03-03      03-02    129<L>  |  100  |  19  ----------------------------<  116<H>  4.4   |  22  |  1.56<H>    Ca    8.6      02 Mar 2023 05:52  Phos  2.9     03-01  Mg     2.2     03-    TPro  6.3  /  Alb  2.8<L>  /  TBili  0.4  /  DBili  x   /  AST  35  /  ALT  22  /  AlkPhos  60  03-      03-    124<L>  |  96  |  21  ----------------------------<  108<H>  4.5   |  20<L>  |  1.90<H>    Ca    8.2<L>      01 Mar 2023 00:32  Phos  3.3     03-  Mg     2.2     03-    TPro  6.3  /  Alb  2.8<L>  /  TBili  0.4  /  DBili  x   /  AST  35  /  ALT  22  /  AlkPhos  60      122<L>  |  93<L>  |  19  ----------------------------<  129<H>  4.3   |  22  |  1.83<H>    Ca    9.2      2023 00:17  Phos  2.9       Mg     2.2         TPro  6.5  /  Alb  3.1<L>  /  TBili  0.5  /  DBili  x   /  AST  40<H>  /  ALT  21  /  AlkPhos  59        CARDIAC MARKERS ( 2023 00:50 )  x     / x     / 403 U/L / x     / x          BLOOD CULTURES: Organism --  Gram Stain Urine -- Gram Stain --  Specimen Source Catheterized None  Culture-Urine --  Culture Results: No growth (23 @ 04:29)       LIPID PROFILE     RADIOLOGY:    CT of Chest: 3/5/23:  INTERPRETATION:  Clinical indication: Cough, hyponatremia.  Axial CT images of the chest are obtained without intravenous administration of contrast.  Comparison is made with the chest CT of 2021.  No enlarged axillary lymph nodes. Vertically oriented enlarged azygoesophageal recess lymph node adjacent to the lower portion of the esophagus, part of which measures about 1.3 x 2 cm as measured on the axial images is without significant interval change. Multiple other smaller mediastinal lymph nodes are also without significant interval change.  No pericardial effusion. Mitral annular calcifications. Vascular calcifications with involvement of the aorta and the coronary arteries.   Aortic valve calcifications.  Trace right pleural effusion.  Evaluation of the upper abdomen demonstrate cholelithiasis. Small hiatal hernia. Subcentimeter left renal hypodensity which is too small to characterize. Mild cortical atrophy of the left kidney as on the prior study.  Evaluation of the lungs demonstrate bilateral interlobular septal thickening. Bilateral lower lobe clustered nodular opacities with the largest nodular component within the superior segment of the right lower lobe new since the prior study measuring up to about 1.5 cm.  Left calcified pleural plaque is unchanged.  No central endobronchial lesions.  Degenerative changes of the spine.  IMPRESSION: Bilateral lower lobe clustered nodular opacities, right greater than left new since 2021 suggestive of endobronchial spread of infection. Findings can be seen in the setting of aspiration given the distribution. 3 month follow-up noncontrast chest CT is recommended to ensure resolution.  Bilateral interlobular septal thickening can be seen in the setting of pulmonary edema.  Trace right pleural effusion.  Enlarged azygoesophageal recess lymph node is without significant interval change since 2021 can be monitored on the follow-up chest CT.  Aortic valve calcifications can be seen in the setting of aortic valve stenosis.    CXR: 3/5/23:  The cardiomediastinal silhouette is normal and the dalila are not enlarged. Aortic calcification. The trachea is midline. Interval improved aeration. No focal lung consolidation or sizable pleural effusion. No significant osseous abnormality.  IMPRESSION:  No focal lung consolidation or sizable pleural effusion.    X-Ray of Abd: 23:  FINDINGS:  Mild air distended colon and mid abdominal small bowel.  No free intra-abdominal air.  No obvious intra-abdominal masses.  LEFT upper quadrant and iliac vasculature all calcifications. The osseous structures are intact.  IMPRESSION:  Mild air distended transverse colon and mid abdominal small bowel.. No radiographic evidence of bowel obstruction.    X-Ray of Hips and CXR:  23:  INTERPRETATION:  Lateral hips and pelvis and chest.  Patient has hip pain.  Patient has vomiting.  Bilateral hips with pelvis. 5 views.  Arterial calcifications are noted.  Degenerative loss of disc height at multiple lower lumbar levels with slight right lower lumbar curve noted.  There is mild symmetric hip degeneration. No fracture.  AP chest on 2023 at 1:45 AM.  Heart magnified by technique.  There are bilateral perihilar infiltrates right greater than left which are new since  this year. Configuration suggests infection.  IMPRESSION: No acute fracture. Extensive arterial calcifications.  Bilateral perihilar infiltrates new since prior suggesting infection.      EK23:  Normal sinus rhythm  Normal ECG      TELEMETRY:  NSR      ECHO:  3/1/23:  M-Mode Measurements (cm)   LVEDd: 3.97 cm            LVESd: 2.55 cm   IVSEd: 1.1 cm   LVPWd: 1.1 cm             AO Root Dimension: 2.8 cm                             LA: 3.5 cm                LVOT: 2 cm  Doppler Measurements:   AV Velocity:434 cm/s                  MV Peak E-Wave: 98.7 cm/s   AV Peak Gradient: 75.34 mmHg          MV Peak A-Wave: 131 cm/s   AV Mean Gradient: 40 mmHg             MV E/A Ratio: 0.75 %   AV Area (Continuity):0.85 cm^2        MV Peak Gradient: 3.9 mmHg   TR Velocity:190 cm/s   TR Gradient:14.44 mmHg   Estimated RAP:5 mmHg   RVSP:27 mmHg    Findings  Mitral Valve   The mitral valve leaflets appear thickened.   EA reversal of the mitral inflow consistent with reduced compliance of the left ventricle.   Mild mitral annular calcification is present.   Mild mitral regurgitation is present.    Aortic Valve   Peak and mean transaortic gradients are 75 and 40mmHg respectively; this finding is consistent with severe aortic stenosis.   Mild (1+) aortic regurgitation is present.    Tricuspid Valve   Trace tricuspid valve regurgitation is present.    Pulmonic Valve   Mild pulmonic valvular regurgitation (1+) is present.    Left Atrium   Normal appearing left atrium.    Left Ventricle   Mild concentric left ventricular hypertrophy is present.   The left ventricle is normal in size.   Estimated left ventricular ejection fraction is 65-70 %.    Right Atrium   Normal appearing right atrium.    Right Ventricle   Normal appearing right ventricle structure and function.    Pericardial Effusion   No evidence of pericardial effusion.    Pleural Effusion   No evidence of pleural effusion.    Miscellaneous   The IVC appears normal.    Summary   The mitral valve leaflets appear thickened.   EA reversal of the mitral inflow consistent with reduced compliance of the left ventricle.   Mild mitral annular calcification is present.   Mild mitral regurgitation is present.   Peak and mean transaortic gradients are 75 and 40mmHg respectively; this finding is consistent with severe aortic stenosis.   Mild (1+) aortic regurgitation is present.   Trace tricuspid valve regurgitation is present.   Mild pulmonic valvular regurgitation (1+) is present.   Normal appearing left atrium.   Mild concentric left ventricular hypertrophy is present.   The left ventricle is normal in size.   Estimated left ventricular ejection fraction is 65-70 %.   Normal appearing right atrium.   Normal appearing right ventricle structure and function.   The IVC appears normal.   No evidence of pericardial effusion.   No evidence of pleural effusion.    Signature   ----------------------------------------------------------------   Electronically signed by Sakina Cheatham MD, Director of   Cardiac Cath Lab(Interpreting physician) on 2023 06:42   PM   ----------------------------------------------------------------

## 2023-03-10 NOTE — PROGRESS NOTE ADULT - ASSESSMENT
2/28/23:  Pt with above history with h/o HFpEF on Lasix developing severe hyponatremia better after current treatment but still hyponatremic.  Will have to avoid diuretics but continue to gently replete NA watching for diastolic CHF given her stiff heart and moderate-severe AS.  Will repeat an echo.  Continue as outlined by medicine etal.  Will follow as treatment progresses.    3/1/23:  More alert and less lethargic this AM.  Na+=124 this AM.  No clinical CHF or anginal chest pains.  Awaiting echo.  No new cardiac complaints.  Continue as outlined by medicine etal.  Will follow as treatment progresses.    3/2/23:  Resting comfortably without CHF or other cardiac symptoms.  Na+=129.  ECHO with severe AS (MARVA=0.8-0.9cm2) but no clinical symptoms yet.  Continue as outlined by medicine etal.  Will follow as treatment progresses.      3/3/23:  Resting and feeling better.  No clinical CHF, angina or syncope.  No new cardiac symptoms.  Tolerating IV NS.  Awaiting AM labs.  Continue as outlined by medicine etal.  Will follow.    3/4/23:  Feeling good without increased SOB or chest pains.  Eager to go home.  Na+ still low.  Etiology for her hyponatremia still a question.  According to her daughter she does not restrict her Na intake much and does not drink a lot of water.    No new cardiac symptoms.  Continue as outlined by medicine and renal etal.  Will follow.    3/5/23:  Spiked a temp last night (100.9 F.) and had urinary retention requiring straight cath x 2.  Resting this AM.  Back on IV NS for Na+=128 again yesterday.  Awaiting AM labs.  No clinical CHF or other new cardiac symptoms.  Continue as outlined by medicine and renal etal.  Will follow.    3/6/23:  Resting this AM.  NA+=132 yesterday and 130 today.  Hgb down to 7.3.  Now COVID-19 (+) with CT of Chest suggest spread of infection???.  No other cardiac symptoms.  Continue as outlined by medicine and renal etal.  Will continue to follow intermittently prn as treatment progresses.    3/7/23:  Tolerating Zosyn antibiotics.  Temp down.  SOB about the same.  No new cardiac symptoms.  Awaiting AM labs today.  Continue as outlined by medicine, ID and renal etal.  Will continue to follow intermittently prn as treatment progresses.    3/8/23:  Coughing but feeling slightly better.  No chest pains or increased SOB.  Tolerating antibiotics so far.  Na+=134 yesterday.  Awaiting AM labs.  No new cardiac symptoms.  Continue as outlined by medicine, ID and renal etal.  Will continue to follow intermittently prn as treatment progresses.    3/9/23:  Still coughing but slowly improving.  No clinical CHF or anginal chest pains.  Na+=134 again yesterday with Creat down to 1.46 and BUN=19.  No new cardiac issues.  Perales catheter still in.  Consider removing that and seeing if she can void on her own.  For rehab placement to [facility] planned for Saturday, 3/11/23.  Continue as outlined by medicine, ID and renal etal.  Will continue to follow intermittently prn as treatment progresses.    3/10/23:  Failed voiding trial and Perales in place followed by urology.  Cough improving.  Na+=136 (yesterday).  Awaiting AM labs.  No new cardiac issues.  For possible transfer to rehab tomorrow.  Continue as outlined by medicine, ID and renal etal.  Will continue to follow intermittently prn as treatment progresses.  I will be away until 3/27/23 and Dr Corazon Collins will be covering me if needed.

## 2023-03-10 NOTE — PROGRESS NOTE ADULT - PROBLEM SELECTOR PROBLEM 4
Mild mitral regurgitation

## 2023-03-10 NOTE — PROGRESS NOTE ADULT - PROBLEM SELECTOR PROBLEM 3
Mild aortic regurgitation

## 2023-03-10 NOTE — PROGRESS NOTE ADULT - PROBLEM SELECTOR PROBLEM 5
Chronic diastolic congestive heart failure

## 2023-03-11 PROCEDURE — 99232 SBSQ HOSP IP/OBS MODERATE 35: CPT

## 2023-03-11 RX ORDER — AMLODIPINE BESYLATE 2.5 MG/1
10 TABLET ORAL DAILY
Refills: 0 | Status: DISCONTINUED | OUTPATIENT
Start: 2023-03-12 | End: 2023-03-16

## 2023-03-11 RX ORDER — AMLODIPINE BESYLATE 2.5 MG/1
5 TABLET ORAL ONCE
Refills: 0 | Status: COMPLETED | OUTPATIENT
Start: 2023-03-11 | End: 2023-03-11

## 2023-03-11 RX ADMIN — SENNA PLUS 2 TABLET(S): 8.6 TABLET ORAL at 21:30

## 2023-03-11 RX ADMIN — PIPERACILLIN AND TAZOBACTAM 25 GRAM(S): 4; .5 INJECTION, POWDER, LYOPHILIZED, FOR SOLUTION INTRAVENOUS at 12:59

## 2023-03-11 RX ADMIN — PIPERACILLIN AND TAZOBACTAM 25 GRAM(S): 4; .5 INJECTION, POWDER, LYOPHILIZED, FOR SOLUTION INTRAVENOUS at 21:30

## 2023-03-11 RX ADMIN — Medication 50 MILLIGRAM(S): at 05:56

## 2023-03-11 RX ADMIN — GABAPENTIN 300 MILLIGRAM(S): 400 CAPSULE ORAL at 20:29

## 2023-03-11 RX ADMIN — AMLODIPINE BESYLATE 5 MILLIGRAM(S): 2.5 TABLET ORAL at 10:37

## 2023-03-11 RX ADMIN — Medication 650 MILLIGRAM(S): at 00:30

## 2023-03-11 RX ADMIN — Medication 50 MILLIGRAM(S): at 21:31

## 2023-03-11 RX ADMIN — Medication 25 MILLIGRAM(S): at 10:37

## 2023-03-11 RX ADMIN — HEPARIN SODIUM 5000 UNIT(S): 5000 INJECTION INTRAVENOUS; SUBCUTANEOUS at 10:36

## 2023-03-11 RX ADMIN — POLYETHYLENE GLYCOL 3350 17 GRAM(S): 17 POWDER, FOR SOLUTION ORAL at 10:37

## 2023-03-11 RX ADMIN — PIPERACILLIN AND TAZOBACTAM 25 GRAM(S): 4; .5 INJECTION, POWDER, LYOPHILIZED, FOR SOLUTION INTRAVENOUS at 05:55

## 2023-03-11 RX ADMIN — PANTOPRAZOLE SODIUM 40 MILLIGRAM(S): 20 TABLET, DELAYED RELEASE ORAL at 05:55

## 2023-03-11 RX ADMIN — ATORVASTATIN CALCIUM 20 MILLIGRAM(S): 80 TABLET, FILM COATED ORAL at 21:31

## 2023-03-11 RX ADMIN — AMLODIPINE BESYLATE 5 MILLIGRAM(S): 2.5 TABLET ORAL at 14:48

## 2023-03-11 RX ADMIN — Medication 650 MILLIGRAM(S): at 21:31

## 2023-03-11 RX ADMIN — Medication 650 MILLIGRAM(S): at 15:43

## 2023-03-11 RX ADMIN — Medication 650 MILLIGRAM(S): at 22:31

## 2023-03-11 RX ADMIN — HEPARIN SODIUM 5000 UNIT(S): 5000 INJECTION INTRAVENOUS; SUBCUTANEOUS at 21:30

## 2023-03-11 RX ADMIN — Medication 2 MILLIGRAM(S): at 21:30

## 2023-03-11 RX ADMIN — Medication 50 MILLIGRAM(S): at 13:00

## 2023-03-11 RX ADMIN — Medication 650 MILLIGRAM(S): at 14:48

## 2023-03-11 RX ADMIN — Medication 25 MILLIGRAM(S): at 21:32

## 2023-03-11 RX ADMIN — Medication 50 MILLIGRAM(S): at 10:37

## 2023-03-11 RX ADMIN — GABAPENTIN 300 MILLIGRAM(S): 400 CAPSULE ORAL at 10:37

## 2023-03-11 RX ADMIN — Medication 1 MILLIGRAM(S): at 10:37

## 2023-03-11 NOTE — PROVIDER CONTACT NOTE (OTHER) - SITUATION
Patient admitted with PNA. Patient with hx. of HTN. Patient noted with BP of 199/61 and HR of 62. Metoprolol 25mg PO and Hydralazine 50 mg administered. Patient BP re-assessed and vitals as follows

## 2023-03-11 NOTE — PROGRESS NOTE ADULT - ASSESSMENT
89 yo female with PMHx arthritis,  AS,  HTN, gout, hyperlipidemia, hx of pelvic fracture,  UTIs ,  urinary retention , s/p Perales p admitted  for:     #COVID/ Aspiration? Pneumonia   Patient now positive 3/5 for COVID.    Low grade temps/ cough.    CT chest with bilateral lower lobe nodular opacities-- may be infection related.    No hypoxia.    Cont Zosyn  Elevated CRP/ ESR  likeoy due to above     #Chronic Anemia:    Hgb normally~ 10 per records.    Was on procrit but many years ago.    Hgb down to 7.2.  -> 03/06 Transfused 1 unit pRBC.  -> now hgb 9.7     #Hyponatremia 2/2 hypovolemia improving   Lasix  use and GI losses  improved Na 130'-134s      #Urinary retention- straight cath  pt can have OP f/up, no procedure can restore voiding, c/e present meds, add Flomax  Urology planning to DC on Perales, follow up as outpatient     #H/o HFpEF,    S/p IVF, CT chest with read for fluid overload   nephrology f/up re: diuresis- not overloaded per nephrology; no diuretic for now    #Mild thrombocytopenia  repeat cbc in am    #HTN   Patient on Hydralazine 25mg tid   Amlodipine 5mg   Metoprolol 25mg bid  I will increase hydralazine to 50mg tid     #CKD  stage 3  monitor creatinine   Nephrology following     # Age/illness  related muscle weakness deconditioning  Consulted PT   Planning dc to [facility] / vs Home with Home Care   I called for [facility] at [phone], Where close , I left my call back number .   Waiting Insurance auth    #DVT Proph:  on Heparin Sc  I spoke with Patient's Son Dr Gordon [phone]. updated about current condition and treatment plan.

## 2023-03-11 NOTE — PROGRESS NOTE ADULT - SUBJECTIVE AND OBJECTIVE BOX
CC: severe hyponatremia (01 Mar 2023 07:23)    HPI:  89 yo F with arthritis, hx of HTN, gout, hyperlipidemia, hx of pelvic fracture,  UTIs  with urinary retention after post void trials in Dr. Bonilla's office presented with bilateral hip and groin pain X 2 days.  Patient's daughter stated  Pt  had recent urology evaluation and to  straight cath urine were recommended but they were difficult after only one day and she thinks patient maybe strained her hips trying to hold positioning during straight cath.  Patient then had indwelling pérez placed 3 days PTA  which has been draining well.  Patient is currently on bactrim for UTI.  Daughter stated  patient was also fatigued at home, not speaking much, and had malaise.  Patient refused to walk  day PTA due to bilateral hip and pelvis pain. No  falls.  +  vomiting  once and could not take her PM dose of bactrim.  Patient denies abdominal pain, nausea, or chest pain.  O2 on arrival was 91% and nasal canula placed for comfort but patient denies SOB.    3/5:  Na improved  low grade temp and dry cough, continues with urinary retention, borderline BP this morning  3/6:  Pt seen.  No new complaints.  Low grade temps.  COVID + now.  Denies SOB.  + cough  03/07 Patient sitting i a chair Sporadic cough , tolerating Diet  on room air   03/08 C/o Dry cough specially at night   03/09 dry cough improving   03/10 Sitting  up the chair , cough improving , on room air , afebrile   3/11 - cough improving.     ROS:   All 10 systems reviewed and found to be negative with the exception of what has been described above.    Vital Signs Last 24 Hrs  T(C): 36.2 (11 Mar 2023 08:49), Max: 36.4 (10 Mar 2023 23:17)  T(F): 97.2 (11 Mar 2023 08:49), Max: 97.5 (10 Mar 2023 23:17)  HR: 63 (11 Mar 2023 08:49) (54 - 64)  BP: 181/53 (11 Mar 2023 08:49) (139/49 - 199/61)  BP(mean): --  RR: 18 (11 Mar 2023 08:49) (18 - 19)  SpO2: 93% (11 Mar 2023 08:49) (93% - 99%)    Parameters below as of 11 Mar 2023 08:49  Patient On (Oxygen Delivery Method): room air    General: Well developed; in no acute distress  Eyes: EOMI; conjunctiva and sclera clear  Head: Normocephalic; atraumatic  ENMT: No nasal discharge; airway clear  Neck: Supple; non tender; no masses  Respiratory: Decreased BS at bases,  No wheezes, rales or rhonchi  Cardiovascular: Regular rate and rhythm. S1 and S2 Normal; + LIANA  Gastrointestinal: Soft non-tender non-distended; Normal bowel sounds  Genitourinary: No  suprapubic  tenderness  Extremities: No  edema  Vascular: Peripheral pulses palpable 2+ bilaterally  Neurological: Alert and oriented x2, non focal   Musculoskeletal: Normal muscle tone, without deformities  Psychiatric: Cooperative and appropriate    labs reviewed                 CT Chest No Cont (03.05.23 @ 10:16) >  IMPRESSION: Bilateral lower lobe clustered nodular opacities, right   greater than left new since February 21, 2021 suggestive of endobronchial   spread of infection. Findings can be seen in the setting of aspiration   given the distribution. 3 month follow-up noncontrast chest CT is   recommended to ensure resolution.    Bilateral interlobular septal thickening can be seen in the setting of   pulmonary edema.    Trace right pleural effusion.    Enlarged azygoesophageal recess lymph node is without significant   interval change since February 21, 2021 can be monitored on the follow-up   chest CT.    Aortic valve calcifications can be seen in the setting of aortic valve   stenosis.

## 2023-03-12 PROCEDURE — 99232 SBSQ HOSP IP/OBS MODERATE 35: CPT

## 2023-03-12 RX ORDER — HYDRALAZINE HCL 50 MG
75 TABLET ORAL THREE TIMES A DAY
Refills: 0 | Status: DISCONTINUED | OUTPATIENT
Start: 2023-03-12 | End: 2023-03-12

## 2023-03-12 RX ORDER — HYDRALAZINE HCL 50 MG
50 TABLET ORAL EVERY 8 HOURS
Refills: 0 | Status: DISCONTINUED | OUTPATIENT
Start: 2023-03-12 | End: 2023-03-16

## 2023-03-12 RX ADMIN — AMLODIPINE BESYLATE 10 MILLIGRAM(S): 2.5 TABLET ORAL at 10:44

## 2023-03-12 RX ADMIN — Medication 100 MILLIGRAM(S): at 20:36

## 2023-03-12 RX ADMIN — PIPERACILLIN AND TAZOBACTAM 25 GRAM(S): 4; .5 INJECTION, POWDER, LYOPHILIZED, FOR SOLUTION INTRAVENOUS at 20:27

## 2023-03-12 RX ADMIN — Medication 50 MILLIGRAM(S): at 10:43

## 2023-03-12 RX ADMIN — GABAPENTIN 300 MILLIGRAM(S): 400 CAPSULE ORAL at 10:43

## 2023-03-12 RX ADMIN — PIPERACILLIN AND TAZOBACTAM 25 GRAM(S): 4; .5 INJECTION, POWDER, LYOPHILIZED, FOR SOLUTION INTRAVENOUS at 12:11

## 2023-03-12 RX ADMIN — Medication 10 MILLILITER(S): at 20:36

## 2023-03-12 RX ADMIN — Medication 2 MILLIGRAM(S): at 22:10

## 2023-03-12 RX ADMIN — PANTOPRAZOLE SODIUM 40 MILLIGRAM(S): 20 TABLET, DELAYED RELEASE ORAL at 05:53

## 2023-03-12 RX ADMIN — Medication 1 MILLIGRAM(S): at 10:43

## 2023-03-12 RX ADMIN — Medication 650 MILLIGRAM(S): at 18:04

## 2023-03-12 RX ADMIN — Medication 650 MILLIGRAM(S): at 11:40

## 2023-03-12 RX ADMIN — Medication 25 MILLIGRAM(S): at 10:43

## 2023-03-12 RX ADMIN — ATORVASTATIN CALCIUM 20 MILLIGRAM(S): 80 TABLET, FILM COATED ORAL at 22:10

## 2023-03-12 RX ADMIN — HEPARIN SODIUM 5000 UNIT(S): 5000 INJECTION INTRAVENOUS; SUBCUTANEOUS at 10:42

## 2023-03-12 RX ADMIN — GABAPENTIN 300 MILLIGRAM(S): 400 CAPSULE ORAL at 22:10

## 2023-03-12 RX ADMIN — HEPARIN SODIUM 5000 UNIT(S): 5000 INJECTION INTRAVENOUS; SUBCUTANEOUS at 22:09

## 2023-03-12 RX ADMIN — Medication 25 MILLIGRAM(S): at 22:10

## 2023-03-12 RX ADMIN — Medication 650 MILLIGRAM(S): at 10:43

## 2023-03-12 RX ADMIN — SENNA PLUS 2 TABLET(S): 8.6 TABLET ORAL at 22:09

## 2023-03-12 RX ADMIN — Medication 50 MILLIGRAM(S): at 22:09

## 2023-03-12 RX ADMIN — Medication 50 MILLIGRAM(S): at 05:53

## 2023-03-12 RX ADMIN — PIPERACILLIN AND TAZOBACTAM 25 GRAM(S): 4; .5 INJECTION, POWDER, LYOPHILIZED, FOR SOLUTION INTRAVENOUS at 05:53

## 2023-03-12 NOTE — PROGRESS NOTE ADULT - ASSESSMENT
89 yo female with PMHx arthritis,  AS,  HTN, gout, hyperlipidemia, hx of pelvic fracture,  UTIs ,  urinary retention , s/p Perales p admitted  for:     #COVID/ Aspiration? Pneumonia   Patient now positive 3/5 for COVID.    Low grade temps/ cough.    CT chest with bilateral lower lobe nodular opacities-- may be infection related.    No hypoxia.    Cont Zosyn  Elevated CRP/ ESR  likely due to above     #Chronic Anemia:    Hgb normally~ 10 per records.    Was on procrit but many years ago.    Hgb down to 7.2.  -> 03/06 Transfused 1 unit pRBC.  -> now hgb 9.7     #Hyponatremia 2/2 hypovolemia improving   Lasix  use and GI losses  improved Na 130'-134s      #Urinary retention- straight cath  pt can have OP f/up, no procedure can restore voiding, c/e present meds, add Flomax  Urology planning to DC on Perales, follow up as outpatient     #H/o HFpEF,    S/p IVF, CT chest with read for fluid overload   nephrology f/up re: diuresis- not overloaded per nephrology; no diuretic for now    #Mild thrombocytopenia  repeat cbc in am    #HTN - controlled  - amlodipine increased to 10 mg  - hydralazine changed to prn  - continue metorpolol 25 mg bid    #CKD  stage 3  monitor creatinine   Nephrology following     # Age/illness  related muscle weakness deconditioning  Consulted PT   Planning dc to [facility] / vs Home with Home Care   I called for [facility] at [phone], Where close , I left my call back number .   Waiting Insurance auth    #DVT Proph:  on Heparin Sc  I spoke with Patient's Son Dr Gordon [phone]. updated about current condition and treatment plan.

## 2023-03-12 NOTE — PROGRESS NOTE ADULT - SUBJECTIVE AND OBJECTIVE BOX
CC: severe hyponatremia (01 Mar 2023 07:23)    HPI:  89 yo F with arthritis, hx of HTN, gout, hyperlipidemia, hx of pelvic fracture,  UTIs  with urinary retention after post void trials in Dr. Bonilla's office presented with bilateral hip and groin pain X 2 days.  Patient's daughter stated  Pt  had recent urology evaluation and to  straight cath urine were recommended but they were difficult after only one day and she thinks patient maybe strained her hips trying to hold positioning during straight cath.  Patient then had indwelling pérez placed 3 days PTA  which has been draining well.  Patient is currently on bactrim for UTI.  Daughter stated  patient was also fatigued at home, not speaking much, and had malaise.  Patient refused to walk  day PTA due to bilateral hip and pelvis pain. No  falls.  +  vomiting  once and could not take her PM dose of bactrim.  Patient denies abdominal pain, nausea, or chest pain.  O2 on arrival was 91% and nasal canula placed for comfort but patient denies SOB.    3/5:  Na improved  low grade temp and dry cough, continues with urinary retention, borderline BP this morning  3/6:  Pt seen.  No new complaints.  Low grade temps.  COVID + now.  Denies SOB.  + cough  03/07 Patient sitting i a chair Sporadic cough , tolerating Diet  on room air   03/08 C/o Dry cough specially at night   03/09 dry cough improving   03/10 Sitting  up the chair , cough improving , on room air , afebrile   3/11 - cough improving.   3/12 - c/o occasional HA. no fever or chills, no blurred vision     ROS:   All 10 systems reviewed and found to be negative with the exception of what has been described above.    Vital Signs Last 24 Hrs  T(C): 36.2 (12 Mar 2023 15:27), Max: 36.4 (11 Mar 2023 15:43)  T(F): 97.2 (12 Mar 2023 15:27), Max: 97.5 (11 Mar 2023 15:43)  HR: 57 (12 Mar 2023 15:27) (57 - 85)  BP: 127/41 (12 Mar 2023 15:27) (103/49 - 160/54)  BP(mean): --  RR: 18 (12 Mar 2023 15:27) (18 - 19)  SpO2: 98% (12 Mar 2023 15:27) (93% - 98%)    Parameters below as of 12 Mar 2023 15:27  Patient On (Oxygen Delivery Method): room air      General: Well developed; in no acute distress  Eyes: EOMI; conjunctiva and sclera clear  Head: Normocephalic; atraumatic  ENMT: No nasal discharge; airway clear  Neck: Supple; non tender; no masses  Respiratory: Decreased BS at bases,  No wheezes, rales or rhonchi  Cardiovascular: Regular rate and rhythm. S1 and S2 Normal; + LIANA  Gastrointestinal: Soft non-tender non-distended; Normal bowel sounds  Genitourinary: No  suprapubic  tenderness  Extremities: No  edema  Vascular: Peripheral pulses palpable 2+ bilaterally  Neurological: Alert and oriented x2, non focal   Musculoskeletal: Normal muscle tone, without deformities  Psychiatric: Cooperative and appropriate    lasb reviewed                 CT Chest No Cont (03.05.23 @ 10:16) >  IMPRESSION: Bilateral lower lobe clustered nodular opacities, right   greater than left new since February 21, 2021 suggestive of endobronchial   spread of infection. Findings can be seen in the setting of aspiration   given the distribution. 3 month follow-up noncontrast chest CT is   recommended to ensure resolution.    Bilateral interlobular septal thickening can be seen in the setting of   pulmonary edema.    Trace right pleural effusion.    Enlarged azygoesophageal recess lymph node is without significant   interval change since February 21, 2021 can be monitored on the follow-up   chest CT.    Aortic valve calcifications can be seen in the setting of aortic valve   stenosis.

## 2023-03-13 LAB
ANION GAP SERPL CALC-SCNC: 6 MMOL/L — SIGNIFICANT CHANGE UP (ref 5–17)
BUN SERPL-MCNC: 14 MG/DL — SIGNIFICANT CHANGE UP (ref 7–23)
CALCIUM SERPL-MCNC: 8.8 MG/DL — SIGNIFICANT CHANGE UP (ref 8.5–10.1)
CHLORIDE SERPL-SCNC: 116 MMOL/L — HIGH (ref 96–108)
CO2 SERPL-SCNC: 21 MMOL/L — LOW (ref 22–31)
CREAT SERPL-MCNC: 1.49 MG/DL — HIGH (ref 0.5–1.3)
EGFR: 33 ML/MIN/1.73M2 — LOW
GLUCOSE SERPL-MCNC: 116 MG/DL — HIGH (ref 70–99)
HCT VFR BLD CALC: 28.5 % — LOW (ref 34.5–45)
HGB BLD-MCNC: 9.2 G/DL — LOW (ref 11.5–15.5)
MCHC RBC-ENTMCNC: 29.9 PG — SIGNIFICANT CHANGE UP (ref 27–34)
MCHC RBC-ENTMCNC: 32.3 GM/DL — SIGNIFICANT CHANGE UP (ref 32–36)
MCV RBC AUTO: 92.5 FL — SIGNIFICANT CHANGE UP (ref 80–100)
PLATELET # BLD AUTO: 101 K/UL — LOW (ref 150–400)
POTASSIUM SERPL-MCNC: 3.6 MMOL/L — SIGNIFICANT CHANGE UP (ref 3.5–5.3)
POTASSIUM SERPL-SCNC: 3.6 MMOL/L — SIGNIFICANT CHANGE UP (ref 3.5–5.3)
RBC # BLD: 3.08 M/UL — LOW (ref 3.8–5.2)
RBC # FLD: 15.1 % — HIGH (ref 10.3–14.5)
SODIUM SERPL-SCNC: 143 MMOL/L — SIGNIFICANT CHANGE UP (ref 135–145)
WBC # BLD: 3.25 K/UL — LOW (ref 3.8–10.5)
WBC # FLD AUTO: 3.25 K/UL — LOW (ref 3.8–10.5)

## 2023-03-13 PROCEDURE — 99232 SBSQ HOSP IP/OBS MODERATE 35: CPT

## 2023-03-13 RX ADMIN — SENNA PLUS 2 TABLET(S): 8.6 TABLET ORAL at 21:43

## 2023-03-13 RX ADMIN — ATORVASTATIN CALCIUM 20 MILLIGRAM(S): 80 TABLET, FILM COATED ORAL at 21:41

## 2023-03-13 RX ADMIN — AMLODIPINE BESYLATE 10 MILLIGRAM(S): 2.5 TABLET ORAL at 09:28

## 2023-03-13 RX ADMIN — Medication 10 MILLILITER(S): at 20:06

## 2023-03-13 RX ADMIN — POLYETHYLENE GLYCOL 3350 17 GRAM(S): 17 POWDER, FOR SOLUTION ORAL at 09:29

## 2023-03-13 RX ADMIN — PANTOPRAZOLE SODIUM 40 MILLIGRAM(S): 20 TABLET, DELAYED RELEASE ORAL at 06:03

## 2023-03-13 RX ADMIN — GABAPENTIN 300 MILLIGRAM(S): 400 CAPSULE ORAL at 09:28

## 2023-03-13 RX ADMIN — HEPARIN SODIUM 5000 UNIT(S): 5000 INJECTION INTRAVENOUS; SUBCUTANEOUS at 21:42

## 2023-03-13 RX ADMIN — Medication 50 MILLIGRAM(S): at 21:44

## 2023-03-13 RX ADMIN — PIPERACILLIN AND TAZOBACTAM 25 GRAM(S): 4; .5 INJECTION, POWDER, LYOPHILIZED, FOR SOLUTION INTRAVENOUS at 05:00

## 2023-03-13 RX ADMIN — Medication 25 MILLIGRAM(S): at 09:28

## 2023-03-13 RX ADMIN — HEPARIN SODIUM 5000 UNIT(S): 5000 INJECTION INTRAVENOUS; SUBCUTANEOUS at 09:27

## 2023-03-13 RX ADMIN — Medication 50 MILLIGRAM(S): at 09:28

## 2023-03-13 RX ADMIN — Medication 100 MILLIGRAM(S): at 06:03

## 2023-03-13 RX ADMIN — GABAPENTIN 300 MILLIGRAM(S): 400 CAPSULE ORAL at 21:41

## 2023-03-13 RX ADMIN — Medication 10 MILLILITER(S): at 06:03

## 2023-03-13 RX ADMIN — Medication 1 MILLIGRAM(S): at 09:28

## 2023-03-13 RX ADMIN — Medication 25 MILLIGRAM(S): at 21:42

## 2023-03-13 RX ADMIN — Medication 650 MILLIGRAM(S): at 20:05

## 2023-03-13 RX ADMIN — Medication 50 MILLIGRAM(S): at 09:27

## 2023-03-13 RX ADMIN — Medication 2 MILLIGRAM(S): at 21:41

## 2023-03-13 RX ADMIN — Medication 100 MILLIGRAM(S): at 20:05

## 2023-03-13 NOTE — PROGRESS NOTE ADULT - ASSESSMENT
89 yo female with PMHx arthritis,  AS,  HTN, gout, hyperlipidemia, hx of pelvic fracture,  UTIs ,  urinary retention , s/p Perales p admitted  for:     #COVID/ Aspiration? Pneumonia   Patient now positive 3/5 for COVID.    Low grade temps/ cough.    CT chest with bilateral lower lobe nodular opacities-- may be infection related.    No hypoxia.    Cont Zosyn  Elevated CRP/ ESR  likely due to above     #Chronic Anemia:    Hgb normally~ 10 per records.    Was on procrit but many years ago.    Hgb down to 7.2.  -> 03/06 Transfused 1 unit pRBC.  -> now hgb 9.7     #Hyponatremia 2/2 hypovolemia improving   Lasix  use and GI losses  improved Na 130'-134s      #Urinary retention- straight cath  pt can have OP f/up, no procedure can restore voiding, c/e present meds, add Flomax  Urology planning to DC on Perales, follow up as outpatient     #H/o HFpEF,    S/p IVF, CT chest with read for fluid overload   nephrology f/up re: diuresis- not overloaded per nephrology; no diuretic for now    #Mild thrombocytopenia  repeat cbc in am    #HTN - controlled  - amlodipine increased to 10 mg  - hydralazine changed to prn  - continue metorpolol 25 mg bid    #CKD  stage 3  monitor creatinine   Nephrology following     # Age/illness  related muscle weakness deconditioning  Consulted PT   Planning dc to [facility] / vs Home with Home Care   I called for [facility] at [phone], Where close , I left my call back number .   Waiting Insurance auth    #DVT Proph:  on Heparin Sc  I spoke with Patient's Son Dr Gordon [phone]. updated about current condition and treatment plan. he wants rehab since primary caretaker who is his sister josie is at home with covid.  await expedited appeal decision

## 2023-03-13 NOTE — PROGRESS NOTE ADULT - SUBJECTIVE AND OBJECTIVE BOX
CC: severe hyponatremia (01 Mar 2023 07:23)    HPI:  89 yo F with arthritis, hx of HTN, gout, hyperlipidemia, hx of pelvic fracture,  UTIs  with urinary retention after post void trials in Dr. Bonilla's office presented with bilateral hip and groin pain X 2 days.  Patient's daughter stated  Pt  had recent urology evaluation and to  straight cath urine were recommended but they were difficult after only one day and she thinks patient maybe strained her hips trying to hold positioning during straight cath.  Patient then had indwelling pérez placed 3 days PTA  which has been draining well.  Patient is currently on bactrim for UTI.  Daughter stated  patient was also fatigued at home, not speaking much, and had malaise.  Patient refused to walk  day PTA due to bilateral hip and pelvis pain. No  falls.  +  vomiting  once and could not take her PM dose of bactrim.  Patient denies abdominal pain, nausea, or chest pain.  O2 on arrival was 91% and nasal canula placed for comfort but patient denies SOB.    3/5:  Na improved  low grade temp and dry cough, continues with urinary retention, borderline BP this morning  3/6:  Pt seen.  No new complaints.  Low grade temps.  COVID + now.  Denies SOB.  + cough  03/07 Patient sitting i a chair Sporadic cough , tolerating Diet  on room air   03/08 C/o Dry cough specially at night   03/09 dry cough improving   03/10 Sitting  up the chair , cough improving , on room air , afebrile   3/11 - cough improving.   3/12 - c/o occasional HA. no fever or chills, no blurred vision   3/13 - HA improved.  no fever or chills.  peer to peer done and recommendation for expedited appeal with new PT note.  pt seen by PT and [facility] recommended with appeal submitted today as per CM.     ROS:   All 10 systems reviewed and found to be negative with the exception of what has been described above.    Vital Signs Last 24 Hrs  T(C): 36.6 (13 Mar 2023 06:32), Max: 36.6 (12 Mar 2023 22:17)  T(F): 97.9 (13 Mar 2023 06:32), Max: 97.9 (12 Mar 2023 22:17)  HR: 70 (13 Mar 2023 06:32) (57 - 70)  BP: 171/55 (13 Mar 2023 06:32) (127/41 - 171/55)  BP(mean): --  RR: 17 (13 Mar 2023 06:32) (17 - 18)  SpO2: 94% (13 Mar 2023 06:32) (94% - 98%)    Parameters below as of 13 Mar 2023 06:32  Patient On (Oxygen Delivery Method): room air      General: Well developed; in no acute distress  Eyes: EOMI; conjunctiva and sclera clear  Head: Normocephalic; atraumatic  ENMT: No nasal discharge; airway clear  Neck: Supple; non tender; no masses  Respiratory: Decreased BS at bases,  No wheezes, rales or rhonchi  Cardiovascular: Regular rate and rhythm. S1 and S2 Normal; + LIANA  Gastrointestinal: Soft non-tender non-distended; Normal bowel sounds  Genitourinary: No  suprapubic  tenderness  Extremities: No  edema  Vascular: Peripheral pulses palpable 2+ bilaterally  Neurological: Alert and oriented x2, non focal   Musculoskeletal: Normal muscle tone, without deformities  Psychiatric: Cooperative and appropriate    labs reviewed                 CT Chest No Cont (03.05.23 @ 10:16) >  IMPRESSION: Bilateral lower lobe clustered nodular opacities, right   greater than left new since February 21, 2021 suggestive of endobronchial   spread of infection. Findings can be seen in the setting of aspiration   given the distribution. 3 month follow-up noncontrast chest CT is   recommended to ensure resolution.    Bilateral interlobular septal thickening can be seen in the setting of   pulmonary edema.    Trace right pleural effusion.    Enlarged azygoesophageal recess lymph node is without significant   interval change since February 21, 2021 can be monitored on the follow-up   chest CT.    Aortic valve calcifications can be seen in the setting of aortic valve   stenosis.

## 2023-03-14 ENCOUNTER — TRANSCRIPTION ENCOUNTER (OUTPATIENT)
Age: 88
End: 2023-03-14

## 2023-03-14 PROCEDURE — 99232 SBSQ HOSP IP/OBS MODERATE 35: CPT

## 2023-03-14 RX ORDER — HYDRALAZINE HCL 50 MG
25 TABLET ORAL
Refills: 0 | Status: DISCONTINUED | OUTPATIENT
Start: 2023-03-14 | End: 2023-03-16

## 2023-03-14 RX ADMIN — AMLODIPINE BESYLATE 10 MILLIGRAM(S): 2.5 TABLET ORAL at 09:39

## 2023-03-14 RX ADMIN — ATORVASTATIN CALCIUM 20 MILLIGRAM(S): 80 TABLET, FILM COATED ORAL at 22:34

## 2023-03-14 RX ADMIN — HEPARIN SODIUM 5000 UNIT(S): 5000 INJECTION INTRAVENOUS; SUBCUTANEOUS at 22:35

## 2023-03-14 RX ADMIN — Medication 25 MILLIGRAM(S): at 22:35

## 2023-03-14 RX ADMIN — HEPARIN SODIUM 5000 UNIT(S): 5000 INJECTION INTRAVENOUS; SUBCUTANEOUS at 09:38

## 2023-03-14 RX ADMIN — Medication 50 MILLIGRAM(S): at 09:39

## 2023-03-14 RX ADMIN — Medication 1 MILLIGRAM(S): at 09:38

## 2023-03-14 RX ADMIN — SENNA PLUS 2 TABLET(S): 8.6 TABLET ORAL at 22:34

## 2023-03-14 RX ADMIN — Medication 2 MILLIGRAM(S): at 22:35

## 2023-03-14 RX ADMIN — GABAPENTIN 300 MILLIGRAM(S): 400 CAPSULE ORAL at 22:34

## 2023-03-14 RX ADMIN — Medication 25 MILLIGRAM(S): at 09:39

## 2023-03-14 RX ADMIN — POLYETHYLENE GLYCOL 3350 17 GRAM(S): 17 POWDER, FOR SOLUTION ORAL at 09:38

## 2023-03-14 RX ADMIN — Medication 10 MILLILITER(S): at 06:56

## 2023-03-14 RX ADMIN — PANTOPRAZOLE SODIUM 40 MILLIGRAM(S): 20 TABLET, DELAYED RELEASE ORAL at 06:56

## 2023-03-14 RX ADMIN — Medication 100 MILLIGRAM(S): at 22:34

## 2023-03-14 RX ADMIN — GABAPENTIN 300 MILLIGRAM(S): 400 CAPSULE ORAL at 09:39

## 2023-03-14 RX ADMIN — Medication 50 MILLIGRAM(S): at 22:35

## 2023-03-14 RX ADMIN — Medication 100 MILLIGRAM(S): at 06:56

## 2023-03-14 NOTE — DISCHARGE NOTE PROVIDER - HOSPITAL COURSE
pmd - [facility]  urology - bodi    89 yo F with arthritis, hx of HTN, gout, hyperlipidemia, hx of pelvic fracture,  UTIs  with urinary retention after post void trials in Dr. Bonilla's office presented with bilateral hip and groin pain X 2 days.  Patient's daughter stated  Pt  had recent urology evaluation and to  straight cath urine were recommended but they were difficult after only one day and she thinks patient maybe strained her hips trying to hold positioning during straight cath.  Patient then had indwelling pérez placed 3 days PTA  which has been draining well.  Patient is currently on bactrim for UTI.  Daughter stated  patient was also fatigued at home, not speaking much, and had malaise.  Patient refused to walk  day PTA due to bilateral hip and pelvis pain. No  falls.  +  vomiting  once and could not take her PM dose of bactrim.  Patient denies abdominal pain, nausea, or chest pain.  O2 on arrival was 91% and nasal canula placed for comfort but patient denies SOB.    PT noted to have hyponatremia in setting of covid which has resolved.  pt admitted and noted to have covid with possible superimposed aspiration pna and treated with course of iv abx.  pt now awaiting [facility] palcement.  labile BP and meds adjusted and needs further monitoring at [facility].  pt with urinary retention with failed void trial and outpt /[facility] dr bonilla for urodynamic studies post rehab. pt transfused 1 unit prbc this admission.  baseline hgb 9-10     CT Chest No Cont (03.05.23 @ 10:16) >  IMPRESSION: Bilateral lower lobe clustered nodular opacities, right   greater than left new since February 21, 2021 suggestive of endobronchial   spread of infection. Findings can be seen in the setting of aspiration   given the distribution. 3 month follow-up noncontrast chest CT is   recommended to ensure resolution.    Bilateral interlobular septal thickening can be seen in the setting of   pulmonary edema.    Trace right pleural effusion.    Enlarged azygoesophageal recess lymph node is without significant   interval change since February 21, 2021 can be monitored on the follow-up   chest CT.    Aortic valve calcifications can be seen in the setting of aortic valve   stenosis.    #COVID/ Aspiration? Pneumonia - completed course of zosyn    #Chronic Anemia:    Hgb normally~ 10 per records.    Was on procrit but many years ago.    Hgb down to 7.2.  -> 03/06 Transfused 1 unit pRBC.  -> now hgb 9.7     #Hyponatremia 2/2 hypovolemia improving - resolved     #Urinary retention- straight cath  - seen by urology 3/9, no void trial outpt f/u with dr bonilla. dc with pérez  pt can have OP f/up, no procedure can restore voiding, c/e present meds, add Flomax    #H/o HFpEF,    S/p IVF, CT chest with read for fluid overload   nephrology f/up re: diuresis- not overloaded per nephrology; no diuretic for now    #Mild thrombocytopenia  repeat cbc in am    #HTN - controlled  - amlodipine increased to 10 mg  - hydralazine 25 mg bid.  and prn   - continue metorpolol 25 mg bid    #CKD  stage 3  monitor creatinine   Nephrology following     # Age/illness  related muscle weakness deconditioning  Consulted PT   Planning dc to [facility] / vs Home with Home Care   I called [facility] at [phone], Where close , I left my call back number .   Waiting Insurance auth    #DVT Proph:  on Heparin Sc  I spoke with Patient's Son Dr Gordon [phone]. updated about current condition and treatment plan on 3/14. he wants rehab since primary caretaker who is his sister josie is at home with lorin.   pmd - [facility]  urology - bodi    89 yo F with arthritis, hx of HTN, gout, hyperlipidemia, hx of pelvic fracture,  UTIs  with urinary retention after post void trials in Dr. Bonilla's office presented with bilateral hip and groin pain X 2 days.  Patient's daughter stated  Pt  had recent urology evaluation and to  straight cath urine were recommended but they were difficult after only one day and she thinks patient maybe strained her hips trying to hold positioning during straight cath.  Patient then had indwelling pérez placed 3 days PTA  which has been draining well.  Patient is currently on bactrim for UTI.  Daughter stated  patient was also fatigued at home, not speaking much, and had malaise.  Patient refused to walk  day PTA due to bilateral hip and pelvis pain. No  falls.  +  vomiting  once and could not take her PM dose of bactrim.  Patient denies abdominal pain, nausea, or chest pain.  O2 on arrival was 91% and nasal canula placed for comfort but patient denies SOB.    PT noted to have hyponatremia in setting of covid which has resolved.  pt admitted and noted to have covid with possible superimposed aspiration pna and treated with course of iv abx.  pt now awaiting [facility] palcement.  labile BP and meds adjusted and needs further monitoring at [facility].  pt with urinary retention with failed void trial and outpt /[facility] dr bonilla for urodynamic studies post rehab. pt transfused 1 unit prbc this admission.  baseline hgb 9-10     CT Chest No Cont (03.05.23 @ 10:16) >  IMPRESSION: Bilateral lower lobe clustered nodular opacities, right   greater than left new since February 21, 2021 suggestive of endobronchial   spread of infection. Findings can be seen in the setting of aspiration   given the distribution. 3 month follow-up noncontrast chest CT is   recommended to ensure resolution.    Bilateral interlobular septal thickening can be seen in the setting of   pulmonary edema.    Trace right pleural effusion.    Enlarged azygoesophageal recess lymph node is without significant   interval change since February 21, 2021 can be monitored on the follow-up   chest CT.    Aortic valve calcifications can be seen in the setting of aortic valve   stenosis.    #COVID/ Aspiration? Pneumonia - completed course of zosyn    #Chronic Anemia:    Hgb normally~ 10 per records.    Was on procrit but many years ago.    Hgb down to 7.2.  -> 03/06 Transfused 1 unit pRBC.  -> now hgb 9.7     #Hyponatremia 2/2 hypovolemia improving - resolved     #Urinary retention- straight cath  - seen by urology 3/9, no void trial outpt f/u with dr bonilla. dc with pérez  pt can have OP f/up, no procedure can restore voiding, c/e present meds, add Flomax    #H/o HFpEF  S/p IVF, CT chest with read for fluid overload   nephrology f/up re: diuresis- not overloaded per nephrology; no diuretic for now    #Mild thrombocytopenia  repeat cbc in am    #HTN - controlled  - amlodipine increased to 10 mg  - hydralazine 25 mg bid.  and prn   - continue metorpolol 25 mg bid    #CKD  stage 3  monitor creatinine   Nephrology following     # Age/illness  related muscle weakness deconditioning  Consulted PT     #DVT Proph:  on Heparin Sc  -VTE score: 1  -D-dimer < 2x ULN - no indication for extended VTE ppx

## 2023-03-14 NOTE — DISCHARGE NOTE PROVIDER - NSDCFUSCHEDAPPT_GEN_ALL_CORE_FT
[facility]  [facility] [address]  Scheduled Appointment: 03/28/2023    [facility]  [place] [facility] [address]  Scheduled Appointment: 04/18/2023    
Gen: Well appearing in NAD  Head: NC/AT  Neck: trachea midline  Resp:  No distress clear to auscultation bilaterally  Ext: no deformities  Neuro:  A&O appears non focal  Skin:  Warm and dry as visualized  Psych:  Normal affect and mood   GI: abdomen soft, tenderness across lower abd more on the left. Guarding in LLQ  Cardiac: Regular rate and rhythm.

## 2023-03-14 NOTE — DISCHARGE NOTE PROVIDER - NSDCMRMEDTOKEN_GEN_ALL_CORE_FT
allopurinol 100 mg oral tablet: 1 tab(s) orally once a day  Bactrim  mg-160 mg oral tablet: 1 tab(s) orally every 12 hours  ***Course Not Complete***  bethanechol 50 mg oral tablet: 1 tab(s) orally 2 times a day  Bystolic 10 mg oral tablet: 1 tab(s) orally once a day (in the evening)  Colace 100 mg oral capsule: 1 cap(s) orally once a day (at bedtime)  folic acid 1 mg oral tablet: 1 tab(s) orally once a day  furosemide 20 mg oral tablet: 1 tab(s) orally once a day  hydrALAZINE 25 mg oral tablet: 1 tab(s) orally 2 times a day  lidocaine 5% patch: apply topically to affected area once daily as needed  Neurontin 300 mg oral capsule: 2 cap(s) orally 2 times a day  pantoprazole 40 mg oral delayed release tablet: 1 tab(s) orally once a day (before a meal), As Needed  rosuvastatin 5 mg oral tablet: 1 tab(s) orally once a day (at bedtime)  Tylenol Extra Strength 500 mg oral tablet: 1 tab(s) orally every 6 hours, As Needed for pain  Vitamin D3 1000 intl units (25 mcg) oral tablet: 1 cap(s) orally once a day   allopurinol 100 mg oral tablet: 1 tab(s) orally once a day  amLODIPine 10 mg oral tablet: 1 tab(s) orally once a day  benzonatate 100 mg oral capsule: 1 cap(s) orally every 8 hours, As needed, Cough  bethanechol 50 mg oral tablet: 1 tab(s) orally 2 times a day  Colace 100 mg oral capsule: 1 cap(s) orally once a day (at bedtime)  dextromethorphan-guaifenesin 10 mg-100 mg/5 mL oral liquid: 10 milliliter(s) orally every 6 hours, As needed, Cough  doxazosin 2 mg oral tablet: 1 tab(s) orally once a day (at bedtime)  folic acid 1 mg oral tablet: 1 tab(s) orally once a day  furosemide 20 mg oral tablet: 1 tab(s) orally once a day  hydrALAZINE 25 mg oral tablet: 1 tab(s) orally 2 times a day  lidocaine 5% patch: apply topically to affected area once daily as needed  metoprolol tartrate 25 mg oral tablet: 1 tab(s) orally 2 times a day  Neurontin 300 mg oral capsule: 2 cap(s) orally 2 times a day  pantoprazole 40 mg oral delayed release tablet: 1 tab(s) orally once a day (before a meal), As Needed  polyethylene glycol 3350 oral powder for reconstitution: 17 gram(s) orally once a day as needed  rosuvastatin 5 mg oral tablet: 1 tab(s) orally once a day (at bedtime)  senna leaf extract oral tablet: 2 tab(s) orally once a day (at bedtime)  Tylenol Extra Strength 500 mg oral tablet: 1 tab(s) orally every 6 hours, As Needed for pain  Vitamin D3 1000 intl units (25 mcg) oral tablet: 1 cap(s) orally once a day

## 2023-03-14 NOTE — DISCHARGE NOTE PROVIDER - ATTENDING DISCHARGE PHYSICAL EXAMINATION:
Patient seen today, feels well, no overnight issues reported    Vital Signs Last 24 Hrs  T(C): 36.5 (16 Mar 2023 08:03), Max: 36.6 (15 Mar 2023 15:48)  T(F): 97.7 (16 Mar 2023 08:03), Max: 97.9 (15 Mar 2023 15:48)  HR: 63 (16 Mar 2023 08:03) (63 - 68)  BP: 139/48 (16 Mar 2023 08:03) (136/48 - 139/48)  BP(mean): 76 (15 Mar 2023 22:03) (76 - 76)  RR: 18 (16 Mar 2023 08:03) (18 - 20)  SpO2: 95% (16 Mar 2023 08:03) (95% - 98%)    Parameters below as of 16 Mar 2023 08:03  Patient On (Oxygen Delivery Method): room air

## 2023-03-14 NOTE — PROGRESS NOTE ADULT - REASON FOR ADMISSION
groin pain
severe hyponatremia
severe hyponatremia
groin pain
hypothermia
hypothermia
severe hyponatremia
groin pain
hyponatremia, lethargy
hyponatremia, lethargy
hypothermia
groin pain
hypothermia
groin pain
severe hyponatremia

## 2023-03-14 NOTE — DISCHARGE NOTE PROVIDER - DETAILS OF MALNUTRITION DIAGNOSIS/DIAGNOSES
This patient has been assessed with a concern for Malnutrition and was treated during this hospitalization for the following Nutrition diagnosis/diagnoses:     -  02/28/2023: Moderate protein-calorie malnutrition

## 2023-03-14 NOTE — PROGRESS NOTE ADULT - SUBJECTIVE AND OBJECTIVE BOX
CC: severe hyponatremia (01 Mar 2023 07:23)    HPI:  89 yo F with arthritis, hx of HTN, gout, hyperlipidemia, hx of pelvic fracture,  UTIs  with urinary retention after post void trials in Dr. Bonilla's office presented with bilateral hip and groin pain X 2 days.  Patient's daughter stated  Pt  had recent urology evaluation and to  straight cath urine were recommended but they were difficult after only one day and she thinks patient maybe strained her hips trying to hold positioning during straight cath.  Patient then had indwelling pérez placed 3 days PTA  which has been draining well.  Patient is currently on bactrim for UTI.  Daughter stated  patient was also fatigued at home, not speaking much, and had malaise.  Patient refused to walk  day PTA due to bilateral hip and pelvis pain. No  falls.  +  vomiting  once and could not take her PM dose of bactrim.  Patient denies abdominal pain, nausea, or chest pain.  O2 on arrival was 91% and nasal canula placed for comfort but patient denies SOB.    3/5:  Na improved  low grade temp and dry cough, continues with urinary retention, borderline BP this morning  3/6:  Pt seen.  No new complaints.  Low grade temps.  COVID + now.  Denies SOB.  + cough  03/07 Patient sitting i a chair Sporadic cough , tolerating Diet  on room air   03/08 C/o Dry cough specially at night   03/09 dry cough improving   03/10 Sitting  up the chair , cough improving , on room air , afebrile   3/11 - cough improving.   3/12 - c/o occasional HA. no fever or chills, no blurred vision   3/13 - HA improved.  no fever or chills.  peer to peer done and recommendation for expedited appeal with new PT note.  pt seen by PT and [facility] recommended with appeal submitted today as per CM.     3/14 - no ovenright events.     ROS:   All 10 systems reviewed and found to be negative with the exception of what has been described above.    Vital Signs Last 24 Hrs  T(C): 36.7 (14 Mar 2023 07:52), Max: 36.7 (14 Mar 2023 07:52)  T(F): 98.1 (14 Mar 2023 07:52), Max: 98.1 (14 Mar 2023 07:52)  HR: 73 (14 Mar 2023 07:52) (60 - 81)  BP: 154/61 (14 Mar 2023 07:52) (133/47 - 154/61)  BP(mean): 89 (13 Mar 2023 21:40) (89 - 89)  RR: 18 (14 Mar 2023 07:52) (17 - 18)  SpO2: 94% (14 Mar 2023 07:52) (94% - 99%)    Parameters below as of 14 Mar 2023 07:52  Patient On (Oxygen Delivery Method): room air        General: Well developed; in no acute distress  Eyes: EOMI; conjunctiva and sclera clear  Head: Normocephalic; atraumatic  ENMT: No nasal discharge; airway clear  Neck: Supple; non tender; no masses  Respiratory: Decreased BS at bases,  No wheezes, rales or rhonchi  Cardiovascular: Regular rate and rhythm. S1 and S2 Normal; + LIANA  Gastrointestinal: Soft non-tender non-distended; Normal bowel sounds  Genitourinary: No  suprapubic  tenderness  Extremities: No  edema  Vascular: Peripheral pulses palpable 2+ bilaterally  Neurological: Alert and oriented x2, non focal   Musculoskeletal: Normal muscle tone, without deformities  Psychiatric: Cooperative and appropriate    labs reviewed                 CT Chest No Cont (03.05.23 @ 10:16) >  IMPRESSION: Bilateral lower lobe clustered nodular opacities, right   greater than left new since February 21, 2021 suggestive of endobronchial   spread of infection. Findings can be seen in the setting of aspiration   given the distribution. 3 month follow-up noncontrast chest CT is   recommended to ensure resolution.    Bilateral interlobular septal thickening can be seen in the setting of   pulmonary edema.    Trace right pleural effusion.    Enlarged azygoesophageal recess lymph node is without significant   interval change since February 21, 2021 can be monitored on the follow-up   chest CT.    Aortic valve calcifications can be seen in the setting of aortic valve   stenosis.

## 2023-03-14 NOTE — DISCHARGE NOTE PROVIDER - CARE PROVIDER_API CALL
Olvin Collins)  Cardiovascular Disease; Internal Medicine  [address], [address]  [place] [address]  Phone: [phone]  Fax: [phone]  Follow Up Time:     Da Bonilla)  Urology  [address], [address] [place] [address]  Phone: [phone]  Fax: [phone]  Follow Up Time:

## 2023-03-14 NOTE — DISCHARGE NOTE PROVIDER - INSTRUCTIONS
Diet, Soft and Bite Sized (03-14-23 @ 07:33) [Pending Verification By Attending]  Diet, Easy to Chew (03-01-23 @ 08:51) [Active]

## 2023-03-14 NOTE — DISCHARGE NOTE PROVIDER - NSDCCPCAREPLAN_GEN_ALL_CORE_FT
PRINCIPAL DISCHARGE DIAGNOSIS  Diagnosis: Pneumonia due to COVID-19 virus  Assessment and Plan of Treatment: your infeciton has resolved  rehab   return to ER if fever or chills   follow up with your primary doctor and urologist after rehab       PRINCIPAL DISCHARGE DIAGNOSIS  Diagnosis: Pneumonia due to COVID-19 virus  Assessment and Plan of Treatment: Admitted and found to be covid-19 positive, also treated for aspiration pneumonia with zosyn. Continue with robitussin and tessalon perles as needed for cough and normal saline nasal spray for congestion.      SECONDARY DISCHARGE DIAGNOSES  Diagnosis: Urinary retention  Assessment and Plan of Treatment: Perales catheter placed for urinary retention, follow up with Dr. Bonilla for outpatient trial of void.

## 2023-03-15 PROCEDURE — 99233 SBSQ HOSP IP/OBS HIGH 50: CPT

## 2023-03-15 RX ORDER — SENNA PLUS 8.6 MG/1
2 TABLET ORAL
Qty: 0 | Refills: 0 | DISCHARGE
Start: 2023-03-15

## 2023-03-15 RX ORDER — NEBIVOLOL HYDROCHLORIDE 5 MG/1
1 TABLET ORAL
Qty: 0 | Refills: 0 | DISCHARGE

## 2023-03-15 RX ORDER — METOPROLOL TARTRATE 50 MG
1 TABLET ORAL
Qty: 0 | Refills: 0 | DISCHARGE
Start: 2023-03-15

## 2023-03-15 RX ORDER — GUAIFENESIN/DEXTROMETHORPHAN 600MG-30MG
10 TABLET, EXTENDED RELEASE 12 HR ORAL
Qty: 0 | Refills: 0 | DISCHARGE
Start: 2023-03-15

## 2023-03-15 RX ORDER — AMLODIPINE BESYLATE 2.5 MG/1
1 TABLET ORAL
Qty: 0 | Refills: 0 | DISCHARGE
Start: 2023-03-15

## 2023-03-15 RX ORDER — POLYETHYLENE GLYCOL 3350 17 G/17G
17 POWDER, FOR SOLUTION ORAL
Qty: 0 | Refills: 0 | DISCHARGE
Start: 2023-03-15

## 2023-03-15 RX ORDER — DOXAZOSIN MESYLATE 4 MG
1 TABLET ORAL
Qty: 0 | Refills: 0 | DISCHARGE
Start: 2023-03-15

## 2023-03-15 RX ADMIN — ATORVASTATIN CALCIUM 20 MILLIGRAM(S): 80 TABLET, FILM COATED ORAL at 21:34

## 2023-03-15 RX ADMIN — Medication 1 MILLIGRAM(S): at 10:56

## 2023-03-15 RX ADMIN — Medication 25 MILLIGRAM(S): at 10:56

## 2023-03-15 RX ADMIN — Medication 100 MILLIGRAM(S): at 10:56

## 2023-03-15 RX ADMIN — HEPARIN SODIUM 5000 UNIT(S): 5000 INJECTION INTRAVENOUS; SUBCUTANEOUS at 22:52

## 2023-03-15 RX ADMIN — HEPARIN SODIUM 5000 UNIT(S): 5000 INJECTION INTRAVENOUS; SUBCUTANEOUS at 10:55

## 2023-03-15 RX ADMIN — AMLODIPINE BESYLATE 10 MILLIGRAM(S): 2.5 TABLET ORAL at 10:55

## 2023-03-15 RX ADMIN — SENNA PLUS 2 TABLET(S): 8.6 TABLET ORAL at 21:37

## 2023-03-15 RX ADMIN — POLYETHYLENE GLYCOL 3350 17 GRAM(S): 17 POWDER, FOR SOLUTION ORAL at 10:55

## 2023-03-15 RX ADMIN — Medication 50 MILLIGRAM(S): at 10:57

## 2023-03-15 RX ADMIN — Medication 650 MILLIGRAM(S): at 21:34

## 2023-03-15 RX ADMIN — GABAPENTIN 300 MILLIGRAM(S): 400 CAPSULE ORAL at 10:56

## 2023-03-15 RX ADMIN — Medication 25 MILLIGRAM(S): at 21:41

## 2023-03-15 RX ADMIN — Medication 100 MILLIGRAM(S): at 21:38

## 2023-03-15 RX ADMIN — Medication 50 MILLIGRAM(S): at 10:56

## 2023-03-15 RX ADMIN — PANTOPRAZOLE SODIUM 40 MILLIGRAM(S): 20 TABLET, DELAYED RELEASE ORAL at 06:00

## 2023-03-15 RX ADMIN — Medication 50 MILLIGRAM(S): at 21:36

## 2023-03-15 RX ADMIN — Medication 2 MILLIGRAM(S): at 21:37

## 2023-03-15 RX ADMIN — Medication 650 MILLIGRAM(S): at 22:34

## 2023-03-15 RX ADMIN — GABAPENTIN 300 MILLIGRAM(S): 400 CAPSULE ORAL at 21:34

## 2023-03-15 NOTE — PROGRESS NOTE ADULT - SUBJECTIVE AND OBJECTIVE BOX
HPI: 89 yo F with arthritis, hx of HTN, gout, hyperlipidemia, hx of pelvic fracture, hx of chronic UTI with some urinary retention after post void trials in Dr. Carvalho office presented with bilateral hip and groin pain X 2 days.  Patient's daughter states she had recent urology evaluation and straight cath urines were recommended but they were difficult after only one day and she thinks patient maybe strained her hips trying to hold positioning during straight cath.  Patient then had indwelling pérez placed 3 days ago which has been draining well.  Patient is currently on bactrim for UTI.  Daughter states patient was also fatigued at home, not speaking much, and had malaise.  Patient refused to walk yesterday due to bilateral hip and pelvis pain. Denies falls.  Daughter sent patient via ambulance when she vomited once and could not take her PM dose of bactrim.  Patient denies abdominal pain, nausea, or chest pain.  O2 on arrival was 91% and nasal canula placed for comfort but patient denies SOB.    Subjective: Patient seen and examined today, feels better, continues with some nasal congestion and cough productive of clear sputum.     REVIEW OF SYSTEMS:    CONSTITUTIONAL: No weakness, fevers or chills  EYES/ENT: No visual changes;  No vertigo or throat pain   NECK: No pain or stiffness  RESPIRATORY: + cough, no wheezing, hemoptysis; No shortness of breath  CARDIOVASCULAR: No chest pain or palpitations  GASTROINTESTINAL: No abdominal or epigastric pain. No nausea, vomiting, or hematemesis; No diarrhea or constipation. No melena or hematochezia.  GENITOURINARY: No dysuria, frequency or hematuria  NEUROLOGICAL: No numbness or weakness  SKIN: No itching, rashes    Vital Signs Last 24 Hrs  T(C): 36.6 (15 Mar 2023 15:48), Max: 36.9 (14 Mar 2023 22:41)  T(F): 97.9 (15 Mar 2023 15:48), Max: 98.4 (14 Mar 2023 22:41)  HR: 63 (15 Mar 2023 15:48) (63 - 85)  BP: 136/48 (15 Mar 2023 15:48) (136/48 - 161/59)  RR: 18 (15 Mar 2023 15:48) (18 - 18)  SpO2: 98% (15 Mar 2023 15:48) (96% - 98%)    Parameters below as of 15 Mar 2023 15:48  Patient On (Oxygen Delivery Method): room air    PHYSICAL EXAM:  GENERAL: NAD, lying in bed comfortably  EYES: conjunctiva and sclera clear  ENT: Moist mucous membranes  NECK: Supple, No JVD  CHEST/LUNG: Clear to auscultation bilaterally; No rales, rhonchi, wheezing. Unlabored respirations  HEART: Regular rate and rhythm; No murmurs  ABDOMEN: Bowel sounds present; Soft, Nontender, Nondistended.   EXTREMITIES:  2+ Peripheral Pulses, brisk capillary refill. No clubbing, cyanosis, or edema  NERVOUS SYSTEM:  Alert & Oriented X3, speech clear. No deficits   MSK: FROM all 4 extremities, full and equal strength    MEDICATIONS  (STANDING):  allopurinol 50 milliGRAM(s) Oral daily  amLODIPine   Tablet 10 milliGRAM(s) Oral daily  atorvastatin 20 milliGRAM(s) Oral at bedtime  bethanechol 50 milliGRAM(s) Oral two times a day  doxazosin 2 milliGRAM(s) Oral at bedtime  folic acid 1 milliGRAM(s) Oral daily  gabapentin 300 milliGRAM(s) Oral two times a day  heparin   Injectable 5000 Unit(s) SubCutaneous every 12 hours  hydrALAZINE 25 milliGRAM(s) Oral two times a day  metoprolol tartrate 25 milliGRAM(s) Oral two times a day  pantoprazole    Tablet 40 milliGRAM(s) Oral before breakfast  polyethylene glycol 3350 17 Gram(s) Oral daily  senna 2 Tablet(s) Oral at bedtime    MEDICATIONS  (PRN):  acetaminophen     Tablet .. 650 milliGRAM(s) Oral every 6 hours PRN Temp greater or equal to 38C (100.4F), Mild Pain (1 - 3)  benzonatate 100 milliGRAM(s) Oral every 8 hours PRN Cough  guaifenesin/dextromethorphan Oral Liquid 10 milliLiter(s) Oral every 6 hours PRN Cough  hydrALAZINE 50 milliGRAM(s) Oral every 8 hours PRN Systolic blood pressure > 160      LABS:                CAPILLARY BLOOD GLUCOSE                RADIOLOGY:

## 2023-03-15 NOTE — PROGRESS NOTE ADULT - ASSESSMENT
89 yo female with PMHx arthritis,  AS,  HTN, gout, hyperlipidemia, hx of pelvic fracture, UTIs, urinary retention, s/p Pérez admitted  for    #COVID/ Aspiration? Pneumonia   Patient now positive 3/5 for COVID.    Low grade temps/ cough  CT chest with bilateral lower lobe nodular opacities-- may be infection related  No hypoxia.    completed course of Zosyn  Elevated CRP/ ESR  likely due to above     #Chronic Anemia:    Hgb normally~ 10 per records.    Was on procrit but many years ago.    Hgb down to 7.2.  -> 03/06 Transfused 1 unit pRBC.  -> now stable     #Hyponatremia 2/2 hypovolemia - resolved   Lasix  use and GI losses    #Urinary retention  -seen by urology 3/9, no void trial outpt f/u with dr fox. dc with pérez  -pt can have OP f/up, no procedure can restore voiding, c/e present meds, add Flomax    #H/o HFpEF  -S/p IVF, CT chest with read for fluid overload   -nephrology f/up re: diuresis- not overloaded per nephrology; no diuretic for now    #Mild thrombocytopenia  repeat cbc in am    #HTN - controlled  - amlodipine increased to 10 mg  - hydralazine 25 mg bid.  and prn   - continue metorpolol 25 mg bid    #CKD  stage 3  monitor creatinine   Nephrology following     # Age/illness  related muscle weakness deconditioning  Consulted PT   Planning dc to [facility] / vs Home with Home Care   I called [facility] at [phone], Where close , I left my call back number .   Waiting Insurance auth    #DVT Proph:  on Heparin Sc 89 yo female with PMHx arthritis,  AS,  HTN, gout, hyperlipidemia, hx of pelvic fracture, UTIs, urinary retention, s/p Pérez admitted  for    #COVID/ Aspiration? Pneumonia   Patient now positive 3/5 for COVID.    Low grade temps/ cough  CT chest with bilateral lower lobe nodular opacities-- may be infection related  No hypoxia.    completed course of Zosyn  Elevated CRP/ ESR  likely due to above     #Chronic Anemia:    Hgb normally~ 10 per records.    Was on procrit but many years ago.    Hgb down to 7.2.  -> 03/06 Transfused 1 unit pRBC.  -> now stable     #Hyponatremia 2/2 hypovolemia - resolved   Lasix  use and GI losses    #Urinary retention  -seen by urology 3/9, no void trial outpt f/u with dr fox. dc with pérez  -pt can have OP f/up, no procedure can restore voiding, c/e present meds, add Flomax    #H/o HFpEF  -S/p IVF, CT chest with read for fluid overload   -nephrology f/up re: diuresis- not overloaded per nephrology; no diuretic for now    #Mild thrombocytopenia    #HTN - controlled  -amlodipine increased to 10 mg  -hydralazine 25 mg bid.  and prn   -continue metorpolol 25 mg bid    #CKD  stage 3  -monitor creatinine   -Nephrology following     #Age/illness  related muscle weakness deconditioning  Consulted PT     #DVT Proph:  on Heparin Sc      DISPO: Discharge to [facility] today    discussed with patient's son  Jevon on plans for discharge today.

## 2023-03-16 ENCOUNTER — APPOINTMENT (OUTPATIENT)
Dept: UROLOGY | Facility: CLINIC | Age: 88
End: 2023-03-16

## 2023-03-16 ENCOUNTER — TRANSCRIPTION ENCOUNTER (OUTPATIENT)
Age: 88
End: 2023-03-16

## 2023-03-16 VITALS
SYSTOLIC BLOOD PRESSURE: 139 MMHG | HEART RATE: 63 BPM | OXYGEN SATURATION: 95 % | DIASTOLIC BLOOD PRESSURE: 48 MMHG | TEMPERATURE: 98 F | RESPIRATION RATE: 18 BRPM

## 2023-03-16 PROCEDURE — 99239 HOSP IP/OBS DSCHRG MGMT >30: CPT

## 2023-03-16 RX ADMIN — POLYETHYLENE GLYCOL 3350 17 GRAM(S): 17 POWDER, FOR SOLUTION ORAL at 10:46

## 2023-03-16 RX ADMIN — PANTOPRAZOLE SODIUM 40 MILLIGRAM(S): 20 TABLET, DELAYED RELEASE ORAL at 05:54

## 2023-03-16 RX ADMIN — Medication 100 MILLIGRAM(S): at 10:46

## 2023-03-16 RX ADMIN — GABAPENTIN 300 MILLIGRAM(S): 400 CAPSULE ORAL at 10:45

## 2023-03-16 RX ADMIN — AMLODIPINE BESYLATE 10 MILLIGRAM(S): 2.5 TABLET ORAL at 10:45

## 2023-03-16 RX ADMIN — Medication 25 MILLIGRAM(S): at 10:47

## 2023-03-16 RX ADMIN — Medication 50 MILLIGRAM(S): at 10:47

## 2023-03-16 RX ADMIN — HEPARIN SODIUM 5000 UNIT(S): 5000 INJECTION INTRAVENOUS; SUBCUTANEOUS at 10:44

## 2023-03-16 RX ADMIN — Medication 1 MILLIGRAM(S): at 10:45

## 2023-03-16 NOTE — PROGRESS NOTE ADULT - SUBJECTIVE AND OBJECTIVE BOX
HPI: 89 yo F with arthritis, hx of HTN, gout, hyperlipidemia, hx of pelvic fracture, hx of chronic UTI with some urinary retention after post void trials in Dr. Carvalho office presented with bilateral hip and groin pain X 2 days.  Patient's daughter states she had recent urology evaluation and straight cath urines were recommended but they were difficult after only one day and she thinks patient maybe strained her hips trying to hold positioning during straight cath.  Patient then had indwelling pérez placed 3 days ago which has been draining well.  Patient is currently on bactrim for UTI.  Daughter states patient was also fatigued at home, not speaking much, and had malaise.  Patient refused to walk yesterday due to bilateral hip and pelvis pain. Denies falls.  Daughter sent patient via ambulance when she vomited once and could not take her PM dose of bactrim.  Patient denies abdominal pain, nausea, or chest pain.  O2 on arrival was 91% and nasal canula placed for comfort but patient denies SOB.    Subjective: Patient seen and examined today, feels well, no overnight issues reported     REVIEW OF SYSTEMS:    CONSTITUTIONAL: No weakness, fevers or chills  EYES/ENT: No visual changes;  No vertigo or throat pain   NECK: No pain or stiffness  RESPIRATORY: + cough, no wheezing, hemoptysis; No shortness of breath  CARDIOVASCULAR: No chest pain or palpitations  GASTROINTESTINAL: No abdominal or epigastric pain. No nausea, vomiting, or hematemesis; No diarrhea or constipation. No melena or hematochezia.  GENITOURINARY: No dysuria, frequency or hematuria  NEUROLOGICAL: No numbness or weakness  SKIN: No itching, rashes    Vital Signs Last 24 Hrs  T(C): 36.5 (16 Mar 2023 08:03), Max: 36.6 (15 Mar 2023 15:48)  T(F): 97.7 (16 Mar 2023 08:03), Max: 97.9 (15 Mar 2023 15:48)  HR: 63 (16 Mar 2023 08:03) (63 - 68)  BP: 139/48 (16 Mar 2023 08:03) (136/48 - 139/48)  BP(mean): 76 (15 Mar 2023 22:03) (76 - 76)  RR: 18 (16 Mar 2023 08:03) (18 - 20)  SpO2: 95% (16 Mar 2023 08:03) (95% - 98%)    Parameters below as of 16 Mar 2023 08:03  Patient On (Oxygen Delivery Method): room air      PHYSICAL EXAM:  GENERAL: NAD, lying in bed comfortably  EYES: conjunctiva and sclera clear  ENT: Moist mucous membranes  NECK: Supple, No JVD  CHEST/LUNG: Clear to auscultation bilaterally; No rales, rhonchi, wheezing. Unlabored respirations  HEART: Regular rate and rhythm; +murmurs  ABDOMEN: Bowel sounds present; Soft, Nontender, Nondistended.   EXTREMITIES:  2+ Peripheral Pulses, brisk capillary refill. No clubbing, cyanosis, or edema  NERVOUS SYSTEM:  Alert & Oriented X3, speech clear. No deficits   MSK: FROM all 4 extremities, full and equal strength    MEDICATIONS  (STANDING):  allopurinol 50 milliGRAM(s) Oral daily  amLODIPine   Tablet 10 milliGRAM(s) Oral daily  atorvastatin 20 milliGRAM(s) Oral at bedtime  bethanechol 50 milliGRAM(s) Oral two times a day  doxazosin 2 milliGRAM(s) Oral at bedtime  folic acid 1 milliGRAM(s) Oral daily  gabapentin 300 milliGRAM(s) Oral two times a day  heparin   Injectable 5000 Unit(s) SubCutaneous every 12 hours  hydrALAZINE 25 milliGRAM(s) Oral two times a day  metoprolol tartrate 25 milliGRAM(s) Oral two times a day  pantoprazole    Tablet 40 milliGRAM(s) Oral before breakfast  polyethylene glycol 3350 17 Gram(s) Oral daily  senna 2 Tablet(s) Oral at bedtime    MEDICATIONS  (PRN):  acetaminophen     Tablet .. 650 milliGRAM(s) Oral every 6 hours PRN Temp greater or equal to 38C (100.4F), Mild Pain (1 - 3)  benzonatate 100 milliGRAM(s) Oral every 8 hours PRN Cough  guaifenesin/dextromethorphan Oral Liquid 10 milliLiter(s) Oral every 6 hours PRN Cough  hydrALAZINE 50 milliGRAM(s) Oral every 8 hours PRN Systolic blood pressure > 160      LABS:                CAPILLARY BLOOD GLUCOSE                RADIOLOGY:

## 2023-03-16 NOTE — PROGRESS NOTE ADULT - ASSESSMENT
89 yo female with PMHx arthritis,  AS,  HTN, gout, hyperlipidemia, hx of pelvic fracture, UTIs, urinary retention, s/p Pérez admitted  for    #COVID/ Aspiration? Pneumonia   Patient now positive 3/5 for COVID.    Low grade temps/ cough  CT chest with bilateral lower lobe nodular opacities-- may be infection related  No hypoxia.    completed course of Zosyn  Elevated CRP/ ESR  likely due to above     #Chronic Anemia:    Hgb normally~ 10 per records.    Was on procrit but many years ago.    Hgb down to 7.2.  -> 03/06 Transfused 1 unit pRBC.  -> now stable     #Hyponatremia 2/2 hypovolemia - resolved   Lasix  use and GI losses    #Urinary retention  -seen by urology 3/9, no void trial outpt f/u with dr fox. dc with pérez  -pt can have OP f/up, no procedure can restore voiding, c/e present meds, add Flomax    #H/o HFpEF  -S/p IVF, CT chest with read for fluid overload   -nephrology f/up re: diuresis- not overloaded per nephrology; no diuretic for now    #Mild thrombocytopenia    #HTN - controlled  -amlodipine increased to 10 mg  -hydralazine 25 mg bid.  and prn   -continue metorpolol 25 mg bid    #CKD  stage 3  -monitor creatinine   -Nephrology following     #Age/illness  related muscle weakness deconditioning  Consulted PT     #DVT Proph:  on Heparin Sc      DISPO: Discharge to [facility] today    discussed with patient's son Dr. Escoto on plans for discharge

## 2023-03-16 NOTE — PROGRESS NOTE ADULT - PROVIDER SPECIALTY LIST ADULT
Critical Care
Hospitalist
Hospitalist
Infectious Disease
Nephrology
Hospitalist
Nephrology
Cardiology
Hospitalist
Infectious Disease
Nephrology
Cardiology
Hospitalist
YASMEEN
Hospitalist
Hospitalist
YASMEEN
Cardiology

## 2023-03-16 NOTE — PROGRESS NOTE ADULT - NUTRITIONAL ASSESSMENT
This patient has been assessed with a concern for Malnutrition and has been determined to have a diagnosis/diagnoses of Moderate protein-calorie malnutrition.    This patient is being managed with:   Diet Easy to Chew-  Entered: Mar  1 2023  8:50AM    
This patient has been assessed with a concern for Malnutrition and has been determined to have a diagnosis/diagnoses of Moderate protein-calorie malnutrition.    This patient is being managed with:   Diet Soft and Bite Sized-  Entered: Mar 15 2023  9:46AM    
This patient has been assessed with a concern for Malnutrition and has been determined to have a diagnosis/diagnoses of Moderate protein-calorie malnutrition.    This patient is being managed with:   Diet Soft and Bite Sized-  Entered: Mar 15 2023  9:46AM    
This patient has been assessed with a concern for Malnutrition and has been determined to have a diagnosis/diagnoses of Moderate protein-calorie malnutrition.    This patient is being managed with:   Diet Easy to Chew-  Entered: Mar  1 2023  8:50AM    
This patient has been assessed with a concern for Malnutrition and has been determined to have a diagnosis/diagnoses of Moderate protein-calorie malnutrition.    This patient is being managed with:   Diet Easy to Chew-  Entered: Mar  1 2023  8:50AM    The following pending diet order is being considered for treatment of Moderate protein-calorie malnutrition:  Diet Soft and Bite Sized-  Entered: Mar 14 2023  7:33AM  
This patient has been assessed with a concern for Malnutrition and has been determined to have a diagnosis/diagnoses of Moderate protein-calorie malnutrition.    This patient is being managed with:   Diet Easy to Chew-  Entered: Mar  1 2023  8:50AM    
This patient has been assessed with a concern for Malnutrition and has been determined to have a diagnosis/diagnoses of Moderate protein-calorie malnutrition.    This patient is being managed with:   Diet Easy to Chew-  Entered: Mar  1 2023  8:50AM      This patient has been assessed with a concern for Malnutrition and has been determined to have a diagnosis/diagnoses of Moderate protein-calorie malnutrition.    This patient is being managed with:   Diet Easy to Chew-  Entered: Mar  1 2023  8:50AM

## 2023-03-16 NOTE — DISCHARGE NOTE NURSING/CASE MANAGEMENT/SOCIAL WORK - NSDCPEFALRISK_GEN_ALL_CORE
For information on Fall & Injury Prevention, visit: [email or web address] OR  [email or web address] OR  https://www.cdc.gov/steadi/patient.html

## 2023-03-16 NOTE — DISCHARGE NOTE NURSING/CASE MANAGEMENT/SOCIAL WORK - PATIENT PORTAL LINK FT
You can access the FollowMyHealth Patient Portal offered by [facility] by registering at the following website: [email or web address]. By joining [employer]’s FollowMyHealth portal, you will also be able to view your health information using other applications (apps) compatible with our system.

## 2023-03-22 DIAGNOSIS — R33.9 RETENTION OF URINE, UNSPECIFIED: ICD-10-CM

## 2023-03-22 DIAGNOSIS — Z88.8 ALLERGY STATUS TO OTHER DRUGS, MEDICAMENTS AND BIOLOGICAL SUBSTANCES STATUS: ICD-10-CM

## 2023-03-22 DIAGNOSIS — J44.9 CHRONIC OBSTRUCTIVE PULMONARY DISEASE, UNSPECIFIED: ICD-10-CM

## 2023-03-22 DIAGNOSIS — N39.0 URINARY TRACT INFECTION, SITE NOT SPECIFIED: ICD-10-CM

## 2023-03-22 DIAGNOSIS — N18.4 CHRONIC KIDNEY DISEASE, STAGE 4 (SEVERE): ICD-10-CM

## 2023-03-22 DIAGNOSIS — E78.5 HYPERLIPIDEMIA, UNSPECIFIED: ICD-10-CM

## 2023-03-22 DIAGNOSIS — J12.82 PNEUMONIA DUE TO CORONAVIRUS DISEASE 2019: ICD-10-CM

## 2023-03-22 DIAGNOSIS — M10.9 GOUT, UNSPECIFIED: ICD-10-CM

## 2023-03-22 DIAGNOSIS — E86.1 HYPOVOLEMIA: ICD-10-CM

## 2023-03-22 DIAGNOSIS — J69.0 PNEUMONITIS DUE TO INHALATION OF FOOD AND VOMIT: ICD-10-CM

## 2023-03-22 DIAGNOSIS — D69.6 THROMBOCYTOPENIA, UNSPECIFIED: ICD-10-CM

## 2023-03-22 DIAGNOSIS — I50.32 CHRONIC DIASTOLIC (CONGESTIVE) HEART FAILURE: ICD-10-CM

## 2023-03-22 DIAGNOSIS — G93.41 METABOLIC ENCEPHALOPATHY: ICD-10-CM

## 2023-03-22 DIAGNOSIS — I08.0 RHEUMATIC DISORDERS OF BOTH MITRAL AND AORTIC VALVES: ICD-10-CM

## 2023-03-22 DIAGNOSIS — N17.9 ACUTE KIDNEY FAILURE, UNSPECIFIED: ICD-10-CM

## 2023-03-22 DIAGNOSIS — I50.30 UNSPECIFIED DIASTOLIC (CONGESTIVE) HEART FAILURE: ICD-10-CM

## 2023-03-22 DIAGNOSIS — U07.1 COVID-19: ICD-10-CM

## 2023-03-22 DIAGNOSIS — E87.1 HYPO-OSMOLALITY AND HYPONATREMIA: ICD-10-CM

## 2023-03-22 DIAGNOSIS — I13.0 HYPERTENSIVE HEART AND CHRONIC KIDNEY DISEASE WITH HEART FAILURE AND STAGE 1 THROUGH STAGE 4 CHRONIC KIDNEY DISEASE, OR UNSPECIFIED CHRONIC KIDNEY DISEASE: ICD-10-CM

## 2023-03-22 DIAGNOSIS — M19.90 UNSPECIFIED OSTEOARTHRITIS, UNSPECIFIED SITE: ICD-10-CM

## 2023-03-22 DIAGNOSIS — E44.0 MODERATE PROTEIN-CALORIE MALNUTRITION: ICD-10-CM

## 2023-03-31 ENCOUNTER — INPATIENT (INPATIENT)
Facility: HOSPITAL | Age: 88
LOS: 1 days | Discharge: HOME | DRG: 312 | End: 2023-04-02
Attending: HOSPITALIST | Admitting: INTERNAL MEDICINE
Payer: MEDICARE

## 2023-03-31 VITALS
HEART RATE: 57 BPM | OXYGEN SATURATION: 97 % | SYSTOLIC BLOOD PRESSURE: 128 MMHG | RESPIRATION RATE: 18 BRPM | HEIGHT: 63 IN | TEMPERATURE: 98 F | DIASTOLIC BLOOD PRESSURE: 47 MMHG

## 2023-03-31 DIAGNOSIS — Z90.89 ACQUIRED ABSENCE OF OTHER ORGANS: Chronic | ICD-10-CM

## 2023-03-31 DIAGNOSIS — R55 SYNCOPE AND COLLAPSE: ICD-10-CM

## 2023-03-31 LAB
ALBUMIN SERPL ELPH-MCNC: 3.6 G/DL — SIGNIFICANT CHANGE UP (ref 3.3–5)
ALP SERPL-CCNC: 61 U/L — SIGNIFICANT CHANGE UP (ref 40–120)
ALT FLD-CCNC: 22 U/L — SIGNIFICANT CHANGE UP (ref 12–78)
ANION GAP SERPL CALC-SCNC: 4 MMOL/L — LOW (ref 5–17)
APPEARANCE UR: ABNORMAL
APPEARANCE UR: CLEAR — SIGNIFICANT CHANGE UP
AST SERPL-CCNC: 22 U/L — SIGNIFICANT CHANGE UP (ref 15–37)
BACTERIA # UR AUTO: ABNORMAL
BACTERIA # UR AUTO: ABNORMAL
BASOPHILS # BLD AUTO: 0.01 K/UL — SIGNIFICANT CHANGE UP (ref 0–0.2)
BASOPHILS NFR BLD AUTO: 0.2 % — SIGNIFICANT CHANGE UP (ref 0–2)
BILIRUB SERPL-MCNC: 0.4 MG/DL — SIGNIFICANT CHANGE UP (ref 0.2–1.2)
BILIRUB UR-MCNC: NEGATIVE — SIGNIFICANT CHANGE UP
BILIRUB UR-MCNC: NEGATIVE — SIGNIFICANT CHANGE UP
BUN SERPL-MCNC: 22 MG/DL — SIGNIFICANT CHANGE UP (ref 7–23)
CALCIUM SERPL-MCNC: 9.3 MG/DL — SIGNIFICANT CHANGE UP (ref 8.5–10.1)
CHLORIDE SERPL-SCNC: 107 MMOL/L — SIGNIFICANT CHANGE UP (ref 96–108)
CO2 SERPL-SCNC: 24 MMOL/L — SIGNIFICANT CHANGE UP (ref 22–31)
COLOR SPEC: YELLOW — SIGNIFICANT CHANGE UP
COLOR SPEC: YELLOW — SIGNIFICANT CHANGE UP
COMMENT - URINE: SIGNIFICANT CHANGE UP
CREAT SERPL-MCNC: 1.57 MG/DL — HIGH (ref 0.5–1.3)
DIFF PNL FLD: ABNORMAL
DIFF PNL FLD: ABNORMAL
EGFR: 31 ML/MIN/1.73M2 — LOW
EOSINOPHIL # BLD AUTO: 0.01 K/UL — SIGNIFICANT CHANGE UP (ref 0–0.5)
EOSINOPHIL NFR BLD AUTO: 0.2 % — SIGNIFICANT CHANGE UP (ref 0–6)
EPI CELLS # UR: SIGNIFICANT CHANGE UP
EPI CELLS # UR: SIGNIFICANT CHANGE UP
FLUAV AG NPH QL: SIGNIFICANT CHANGE UP
FLUBV AG NPH QL: SIGNIFICANT CHANGE UP
GLUCOSE SERPL-MCNC: 108 MG/DL — HIGH (ref 70–99)
GLUCOSE UR QL: NEGATIVE — SIGNIFICANT CHANGE UP
GLUCOSE UR QL: NEGATIVE — SIGNIFICANT CHANGE UP
HCT VFR BLD CALC: 28.9 % — LOW (ref 34.5–45)
HGB BLD-MCNC: 9.6 G/DL — LOW (ref 11.5–15.5)
IMM GRANULOCYTES NFR BLD AUTO: 1.4 % — HIGH (ref 0–0.9)
KETONES UR-MCNC: NEGATIVE — SIGNIFICANT CHANGE UP
KETONES UR-MCNC: NEGATIVE — SIGNIFICANT CHANGE UP
LEUKOCYTE ESTERASE UR-ACNC: ABNORMAL
LEUKOCYTE ESTERASE UR-ACNC: ABNORMAL
LYMPHOCYTES # BLD AUTO: 1.21 K/UL — SIGNIFICANT CHANGE UP (ref 1–3.3)
LYMPHOCYTES # BLD AUTO: 19.5 % — SIGNIFICANT CHANGE UP (ref 13–44)
MAGNESIUM SERPL-MCNC: 2.4 MG/DL — SIGNIFICANT CHANGE UP (ref 1.6–2.6)
MCHC RBC-ENTMCNC: 30.7 PG — SIGNIFICANT CHANGE UP (ref 27–34)
MCHC RBC-ENTMCNC: 33.2 GM/DL — SIGNIFICANT CHANGE UP (ref 32–36)
MCV RBC AUTO: 92.3 FL — SIGNIFICANT CHANGE UP (ref 80–100)
MONOCYTES # BLD AUTO: 0.59 K/UL — SIGNIFICANT CHANGE UP (ref 0–0.9)
MONOCYTES NFR BLD AUTO: 9.5 % — SIGNIFICANT CHANGE UP (ref 2–14)
NEUTROPHILS # BLD AUTO: 4.31 K/UL — SIGNIFICANT CHANGE UP (ref 1.8–7.4)
NEUTROPHILS NFR BLD AUTO: 69.2 % — SIGNIFICANT CHANGE UP (ref 43–77)
NITRITE UR-MCNC: POSITIVE
NITRITE UR-MCNC: POSITIVE
PH UR: 6 — SIGNIFICANT CHANGE UP (ref 5–8)
PH UR: 6 — SIGNIFICANT CHANGE UP (ref 5–8)
PLATELET # BLD AUTO: 97 K/UL — LOW (ref 150–400)
POTASSIUM SERPL-MCNC: 4.6 MMOL/L — SIGNIFICANT CHANGE UP (ref 3.5–5.3)
POTASSIUM SERPL-SCNC: 4.6 MMOL/L — SIGNIFICANT CHANGE UP (ref 3.5–5.3)
PROT SERPL-MCNC: 7.1 GM/DL — SIGNIFICANT CHANGE UP (ref 6–8.3)
PROT UR-MCNC: 100
PROT UR-MCNC: NEGATIVE — SIGNIFICANT CHANGE UP
RBC # BLD: 3.13 M/UL — LOW (ref 3.8–5.2)
RBC # FLD: 15.3 % — HIGH (ref 10.3–14.5)
RBC CASTS # UR COMP ASSIST: SIGNIFICANT CHANGE UP /HPF (ref 0–4)
RBC CASTS # UR COMP ASSIST: SIGNIFICANT CHANGE UP /HPF (ref 0–4)
RSV RNA NPH QL NAA+NON-PROBE: SIGNIFICANT CHANGE UP
SARS-COV-2 RNA SPEC QL NAA+PROBE: DETECTED
SODIUM SERPL-SCNC: 135 MMOL/L — SIGNIFICANT CHANGE UP (ref 135–145)
SP GR SPEC: 1.01 — SIGNIFICANT CHANGE UP (ref 1.01–1.02)
SP GR SPEC: 1.01 — SIGNIFICANT CHANGE UP (ref 1.01–1.02)
TROPONIN I, HIGH SENSITIVITY RESULT: 15.39 NG/L — SIGNIFICANT CHANGE UP
UROBILINOGEN FLD QL: NEGATIVE — SIGNIFICANT CHANGE UP
UROBILINOGEN FLD QL: NEGATIVE — SIGNIFICANT CHANGE UP
WBC # BLD: 6.22 K/UL — SIGNIFICANT CHANGE UP (ref 3.8–10.5)
WBC # FLD AUTO: 6.22 K/UL — SIGNIFICANT CHANGE UP (ref 3.8–10.5)
WBC UR QL: >50 /HPF (ref 0–5)
WBC UR QL: ABNORMAL /HPF (ref 0–5)

## 2023-03-31 PROCEDURE — 99285 EMERGENCY DEPT VISIT HI MDM: CPT

## 2023-03-31 PROCEDURE — 87077 CULTURE AEROBIC IDENTIFY: CPT

## 2023-03-31 PROCEDURE — 85027 COMPLETE CBC AUTOMATED: CPT

## 2023-03-31 PROCEDURE — 87186 SC STD MICRODIL/AGAR DIL: CPT

## 2023-03-31 PROCEDURE — 99223 1ST HOSP IP/OBS HIGH 75: CPT

## 2023-03-31 PROCEDURE — 80048 BASIC METABOLIC PNL TOTAL CA: CPT

## 2023-03-31 PROCEDURE — 80061 LIPID PANEL: CPT

## 2023-03-31 PROCEDURE — 84484 ASSAY OF TROPONIN QUANT: CPT

## 2023-03-31 PROCEDURE — 87086 URINE CULTURE/COLONY COUNT: CPT

## 2023-03-31 PROCEDURE — 93005 ELECTROCARDIOGRAM TRACING: CPT

## 2023-03-31 PROCEDURE — 72125 CT NECK SPINE W/O DYE: CPT | Mod: 26,MA

## 2023-03-31 PROCEDURE — 93010 ELECTROCARDIOGRAM REPORT: CPT

## 2023-03-31 PROCEDURE — 71045 X-RAY EXAM CHEST 1 VIEW: CPT

## 2023-03-31 PROCEDURE — 71045 X-RAY EXAM CHEST 1 VIEW: CPT | Mod: 26

## 2023-03-31 PROCEDURE — 97162 PT EVAL MOD COMPLEX 30 MIN: CPT | Mod: GP

## 2023-03-31 PROCEDURE — 36415 COLL VENOUS BLD VENIPUNCTURE: CPT

## 2023-03-31 PROCEDURE — 81001 URINALYSIS AUTO W/SCOPE: CPT

## 2023-03-31 PROCEDURE — 97116 GAIT TRAINING THERAPY: CPT | Mod: GP

## 2023-03-31 PROCEDURE — 70450 CT HEAD/BRAIN W/O DYE: CPT | Mod: 26,MA

## 2023-03-31 RX ORDER — BETHANECHOL CHLORIDE 25 MG
50 TABLET ORAL
Refills: 0 | Status: DISCONTINUED | OUTPATIENT
Start: 2023-03-31 | End: 2023-04-02

## 2023-03-31 RX ORDER — ACETAMINOPHEN 500 MG
650 TABLET ORAL EVERY 6 HOURS
Refills: 0 | Status: DISCONTINUED | OUTPATIENT
Start: 2023-03-31 | End: 2023-04-02

## 2023-03-31 RX ORDER — HYDRALAZINE HCL 50 MG
25 TABLET ORAL
Refills: 0 | Status: DISCONTINUED | OUTPATIENT
Start: 2023-03-31 | End: 2023-04-02

## 2023-03-31 RX ORDER — GABAPENTIN 400 MG/1
600 CAPSULE ORAL
Refills: 0 | Status: DISCONTINUED | OUTPATIENT
Start: 2023-03-31 | End: 2023-04-02

## 2023-03-31 RX ORDER — SODIUM CHLORIDE 9 MG/ML
1000 INJECTION INTRAMUSCULAR; INTRAVENOUS; SUBCUTANEOUS ONCE
Refills: 0 | Status: COMPLETED | OUTPATIENT
Start: 2023-03-31 | End: 2023-03-31

## 2023-03-31 RX ORDER — ATORVASTATIN CALCIUM 80 MG/1
20 TABLET, FILM COATED ORAL AT BEDTIME
Refills: 0 | Status: DISCONTINUED | OUTPATIENT
Start: 2023-03-31 | End: 2023-04-02

## 2023-03-31 RX ORDER — SENNA PLUS 8.6 MG/1
2 TABLET ORAL AT BEDTIME
Refills: 0 | Status: DISCONTINUED | OUTPATIENT
Start: 2023-03-31 | End: 2023-04-02

## 2023-03-31 RX ORDER — ALLOPURINOL 300 MG
100 TABLET ORAL DAILY
Refills: 0 | Status: DISCONTINUED | OUTPATIENT
Start: 2023-03-31 | End: 2023-04-02

## 2023-03-31 RX ORDER — AMLODIPINE BESYLATE 2.5 MG/1
10 TABLET ORAL DAILY
Refills: 0 | Status: DISCONTINUED | OUTPATIENT
Start: 2023-03-31 | End: 2023-04-02

## 2023-03-31 RX ORDER — NYSTATIN CREAM 100000 [USP'U]/G
1 CREAM TOPICAL
Refills: 0 | Status: DISCONTINUED | OUTPATIENT
Start: 2023-03-31 | End: 2023-04-02

## 2023-03-31 RX ORDER — LIDOCAINE 4 G/100G
1 CREAM TOPICAL DAILY
Refills: 0 | Status: DISCONTINUED | OUTPATIENT
Start: 2023-03-31 | End: 2023-04-02

## 2023-03-31 RX ORDER — CHOLECALCIFEROL (VITAMIN D3) 125 MCG
2000 CAPSULE ORAL DAILY
Refills: 0 | Status: DISCONTINUED | OUTPATIENT
Start: 2023-03-31 | End: 2023-04-02

## 2023-03-31 RX ORDER — FOLIC ACID 0.8 MG
1 TABLET ORAL DAILY
Refills: 0 | Status: DISCONTINUED | OUTPATIENT
Start: 2023-03-31 | End: 2023-04-02

## 2023-03-31 RX ORDER — DOXAZOSIN MESYLATE 4 MG
2 TABLET ORAL AT BEDTIME
Refills: 0 | Status: DISCONTINUED | OUTPATIENT
Start: 2023-03-31 | End: 2023-04-02

## 2023-03-31 RX ORDER — METOPROLOL TARTRATE 50 MG
25 TABLET ORAL
Refills: 0 | Status: DISCONTINUED | OUTPATIENT
Start: 2023-03-31 | End: 2023-04-02

## 2023-03-31 RX ORDER — CIPROFLOXACIN LACTATE 400MG/40ML
400 VIAL (ML) INTRAVENOUS ONCE
Refills: 0 | Status: COMPLETED | OUTPATIENT
Start: 2023-03-31 | End: 2023-03-31

## 2023-03-31 RX ORDER — ACETAMINOPHEN 500 MG
1000 TABLET ORAL ONCE
Refills: 0 | Status: COMPLETED | OUTPATIENT
Start: 2023-03-31 | End: 2023-03-31

## 2023-03-31 RX ORDER — ENOXAPARIN SODIUM 100 MG/ML
30 INJECTION SUBCUTANEOUS EVERY 24 HOURS
Refills: 0 | Status: DISCONTINUED | OUTPATIENT
Start: 2023-03-31 | End: 2023-04-02

## 2023-03-31 RX ORDER — FUROSEMIDE 40 MG
20 TABLET ORAL DAILY
Refills: 0 | Status: DISCONTINUED | OUTPATIENT
Start: 2023-03-31 | End: 2023-04-02

## 2023-03-31 RX ADMIN — Medication 400 MILLIGRAM(S): at 18:49

## 2023-03-31 RX ADMIN — Medication 200 MILLIGRAM(S): at 15:52

## 2023-03-31 RX ADMIN — Medication 2 MILLIGRAM(S): at 20:47

## 2023-03-31 RX ADMIN — ATORVASTATIN CALCIUM 20 MILLIGRAM(S): 80 TABLET, FILM COATED ORAL at 20:47

## 2023-03-31 RX ADMIN — SODIUM CHLORIDE 2000 MILLILITER(S): 9 INJECTION INTRAMUSCULAR; INTRAVENOUS; SUBCUTANEOUS at 15:53

## 2023-03-31 NOTE — ED PROVIDER NOTE - CLINICAL SUMMARY MEDICAL DECISION MAKING FREE TEXT BOX
labs and imaging EKG is sinus rhythm at a rate of 60, no acute ST segment or T wave changes, labs and imaging obtained EKG is sinus rhythm at a rate of 60, no acute ST segment or T wave changes, labs and imaging obtainedPatient's daughter arrived to the department, stated that the patient actually had passed out twice earlier today, in addition she had been complaining of a headache for couple of days after she had been discharged from the nursing facility, patient CT head did not reveal any acute process, urinalysis did show a nitrite positive UTI however she does have an indwelling catheter, urine culture sent and she was with a dose of antibiotics here, will discuss with medicine given syncope x2.

## 2023-03-31 NOTE — ED ADULT TRIAGE NOTE - CHIEF COMPLAINT QUOTE
Pt BIBEMS from EMS, c/o syncopal episode while having a bowel movement. Unknown head strike, however endorses headache. . Denies AC/ASA use. Denies chest pain. STAT EKG to be completed. Dr. Brown to triage, neuro alert called. Brought directly to CT scan.

## 2023-03-31 NOTE — ED ADULT NURSE NOTE - NS ED NOTE ABUSE SUSPICION NEGLECT YN
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 LP x 3 with elevated OP and papilledema on ophthalmology exam. NSGY consulted for consideration of drain placement for elevated ICPs. Pt is currently neurologically stable. Of note, pt is on hep gtt for acute DVT    -Imaging and diagnostics reviewed   -CSF (6/24) OP34, Glu 6, protein 93. Lithuanian stain pos for yeast like organisms.  Cx pos for encapsulated yeast species.     -CSF (6/30): OP35,CP13. G<5, P174, 144 WBC, 3000 RBC, 37 lymph, 59 mono/macro. Repeat CSF w/1:8 cryptococcal antigen, cx w/yeast.   -CSF (7/8): OP27, CP12. G<15,P162, CSF cx pending, gram stain with yeast    -CTH 6/30 with slight enlargement of the temporal horn of right lateral ventricle compared to prior MRI brain 06/23    -CTH 7/8 without significant change  -Ophthalmology following. Last eval 7/7 with stable OU, color plates full. Still 3+ ONH edema  -ID: Pt continued on flucytosine and amphotericin B 7/8. F/u ongoing ID recommendations  -Patient with improvement in headaches. LP on 7/13 with opening pressure of 21. Downtrending from prior LPs with opening pressure of 35 and then 27. Will attempt LP tomorrow to evaluate pressure. Will monitor for headaches over the weekend. VPS shunt  tentatively on for Monday. If pressure continues to normalize, shunt may not be indicated.    -We will continue to follow. Please call NSGY with any decline in neuro exam. Would have a low threshold to transfer to ICU with any worsening visual deficits, decline in neuro exam or signs of worsening ICPs    Discussed with Dr. Sorensen          No

## 2023-03-31 NOTE — H&P ADULT - NSICDXFAMILYHX_GEN_ALL_CORE_FT
FAMILY HISTORY:  Father  Still living? Unknown  Family history of hypertension, Age at diagnosis: Age Unknown    Mother  Still living? No  Family history of hypertension, Age at diagnosis: Age Unknown

## 2023-03-31 NOTE — H&P ADULT - ASSESSMENT
Pt is admitted  w/    Syncope x2   Recent Headaches  NIHSS of O  Severe Aortic Stenosis  Recent Covid, still positive  Recent Asp Pna  Chronic Pérez due to urinary retention  CTs head and c spine:  shows mild periventr white matter ischemia,  degen disc disease, small central disc charlotte at C7-T1 indenting the ventral cord  ( see full report)    - admit to telemetry, eval for arrythmia   -  orthostatic vitals  - s/p Cipro x1 dose in ED for positive ua with change of pérez catheter  - Cardiology consult  - cont. metoprolol 25mg BID, hydralazine 25 mg BID,  furosemide 20mg, norvasc 10 mg  - statin  - Neurology consult  - check fasting lipids, BMP, HgA1C  - f/u u cx from ED,   also pt had outpt u cx sent 3/ 30/23  - outpt f/u with urology  - DVT proph: heparin  - Full Code  - plan of care discussed in detail with pts daughter Nick [phone]

## 2023-03-31 NOTE — ED ADULT NURSE NOTE - NSIMPLEMENTINTERV_GEN_ALL_ED
Implemented All Fall with Harm Risk Interventions:  [place] to call system. Call bell, personal items and telephone within reach. Instruct patient to call for assistance. Room bathroom lighting operational. Non-slip footwear when patient is off stretcher. Physically safe environment: no spills, clutter or unnecessary equipment. Stretcher in lowest position, wheels locked, appropriate side rails in place. Provide visual cue, wrist band, yellow gown, etc. Monitor gait and stability. Monitor for mental status changes and reorient to person, place, and time. Review medications for side effects contributing to fall risk. Reinforce activity limits and safety measures with patient and family. Provide visual clues: red socks.

## 2023-03-31 NOTE — PATIENT PROFILE ADULT - FALL HARM RISK - HARM RISK INTERVENTIONS

## 2023-03-31 NOTE — H&P ADULT - NSHPPHYSICALEXAM_GEN_ALL_CORE
ICU Vital Signs Last 24 Hrs  T(C): 36.6 (31 Mar 2023 13:15), Max: 36.6 (31 Mar 2023 13:15)  T(F): 97.9 (31 Mar 2023 13:15), Max: 97.9 (31 Mar 2023 13:15)  HR: 57 (31 Mar 2023 13:15) (57 - 57)  BP: 128/47 (31 Mar 2023 13:15) (128/47 - 128/47)    RR: 18 (31 Mar 2023 13:15) (18 - 18)   SpO2: 97% (31 Mar 2023 13:15) (97% - 97%)    O2 Parameters below as of 31 Mar 2023 13:15  Patient On (Oxygen Delivery Method): room air

## 2023-03-31 NOTE — ED PROVIDER NOTE - OBJECTIVE STATEMENT
89 y/o female with pmhx of arthritis, hx of HTN on Metroprolol/Amlodipine, gout, HLD, hx of pelvic fracture, UTIs w/ urinary retention on Perales   c/o syncope. Pt states remember getting out of the shower and that's it. Patient doesn't remember passing out or hitting head. Pt does indorse headache. Patient states eating very little today. Denies chest pain or SOB. Not on blood thinners.

## 2023-03-31 NOTE — H&P ADULT - MUSCULOSKELETAL
no calf tenderness/strength 5/5 bilateral upper extremities/strength 5/5 bilateral lower extremities/no chest wall tenderness

## 2023-03-31 NOTE — ED ADULT NURSE NOTE - HIV OFFER
ID Progress Note    Chuy Hollis Jr. is a 49 year old male with osteomyelitis, wound infection     Today's reason for visit: osteomyelitis, wound infection     S: stable, no new issue, + pain, no fever, no cough, no shortness of breath     ROS:    General: no fever, + pain  HEENT: no visual problems, no hearing issues, no headache, no throat pain  Neck: no swelling, or pain  Chest: no coughing, no shortness of breath  CVS: no chest pain, no palpitation  GI: no nausea, vomiting, no diarrhea, no abd pain  : no urinary frequency, no dysuria, no discharge, no blood in urine  Ext: no edema , + wound   Skin: no rash  Musculoskeletal: no joint pain, no difficulty moving limbs  Neuro: no weakness, no tremors, no headache  Psych: no anxiety, no depression        O:        Vital Last Value 24 Hour Range   Temperature 98.1 °F (36.7 °C) (03/28/22 1058) Temp  Min: 97.4 °F (36.3 °C)  Max: 98.2 °F (36.8 °C)   Pulse 82 (03/28/22 1058) Pulse  Min: 72  Max: 85   Respiratory 17 (03/28/22 1058) Resp  Min: 17  Max: 18   Non-Invasive  Blood Pressure 118/75 (03/28/22 1058) BP  Min: 108/79  Max: 153/93   Pulse Oximetry 98 % (03/28/22 1058) SpO2  Min: 98 %  Max: 100 %   Arterial   Blood Pressure   No data recorded     O2 No data recorded       Vital Today Admit   Weight 110.2 kg (242 lb 15.2 oz) (03/28/22 1024) Weight: 108 kg (238 lb 1.6 oz) (03/11/22 1809)   Height N/A Height: 6' 3\" (190.5 cm) (03/11/22 1809)   BMI N/A BMI (Calculated): 29.76 (03/11/22 1809)           I/O last 3 completed shifts:  In: 10 [I.V.:10]  Out: -       Intake/Output Summary (Last 24 hours) at 3/28/2022 1157  Last data filed at 3/28/2022 1000  Gross per 24 hour   Intake 370 ml   Output --   Net 370 ml        EXAM:  GEN:  Stable, not in acute distress  HEENT: no icterus, no conjunctival hemorrhages  NECK: supple, no JVD, no thyromegaly, no bruit  CVS: s1s2 audible, regular  CHEST: clear, decreased breath sounds at  bases, no wheezing, no rales  ABD: soft, non tender  EXT: no edema, +foot ulcer  NEURO: grossly nonfocal  SKIN: no rash, + ulcer  LYMP: no cervical  lymphadenopathy  PSYCH: stable, not anxious or depressed  Lines:     LABS:     Reviewed:  Recent Labs   Lab 03/28/22  0616 03/27/22  0511 03/26/22  0646 03/25/22  0642 03/24/22  0552 03/23/22  0628 03/22/22  0604   HGB 10.4* 10.6* 10.8* 10.1* 9.9* 9.6* 9.7*   WBC 9.2 8.0 7.1 7.1 7.0 6.7 7.9   SEG 68 64 60 60 57 52 56    161 158 143 142 148 148   SODIUM 134* 137 137 134* 134* 133* 134*   POTASSIUM 5.4* 4.9 4.7 5.2* 5.1 5.4* 5.0   CHLORIDE 95* 97* 98 95* 96* 96* 96*   BUN 88* 65* 37* 64* 45* 67* 51*   CREATININE 12.70* 10.18* 7.35* 10.46* 7.89* 10.20* 7.83*   BILIRUBIN 0.5 0.5 0.5 0.5 0.5 0.4  --    GPT 14 18 17 12 17 13  --    AST 13 15 21 16 22 17  --    ALKPT 87 92 95 92 96 78  --    PCT 0.49* 0.54* 0.50* 0.59*  --  0.45*  --             CULTURES:  Reviewed:   Lab Results   Component Value Date    BLC No Growth 5 Days. 03/11/2022    BLC No Growth 5 Days. 03/11/2022    AANC Rare Staphylococcus aureus (A) 03/18/2022    AANC Very rare Staphylococcus coagulase negative (A) 03/18/2022    AANC Few Staphylococcus aureus (A) 03/11/2022    AANC Many Anaerococcus vaginalis (A) 03/11/2022    AANC Few Dermabacter hominis (A) 03/11/2022    CAFBS No Growth 7 Days. 03/18/2022    AFSM No acid fast bacilli seen. 03/18/2022    GS Moderate Polymorphonuclear cells. 03/18/2022    GS No epithelial cells seen. 03/18/2022    GS No organisms seen. 03/18/2022      Other:      IMAGING:    Reviewed:   CXR:   CT Scan: 1. Findings consistent with osteomyelitis of the second toe as discussed  above. Possible nondisplaced fracture of the proximal phalangeal shaft  versus infection, second toe.  2. Extensive vascular calcifications and dystrophic soft tissue  calcifications.  3. Patchy low bone density diffusely and findings related to hypertrophic  osteoarthritis.  US:  1.  Patent outflow. No  evidence of significant inflow disease.  2.  Substantial runoff disease, progressed from January 2021.  3.  No definite in-line flow to the foot, although there is possible flow  in the posterior tibial artery versus proximal occlusion as previously  suspected. At most single vessel runoff to the posterior tibial artery.  4.  Completely occluded peroneal artery, progressed from prior.  5.  Proximal anterior tibial artery occlusion with midportion  reconstitution, new from prior.  MRI:  WBC Scan:  Other:     MEDS:  • warfarin  5 mg Oral Once   • WARFARIN - PHARMACIST MONITORED   Does not apply See Admin Instructions   • doxycycline hyclate  100 mg Oral 2 times per day   • epoetin kala-epbx  10,000 Units Subcutaneous Once per day on Mon Wed Fri   • sodium chloride (PF)  10 mL Injection 2 times per day   • sevelamer carbonate  1,600 mg Oral TID WC   • aspirin  81 mg Oral Nightly   • atorvastatin  20 mg Oral Nightly   • cinacalcet  60 mg Oral Daily at noon   • metoPROLOL succinate  12.5 mg Oral Daily   • midodrine  10 mg Oral Once per day on Mon Wed Fri   • sodium chloride (PF)  2 mL Intracatheter 2 times per day   • pantoprazole  40 mg Oral QAM AC        ASSESSMENT:    Wound infection: + staph, anaerococcus vaginalis and dermabacter hominis   Osteomyelitis: toe amp 3/18     PLAN:  Antiinfectives: cefepime, flagyl, linezolid dced, on PO doxy   Duration: 2 weeks  Follow final cultures results and sensitivities  Follow CBC, CR, vanco trough  Follow Imaging   Local care  Isolation: contact       Discharge criteria and plan: PO doxy for 2 weeks      Follow up as outpatient on discharge in 2-3 weeks    Discussed with: Patient, RN    Will follow,    Sy Valverde MD    Please dial [phone] for answering service   Previously Declined (within the last year)

## 2023-03-31 NOTE — ED PROVIDER NOTE - NS ED ROS FT
Constitutional: No fever or chills  Eyes: No visual changes  HEENT: No throat pain  CV: No chest pain  Resp: No SOB no cough  GI: No abd pain, nausea or vomiting  : No dysuria  MSK: No musculoskeletal pain  Skin: No rash  Neuro: +headache, +syncope

## 2023-03-31 NOTE — PATIENT PROFILE ADULT - FALL HARM RISK - FACTORS NURSING JUDGEMENT
8/28:         Patient Call    MD Lorenzo Javed M Onc Nurse Msg Pool 1 hour ago (2:07 PM)     Just wanted to check in with them. The BM Bx probably won't result until Monday, she should see a Heme specialist starting then. Wanted to also convey that the CA-125 did improve further so at least the chemo was working against the ovarian CA. Best of luck with the marrow results and further treatment.   Thank you    Routing comment       Diego Ventura MD 1 hour ago (2:05 PM)        Tried to call preferred home phone x 3 (sounded like they were repeatedly hanging up), then tried cell phone and left voicemail.                     Documentation       MD Ron Javed Patricia A 1 hour ago (2:00 PM)      Diego Ventura MD  You; RITO Ventura Onc Nurse Msg Pool 2 hours ago (12:57 PM)     Let's request a consult with Heme, not sure if the newer Dr. Baca would be first up.   Thank you    Message text         Yes

## 2023-03-31 NOTE — H&P ADULT - NSHPLABSRESULTS_GEN_ALL_CORE
< from: TTE Echo Complete w/o Contrast w/ Doppler (03.01.23 @ 11:37) >     Impression   Summary     The mitral valve leaflets appear thickened.   EA reversal of the mitral inflow consistent with reduced compliance of   the left ventricle.   Mild mitral annular calcification is present.   Mild mitral regurgitation is present.   Peak and mean transaortic gradients are 75 and 40mmHg respectively; this   finding is consistent with severe aortic stenosis.   Mild (1+) aortic regurgitation is present.   Trace tricuspid valve regurgitation is present.   Mild pulmonic valvular regurgitation (1+) is present.   Normal appearing left atrium.   Mild concentric left ventricular hypertrophy is present.   The left ventricle is normal in size.   Estimated left ventricular ejection fraction is 65-70 %.   Normal appearingright atrium.   Normal appearing right ventricle structure and function.   The IVC appears normal.   No evidence of pericardial effusion.   No evidence of pleural effusion.     Signature   ----------------------------------------------------------------   Electronically signed by Sakina Cheatham MD, Director of   Cardiac Cath Lab(Interpreting physician) on 03/01/2023 06:42   PM  < end of copied text >          My interpret:    EKG:  SR 60 bpm, 1st degr AV Block,  LVH

## 2023-03-31 NOTE — PHARMACOTHERAPY INTERVENTION NOTE - COMMENTS
Medication history complete, reviewed medications with patient daughter at bedside and confirmed with doctor first med hx.

## 2023-03-31 NOTE — H&P ADULT - HISTORY OF PRESENT ILLNESS
Pt is a pleasant  89 y/o female with pmhx of arthritis, hx of HTN on Metroprolol/Amlodipine, gout, HLD, hx of pelvic fracture, UTIs w/ urinary retention on Perales  c/o syncope. Pt states remember getting out of the shower and that's it. Patient doesn't remember passing out or hitting head. Pt does indorse headache. Patient states eating very little today.     Denies chest pain or SOB. Not on blood thinners. Pt is a pleasant  89 y/o female with pmhx of arthritis, hx of  Severe AS, HTN on Metroprolol, Amlodipine,Hydralazine, gout, HLD, hx of pelvic fracture, UTIs w/ urinary retention on Pérez, recent hyponatremia with Covid, asp Pna 3/2023 presenting to  ED  c/o syncope.   Pt woke at 10 am,  had some cereal for breakfast and took a shower around 11:30 to 12 pm with the help of her Aide.   Pt was sitting on the shower chair and then sat on the toilet where she had syncope x 2 episodes.  She had a bowel movement with her second episode of syncope.    Pt  does not recall passing out or hitting her head.      Pt reports a recent headache,  also conformed by pts daughter.      Pt denies chest pain or SOB,  no dizziness, no abd pain, no n/v/d,  no urinary complaints other than recent need for a pérez.

## 2023-03-31 NOTE — H&P ADULT - CONVERSATION DETAILS
Pt states she is Full Code.  Pt states her daughter Pippa is her HCP and would like me to call her daughter and not her son at this time.   She states her son is in [place] and is a general doctor.      Plan of care discussed in detail with pts daughter Pippa [phone].

## 2023-03-31 NOTE — ED ADULT NURSE NOTE - OBJECTIVE STATEMENT
pt is 89 yo female presents to ED for syncopal episode, daughter states pt passed out on shower chair.  denies falling, and no head injury.  pt reports having HA for several days.  hx of chronic UTI.  pt was recently dc from [facility] for UTI treatment.  NSR 60s noted on monitor.

## 2023-03-31 NOTE — ED PROVIDER NOTE - PHYSICAL EXAMINATION
Constitutional: NAD AAOx3  Eyes: PERRL, EOMI  Head: Normocephalic atraumatic  Mouth: MMM  Cardiac: regular rate   Resp: Lungs CTAB  GI: Abd s/nt/nd  Neuro: CN2-12 intact  Extremities: Intact distal pulses b/l, no calf tenderness, normal ROM b/l UE and LE   Skin: No rashes, old appearing ecchymoses to b/l UE.

## 2023-04-01 LAB
ANION GAP SERPL CALC-SCNC: 6 MMOL/L — SIGNIFICANT CHANGE UP (ref 5–17)
BUN SERPL-MCNC: 17 MG/DL — SIGNIFICANT CHANGE UP (ref 7–23)
CALCIUM SERPL-MCNC: 8.6 MG/DL — SIGNIFICANT CHANGE UP (ref 8.5–10.1)
CHLORIDE SERPL-SCNC: 109 MMOL/L — HIGH (ref 96–108)
CHOLEST SERPL-MCNC: 116 MG/DL — SIGNIFICANT CHANGE UP
CO2 SERPL-SCNC: 22 MMOL/L — SIGNIFICANT CHANGE UP (ref 22–31)
CREAT SERPL-MCNC: 1.43 MG/DL — HIGH (ref 0.5–1.3)
EGFR: 34 ML/MIN/1.73M2 — LOW
GLUCOSE SERPL-MCNC: 101 MG/DL — HIGH (ref 70–99)
HCT VFR BLD CALC: 25.9 % — LOW (ref 34.5–45)
HDLC SERPL-MCNC: 67 MG/DL — SIGNIFICANT CHANGE UP
HGB BLD-MCNC: 8.5 G/DL — LOW (ref 11.5–15.5)
LIPID PNL WITH DIRECT LDL SERPL: 37 MG/DL — SIGNIFICANT CHANGE UP
MCHC RBC-ENTMCNC: 30.7 PG — SIGNIFICANT CHANGE UP (ref 27–34)
MCHC RBC-ENTMCNC: 32.8 GM/DL — SIGNIFICANT CHANGE UP (ref 32–36)
MCV RBC AUTO: 93.5 FL — SIGNIFICANT CHANGE UP (ref 80–100)
NON HDL CHOLESTEROL: 49 MG/DL — SIGNIFICANT CHANGE UP
PLATELET # BLD AUTO: 86 K/UL — LOW (ref 150–400)
POTASSIUM SERPL-MCNC: 3.9 MMOL/L — SIGNIFICANT CHANGE UP (ref 3.5–5.3)
POTASSIUM SERPL-SCNC: 3.9 MMOL/L — SIGNIFICANT CHANGE UP (ref 3.5–5.3)
RBC # BLD: 2.77 M/UL — LOW (ref 3.8–5.2)
RBC # FLD: 15.4 % — HIGH (ref 10.3–14.5)
SODIUM SERPL-SCNC: 137 MMOL/L — SIGNIFICANT CHANGE UP (ref 135–145)
TRIGL SERPL-MCNC: 64 MG/DL — SIGNIFICANT CHANGE UP
TROPONIN I, HIGH SENSITIVITY RESULT: 13.61 NG/L — SIGNIFICANT CHANGE UP
WBC # BLD: 5.48 K/UL — SIGNIFICANT CHANGE UP (ref 3.8–10.5)
WBC # FLD AUTO: 5.48 K/UL — SIGNIFICANT CHANGE UP (ref 3.8–10.5)

## 2023-04-01 PROCEDURE — 93010 ELECTROCARDIOGRAM REPORT: CPT

## 2023-04-01 PROCEDURE — 99233 SBSQ HOSP IP/OBS HIGH 50: CPT

## 2023-04-01 PROCEDURE — 99223 1ST HOSP IP/OBS HIGH 75: CPT

## 2023-04-01 RX ORDER — LANOLIN ALCOHOL/MO/W.PET/CERES
3 CREAM (GRAM) TOPICAL AT BEDTIME
Refills: 0 | Status: DISCONTINUED | OUTPATIENT
Start: 2023-04-01 | End: 2023-04-02

## 2023-04-01 RX ADMIN — GABAPENTIN 600 MILLIGRAM(S): 400 CAPSULE ORAL at 17:27

## 2023-04-01 RX ADMIN — Medication 2 MILLIGRAM(S): at 21:16

## 2023-04-01 RX ADMIN — Medication 650 MILLIGRAM(S): at 12:08

## 2023-04-01 RX ADMIN — Medication 50 MILLIGRAM(S): at 17:27

## 2023-04-01 RX ADMIN — GABAPENTIN 600 MILLIGRAM(S): 400 CAPSULE ORAL at 06:12

## 2023-04-01 RX ADMIN — ENOXAPARIN SODIUM 30 MILLIGRAM(S): 100 INJECTION SUBCUTANEOUS at 06:11

## 2023-04-01 RX ADMIN — Medication 50 MILLIGRAM(S): at 06:13

## 2023-04-01 RX ADMIN — NYSTATIN CREAM 1 APPLICATION(S): 100000 CREAM TOPICAL at 17:29

## 2023-04-01 RX ADMIN — Medication 30 MILLILITER(S): at 21:17

## 2023-04-01 RX ADMIN — Medication 100 MILLIGRAM(S): at 09:21

## 2023-04-01 RX ADMIN — AMLODIPINE BESYLATE 10 MILLIGRAM(S): 2.5 TABLET ORAL at 06:15

## 2023-04-01 RX ADMIN — Medication 25 MILLIGRAM(S): at 17:26

## 2023-04-01 RX ADMIN — LIDOCAINE 1 PATCH: 4 CREAM TOPICAL at 09:20

## 2023-04-01 RX ADMIN — Medication 20 MILLIGRAM(S): at 06:14

## 2023-04-01 RX ADMIN — Medication 3 MILLIGRAM(S): at 21:16

## 2023-04-01 RX ADMIN — SENNA PLUS 2 TABLET(S): 8.6 TABLET ORAL at 21:15

## 2023-04-01 RX ADMIN — ATORVASTATIN CALCIUM 20 MILLIGRAM(S): 80 TABLET, FILM COATED ORAL at 21:16

## 2023-04-01 RX ADMIN — Medication 25 MILLIGRAM(S): at 21:16

## 2023-04-01 RX ADMIN — Medication 2000 UNIT(S): at 09:20

## 2023-04-01 RX ADMIN — Medication 25 MILLIGRAM(S): at 06:15

## 2023-04-01 RX ADMIN — Medication 1 MILLIGRAM(S): at 09:20

## 2023-04-01 RX ADMIN — Medication 25 MILLIGRAM(S): at 09:19

## 2023-04-01 RX ADMIN — LIDOCAINE 1 PATCH: 4 CREAM TOPICAL at 20:47

## 2023-04-01 RX ADMIN — NYSTATIN CREAM 1 APPLICATION(S): 100000 CREAM TOPICAL at 06:12

## 2023-04-01 NOTE — CONSULT NOTE ADULT - SUBJECTIVE AND OBJECTIVE BOX
CHIEF COMPLAINT: Patient is a 89y old  Female who presents with a chief complaint of Syncope and Collapse (31 Mar 2023 16:52)      HPI:  Pt is a pleasant  89 y/o female with pmhx of arthritis, hx of  Severe AS, HTN on Metroprolol, Amlodipine,Hydralazine, gout, HLD, hx of pelvic fracture, UTIs w/ urinary retention on Pérez, recent hyponatremia with Covid, asp Pna 3/2023 presenting to  ED  c/o syncope.   Pt woke at 10 am,  had some cereal for breakfast and took a shower around 11:30 to 12 pm with the help of her Aide.   Pt was sitting on the shower chair and then sat on the toilet where she had syncope x 2 episodes.  She had a bowel movement with her second episode of syncope.    Pt  does not recall passing out or hitting her head.      Pt reports a recent headache,  also conformed by pts daughter.      Pt denies chest pain or SOB,  no dizziness, no abd pain, no n/v/d,  no urinary complaints other than recent need for a pérez.  (31 Mar 2023 16:52)      PMHx: PAST MEDICAL & SURGICAL HISTORY:  HTN (hypertension)      HLD (hyperlipidemia)      Gout      Osteoarthritis      History of tonsillectomy  in childhood            Soc Hx:       Allergies: Allergies    Ceftin (Hives; Rash)    Intolerances      Vital Signs Last 24 Hrs  T(C): 37 (01 Apr 2023 07:24), Max: 37 (01 Apr 2023 07:24)  T(F): 98.6 (01 Apr 2023 07:24), Max: 98.6 (01 Apr 2023 07:24)  HR: 56 (01 Apr 2023 07:24) (56 - 72)  BP: 117/48 (01 Apr 2023 07:24) (111/38 - 148/72)  BP(mean): 56 (31 Mar 2023 19:53) (56 - 56)  RR: 17 (01 Apr 2023 07:24) (17 - 18)  SpO2: 95% (01 Apr 2023 07:24) (95% - 100%)    Parameters below as of 01 Apr 2023 07:24  Patient On (Oxygen Delivery Method): room air        I&O's Summary          PHYSICAL EXAM:   Constitutional: NAD, awake and alert, well-developed  Respiratory: Breath sounds are clear bilaterally, No wheezing, rales or rhonchi  Cardiovascular: S1 and S2, regular rate and rhythm, LIANA  Extremities: No peripheral edema  Vascular: 2+ peripheral pulses  Neurological: A/O x 3, no focal deficits      MEDICATIONS:  MEDICATIONS  (STANDING):  allopurinol 100 milliGRAM(s) Oral daily  amLODIPine   Tablet 10 milliGRAM(s) Oral daily  atorvastatin 20 milliGRAM(s) Oral at bedtime  bethanechol 50 milliGRAM(s) Oral two times a day  cholecalciferol 2000 Unit(s) Oral daily  doxazosin 2 milliGRAM(s) Oral at bedtime  enoxaparin Injectable 30 milliGRAM(s) SubCutaneous every 24 hours  folic acid 1 milliGRAM(s) Oral daily  furosemide    Tablet 20 milliGRAM(s) Oral daily  gabapentin 600 milliGRAM(s) Oral two times a day  hydrALAZINE 25 milliGRAM(s) Oral two times a day  lidocaine   4% Patch 1 Patch Transdermal daily  melatonin 3 milliGRAM(s) Oral at bedtime  metoprolol tartrate 25 milliGRAM(s) Oral two times a day  nystatin Powder 1 Application(s) Topical two times a day  senna 2 Tablet(s) Oral at bedtime      LABS: All Labs Reviewed:                        8.5    5.48  )-----------( 86       ( 01 Apr 2023 07:01 )             25.9     04-01    137  |  109<H>  |  17  ----------------------------<  101<H>  3.9   |  22  |  1.43<H>    Ca    8.6      01 Apr 2023 07:01  Mg     2.4     03-31    TPro  7.1  /  Alb  3.6  /  TBili  0.4  /  DBili  x   /  AST  22  /  ALT  22  /  AlkPhos  61  03-31            EKG: SR, 1st degree AV block, LVH pattern, non specific STchanges     Telemetry: SR    ECHO:< from: TTE Echo Complete w/o Contrast w/ Doppler (03.01.23 @ 11:37) >     Summary     The mitral valve leaflets appear thickened.   EA reversal of the mitral inflow consistent with reduced compliance of   the   left ventricle.   Mild mitral annular calcification is present.   Mild mitral regurgitation is present.   Peak and mean transaortic gradients are 75 and 40mmHg respectively; this   finding is consistent with severe aortic stenosis.   Mild (1+) aortic regurgitation is present.   Trace tricuspid valve regurgitation is present.   Mild pulmonic valvular regurgitation (1+) is present.   Normal appearing left atrium.   Mild concentric left ventricular hypertrophy is present.   The left ventricle is normal in size.   Estimated left ventricular ejection fraction is 65-70 %.   Normal appearingright atrium.   Normal appearing right ventricle structure and function.   The IVC appears normal.   No evidence of pericardial effusion.   No evidence of pleural effusion.     Signature     ----------------------------------------------------------------   Electronically signed by Sakina Cheatham MD, Director of   Cardiac Cath Lab(Interpreting physician) on 03/01/2023 06:42   PM   ----------------------------------------------------------------    < end of copied text >      
Patient is a 89y old  Female who presents with a chief complaint of Syncope and Collapse (01 Apr 2023 08:29)    HPI:  Pt is a pleasant  89 y/o female with pmhx of arthritis, hx of  Severe AS, HTN on Metroprolol, Amlodipine,Hydralazine, gout, HLD, hx of pelvic fracture, UTIs w/ urinary retention on Pérez, recent hyponatremia with Covid, asp Pna 3/2023 presenting to  ED  c/o syncope.   Pt woke at 10 am,  had some cereal for breakfast and took a shower around 11:30 to 12 pm with the help of her Aide.   Pt was sitting on the shower chair and then sat on the toilet where she had syncope x 2 episodes.  She had a bowel movement with her second episode of syncope.    Pt  does not recall passing out or hitting her head.    Pt reports a recent headache.    Pt denies chest pain or SOB,  no dizziness, no abd pain, no n/v/d,  no urinary complaints other than recent need for a pérez.  (31 Mar 2023 16:52)    She states that the headache has been present for ~ 2 days.  She describes it as holocephalic, pressure, with some photophobia. She denies blurry vision or nausea/vomiting.      PAST MEDICAL & SURGICAL HISTORY:  HTN (hypertension)    HLD (hyperlipidemia)    Gout    Osteoarthritis    History of tonsillectomy  in childhood          FAMILY HISTORY:  Family history of hypertension (Father, Mother)        Social Hx:  Nonsmoker, no drug or alcohol use    MEDICATIONS  (STANDING):  allopurinol 100 milliGRAM(s) Oral daily  amLODIPine   Tablet 10 milliGRAM(s) Oral daily  atorvastatin 20 milliGRAM(s) Oral at bedtime  bethanechol 50 milliGRAM(s) Oral two times a day  cholecalciferol 2000 Unit(s) Oral daily  doxazosin 2 milliGRAM(s) Oral at bedtime  enoxaparin Injectable 30 milliGRAM(s) SubCutaneous every 24 hours  folic acid 1 milliGRAM(s) Oral daily  furosemide    Tablet 20 milliGRAM(s) Oral daily  gabapentin 600 milliGRAM(s) Oral two times a day  hydrALAZINE 25 milliGRAM(s) Oral two times a day  lidocaine   4% Patch 1 Patch Transdermal daily  melatonin 3 milliGRAM(s) Oral at bedtime  metoprolol tartrate 25 milliGRAM(s) Oral two times a day  nystatin Powder 1 Application(s) Topical two times a day  senna 2 Tablet(s) Oral at bedtime       Allergies    Ceftin (Hives; Rash)    Intolerances        ROS: Pertinent positives in HPI, all other ROS were reviewed and are negative.      Vital Signs Last 24 Hrs  T(C): 37 (01 Apr 2023 07:24), Max: 37 (01 Apr 2023 07:24)  T(F): 98.6 (01 Apr 2023 07:24), Max: 98.6 (01 Apr 2023 07:24)  HR: 56 (01 Apr 2023 07:24) (56 - 72)  BP: 117/48 (01 Apr 2023 07:24) (111/38 - 148/72)  BP(mean): 56 (31 Mar 2023 19:53) (56 - 56)  RR: 17 (01 Apr 2023 07:24) (17 - 18)  SpO2: 95% (01 Apr 2023 07:24) (95% - 100%)    Parameters below as of 01 Apr 2023 07:24  Patient On (Oxygen Delivery Method): room air            Constitutional: awake and alert.  HEENT: PERRLA, EOMI,   Neck: Supple.  Respiratory: Breath sounds are clear bilaterally  Cardiovascular: S1 and S2, regular / irregular rhythm  Gastrointestinal: soft, nontender  Extremities:  no edema  Musculoskeletal: no joint swelling/tenderness, no abnormal movements  Skin: No rashes    Neurological exam:  HF: A x O x 3. Appropriately interactive, normal affect. Speech fluent, No Aphasia or paraphasic errors.   CN: SABIHA, EOMI, VFF, facial sensation normal, no NLFD, tongue midline, Palate moves equally, SCM equal bilaterally  Motor: No pronator drift, Strength 5/5 in all 4 ext, normal bulk and tone, no tremor, rigidity or bradykinesia.    Sens: Intact to light touch  Reflexes: Symmetric and normal . BJ 1+, BR 1+, KJ trace, , downgoing toes b/l  Coord:  No FNFA, dysmetria, MANUELA intact   Gait/Balance: Not tested      Labs:   04-01    137  |  109<H>  |  17  ----------------------------<  101<H>  3.9   |  22  |  1.43<H>    Ca    8.6      01 Apr 2023 07:01  Mg     2.4     03-31    TPro  7.1  /  Alb  3.6  /  TBili  0.4  /  DBili  x   /  AST  22  /  ALT  22  /  AlkPhos  61  03-31                              8.5    5.48  )-----------( 86       ( 01 Apr 2023 07:01 )             25.9       Radiology:  CT head and cervical spine  3/31/23:    CT HEAD: Mild periventricular white matter ischemia. Moderate cerebellar   atrophy.    CT cervical spine:   No vertebral fracture is recognized.  Moderate   degenerative disc disease and spondylosis at C5-6 through C7-T1 with loss   of disc height and associated degenerative endplate changes. There is   narrowingof the RIGHT C2-3, BILATERAL C3-4, C4-5, C5-6 and C6-7 neural   foramina due to uncovertebral spurring and facet osteophytic hypertrophy.   Small central disc herniation noted at C7-T1 indents the ventral cord.   With loss of disc height and associated degenerative endplate changes.   Degenerative cord impingement is seen at C4-5, C5-6 and C6-7 due to   posterior osteophytic ridge/disc complexes.

## 2023-04-01 NOTE — PROGRESS NOTE ADULT - ASSESSMENT
89 y/o female with pmhx of arthritis, hx of  Severe AS, HTN on Metroprolol, Amlodipine,Hydralazine, gout, HLD, hx of pelvic fracture, UTIs w/ urinary retention on Pérez, recent hyponatremia with Covid, asp Pna 3/2023 presenting to  ED  c/o syncope.    #Syncope  -possible vasovagal, orthostatic by hx (after hot shower), in setting of underlying severe AS  -son reports possible that pt is on too much anti-HTN meds (son is MD)  -orthostatics neg here  -tele   -trops neg x 2  -echo 3/1/23  -hydrate by mouth for now, continue home meds  -appreciate cards input (Jessica Collins)  -for outpt f/u regarding TAVR eval  -appreciate neuro eval  -PT    #HAs  -imaging neg, no alarm features  -appreciate neuro input    #CKD  -at b/l    #chronic pérez  -no current s/s infection, difficult to interpret UA  -pérez changed in ED  -hold abx for now    #anemia, thrombocytopenia  -at b/l    #HTN  -home meds for now  -may need to de-escalate in future    #DVT ppx- SQL    Poss d/c 4/2 pending PT  Updated son, Evan, [phone]

## 2023-04-01 NOTE — CONSULT NOTE ADULT - ASSESSMENT
Pt is a pleasant  89 y/o female with pmhx of arthritis, hx of  Severe AS, HTN on Metroprolol, Amlodipine,Hydralazine, gout, HLD, hx of pelvic fracture, UTIs w/ urinary retention on Perales, recent hyponatremia with Covid, asp Pna 3/2023 presenting to  ED  c/o syncope.   Pt woke at 10 am,  had some cereal for breakfast and took a shower around 11:30 to 12 pm with the help of her Aide.   Pt was sitting on the shower chair and then sat on the toilet where she had syncope x 2 episodes.  She had a bowel movement with her second episode of syncope.    Pt  does not recall passing out or hitting her head.    Pt reports a recent headache.    Syncope  -Not suggestive for neurogenic cause  -orthostatics negative  -Vasovagal, severe AS  -No suspicion of seizure by history    Headache  -No red flag features at this time  -CT head negative for any acute pathology  -Conservative management    Please call back if additional input is needed from neurology service
89 F with hx of severe aortic stenosis presents with syncopal episode after hot shower     She notes that she felt "woozy" prior to event, but overall she feels well    With severe, aortic stenosis having hot shower ( likely drop in BP due to vascular dilation), and urine infection, hemodynamic compromise is more likely    Ideally patient would benefit from aortic valve replacement ( TAVR)      recommend adequate PO hydration, recommend checking orthostatics    recommend outpatient further discussion of treatment for valvular disease

## 2023-04-01 NOTE — PROGRESS NOTE ADULT - SUBJECTIVE AND OBJECTIVE BOX
HOSPITALIST ATTENDING PROGRESS NOTE    Chart and meds reviewed.  Patient seen and examined.    CC: syncope    Subjective: Denies CP, SOB, palp.    All other systems reviewed and found to be negative with the exception of what has been described above.    MEDICATIONS  (STANDING):  allopurinol 100 milliGRAM(s) Oral daily  amLODIPine   Tablet 10 milliGRAM(s) Oral daily  atorvastatin 20 milliGRAM(s) Oral at bedtime  bethanechol 50 milliGRAM(s) Oral two times a day  cholecalciferol 2000 Unit(s) Oral daily  doxazosin 2 milliGRAM(s) Oral at bedtime  enoxaparin Injectable 30 milliGRAM(s) SubCutaneous every 24 hours  folic acid 1 milliGRAM(s) Oral daily  furosemide    Tablet 20 milliGRAM(s) Oral daily  gabapentin 600 milliGRAM(s) Oral two times a day  hydrALAZINE 25 milliGRAM(s) Oral two times a day  lidocaine   4% Patch 1 Patch Transdermal daily  melatonin 3 milliGRAM(s) Oral at bedtime  metoprolol tartrate 25 milliGRAM(s) Oral two times a day  nystatin Powder 1 Application(s) Topical two times a day  senna 2 Tablet(s) Oral at bedtime    MEDICATIONS  (PRN):  acetaminophen     Tablet .. 650 milliGRAM(s) Oral every 6 hours PRN Mild Pain (1 - 3)  benzonatate 100 milliGRAM(s) Oral every 8 hours PRN Cough      VITALS:  T(F): 98.6 (23 @ 07:24), Max: 98.6 (23 @ 07:24)  HR: 56 (23 @ 07:24) (56 - 72)  BP: 117/48 (23 @ 07:24) (111/38 - 148/72)  RR: 17 (23 @ 07:24) (17 - 18)  SpO2: 95% (23 @ 07:24) (95% - 100%)  Wt(kg): --    I&O's Summary      CAPILLARY BLOOD GLUCOSE      POCT Blood Glucose.: 106 mg/dL (31 Mar 2023 13:18)      PHYSICAL EXAM:  Gen: NAD  HEENT:  pupils equal and reactive, EOMI, no oropharyngeal lesions, erythema, exudates, oral thrush  NECK:   supple, no carotid bruits, no palpable lymph nodes, no thyromegaly  CV:  +S1, +S2, regular, no murmurs or rubs  RESP:   lungs clear to auscultation bilaterally, no wheezing, rales, rhonchi, good air entry bilaterally  BREAST:  not examined  GI:  abdomen soft, non-tender, non-distended, normal BS, no bruits, no abdominal masses, no palpable masses  RECTAL:  not examined  :  not examined  MSK:   normal muscle tone, no atrophy, no rigidity, no contractions  EXT:  no clubbing, no cyanosis, no edema, no calf pain, swelling or erythema  VASCULAR:  pulses equal and symmetric in the upper and lower extremities  NEURO:  AAOX3, no focal neurological deficits, follows all commands, able to move extremities spontaneously  SKIN:  no ulcers, lesions or rashes    LABS:                            8.5    5.48  )-----------( 86       ( 2023 07:01 )             25.9     04-    137  |  109<H>  |  17  ----------------------------<  101<H>  3.9   |  22  |  1.43<H>    Ca    8.6      2023 07:01  Mg     2.4         TPro  7.1  /  Alb  3.6  /  TBili  0.4  /  DBili  x   /  AST  22  /  ALT  22  /  AlkPhos  61          LIVER FUNCTIONS - ( 31 Mar 2023 13:42 )  Alb: 3.6 g/dL / Pro: 7.1 gm/dL / ALK PHOS: 61 U/L / ALT: 22 U/L / AST: 22 U/L / GGT: x             Urinalysis Basic - ( 31 Mar 2023 17:50 )    Color: Yellow / Appearance: Clear / S.010 / pH: x  Gluc: x / Ketone: Negative  / Bili: Negative / Urobili: Negative   Blood: x / Protein: Negative / Nitrite: Positive   Leuk Esterase: Moderate / RBC: 0-2 /HPF / WBC 26-50 /HPF   Sq Epi: x / Non Sq Epi: Occasional / Bacteria: Moderate    CULTURES:  Blood, Urine: Trace ( @ 17:50)  Blood, Urine: Small ( @ 13:42)  no new    Additional results/Imaging, I have personally reviewed:  CT, CT cspine 3/31/23: CT HEAD: Mild periventricular white matter ischemia. Moderate cerebellar atrophy. CT cervical spine:   No vertebral fracture is recognized.  Moderate degenerative disc disease and spondylosis at C5-6 through C7-T1 with loss of disc height and associated degenerative endplate changes. There is narrowingof the RIGHT C2-3, BILATERAL C3-4, C4-5, C5-6 and C6-7 neural foramina due to uncovertebral spurring and facet osteophytic hypertrophy. Small central disc herniation noted at C7-T1 indents the ventral cord. With loss of disc height and associated degenerative endplate changes. Degenerative cord impingement is seen at C4-5, C5-6 and C6-7 due to posterior osteophytic ridge/disc complexes.    Echo 3/1/23: The mitral valve leaflets appear thickened. EA reversal of the mitral inflow consistent with reduced compliance of the left ventricle. Mild mitral annular calcification is present. Mild mitral regurgitation is present. Peak and mean transaortic gradients are 75 and 40mmHg respectively; this finding is consistent with severe aortic stenosis. Mild (1+) aortic regurgitation is present. Trace tricuspid valve regurgitation is present. Mild pulmonic valvular regurgitation (1+) is present. Normal appearing left atrium. Mild concentric left ventricular hypertrophy is present. The left ventricle is normal in size. Estimated left ventricular ejection fraction is 65-70 %. Normal appearingright atrium. Normal appearing right ventricle structure and function. The IVC appears normal. No evidence of pericardial effusion. No evidence of pleural effusion.    Telemetry, personally reviewed:  23: sinus 60s

## 2023-04-02 ENCOUNTER — TRANSCRIPTION ENCOUNTER (OUTPATIENT)
Age: 88
End: 2023-04-02

## 2023-04-02 VITALS
TEMPERATURE: 99 F | HEART RATE: 58 BPM | SYSTOLIC BLOOD PRESSURE: 125 MMHG | RESPIRATION RATE: 17 BRPM | OXYGEN SATURATION: 93 % | DIASTOLIC BLOOD PRESSURE: 41 MMHG

## 2023-04-02 LAB
ANION GAP SERPL CALC-SCNC: 4 MMOL/L — LOW (ref 5–17)
BUN SERPL-MCNC: 19 MG/DL — SIGNIFICANT CHANGE UP (ref 7–23)
CALCIUM SERPL-MCNC: 8.3 MG/DL — LOW (ref 8.5–10.1)
CHLORIDE SERPL-SCNC: 111 MMOL/L — HIGH (ref 96–108)
CO2 SERPL-SCNC: 24 MMOL/L — SIGNIFICANT CHANGE UP (ref 22–31)
CREAT SERPL-MCNC: 1.35 MG/DL — HIGH (ref 0.5–1.3)
EGFR: 37 ML/MIN/1.73M2 — LOW
GLUCOSE SERPL-MCNC: 109 MG/DL — HIGH (ref 70–99)
HCT VFR BLD CALC: 24.9 % — LOW (ref 34.5–45)
HGB BLD-MCNC: 8.2 G/DL — LOW (ref 11.5–15.5)
MCHC RBC-ENTMCNC: 30.8 PG — SIGNIFICANT CHANGE UP (ref 27–34)
MCHC RBC-ENTMCNC: 32.9 GM/DL — SIGNIFICANT CHANGE UP (ref 32–36)
MCV RBC AUTO: 93.6 FL — SIGNIFICANT CHANGE UP (ref 80–100)
PLATELET # BLD AUTO: 83 K/UL — LOW (ref 150–400)
POTASSIUM SERPL-MCNC: 3.7 MMOL/L — SIGNIFICANT CHANGE UP (ref 3.5–5.3)
POTASSIUM SERPL-SCNC: 3.7 MMOL/L — SIGNIFICANT CHANGE UP (ref 3.5–5.3)
RBC # BLD: 2.66 M/UL — LOW (ref 3.8–5.2)
RBC # FLD: 15.4 % — HIGH (ref 10.3–14.5)
SODIUM SERPL-SCNC: 139 MMOL/L — SIGNIFICANT CHANGE UP (ref 135–145)
WBC # BLD: 5.29 K/UL — SIGNIFICANT CHANGE UP (ref 3.8–10.5)
WBC # FLD AUTO: 5.29 K/UL — SIGNIFICANT CHANGE UP (ref 3.8–10.5)

## 2023-04-02 PROCEDURE — 99239 HOSP IP/OBS DSCHRG MGMT >30: CPT

## 2023-04-02 RX ADMIN — Medication 50 MILLIGRAM(S): at 05:07

## 2023-04-02 RX ADMIN — Medication 100 MILLIGRAM(S): at 09:34

## 2023-04-02 RX ADMIN — LIDOCAINE 1 PATCH: 4 CREAM TOPICAL at 09:35

## 2023-04-02 RX ADMIN — ENOXAPARIN SODIUM 30 MILLIGRAM(S): 100 INJECTION SUBCUTANEOUS at 05:10

## 2023-04-02 RX ADMIN — Medication 1 MILLIGRAM(S): at 09:34

## 2023-04-02 RX ADMIN — Medication 25 MILLIGRAM(S): at 09:34

## 2023-04-02 RX ADMIN — Medication 25 MILLIGRAM(S): at 05:06

## 2023-04-02 RX ADMIN — AMLODIPINE BESYLATE 10 MILLIGRAM(S): 2.5 TABLET ORAL at 05:06

## 2023-04-02 RX ADMIN — NYSTATIN CREAM 1 APPLICATION(S): 100000 CREAM TOPICAL at 05:07

## 2023-04-02 RX ADMIN — Medication 100 MILLIGRAM(S): at 05:10

## 2023-04-02 RX ADMIN — Medication 2000 UNIT(S): at 09:34

## 2023-04-02 RX ADMIN — GABAPENTIN 600 MILLIGRAM(S): 400 CAPSULE ORAL at 05:07

## 2023-04-02 RX ADMIN — Medication 20 MILLIGRAM(S): at 05:06

## 2023-04-02 NOTE — DISCHARGE NOTE PROVIDER - NSDCCPCAREPLAN_GEN_ALL_CORE_FT
PRINCIPAL DISCHARGE DIAGNOSIS  Diagnosis: Syncope  Assessment and Plan of Treatment: Take medications as previously prescribed. Follow up with Dr. Collins regarding aortic stenosis management.

## 2023-04-02 NOTE — DISCHARGE NOTE NURSING/CASE MANAGEMENT/SOCIAL WORK - PATIENT PORTAL LINK FT
You can access the FollowMyHealth Patient Portal offered by [facility] by registering at the following website: [email or web address]. By joining [facility]’s FollowMyHealth portal, you will also be able to view your health information using other applications (apps) compatible with our system.

## 2023-04-02 NOTE — PHYSICAL THERAPY INITIAL EVALUATION ADULT - PERTINENT HX OF CURRENT PROBLEM, REHAB EVAL
89 y/o female with pmhx of arthritis, hx of  Severe AS, HTN on Metroprolol, Amlodipine,Hydralazine, gout, HLD, hx of pelvic fracture, UTIs w/ urinary retention on Perales, recent hyponatremia with Covid, asp Pna 3/2023 presenting to  ED  c/o syncope.

## 2023-04-02 NOTE — DISCHARGE NOTE PROVIDER - CARE PROVIDER_API CALL
Olvin Collins)  Cardiovascular Disease; Internal Medicine  [address], [address]  [place] [address]  Phone: [phone]  Fax: [phone]  Follow Up Time: 1 week

## 2023-04-02 NOTE — PHYSICAL THERAPY INITIAL EVALUATION ADULT - ADDITIONAL COMMENTS
Pt has a HHA during the day that assists her ADLs and supervises her with mobility. PT's dtr and son-in-law stay with her overnight

## 2023-04-02 NOTE — PROGRESS NOTE ADULT - ASSESSMENT
89 F with hx of severe aortic stenosis presents with syncopal episode after hot shower     She notes that she felt "woozy" prior to event, but overall she feels well    With severe, aortic stenosis having hot shower ( likely drop in BP due to vascular dilation), and urine infection, hemodynamic compromise is more likely    Ideally patient would benefit from aortic valve replacement ( TAVR)      not orthostatic on most recent BP check     DC planning with close outpatient follow up

## 2023-04-02 NOTE — PHYSICAL THERAPY INITIAL EVALUATION ADULT - DIAGNOSIS, PT EVAL
Syncope -possible vasovagal, orthostatic by hx (after hot shower), in setting of underlying severe AS

## 2023-04-02 NOTE — DISCHARGE NOTE PROVIDER - NSDCMRMEDTOKEN_GEN_ALL_CORE_FT
allopurinol 100 mg oral tablet: 1 tab(s) orally once a day  amLODIPine 10 mg oral tablet: 1 tab(s) orally once a day  benzonatate 100 mg oral capsule: 1 cap(s) orally every 8 hours, As needed, Cough  bethanechol 50 mg oral tablet: 1 tab(s) orally 2 times a day  Colace 100 mg oral capsule: 1 cap(s) orally once a day (at bedtime)  dextromethorphan-guaifenesin 10 mg-100 mg/5 mL oral liquid: 10 milliliter(s) orally every 6 hours, As needed, Cough  doxazosin 2 mg oral tablet: 1 tab(s) orally once a day (at bedtime)  folic acid 1 mg oral tablet: 1 tab(s) orally once a day  furosemide 20 mg oral tablet: 1 tab(s) orally once a day  hydrALAZINE 25 mg oral tablet: 1 tab(s) orally 2 times a day  lidocaine 5% patch: apply topically to affected area once daily as needed  metoprolol tartrate 25 mg oral tablet: 1 tab(s) orally 2 times a day  Neurontin 300 mg oral capsule: 2 cap(s) orally 2 times a day  rosuvastatin 5 mg oral tablet: 1 tab(s) orally once a day (at bedtime)  senna leaf extract oral tablet: 2 tab(s) orally once a day (at bedtime)  Tylenol Extra Strength 500 mg oral tablet: 1 tab(s) orally every 6 hours, As Needed for pain  Vitamin D3 1000 intl units (25 mcg) oral tablet: 1 cap(s) orally once a day

## 2023-04-02 NOTE — DISCHARGE NOTE PROVIDER - NSDCFUSCHEDAPPT_GEN_ALL_CORE_FT
[facility]  [facility] [address]  Scheduled Appointment: 04/14/2023    [facility]  [facility] [address]  Scheduled Appointment: 04/18/2023

## 2023-04-02 NOTE — DISCHARGE NOTE PROVIDER - HOSPITAL COURSE
CC: syncope  HPI and Hospital course: 89 y/o female with pmhx of arthritis, hx of  Severe AS, HTN on Metroprolol, Amlodipine,Hydralazine, gout, HLD, hx of pelvic fracture, UTIs w/ urinary retention on Pérez, recent hyponatremia with Covid, asp Pna 3/2023 presenting to  ED  c/o syncope.    Likely vasovagal vs orthostatic in setting of hot shower, in setting of underlying severe AS.     VITALS:  T(F): 98.8 (04-02-23 @ 08:17), Max: 98.8 (04-02-23 @ 08:17)  HR: 58 (04-02-23 @ 08:17) (58 - 68)  BP: 125/41 (04-02-23 @ 08:17) (125/41 - 147/48)  RR: 17 (04-02-23 @ 08:17) (17 - 18)  SpO2: 93% (04-02-23 @ 08:17) (93% - 98%)    PHYSICAL EXAM:  Gen: NAD  HEENT:  pupils equal and reactive, EOMI, no oropharyngeal lesions, erythema, exudates, oral thrush  NECK:   supple, no carotid bruits, no palpable lymph nodes, no thyromegaly  CV:  +S1, +S2, regular, no murmurs or rubs  RESP:   lungs clear to auscultation bilaterally, no wheezing, rales, rhonchi, good air entry bilaterally  BREAST:  not examined  GI:  abdomen soft, non-tender, non-distended, normal BS, no bruits, no abdominal masses, no palpable masses  RECTAL:  not examined  :  not examined  MSK:   normal muscle tone, no atrophy, no rigidity, no contractions  EXT:  no clubbing, no cyanosis, no edema, no calf pain, swelling or erythema  VASCULAR:  pulses equal and symmetric in the upper and lower extremities  NEURO:  AAOX3, no focal neurological deficits, follows all commands, able to move extremities spontaneously  SKIN:  no ulcers, lesions or rashes    CTH, CT cspine 3/31/23: CT HEAD: Mild periventricular white matter ischemia. Moderate cerebellar atrophy. CT cervical spine:   No vertebral fracture is recognized.  Moderate degenerative disc disease and spondylosis at C5-6 through C7-T1 with loss of disc height and associated degenerative endplate changes. There is narrowingof the RIGHT C2-3, BILATERAL C3-4, C4-5, C5-6 and C6-7 neural foramina due to uncovertebral spurring and facet osteophytic hypertrophy. Small central disc herniation noted at C7-T1 indents the ventral cord. With loss of disc height and associated degenerative endplate changes. Degenerative cord impingement is seen at C4-5, C5-6 and C6-7 due to posterior osteophytic ridge/disc complexes.    Echo 3/1/23: The mitral valve leaflets appear thickened. EA reversal of the mitral inflow consistent with reduced compliance of the left ventricle. Mild mitral annular calcification is present. Mild mitral regurgitation is present. Peak and mean transaortic gradients are 75 and 40mmHg respectively; this finding is consistent with severe aortic stenosis. Mild (1+) aortic regurgitation is present. Trace tricuspid valve regurgitation is present. Mild pulmonic valvular regurgitation (1+) is present. Normal appearing left atrium. Mild concentric left ventricular hypertrophy is present. The left ventricle is normal in size. Estimated left ventricular ejection fraction is 65-70 %. Normal appearingright atrium. Normal appearing right ventricle structure and function. The IVC appears normal. No evidence of pericardial effusion. No evidence of pleural effusion.    #Syncope  -possible vasovagal, orthostatic by hx (after hot shower), in setting of underlying severe AS  -son reports possible that pt is on too much anti-HTN meds (son is MD)  -orthostatics neg here  -tele   -trops neg x 2  -echo 3/1/23  -hydrate by mouth for now, continue home meds  -appreciate cards input (Jessica Collins)  -for outpt f/u regarding TAVR eval  -appreciate neuro eval  -PT eval done    #HAs  -imaging neg, no alarm features  -appreciate neuro input    #CKD  -at b/l    #chronic pérez  -no current s/s infection, difficult to interpret UA  -pérez changed in ED  -hold abx for now    #anemia, thrombocytopenia  -at b/l    #HTN  -home meds for now  -may need to de-escalate in future    #DVT ppx- SQL    Updated son, Evan, [phone], on 4/1/23    I have spent 36 min on day of d/c coordinating care.

## 2023-04-02 NOTE — PROGRESS NOTE ADULT - SUBJECTIVE AND OBJECTIVE BOX
CHIEF COMPLAINT: Patient is a 89y old  Female who presents with a chief complaint of Syncope and Collapse (31 Mar 2023 16:52)      HPI:  Pt is a pleasant  89 y/o female with pmhx of arthritis, hx of  Severe AS, HTN on Metroprolol, Amlodipine,Hydralazine, gout, HLD, hx of pelvic fracture, UTIs w/ urinary retention on Pérez, recent hyponatremia with Covid, asp Pna 3/2023 presenting to  ED  c/o syncope.   Pt woke at 10 am,  had some cereal for breakfast and took a shower around 11:30 to 12 pm with the help of her Aide.   Pt was sitting on the shower chair and then sat on the toilet where she had syncope x 2 episodes.  She had a bowel movement with her second episode of syncope.    Pt  does not recall passing out or hitting her head.      Pt reports a recent headache,  also conformed by pts daughter.      Pt denies chest pain or SOB,  no dizziness, no abd pain, no n/v/d,  no urinary complaints other than recent need for a pérez.  (31 Mar 2023 16:52)    4/2/23 - no acute issues overnight     PMHx: PAST MEDICAL & SURGICAL HISTORY:  HTN (hypertension)      HLD (hyperlipidemia)      Gout      Osteoarthritis      History of tonsillectomy  in childhood           Allergies: Allergies    Ceftin (Hives; Rash)    Intolerances        Vital Signs Last 24 Hrs  T(C): 36.7 (04-02-23 @ 04:59), Max: 37 (04-01-23 @ 07:24)  T(F): 98 (04-02-23 @ 04:59), Max: 98.6 (04-01-23 @ 07:24)  HR: 68 (04-02-23 @ 04:59) (56 - 68)  BP: 147/48 (04-02-23 @ 04:59) (117/48 - 147/48)  BP(mean): --  RR: 18 (04-02-23 @ 04:59) (17 - 18)  SpO2: 98% (04-02-23 @ 04:59) (93% - 98%)    Orthostatic VS  04-01-23 @ 10:10  Lying BP: 124/37 HR: 61  Sitting BP: 126/46 HR: 64  Standing BP: 123/48 HR: 66  Site: upper left arm  Mode: electronic            PHYSICAL EXAM:   Constitutional: NAD, awake and alert, well-developed  Respiratory: Breath sounds are clear bilaterally, No wheezing, rales or rhonchi  Cardiovascular: S1 and S2, regular rate and rhythm, LIANA  Extremities: No peripheral edema  Vascular: 2+ peripheral pulses  Neurological: A/O x 3, no focal deficits        MEDICATIONS  (STANDING):  allopurinol 100 milliGRAM(s) Oral daily  amLODIPine   Tablet 10 milliGRAM(s) Oral daily  atorvastatin 20 milliGRAM(s) Oral at bedtime  bethanechol 50 milliGRAM(s) Oral two times a day  cholecalciferol 2000 Unit(s) Oral daily  doxazosin 2 milliGRAM(s) Oral at bedtime  enoxaparin Injectable 30 milliGRAM(s) SubCutaneous every 24 hours  folic acid 1 milliGRAM(s) Oral daily  furosemide    Tablet 20 milliGRAM(s) Oral daily  gabapentin 600 milliGRAM(s) Oral two times a day  hydrALAZINE 25 milliGRAM(s) Oral two times a day  lidocaine   4% Patch 1 Patch Transdermal daily  melatonin 3 milliGRAM(s) Oral at bedtime  metoprolol tartrate 25 milliGRAM(s) Oral two times a day  nystatin Powder 1 Application(s) Topical two times a day  senna 2 Tablet(s) Oral at bedtime    MEDICATIONS  (PRN):  acetaminophen     Tablet .. 650 milliGRAM(s) Oral every 6 hours PRN Mild Pain (1 - 3)  benzonatate 100 milliGRAM(s) Oral every 8 hours PRN Cough        LABS: All Labs Reviewed:                                      8.5    5.48  )-----------( 86       ( 01 Apr 2023 07:01 )             25.9   04-01    137  |  109<H>  |  17  ----------------------------<  101<H>  3.9   |  22  |  1.43<H>    Ca    8.6      01 Apr 2023 07:01  Mg     2.4     03-31    TPro  7.1  /  Alb  3.6  /  TBili  0.4  /  DBili  x   /  AST  22  /  ALT  22  /  AlkPhos  61  03-31        EKG: SR, 1st degree AV block, LVH pattern, non specific STchanges     Telemetry: SR    ECHO:< from: TTE Echo Complete w/o Contrast w/ Doppler (03.01.23 @ 11:37) >     Summary     The mitral valve leaflets appear thickened.   EA reversal of the mitral inflow consistent with reduced compliance of   the   left ventricle.   Mild mitral annular calcification is present.   Mild mitral regurgitation is present.   Peak and mean transaortic gradients are 75 and 40mmHg respectively; this   finding is consistent with severe aortic stenosis.   Mild (1+) aortic regurgitation is present.   Trace tricuspid valve regurgitation is present.   Mild pulmonic valvular regurgitation (1+) is present.   Normal appearing left atrium.   Mild concentric left ventricular hypertrophy is present.   The left ventricle is normal in size.   Estimated left ventricular ejection fraction is 65-70 %.   Normal appearingright atrium.   Normal appearing right ventricle structure and function.   The IVC appears normal.   No evidence of pericardial effusion.   No evidence of pleural effusion.     Signature     ----------------------------------------------------------------   Electronically signed by Sakina Cheatham MD, Director of   Cardiac Cath Lab(Interpreting physician) on 03/01/2023 06:42   PM   ----------------------------------------------------------------    < end of copied text >

## 2023-04-02 NOTE — CHART NOTE - NSCHARTNOTEFT_GEN_A_CORE
client stable for discharge home spoke with hcp/daughter Pippa Kulkarni at , discussed dc plan and re instatement of [facility] hha for 4/3/2023. Daughter in agreement, states "i'll will be with mom". Daughter to come in around 2 pm for dc

## 2023-04-03 LAB
-  AMIKACIN: SIGNIFICANT CHANGE UP
-  AMOXICILLIN/CLAVULANIC ACID: SIGNIFICANT CHANGE UP
-  AMPICILLIN/SULBACTAM: SIGNIFICANT CHANGE UP
-  AMPICILLIN: SIGNIFICANT CHANGE UP
-  AMPICILLIN: SIGNIFICANT CHANGE UP
-  AZTREONAM: SIGNIFICANT CHANGE UP
-  CEFAZOLIN: SIGNIFICANT CHANGE UP
-  CEFEPIME: SIGNIFICANT CHANGE UP
-  CEFOXITIN: SIGNIFICANT CHANGE UP
-  CEFTRIAXONE: SIGNIFICANT CHANGE UP
-  CIPROFLOXACIN: SIGNIFICANT CHANGE UP
-  CIPROFLOXACIN: SIGNIFICANT CHANGE UP
-  ERTAPENEM: SIGNIFICANT CHANGE UP
-  GENTAMICIN: SIGNIFICANT CHANGE UP
-  IMIPENEM: SIGNIFICANT CHANGE UP
-  LEVOFLOXACIN: SIGNIFICANT CHANGE UP
-  LEVOFLOXACIN: SIGNIFICANT CHANGE UP
-  MEROPENEM: SIGNIFICANT CHANGE UP
-  NITROFURANTOIN: SIGNIFICANT CHANGE UP
-  NITROFURANTOIN: SIGNIFICANT CHANGE UP
-  PIPERACILLIN/TAZOBACTAM: SIGNIFICANT CHANGE UP
-  TETRACYCLINE: SIGNIFICANT CHANGE UP
-  TOBRAMYCIN: SIGNIFICANT CHANGE UP
-  TRIMETHOPRIM/SULFAMETHOXAZOLE: SIGNIFICANT CHANGE UP
-  VANCOMYCIN: SIGNIFICANT CHANGE UP
CULTURE RESULTS: SIGNIFICANT CHANGE UP
METHOD TYPE: SIGNIFICANT CHANGE UP
METHOD TYPE: SIGNIFICANT CHANGE UP
ORGANISM # SPEC MICROSCOPIC CNT: SIGNIFICANT CHANGE UP
SPECIMEN SOURCE: SIGNIFICANT CHANGE UP

## 2023-04-04 ENCOUNTER — INPATIENT (INPATIENT)
Facility: HOSPITAL | Age: 88
LOS: 6 days | Discharge: HOME HEALTH | DRG: 871 | End: 2023-04-11
Attending: HOSPITALIST | Admitting: FAMILY MEDICINE
Payer: MEDICARE

## 2023-04-04 VITALS
SYSTOLIC BLOOD PRESSURE: 127 MMHG | HEART RATE: 75 BPM | HEIGHT: 63 IN | RESPIRATION RATE: 23 BRPM | TEMPERATURE: 100 F | WEIGHT: 184.97 LBS | DIASTOLIC BLOOD PRESSURE: 45 MMHG | OXYGEN SATURATION: 97 %

## 2023-04-04 DIAGNOSIS — J18.9 PNEUMONIA, UNSPECIFIED ORGANISM: ICD-10-CM

## 2023-04-04 DIAGNOSIS — Z90.89 ACQUIRED ABSENCE OF OTHER ORGANS: Chronic | ICD-10-CM

## 2023-04-04 DIAGNOSIS — U07.1 COVID-19: ICD-10-CM

## 2023-04-04 DIAGNOSIS — I35.0 NONRHEUMATIC AORTIC (VALVE) STENOSIS: ICD-10-CM

## 2023-04-04 LAB
ALBUMIN SERPL ELPH-MCNC: 3.1 G/DL — LOW (ref 3.3–5)
ALP SERPL-CCNC: 51 U/L — SIGNIFICANT CHANGE UP (ref 40–120)
ALT FLD-CCNC: 17 U/L — SIGNIFICANT CHANGE UP (ref 12–78)
ANION GAP SERPL CALC-SCNC: 6 MMOL/L — SIGNIFICANT CHANGE UP (ref 5–17)
ANISOCYTOSIS BLD QL: SLIGHT — SIGNIFICANT CHANGE UP
APPEARANCE UR: ABNORMAL
AST SERPL-CCNC: 21 U/L — SIGNIFICANT CHANGE UP (ref 15–37)
BACTERIA # UR AUTO: ABNORMAL
BASOPHILS # BLD AUTO: 0.01 K/UL — SIGNIFICANT CHANGE UP (ref 0–0.2)
BASOPHILS NFR BLD AUTO: 0.1 % — SIGNIFICANT CHANGE UP (ref 0–2)
BILIRUB SERPL-MCNC: 0.5 MG/DL — SIGNIFICANT CHANGE UP (ref 0.2–1.2)
BILIRUB UR-MCNC: NEGATIVE — SIGNIFICANT CHANGE UP
BUN SERPL-MCNC: 26 MG/DL — HIGH (ref 7–23)
CALCIUM SERPL-MCNC: 8.5 MG/DL — SIGNIFICANT CHANGE UP (ref 8.5–10.1)
CHLORIDE SERPL-SCNC: 108 MMOL/L — SIGNIFICANT CHANGE UP (ref 96–108)
CO2 SERPL-SCNC: 21 MMOL/L — LOW (ref 22–31)
COLOR SPEC: YELLOW — SIGNIFICANT CHANGE UP
CREAT SERPL-MCNC: 1.58 MG/DL — HIGH (ref 0.5–1.3)
DACRYOCYTES BLD QL SMEAR: SLIGHT — SIGNIFICANT CHANGE UP
DIFF PNL FLD: ABNORMAL
EGFR: 30 ML/MIN/1.73M2 — LOW
EOSINOPHIL # BLD AUTO: 0 K/UL — SIGNIFICANT CHANGE UP (ref 0–0.5)
EOSINOPHIL NFR BLD AUTO: 0 % — SIGNIFICANT CHANGE UP (ref 0–6)
EPI CELLS # UR: SIGNIFICANT CHANGE UP
GLUCOSE SERPL-MCNC: 118 MG/DL — HIGH (ref 70–99)
GLUCOSE UR QL: NEGATIVE — SIGNIFICANT CHANGE UP
HCT VFR BLD CALC: 25.7 % — LOW (ref 34.5–45)
HGB BLD-MCNC: 8.4 G/DL — LOW (ref 11.5–15.5)
IMM GRANULOCYTES NFR BLD AUTO: 1 % — HIGH (ref 0–0.9)
KETONES UR-MCNC: NEGATIVE — SIGNIFICANT CHANGE UP
LACTATE SERPL-SCNC: 1.1 MMOL/L — SIGNIFICANT CHANGE UP (ref 0.7–2)
LEUKOCYTE ESTERASE UR-ACNC: ABNORMAL
LYMPHOCYTES # BLD AUTO: 0.14 K/UL — LOW (ref 1–3.3)
LYMPHOCYTES # BLD AUTO: 1.2 % — LOW (ref 13–44)
MACROCYTES BLD QL: SLIGHT — SIGNIFICANT CHANGE UP
MANUAL SMEAR VERIFICATION: SIGNIFICANT CHANGE UP
MCHC RBC-ENTMCNC: 30.2 PG — SIGNIFICANT CHANGE UP (ref 27–34)
MCHC RBC-ENTMCNC: 32.7 GM/DL — SIGNIFICANT CHANGE UP (ref 32–36)
MCV RBC AUTO: 92.4 FL — SIGNIFICANT CHANGE UP (ref 80–100)
MONOCYTES # BLD AUTO: 0.45 K/UL — SIGNIFICANT CHANGE UP (ref 0–0.9)
MONOCYTES NFR BLD AUTO: 3.8 % — SIGNIFICANT CHANGE UP (ref 2–14)
NEUTROPHILS # BLD AUTO: 11.19 K/UL — HIGH (ref 1.8–7.4)
NEUTROPHILS NFR BLD AUTO: 93.9 % — HIGH (ref 43–77)
NITRITE UR-MCNC: NEGATIVE — SIGNIFICANT CHANGE UP
NT-PROBNP SERPL-SCNC: 1886 PG/ML — HIGH (ref 0–450)
OVALOCYTES BLD QL SMEAR: SLIGHT — SIGNIFICANT CHANGE UP
PH UR: 5 — SIGNIFICANT CHANGE UP (ref 5–8)
PLAT MORPH BLD: NORMAL — SIGNIFICANT CHANGE UP
PLATELET # BLD AUTO: 75 K/UL — LOW (ref 150–400)
POIKILOCYTOSIS BLD QL AUTO: SLIGHT — SIGNIFICANT CHANGE UP
POTASSIUM SERPL-MCNC: 3.8 MMOL/L — SIGNIFICANT CHANGE UP (ref 3.5–5.3)
POTASSIUM SERPL-SCNC: 3.8 MMOL/L — SIGNIFICANT CHANGE UP (ref 3.5–5.3)
PROT SERPL-MCNC: 6.7 GM/DL — SIGNIFICANT CHANGE UP (ref 6–8.3)
PROT UR-MCNC: 30 MG/DL
RAPID RVP RESULT: DETECTED
RBC # BLD: 2.78 M/UL — LOW (ref 3.8–5.2)
RBC # FLD: 15.3 % — HIGH (ref 10.3–14.5)
RBC BLD AUTO: ABNORMAL
RBC CASTS # UR COMP ASSIST: ABNORMAL /HPF (ref 0–4)
SARS-COV-2 RNA SPEC QL NAA+PROBE: DETECTED
SCHISTOCYTES BLD QL AUTO: SLIGHT — SIGNIFICANT CHANGE UP
SODIUM SERPL-SCNC: 135 MMOL/L — SIGNIFICANT CHANGE UP (ref 135–145)
SP GR SPEC: 1.01 — SIGNIFICANT CHANGE UP (ref 1.01–1.02)
TROPONIN I, HIGH SENSITIVITY RESULT: 14.73 NG/L — SIGNIFICANT CHANGE UP
UROBILINOGEN FLD QL: NEGATIVE — SIGNIFICANT CHANGE UP
WBC # BLD: 11.91 K/UL — HIGH (ref 3.8–10.5)
WBC # FLD AUTO: 11.91 K/UL — HIGH (ref 3.8–10.5)
WBC UR QL: ABNORMAL /HPF (ref 0–5)

## 2023-04-04 PROCEDURE — 97116 GAIT TRAINING THERAPY: CPT | Mod: GP

## 2023-04-04 PROCEDURE — 71250 CT THORAX DX C-: CPT | Mod: 26

## 2023-04-04 PROCEDURE — 86900 BLOOD TYPING SEROLOGIC ABO: CPT

## 2023-04-04 PROCEDURE — 85025 COMPLETE CBC W/AUTO DIFF WBC: CPT

## 2023-04-04 PROCEDURE — 71250 CT THORAX DX C-: CPT

## 2023-04-04 PROCEDURE — 93010 ELECTROCARDIOGRAM REPORT: CPT

## 2023-04-04 PROCEDURE — 99223 1ST HOSP IP/OBS HIGH 75: CPT

## 2023-04-04 PROCEDURE — 85027 COMPLETE CBC AUTOMATED: CPT

## 2023-04-04 PROCEDURE — 85384 FIBRINOGEN ACTIVITY: CPT

## 2023-04-04 PROCEDURE — P9016: CPT

## 2023-04-04 PROCEDURE — 97110 THERAPEUTIC EXERCISES: CPT | Mod: GP

## 2023-04-04 PROCEDURE — 82746 ASSAY OF FOLIC ACID SERUM: CPT

## 2023-04-04 PROCEDURE — 92610 EVALUATE SWALLOWING FUNCTION: CPT | Mod: GN

## 2023-04-04 PROCEDURE — 80048 BASIC METABOLIC PNL TOTAL CA: CPT

## 2023-04-04 PROCEDURE — 86850 RBC ANTIBODY SCREEN: CPT

## 2023-04-04 PROCEDURE — 87086 URINE CULTURE/COLONY COUNT: CPT

## 2023-04-04 PROCEDURE — 86923 COMPATIBILITY TEST ELECTRIC: CPT

## 2023-04-04 PROCEDURE — 71045 X-RAY EXAM CHEST 1 VIEW: CPT | Mod: 26

## 2023-04-04 PROCEDURE — 80053 COMPREHEN METABOLIC PANEL: CPT

## 2023-04-04 PROCEDURE — 86022 PLATELET ANTIBODIES: CPT

## 2023-04-04 PROCEDURE — 97163 PT EVAL HIGH COMPLEX 45 MIN: CPT | Mod: GP

## 2023-04-04 PROCEDURE — 36430 TRANSFUSION BLD/BLD COMPNT: CPT

## 2023-04-04 PROCEDURE — 36415 COLL VENOUS BLD VENIPUNCTURE: CPT

## 2023-04-04 PROCEDURE — 81001 URINALYSIS AUTO W/SCOPE: CPT

## 2023-04-04 PROCEDURE — 82607 VITAMIN B-12: CPT

## 2023-04-04 PROCEDURE — 86901 BLOOD TYPING SEROLOGIC RH(D): CPT

## 2023-04-04 PROCEDURE — 92523 SPEECH SOUND LANG COMPREHEN: CPT | Mod: GN

## 2023-04-04 PROCEDURE — 99285 EMERGENCY DEPT VISIT HI MDM: CPT

## 2023-04-04 RX ORDER — ONDANSETRON 8 MG/1
4 TABLET, FILM COATED ORAL ONCE
Refills: 0 | Status: COMPLETED | OUTPATIENT
Start: 2023-04-04 | End: 2023-04-04

## 2023-04-04 RX ORDER — ACETAMINOPHEN 500 MG
650 TABLET ORAL EVERY 6 HOURS
Refills: 0 | Status: DISCONTINUED | OUTPATIENT
Start: 2023-04-04 | End: 2023-04-11

## 2023-04-04 RX ORDER — IBUPROFEN 200 MG
400 TABLET ORAL ONCE
Refills: 0 | Status: COMPLETED | OUTPATIENT
Start: 2023-04-04 | End: 2023-04-04

## 2023-04-04 RX ORDER — HYDRALAZINE HCL 50 MG
25 TABLET ORAL
Refills: 0 | Status: DISCONTINUED | OUTPATIENT
Start: 2023-04-04 | End: 2023-04-11

## 2023-04-04 RX ORDER — ALLOPURINOL 300 MG
100 TABLET ORAL DAILY
Refills: 0 | Status: DISCONTINUED | OUTPATIENT
Start: 2023-04-04 | End: 2023-04-11

## 2023-04-04 RX ORDER — HEPARIN SODIUM 5000 [USP'U]/ML
5000 INJECTION INTRAVENOUS; SUBCUTANEOUS EVERY 8 HOURS
Refills: 0 | Status: DISCONTINUED | OUTPATIENT
Start: 2023-04-04 | End: 2023-04-05

## 2023-04-04 RX ORDER — AMLODIPINE BESYLATE 2.5 MG/1
10 TABLET ORAL DAILY
Refills: 0 | Status: DISCONTINUED | OUTPATIENT
Start: 2023-04-04 | End: 2023-04-04

## 2023-04-04 RX ORDER — ONDANSETRON 8 MG/1
4 TABLET, FILM COATED ORAL ONCE
Refills: 0 | Status: DISCONTINUED | OUTPATIENT
Start: 2023-04-04 | End: 2023-04-11

## 2023-04-04 RX ORDER — PIPERACILLIN AND TAZOBACTAM 4; .5 G/20ML; G/20ML
3.38 INJECTION, POWDER, LYOPHILIZED, FOR SOLUTION INTRAVENOUS ONCE
Refills: 0 | Status: COMPLETED | OUTPATIENT
Start: 2023-04-04 | End: 2023-04-04

## 2023-04-04 RX ORDER — METOPROLOL TARTRATE 50 MG
25 TABLET ORAL
Refills: 0 | Status: DISCONTINUED | OUTPATIENT
Start: 2023-04-04 | End: 2023-04-11

## 2023-04-04 RX ORDER — ALBUTEROL 90 UG/1
1 AEROSOL, METERED ORAL EVERY 4 HOURS
Refills: 0 | Status: DISCONTINUED | OUTPATIENT
Start: 2023-04-04 | End: 2023-04-11

## 2023-04-04 RX ORDER — IBUPROFEN 200 MG
600 TABLET ORAL ONCE
Refills: 0 | Status: DISCONTINUED | OUTPATIENT
Start: 2023-04-04 | End: 2023-04-04

## 2023-04-04 RX ORDER — ACETAMINOPHEN 500 MG
650 TABLET ORAL ONCE
Refills: 0 | Status: COMPLETED | OUTPATIENT
Start: 2023-04-04 | End: 2023-04-04

## 2023-04-04 RX ORDER — ALBUTEROL 90 UG/1
2.5 AEROSOL, METERED ORAL EVERY 6 HOURS
Refills: 0 | Status: DISCONTINUED | OUTPATIENT
Start: 2023-04-04 | End: 2023-04-11

## 2023-04-04 RX ORDER — SENNA PLUS 8.6 MG/1
2 TABLET ORAL AT BEDTIME
Refills: 0 | Status: DISCONTINUED | OUTPATIENT
Start: 2023-04-04 | End: 2023-04-11

## 2023-04-04 RX ORDER — VANCOMYCIN HCL 1 G
1000 VIAL (EA) INTRAVENOUS ONCE
Refills: 0 | Status: COMPLETED | OUTPATIENT
Start: 2023-04-04 | End: 2023-04-04

## 2023-04-04 RX ORDER — FOLIC ACID 0.8 MG
1 TABLET ORAL DAILY
Refills: 0 | Status: DISCONTINUED | OUTPATIENT
Start: 2023-04-04 | End: 2023-04-11

## 2023-04-04 RX ORDER — SODIUM CHLORIDE 9 MG/ML
1000 INJECTION INTRAMUSCULAR; INTRAVENOUS; SUBCUTANEOUS
Refills: 0 | Status: DISCONTINUED | OUTPATIENT
Start: 2023-04-04 | End: 2023-04-04

## 2023-04-04 RX ORDER — GABAPENTIN 400 MG/1
300 CAPSULE ORAL
Refills: 0 | Status: DISCONTINUED | OUTPATIENT
Start: 2023-04-04 | End: 2023-04-11

## 2023-04-04 RX ORDER — LANOLIN ALCOHOL/MO/W.PET/CERES
3 CREAM (GRAM) TOPICAL AT BEDTIME
Refills: 0 | Status: DISCONTINUED | OUTPATIENT
Start: 2023-04-04 | End: 2023-04-11

## 2023-04-04 RX ORDER — ACETAMINOPHEN 500 MG
975 TABLET ORAL ONCE
Refills: 0 | Status: COMPLETED | OUTPATIENT
Start: 2023-04-04 | End: 2023-04-04

## 2023-04-04 RX ORDER — BETHANECHOL CHLORIDE 25 MG
50 TABLET ORAL
Refills: 0 | Status: DISCONTINUED | OUTPATIENT
Start: 2023-04-04 | End: 2023-04-11

## 2023-04-04 RX ORDER — DOXAZOSIN MESYLATE 4 MG
2 TABLET ORAL AT BEDTIME
Refills: 0 | Status: DISCONTINUED | OUTPATIENT
Start: 2023-04-04 | End: 2023-04-11

## 2023-04-04 RX ORDER — ATORVASTATIN CALCIUM 80 MG/1
20 TABLET, FILM COATED ORAL AT BEDTIME
Refills: 0 | Status: DISCONTINUED | OUTPATIENT
Start: 2023-04-04 | End: 2023-04-11

## 2023-04-04 RX ORDER — PIPERACILLIN AND TAZOBACTAM 4; .5 G/20ML; G/20ML
3.38 INJECTION, POWDER, LYOPHILIZED, FOR SOLUTION INTRAVENOUS EVERY 8 HOURS
Refills: 0 | Status: DISCONTINUED | OUTPATIENT
Start: 2023-04-04 | End: 2023-04-05

## 2023-04-04 RX ADMIN — SODIUM CHLORIDE 75 MILLILITER(S): 9 INJECTION INTRAMUSCULAR; INTRAVENOUS; SUBCUTANEOUS at 14:51

## 2023-04-04 RX ADMIN — Medication 650 MILLIGRAM(S): at 10:45

## 2023-04-04 RX ADMIN — Medication 400 MILLIGRAM(S): at 14:52

## 2023-04-04 RX ADMIN — ONDANSETRON 4 MILLIGRAM(S): 8 TABLET, FILM COATED ORAL at 11:44

## 2023-04-04 RX ADMIN — Medication 250 MILLIGRAM(S): at 10:30

## 2023-04-04 RX ADMIN — GABAPENTIN 300 MILLIGRAM(S): 400 CAPSULE ORAL at 14:56

## 2023-04-04 RX ADMIN — PIPERACILLIN AND TAZOBACTAM 200 GRAM(S): 4; .5 INJECTION, POWDER, LYOPHILIZED, FOR SOLUTION INTRAVENOUS at 09:19

## 2023-04-04 RX ADMIN — Medication 650 MILLIGRAM(S): at 18:22

## 2023-04-04 RX ADMIN — PIPERACILLIN AND TAZOBACTAM 25 GRAM(S): 4; .5 INJECTION, POWDER, LYOPHILIZED, FOR SOLUTION INTRAVENOUS at 18:59

## 2023-04-04 NOTE — H&P ADULT - HISTORY OF PRESENT ILLNESS
89 y/o F with PMH severe AS, HFrEF, HTN on metoprolol, amlodipine, hydralazine, HLD, frequent UTIs with urinary retention and chronic pérez, gout, arthritis, hx pelvic fx, recent covid/?asp PNA 3/2023 BIBA to ED for fever TMax 103F. 89 y/o F with PMH severe AS, HFrEF, HTN, HLD, frequent UTIs with urinary retention and chronic pérez, gout, arthritis, aspiration  PNA 3/2023 BIBA to ED for fever TMax 103F, confusion. As per Pts daughter, she was told that last night Pt was confused and was coughing,  febrile, also had episode of vomiting. Pt is awake, known in the hospital, reports currently no SOB, but feels very weak and sleepy. Pt c/o feeling cold, body aches, HA.  Pt denies CP, no abd pain.  As per daughter was started  on Macrobid by PCP.   In ED: Was hypoxic in 80s, stable on NC, rest VS are stable. CXR with b/l infiltrates. Pt was given IV Zosyn and VAnco

## 2023-04-04 NOTE — ED PROVIDER NOTE - OBJECTIVE STATEMENT
89 y/o F with PMH severe AS, HFrEF, HTN on metoprolol, amlodipine, hydralazine, HLD, frequent UTIs with urinary retention and chronic pérez, gout, arthritis, hx pelvic fx, recent covid/?asp PNA 3/2023 BIBA to ED for fever TMax 103F. Pt seen in  ED 3/31 for syncope, was admitted and discharged 4/2. Pt started on macrobid for UTI by Dr. Waldrop yesterday, took 2 doses (none today). Daughter noticed fever at 4am, took 1G tylenol 4:30am. O2 84% in triage, started on 6L NC, now at 97%. Pt denies CP or SOB at this time. No headache, dizziness, LOC, N/V/D, abdominal pain, ext numbness/weakness.

## 2023-04-04 NOTE — ED ADULT TRIAGE NOTE - AS PAIN REST
Our goal is to have forms completed with 72 hours, however some forms may require a visit or additional information.    Who is the form from?: [place] Dept of [facility] (if other please explain)  Where the form came from: form was faxed in  What clinic location was the form placed at?: [place]  Where the form was placed: 's Box  What number is listed as a contact on the form?: [phone]    Phone call message- patient request for a letter, form or note:    Date needed: as soon as possible  Please fax to [phone]  Has the patient signed a consent form for release of information? NO    Additional comments:     Call taken on 2/14/2019 at 10:32 AM by Lucille Pressley    Type of letter, form or note: medical     0 (no pain/absence of nonverbal indicators of pain)

## 2023-04-04 NOTE — ED PROVIDER NOTE - ENMT, MLM
"""Call if vision decreases or RD warning signs increases/RD warnings given.  No signs of right eye retinal tear Airway patent, Nasal mucosa clear. Mouth with normal mucosa. Throat has no vesicles, no oropharyngeal exudates and uvula is midline.

## 2023-04-04 NOTE — SWALLOW BEDSIDE ASSESSMENT ADULT - PHARYNGEAL PHASE
Swallow triggered in an acceptable time frame for age. Swallow triggered in an acceptable time frame for age. Laryngeal lift on palpation during swallowing trials was very mildly decreased but felt to be functional for age as well. NO behavioral aspiration signs exhibited.

## 2023-04-04 NOTE — H&P ADULT - ASSESSMENT
89 y/o F with PMH severe AS, HFrEF, HTN, HLD, frequent UTIs with urinary retention and chronic pérez, gout, arthritis, aspiration  PNA admitted for:      1. Acute Hypoxic Respiratory Failure due to B/L  PNA  admit to med surg  monitor pulse oc  Supplement  O2 via NC  S/p Vanco  and Zosyn in ED   Start Zosyn IV as per ID recs   F/u BCX  Tylenol PRN   Mucinex, Tessalon   Albuterol PRN   Possible aspiration?  Speech and swallow  eval       2.  Suspected UTI assocated with Chronic Pérez   UA +   Past UCX reviewed   Started on Zosyn   ID recs appreciated       3. HFrEF,  severe AS  Clinically dehydrated, min PO intake and UO   Strict is and OS   Hold lasix for now   re eval daily   Check BNP   F/u with cardio outPt         4. CKD 4. Dehydration   baseline CR around 1.6  renal Fx stable   gentle Hydration x 12 h due to poor oral intake and decreased  UO  LAbs in am       5. HTN   Monitor BP  C/w Metoprolol, Hydralazine   Hold amlodipine for now     6. Metabolic  encephalopathy   likely 2/2 acute illness   Supportive  care     7. DVT PPX:  heparin SQ     8. ACP: d/w {ts daughter, who reports that Pt   wanted to be full code

## 2023-04-04 NOTE — CONSULT NOTE ADULT - SUBJECTIVE AND OBJECTIVE BOX
Patient is a 89y old  Female who presents with a chief complaint of SOB, hypoxia, bilateral PNA, severe AS, recent COVID-19 (2023 08:27)    HPI:  89 y/o F with PMH severe AS, HFrEF, HTN on metoprolol, amlodipine, hydralazine, HLD, frequent UTIs with urinary retention and chronic pérez, gout, arthritis, hx pelvic fx, recent covid/?asp PNA 3/2023 BIBA to ED for fever TMax 103F. Pt seen in  ED 3/31 for syncope, was admitted and discharged . Pt started on macrobid for UTI by Dr. Waldrop 4/3, took 2 doses. Daughter noticed fever at 4am, took 1G tylenol 4:30am. O2 84% in triage, started on 6L NC, now at 97%. Pt denies CP or SOB at this time. No headache, dizziness, LOC, N/V/D, abdominal pain, ext numbness/weakness.  Here imaging shows b/l pneumonia on xray, UA positive, noted with vomiting episodes in ER, daughter at bedside, was given dose of vanocmycin/zosyn.      PAST MEDICAL HISTORY:  Gout     HLD (hyperlipidemia)     HTN (hypertension)     Osteoarthritis.     PAST SURGICAL HISTORY:  History of tonsillectomy in childhood.     Meds: per reconciliation sheet, noted below  MEDICATIONS  (STANDING):  ondansetron Injectable 4 milliGRAM(s) IV Push once      Allergies    Ceftin (Hives; Rash)    Intolerances      Social: no smoking, no alcohol, no illegal drugs; no recent travel, no exposure to TB  FAMILY HISTORY:  Family history of hypertension (Father, Mother)       no history of premature cardiovascular disease in first degree relatives    ROS: +fever, chills, no HA, no dizziness, no sore throat, no blurry vision, no CP, no palpitations, no SOB, +cough, no abdominal pain, no diarrhea, + N/V, no dysuria, no leg pain, no claudication, no rash, no joint aches, no rectal pain or bleeding, no night sweats    All other systems reviewed and are negative    Vital Signs Last 24 Hrs  T(C): 37.5 (2023 06:54), Max: 37.5 (2023 06:54)  T(F): 99.5 (2023 06:54), Max: 99.5 (2023 06:54)  HR: 75 (2023 06:54) (75 - 75)  BP: 127/45 (2023 06:54) (127/45 - 127/45)  BP(mean): --  RR: 23 (2023 06:54) (23 - 23)  SpO2: 97% (2023 06:54) (97% - 97%)    Parameters below as of 2023 06:54  Patient On (Oxygen Delivery Method): nasal cannula  O2 Flow (L/min): 6    Daily Height in cm: 160.02 (2023 06:54)    Daily     PE:  Constitutional: frail looking  HEENT: NC/AT, EOMI, PERRLA, conjunctivae clear; ears and nose atraumatic; pharynx benign  Neck: supple; thyroid not palpable  Back: no tenderness  Respiratory: decreased breath sounds, rhonchi  Cardiovascular: S1S2 regular, no murmurs  Abdomen: soft, not tender, not distended, positive BS; liver and spleen WNL  Genitourinary: no suprapubic tenderness  Lymphatic: no LN palpable  Musculoskeletal: no muscle tenderness, no joint swelling or tenderness  Extremities: no pedal edema  Neurological/ Psychiatric:  moving all extremities  Skin: no rashes; no palpable lesions    Labs: all available labs reviewed                        8.4    11.91 )-----------( 75       ( 2023 07:54 )             25.7     04-04    135  |  108  |  26<H>  ----------------------------<  118<H>  3.8   |  21<L>  |  1.58<H>    Ca    8.5      2023 07:54    TPro  6.7  /  Alb  3.1<L>  /  TBili  0.5  /  DBili  x   /  AST  21  /  ALT  17  /  AlkPhos  51  04-04     LIVER FUNCTIONS - ( 2023 07:54 )  Alb: 3.1 g/dL / Pro: 6.7 gm/dL / ALK PHOS: 51 U/L / ALT: 17 U/L / AST: 21 U/L / GGT: x           Urinalysis Basic - ( 2023 10:59 )    Color: Yellow / Appearance: Slightly Turbid / S.010 / pH: x  Gluc: x / Ketone: Negative  / Bili: Negative / Urobili: Negative   Blood: x / Protein: 30 mg/dL / Nitrite: Negative   Leuk Esterase: Moderate / RBC: 3-5 /HPF / WBC 26-50 /HPF   Sq Epi: x / Non Sq Epi: Few / Bacteria: Many    Culture - Urine (23 @ 17:50)   - Vancomycin: S 2  - Tetracycline: S <=1  - Tobramycin: S <=2  - Trimethoprim/Sulfamethoxazole: S <=0.5/9.5  - Levofloxacin: S <=0.5  - Levofloxacin: S 1  - Meropenem: S <=1  - Nitrofurantoin: S <=32 Should not be used to treat pyelonephritis  - Nitrofurantoin: S <=32 Should not be used to treat pyelonephritis.  - Piperacillin/Tazobactam: S <=8  - Cefazolin: S <=2  - Cefepime: S <=2  - Cefoxitin: S <=8  - Ceftriaxone: S <=1 Enterobacter, Klebsiella aerogenes, Citrobacter, and Serratia may develop resistance during prolonged therapy  - Ciprofloxacin: S <=0.25  - Ciprofloxacin: S <=1  - Ertapenem: S <=0.5  - Gentamicin: S <=2  - Imipenem: S <=1  - Amikacin: S <=16  - Amoxicillin/Clavulanic Acid: S <=8/4  - Ampicillin: R >16 These ampicillin results predict results for amoxicillin  - Ampicillin: S <=2 Predicts results to ampicillin/sulbactam, amoxacillin-clavulanate and piperacillin-tazobactam.  - Ampicillin/Sulbactam: S 8/4 Enterobacter, Klebsiella aerogenes, Citrobacter, and Serratia may develop resistance during prolonged therapy (3-4 days)  - Aztreonam: S <=4  Specimen Source: Clean Catch None  Culture Results:   >100,000 CFU/ml Klebsiella oxytoca/Raoultella ornithinolytica   >100,000 CFU/ml Enterococcus faecalis  Organism Identification: Klebsiella oxytoca /Raoutella ornithinolytica       Radiology: all available radiological tests reviewed      ACC: [id number] EXAM:  XR CHEST PORTABLE URGENT 1V   ORDERED BY: DIRK BAUER     PROCEDURE DATE:  2023          INTERPRETATION:  Portable chest radiograph    CLINICAL INFORMATION: Fever    TECHNIQUE:  Portable  AP chest radiograph.    COMPARISON: 3/31/2023 chest x-ray .    FINDINGS:  CATHETERS AND TUBES: None    PULMONARY: Bilateral perihilar/bibasilar diffuse airspace disease.  Apices clear. No pneumothorax.    HEART/VASCULAR: The heart and mediastinum size and configuration are   within normal limits.    BONES: Visualized osseous thorax intact.    IMPRESSION:    Bilateral perihilar/basilar diffuse airspace disease..    --- End of Report ---      < end of copied text >    Advanced directives addressed: full resuscitation

## 2023-04-04 NOTE — CONSULT NOTE ADULT - SUBJECTIVE AND OBJECTIVE BOX
CHIEF COMPLAINT:  Patient is a 89y old  Female who presents with a chief complaint of     HPI: 4/4/23:    Patient is a 89 yo F, well known to me, BIBEMS from home with c/o fever starting this morning. EMS reports pt's daughter gave pt 650mg of Tylenol @ 0430 with a fever TMAX 103 and had no relief, EMS also reports pt was d/c from  3/31 for UTI and has Perales in place. Pt found to be hypoxic in triage sating 84% and placed on 6LNC sating 97%. Pt denies chest pain and SOB. Pt is A&OX3 and reports hx of frequent UTI's. EMS reports . Oral temp 99.5 and patient feels very warm to touch.  She was recently admitted with syncope and was COVID (+) on 3/31/23.  She has known severe AS by echo with HFpEF (LVEF=65-70%) (see report below).  She seen in the ER with CXR suggesting bilateral lower lobe PNA.  On O2 she is feeling better.  She has no anginal chest pains or recurrent syncope so far.          PMHx:  PAST MEDICAL & SURGICAL HISTORY:  Severe AS  HTN (hypertension)  HLD (hyperlipidemia)  Gout  Osteoarthritis  History of tonsillectomy in childhood      FAMILY HISTORY:   FAMILY HISTORY:  Family history of hypertension (Father, Mother)      ALLERGIES:  Allergies  Ceftin (Hives; Rash)      REVIEW OF SYSTEMS:  10 point ROS was obtained  Pertinent positives and negatives are as above  All other review of systems is negative unless indicated above      Vital Signs Last 24 Hrs  T(C): 37.5 (04 Apr 2023 06:54), Max: 37.5 (04 Apr 2023 06:54)  T(F): 99.5 (04 Apr 2023 06:54), Max: 99.5 (04 Apr 2023 06:54)  HR: 75 (04 Apr 2023 06:54) (75 - 75)  BP: 127/45 (04 Apr 2023 06:54) (127/45 - 127/45)  RR: 23 (04 Apr 2023 06:54) (23 - 23)  SpO2: 97% (04 Apr 2023 06:54) (97% - 97%): nasal cannula  O2 Flow (L/min): 6      PHYSICAL EXAM:   Constitutional: NAD, awake and alert, well-developed  HEENT: PERR, EOMI, Normal Hearing, MMM  Neck: Soft and supple, No LAD, No JVD  Respiratory: Breath sounds scattered rhonchi at bses bilaterally, No rales  Cardiovascular: S1 and S2, regular rate and rhythm, harsh LIANA at base and LLSB as before, (+) S4; no S3 gallop or rubs  Gastrointestinal: Bowel Sounds present, soft, nontender, nondistended, no guarding, no rebound  Extremities: No peripheral edema  Vascular: 2+ peripheral pulses  Neurological: no focal deficits  Skin: No rashes      MEDICATIONS:  MEDICATIONS  (STANDING):  piperacillin/tazobactam IVPB... 3.375 Gram(s) IV Intermittent once  vancomycin  IVPB. 1000 milliGRAM(s) IV Intermittent once      LABS: All Labs Reviewed:                        8.4    11.91 )-----------( 75       ( 04 Apr 2023 07:54 )             25.7       CULTURES:   Gram Stain Urine -- Gram Stain --  Specimen Source Clean Catch None  Culture-Urine --Organism Klebsiella oxytoca /Raoutella ornithinolytica      LIPID PROFILE (04.01.23 @ 07:01)   Cholesterol, Serum: 116 mg/dL  Triglycerides, Serum: 64 mg/dL  HDL Cholesterol, Serum: 67 mg/dL  Non HDL Cholesterol: 49      RADIOLOGY:  CXR: 3/31/23:  INTERPRETATION:  AP chest on March 31, 2023 at 2:17 PM. Patient had a syncopal episode.  Heart magnified by technique.  There is an increasing small infiltrate off the right lower hilum compared to March 5 of this year.  IMPRESSION: Increasing right lower perihilar infiltrate.      EKG:      TELEMETRY:  NSR      ECHO:  3/1/23:  M-Mode Measurements (cm)   LVEDd: 3.97 cm            LVESd: 2.55 cm   IVSEd: 1.1 cm   LVPWd: 1.1 cm             AO Root Dimension: 2.8 cm                             LA: 3.5 cm                LVOT: 2 cm  Doppler Measurements:   AV Velocity:434 cm/s                  MV Peak E-Wave: 98.7 cm/s   AV Peak Gradient: 75.34 mmHg          MV Peak A-Wave: 131 cm/s   AV Mean Gradient: 40 mmHg             MV E/A Ratio: 0.75 %   AV Area (Continuity):0.85 cm^2        MV Peak Gradient: 3.9 mmHg   TR Velocity:190 cm/s   TR Gradient:14.44 mmHg   Estimated RAP:5 mmHg   RVSP:27 mmHg    Findings  Mitral Valve   The mitral valve leaflets appear thickened.   EA reversal of the mitral inflow consistent with reduced compliance of the left ventricle.   Mild mitral annular calcification is present.   Mild mitral regurgitation is present.    Aortic Valve   Peak and mean transaortic gradients are 75 and 40mmHg respectively; this finding is consistent with severe aortic stenosis.   Mild (1+) aortic regurgitation is present.    Tricuspid Valve   Trace tricuspid valve regurgitation is present.    Pulmonic Valve   Mild pulmonic valvular regurgitation (1+) is present.    Left Atrium   Normal appearing left atrium.    Left Ventricle   Mild concentric left ventricular hypertrophy is present.   The left ventricle is normal in size.   Estimated left ventricular ejection fraction is 65-70 %.    Right Atrium   Normal appearing right atrium.    Right Ventricle   Normal appearing right ventricle structure and function.    Pericardial Effusion   No evidence of pericardial effusion.    Pleural Effusion   No evidence of pleural effusion.    Miscellaneous   The IVC appears normal.    Summary   The mitral valve leaflets appear thickened.   EA reversal of the mitral inflow consistent with reduced compliance of the left ventricle.   Mild mitral annular calcification is present.   Mild mitral regurgitation is present.   Peak and mean transaortic gradients are 75 and 40mmHg respectively; this finding is consistent with severe aortic stenosis.   Mild (1+) aortic regurgitation is present.   Trace tricuspid valve regurgitation is present.   Mild pulmonic valvular regurgitation (1+) is present.   Normal appearing left atrium.   Mild concentric left ventricular hypertrophy is present.   The left ventricle is normal in size.   Estimated left ventricular ejection fraction is 65-70 %.   Normal appearing right atrium.   Normal appearing right ventricle structure and function.   The IVC appears normal.   No evidence of pericardial effusion.   No evidence of pleural effusion.    Signature   ----------------------------------------------------------------   Electronically signed by Sakina Cheatham MD, Director of   Cardiac Cath Lab(Interpreting physician) on 03/01/2023 06:42   PM   ----------------------------------------------------------------        CHIEF COMPLAINT:  Patient is a 89y old  Female who presents with a chief complaint of     HPI: 4/4/23:    Patient is a 89 yo F, well known to me, MALDONADO from home with c/o fever starting this morning. EMS reports pt's daughter gave pt 650mg of Tylenol @ 0430 with a fever TMAX 103.  She was also recently admitted with ? asp. PNA on 3/20/23 and was COVID (+) at that time.  Pt now found to be hypoxic in triage sating 84% and placed on 6LNC sating 97%. Pt denies chest pain and SOB. Pt is A&OX3 and reports hx of frequent UTI's. EMS reports . Oral temp 99.5 and patient feels very warm to touch.  She was recently admitted with syncope and UTI and was COVID (+) still on 3/31/23.  She has known severe AS by echo with HFpEF (LVEF=65-70%) (see report below).  She seen in the ER with CXR suggesting bilateral lower lobe PNA.  On O2 she is feeling better.  She has no anginal chest pains or recurrent syncope so far.          PMHx:  PAST MEDICAL & SURGICAL HISTORY:  Severe AS  HTN (hypertension)  HLD (hyperlipidemia)  Gout  Osteoarthritis  History of tonsillectomy in childhood      FAMILY HISTORY:   FAMILY HISTORY:  Family history of hypertension (Father, Mother)      ALLERGIES:  Allergies  Ceftin (Hives; Rash)      REVIEW OF SYSTEMS:  10 point ROS was obtained  Pertinent positives and negatives are as above  All other review of systems is negative unless indicated above      Vital Signs Last 24 Hrs  T(C): 37.5 (04 Apr 2023 06:54), Max: 37.5 (04 Apr 2023 06:54)  T(F): 99.5 (04 Apr 2023 06:54), Max: 99.5 (04 Apr 2023 06:54)  HR: 75 (04 Apr 2023 06:54) (75 - 75)  BP: 127/45 (04 Apr 2023 06:54) (127/45 - 127/45)  RR: 23 (04 Apr 2023 06:54) (23 - 23)  SpO2: 97% (04 Apr 2023 06:54) (97% - 97%): nasal cannula  O2 Flow (L/min): 6      PHYSICAL EXAM:   Constitutional: NAD, awake and alert, well-developed  HEENT: PERR, EOMI, Normal Hearing, MMM  Neck: Soft and supple, No LAD, No JVD  Respiratory: Breath sounds scattered rhonchi at bses bilaterally, No rales  Cardiovascular: S1 and S2, regular rate and rhythm, harsh LIANA at base and LLSB as before, (+) S4; no S3 gallop or rubs  Gastrointestinal: Bowel Sounds present, soft, nontender, nondistended, no guarding, no rebound  Extremities: No peripheral edema  Vascular: 2+ peripheral pulses  Neurological: no focal deficits  Skin: No rashes      MEDICATIONS:  MEDICATIONS  (STANDING):  piperacillin/tazobactam IVPB... 3.375 Gram(s) IV Intermittent once  vancomycin  IVPB. 1000 milliGRAM(s) IV Intermittent once      LABS: All Labs Reviewed:                        8.4    11.91 )-----------( 75       ( 04 Apr 2023 07:54 )             25.7       CULTURES:   Gram Stain Urine -- Gram Stain --  Specimen Source Clean Catch None  Culture-Urine --Organism Klebsiella oxytoca /Raoutella ornithinolytica      LIPID PROFILE (04.01.23 @ 07:01)   Cholesterol, Serum: 116 mg/dL  Triglycerides, Serum: 64 mg/dL  HDL Cholesterol, Serum: 67 mg/dL  Non HDL Cholesterol: 49      RADIOLOGY:  CXR: 3/31/23:  INTERPRETATION:  AP chest on March 31, 2023 at 2:17 PM. Patient had a syncopal episode.  Heart magnified by technique.  There is an increasing small infiltrate off the right lower hilum compared to March 5 of this year.  IMPRESSION: Increasing right lower perihilar infiltrate.      EKG:      TELEMETRY:  NSR      ECHO:  3/1/23:  M-Mode Measurements (cm)   LVEDd: 3.97 cm            LVESd: 2.55 cm   IVSEd: 1.1 cm   LVPWd: 1.1 cm             AO Root Dimension: 2.8 cm                             LA: 3.5 cm                LVOT: 2 cm  Doppler Measurements:   AV Velocity:434 cm/s                  MV Peak E-Wave: 98.7 cm/s   AV Peak Gradient: 75.34 mmHg          MV Peak A-Wave: 131 cm/s   AV Mean Gradient: 40 mmHg             MV E/A Ratio: 0.75 %   AV Area (Continuity):0.85 cm^2        MV Peak Gradient: 3.9 mmHg   TR Velocity:190 cm/s   TR Gradient:14.44 mmHg   Estimated RAP:5 mmHg   RVSP:27 mmHg    Findings  Mitral Valve   The mitral valve leaflets appear thickened.   EA reversal of the mitral inflow consistent with reduced compliance of the left ventricle.   Mild mitral annular calcification is present.   Mild mitral regurgitation is present.    Aortic Valve   Peak and mean transaortic gradients are 75 and 40mmHg respectively; this finding is consistent with severe aortic stenosis.   Mild (1+) aortic regurgitation is present.    Tricuspid Valve   Trace tricuspid valve regurgitation is present.    Pulmonic Valve   Mild pulmonic valvular regurgitation (1+) is present.    Left Atrium   Normal appearing left atrium.    Left Ventricle   Mild concentric left ventricular hypertrophy is present.   The left ventricle is normal in size.   Estimated left ventricular ejection fraction is 65-70 %.    Right Atrium   Normal appearing right atrium.    Right Ventricle   Normal appearing right ventricle structure and function.    Pericardial Effusion   No evidence of pericardial effusion.    Pleural Effusion   No evidence of pleural effusion.    Miscellaneous   The IVC appears normal.    Summary   The mitral valve leaflets appear thickened.   EA reversal of the mitral inflow consistent with reduced compliance of the left ventricle.   Mild mitral annular calcification is present.   Mild mitral regurgitation is present.   Peak and mean transaortic gradients are 75 and 40mmHg respectively; this finding is consistent with severe aortic stenosis.   Mild (1+) aortic regurgitation is present.   Trace tricuspid valve regurgitation is present.   Mild pulmonic valvular regurgitation (1+) is present.   Normal appearing left atrium.   Mild concentric left ventricular hypertrophy is present.   The left ventricle is normal in size.   Estimated left ventricular ejection fraction is 65-70 %.   Normal appearing right atrium.   Normal appearing right ventricle structure and function.   The IVC appears normal.   No evidence of pericardial effusion.   No evidence of pleural effusion.    Signature   ----------------------------------------------------------------   Electronically signed by Sakina Cheatham MD, Director of   Cardiac Cath Lab(Interpreting physician) on 03/01/2023 06:42   PM   ----------------------------------------------------------------

## 2023-04-04 NOTE — CONSULT NOTE ADULT - ASSESSMENT
89 y/o F with PMH severe AS, HFrEF, HTN on metoprolol, amlodipine, hydralazine, HLD, frequent UTIs with urinary retention and chronic pérez, gout, arthritis, hx pelvic fx, recent covid/?asp PNA 3/2023 BIBA to ED for fever TMax 103F. Pt seen in  ED 3/31 for syncope, was admitted and discharged 4/2. Pt started on macrobid for UTI by Dr. Waldrop 4/3, took 2 doses. Daughter noticed fever at 4am, took 1G tylenol 4:30am. O2 84% in triage, started on 6L NC, now at 97%. Pt denies CP or SOB at this time. No headache, dizziness, LOC, N/V/D, abdominal pain, ext numbness/weakness.  Here imaging shows b/l pneumonia on xray, UA positive, noted with vomiting episodes in ER, daughter at bedside, was given dose of vanocmycin/zosyn.    1. Fever. UTI with ENFA/KLOX. Aspiration pneumonia. Recent Covid-19 viral syndrome. ANDREW  - imaging and prior chart/cultures reviewed  - 3/31 urine cx grew ENFA/KLOX - sensitivities noted  - no further macrobid!!  - start zosyn [id number]   - no more vancomycin  - aspiration precautions  - fu cbc  - covid not active has had prior in march  - monitor temps  - tolerating abx well so far; no side effects noted  - reason for abx use and side effects reviewed with patient  - supportive care  - fu cbc    2. other issues - care per medicine

## 2023-04-04 NOTE — SWALLOW BEDSIDE ASSESSMENT ADULT - ORAL PREPARATORY PHASE
Pt tended to accept PO in small volumes. Labial grading on utensils was functional. No dribbling was noted.

## 2023-04-04 NOTE — ED ADULT NURSE REASSESSMENT NOTE - NS ED NURSE REASSESS COMMENT FT1
Pt alert and oriented, VSS, O2 sat 94 percent on 4L NC, pt c/o B/L leg and hip pain, hx of arthritis, Dr. Mcconnell aware and ordering medications, pt and daughter instructed on plan of care, call bell in reach

## 2023-04-04 NOTE — CONSULT NOTE ADULT - ASSESSMENT
4/4/23:  Pt with above history and severe AS with recent COVID-19 (+) and recent syncope related to her AS shy UTI now with fever and probable bilateral PNA.  Would continue to treat her PNA as per medicine and ID and await blood and sputum cultures.  Hopefully no endocarditis.  When clinically bet and no evidence for infections pt is being considered for a TAVR in the future.  Continue as outlined by medicine and ID etal.  Will follow as work-up and treatment progresses.

## 2023-04-04 NOTE — ED PROVIDER NOTE - NS ED ATTENDING STATEMENT MOD
This was a shared visit with the [facility]. I reviewed and verified the documentation and independently performed the documented:

## 2023-04-04 NOTE — ED PROVIDER NOTE - CARE PLAN
Principal Discharge DX:	Multifocal pneumonia  Secondary Diagnosis:	Acute respiratory failure with hypoxia   1

## 2023-04-04 NOTE — SWALLOW BEDSIDE ASSESSMENT ADULT - ORAL PHASE
Bolus formation/transfer were timely to mildly+ prolonged but felt to be mechanically functional for age(89). The patient was able to clear oral debris after age acceptable piecemeal deglutition. Note that pt verbalized a preference for her solid foods to be soft/bite size.

## 2023-04-04 NOTE — ED PROVIDER NOTE - ADMIT DISPOSITION PRESENT ON ADMISSION SEPSIS
No
No. JORGE screening performed.  STOP BANG Legend: 0-2 = LOW Risk; 3-4 = INTERMEDIATE Risk; 5-8 = HIGH Risk

## 2023-04-04 NOTE — H&P ADULT - NSHPPHYSICALEXAM_GEN_ALL_CORE
Vital Signs Last 24 Hrs  T(C): 36.7 (04 Apr 2023 11:04), Max: 37.5 (04 Apr 2023 06:54)  T(F): 98 (04 Apr 2023 11:04), Max: 99.5 (04 Apr 2023 06:54)  HR: 98 (04 Apr 2023 11:04) (75 - 98)  BP: 119/54 (04 Apr 2023 11:04) (119/54 - 127/45)  BP(mean): --  RR: 21 (04 Apr 2023 11:04) (21 - 23)  SpO2: 93% (04 Apr 2023 11:04) (93% - 97%)    Parameters below as of 04 Apr 2023 11:04  Patient On (Oxygen Delivery Method): nasal cannula  O2 Flow (L/min): 4      PHYSICAL EXAM:  General: Well developed; well nourished; in no acute distress  Eyes: PERRLA, EOMI; conjunctiva and sclera clear  Head: Normocephalic; atraumatic  ENMT: No nasal discharge; airway clear  Neck: Supple; non tender; no masses  Respiratory: No wheezes, rales or rhonchi  Cardiovascular: Regular rate and rhythm. S1 and S2 Normal; No murmurs, gallops or rubs  Gastrointestinal: Soft non-tender non-distended; Normal bowel sounds  Genitourinary: No  suprapubic  tenderness  Extremities: Normal range of motion, No clubbing, cyanosis or edema  Vascular: Peripheral pulses palpable 2+ bilaterally  Neurological: Alert and oriented x4  Skin: Warm and dry. No acute rash  Lymph Nodes: No acute cervical adenopathy  Musculoskeletal: Normal muscle tone, without deformities  Psychiatric: Cooperative and appropriate Vital Signs Last 24 Hrs  T(C): 36.7 (04 Apr 2023 11:04), Max: 37.5 (04 Apr 2023 06:54)  T(F): 98 (04 Apr 2023 11:04), Max: 99.5 (04 Apr 2023 06:54)  HR: 98 (04 Apr 2023 11:04) (75 - 98)  BP: 119/54 (04 Apr 2023 11:04) (119/54 - 127/45)  BP(mean): --  RR: 21 (04 Apr 2023 11:04) (21 - 23)  SpO2: 93% (04 Apr 2023 11:04) (93% - 97%)    Parameters below as of 04 Apr 2023 11:04  Patient On (Oxygen Delivery Method): nasal cannula  O2 Flow (L/min): 4      PHYSICAL EXAM:  General: Well developed; frail elderly; in no acute distress  Eyes:  EOMI; conjunctiva and sclera clear  Head: Normocephalic; atraumatic  ENMT: No nasal discharge; airway clear, dry oral mucosa  Respiratory: Decreased BS at bases b/l, with R crackles  No wheezes  Cardiovascular: Regular rate and rhythm. S1 and S2 Normal; +  murmur  Gastrointestinal: Soft non-tender non-distended; Normal bowel sounds  Genitourinary: No  suprapubic  tenderness, pérez in place   Extremities: No edema  Neurological: Alert and oriented x2-3, mildly encephalopathic, non focal   Musculoskeletal: Normal muscle tone, without deformities

## 2023-04-04 NOTE — SWALLOW BEDSIDE ASSESSMENT ADULT - SWALLOW EVAL: RECOMMENDED DIET
SUGGEST A SOFT AND BITE SIZE DIET WITH THIN LIQUID CONSISTENCIES AS THE PATIENT APPEARED CLINICALLY TOLERANT OF THESE FOOD TEXTURES FROM AN OROPHARYNGEAL SWALLOWING STANCE ON EXAM AND FOOD TEXTURES ON THIS DIET ACCOMMODATES HER EXPRESSED FOOD PREFERENCES/PRESBYPHAGIA/FEW MISSING TEETH.

## 2023-04-04 NOTE — ED PROVIDER NOTE - ATTENDING APP SHARED VISIT CONTRIBUTION OF CARE
I, Tommie Morgan MD, personally saw the patient with [facility].  I have personally performed a face to face diagnostic evaluation on this patient.  I have reviewed the [facility] note and agree with the history, exam, and plan of care, except as noted.     Critical care time 35 min. Elements for critical care include direct patient care (not related to procedure), additional history taking, interpretation of diagnostic studies, documentation, consultation with other physicians, consult w/ pt's family directly relating to pts condition.

## 2023-04-04 NOTE — ED ADULT TRIAGE NOTE - CHIEF COMPLAINT QUOTE
Patient BIBEMS from home with c/o fever starting this morning. EMS reports pt's daughter gave pt 650mg of tylenol @ 0430 with a fever TMAX 103 and had no relief, EMS also reports pt was d/c from  3/31 for UTI and has pérez in place. Pt found to be hypoxic in triage sating 84% and placed on 6LNC sating 97%. Pt denies chest pain and SOB. Pt is A&OX3 and reports hx of frequent UTI's. EMS reports . Oral temp 99.5 and patient feels very warm to touch in triage.

## 2023-04-04 NOTE — ED PROVIDER NOTE - CLINICAL SUMMARY MEDICAL DECISION MAKING FREE TEXT BOX
89 y/o F with PMH severe AS, HFrEF, HTN on metoprolol, amlodipine, hydralazine, HLD, frequent UTIs with urinary retention and chronic pérez, gout, arthritis, hx pelvic fx, recent covid/?asp PNA 3/2023 BIBA to ED for fever TMax 103F. 1G Tylenol given PTA, started on O2 6L NC in triage. EKG, CXR, basics with trop, blood cx, urine, lactate, RVP. Hold fluids for now as pt is stable and hx HF. Will continue to monitor and reassess.

## 2023-04-04 NOTE — PHARMACOTHERAPY INTERVENTION NOTE - COMMENTS
Medication history complete, reviewed medication with list provided by patient - pt was discharged yesterday.

## 2023-04-04 NOTE — SWALLOW BEDSIDE ASSESSMENT ADULT - SWALLOW EVAL: PROGNOSIS
2) Pt verbalized a preference for her foods to be cut up to ease masticatory demands associated with some missing teeth. On exam, the pt exhibited relatively mild functional Oropharyngeal Presbyphagia with sufficient oropharyngeal swallowing preservation for age(89) nonetheless. While presbyphagia may place pt at a relatively heightened risk for episodic aspiration, she did not demonstrate any behavioral aspiration signs on exam. No change in O2 sats noted. Odynophagia was denied. Pt's oropharyngeal swallowing integrity is felt to be stable for age and suspectedly at usual state.

## 2023-04-04 NOTE — SWALLOW BEDSIDE ASSESSMENT ADULT - SWALLOW EVAL: DIAGNOSIS
1) On encounter, the pt was alert. She was interactive but [place]. Pt was able to verbalize during communicative probes without evidence of a primary motor speech or primary linguistic pathology. Pt was able to verbalize needs and is at reported speech-language baseline.

## 2023-04-04 NOTE — PATIENT PROFILE ADULT - FALL HARM RISK - HARM RISK INTERVENTIONS

## 2023-04-04 NOTE — SWALLOW BEDSIDE ASSESSMENT ADULT - NS SPL SWALLOW CLINIC TRIAL FT
Pt verbalized a preference for her foods to be cut up to ease masticatory demands associated with some missing teeth. On exam, the pt exhibited relatively mild functional Oropharyngeal Presbyphagia with sufficient oropharyngeal swallowing preservation for age(89) nonetheless. While presbyphagia may place pt at a relatively heightened risk for episodic aspiration, she did not demonstrate any behavioral aspiration signs on exam. No change in O2 sats noted. Odynophagia was denied. Pt's oropharyngeal swallowing integrity is felt to be stable for age and suspectedly at usual state.

## 2023-04-04 NOTE — SWALLOW BEDSIDE ASSESSMENT ADULT - SWALLOW EVAL: CRITERIA FOR SKILLED INTERVENTION MET
DO NOT FEEL THAT ACUTE SPEECH PATHOLOGY FOLLOW UP WOULD CHANGE CLINICAL MANAGEMENT/OUTCOME IN ACUTE HOSPITAL SETTING. PT'S OROPHARYNGEAL SWALLOWING PRESBYPHAGIA IS FELT TO BE FUNCTIONAL/STABLE FOR AGE AND NO PRIMARY SPEECH-LANGUAGE PATHOLOGY WAS EVIDENT ON EXAM. PT'S OROPHARYNGEAL SWALLOWING INTEGRITY AND SPEECH-LANGUAGE INTEGRITY ARE FELT TO BE AT BASELINE/MAXIMIZED. GIVEN ABOVE, THIS SERVICE WILL NOT ACTIVELY FOLLOW. RECONSULT PRN SHOULD STATUS CHANGE AND CONDITION WARRANT.

## 2023-04-04 NOTE — SWALLOW BEDSIDE ASSESSMENT ADULT - SLP GENERAL OBSERVATIONS
On encounter, the pt was alert. She was interactive but [place]. Pt was able to verbalize during communicative probes without evidence of a primary motor speech or primary linguistic pathology. Pt was able to verbalize needs and is at reported speech-language baseline

## 2023-04-04 NOTE — PATIENT PROFILE ADULT - HEALTH LITERACY
Remains on RA. Tolerating increased feeds over 30 minutes. Voiding and stooling. Parents and brother here visiting.    no

## 2023-04-04 NOTE — SWALLOW BEDSIDE ASSESSMENT ADULT - COMMENTS
Pt admitted to  with altered mentation, fever, and coughing, Hospital course is notable for dehydration, UTI and bilateral PNA. This profile is superimposed upon a history of OA, gout, HLD, HTN, CKD, AS, CHF, frequent UTI's with chronic Perales placement and past tonsillectomy.

## 2023-04-05 LAB
ANION GAP SERPL CALC-SCNC: 8 MMOL/L — SIGNIFICANT CHANGE UP (ref 5–17)
BUN SERPL-MCNC: 40 MG/DL — HIGH (ref 7–23)
CALCIUM SERPL-MCNC: 8 MG/DL — LOW (ref 8.5–10.1)
CHLORIDE SERPL-SCNC: 107 MMOL/L — SIGNIFICANT CHANGE UP (ref 96–108)
CO2 SERPL-SCNC: 20 MMOL/L — LOW (ref 22–31)
CREAT SERPL-MCNC: 2.16 MG/DL — HIGH (ref 0.5–1.3)
CULTURE RESULTS: SIGNIFICANT CHANGE UP
EGFR: 21 ML/MIN/1.73M2 — LOW
GLUCOSE SERPL-MCNC: 156 MG/DL — HIGH (ref 70–99)
HCT VFR BLD CALC: 24.5 % — LOW (ref 34.5–45)
HGB BLD-MCNC: 8 G/DL — LOW (ref 11.5–15.5)
MCHC RBC-ENTMCNC: 30.7 PG — SIGNIFICANT CHANGE UP (ref 27–34)
MCHC RBC-ENTMCNC: 32.7 GM/DL — SIGNIFICANT CHANGE UP (ref 32–36)
MCV RBC AUTO: 93.9 FL — SIGNIFICANT CHANGE UP (ref 80–100)
PLATELET # BLD AUTO: 68 K/UL — LOW (ref 150–400)
POTASSIUM SERPL-MCNC: 3.8 MMOL/L — SIGNIFICANT CHANGE UP (ref 3.5–5.3)
POTASSIUM SERPL-SCNC: 3.8 MMOL/L — SIGNIFICANT CHANGE UP (ref 3.5–5.3)
RBC # BLD: 2.61 M/UL — LOW (ref 3.8–5.2)
RBC # FLD: 15.9 % — HIGH (ref 10.3–14.5)
SODIUM SERPL-SCNC: 135 MMOL/L — SIGNIFICANT CHANGE UP (ref 135–145)
SPECIMEN SOURCE: SIGNIFICANT CHANGE UP
WBC # BLD: 12.7 K/UL — HIGH (ref 3.8–10.5)
WBC # FLD AUTO: 12.7 K/UL — HIGH (ref 3.8–10.5)

## 2023-04-05 PROCEDURE — 99233 SBSQ HOSP IP/OBS HIGH 50: CPT

## 2023-04-05 RX ORDER — DEXAMETHASONE 0.5 MG/5ML
6 ELIXIR ORAL DAILY
Refills: 0 | Status: DISCONTINUED | OUTPATIENT
Start: 2023-04-05 | End: 2023-04-05

## 2023-04-05 RX ORDER — PIPERACILLIN AND TAZOBACTAM 4; .5 G/20ML; G/20ML
3.38 INJECTION, POWDER, LYOPHILIZED, FOR SOLUTION INTRAVENOUS EVERY 12 HOURS
Refills: 0 | Status: DISCONTINUED | OUTPATIENT
Start: 2023-04-05 | End: 2023-04-07

## 2023-04-05 RX ORDER — POLYETHYLENE GLYCOL 3350 17 G/17G
17 POWDER, FOR SOLUTION ORAL DAILY
Refills: 0 | Status: DISCONTINUED | OUTPATIENT
Start: 2023-04-05 | End: 2023-04-08

## 2023-04-05 RX ORDER — SODIUM CHLORIDE 9 MG/ML
1000 INJECTION INTRAMUSCULAR; INTRAVENOUS; SUBCUTANEOUS
Refills: 0 | Status: DISCONTINUED | OUTPATIENT
Start: 2023-04-05 | End: 2023-04-11

## 2023-04-05 RX ADMIN — Medication 25 MILLIGRAM(S): at 21:36

## 2023-04-05 RX ADMIN — Medication 100 MILLIGRAM(S): at 09:49

## 2023-04-05 RX ADMIN — Medication 600 MILLIGRAM(S): at 21:35

## 2023-04-05 RX ADMIN — SODIUM CHLORIDE 50 MILLILITER(S): 9 INJECTION INTRAMUSCULAR; INTRAVENOUS; SUBCUTANEOUS at 15:52

## 2023-04-05 RX ADMIN — PIPERACILLIN AND TAZOBACTAM 25 GRAM(S): 4; .5 INJECTION, POWDER, LYOPHILIZED, FOR SOLUTION INTRAVENOUS at 05:11

## 2023-04-05 RX ADMIN — Medication 50 MILLIGRAM(S): at 21:36

## 2023-04-05 RX ADMIN — Medication 2 MILLIGRAM(S): at 21:35

## 2023-04-05 RX ADMIN — GABAPENTIN 300 MILLIGRAM(S): 400 CAPSULE ORAL at 09:49

## 2023-04-05 RX ADMIN — HEPARIN SODIUM 5000 UNIT(S): 5000 INJECTION INTRAVENOUS; SUBCUTANEOUS at 05:11

## 2023-04-05 RX ADMIN — Medication 600 MILLIGRAM(S): at 09:49

## 2023-04-05 RX ADMIN — Medication 25 MILLIGRAM(S): at 23:40

## 2023-04-05 RX ADMIN — GABAPENTIN 300 MILLIGRAM(S): 400 CAPSULE ORAL at 21:35

## 2023-04-05 RX ADMIN — ATORVASTATIN CALCIUM 20 MILLIGRAM(S): 80 TABLET, FILM COATED ORAL at 21:35

## 2023-04-05 RX ADMIN — Medication 650 MILLIGRAM(S): at 21:01

## 2023-04-05 RX ADMIN — Medication 50 MILLIGRAM(S): at 09:48

## 2023-04-05 RX ADMIN — Medication 6 MILLIGRAM(S): at 10:00

## 2023-04-05 RX ADMIN — Medication 1 MILLIGRAM(S): at 09:49

## 2023-04-05 RX ADMIN — PIPERACILLIN AND TAZOBACTAM 25 GRAM(S): 4; .5 INJECTION, POWDER, LYOPHILIZED, FOR SOLUTION INTRAVENOUS at 21:41

## 2023-04-05 RX ADMIN — Medication 650 MILLIGRAM(S): at 21:31

## 2023-04-05 RX ADMIN — SENNA PLUS 2 TABLET(S): 8.6 TABLET ORAL at 21:35

## 2023-04-05 NOTE — PHYSICAL THERAPY INITIAL EVALUATION ADULT - MODALITIES TREATMENT COMMENTS
Pt left supine in bed with HOB elevated with NC (4L/min), pérez and pulse oxym (96%) all intact. VSS. CBIR. Pt instructed to not get up alone. Bed alarm activated. Patient's sister present at bedside

## 2023-04-05 NOTE — PHYSICAL THERAPY INITIAL EVALUATION ADULT - LEVEL OF INDEPENDENCE: SIT/STAND, REHAB EVAL
[place] Sit to Stand Transfer at EOB today due to low Hemoglobin (8.0) and patient's c/o of dizziness & lightheadedness once sitting up on EOB

## 2023-04-05 NOTE — PROGRESS NOTE ADULT - SUBJECTIVE AND OBJECTIVE BOX
Date of service: 23 @ 10:01    pt seen and examined  feels better this am  no more vomiting  some cough  no resp distress     ROS: no fever or chills; denies dizziness, no HA,  no abdominal pain, no diarrhea or constipation; no dysuria, no urinary frequency, no legs pain, no rashes    MEDICATIONS  (STANDING):  albuterol    90 MICROgram(s) HFA Inhaler 1 Puff(s) Inhalation every 4 hours  allopurinol 100 milliGRAM(s) Oral daily  atorvastatin 20 milliGRAM(s) Oral at bedtime  bethanechol 50 milliGRAM(s) Oral two times a day  dexAMETHasone  Injectable 6 milliGRAM(s) IV Push daily  doxazosin 2 milliGRAM(s) Oral at bedtime  folic acid 1 milliGRAM(s) Oral daily  gabapentin 300 milliGRAM(s) Oral two times a day  guaiFENesin  milliGRAM(s) Oral every 12 hours  heparin   Injectable 5000 Unit(s) SubCutaneous every 8 hours  hydrALAZINE 25 milliGRAM(s) Oral two times a day  metoprolol tartrate 25 milliGRAM(s) Oral two times a day  ondansetron Injectable 4 milliGRAM(s) IV Push once  piperacillin/tazobactam IVPB.. 3.375 Gram(s) IV Intermittent every 8 hours  senna 2 Tablet(s) Oral at bedtime      Vital Signs Last 24 Hrs  T(C): 36.9 (2023 09:40), Max: 37.4 (2023 14:25)  T(F): 98.4 (2023 09:40), Max: 99.3 (2023 14:25)  HR: 77 (2023 09:40) (77 - 99)  BP: 111/47 (2023 09:40) (93/55 - 127/59)  BP(mean): 67 (2023 09:40) (67 - 67)  RR: 18 (2023 09:40) (18 - 21)  SpO2: 98% (2023 09:40) (93% - 98%)    Parameters below as of 2023 09:40  Patient On (Oxygen Delivery Method): nasal cannula  O2 Flow (L/min): 4        PE:  Constitutional: frail looking  HEENT: NC/AT, EOMI, PERRLA, conjunctivae clear; ears and nose atraumatic; pharynx benign  Neck: supple; thyroid not palpable  Back: no tenderness  Respiratory: decreased breath sounds, rhonchi  Cardiovascular: S1S2 regular, no murmurs  Abdomen: soft, not tender, not distended, positive BS; liver and spleen WNL  Genitourinary: no suprapubic tenderness  Lymphatic: no LN palpable  Musculoskeletal: no muscle tenderness, no joint swelling or tenderness  Extremities: no pedal edema  Neurological/ Psychiatric:  moving all extremities  Skin: no rashes; no palpable lesions    Labs: all available labs reviewed                                   8.4     )-----------( 75       ( 2023 07:54 )             25.7     04-    135  |  108  |  26<H>  ----------------------------<  118<H>  3.8   |  21<L>  |  1.58<H>    Ca    8.5      2023 07:54    TPro  6.7  /  Alb  3.1<L>  /  TBili  0.5  /  DBili  x   /  AST  21  /  ALT  17  /  AlkPhos  51  04-04        Urinalysis Basic - ( 2023 10:59 )    Color: Yellow / Appearance: Slightly Turbid / S.010 / pH: x  Gluc: x / Ketone: Negative  / Bili: Negative / Urobili: Negative   Blood: x / Protein: 30 mg/dL / Nitrite: Negative   Leuk Esterase: Moderate / RBC: 3-5 /HPF / WBC 26-50 /HPF   Sq Epi: x / Non Sq Epi: Few / Bacteria: Many    Culture - Urine (23 @ 17:50)   - Vancomycin: S 2  - Tetracycline: S <=1  - Tobramycin: S <=2  - Trimethoprim/Sulfamethoxazole: S <=0.5/9.5  - Levofloxacin: S <=0.5  - Levofloxacin: S 1  - Meropenem: S <=1  - Nitrofurantoin: S <=32 Should not be used to treat pyelonephritis  - Nitrofurantoin: S <=32 Should not be used to treat pyelonephritis.  - Piperacillin/Tazobactam: S <=8  - Cefazolin: S <=2  - Cefepime: S <=2  - Cefoxitin: S <=8  - Ceftriaxone: S <=1 Enterobacter, Klebsiella aerogenes, Citrobacter, and Serratia may develop resistance during prolonged therapy  - Ciprofloxacin: S <=0.25  - Ciprofloxacin: S <=1  - Ertapenem: S <=0.5  - Gentamicin: S <=2  - Imipenem: S <=1  - Amikacin: S <=16  - Amoxicillin/Clavulanic Acid: S <=8/4  - Ampicillin: R >16 These ampicillin results predict results for amoxicillin  - Ampicillin: S <=2 Predicts results to ampicillin/sulbactam, amoxacillin-clavulanate and piperacillin-tazobactam.  - Ampicillin/Sulbactam: S 8/4 Enterobacter, Klebsiella aerogenes, Citrobacter, and Serratia may develop resistance during prolonged therapy (3-4 days)  - Aztreonam: S <=4  Specimen Source: Clean Catch None  Culture Results:   >100,000 CFU/ml Klebsiella oxytoca/Raoultella ornithinolytica   >100,000 CFU/ml Enterococcus faecalis  Organism Identification: Klebsiella oxytoca /Raoutella ornithinolytica       Radiology: all available radiological tests reviewed      ACC: [id number] EXAM:  XR CHEST PORTABLE URGENT 1V   ORDERED BY: DIRK BAUER     PROCEDURE DATE:  2023          INTERPRETATION:  Portable chest radiograph    CLINICAL INFORMATION: Fever    TECHNIQUE:  Portable  AP chest radiograph.    COMPARISON: 3/31/2023 chest x-ray .    FINDINGS:  CATHETERS AND TUBES: None    PULMONARY: Bilateral perihilar/bibasilar diffuse airspace disease.  Apices clear. No pneumothorax.    HEART/VASCULAR: The heart and mediastinum size and configuration are   within normal limits.    BONES: Visualized osseous thorax intact.    IMPRESSION:    Bilateral perihilar/basilar diffuse airspace disease..    < from: CT Chest No Cont (23 @ 15:54) >  ACC: [id number] EXAM:  CT CHEST   ORDERED BY: FELECIA YOU     PROCEDURE DATE:  2023          INTERPRETATION:  INDICATION: Shortness of breath    TECHNIQUE: Volumetric images of the chest without intravenous contrast.   Maximum intensity projection images were generated.    COMPARISON: CT chest 3/5/2023.    FINDINGS:    LUNGS/AIRWAYS/PLEURA: Slitlike narrowing of the AP diameter of the   trachea due to exhalation which can be seen with tracheomalacia. Mosaic   attenuation in both lungsfrom air trapping. New consolidation adjacent   to atelectasis in the right lower lobe, as well as in the right upper   lobe at the apex and adjacent to the oblique fissure. Interlobular septal   thickening in both lungs. Small right and trace left pleural effusions   and secondary passive atelectasis of both lower lobes.    LYMPH NODES/MEDIASTINUM: Unchanged mediastinal lymph nodes, largest 1 cm   in the prevascular space.    HEART/VASCULATURE: Normal heart size. No pericardial effusion. Calcified   aortic valve. Coronary artery calcifications.    UPPER ABDOMEN: Cholelithiasis. Medially displaced intimal calcification   of the infrarenal aorta, likely chronic. Small hypodensity arising from   the left kidney, likely a cyst.    BONES/SOFT TISSUES: Unremarkable.      IMPRESSION:    Since 3/5/2023:    New consolidation in the right upper and right lower lobes, favor   pneumonia in the appropriate clinical setting.    New small right and trace left pleural effusions, and mild pulmonary   edema.      Advanced directives addressed: full resuscitation

## 2023-04-05 NOTE — PROGRESS NOTE ADULT - ASSESSMENT
89 y/o F with PMH severe AS, HFrEF, HTN on metoprolol, amlodipine, hydralazine, HLD, frequent UTIs with urinary retention and chronic pérez, gout, arthritis, hx pelvic fx, recent covid/?asp PNA 3/2023 BIBA to ED for fever TMax 103F. Pt seen in  ED 3/31 for syncope, was admitted and discharged 4/2. Pt started on macrobid for UTI by Dr. Waldrop 4/3, took 2 doses. Daughter noticed fever at 4am, took 1G tylenol 4:30am. O2 84% in triage, started on 6L NC, now at 97%. Pt denies CP or SOB at this time. No headache, dizziness, LOC, N/V/D, abdominal pain, ext numbness/weakness.  Here imaging shows b/l pneumonia on xray, UA positive, noted with vomiting episodes in ER, daughter at bedside, was given dose of vanocmycin/zosyn.      1. Fever. UTI with ENFA/KLOX. Aspiration pneumonia. Recent Covid-19 viral syndrome. ANDREW  - imaging and prior chart/cultures reviewed  - CT chest shows new consolidation in R upper and R lower lobes   - 3/31 urine cx grew ENFA/KLOX - sensitivities noted  - on zosyn [id number] #2  - continue with antibiotic coverage   - aspiration precautions  - fu cbc  - covid not active has had prior in march  - speech/swallow eval noted   - monitor temps  - tolerating abx well so far; no side effects noted  - reason for abx use and side effects reviewed with patient  - supportive care  - fu cbc    2. other issues - care per medicine

## 2023-04-05 NOTE — PROGRESS NOTE ADULT - ASSESSMENT
89 y/o F with PMH severe AS, HFrEF, HTN, HLD, frequent UTIs with urinary retention and chronic pérez, gout, arthritis, aspiration  PNA admitted for:      1. Acute Hypoxic Respiratory Failure due to B/L  PNA  admit to med surg  monitor pulse oc  Supplement  O2 via NC  S/p Vanco  and Zosyn in ED   cefepime   blood cx and Ucx neg  Tylenol PRN   Mucinex, Tessalon   Albuterol PRN   Possible aspiration?  Speech and swallow  eval     # chronic anemia and thrombocytopenia further worsening likely consumptional   stop zosyn' switch to cefepime dw with ID   hold heparin SC for PLT<70      2.  Suspected UTI assocated with Chronic Pérez   UA +   Past UCX reviewed Started on Zosyn   ID recs appreciated       3. HFrEF,  severe AS  Clinically dehydrated, min PO intake and UO   Strict is and OS   Hold lasix for now   re eval daily   Check BNP   F/u with cardio outPt     4. CKD 4. Dehydration   baseline CR around 1.6  renal Fx stable   will give further IVF cautiously - in lieu of AS  LAbs in am       5. HTN   Monitor BP  C/w Metoprolol, Hydralazine   Hold amlodipine for now     6. Metabolic  encephalopathy   likely 2/2 acute illness   Supportive  care     7. DVT PPX:  heparin SQ     8. ACP: d/w {ts daughter, who reports that Pt   wanted to be full code  89 y/o F with PMH severe AS, HFrEF, HTN, HLD, frequent UTIs with urinary retention and chronic pérez, gout, arthritis, aspiration  PNA admitted for:      1. Acute Hypoxic Respiratory Failure due to B/L  PNA  admit to med surg  monitor pulse oc  Supplement  O2 via NC  S/p Vanco  and Zosyn in ED   cefepime   blood cx and Ucx neg  Tylenol PRN   Mucinex, Tessalon   Albuterol PRN   Possible aspiration?  Speech and swallow  eval     # chronic anemia and thrombocytopenia further worsening likely consumptional   stop zosyn' switch to cefepime dw with ID   hold heparin SC for PLT<70      2.  Suspected UTI assocated with Chronic Pérez   UA +   Past UCX reviewed Started on Zosyn   ID recs appreciated       3. HFrEF,  severe AS  Clinically dehydrated, min PO intake and UO   Strict is and OS   Hold lasix for now   re eval daily   Check BNP   F/u with cardio outPt     4. CKD 4. Dehydration   baseline CR around 1.6  renal Fx stable   will give further IVF cautiously - in lieu of AS  LAbs in am       5. HTN   Monitor BP  C/w Metoprolol, Hydralazine   Hold amlodipine for now     6. Metabolic  encephalopathy   likely 2/2 acute illness   Supportive  care     7. DVT PPX:  heparin SQ     8. ACP:  full code   I left message for daughter Ms crawford 4/5  89 y/o F with PMH severe AS, HFrEF, HTN, HLD, frequent UTIs with urinary retention and chronic pérez, gout, arthritis, aspiration  PNA admitted for:      1. Acute Hypoxic Respiratory Failure due to B/L  PNA  admit to med surg  monitor pulse oc  Supplement  O2 via NC  S/p Vanco  and Zosyn in ED   cefepime   blood cx and Ucx neg  Tylenol PRN   Mucinex, Tessalon   Albuterol PRN   Possible aspiration?  Speech and swallow  eval     # chronic anemia and thrombocytopenia further worsening likely consumptional   stop zosyn' switch to cefepime dw with ID   hold heparin SC for PLT<50      2.  Suspected UTI assocated with Chronic Pérez   UA +   Past UCX reviewed Started on Zosyn   ID recs appreciated       3. HFrEF,  severe AS  Clinically dehydrated, min PO intake and UO   Strict is and OS   Hold lasix for now   re eval daily   Check BNP   F/u with cardio outPt     4. CKD 4. Dehydration   baseline CR around 1.6  renal Fx stable   will give further IVF cautiously - in lieu of AS  LAbs in am       5. HTN   Monitor BP  C/w Metoprolol, Hydralazine   Hold amlodipine for now     6. Metabolic  encephalopathy   likely 2/2 acute illness   Supportive  care     7. DVT PPX:  heparin SQ     8. ACP:  full code   I left message for daughter Ms crawford 4/5

## 2023-04-05 NOTE — PHYSICAL THERAPY INITIAL EVALUATION ADULT - ADDITIONAL COMMENTS
Patient was ambulating with RW and with assistance via HHA at home PTA. Patient's HHA assists patient with all ADL's at home PTA

## 2023-04-05 NOTE — PHYSICAL THERAPY INITIAL EVALUATION ADULT - DIAGNOSIS, PT EVAL
B PNA, Severe AS, Acute Hypoxic Respiratory Failure, UTI, ANDREW, CHF, Metabolic Encephalopathy, Syncope & Collapse

## 2023-04-05 NOTE — PROGRESS NOTE ADULT - SUBJECTIVE AND OBJECTIVE BOX
CHIEF COMPLAINT:  Patient is a 89y old  Female who presents with a chief complaint of     HPI: 23:    Patient is a 89 yo F, well known to me, BIBEMS from home with c/o fever, starting this morning. EMS reports pt's daughter gave pt 650mg of Tylenol @ 0430 with a fever TMAX 103.  She was also recently admitted with ? asp. PNA on 3/20/23 and was COVID (+) at that time.  Pt now found to be hypoxic in triage sating 84% and placed on 6LNC sating 97%. Pt denies chest pain and SOB. Pt is A&OX3 and reports hx of frequent UTI's. EMS reports . Oral temp 99.5 and patient feels very warm to touch.  She was recently admitted with syncope and UTI and was COVID (+) still on 3/31/23.  She has known severe AS by echo with HFpEF (LVEF=65-70%) (see report below).  She seen in the ER with CXR suggesting bilateral lower lobe PNA.  On O2 she is feeling better.  She has no anginal chest pains or recurrent syncope so far.      23:  Feeling slightly better already without increased SOB, clinical CHF or anginal chest pains.  No palpitations or recurrent syncope.  Tolerating current treatments and antibiotics.  Awaiting formal culture reports.          PMHx:  PAST MEDICAL & SURGICAL HISTORY:  Severe AS  HTN (hypertension)  HLD (hyperlipidemia)  Gout  Osteoarthritis  History of tonsillectomy in childhood      FAMILY HISTORY:   FAMILY HISTORY:  Family history of hypertension (Father, Mother)      ALLERGIES:  Allergies  Ceftin (Hives; Rash)      REVIEW OF SYSTEMS:  10 point ROS was obtained  Pertinent positives and negatives are as above  All other review of systems is negative unless indicated above      Vital Signs Last 24 Hrs  T(C): 36.9 (2023 06:05), Max: 37.4 (2023 14:25)  T(F): 98.5 (2023 06:05), Max: 99.3 (2023 14:25)  HR: 85 (2023 06:05) (81 - 99)  BP: 114/56 (2023 06:05) (93/55 - 127/59)  RR: 18 (2023 06:05) (18 - 21)  SpO2: 95% (2023 06:05) (93% - 98%): nasal cannula      PHYSICAL EXAM:   Constitutional: NAD, awake and alert, well-developed  HEENT: PERR, EOMI, Normal Hearing, MMM  Neck: Soft and supple, No LAD, No JVD  Respiratory: Breath sounds scattered rhonchi at bases bilaterally, No rales  Cardiovascular: S1 and S2, regular rate and rhythm, harsh LIANA at base and LLSB as before, (+) S4; no S3 gallop or rubs  Gastrointestinal: Bowel Sounds present, soft, nontender, nondistended, no guarding, no rebound  Extremities: No peripheral edema  Vascular: 2+ peripheral pulses  Neurological: no focal deficits  Skin: No rashes      MEDICATIONS  (STANDING):  albuterol    90 MICROgram(s) HFA Inhaler 1 Puff(s) Inhalation every 4 hours  allopurinol 100 milliGRAM(s) Oral daily  atorvastatin 20 milliGRAM(s) Oral at bedtime  bethanechol 50 milliGRAM(s) Oral two times a day  doxazosin 2 milliGRAM(s) Oral at bedtime  folic acid 1 milliGRAM(s) Oral daily  gabapentin 300 milliGRAM(s) Oral two times a day  guaiFENesin  milliGRAM(s) Oral every 12 hours  heparin   Injectable 5000 Unit(s) SubCutaneous every 8 hours  hydrALAZINE 25 milliGRAM(s) Oral two times a day  metoprolol tartrate 25 milliGRAM(s) Oral two times a day  ondansetron Injectable 4 milliGRAM(s) IV Push once  piperacillin/tazobactam IVPB.. 3.375 Gram(s) IV Intermittent every 8 hours  senna 2 Tablet(s) Oral at bedtime    MEDICATIONS  (PRN):  acetaminophen     Tablet .. 650 milliGRAM(s) Oral every 6 hours PRN Temp greater or equal to 38C (100.4F), Mild Pain (1 - 3)  albuterol    0.083% 2.5 milliGRAM(s) Nebulizer every 6 hours PRN Shortness of Breath and/or Wheezing  aluminum hydroxide/magnesium hydroxide/simethicone Suspension 30 milliLiter(s) Oral every 4 hours PRN Dyspepsia  benzonatate 100 milliGRAM(s) Oral every 8 hours PRN Cough  melatonin 3 milliGRAM(s) Oral at bedtime PRN Insomnia      LABS: All Labs Reviewed:                        8.4    11.91 )-----------( 75       ( 2023 07:54 )             25.7     04-    135  |  108  |  26<H>  ----------------------------<  118<H>  3.8   |  21<L>  |  1.58<H>    Ca    8.5      2023 07:54    TPro  6.7  /  Alb  3.1<L>  /  TBili  0.5  /  DBili  x   /  AST  21  /  ALT  17  /  AlkPhos  51  04-04    Pro-Brain Natriuretic Peptide (23 @ 07:54): 1886 pg/mL    Troponin I, High Sensitivity (23 @ 07:54): 14.73    CULTURES:   Gram Stain Urine -- Gram Stain --  Specimen Source Clean Catch None  Culture-Urine --Organism Klebsiella oxytoca /Raoutella ornithinolytica>100,000 CFU/ml Enterococcus faecalis (23 @ 17:50)       LIPID PROFILE (23 @ 07:01)   Cholesterol, Serum: 116 mg/dL  Triglycerides, Serum: 64 mg/dL  HDL Cholesterol, Serum: 67 mg/dL  Non HDL Cholesterol: 49      RADIOLOGY:  CT of Chest: 23:  FINDINGS:  LUNGS/AIRWAYS/PLEURA: Slitlike narrowing of the AP diameter of the trachea due to exhalation which can be seen with tracheomalacia. Mosaic attenuation in both lungsfrom air trapping. New consolidation adjacent to atelectasis in the right lower lobe, as well as in the right upper lobe at the apex and adjacent to the oblique fissure. Interlobular septal thickening in both lungs. Small right and trace left pleural effusions and secondary passive atelectasis of both lower lobes.  LYMPH NODES/MEDIASTINUM: Unchanged mediastinal lymph nodes, largest 1 cm in the prevascular space.  HEART/VASCULATURE: Normal heart size. No pericardial effusion. Calcified aortic valve. Coronary artery calcifications.  UPPER ABDOMEN: Cholelithiasis. Medially displaced intimal calcification of the infrarenal aorta, likely chronic. Small hypodensity arising from the left kidney, likely a cyst.  BONES/SOFT TISSUES: Unremarkable.  IMPRESSION: Since 3/5/2023:  New consolidation in the right upper and right lower lobes, favor pneumonia in the appropriate clinical setting.  New small right and trace left pleural effusions, and mild pulmonary edema.      CXR: 23:  FINDINGS:  CATHETERS AND TUBES: None  PULMONARY: Bilateral perihilar/bibasilar diffuse airspace disease.Apices clear. No pneumothorax.  HEART/VASCULAR: The heart and mediastinum size and configuration are within normal limits.  BONES: Visualized osseous thorax intact.  IMPRESSION:  Bilateral perihilar/basilar diffuse airspace disease..    CXR: 3/31/23:  INTERPRETATION:  AP chest on 2023 at 2:17 PM. Patient had a syncopal episode.  Heart magnified by technique.  There is an increasing small infiltrate off the right lower hilum compared to  of this year.  IMPRESSION: Increasing right lower perihilar infiltrate.      EK23:  Normal sinus rhythm  Possible Left atrial enlargement  Left ventricular hypertrophy      TELEMETRY:  NSR      ECHO:  3/1/23:  M-Mode Measurements (cm)   LVEDd: 3.97 cm            LVESd: 2.55 cm   IVSEd: 1.1 cm   LVPWd: 1.1 cm             AO Root Dimension: 2.8 cm                             LA: 3.5 cm                LVOT: 2 cm  Doppler Measurements:   AV Velocity:434 cm/s                  MV Peak E-Wave: 98.7 cm/s   AV Peak Gradient: 75.34 mmHg          MV Peak A-Wave: 131 cm/s   AV Mean Gradient: 40 mmHg             MV E/A Ratio: 0.75 %   AV Area (Continuity):0.85 cm^2        MV Peak Gradient: 3.9 mmHg   TR Velocity:190 cm/s   TR Gradient:14.44 mmHg   Estimated RAP:5 mmHg   RVSP:27 mmHg    Findings  Mitral Valve   The mitral valve leaflets appear thickened.   EA reversal of the mitral inflow consistent with reduced compliance of the left ventricle.   Mild mitral annular calcification is present.   Mild mitral regurgitation is present.    Aortic Valve   Peak and mean transaortic gradients are 75 and 40mmHg respectively; this finding is consistent with severe aortic stenosis.   Mild (1+) aortic regurgitation is present.    Tricuspid Valve   Trace tricuspid valve regurgitation is present.    Pulmonic Valve   Mild pulmonic valvular regurgitation (1+) is present.    Left Atrium   Normal appearing left atrium.    Left Ventricle   Mild concentric left ventricular hypertrophy is present.   The left ventricle is normal in size.   Estimated left ventricular ejection fraction is 65-70 %.    Right Atrium   Normal appearing right atrium.    Right Ventricle   Normal appearing right ventricle structure and function.    Pericardial Effusion   No evidence of pericardial effusion.    Pleural Effusion   No evidence of pleural effusion.    Miscellaneous   The IVC appears normal.    Summary   The mitral valve leaflets appear thickened.   EA reversal of the mitral inflow consistent with reduced compliance of the left ventricle.   Mild mitral annular calcification is present.   Mild mitral regurgitation is present.   Peak and mean transaortic gradients are 75 and 40mmHg respectively; this finding is consistent with severe aortic stenosis.   Mild (1+) aortic regurgitation is present.   Trace tricuspid valve regurgitation is present.   Mild pulmonic valvular regurgitation (1+) is present.   Normal appearing left atrium.   Mild concentric left ventricular hypertrophy is present.   The left ventricle is normal in size.   Estimated left ventricular ejection fraction is 65-70 %.   Normal appearing right atrium.   Normal appearing right ventricle structure and function.   The IVC appears normal.   No evidence of pericardial effusion.   No evidence of pleural effusion.    Signature   ----------------------------------------------------------------   Electronically signed by Sakina Cheatham MD, Director of   Cardiac Cath Lab(Interpreting physician) on 2023 06:42   PM   ----------------------------------------------------------------

## 2023-04-05 NOTE — PROGRESS NOTE ADULT - ASSESSMENT
4/4/23:  Pt with above history and severe AS with recent COVID-19 (+) and recent syncope related to her AS shy UTI now with fever and probable bilateral PNA.  Would continue to treat her PNA as per medicine and ID and await blood and sputum cultures.  Hopefully no endocarditis.  When clinically bet and no evidence for infections pt is being considered for a TAVR in the future.  Continue as outlined by medicine and ID etal.  Will follow as work-up and treatment progresses.    4/5/23:  Feeling slightly better already without increased SOB, clinical CHF or anginal chest pains.  No palpitations or recurrent syncope.  Tolerating current treatments and antibiotics.  Awaiting formal culture reports.  Continue as outlined by medicine and ID etal.  Will follow as work-up and treatment progresses.

## 2023-04-05 NOTE — PHYSICAL THERAPY INITIAL EVALUATION ADULT - LIVES WITH, PROFILE
Pt lives at home with her daughter and HHA (Monday - Friday 8 Hours). patient's home has 1 PA and 0 steps inside. patient was recently at [facility] for rehab strengthening purposes

## 2023-04-05 NOTE — PROGRESS NOTE ADULT - SUBJECTIVE AND OBJECTIVE BOX
89 y/o F with PMH severe AS, HFrEF, HTN, HLD, frequent UTIs with urinary retention and chronic pérez, gout, arthritis, aspiration  PNA 3/2023 BIBA to ED for fever TMax 103F, confusion. As per Pts daughter, she was told that last night Pt was confused and was coughing,  febrile, also had episode of vomiting. Pt is awake, known in the hospital, reports currently no SOB, but feels very weak and sleepy. Pt c/o feeling cold, body aches, HA.  Pt denies CP, no abd pain.  As per daughter was started  on Macrobid by PCP.   In ED: Was hypoxic in 80s, stable on NC, rest VS are stable. CXR with b/l infiltrates. Pt was given IV Zosyn and VAnco     4/5 feels better, less sob , comfortable at rest , has cough     PHYSICAL EXAM:  General: Well developed; frail elderly; in no acute distress  Eyes:  EOMI; conjunctiva and sclera clear  Head: Normocephalic; atraumatic  ENMT: No nasal discharge; airway clear, dry oral mucosa  Respiratory: Decreased BS at bases b/l, with R crackles  No wheezes  Cardiovascular: Regular rate and rhythm. S1 and S2 Normal; +  murmur  Gastrointestinal: Soft non-tender non-distended; Normal bowel sounds  Genitourinary: No  suprapubic  tenderness, pérez in place   Extremities: No edema  Neurological: Alert and oriented x2-3, mildly encephalopathic, non focal   Musculoskeletal: Normal muscle tone, without deformities      PHYSICAL EXAM:    Daily     Daily     ICU Vital Signs Last 24 Hrs  T(C): 36.9 (2023 09:40), Max: 37.4 (2023 17:28)  T(F): 98.4 (2023 09:40), Max: 99.3 (2023 17:28)  HR: 77 (2023 09:40) (77 - 85)  BP: 111/47 (2023 09:40) (93/55 - 118/85)  BP(mean): 67 (2023 09:40) (67 - 67)  ABP: --  ABP(mean): --  RR: 18 (2023 09:40) (18 - 20)  SpO2: 98% (2023 09:40) (95% - 98%)    O2 Parameters below as of 2023 09:40  Patient On (Oxygen Delivery Method): nasal cannula  O2 Flow (L/min): 4                                8.0    12.70 )-----------( 68       ( 2023 10:35 )             24.5       CBC Full  -  ( 2023 10:35 )  WBC Count : 12.70 K/uL  RBC Count : 2.61 M/uL  Hemoglobin : 8.0 g/dL  Hematocrit : 24.5 %  Platelet Count - Automated : 68 K/uL  Mean Cell Volume : 93.9 fl  Mean Cell Hemoglobin : 30.7 pg  Mean Cell Hemoglobin Concentration : 32.7 gm/dL  Auto Neutrophil # : x  Auto Lymphocyte # : x  Auto Monocyte # : x  Auto Eosinophil # : x  Auto Basophil # : x  Auto Neutrophil % : x  Auto Lymphocyte % : x  Auto Monocyte % : x  Auto Eosinophil % : x  Auto Basophil % : x      04-05    135  |  107  |  40<H>  ----------------------------<  156<H>  3.8   |  20<L>  |  2.16<H>    Ca    8.0<L>      2023 10:35    TPro  6.7  /  Alb  3.1<L>  /  TBili  0.5  /  DBili  x   /  AST  21  /  ALT  17  /  AlkPhos  51  04-04      LIVER FUNCTIONS - ( 2023 07:54 )  Alb: 3.1 g/dL / Pro: 6.7 gm/dL / ALK PHOS: 51 U/L / ALT: 17 U/L / AST: 21 U/L / GGT: x                       Urinalysis Basic - ( 2023 10:59 )    Color: Yellow / Appearance: Slightly Turbid / S.010 / pH: x  Gluc: x / Ketone: Negative  / Bili: Negative / Urobili: Negative   Blood: x / Protein: 30 mg/dL / Nitrite: Negative   Leuk Esterase: Moderate / RBC: 3-5 /HPF / WBC 26-50 /HPF   Sq Epi: x / Non Sq Epi: Few / Bacteria: Many            MEDICATIONS  (STANDING):  albuterol    90 MICROgram(s) HFA Inhaler 1 Puff(s) Inhalation every 4 hours  allopurinol 100 milliGRAM(s) Oral daily  atorvastatin 20 milliGRAM(s) Oral at bedtime  bethanechol 50 milliGRAM(s) Oral two times a day  doxazosin 2 milliGRAM(s) Oral at bedtime  folic acid 1 milliGRAM(s) Oral daily  gabapentin 300 milliGRAM(s) Oral two times a day  guaiFENesin  milliGRAM(s) Oral every 12 hours  heparin   Injectable 5000 Unit(s) SubCutaneous every 8 hours  hydrALAZINE 25 milliGRAM(s) Oral two times a day  metoprolol tartrate 25 milliGRAM(s) Oral two times a day  ondansetron Injectable 4 milliGRAM(s) IV Push once  piperacillin/tazobactam IVPB.. 3.375 Gram(s) IV Intermittent every 8 hours  senna 2 Tablet(s) Oral at bedtime

## 2023-04-05 NOTE — PHYSICAL THERAPY INITIAL EVALUATION ADULT - GENERAL OBSERVATIONS, REHAB EVAL
Pt rec'd supine in bed in NAD with pérez, Pulse Oxym and NC (4L/min) all intact. Patient reporting general pain in BLE Knees and R Shoulder from chronic orthopedic injuries

## 2023-04-06 DIAGNOSIS — I12.9 HYPERTENSIVE CHRONIC KIDNEY DISEASE WITH STAGE 1 THROUGH STAGE 4 CHRONIC KIDNEY DISEASE, OR UNSPECIFIED CHRONIC KIDNEY DISEASE: ICD-10-CM

## 2023-04-06 DIAGNOSIS — Z86.16 PERSONAL HISTORY OF COVID-19: ICD-10-CM

## 2023-04-06 DIAGNOSIS — M10.9 GOUT, UNSPECIFIED: ICD-10-CM

## 2023-04-06 DIAGNOSIS — R55 SYNCOPE AND COLLAPSE: ICD-10-CM

## 2023-04-06 DIAGNOSIS — R51.9 HEADACHE, UNSPECIFIED: ICD-10-CM

## 2023-04-06 DIAGNOSIS — E78.5 HYPERLIPIDEMIA, UNSPECIFIED: ICD-10-CM

## 2023-04-06 DIAGNOSIS — N18.9 CHRONIC KIDNEY DISEASE, UNSPECIFIED: ICD-10-CM

## 2023-04-06 DIAGNOSIS — R33.9 RETENTION OF URINE, UNSPECIFIED: ICD-10-CM

## 2023-04-06 DIAGNOSIS — M19.90 UNSPECIFIED OSTEOARTHRITIS, UNSPECIFIED SITE: ICD-10-CM

## 2023-04-06 DIAGNOSIS — Z88.3 ALLERGY STATUS TO OTHER ANTI-INFECTIVE AGENTS: ICD-10-CM

## 2023-04-06 DIAGNOSIS — D69.6 THROMBOCYTOPENIA, UNSPECIFIED: ICD-10-CM

## 2023-04-06 DIAGNOSIS — D63.1 ANEMIA IN CHRONIC KIDNEY DISEASE: ICD-10-CM

## 2023-04-06 DIAGNOSIS — I35.0 NONRHEUMATIC AORTIC (VALVE) STENOSIS: ICD-10-CM

## 2023-04-06 LAB
ALBUMIN SERPL ELPH-MCNC: 2.6 G/DL — LOW (ref 3.3–5)
ALP SERPL-CCNC: 60 U/L — SIGNIFICANT CHANGE UP (ref 40–120)
ALT FLD-CCNC: 38 U/L — SIGNIFICANT CHANGE UP (ref 12–78)
ANION GAP SERPL CALC-SCNC: 5 MMOL/L — SIGNIFICANT CHANGE UP (ref 5–17)
ANISOCYTOSIS BLD QL: SLIGHT — SIGNIFICANT CHANGE UP
AST SERPL-CCNC: 36 U/L — SIGNIFICANT CHANGE UP (ref 15–37)
BASOPHILS # BLD AUTO: 0 K/UL — SIGNIFICANT CHANGE UP (ref 0–0.2)
BASOPHILS NFR BLD AUTO: 0 % — SIGNIFICANT CHANGE UP (ref 0–2)
BILIRUB SERPL-MCNC: 0.6 MG/DL — SIGNIFICANT CHANGE UP (ref 0.2–1.2)
BIZARRE PLATELETS BLD QL SMEAR: PRESENT — SIGNIFICANT CHANGE UP
BLD GP AB SCN SERPL QL: SIGNIFICANT CHANGE UP
BUN SERPL-MCNC: 40 MG/DL — HIGH (ref 7–23)
BURR CELLS BLD QL SMEAR: PRESENT — SIGNIFICANT CHANGE UP
CALCIUM SERPL-MCNC: 8.3 MG/DL — LOW (ref 8.5–10.1)
CHLORIDE SERPL-SCNC: 112 MMOL/L — HIGH (ref 96–108)
CO2 SERPL-SCNC: 20 MMOL/L — LOW (ref 22–31)
CREAT SERPL-MCNC: 1.68 MG/DL — HIGH (ref 0.5–1.3)
EGFR: 28 ML/MIN/1.73M2 — LOW
EOSINOPHIL # BLD AUTO: 0 K/UL — SIGNIFICANT CHANGE UP (ref 0–0.5)
EOSINOPHIL NFR BLD AUTO: 0 % — SIGNIFICANT CHANGE UP (ref 0–6)
FIBRINOGEN PPP-MCNC: 647 MG/DL — HIGH (ref 330–520)
GLUCOSE SERPL-MCNC: 153 MG/DL — HIGH (ref 70–99)
HCT VFR BLD CALC: 23.4 % — LOW (ref 34.5–45)
HGB BLD-MCNC: 7.6 G/DL — LOW (ref 11.5–15.5)
LYMPHOCYTES # BLD AUTO: 0.44 K/UL — LOW (ref 1–3.3)
LYMPHOCYTES # BLD AUTO: 5 % — LOW (ref 13–44)
MANUAL SMEAR VERIFICATION: SIGNIFICANT CHANGE UP
MCHC RBC-ENTMCNC: 30.3 PG — SIGNIFICANT CHANGE UP (ref 27–34)
MCHC RBC-ENTMCNC: 32.5 GM/DL — SIGNIFICANT CHANGE UP (ref 32–36)
MCV RBC AUTO: 93.2 FL — SIGNIFICANT CHANGE UP (ref 80–100)
MICROCYTES BLD QL: SLIGHT — SIGNIFICANT CHANGE UP
MONOCYTES # BLD AUTO: 0.18 K/UL — SIGNIFICANT CHANGE UP (ref 0–0.9)
MONOCYTES NFR BLD AUTO: 2 % — SIGNIFICANT CHANGE UP (ref 2–14)
NEUTROPHILS # BLD AUTO: 8.19 K/UL — HIGH (ref 1.8–7.4)
NEUTROPHILS NFR BLD AUTO: 93 % — HIGH (ref 43–77)
NRBC # BLD: 0 /100 — SIGNIFICANT CHANGE UP (ref 0–0)
NRBC # BLD: SIGNIFICANT CHANGE UP /100 WBCS (ref 0–0)
OVALOCYTES BLD QL SMEAR: SLIGHT — SIGNIFICANT CHANGE UP
PLAT MORPH BLD: NORMAL — SIGNIFICANT CHANGE UP
PLATELET # BLD AUTO: 74 K/UL — LOW (ref 150–400)
PLATELET COUNT - ESTIMATE: ABNORMAL
POTASSIUM SERPL-MCNC: 4.4 MMOL/L — SIGNIFICANT CHANGE UP (ref 3.5–5.3)
POTASSIUM SERPL-SCNC: 4.4 MMOL/L — SIGNIFICANT CHANGE UP (ref 3.5–5.3)
PROT SERPL-MCNC: 6.2 GM/DL — SIGNIFICANT CHANGE UP (ref 6–8.3)
RBC # BLD: 2.51 M/UL — LOW (ref 3.8–5.2)
RBC # FLD: 15.6 % — HIGH (ref 10.3–14.5)
RBC BLD AUTO: ABNORMAL
SODIUM SERPL-SCNC: 137 MMOL/L — SIGNIFICANT CHANGE UP (ref 135–145)
WBC # BLD: 8.81 K/UL — SIGNIFICANT CHANGE UP (ref 3.8–10.5)
WBC # FLD AUTO: 8.81 K/UL — SIGNIFICANT CHANGE UP (ref 3.8–10.5)

## 2023-04-06 PROCEDURE — 99233 SBSQ HOSP IP/OBS HIGH 50: CPT

## 2023-04-06 RX ORDER — FUROSEMIDE 40 MG
20 TABLET ORAL ONCE
Refills: 0 | Status: COMPLETED | OUTPATIENT
Start: 2023-04-06 | End: 2023-04-06

## 2023-04-06 RX ADMIN — Medication 25 MILLIGRAM(S): at 09:20

## 2023-04-06 RX ADMIN — Medication 25 MILLIGRAM(S): at 22:51

## 2023-04-06 RX ADMIN — Medication 2 MILLIGRAM(S): at 21:20

## 2023-04-06 RX ADMIN — GABAPENTIN 300 MILLIGRAM(S): 400 CAPSULE ORAL at 09:20

## 2023-04-06 RX ADMIN — Medication 10 MILLIGRAM(S): at 01:20

## 2023-04-06 RX ADMIN — Medication 1 MILLIGRAM(S): at 09:20

## 2023-04-06 RX ADMIN — GABAPENTIN 300 MILLIGRAM(S): 400 CAPSULE ORAL at 21:20

## 2023-04-06 RX ADMIN — Medication 3 MILLIGRAM(S): at 01:19

## 2023-04-06 RX ADMIN — Medication 50 MILLIGRAM(S): at 21:21

## 2023-04-06 RX ADMIN — Medication 600 MILLIGRAM(S): at 09:20

## 2023-04-06 RX ADMIN — POLYETHYLENE GLYCOL 3350 17 GRAM(S): 17 POWDER, FOR SOLUTION ORAL at 09:23

## 2023-04-06 RX ADMIN — SENNA PLUS 2 TABLET(S): 8.6 TABLET ORAL at 21:20

## 2023-04-06 RX ADMIN — PIPERACILLIN AND TAZOBACTAM 25 GRAM(S): 4; .5 INJECTION, POWDER, LYOPHILIZED, FOR SOLUTION INTRAVENOUS at 09:23

## 2023-04-06 RX ADMIN — Medication 50 MILLIGRAM(S): at 09:19

## 2023-04-06 RX ADMIN — PIPERACILLIN AND TAZOBACTAM 25 GRAM(S): 4; .5 INJECTION, POWDER, LYOPHILIZED, FOR SOLUTION INTRAVENOUS at 21:21

## 2023-04-06 RX ADMIN — Medication 600 MILLIGRAM(S): at 22:59

## 2023-04-06 RX ADMIN — Medication 25 MILLIGRAM(S): at 21:19

## 2023-04-06 RX ADMIN — Medication 20 MILLIGRAM(S): at 15:24

## 2023-04-06 RX ADMIN — ATORVASTATIN CALCIUM 20 MILLIGRAM(S): 80 TABLET, FILM COATED ORAL at 21:19

## 2023-04-06 RX ADMIN — Medication 100 MILLIGRAM(S): at 09:20

## 2023-04-06 RX ADMIN — Medication 3 MILLIGRAM(S): at 22:52

## 2023-04-06 NOTE — PROGRESS NOTE ADULT - ASSESSMENT
4/4/23:  Pt with above history and severe AS with recent COVID-19 (+) and recent syncope related to her AS shy UTI now with fever and probable bilateral PNA.  Would continue to treat her PNA as per medicine and ID and await blood and sputum cultures.  Hopefully no endocarditis.  When clinically bet and no evidence for infections pt is being considered for a TAVR in the future.  Continue as outlined by medicine and ID etal.  Will follow as work-up and treatment progresses.    4/5/23:  Feeling slightly better already without increased SOB, clinical CHF or anginal chest pains.  No palpitations or recurrent syncope.  Tolerating current treatments and antibiotics.  Awaiting formal culture reports.  Continue as outlined by medicine and ID etal.  Will follow as work-up and treatment progresses.    4/6/23:  Temp 95.5 this AM and heating blanket in place.  Given a rectal suppository for constipation yesterday.  Creatinine up yesterday and gently hydrated.  Awaiting AM labs.  No increased SOB bugt still has cough but slightly better.  No new cardiac symptoms.  Continue as outlined by medicine and ID etal.  Will follow as treatment progresses.

## 2023-04-06 NOTE — CONSULT NOTE ADULT - SUBJECTIVE AND OBJECTIVE BOX
89 y/o F with PMH severe AS, HFrEF, HTN, HLD, frequent UTIs with urinary retention and chronic pérez, gout, arthritis, aspiration  PNA 3/2023 BIBA to ED for fever TMax 103F, confusion.   In ED: Was hypoxic in 80s, stable on NC, rest VS are stable. CXR with b/l infiltrates. Pt was given IV Zosyn and VAnco     4/5 feels better, less sob , comfortable at rest , has cough   4/6 had BM after suppostirory last night, hypothermic 95, requiring warming blanket       Renal eval called for elevated Cr   hx of CKD 3b Cr ~ 1.5 - 1.6 in past    Today    pt feeling ok, using bear hugger for hypothermia       PAST MEDICAL & SURGICAL HISTORY:  HTN (hypertension)      HLD (hyperlipidemia)      Gout      Osteoarthritis      History of tonsillectomy  in childhood          MEDICATIONS  (STANDING):  albuterol    90 MICROgram(s) HFA Inhaler 1 Puff(s) Inhalation every 4 hours  allopurinol 100 milliGRAM(s) Oral daily  atorvastatin 20 milliGRAM(s) Oral at bedtime  bethanechol 50 milliGRAM(s) Oral two times a day  doxazosin 2 milliGRAM(s) Oral at bedtime  folic acid 1 milliGRAM(s) Oral daily  gabapentin 300 milliGRAM(s) Oral two times a day  guaiFENesin  milliGRAM(s) Oral every 12 hours  hydrALAZINE 25 milliGRAM(s) Oral two times a day  metoprolol tartrate 25 milliGRAM(s) Oral two times a day  ondansetron Injectable 4 milliGRAM(s) IV Push once  piperacillin/tazobactam IVPB.. 3.375 Gram(s) IV Intermittent every 12 hours  polyethylene glycol 3350 17 Gram(s) Oral daily  senna 2 Tablet(s) Oral at bedtime  sodium chloride 0.9%. 1000 milliLiter(s) (50 mL/Hr) IV Continuous <Continuous>      Allergies    Ceftin (Hives; Rash)    Intolerances        SOCIAL HISTORY:  Denies ETOh,Smoking,     FAMILY HISTORY:  Family history of hypertension (Father, Mother)        REVIEW OF SYSTEMS:    CONSTITUTIONAL: No , fevers or chills  EYES/ENT: No visual changes;  No vertigo or throat pain   NECK: No pain or stiffness  RESPIRATORY: No cough, wheezing, hemoptysis; No shortness of breath  CARDIOVASCULAR: No chest pain or palpitations  GASTROINTESTINAL: No abdominal or epigastric pain. No nausea, vomiting, or hematemesis; No diarrhea or constipation. No melena or hematochezia.  GENITOURINARY: No dysuria, frequency or hematuria  NEUROLOGICAL: No numbness or weakness  SKIN: No itching, burning, rashes, or lesions   All other review of systems is negative unless indicated above.    VITAL:  Vital Signs Last 24 Hrs  T(C): 37 (06 Apr 2023 18:53), Max: 37 (06 Apr 2023 18:53)  T(F): 98.6 (06 Apr 2023 18:53), Max: 98.6 (06 Apr 2023 18:53)  HR: 75 (06 Apr 2023 18:53) (63 - 96)  BP: 150/55 (06 Apr 2023 18:53) (128/50 - 154/82)  BP(mean): --  RR: 18 (06 Apr 2023 18:53) (18 - 18)  SpO2: 96% (06 Apr 2023 18:53) (94% - 98%)    Parameters below as of 06 Apr 2023 18:53  Patient On (Oxygen Delivery Method): nasal cannula  O2 Flow (L/min): 2      I&O's Detail    05 Apr 2023 07:01  -  06 Apr 2023 07:00  --------------------------------------------------------  IN:    Oral Fluid: 520 mL  Total IN: 520 mL    OUT:    Indwelling Catheter - Urethral (mL): 1120 mL    Voided (mL): 470 mL  Total OUT: 1590 mL    Total NET: -1070 mL      06 Apr 2023 07:01  -  06 Apr 2023 21:00  --------------------------------------------------------  IN:    PRBCs (Packed Red Blood Cells): 350 mL  Total IN: 350 mL    OUT:    Indwelling Catheter - Urethral (mL): 350 mL  Total OUT: 350 mL    Total NET: 0 mL        PHYSICAL EXAM:    Constitutional: NAD  HEENT:  EOMI,  MMM  Neck: No LAD, No JVD  Respiratory: CTAB  Cardiovascular: S1 and S2  Gastrointestinal: BS+, soft, NT/ND  Extremities: No peripheral edema  Neurological: A/O x 3, no focal deficits  :  Pérez  Skin: No rashes  Access: Not applicable    LABS:                                       7.6    8.81  )-----------( 74       ( 06 Apr 2023 07:44 )             23.4                         8.0    12.70 )-----------( 68       ( 05 Apr 2023 10:35 )             24.5         137    |  112    |  40     ----------------------------<  153       06 Apr 2023 07:44  4.4     |  20     |  1.68     135    |  107    |  40     ----------------------------<  156       05 Apr 2023 10:35  3.8     |  20     |  2.16     135    |  108    |  26     ----------------------------<  118       04 Apr 2023 07:54  3.8     |  21     |  1.58     Ca    8.3        06 Apr 2023 07:44  Ca    8.0        05 Apr 2023 10:35           Urine Studies:          RADIOLOGY & ADDITIONAL STUDIES:

## 2023-04-06 NOTE — PROGRESS NOTE ADULT - ASSESSMENT
89 y/o F with PMH severe AS, HFrEF, HTN, HLD, frequent UTIs with urinary retention and chronic pérez, gout, arthritis, aspiration  PNA admitted for:      1. Acute Hypoxic Respiratory Failure due to B/L  PNA  admit to med surg  monitor pulse oc  Supplement  O2 via NC  S/p Vanco  and Zosyn in ED   cefepime   blood cx and Ucx neg  Tylenol PRN   Mucinex, Tessalon   Albuterol PRN   Possible aspiration?  Speech and swallow  eval     # chronic anemia and thrombocytopenia further worsening likely consumptional  from sepsis and zosyn , r/o HIT although low probability for HIT  PRBC tranfuse with lasix - dw with cardio  cw zosyn per ID - PLT stabilizing, pt allergic to ceftin  check HIT abs  4Tscore 3  heme eval      2.  Suspected UTI assocated with Chronic Pérez   UA +   Past UCX reviewed Started on Zosyn   ID recs appreciated       3. HFrEF,  severe AS  Clinically dehydrated, min PO intake and UO   Strict is and OS   Hold lasix for now   re eval daily   Check BNP   F/u with cardio outPt     4. CKD 4. Dehydration   baseline CR around 1.6  renal Fx stable     LAbs in am       5. HTN   Monitor BP  C/w Metoprolol, Hydralazine   Hold amlodipine for now     6. Metabolic  encephalopathy   likely 2/2 acute illness   Supportive  care     7. DVT PPX:  heparin SQ     8. ACP:  full code   I left message for daughter Ms crawford 4/5  89 y/o F with PMH severe AS, HFrEF, HTN, HLD, frequent UTIs with urinary retention and chronic pérez, gout, arthritis, aspiration  PNA admitted for:      1. Acute Hypoxic Respiratory Failure due to B/L  PNA  admit to med surg  monitor pulse oc  Supplement  O2 via NC  S/p Vanco  and Zosyn in ED   cefepime   blood cx and Ucx neg  Tylenol PRN   Mucinex, Tessalon   Albuterol PRN   Possible aspiration?  Speech and swallow  eval     # chronic anemia and thrombocytopenia further worsening likely consumptional  from sepsis and zosyn , r/o HIT although low probability for HIT  PRBC tranfuse with lasix - dw with cardio  cw zosyn per ID - PLT stabilizing, pt allergic to ceftin  check HIT abs  4Tscore 3  heme eval    # recent  history of  COVID- ruled out acute covid infection      2.  Suspected UTI assocated with Chronic Pérez   UA +   Past UCX reviewed Started on Zosyn   ID recs appreciated       3.  chronic diastolic CHF,  severe AS  Clinically dehydrated, min PO intake and UO   Strict is and OS   will give PRBC with lasix ,consider resuming  standing  lasix in 24 to 48hrs  re eval daily   Check BNP   F/u with cardio outPt     4. CKD 4. Dehydration   baseline CR around 1.6  renal Fx stable     LAbs in am       5. HTN   Monitor BP  C/w Metoprolol, Hydralazine   Hold amlodipine for now     6. Metabolic  encephalopathy   likely 2/2 acute illness   Supportive  care     7. DVT PPX:  heparin SQ     8. ACP:  full code   I left message for daughter Ms crawford 4/5

## 2023-04-06 NOTE — PROGRESS NOTE ADULT - SUBJECTIVE AND OBJECTIVE BOX
Date of service: 23 @ 11:08    pt seen and examined  low temp this am  has some cough  no fevers      ROS: no fever or chills; denies dizziness, no HA,  no abdominal pain, no diarrhea or constipation; no dysuria, no urinary frequency, no legs pain, no rashes      MEDICATIONS  (STANDING):  albuterol    90 MICROgram(s) HFA Inhaler 1 Puff(s) Inhalation every 4 hours  allopurinol 100 milliGRAM(s) Oral daily  atorvastatin 20 milliGRAM(s) Oral at bedtime  bethanechol 50 milliGRAM(s) Oral two times a day  doxazosin 2 milliGRAM(s) Oral at bedtime  folic acid 1 milliGRAM(s) Oral daily  gabapentin 300 milliGRAM(s) Oral two times a day  guaiFENesin  milliGRAM(s) Oral every 12 hours  hydrALAZINE 25 milliGRAM(s) Oral two times a day  metoprolol tartrate 25 milliGRAM(s) Oral two times a day  ondansetron Injectable 4 milliGRAM(s) IV Push once  piperacillin/tazobactam IVPB.. 3.375 Gram(s) IV Intermittent every 12 hours  polyethylene glycol 3350 17 Gram(s) Oral daily  senna 2 Tablet(s) Oral at bedtime  sodium chloride 0.9%. 1000 milliLiter(s) (50 mL/Hr) IV Continuous <Continuous>    Vital Signs Last 24 Hrs  T(C): 36.3 (2023 09:20), Max: 36.9 (2023 16:02)  T(F): 97.3 (2023 09:20), Max: 98.4 (2023 16:02)  HR: 75 (2023 09:20) (72 - 96)  BP: 151/55 (2023 09:20) (128/50 - 154/82)  BP(mean): --  RR: 18 (2023 09:20) (18 - 19)  SpO2: 97% (2023 09:20) (96% - 99%)    Parameters below as of 2023 09:20  Patient On (Oxygen Delivery Method): nasal cannula  O2 Flow (L/min): 2      PE:  Constitutional: frail looking  HEENT: NC/AT, EOMI, PERRLA, conjunctivae clear; ears and nose atraumatic; pharynx benign  Neck: supple; thyroid not palpable  Back: no tenderness  Respiratory: decreased breath sounds, rhonchi  Cardiovascular: S1S2 regular, no murmurs  Abdomen: soft, not tender, not distended, positive BS; liver and spleen WNL  Genitourinary: no suprapubic tenderness  Lymphatic: no LN palpable  Musculoskeletal: no muscle tenderness, no joint swelling or tenderness  Extremities: no pedal edema  Neurological/ Psychiatric:  moving all extremities  Skin: no rashes; no palpable lesions    Labs: all available labs reviewed                                              7.6    8.81  )-----------( 74       ( 2023 07:44 )             23.4     04-    137  |  112<H>  |  40<H>  ----------------------------<  153<H>  4.4   |  20<L>  |  1.68<H>    Ca    8.3<L>      2023 07:44    TPro  6.2  /  Alb  2.6<L>  /  TBili  0.6  /  DBili  x   /  AST  36  /  ALT  38  /  AlkPhos  60  -          Urinalysis Basic - ( 2023 10:59 )    Color: Yellow / Appearance: Slightly Turbid / S.010 / pH: x  Gluc: x / Ketone: Negative  / Bili: Negative / Urobili: Negative   Blood: x / Protein: 30 mg/dL / Nitrite: Negative   Leuk Esterase: Moderate / RBC: 3-5 /HPF / WBC 26-50 /HPF   Sq Epi: x / Non Sq Epi: Few / Bacteria: Many    Culture - Urine (23 @ 17:50)   - Vancomycin: S 2  - Tetracycline: S <=1  - Tobramycin: S <=2  - Trimethoprim/Sulfamethoxazole: S <=0.5/9.5  - Levofloxacin: S <=0.5  - Levofloxacin: S 1  - Meropenem: S <=1  - Nitrofurantoin: S <=32 Should not be used to treat pyelonephritis  - Nitrofurantoin: S <=32 Should not be used to treat pyelonephritis.  - Piperacillin/Tazobactam: S <=8  - Cefazolin: S <=2  - Cefepime: S <=2  - Cefoxitin: S <=8  - Ceftriaxone: S <=1 Enterobacter, Klebsiella aerogenes, Citrobacter, and Serratia may develop resistance during prolonged therapy  - Ciprofloxacin: S <=0.25  - Ciprofloxacin: S <=1  - Ertapenem: S <=0.5  - Gentamicin: S <=2  - Imipenem: S <=1  - Amikacin: S <=16  - Amoxicillin/Clavulanic Acid: S <=8/4  - Ampicillin: R >16 These ampicillin results predict results for amoxicillin  - Ampicillin: S <=2 Predicts results to ampicillin/sulbactam, amoxacillin-clavulanate and piperacillin-tazobactam.  - Ampicillin/Sulbactam: S 8/4 Enterobacter, Klebsiella aerogenes, Citrobacter, and Serratia may develop resistance during prolonged therapy (3-4 days)  - Aztreonam: S <=4  Specimen Source: Clean Catch None  Culture Results:   >100,000 CFU/ml Klebsiella oxytoca/Raoultella ornithinolytica   >100,000 CFU/ml Enterococcus faecalis  Organism Identification: Klebsiella oxytoca /Raoutella ornithinolytica       Radiology: all available radiological tests reviewed      ACC: [id number] EXAM:  XR CHEST PORTABLE URGENT 1V   ORDERED BY: DIRK BAUER     PROCEDURE DATE:  2023          INTERPRETATION:  Portable chest radiograph    CLINICAL INFORMATION: Fever    TECHNIQUE:  Portable  AP chest radiograph.    COMPARISON: 3/31/2023 chest x-ray .    FINDINGS:  CATHETERS AND TUBES: None    PULMONARY: Bilateral perihilar/bibasilar diffuse airspace disease.  Apices clear. No pneumothorax.    HEART/VASCULAR: The heart and mediastinum size and configuration are   within normal limits.    BONES: Visualized osseous thorax intact.    IMPRESSION:    Bilateral perihilar/basilar diffuse airspace disease..    < from: CT Chest No Cont (23 @ 15:54) >  ACC: [id number] EXAM:  CT CHEST   ORDERED BY: FELECIA YOU     PROCEDURE DATE:  2023          INTERPRETATION:  INDICATION: Shortness of breath    TECHNIQUE: Volumetric images of the chest without intravenous contrast.   Maximum intensity projection images were generated.    COMPARISON: CT chest 3/5/2023.    FINDINGS:    LUNGS/AIRWAYS/PLEURA: Slitlike narrowing of the AP diameter of the   trachea due to exhalation which can be seen with tracheomalacia. Mosaic   attenuation in both lungsfrom air trapping. New consolidation adjacent   to atelectasis in the right lower lobe, as well as in the right upper   lobe at the apex and adjacent to the oblique fissure. Interlobular septal   thickening in both lungs. Small right and trace left pleural effusions   and secondary passive atelectasis of both lower lobes.    LYMPH NODES/MEDIASTINUM: Unchanged mediastinal lymph nodes, largest 1 cm   in the prevascular space.    HEART/VASCULATURE: Normal heart size. No pericardial effusion. Calcified   aortic valve. Coronary artery calcifications.    UPPER ABDOMEN: Cholelithiasis. Medially displaced intimal calcification   of the infrarenal aorta, likely chronic. Small hypodensity arising from   the left kidney, likely a cyst.    BONES/SOFT TISSUES: Unremarkable.      IMPRESSION:    Since 3/5/2023:    New consolidation in the right upper and right lower lobes, favor   pneumonia in the appropriate clinical setting.    New small right and trace left pleural effusions, and mild pulmonary   edema.      Advanced directives addressed: full resuscitation

## 2023-04-06 NOTE — CONSULT NOTE ADULT - ASSESSMENT
89 yo female with hx of HFrEF, HTN, UTI, COVID in March, and chronic pérez. Now returning to  with c/o AMS , hypoxia c/w PNA    ANDREW on CKD 3b - baseline Cr 1.5 -1 .6    prerenal    improved with IVF - now back to baseline    monitor off IVF - encourage po    trend renal labs   avoid IV contrast, NSAID    no ace or arb   renal dose meds   Chronic Pérez - monitor UOP     Anemia/thrombocytopenia   Heme fup    PRBC as per Medicine/Heme   consider INEZ     * pt seen earlier , note now    Thank you for the courtesy of this consult. We will follow this patient with you.   Management is subject to change if new information becomes available or patient condition changes.

## 2023-04-06 NOTE — CDI QUERY NOTE - [id number]_GEN_ALL_CORE_FT
Clinical documentation indicates that this patient has CHF.  Please include more specific documentation of the acuity and, type  of Heart Failure in your Progress Note and/or Discharge Summary.    -Acute ___ CHF   -Acute on chronic ___ CHF  -Chronic ___ CHF  -Right Heart Failure  -Biventricular heart Failure (include the type-systolic/diastolic also)  -Other (please specify)  -Not clinically significant      SUPPORTING DOCUMENTATION AND/OR CLINICAL EVIDENCE:    Cardiology documentation on 4/6/2023:  Patient is a 89 yo F, well known to me, BIBEMS from home with c/o fever, starting this morning. EMS reports pt's daughter gave pt 650mg of Tylenol @ 0430 with a fever TMAX 103.  She was also recently admitted with ? asp. PNA on 3/20/23 and was COVID (+) at that time.  Pt now found to be hypoxic in triage sating 84% and placed on 6LNC sating 97%. Pt denies chest pain and SOB. Pt is A&OX3 and reports hx of frequent UTI's. EMS reports . Oral temp 99.5 and patient feels very warm to touch.  She was recently admitted with syncope and UTI and was COVID (+) still on 3/31/23.  She has known severe AS by echo with HFpEF (LVEF=65-70%) (see report below).  She seen in the ER with CXR suggesting bilateral lower lobe PNA.  On O2 she is feeling better.  She has no anginal chest pains or recurrent syncope so far.        ECHO RESULTS:  < from: TTE Echo Complete w/o Contrast w/ Doppler (03.01.23 @ 11:37) >   Estimated left ventricular ejection fraction is 65-70 %.    BNP:  Pro-Brain Natriuretic Peptide: 1886 pg/mL (04.04.23 @ 07:54)       MEDICATIONS:  Medications Cardiovascular (MAR):   doxazosin 2 milliGRAM(s) Oral at bedtime  hydrALAZINE 25 milliGRAM(s) Oral two times a day  metoprolol tartrate 25 milliGRAM(s) Oral two times a day

## 2023-04-06 NOTE — PROVIDER CONTACT NOTE (OTHER) - SITUATION
Patient rectal temp 95.5 F. Patient states she feels cold and hot, sweat throughout night. Other VSS.

## 2023-04-06 NOTE — CONSULT NOTE ADULT - SUBJECTIVE AND OBJECTIVE BOX
Reason for consult: Thrombocytopenia    HPI:  89 y/o F with PMH severe AS, HFrEF, HTN, HLD, frequent UTIs with urinary retention and chronic pérez, gout, arthritis, aspiration  PNA 3/2023 BIBA  presented with fever and confusion with presumed UTI. As per Pts daughter, she has had multiple hospitalizations over the last few months with recurrent UTI and she was thought to have another UTI and started on macrobid. Her symptoms worsened and she was brought to the hospital. She has been in the hospital for approximately 2 days and has been having a worsening platelet count.      PAST MEDICAL & SURGICAL HISTORY:  HTN (hypertension)      HLD (hyperlipidemia)      Gout      Osteoarthritis      History of tonsillectomy  in childhood          FAMILY HISTORY:  Family history of hypertension (Father, Mother)        Alochol: Denied  Smoking: Nonsmoker  Drug Use: Denied  Marital Status:         Allergies    Ceftin (Hives; Rash)    Intolerances        MEDICATIONS  (STANDING):  albuterol    90 MICROgram(s) HFA Inhaler 1 Puff(s) Inhalation every 4 hours  allopurinol 100 milliGRAM(s) Oral daily  atorvastatin 20 milliGRAM(s) Oral at bedtime  bethanechol 50 milliGRAM(s) Oral two times a day  doxazosin 2 milliGRAM(s) Oral at bedtime  folic acid 1 milliGRAM(s) Oral daily  furosemide   Injectable 20 milliGRAM(s) IV Push once  gabapentin 300 milliGRAM(s) Oral two times a day  guaiFENesin  milliGRAM(s) Oral every 12 hours  hydrALAZINE 25 milliGRAM(s) Oral two times a day  metoprolol tartrate 25 milliGRAM(s) Oral two times a day  ondansetron Injectable 4 milliGRAM(s) IV Push once  piperacillin/tazobactam IVPB.. 3.375 Gram(s) IV Intermittent every 12 hours  polyethylene glycol 3350 17 Gram(s) Oral daily  senna 2 Tablet(s) Oral at bedtime  sodium chloride 0.9%. 1000 milliLiter(s) (50 mL/Hr) IV Continuous <Continuous>    MEDICATIONS  (PRN):  acetaminophen     Tablet .. 650 milliGRAM(s) Oral every 6 hours PRN Temp greater or equal to 38C (100.4F), Mild Pain (1 - 3)  albuterol    0.083% 2.5 milliGRAM(s) Nebulizer every 6 hours PRN Shortness of Breath and/or Wheezing  aluminum hydroxide/magnesium hydroxide/simethicone Suspension 30 milliLiter(s) Oral every 4 hours PRN Dyspepsia  benzonatate 100 milliGRAM(s) Oral every 8 hours PRN Cough  melatonin 3 milliGRAM(s) Oral at bedtime PRN Insomnia      ROS  + fever  no cp  + chills  + bruising       T(C): 36.5 (04-06-23 @ 13:28), Max: 36.9 (04-05-23 @ 16:02)  HR: 63 (04-06-23 @ 11:14) (63 - 96)  BP: 134/57 (04-06-23 @ 11:14) (128/50 - 154/82)  RR: 18 (04-06-23 @ 09:20) (18 - 19)  SpO2: 97% (04-06-23 @ 09:20) (96% - 99%)  Wt(kg): --    PE  Awake, alert, + cooling blanket  Anicteric, MMM  RRR  CTAB  Abd soft, NT, ND  No c/c/e  Skin + petechiae  No rash grossly  FROM                          7.6    8.81  )-----------( 74       ( 06 Apr 2023 07:44 )             23.4       04-06    137  |  112<H>  |  40<H>  ----------------------------<  153<H>  4.4   |  20<L>  |  1.68<H>    Ca    8.3<L>      06 Apr 2023 07:44    TPro  6.2  /  Alb  2.6<L>  /  TBili  0.6  /  DBili  x   /  AST  36  /  ALT  38  /  AlkPhos  60  04-06

## 2023-04-06 NOTE — PROGRESS NOTE ADULT - SUBJECTIVE AND OBJECTIVE BOX
CHIEF COMPLAINT:  Patient is a 89y old  Female who presents with a chief complaint of     HPI: 23:    Patient is a 89 yo F, well known to me, BIBEMS from home with c/o fever, starting this morning. EMS reports pt's daughter gave pt 650mg of Tylenol @ 0430 with a fever TMAX 103.  She was also recently admitted with ? asp. PNA on 3/20/23 and was COVID (+) at that time.  Pt now found to be hypoxic in triage sating 84% and placed on 6LNC sating 97%. Pt denies chest pain and SOB. Pt is A&OX3 and reports hx of frequent UTI's. EMS reports . Oral temp 99.5 and patient feels very warm to touch.  She was recently admitted with syncope and UTI and was COVID (+) still on 3/31/23.  She has known severe AS by echo with HFpEF (LVEF=65-70%) (see report below).  She seen in the ER with CXR suggesting bilateral lower lobe PNA.  On O2 she is feeling better.  She has no anginal chest pains or recurrent syncope so far.      23:  Feeling slightly better already without increased SOB, clinical CHF or anginal chest pains.  No palpitations or recurrent syncope.  Tolerating current treatments and antibiotics.  Awaiting formal culture reports.      23:  Temp 95.5 this AM and heating blanket in place.  Given a rectal suppository for constipation yesterday.  Creatinine up yesterday and gently hydrated.  Awaiting AM labs.  No increased SOB bugt still has cough but slightly better.  No new cardiac symptoms.        PMHx:  PAST MEDICAL & SURGICAL HISTORY:  Severe AS  HTN (hypertension)  HLD (hyperlipidemia)  Gout  Osteoarthritis  History of tonsillectomy in childhood      FAMILY HISTORY:   FAMILY HISTORY:  Family history of hypertension (Father, Mother)      ALLERGIES:  Allergies  Ceftin (Hives; Rash)      REVIEW OF SYSTEMS:  10 point ROS was obtained  Pertinent positives and negatives are as above  All other review of systems is negative unless indicated above      Vital Signs Last 24 Hrs  T(C): 35.3 (2023 06:40), Max: 36.9 (2023 09:40)  T(F): 95.5 (2023 06:40), Max: 98.4 (2023 09:40)  HR: 73 (2023 06:40) (73 - 96)  BP: 128/50 (2023 06:40) (111/47 - 154/82)  BP(mean): 67 (2023 09:40) (67 - 67)  RR: 18 (2023 06:40) (18 - 19)  SpO2: 97% (2023 06:40) (95% - 99%): nasal cannula  O2 Flow (L/min): 4      PHYSICAL EXAM:   Constitutional: NAD, awake and alert, well-developed  HEENT: PERR, EOMI, Normal Hearing, MMM  Neck: Soft and supple, No LAD, No JVD  Respiratory: Breath sounds scattered rhonchi at bases bilaterally, No rales  Cardiovascular: S1 and S2, regular rate and rhythm, harsh LIANA at base and LLSB as before, (+) S4; no S3 gallop or rubs  Gastrointestinal: Bowel Sounds present, soft, nontender, nondistended, no guarding, no rebound  Extremities: No peripheral edema  Vascular: 2+ peripheral pulses  Neurological: no focal deficits  Skin: No rashes      MEDICATIONS  (STANDING):  albuterol    90 MICROgram(s) HFA Inhaler 1 Puff(s) Inhalation every 4 hours  allopurinol 100 milliGRAM(s) Oral daily  atorvastatin 20 milliGRAM(s) Oral at bedtime  bethanechol 50 milliGRAM(s) Oral two times a day  doxazosin 2 milliGRAM(s) Oral at bedtime  folic acid 1 milliGRAM(s) Oral daily  gabapentin 300 milliGRAM(s) Oral two times a day  guaiFENesin  milliGRAM(s) Oral every 12 hours  hydrALAZINE 25 milliGRAM(s) Oral two times a day  metoprolol tartrate 25 milliGRAM(s) Oral two times a day  ondansetron Injectable 4 milliGRAM(s) IV Push once  piperacillin/tazobactam IVPB.. 3.375 Gram(s) IV Intermittent every 12 hours  polyethylene glycol 3350 17 Gram(s) Oral daily  senna 2 Tablet(s) Oral at bedtime  sodium chloride 0.9%. 1000 milliLiter(s) (50 mL/Hr) IV Continuous <Continuous>    MEDICATIONS  (PRN):  acetaminophen     Tablet .. 650 milliGRAM(s) Oral every 6 hours PRN Temp greater or equal to 38C (100.4F), Mild Pain (1 - 3)  albuterol    0.083% 2.5 milliGRAM(s) Nebulizer every 6 hours PRN Shortness of Breath and/or Wheezing  aluminum hydroxide/magnesium hydroxide/simethicone Suspension 30 milliLiter(s) Oral every 4 hours PRN Dyspepsia  benzonatate 100 milliGRAM(s) Oral every 8 hours PRN Cough  melatonin 3 milliGRAM(s) Oral at bedtime PRN Insomnia      LABS: All Labs Reviewed:                        8.0    12.70 )-----------( 68       ( 2023 10:35 )             24.5                           8.4    11.91 )-----------( 75       ( 2023 07:54 )             25.7       04-05    135  |  107  |  40<H>  ----------------------------<  156<H>  3.8   |  20<L>  |  2.16<H>    Ca    8.0<L>      2023 10:35    TPro  6.7  /  Alb  3.1<L>  /  TBili  0.5  /  DBili  x   /  AST  21  /  ALT  17  /  AlkPhos  51  04-04      04-04    135  |  108  |  26<H>  ----------------------------<  118<H>  3.8   |  21<L>  |  1.58<H>    Ca    8.5      2023 07:54    TPro  6.7  /  Alb  3.1<L>  /  TBili  0.5  /  DBili  x   /  AST  21  /  ALT  17  /  AlkPhos  51  04-04    Pro-Brain Natriuretic Peptide (23 @ 07:54): 1886 pg/mL    Troponin I, High Sensitivity (23 @ 07:54): 14.73    CULTURES:   Gram Stain Urine -- Gram Stain --  Specimen Source Clean Catch None  Culture-Urine --Organism Klebsiella oxytoca /Raoutella ornithinolytica>100,000 CFU/ml Enterococcus faecalis (23 @ 17:50)       LIPID PROFILE (23 @ 07:01)   Cholesterol, Serum: 116 mg/dL  Triglycerides, Serum: 64 mg/dL  HDL Cholesterol, Serum: 67 mg/dL  Non HDL Cholesterol: 49      RADIOLOGY:    CT of Chest: 23:  FINDINGS:  LUNGS/AIRWAYS/PLEURA: Slitlike narrowing of the AP diameter of the trachea due to exhalation which can be seen with tracheomalacia. Mosaic attenuation in both lungsfrom air trapping. New consolidation adjacent to atelectasis in the right lower lobe, as well as in the right upper lobe at the apex and adjacent to the oblique fissure. Interlobular septal thickening in both lungs. Small right and trace left pleural effusions and secondary passive atelectasis of both lower lobes.  LYMPH NODES/MEDIASTINUM: Unchanged mediastinal lymph nodes, largest 1 cm in the prevascular space.  HEART/VASCULATURE: Normal heart size. No pericardial effusion. Calcified aortic valve. Coronary artery calcifications.  UPPER ABDOMEN: Cholelithiasis. Medially displaced intimal calcification of the infrarenal aorta, likely chronic. Small hypodensity arising from the left kidney, likely a cyst.  BONES/SOFT TISSUES: Unremarkable.  IMPRESSION: Since 3/5/2023:  New consolidation in the right upper and right lower lobes, favor pneumonia in the appropriate clinical setting.  New small right and trace left pleural effusions, and mild pulmonary edema.      CXR: 23:  FINDINGS:  CATHETERS AND TUBES: None  PULMONARY: Bilateral perihilar/bibasilar diffuse airspace disease. Apices clear. No pneumothorax.  HEART/VASCULAR: The heart and mediastinum size and configuration are within normal limits.  BONES: Visualized osseous thorax intact.  IMPRESSION:  Bilateral perihilar/basilar diffuse airspace disease..    CXR: 3/31/23:  INTERPRETATION:  AP chest on 2023 at 2:17 PM. Patient had a syncopal episode.  Heart magnified by technique.  There is an increasing small infiltrate off the right lower hilum compared to  of this year.  IMPRESSION: Increasing right lower perihilar infiltrate.      EK23:  Normal sinus rhythm  Possible Left atrial enlargement  Left ventricular hypertrophy      TELEMETRY:  NSR      ECHO:  3/1/23:  M-Mode Measurements (cm)   LVEDd: 3.97 cm            LVESd: 2.55 cm   IVSEd: 1.1 cm   LVPWd: 1.1 cm             AO Root Dimension: 2.8 cm                             LA: 3.5 cm                LVOT: 2 cm  Doppler Measurements:   AV Velocity:434 cm/s                  MV Peak E-Wave: 98.7 cm/s   AV Peak Gradient: 75.34 mmHg          MV Peak A-Wave: 131 cm/s   AV Mean Gradient: 40 mmHg             MV E/A Ratio: 0.75 %   AV Area (Continuity):0.85 cm^2        MV Peak Gradient: 3.9 mmHg   TR Velocity:190 cm/s   TR Gradient:14.44 mmHg   Estimated RAP:5 mmHg   RVSP:27 mmHg    Findings  Mitral Valve   The mitral valve leaflets appear thickened.   EA reversal of the mitral inflow consistent with reduced compliance of the left ventricle.   Mild mitral annular calcification is present.   Mild mitral regurgitation is present.    Aortic Valve   Peak and mean transaortic gradients are 75 and 40mmHg respectively; this finding is consistent with severe aortic stenosis.   Mild (1+) aortic regurgitation is present.    Tricuspid Valve   Trace tricuspid valve regurgitation is present.    Pulmonic Valve   Mild pulmonic valvular regurgitation (1+) is present.    Left Atrium   Normal appearing left atrium.    Left Ventricle   Mild concentric left ventricular hypertrophy is present.   The left ventricle is normal in size.   Estimated left ventricular ejection fraction is 65-70 %.    Right Atrium   Normal appearing right atrium.    Right Ventricle   Normal appearing right ventricle structure and function.    Pericardial Effusion   No evidence of pericardial effusion.    Pleural Effusion   No evidence of pleural effusion.    Miscellaneous   The IVC appears normal.    Summary   The mitral valve leaflets appear thickened.   EA reversal of the mitral inflow consistent with reduced compliance of the left ventricle.   Mild mitral annular calcification is present.   Mild mitral regurgitation is present.   Peak and mean transaortic gradients are 75 and 40mmHg respectively; this finding is consistent with severe aortic stenosis.   Mild (1+) aortic regurgitation is present.   Trace tricuspid valve regurgitation is present.   Mild pulmonic valvular regurgitation (1+) is present.   Normal appearing left atrium.   Mild concentric left ventricular hypertrophy is present.   The left ventricle is normal in size.   Estimated left ventricular ejection fraction is 65-70 %.   Normal appearing right atrium.   Normal appearing right ventricle structure and function.   The IVC appears normal.   No evidence of pericardial effusion.   No evidence of pleural effusion.    Signature   ----------------------------------------------------------------   Electronically signed by Sakina Cheatham MD, Director of   Cardiac Cath Lab(Interpreting physician) on 2023 06:42   PM   ----------------------------------------------------------------

## 2023-04-06 NOTE — CONSULT NOTE ADULT - ASSESSMENT
89 y/o F with extensive PMH of AS, HrEF, HTN, multiple hospitalizations over the last few months for recurrent UTI, admitted with b/l PNA, CHF, and found to have thrombocytopenia and anemia.    1) Thrombocytopenia  -Suspect likely from infection and antibiotics  -NANNETTE ab sent, will f/u results  -Check fibrinogen, b12, folate  -On previous admission in Jan her plt count also did drop to 70K, would observe for now with no additional treatment    2) Anemia  -Has reported history of anemia of CKD  -Would check iron panel, b12, folate  -Agree with PRBC transfusion, would aim for higher PRBC goal given hx of cardiac disease    Will cont to follow with you.    Elvis Mendes MD  [facility]  Cell: [phone]     89 y/o F with extensive PMH of AS, HrEF, HTN, multiple hospitalizations over the last few months for recurrent UTI, admitted with b/l PNA and found to have thrombocytopenia and anemia.    1) Thrombocytopenia  -Suspect likely from infection and antibiotics  -NANNETTE ab sent, will f/u results  -Check fibrinogen, b12, folate  -On previous admission in Jan her plt count also did drop to 70K, would observe for now with no additional treatment    2) Anemia  -Has reported history of anemia of CKD  -Would check iron panel, b12, folate  -Agree with PRBC transfusion, would aim for higher PRBC goal given hx of cardiac disease    Will cont to follow with you.    Elvis Mendes MD  [facility]  Cell: [phone]

## 2023-04-06 NOTE — PROGRESS NOTE ADULT - SUBJECTIVE AND OBJECTIVE BOX
89 y/o F with PMH severe AS, HFrEF, HTN, HLD, frequent UTIs with urinary retention and chronic pérez, gout, arthritis, aspiration  PNA 3/2023 BIBA to ED for fever TMax 103F, confusion. As per Pts daughter, she was told that last night Pt was confused and was coughing,  febrile, also had episode of vomiting. Pt is awake, known in the hospital, reports currently no SOB, but feels very weak and sleepy. Pt c/o feeling cold, body aches, HA.  Pt denies CP, no abd pain.  As per daughter was started  on Macrobid by PCP.   In ED: Was hypoxic in 80s, stable on NC, rest VS are stable. CXR with b/l infiltrates. Pt was given IV Zosyn and VAnco     4/5 feels better, less sob , comfortable at rest , has cough   4/6 had BM after suppostirory last night, hypothermic 95, requiring warming blanket , now temp 97, awake, comfortable at rest, daughter at bedside, consented to PRBC     PHYSICAL EXAM:  General: Well developed; frail elderly; in no acute distress  Eyes:  EOMI; conjunctiva and sclera clear  Head: Normocephalic; atraumatic  ENMT: No nasal discharge; airway clear, dry oral mucosa  Respiratory: Decreased BS at bases b/l, with R crackles  No wheezes  Cardiovascular: Regular rate and rhythm. S1 and S2 Normal; +  murmur  Gastrointestinal: Soft non-tender non-distended; Normal bowel sounds  Genitourinary: No  suprapubic  tenderness, pérez in place   Extremities: No edema  Neurological: Alert and oriented x2-3, mildly encephalopathic, non focal   Musculoskeletal: Normal muscle tone, without deformities      PHYSICAL EXAM:    Daily     Daily     ICU Vital Signs Last 24 Hrs  T(C): 36.2 (06 Apr 2023 11:14), Max: 36.9 (05 Apr 2023 16:02)  T(F): 97.2 (06 Apr 2023 11:14), Max: 98.4 (05 Apr 2023 16:02)  HR: 63 (06 Apr 2023 11:14) (63 - 96)  BP: 134/57 (06 Apr 2023 11:14) (128/50 - 154/82)  BP(mean): --  ABP: --  ABP(mean): --  RR: 18 (06 Apr 2023 09:20) (18 - 19)  SpO2: 97% (06 Apr 2023 09:20) (96% - 99%)    O2 Parameters below as of 06 Apr 2023 09:20  Patient On (Oxygen Delivery Method): nasal cannula  O2 Flow (L/min): 2                                7.6    8.81  )-----------( 74       ( 06 Apr 2023 07:44 )             23.4       CBC Full  -  ( 06 Apr 2023 07:44 )  WBC Count : 8.81 K/uL  RBC Count : 2.51 M/uL  Hemoglobin : 7.6 g/dL  Hematocrit : 23.4 %  Platelet Count - Automated : 74 K/uL  Mean Cell Volume : 93.2 fl  Mean Cell Hemoglobin : 30.3 pg  Mean Cell Hemoglobin Concentration : 32.5 gm/dL  Auto Neutrophil # : 8.19 K/uL  Auto Lymphocyte # : 0.44 K/uL  Auto Monocyte # : 0.18 K/uL  Auto Eosinophil # : 0.00 K/uL  Auto Basophil # : 0.00 K/uL  Auto Neutrophil % : 93.0 %  Auto Lymphocyte % : 5.0 %  Auto Monocyte % : 2.0 %  Auto Eosinophil % : 0.0 %  Auto Basophil % : 0.0 %      04-06    137  |  112<H>  |  40<H>  ----------------------------<  153<H>  4.4   |  20<L>  |  1.68<H>    Ca    8.3<L>      06 Apr 2023 07:44    TPro  6.2  /  Alb  2.6<L>  /  TBili  0.6  /  DBili  x   /  AST  36  /  ALT  38  /  AlkPhos  60  04-06      LIVER FUNCTIONS - ( 06 Apr 2023 07:44 )  Alb: 2.6 g/dL / Pro: 6.2 gm/dL / ALK PHOS: 60 U/L / ALT: 38 U/L / AST: 36 U/L / GGT: x                               MEDICATIONS  (STANDING):  albuterol    90 MICROgram(s) HFA Inhaler 1 Puff(s) Inhalation every 4 hours  allopurinol 100 milliGRAM(s) Oral daily  atorvastatin 20 milliGRAM(s) Oral at bedtime  bethanechol 50 milliGRAM(s) Oral two times a day  doxazosin 2 milliGRAM(s) Oral at bedtime  folic acid 1 milliGRAM(s) Oral daily  gabapentin 300 milliGRAM(s) Oral two times a day  guaiFENesin  milliGRAM(s) Oral every 12 hours  hydrALAZINE 25 milliGRAM(s) Oral two times a day  metoprolol tartrate 25 milliGRAM(s) Oral two times a day  ondansetron Injectable 4 milliGRAM(s) IV Push once  piperacillin/tazobactam IVPB.. 3.375 Gram(s) IV Intermittent every 12 hours  polyethylene glycol 3350 17 Gram(s) Oral daily  senna 2 Tablet(s) Oral at bedtime  sodium chloride 0.9%. 1000 milliLiter(s) (50 mL/Hr) IV Continuous <Continuous>

## 2023-04-06 NOTE — PROGRESS NOTE ADULT - ASSESSMENT
89 y/o F with PMH severe AS, HFrEF, HTN on metoprolol, amlodipine, hydralazine, HLD, frequent UTIs with urinary retention and chronic pérez, gout, arthritis, hx pelvic fx, recent covid/?asp PNA 3/2023 BIBA to ED for fever TMax 103F. Pt seen in  ED 3/31 for syncope, was admitted and discharged 4/2. Pt started on macrobid for UTI by Dr. Waldrop 4/3, took 2 doses. Daughter noticed fever at 4am, took 1G tylenol 4:30am. O2 84% in triage, started on 6L NC, now at 97%. Pt denies CP or SOB at this time. No headache, dizziness, LOC, N/V/D, abdominal pain, ext numbness/weakness.  Here imaging shows b/l pneumonia on xray, UA positive, noted with vomiting episodes in ER, daughter at bedside, was given dose of vanocmycin/zosyn.    1. Fever. UTI with ENFA/KLOX. Aspiration pneumonia. Recent Covid-19 viral syndrome. ANDREW  - imaging and prior chart/cultures reviewed  - CT chest shows new consolidation in R upper and R lower lobes   - 3/31 urine cx grew ENFA/KLOX - sensitivities noted  - on zosyn [id number] #3, if Crcl > 20, can increase zosyn dose to q8h  - continue with antibiotic coverage   - aspiration precautions  - fu cbc  - covid not active has had prior in march  - speech/swallow eval noted   - monitor temps  - tolerating abx well so far; no side effects noted  - reason for abx use and side effects reviewed with patient  - supportive care  - fu cbc    2. other issues - care per medicine

## 2023-04-07 LAB
ANION GAP SERPL CALC-SCNC: 7 MMOL/L — SIGNIFICANT CHANGE UP (ref 5–17)
BASOPHILS # BLD AUTO: 0.02 K/UL — SIGNIFICANT CHANGE UP (ref 0–0.2)
BASOPHILS NFR BLD AUTO: 0.2 % — SIGNIFICANT CHANGE UP (ref 0–2)
BUN SERPL-MCNC: 37 MG/DL — HIGH (ref 7–23)
CALCIUM SERPL-MCNC: 8.7 MG/DL — SIGNIFICANT CHANGE UP (ref 8.5–10.1)
CHLORIDE SERPL-SCNC: 111 MMOL/L — HIGH (ref 96–108)
CO2 SERPL-SCNC: 21 MMOL/L — LOW (ref 22–31)
CREAT SERPL-MCNC: 1.46 MG/DL — HIGH (ref 0.5–1.3)
EGFR: 33 ML/MIN/1.73M2 — LOW
EOSINOPHIL # BLD AUTO: 0.07 K/UL — SIGNIFICANT CHANGE UP (ref 0–0.5)
EOSINOPHIL NFR BLD AUTO: 0.7 % — SIGNIFICANT CHANGE UP (ref 0–6)
FOLATE SERPL-MCNC: >20 NG/ML — SIGNIFICANT CHANGE UP
GLUCOSE SERPL-MCNC: 102 MG/DL — HIGH (ref 70–99)
HCT VFR BLD CALC: 29.6 % — LOW (ref 34.5–45)
HGB BLD-MCNC: 9.8 G/DL — LOW (ref 11.5–15.5)
IMM GRANULOCYTES NFR BLD AUTO: 2.1 % — HIGH (ref 0–0.9)
LYMPHOCYTES # BLD AUTO: 1 K/UL — SIGNIFICANT CHANGE UP (ref 1–3.3)
LYMPHOCYTES # BLD AUTO: 10.1 % — LOW (ref 13–44)
MCHC RBC-ENTMCNC: 30.1 PG — SIGNIFICANT CHANGE UP (ref 27–34)
MCHC RBC-ENTMCNC: 33.1 GM/DL — SIGNIFICANT CHANGE UP (ref 32–36)
MCV RBC AUTO: 90.8 FL — SIGNIFICANT CHANGE UP (ref 80–100)
MONOCYTES # BLD AUTO: 0.54 K/UL — SIGNIFICANT CHANGE UP (ref 0–0.9)
MONOCYTES NFR BLD AUTO: 5.4 % — SIGNIFICANT CHANGE UP (ref 2–14)
NEUTROPHILS # BLD AUTO: 8.08 K/UL — HIGH (ref 1.8–7.4)
NEUTROPHILS NFR BLD AUTO: 81.5 % — HIGH (ref 43–77)
PLATELET # BLD AUTO: 92 K/UL — LOW (ref 150–400)
POTASSIUM SERPL-MCNC: 4.1 MMOL/L — SIGNIFICANT CHANGE UP (ref 3.5–5.3)
POTASSIUM SERPL-SCNC: 4.1 MMOL/L — SIGNIFICANT CHANGE UP (ref 3.5–5.3)
RBC # BLD: 3.26 M/UL — LOW (ref 3.8–5.2)
RBC # FLD: 16.5 % — HIGH (ref 10.3–14.5)
SODIUM SERPL-SCNC: 139 MMOL/L — SIGNIFICANT CHANGE UP (ref 135–145)
VIT B12 SERPL-MCNC: 506 PG/ML — SIGNIFICANT CHANGE UP (ref 232–1245)
WBC # BLD: 9.92 K/UL — SIGNIFICANT CHANGE UP (ref 3.8–10.5)
WBC # FLD AUTO: 9.92 K/UL — SIGNIFICANT CHANGE UP (ref 3.8–10.5)

## 2023-04-07 PROCEDURE — 99221 1ST HOSP IP/OBS SF/LOW 40: CPT

## 2023-04-07 PROCEDURE — 99232 SBSQ HOSP IP/OBS MODERATE 35: CPT

## 2023-04-07 RX ORDER — LIDOCAINE 4 G/100G
1 CREAM TOPICAL DAILY
Refills: 0 | Status: DISCONTINUED | OUTPATIENT
Start: 2023-04-07 | End: 2023-04-11

## 2023-04-07 RX ORDER — PANTOPRAZOLE SODIUM 20 MG/1
40 TABLET, DELAYED RELEASE ORAL
Refills: 0 | Status: DISCONTINUED | OUTPATIENT
Start: 2023-04-07 | End: 2023-04-11

## 2023-04-07 RX ORDER — PIPERACILLIN AND TAZOBACTAM 4; .5 G/20ML; G/20ML
3.38 INJECTION, POWDER, LYOPHILIZED, FOR SOLUTION INTRAVENOUS EVERY 8 HOURS
Refills: 0 | Status: DISCONTINUED | OUTPATIENT
Start: 2023-04-07 | End: 2023-04-11

## 2023-04-07 RX ADMIN — SENNA PLUS 2 TABLET(S): 8.6 TABLET ORAL at 21:47

## 2023-04-07 RX ADMIN — Medication 50 MILLIGRAM(S): at 09:09

## 2023-04-07 RX ADMIN — ATORVASTATIN CALCIUM 20 MILLIGRAM(S): 80 TABLET, FILM COATED ORAL at 21:48

## 2023-04-07 RX ADMIN — Medication 600 MILLIGRAM(S): at 21:47

## 2023-04-07 RX ADMIN — Medication 3 MILLIGRAM(S): at 21:47

## 2023-04-07 RX ADMIN — Medication 25 MILLIGRAM(S): at 09:10

## 2023-04-07 RX ADMIN — PIPERACILLIN AND TAZOBACTAM 25 GRAM(S): 4; .5 INJECTION, POWDER, LYOPHILIZED, FOR SOLUTION INTRAVENOUS at 15:58

## 2023-04-07 RX ADMIN — Medication 50 MILLIGRAM(S): at 21:48

## 2023-04-07 RX ADMIN — PIPERACILLIN AND TAZOBACTAM 25 GRAM(S): 4; .5 INJECTION, POWDER, LYOPHILIZED, FOR SOLUTION INTRAVENOUS at 23:10

## 2023-04-07 RX ADMIN — POLYETHYLENE GLYCOL 3350 17 GRAM(S): 17 POWDER, FOR SOLUTION ORAL at 09:09

## 2023-04-07 RX ADMIN — PIPERACILLIN AND TAZOBACTAM 25 GRAM(S): 4; .5 INJECTION, POWDER, LYOPHILIZED, FOR SOLUTION INTRAVENOUS at 09:09

## 2023-04-07 RX ADMIN — Medication 100 MILLIGRAM(S): at 21:48

## 2023-04-07 RX ADMIN — Medication 650 MILLIGRAM(S): at 20:03

## 2023-04-07 RX ADMIN — Medication 1 MILLIGRAM(S): at 09:10

## 2023-04-07 RX ADMIN — Medication 2 MILLIGRAM(S): at 21:47

## 2023-04-07 RX ADMIN — Medication 100 MILLIGRAM(S): at 09:10

## 2023-04-07 RX ADMIN — Medication 25 MILLIGRAM(S): at 21:48

## 2023-04-07 RX ADMIN — GABAPENTIN 300 MILLIGRAM(S): 400 CAPSULE ORAL at 09:10

## 2023-04-07 RX ADMIN — GABAPENTIN 300 MILLIGRAM(S): 400 CAPSULE ORAL at 21:47

## 2023-04-07 RX ADMIN — Medication 600 MILLIGRAM(S): at 09:19

## 2023-04-07 NOTE — PROGRESS NOTE ADULT - ASSESSMENT
{\rtf1\wyvxyi69746\ansi\fahxjti3070\ftnbj\uc1\deff0  {\fonttbl{\f0 \fnil Segoe UI;}{\f1 \fnil \fcharset0 Segoe UI;}{\f2 \fnil Times New Sadi;}}  {\colortbl ;\wbt469\mljym313\zyhy068 ;\red0\green0\blue0 ;\red0\green0\blue0 ;}  {\stylesheet{\f0\fs20 Normal;}{\cs1 Default Paragraph Font;}{\cs2\f0\fs16 Line Number;}{\cs3\f2\fs24 Hyperlink;}}  {\*\revtbl{Unknown;}}  \loxyyu65230\gjzdov27885\clzna2896\iuabd8134\cmzsr9029\ywlpn1750\qrxyhcv931\lrqwwso607\nogrowautofit\ycgruf873\formshade\nofeaturethrottle1\dntblnsbdb\fet4\aendnotes\aftnnrlc\pgbrdrhead\pgbrdrfoot  \sectd\rguuqz15860\rdrmhm76984\guttersxn0\zbsymsrx7192\smskmwqy8980\wtkoygtv6874\gzcietif6860\huklvdo204\qptsruf613\sbkpage\pgncont\pgndec  \plain\plain\f0\fs24\ql\plain\f0\fs24\plain\f1\fs16\acqq5319\hich\f1\dbch\f1\loch\f1\cf2\fs16 4/7 change pérez catheter if not done so\par  on IV zosyn for PNA and UTI\par   thrombocytopneia from PNA improving - HIT abs pending \par  PPI for gerd\par  constipation - miralax and senna\par  \par  \par  89 y/o F with PMH severe AS, HFrEF, HTN, HLD, frequent UTIs with urinary retention and chronic pérez, gout, arthritis, aspiration  PNA admitted for:\par  \par  \par  1. Acute Hypoxic Respiratory Failure due to B/L  PNA\par  admit to med surg\par  monitor pulse oc\par  Supplement  O2 via NC\par  S/p Vanco  and Zosyn in ED \par  cefepime \par  blood cx and Ucx neg\par  Tylenol PRN \par  Mucinex, Tessalon \par  Albuterol PRN \par  Possible aspiration?\par  Speech and swallow  eval \par  \par  # chronic anemia and thrombocytopenia further worsening likely consumptional  from sepsis and zosyn , r/o HIT although low probability for HIT\par  PRBC tranfuse with lasix - dw with cardio\par  \plain\f1\fs16\abwi7455\hich\f1\dbch\f1\loch\f1\cf2\fs16\b cw zosyn per ID - PLT stabilizing, pt allergic to ceftin\par  check HIT abs\par  4Tscore 3\par  heme eval\par  \par  # recent  history of  COVID- ruled out acute covid infection\plain\f1\fs16\pmli8763\hich\f1\dbch\f1\loch\f1\cf2\fs16\par  \par  \par  2.  Suspected UTI assocated with Chronic Pérez \par  UA + \par  Past UCX reviewed Started on Zosyn \par  ID recs appreciated \par  \par  \par  3.  chronic diastolic CHF,  severe AS\par  Clinically dehydrated, min PO intake and UO \par  Strict is and OS \par  \plain\f1\fs16\vxfg8139\hich\f1\dbch\f1\loch\f1\cf2\fs16\b will give PRBC with lasix ,consider resuming  standing  lasix in 24 to 48hrs\plain\f1\fs16\rjsd3693\hich\f1\dbch\f1\loch\f1\cf2\fs16\par  re eval daily \par  Check BNP \par  F/u with cardio outPt \par  \par  4. CKD 4. Dehydration \par  baseline CR around 1.6\par  renal Fx stable \par  \par  LAbs in am \par  \par  \par  5. HTN \par  Monitor BP\par  C/w Metoprolol, Hydralazine \par  Hold amlodipine for now \par  \par  6. Metabolic  encephalopathy \par  likely 2/2 acute illness \par  Supportive  care \par  \par  7. DVT PPX:  heparin SQ \par  \par  8. ACP:  full code \par  I left message for daughter Ms lafaro 4/5 \par  \par  \plain\f1\fs16\fsoj5399\hich\f1\dbch\f1\loch\f1\cf2\fs16\strike\plain\f1\fs16\tgqd4854\hich\f1\dbch\f1\loch\f1\cf2\fs16\plain\f1\fs16\johz6237\hich\f1\dbch\f1\loch\f1\cf2\fs16\par  \plain\f0\fs20\moon2206\hich\f0\dbch\f0\loch\f0\fs20\par  }

## 2023-04-07 NOTE — CONSULT NOTE ADULT - ASSESSMENT
89 y/o F with PMH severe AS, HFrEF, HTN, HLD, frequent UTIs with urinary retention and chronic pérez, gout, arthritis, aspiration PNA admitted for:    PROBLEMS:    Acute Hypoxic Respiratory Failure due to B/L  PNA  Chronic anemia and thrombocytopenia further worsening likely consumptional  from sepsis and zosyn-low probability for HIT  Recent  history of  COVID  Suspected UTI assocated with Chronic Pérez-UA + 3  Chronic diastolic CHF-severe AS  CKD/HTN/Metabolic  encephalopathy     PLAN:    Monitor pulse OX-Supplement  O2 via NC  S/p Vanco/ IV Zosyn   Blood cx and Ucx neg  Tylenol PRN   Mucinex, Tessalon   Albuterol PRN   Possible aspiration?  Speech and swallow  eval   Monitor Hb/hct  DVT PPX-heparin SQ

## 2023-04-07 NOTE — CONSULT NOTE ADULT - CONSULT REQUESTED DATE/TIME
04-Apr-2023 12:31
07-Apr-2023 09:16
07-Apr-2023 09:14
06-Apr-2023 16:55
05-Apr-2023 14:12
04-Apr-2023 08:28

## 2023-04-07 NOTE — PROGRESS NOTE ADULT - ASSESSMENT
89 y/o F with PMH severe AS, HFrEF, HTN on metoprolol, amlodipine, hydralazine, HLD, frequent UTIs with urinary retention and chronic pérez, gout, arthritis, hx pelvic fx, recent covid/?asp PNA 3/2023 BIBA to ED for fever TMax 103F. Pt seen in  ED 3/31 for syncope, was admitted and discharged 4/2. Pt started on macrobid for UTI by Dr. Waldrop 4/3, took 2 doses. Daughter noticed fever at 4am, took 1G tylenol 4:30am. O2 84% in triage, started on 6L NC, now at 97%. Pt denies CP or SOB at this time. No headache, dizziness, LOC, N/V/D, abdominal pain, ext numbness/weakness.  Here imaging shows b/l pneumonia on xray, UA positive, noted with vomiting episodes in ER, daughter at bedside, was given dose of vanocmycin/zosyn.    1. Fever. UTI with ENFA/KLOX. Aspiration pneumonia. Recent Covid-19 viral syndrome  - imaging and prior chart/cultures reviewed  - CT chest shows new consolidation in R upper and R lower lobes   - 3/31 urine cx grew ENFA/KLOX - sensitivities noted  - on zosyn #4, change dose to q8h renal function improved   - continue with antibiotic coverage   - aspiration precautions  - fu cbc  - covid not active has had prior in march  - speech/swallow eval noted   - monitor temps  - tolerating abx well so far; no side effects noted  - reason for abx use and side effects reviewed with patient  - supportive care  - fu cbc    2. other issues - care per medicine

## 2023-04-07 NOTE — CONSULT NOTE ADULT - ASSESSMENT
Pt is a pleasant 89 (soon to be 89yo) female  with PMH severe AS, HFrEF, HTN, HLD, frequent UTIs with urinary retention and chronic pérez, gout, arthritis, aspiration  PNA  Urology called for chronic Pérez and recurring UTI    Pt with likely neurogenic bladder necessitating Pérez with secondary UTI  Pt with NEGATIVE urine cx this admission   Pt known to Dr. Bonilla  Pérez replaced by him 2/23/23- was scheduled for urodynamics at [facility] but was cancelled.     Continue Abx at this time  Change Pérez if not already done this admission  Suggest scheduling UDS as out pt since prior instances were cancelled.     Will discuss with Dr. Collins

## 2023-04-07 NOTE — PROGRESS NOTE ADULT - SUBJECTIVE AND OBJECTIVE BOX
CHIEF COMPLAINT:  Patient is a 89y old  Female who presents with a chief complaint of     HPI: 23:    Patient is a 89 yo F, well known to me, BIBEMS from home with c/o fever, starting this morning. EMS reports pt's daughter gave pt 650mg of Tylenol @ 0430 with a fever TMAX 103.  She was also recently admitted with ? asp. PNA on 3/20/23 and was COVID (+) at that time.  Pt now found to be hypoxic in triage sating 84% and placed on 6LNC sating 97%. Pt denies chest pain and SOB. Pt is A&OX3 and reports hx of frequent UTI's. EMS reports . Oral temp 99.5 and patient feels very warm to touch.  She was recently admitted with syncope and UTI and was COVID (+) still on 3/31/23.  She has known severe AS by echo with HFpEF (LVEF=65-70%) (see report below).  She seen in the ER with CXR suggesting bilateral lower lobe PNA.  On O2 she is feeling better.  She has no anginal chest pains or recurrent syncope so far.      23:  Feeling slightly better already without increased SOB, clinical CHF or anginal chest pains.  No palpitations or recurrent syncope.  Tolerating current treatments and antibiotics.  Awaiting formal culture reports.      23:  Temp 95.5 this AM and heating blanket in place.  Given a rectal suppository for constipation yesterday.  Creatinine up yesterday and gently hydrated.  Awaiting AM labs.  No increased SOB but still has cough but slightly better.  No new cardiac symptoms.    23:  Feeling better with less cough and less SOB.  Off the heating blanket.  Creatinine coming down as of yesterday.  Transfused 1 unit PRBC's yesterday for Hgb=7.6.  Awaiting AM labs.  No new cardiac symptoms.          PMHx:  PAST MEDICAL & SURGICAL HISTORY:  Severe AS  HTN (hypertension)  HLD (hyperlipidemia)  Gout  Osteoarthritis  History of tonsillectomy in childhood      FAMILY HISTORY:   FAMILY HISTORY:  Family history of hypertension (Father, Mother)      ALLERGIES:  Allergies  Ceftin (Hives; Rash)      REVIEW OF SYSTEMS:  10 point ROS was obtained  Pertinent positives and negatives are as above  All other review of systems is negative unless indicated above      Vital Signs Last 24 Hrs  T(C): 36.7 (2023 22:50), Max: 37 (2023 18:53)  T(F): 98.1 (2023 22:50), Max: 98.6 (2023 18:53)  HR: 68 (2023 22:50) (63 - 79)  BP: 154/57 (2023 22:50) (133/53 - 155/63)  BP(mean): 82 (2023 22:50) (82 - 90)  RR: 18 (2023 22:50) (18 - 19)  SpO2: 96% (2023 22:50) (94% - 98%): nasal cannula  O2 Flow (L/min): 2      PHYSICAL EXAM:   Constitutional: NAD, awake and alert, well-developed  HEENT: PERR, EOMI, Normal Hearing, MMM  Neck: Soft and supple, No LAD, No JVD  Respiratory: Breath sounds scattered rhonchi at bases bilaterally, No rales  Cardiovascular: S1 and S2, regular rate and rhythm, harsh LIANA at base and LLSB as before, (+) S4; no S3 gallop or rubs  Gastrointestinal: Bowel Sounds present, soft, nontender, nondistended, no guarding, no rebound  Extremities: No peripheral edema  Vascular: 2+ peripheral pulses  Neurological: no focal deficits  Skin: No rashes      MEDICATIONS  (STANDING):  albuterol    90 MICROgram(s) HFA Inhaler 1 Puff(s) Inhalation every 4 hours  allopurinol 100 milliGRAM(s) Oral daily  atorvastatin 20 milliGRAM(s) Oral at bedtime  bethanechol 50 milliGRAM(s) Oral two times a day  doxazosin 2 milliGRAM(s) Oral at bedtime  folic acid 1 milliGRAM(s) Oral daily  gabapentin 300 milliGRAM(s) Oral two times a day  guaiFENesin  milliGRAM(s) Oral every 12 hours  hydrALAZINE 25 milliGRAM(s) Oral two times a day  metoprolol tartrate 25 milliGRAM(s) Oral two times a day  ondansetron Injectable 4 milliGRAM(s) IV Push once  piperacillin/tazobactam IVPB.. 3.375 Gram(s) IV Intermittent every 12 hours  polyethylene glycol 3350 17 Gram(s) Oral daily  senna 2 Tablet(s) Oral at bedtime  sodium chloride 0.9%. 1000 milliLiter(s) (50 mL/Hr) IV Continuous <Continuous>    MEDICATIONS  (PRN):  acetaminophen     Tablet .. 650 milliGRAM(s) Oral every 6 hours PRN Temp greater or equal to 38C (100.4F), Mild Pain (1 - 3)  albuterol    0.083% 2.5 milliGRAM(s) Nebulizer every 6 hours PRN Shortness of Breath and/or Wheezing  aluminum hydroxide/magnesium hydroxide/simethicone Suspension 30 milliLiter(s) Oral every 4 hours PRN Dyspepsia  benzonatate 100 milliGRAM(s) Oral every 8 hours PRN Cough  melatonin 3 milliGRAM(s) Oral at bedtime PRN Insomnia      LABS: All Labs Reviewed:                        7.6    8.81  )-----------( 74       ( 2023 07:44 )             23.4                           8.0    12.70 )-----------( 68       ( 2023 10:35 )             24.5                           8.4    11.91 )-----------( 75       ( 2023 07:54 )             25.7       04-06    137  |  112<H>  |  40<H>  ----------------------------<  153<H>  4.4   |  20<L>  |  1.68<H>    Ca    8.3<L>      2023 07:44    TPro  6.2  /  Alb  2.6<L>  /  TBili  0.6  /  DBili  x   /  AST  36  /  ALT  38  /  AlkPhos  60      135  |  107  |  40<H>  ----------------------------<  156<H>  3.8   |  20<L>  |  2.16<H>    Ca    8.0<L>      2023 10:35    TPro  6.7  /  Alb  3.1<L>  /  TBili  0.5  /  DBili  x   /  AST  21  /  ALT  17  /  AlkPhos  51  04    135  |  108  |  26<H>  ----------------------------<  118<H>  3.8   |  21<L>  |  1.58<H>    Ca    8.5      2023 07:54    TPro  6.7  /  Alb  3.1<L>  /  TBili  0.5  /  DBili  x   /  AST  21  /  ALT  17  /  AlkPhos  51  04-    Pro-Brain Natriuretic Peptide (23 @ 07:54): 1886 pg/mL    Troponin I, High Sensitivity (23 @ 07:54): 14.73    CULTURES:   Culture - Blood (23 @ 07:54)   Specimen Source: .Blood None  Culture Results: No growth to date.    Culture - Blood (23 @ 08:21)   Specimen Source: .Blood None  Culture Results: No growth to date.    Gram Stain Urine -- Gram Stain --  Specimen Source Clean Catch None  Culture-Urine --Organism Klebsiella oxytoca /Raoutella ornithinolytica>100,000 CFU/ml Enterococcus faecalis (23 @ 17:50)   Culture Results: <10,000 CFU/mL Normal Urogenital Kristel (23 @ 10:59)       LIPID PROFILE (23 @ 07:01)   Cholesterol, Serum: 116 mg/dL  Triglycerides, Serum: 64 mg/dL  HDL Cholesterol, Serum: 67 mg/dL  Non HDL Cholesterol: 49      RADIOLOGY:    CT of Chest: 23:  FINDINGS:  LUNGS/AIRWAYS/PLEURA: Slitlike narrowing of the AP diameter of the trachea due to exhalation which can be seen with tracheomalacia. Mosaic attenuation in both lungsfrom air trapping. New consolidation adjacent to atelectasis in the right lower lobe, as well as in the right upper lobe at the apex and adjacent to the oblique fissure. Interlobular septal thickening in both lungs. Small right and trace left pleural effusions and secondary passive atelectasis of both lower lobes.  LYMPH NODES/MEDIASTINUM: Unchanged mediastinal lymph nodes, largest 1 cm in the prevascular space.  HEART/VASCULATURE: Normal heart size. No pericardial effusion. Calcified aortic valve. Coronary artery calcifications.  UPPER ABDOMEN: Cholelithiasis. Medially displaced intimal calcification of the infrarenal aorta, likely chronic. Small hypodensity arising from the left kidney, likely a cyst.  BONES/SOFT TISSUES: Unremarkable.  IMPRESSION: Since 3/5/2023:  New consolidation in the right upper and right lower lobes, favor pneumonia in the appropriate clinical setting.  New small right and trace left pleural effusions, and mild pulmonary edema.      CXR: 23:  FINDINGS:  CATHETERS AND TUBES: None  PULMONARY: Bilateral perihilar/bibasilar diffuse airspace disease. Apices clear. No pneumothorax.  HEART/VASCULAR: The heart and mediastinum size and configuration are within normal limits.  BONES: Visualized osseous thorax intact.  IMPRESSION:  Bilateral perihilar/basilar diffuse airspace disease..    CXR: 3/31/23:  INTERPRETATION:  AP chest on 2023 at 2:17 PM. Patient had a syncopal episode.  Heart magnified by technique.  There is an increasing small infiltrate off the right lower hilum compared to  of this year.  IMPRESSION: Increasing right lower perihilar infiltrate.      EK23:  Normal sinus rhythm  Possible Left atrial enlargement  Left ventricular hypertrophy      TELEMETRY:  NSR      ECHO:  3/1/23:  M-Mode Measurements (cm)   LVEDd: 3.97 cm            LVESd: 2.55 cm   IVSEd: 1.1 cm   LVPWd: 1.1 cm             AO Root Dimension: 2.8 cm                             LA: 3.5 cm                LVOT: 2 cm  Doppler Measurements:   AV Velocity:434 cm/s                  MV Peak E-Wave: 98.7 cm/s   AV Peak Gradient: 75.34 mmHg          MV Peak A-Wave: 131 cm/s   AV Mean Gradient: 40 mmHg             MV E/A Ratio: 0.75 %   AV Area (Continuity):0.85 cm^2        MV Peak Gradient: 3.9 mmHg   TR Velocity:190 cm/s   TR Gradient:14.44 mmHg   Estimated RAP:5 mmHg   RVSP:27 mmHg    Findings  Mitral Valve   The mitral valve leaflets appear thickened.   EA reversal of the mitral inflow consistent with reduced compliance of the left ventricle.   Mild mitral annular calcification is present.   Mild mitral regurgitation is present.    Aortic Valve   Peak and mean transaortic gradients are 75 and 40mmHg respectively; this finding is consistent with severe aortic stenosis.   Mild (1+) aortic regurgitation is present.    Tricuspid Valve   Trace tricuspid valve regurgitation is present.    Pulmonic Valve   Mild pulmonic valvular regurgitation (1+) is present.    Left Atrium   Normal appearing left atrium.    Left Ventricle   Mild concentric left ventricular hypertrophy is present.   The left ventricle is normal in size.   Estimated left ventricular ejection fraction is 65-70 %.    Right Atrium   Normal appearing right atrium.    Right Ventricle   Normal appearing right ventricle structure and function.    Pericardial Effusion   No evidence of pericardial effusion.    Pleural Effusion   No evidence of pleural effusion.    Miscellaneous   The IVC appears normal.    Summary   The mitral valve leaflets appear thickened.   EA reversal of the mitral inflow consistent with reduced compliance of the left ventricle.   Mild mitral annular calcification is present.   Mild mitral regurgitation is present.   Peak and mean transaortic gradients are 75 and 40mmHg respectively; this finding is consistent with severe aortic stenosis.   Mild (1+) aortic regurgitation is present.   Trace tricuspid valve regurgitation is present.   Mild pulmonic valvular regurgitation (1+) is present.   Normal appearing left atrium.   Mild concentric left ventricular hypertrophy is present.   The left ventricle is normal in size.   Estimated left ventricular ejection fraction is 65-70 %.   Normal appearing right atrium.   Normal appearing right ventricle structure and function.   The IVC appears normal.   No evidence of pericardial effusion.   No evidence of pleural effusion.    Signature   ----------------------------------------------------------------   Electronically signed by Sakina Cheatham MD, Director of   Cardiac Cath Lab(Interpreting physician) on 2023 06:42   PM   ----------------------------------------------------------------

## 2023-04-07 NOTE — PROGRESS NOTE ADULT - SUBJECTIVE AND OBJECTIVE BOX
89 y/o F with PMH severe AS, HFrEF, HTN, HLD, frequent UTIs with urinary retention and chronic pérez, gout, arthritis, aspiration  PNA 3/2023 BIBA to ED for fever TMax 103F, confusion.   In ED: Was hypoxic in 80s, stable on NC, rest VS are stable. CXR with b/l infiltrates. Pt was given IV Zosyn and VAnco   Renal eval called for elevated Cr   hx of CKD 3b Cr ~ 1.5 - 1.6 in past    Today    pt feeling ok,       MEDICATIONS  (STANDING):  albuterol    90 MICROgram(s) HFA Inhaler 1 Puff(s) Inhalation every 4 hours  allopurinol 100 milliGRAM(s) Oral daily  atorvastatin 20 milliGRAM(s) Oral at bedtime  bethanechol 50 milliGRAM(s) Oral two times a day  doxazosin 2 milliGRAM(s) Oral at bedtime  folic acid 1 milliGRAM(s) Oral daily  gabapentin 300 milliGRAM(s) Oral two times a day  guaiFENesin  milliGRAM(s) Oral every 12 hours  hydrALAZINE 25 milliGRAM(s) Oral two times a day  lidocaine   4% Patch 1 Patch Transdermal daily  metoprolol tartrate 25 milliGRAM(s) Oral two times a day  ondansetron Injectable 4 milliGRAM(s) IV Push once  pantoprazole    Tablet 40 milliGRAM(s) Oral before breakfast  piperacillin/tazobactam IVPB.. 3.375 Gram(s) IV Intermittent every 8 hours  polyethylene glycol 3350 17 Gram(s) Oral daily  senna 2 Tablet(s) Oral at bedtime  sodium chloride 0.9%. 1000 milliLiter(s) (50 mL/Hr) IV Continuous <Continuous>        Allergies    Ceftin (Hives; Rash)    Intolerances        SOCIAL HISTORY:  Denies ETOh,Smoking,     FAMILY HISTORY:  Family history of hypertension (Father, Mother)        REVIEW OF SYSTEMS:    CONSTITUTIONAL: No , fevers or chills  EYES/ENT: No visual changes;  No vertigo or throat pain   NECK: No pain or stiffness  RESPIRATORY: No cough, wheezing, hemoptysis; No shortness of breath  CARDIOVASCULAR: No chest pain or palpitations  GASTROINTESTINAL: No abdominal or epigastric pain. No nausea, vomiting, or hematemesis; No diarrhea or constipation. No melena or hematochezia.  GENITOURINARY: No dysuria, frequency or hematuria  NEUROLOGICAL: No numbness or weakness  SKIN: No itching, burning, rashes, or lesions   All other review of systems is negative unless indicated above.    VITAL:  Vital Signs Last 24 Hrs  T(C): 36.4 (07 Apr 2023 21:46), Max: 36.8 (07 Apr 2023 07:46)  T(F): 97.5 (07 Apr 2023 21:46), Max: 98.2 (07 Apr 2023 07:46)  HR: 71 (07 Apr 2023 21:46) (68 - 75)  BP: 167/60 (07 Apr 2023 21:46) (145/56 - 167/60)  BP(mean): --  RR: 18 (07 Apr 2023 21:46) (17 - 19)  SpO2: 91% (07 Apr 2023 21:46) (91% - 94%)    Parameters below as of 07 Apr 2023 21:46  Patient On (Oxygen Delivery Method): room air    I&O's Detail    06 Apr 2023 07:01  -  07 Apr 2023 07:00  --------------------------------------------------------  IN:    PRBCs (Packed Red Blood Cells): 350 mL  Total IN: 350 mL    OUT:    Indwelling Catheter - Urethral (mL): 2050 mL  Total OUT: 2050 mL    Total NET: -1700 mL      07 Apr 2023 07:01  -  08 Apr 2023 00:42  --------------------------------------------------------  IN:  Total IN: 0 mL    OUT:    Indwelling Catheter - Urethral (mL): 775 mL  Total OUT: 775 mL    Total NET: -775 mL            PHYSICAL EXAM:    Constitutional: NAD  HEENT:  EOMI,  MMM  Neck: No LAD, No JVD  Respiratory: CTAB  Cardiovascular: S1 and S2  Gastrointestinal: BS+, soft, NT/ND  Extremities: No peripheral edema  Neurological: A/O x 3, no focal deficits  :  Pérez  Skin: No rashes  Access: Not applicable    LABS:                                      139    |  111    |  37     ----------------------------<  102       07 Apr 2023 07:08  4.1     |  21     |  1.46     137    |  112    |  40     ----------------------------<  153       06 Apr 2023 07:44  4.4     |  20     |  1.68     135    |  107    |  40     ----------------------------<  156       05 Apr 2023 10:35  3.8     |  20     |  2.16     Ca    8.7        07 Apr 2023 07:08  Ca    8.3        06 Apr 2023 07:44        TPro  6.2    /  Alb  2.6    /  TBili  0.6    /        06 Apr 2023 07:44  DBili  x      /  AST  36     /  ALT  38     /  AlkPhos  60       TPro  6.7    /  Alb  3.1    /  TBili  0.5    /        04 Apr 2023 07:54  DBili  x      /  AST  21     /  ALT  17     /  AlkPhos  51           Urine Studies:          RADIOLOGY & ADDITIONAL STUDIES:

## 2023-04-07 NOTE — PROGRESS NOTE ADULT - SUBJECTIVE AND OBJECTIVE BOX
89 y/o F with PMH severe AS, HFrEF, HTN, HLD, frequent UTIs with urinary retention and chronic pérez, gout, arthritis, aspiration  PNA 3/2023 BIBA to ED for fever TMax 103F, confusion. As per Pts daughter, she was told that last night Pt was confused and was coughing,  febrile, also had episode of vomiting. Pt is awake, known in the hospital, reports currently no SOB, but feels very weak and sleepy. Pt c/o feeling cold, body aches, HA.  Pt denies CP, no abd pain.  As per daughter was started  on Macrobid by PCP.   In ED: Was hypoxic in 80s, stable on NC, rest VS are stable. CXR with b/l infiltrates. Pt was given IV Zosyn and VAnco     4/5 feels better, less sob , comfortable at rest , has cough   4/6 had BM after suppostirory last night, hypothermic 95, requiring warming blanket , now temp 97, awake, comfortable at rest, daughter at bedside, consented to PRBC   4/6 sitting in chair, respiratory wise improved, afebrile today, has decreased appetite, constipated per daughter    PHYSICAL EXAM:  General: Well developed; frail elderly; in no acute distress  Eyes:  EOMI; conjunctiva and sclera clear  Head: Normocephalic; atraumatic  ENMT: No nasal discharge; airway clear, dry oral mucosa  Respiratory: Decreased BS at bases b/l, with R crackles  No wheezes  Cardiovascular: Regular rate and rhythm. S1 and S2 Normal; +  murmur  Gastrointestinal: Soft non-tender non-distended; Normal bowel sounds  Genitourinary: No  suprapubic  tenderness, pérez in place   Extremities: No edema  Neurological: Alert and oriented x2-3, mildly encephalopathic, non focal   Musculoskeletal: Normal muscle tone, without deformities    labs reviewed

## 2023-04-07 NOTE — PROGRESS NOTE ADULT - ASSESSMENT
89 yo female with hx of HFrEF, HTN, UTI, COVID in March, and chronic pérez. Now returning to  with c/o AMS , hypoxia c/w PNA    ANDREW on CKD 3b - baseline Cr 1.5 -1 .6    prerenal    improved with IVF - now back to baseline    monitor off IVF - encourage po    trend renal labs   avoid IV contrast, NSAID    no ace or arb   renal dose meds   Chronic Pérez - monitor UOP     Anemia/thrombocytopenia   Heme fup    PRBC as per Medicine/Heme   consider INEZ     * pt seen earlier , note now    Dr Blanco covering weekend

## 2023-04-07 NOTE — PROGRESS NOTE ADULT - SUBJECTIVE AND OBJECTIVE BOX
Date of service: 23 @ 11:19    pt seen and examined  temps down  less cough  no fevers  feels weak    ROS: no fever or chills; denies dizziness, no HA,  no abdominal pain, no diarrhea or constipation; no dysuria, no urinary frequency, no legs pain, no rashes    MEDICATIONS  (STANDING):  albuterol    90 MICROgram(s) HFA Inhaler 1 Puff(s) Inhalation every 4 hours  allopurinol 100 milliGRAM(s) Oral daily  atorvastatin 20 milliGRAM(s) Oral at bedtime  bethanechol 50 milliGRAM(s) Oral two times a day  doxazosin 2 milliGRAM(s) Oral at bedtime  folic acid 1 milliGRAM(s) Oral daily  gabapentin 300 milliGRAM(s) Oral two times a day  guaiFENesin  milliGRAM(s) Oral every 12 hours  hydrALAZINE 25 milliGRAM(s) Oral two times a day  metoprolol tartrate 25 milliGRAM(s) Oral two times a day  ondansetron Injectable 4 milliGRAM(s) IV Push once  piperacillin/tazobactam IVPB.. 3.375 Gram(s) IV Intermittent every 12 hours  polyethylene glycol 3350 17 Gram(s) Oral daily  senna 2 Tablet(s) Oral at bedtime  sodium chloride 0.9%. 1000 milliLiter(s) (50 mL/Hr) IV Continuous <Continuous>    Vital Signs Last 24 Hrs  T(C): 36.8 (2023 07:46), Max: 37 (2023 18:53)  T(F): 98.2 (2023 07:46), Max: 98.6 (2023 18:53)  HR: 75 (2023 09:06) (68 - 79)  BP: 145/56 (2023 09:06) (133/53 - 155/63)  BP(mean): 82 (2023 22:50) (82 - 90)  RR: 17 (2023 07:46) (17 - 19)  SpO2: 94% (2023 07:46) (94% - 96%)    Parameters below as of 2023 07:46  Patient On (Oxygen Delivery Method): nasal cannula  O2 Flow (L/min): 2        PE:  Constitutional: frail looking  HEENT: NC/AT, EOMI, PERRLA, conjunctivae clear; ears and nose atraumatic; pharynx benign  Neck: supple; thyroid not palpable  Back: no tenderness  Respiratory: decreased breath sounds, rhonchi  Cardiovascular: S1S2 regular, no murmurs  Abdomen: soft, not tender, not distended, positive BS; liver and spleen WNL  Genitourinary: no suprapubic tenderness  Lymphatic: no LN palpable  Musculoskeletal: no muscle tenderness, no joint swelling or tenderness  Extremities: no pedal edema  Neurological/ Psychiatric:  moving all extremities  Skin: no rashes; no palpable lesions    Labs: all available labs reviewed                                              9.8    9.92  )-----------( 92       ( 2023 07:08 )             29.6     04-    139  |  111<H>  |  37<H>  ----------------------------<  102<H>  4.1   |  21<L>  |  1.46<H>    Ca    8.7      2023 07:08    TPro  6.2  /  Alb  2.6<L>  /  TBili  0.6  /  DBili  x   /  AST  36  /  ALT  38  /  AlkPhos  60  04-          Urinalysis Basic - ( 2023 10:59 )    Color: Yellow / Appearance: Slightly Turbid / S.010 / pH: x  Gluc: x / Ketone: Negative  / Bili: Negative / Urobili: Negative   Blood: x / Protein: 30 mg/dL / Nitrite: Negative   Leuk Esterase: Moderate / RBC: 3-5 /HPF / WBC 26-50 /HPF   Sq Epi: x / Non Sq Epi: Few / Bacteria: Many    Culture - Urine (23 @ 17:50)   - Vancomycin: S 2  - Tetracycline: S <=1  - Tobramycin: S <=2  - Trimethoprim/Sulfamethoxazole: S <=0.5/9.5  - Levofloxacin: S <=0.5  - Levofloxacin: S 1  - Meropenem: S <=1  - Nitrofurantoin: S <=32 Should not be used to treat pyelonephritis  - Nitrofurantoin: S <=32 Should not be used to treat pyelonephritis.  - Piperacillin/Tazobactam: S <=8  - Cefazolin: S <=2  - Cefepime: S <=2  - Cefoxitin: S <=8  - Ceftriaxone: S <=1 Enterobacter, Klebsiella aerogenes, Citrobacter, and Serratia may develop resistance during prolonged therapy  - Ciprofloxacin: S <=0.25  - Ciprofloxacin: S <=1  - Ertapenem: S <=0.5  - Gentamicin: S <=2  - Imipenem: S <=1  - Amikacin: S <=16  - Amoxicillin/Clavulanic Acid: S <=8/4  - Ampicillin: R >16 These ampicillin results predict results for amoxicillin  - Ampicillin: S <=2 Predicts results to ampicillin/sulbactam, amoxacillin-clavulanate and piperacillin-tazobactam.  - Ampicillin/Sulbactam: S 8/4 Enterobacter, Klebsiella aerogenes, Citrobacter, and Serratia may develop resistance during prolonged therapy (3-4 days)  - Aztreonam: S <=4  Specimen Source: Clean Catch None  Culture Results:   >100,000 CFU/ml Klebsiella oxytoca/Raoultella ornithinolytica   >100,000 CFU/ml Enterococcus faecalis  Organism Identification: Klebsiella oxytoca /Raoutella ornithinolytica       Radiology: all available radiological tests reviewed      ACC: [id number] EXAM:  XR CHEST PORTABLE URGENT 1V   ORDERED BY: DIRK BAUER     PROCEDURE DATE:  2023          INTERPRETATION:  Portable chest radiograph    CLINICAL INFORMATION: Fever    TECHNIQUE:  Portable  AP chest radiograph.    COMPARISON: 3/31/2023 chest x-ray .    FINDINGS:  CATHETERS AND TUBES: None    PULMONARY: Bilateral perihilar/bibasilar diffuse airspace disease.  Apices clear. No pneumothorax.    HEART/VASCULAR: The heart and mediastinum size and configuration are   within normal limits.    BONES: Visualized osseous thorax intact.    IMPRESSION:    Bilateral perihilar/basilar diffuse airspace disease..    < from: CT Chest No Cont (23 @ 15:54) >  ACC: [id number] EXAM:  CT CHEST   ORDERED BY: FELECIA YOU     PROCEDURE DATE:  2023          INTERPRETATION:  INDICATION: Shortness of breath    TECHNIQUE: Volumetric images of the chest without intravenous contrast.   Maximum intensity projection images were generated.    COMPARISON: CT chest 3/5/2023.    FINDINGS:    LUNGS/AIRWAYS/PLEURA: Slitlike narrowing of the AP diameter of the   trachea due to exhalation which can be seen with tracheomalacia. Mosaic   attenuation in both lungsfrom air trapping. New consolidation adjacent   to atelectasis in the right lower lobe, as well as in the right upper   lobe at the apex and adjacent to the oblique fissure. Interlobular septal   thickening in both lungs. Small right and trace left pleural effusions   and secondary passive atelectasis of both lower lobes.    LYMPH NODES/MEDIASTINUM: Unchanged mediastinal lymph nodes, largest 1 cm   in the prevascular space.    HEART/VASCULATURE: Normal heart size. No pericardial effusion. Calcified   aortic valve. Coronary artery calcifications.    UPPER ABDOMEN: Cholelithiasis. Medially displaced intimal calcification   of the infrarenal aorta, likely chronic. Small hypodensity arising from   the left kidney, likely a cyst.    BONES/SOFT TISSUES: Unremarkable.      IMPRESSION:    Since 3/5/2023:    New consolidation in the right upper and right lower lobes, favor   pneumonia in the appropriate clinical setting.    New small right and trace left pleural effusions, and mild pulmonary   edema.      Advanced directives addressed: full resuscitation

## 2023-04-07 NOTE — CONSULT NOTE ADULT - CONSULT REASON
pneumonia
uti  pneumonia  recent covid
ANDREW on CKD
Chronic Perales/ UTI
SOB, hypoxia, bilateral PNA, severe AS, recent COVID-19
thrombocytopenia

## 2023-04-07 NOTE — PROGRESS NOTE ADULT - SUBJECTIVE AND OBJECTIVE BOX
Hematology/Oncology    Patient seen, no warming blanket today, sitting upright, more alert    MEDICATIONS  (STANDING):  albuterol    90 MICROgram(s) HFA Inhaler 1 Puff(s) Inhalation every 4 hours  allopurinol 100 milliGRAM(s) Oral daily  atorvastatin 20 milliGRAM(s) Oral at bedtime  bethanechol 50 milliGRAM(s) Oral two times a day  doxazosin 2 milliGRAM(s) Oral at bedtime  folic acid 1 milliGRAM(s) Oral daily  gabapentin 300 milliGRAM(s) Oral two times a day  guaiFENesin  milliGRAM(s) Oral every 12 hours  hydrALAZINE 25 milliGRAM(s) Oral two times a day  metoprolol tartrate 25 milliGRAM(s) Oral two times a day  ondansetron Injectable 4 milliGRAM(s) IV Push once  piperacillin/tazobactam IVPB.. 3.375 Gram(s) IV Intermittent every 8 hours  polyethylene glycol 3350 17 Gram(s) Oral daily  senna 2 Tablet(s) Oral at bedtime  sodium chloride 0.9%. 1000 milliLiter(s) (50 mL/Hr) IV Continuous <Continuous>    MEDICATIONS  (PRN):  acetaminophen     Tablet .. 650 milliGRAM(s) Oral every 6 hours PRN Temp greater or equal to 38C (100.4F), Mild Pain (1 - 3)  albuterol    0.083% 2.5 milliGRAM(s) Nebulizer every 6 hours PRN Shortness of Breath and/or Wheezing  aluminum hydroxide/magnesium hydroxide/simethicone Suspension 30 milliLiter(s) Oral every 4 hours PRN Dyspepsia  benzonatate 100 milliGRAM(s) Oral every 8 hours PRN Cough  melatonin 3 milliGRAM(s) Oral at bedtime PRN Insomnia      ROS     No edema  No rash       Vital Signs Last 24 Hrs  T(C): 36.3 (07 Apr 2023 15:42), Max: 37 (06 Apr 2023 18:53)  T(F): 97.3 (07 Apr 2023 15:42), Max: 98.6 (06 Apr 2023 18:53)  HR: 68 (07 Apr 2023 15:42) (68 - 79)  BP: 156/52 (07 Apr 2023 15:42) (145/56 - 156/52)  BP(mean): 82 (06 Apr 2023 22:50) (82 - 90)  RR: 19 (07 Apr 2023 15:42) (17 - 19)  SpO2: 91% (07 Apr 2023 15:42) (91% - 96%)    Parameters below as of 07 Apr 2023 15:42  Patient On (Oxygen Delivery Method): room air        PE  NAD  Awake, alert  Anicteric, MMM  RRR  CTAB  Abd soft, NT, ND  No c/c/e  + petechiae noted on upper extremities  FROM                          9.8    9.92  )-----------( 92       ( 07 Apr 2023 07:08 )             29.6       04-07    139  |  111<H>  |  37<H>  ----------------------------<  102<H>  4.1   |  21<L>  |  1.46<H>    Ca    8.7      07 Apr 2023 07:08    TPro  6.2  /  Alb  2.6<L>  /  TBili  0.6  /  DBili  x   /  AST  36  /  ALT  38  /  AlkPhos  60  04-06

## 2023-04-07 NOTE — CONSULT NOTE ADULT - SUBJECTIVE AND OBJECTIVE BOX
----- Message from Sergo Aguilar MD sent at 4/30/2019  4:07 PM CDT -----  Please call patient and relay results as follows: X ray shows NO broken bones.   UROLOGY CONSULT NOTE  --------------------------------------------------------------------------------------------    Patient is a 89y old  Female who presents with a chief complaint of pneumonia (2023 09:13)  Pt known to Dr. Bonilla (Seen )  Pt does not know reason for  consult  Chart reviewed- Pt with h/o urinary retention with Perales since 2022 and recurring UTI's\  Pt denies abdominal pain, dysuria, fevers/ chills      ROS: 10-system review is otherwise negative except HPI above.      PAST MEDICAL & SURGICAL HISTORY:  HTN (hypertension), HLD (hyperlipidemia), Gout, Osteoarthritis    History of tonsillectomy in childhood    FAMILY HISTORY:  Family history of hypertension (Father, Mother)    ALLERGIES: Ceftin (Hives; Rash)      HOME MEDICATIONS: ***    CURRENT MEDICATIONS  MEDICATIONS (STANDING): albuterol    90 MICROgram(s) HFA Inhaler 1 Puff(s) Inhalation every 4 hours  allopurinol 100 milliGRAM(s) Oral daily  atorvastatin 20 milliGRAM(s) Oral at bedtime  bethanechol 50 milliGRAM(s) Oral two times a day  doxazosin 2 milliGRAM(s) Oral at bedtime  folic acid 1 milliGRAM(s) Oral daily  gabapentin 300 milliGRAM(s) Oral two times a day  guaiFENesin  milliGRAM(s) Oral every 12 hours  hydrALAZINE 25 milliGRAM(s) Oral two times a day  metoprolol tartrate 25 milliGRAM(s) Oral two times a day  ondansetron Injectable 4 milliGRAM(s) IV Push once  piperacillin/tazobactam IVPB.. 3.375 Gram(s) IV Intermittent every 12 hours  polyethylene glycol 3350 17 Gram(s) Oral daily  senna 2 Tablet(s) Oral at bedtime  sodium chloride 0.9%. 1000 milliLiter(s) IV Continuous <Continuous>    MEDICATIONS (PRN):acetaminophen     Tablet .. 650 milliGRAM(s) Oral every 6 hours PRN Temp greater or equal to 38C (100.4F), Mild Pain (1 - 3)  albuterol    0.083% 2.5 milliGRAM(s) Nebulizer every 6 hours PRN Shortness of Breath and/or Wheezing  aluminum hydroxide/magnesium hydroxide/simethicone Suspension 30 milliLiter(s) Oral every 4 hours PRN Dyspepsia  benzonatate 100 milliGRAM(s) Oral every 8 hours PRN Cough  melatonin 3 milliGRAM(s) Oral at bedtime PRN Insomnia    --------------------------------------------------------------------------------------------    Vitals:   T(C): 36.8 (23 @ 07:46), Max: 37 (23 @ 18:53)  HR: 70 (23 @ 07:46) (63 - 79)  BP: 150/57 (23 @ 07:46) (133/53 - 155/63)  RR: 17 (23 @ 07:46) (17 - 19)  SpO2: 94% (23 @ 07:46) (94% - 97%)  CAPILLARY BLOOD GLUCOSE        CAPILLARY BLOOD GLUCOSE           07:01  -   07:00  --------------------------------------------------------  IN:    PRBCs (Packed Red Blood Cells): 350 mL  Total IN: 350 mL    OUT:    Indwelling Catheter - Urethral (mL): 2050 mL  Total OUT: 2050 mL    Total NET: -1700 mL        Height (cm): 160 ( 14:11)  Weight (kg): 72.2 ( 14:11)  BMI (kg/m2): 28.2 ( 14:11)  BSA (m2): 1.75 ( @ 14:11)    PHYSICAL EXAM: ***  General: Pleasant elderly female NAD, Lying in bed comfortably  Neuro: A+Ox3  Cardio: RRR, nml S1/S2  Resp: Good effort, CTA b/l    GI/Abd: Soft, NT/ND, no rebound/guarding, no masses palpated, bladder not distended   - Perales in place draining clear yellow urine   Vascular: All 4 extremities warm.  --------------------------------------------------------------------------------------------    LABS  CBC ( @ 07:08)                              9.8<L>                         9.92    )----------------(  92<L>      81.5<H>% Neutrophils, 10.1<L>% Lymphocytes, ANC: 8.08<H>                              29.6<L>  CBC ( 07:44)                              7.6<L>                         8.81    )----------------(  74<L>      93.0<H>% Neutrophils, 5.0<L>% Lymphocytes, ANC: 8.19<H>                              23.4<L>    BMP ( @ 07:08)             139     |  111<H>  |  37<H> 		Ca++ --      Ca 8.7                ---------------------------------( 102<H>		Mg --                 4.1     |  21<L>   |  1.46<H>			Ph --      BMP ( @ 07:44)             137     |  112<H>  |  40<H> 		Ca++ --      Ca 8.3<L>             ---------------------------------( 153<H>		Mg --                 4.4     |  20<L>   |  1.68<H>			Ph --        LFTs ( @ 07:44)      TPro 6.2 / Alb 2.6<L> / TBili 0.6 / DBili -- / AST 36 / ALT 38 / AlkPhos 60            --------------------------------------------------------------------------------------------    MICROBIOLOGY  Urinalysis ( @ 10:59):     Color: Yellow / Appearance: Slightly Turbid<!> / S.010 / pH: 5.0 / Gluc: Negative / Ketones: Negative / Bili: Negative / Urobili: Negative / Protein :30<!> / Nitrites: Negative / Leuk.Est: Moderate<!> / RBC: 3-5<!> / WBC: 26-50<!> / Sq Epi:  / Non Sq Epi: Few / Bacteria Many<!>       -> Clean Catch None Culture ( @ 10:59)     NG    NG    <10,000 CFU/mL Normal Urogenital Kristel    -> .Blood None Culture ( @ 08:21)     NG    NG    No growth to date.    -> .Blood None Culture ( @ 07:54)     NG    NG    No growth to date.    -> Clean Catch None Culture ( @ 17:50)     NG    Klebsiella oxytoca /Raoutella ornithinolytica  Enterococcus faecalis    >100,000 CFU/ml Klebsiella oxytoca/Raoultella ornithinolytica  >100,000 CFU/ml Enterococcus faecalis      --------------------------------------------------------------------------------------------

## 2023-04-07 NOTE — PROGRESS NOTE ADULT - ASSESSMENT
89 y/o F with extensive PMH of AS, HrEF, HTN, multiple hospitalizations over the last few months for recurrent UTI, admitted with b/l PNA and found to have thrombocytopenia and anemia.    1) Thrombocytopenia  -Suspect likely from infection and antibiotics  -NANNETTE ab sent, will f/u results  -fibrinogen, b12, folate all adequate  -On previous admission in Jan her plt count also did drop to 70K  -plt count improving today and > 90 K    2) Anemia  -Has reported history of anemia of CKD  -f/u iron panel  -better post transfusion, would aim to keep hgb > 8 given cardiac disease    Will cont to follow with you.    Elvis Mendes MD  [facility]  Cell: [phone]

## 2023-04-07 NOTE — CONSULT NOTE ADULT - SUBJECTIVE AND OBJECTIVE BOX
HPI:  89 y/o F with PMH severe AS, HFrEF, HTN, HLD, frequent UTIs with urinary retention and chronic pérez, gout, arthritis, aspiration  PNA 3/2023 BIBA to ED for fever TMax 103F, confusion. As per Pts daughter, she was told that last night Pt was confused and was coughing,  febrile, also had episode of vomiting. Pt is awake, known in the hospital, reports currently no SOB, but feels very weak and sleepy. Pt c/o feeling cold, body aches, HA.  Pt denies CP, no abd pain.  As per daughter was started  on Macrobid by PCP.   In ED: Was hypoxic in 80s, stable on NC, rest VS are stable. CXR with b/l infiltrates. Pt was given IV Zosyn and VAnco  (04 Apr 2023 13:20)      PAST MEDICAL & SURGICAL HISTORY:  HTN (hypertension)      HLD (hyperlipidemia)      Gout      Osteoarthritis      History of tonsillectomy  in childhood          Home Medications:  allopurinol 100 mg oral tablet: 1 tab(s) orally once a day (04 Apr 2023 11:16)  amLODIPine 10 mg oral tablet: 1 tab(s) orally once a day (04 Apr 2023 11:16)  benzonatate 100 mg oral capsule: 1 cap(s) orally every 8 hours, As needed, Cough (04 Apr 2023 11:16)  bethanechol 50 mg oral tablet: 1 tab(s) orally 2 times a day (04 Apr 2023 11:16)  Colace 100 mg oral capsule: 1 cap(s) orally once a day (at bedtime) (04 Apr 2023 11:16)  dextromethorphan-guaifenesin 10 mg-100 mg/5 mL oral liquid: 10 milliliter(s) orally every 6 hours, As needed, Cough (04 Apr 2023 11:16)  doxazosin 2 mg oral tablet: 1 tab(s) orally once a day (at bedtime) (04 Apr 2023 11:16)  folic acid 1 mg oral tablet: 1 tab(s) orally once a day (04 Apr 2023 11:16)  furosemide 20 mg oral tablet: 1 tab(s) orally once a day (04 Apr 2023 11:16)  hydrALAZINE 25 mg oral tablet: 1 tab(s) orally 2 times a day (04 Apr 2023 11:16)  lidocaine 5% patch: apply topically to affected area once daily as needed (04 Apr 2023 11:16)  metoprolol tartrate 25 mg oral tablet: 1 tab(s) orally 2 times a day (04 Apr 2023 11:16)  Neurontin 300 mg oral capsule: 2 cap(s) orally 2 times a day (04 Apr 2023 11:16)  nitrofurantoin macrocrystals-monohydrate 100 mg oral capsule: 1 cap(s) orally 2 times a day x 7 days ***Course Not Complete*** (04 Apr 2023 11:19)  rosuvastatin 5 mg oral tablet: 1 tab(s) orally once a day (at bedtime) (04 Apr 2023 11:16)  senna leaf extract oral tablet: 2 tab(s) orally once a day (at bedtime) (04 Apr 2023 11:16)  Tylenol Extra Strength 500 mg oral tablet: 1 tab(s) orally every 6 hours, As Needed for pain (04 Apr 2023 11:16)  Vitamin D3 1000 intl units (25 mcg) oral tablet: 1 cap(s) orally once a day (04 Apr 2023 11:16)      MEDICATIONS  (STANDING):  albuterol    90 MICROgram(s) HFA Inhaler 1 Puff(s) Inhalation every 4 hours  allopurinol 100 milliGRAM(s) Oral daily  atorvastatin 20 milliGRAM(s) Oral at bedtime  bethanechol 50 milliGRAM(s) Oral two times a day  doxazosin 2 milliGRAM(s) Oral at bedtime  folic acid 1 milliGRAM(s) Oral daily  gabapentin 300 milliGRAM(s) Oral two times a day  guaiFENesin  milliGRAM(s) Oral every 12 hours  hydrALAZINE 25 milliGRAM(s) Oral two times a day  metoprolol tartrate 25 milliGRAM(s) Oral two times a day  ondansetron Injectable 4 milliGRAM(s) IV Push once  piperacillin/tazobactam IVPB.. 3.375 Gram(s) IV Intermittent every 12 hours  polyethylene glycol 3350 17 Gram(s) Oral daily  senna 2 Tablet(s) Oral at bedtime  sodium chloride 0.9%. 1000 milliLiter(s) (50 mL/Hr) IV Continuous <Continuous>    MEDICATIONS  (PRN):  acetaminophen     Tablet .. 650 milliGRAM(s) Oral every 6 hours PRN Temp greater or equal to 38C (100.4F), Mild Pain (1 - 3)  albuterol    0.083% 2.5 milliGRAM(s) Nebulizer every 6 hours PRN Shortness of Breath and/or Wheezing  aluminum hydroxide/magnesium hydroxide/simethicone Suspension 30 milliLiter(s) Oral every 4 hours PRN Dyspepsia  benzonatate 100 milliGRAM(s) Oral every 8 hours PRN Cough  melatonin 3 milliGRAM(s) Oral at bedtime PRN Insomnia      Allergies    Ceftin (Hives; Rash)    Intolerances        SOCIAL HISTORY: Denies tobacco, etoh abuse or illicit drug use    FAMILY HISTORY:  Family history of hypertension (Father, Mother)        Vital Signs Last 24 Hrs  T(C): 36.8 (07 Apr 2023 07:46), Max: 37 (06 Apr 2023 18:53)  T(F): 98.2 (07 Apr 2023 07:46), Max: 98.6 (06 Apr 2023 18:53)  HR: 70 (07 Apr 2023 07:46) (63 - 79)  BP: 150/57 (07 Apr 2023 07:46) (133/53 - 155/63)  BP(mean): 82 (06 Apr 2023 22:50) (82 - 90)  RR: 17 (07 Apr 2023 07:46) (17 - 19)  SpO2: 94% (07 Apr 2023 07:46) (94% - 97%)    Parameters below as of 07 Apr 2023 07:46  Patient On (Oxygen Delivery Method): nasal cannula  O2 Flow (L/min): 2          REVIEW OF SYSTEMS:    CONSTITUTIONAL:  As per HPI.  HEENT:  Eyes:  No diplopia or blurred vision. ENT:  No earache, sore throat or runny nose.  CARDIOVASCULAR:  No pressure, squeezing, tightness, heaviness or aching about the chest, neck, axilla or epigastrium.  RESPIRATORY:  No cough, shortness of breath, PND or orthopnea.  GASTROINTESTINAL:  No nausea, vomiting or diarrhea.  GENITOURINARY:  No dysuria, frequency or urgency.  MUSCULOSKELETAL:  As per HPI.  SKIN:  No change in skin, hair or nails.  NEUROLOGIC:  No paresthesias, fasciculations, seizures or weakness.  PSYCHIATRIC:  No disorder of thought or mood.  ENDOCRINE:  No heat or cold intolerance, polyuria or polydipsia.  HEMATOLOGICAL:  No easy bruising or bleedings:  .     PHYSICAL EXAMINATION:    GENERAL APPEARANCE:  Pt. is not currently dyspneic, in no distress. Pt. is alert, oriented, and pleasant.  HEENT:  Pupils are normal and react normally. No icterus. Mucous membranes well colored.  NECK:  Supple. No lymphadenopathy. Jugular venous pressure not elevated. Carotids equal.   HEART:   The cardiac impulse has a normal quality. Regular. Normal S1 and S2. There are no murmurs, rubs or gallops noted  CHEST:  Chest is clear to auscultation. Normal respiratory effort.  ABDOMEN:  Soft and nontender.   EXTREMITIES:  There is no cyanosis, clubbing or edema.   SKIN:  No rash or significant lesions are noted.    LABS:                        9.8    9.92  )-----------( 92       ( 07 Apr 2023 07:08 )             29.6     04-07    139  |  111<H>  |  37<H>  ----------------------------<  102<H>  4.1   |  21<L>  |  1.46<H>    Ca    8.7      07 Apr 2023 07:08    TPro  6.2  /  Alb  2.6<L>  /  TBili  0.6  /  DBili  x   /  AST  36  /  ALT  38  /  AlkPhos  60  04-06    LIVER FUNCTIONS - ( 06 Apr 2023 07:44 )  Alb: 2.6 g/dL / Pro: 6.2 gm/dL / ALK PHOS: 60 U/L / ALT: 38 U/L / AST: 36 U/L / GGT: x                         RADIOLOGY & ADDITIONAL STUDIES:        HPI:    89 y/o F with PMH severe AS, HFrEF, HTN, HLD, frequent UTIs with urinary retention and chronic pérez, gout, arthritis, aspiration  PNA 3/2023 BIBA to ED for fever TMax 103F, confusion. As per Pts daughter, she was told that last night Pt was confused and was coughing,  febrile, also had episode of vomiting. Pt is awake, known in the hospital, reports currently no SOB, but feels very weak and sleepy. Pt c/o feeling cold, body aches, HA.  Pt denies CP, no abd pain.  As per daughter was started  on Macrobid by PCP. In ED: Was hypoxic in 80s, stable on NC, rest VS are stable. CXR with b/l infiltrates. Pt was given IV Zosyn and VAnco. pat seen for pulmonary eval, sitting in bed, no distress.      PAST MEDICAL & SURGICAL HISTORY:  HTN (hypertension)      HLD (hyperlipidemia)      Gout      Osteoarthritis      History of tonsillectomy  in childhood          Home Medications:  allopurinol 100 mg oral tablet: 1 tab(s) orally once a day (04 Apr 2023 11:16)  amLODIPine 10 mg oral tablet: 1 tab(s) orally once a day (04 Apr 2023 11:16)  benzonatate 100 mg oral capsule: 1 cap(s) orally every 8 hours, As needed, Cough (04 Apr 2023 11:16)  bethanechol 50 mg oral tablet: 1 tab(s) orally 2 times a day (04 Apr 2023 11:16)  Colace 100 mg oral capsule: 1 cap(s) orally once a day (at bedtime) (04 Apr 2023 11:16)  dextromethorphan-guaifenesin 10 mg-100 mg/5 mL oral liquid: 10 milliliter(s) orally every 6 hours, As needed, Cough (04 Apr 2023 11:16)  doxazosin 2 mg oral tablet: 1 tab(s) orally once a day (at bedtime) (04 Apr 2023 11:16)  folic acid 1 mg oral tablet: 1 tab(s) orally once a day (04 Apr 2023 11:16)  furosemide 20 mg oral tablet: 1 tab(s) orally once a day (04 Apr 2023 11:16)  hydrALAZINE 25 mg oral tablet: 1 tab(s) orally 2 times a day (04 Apr 2023 11:16)  lidocaine 5% patch: apply topically to affected area once daily as needed (04 Apr 2023 11:16)  metoprolol tartrate 25 mg oral tablet: 1 tab(s) orally 2 times a day (04 Apr 2023 11:16)  Neurontin 300 mg oral capsule: 2 cap(s) orally 2 times a day (04 Apr 2023 11:16)  nitrofurantoin macrocrystals-monohydrate 100 mg oral capsule: 1 cap(s) orally 2 times a day x 7 days ***Course Not Complete*** (04 Apr 2023 11:19)  rosuvastatin 5 mg oral tablet: 1 tab(s) orally once a day (at bedtime) (04 Apr 2023 11:16)  senna leaf extract oral tablet: 2 tab(s) orally once a day (at bedtime) (04 Apr 2023 11:16)  Tylenol Extra Strength 500 mg oral tablet: 1 tab(s) orally every 6 hours, As Needed for pain (04 Apr 2023 11:16)  Vitamin D3 1000 intl units (25 mcg) oral tablet: 1 cap(s) orally once a day (04 Apr 2023 11:16)      MEDICATIONS  (STANDING):  albuterol    90 MICROgram(s) HFA Inhaler 1 Puff(s) Inhalation every 4 hours  allopurinol 100 milliGRAM(s) Oral daily  atorvastatin 20 milliGRAM(s) Oral at bedtime  bethanechol 50 milliGRAM(s) Oral two times a day  doxazosin 2 milliGRAM(s) Oral at bedtime  folic acid 1 milliGRAM(s) Oral daily  gabapentin 300 milliGRAM(s) Oral two times a day  guaiFENesin  milliGRAM(s) Oral every 12 hours  hydrALAZINE 25 milliGRAM(s) Oral two times a day  metoprolol tartrate 25 milliGRAM(s) Oral two times a day  ondansetron Injectable 4 milliGRAM(s) IV Push once  piperacillin/tazobactam IVPB.. 3.375 Gram(s) IV Intermittent every 12 hours  polyethylene glycol 3350 17 Gram(s) Oral daily  senna 2 Tablet(s) Oral at bedtime  sodium chloride 0.9%. 1000 milliLiter(s) (50 mL/Hr) IV Continuous <Continuous>    MEDICATIONS  (PRN):  acetaminophen     Tablet .. 650 milliGRAM(s) Oral every 6 hours PRN Temp greater or equal to 38C (100.4F), Mild Pain (1 - 3)  albuterol    0.083% 2.5 milliGRAM(s) Nebulizer every 6 hours PRN Shortness of Breath and/or Wheezing  aluminum hydroxide/magnesium hydroxide/simethicone Suspension 30 milliLiter(s) Oral every 4 hours PRN Dyspepsia  benzonatate 100 milliGRAM(s) Oral every 8 hours PRN Cough  melatonin 3 milliGRAM(s) Oral at bedtime PRN Insomnia      Allergies    Ceftin (Hives; Rash)    Intolerances        SOCIAL HISTORY: Denies tobacco, etoh abuse or illicit drug use    FAMILY HISTORY:  Family history of hypertension (Father, Mother)        Vital Signs Last 24 Hrs  T(C): 36.8 (07 Apr 2023 07:46), Max: 37 (06 Apr 2023 18:53)  T(F): 98.2 (07 Apr 2023 07:46), Max: 98.6 (06 Apr 2023 18:53)  HR: 70 (07 Apr 2023 07:46) (63 - 79)  BP: 150/57 (07 Apr 2023 07:46) (133/53 - 155/63)  BP(mean): 82 (06 Apr 2023 22:50) (82 - 90)  RR: 17 (07 Apr 2023 07:46) (17 - 19)  SpO2: 94% (07 Apr 2023 07:46) (94% - 97%)    Parameters below as of 07 Apr 2023 07:46  Patient On (Oxygen Delivery Method): nasal cannula  O2 Flow (L/min): 2          REVIEW OF SYSTEMS:    CONSTITUTIONAL:  As per HPI.  HEENT:  Eyes:  No diplopia or blurred vision. ENT:  No earache, sore throat or runny nose.  CARDIOVASCULAR:  No pressure, squeezing, tightness, heaviness or aching about the chest, neck, axilla or epigastrium.  RESPIRATORY:  No cough, +shortness of breath, PND or orthopnea.  GASTROINTESTINAL:  No nausea, vomiting or diarrhea.  GENITOURINARY:  No dysuria, frequency or urgency.  MUSCULOSKELETAL:  As per HPI.  SKIN:  No change in skin, hair or nails.  NEUROLOGIC:  No paresthesias, fasciculations, seizures or weakness.  PSYCHIATRIC:  No disorder of thought or mood.  ENDOCRINE:  No heat or cold intolerance, polyuria or polydipsia.  HEMATOLOGICAL:  No easy bruising or bleedings:  .     PHYSICAL EXAMINATION:    GENERAL APPEARANCE:  Pt. is not currently dyspneic, in no distress. Pt. is alert, oriented, and pleasant.  HEENT:  Pupils are normal and react normally. No icterus. Mucous membranes well colored.  NECK:  Supple. No lymphadenopathy. Jugular venous pressure not elevated. Carotids equal.   HEART:   The cardiac impulse has a normal quality. Regular. Normal S1 and S2. There are no murmurs, rubs or gallops noted  CHEST:  Chest crackles to auscultation. Normal respiratory effort.  ABDOMEN:  Soft and nontender.   EXTREMITIES:  There is no cyanosis, clubbing or edema.   SKIN:  No rash or significant lesions are noted.    LABS:                        9.8    9.92  )-----------( 92       ( 07 Apr 2023 07:08 )             29.6     04-07    139  |  111<H>  |  37<H>  ----------------------------<  102<H>  4.1   |  21<L>  |  1.46<H>    Ca    8.7      07 Apr 2023 07:08    TPro  6.2  /  Alb  2.6<L>  /  TBili  0.6  /  DBili  x   /  AST  36  /  ALT  38  /  AlkPhos  60  04-06    LIVER FUNCTIONS - ( 06 Apr 2023 07:44 )  Alb: 2.6 g/dL / Pro: 6.2 gm/dL / ALK PHOS: 60 U/L / ALT: 38 U/L / AST: 36 U/L / GGT: x                         RADIOLOGY & ADDITIONAL STUDIES:

## 2023-04-07 NOTE — PROGRESS NOTE ADULT - ASSESSMENT
4/4/23:  Pt with above history and severe AS with recent COVID-19 (+) and recent syncope related to her AS shy UTI now with fever and probable bilateral PNA.  Would continue to treat her PNA as per medicine and ID and await blood and sputum cultures.  Hopefully no endocarditis.  When clinically bet and no evidence for infections pt is being considered for a TAVR in the future.  Continue as outlined by medicine and ID etal.  Will follow as work-up and treatment progresses.    4/5/23:  Feeling slightly better already without increased SOB, clinical CHF or anginal chest pains.  No palpitations or recurrent syncope.  Tolerating current treatments and antibiotics.  Awaiting formal culture reports.  Continue as outlined by medicine and ID etal.  Will follow as work-up and treatment progresses.    4/6/23:  Temp 95.5 this AM and heating blanket in place.  Given a rectal suppository for constipation yesterday.  Creatinine up yesterday and gently hydrated.  Awaiting AM labs.  No increased SOB bugt still has cough but slightly better.  No new cardiac symptoms.  Continue as outlined by medicine and ID etal.  Will follow as treatment progresses.    4/7/23:  Feeling better with less cough and less SOB.  Off the heating blanket.  Creatinine coming down as of yesterday.  Transfused 1 unit PRBC's yesterday for Hgb=7.6.  Awaiting AM labs.  No new cardiac symptoms. Continue as outlined by medicine and ID etal.  Will follow intermittently prn as treatment progresses.  Home when ok with medicine etal.  Dr Garcia will be covering me over the weekend if needed.

## 2023-04-08 LAB
ANION GAP SERPL CALC-SCNC: 5 MMOL/L — SIGNIFICANT CHANGE UP (ref 5–17)
BASOPHILS # BLD AUTO: 0.02 K/UL — SIGNIFICANT CHANGE UP (ref 0–0.2)
BASOPHILS NFR BLD AUTO: 0.2 % — SIGNIFICANT CHANGE UP (ref 0–2)
BUN SERPL-MCNC: 35 MG/DL — HIGH (ref 7–23)
CALCIUM SERPL-MCNC: 8.3 MG/DL — LOW (ref 8.5–10.1)
CHLORIDE SERPL-SCNC: 110 MMOL/L — HIGH (ref 96–108)
CO2 SERPL-SCNC: 23 MMOL/L — SIGNIFICANT CHANGE UP (ref 22–31)
CREAT SERPL-MCNC: 1.41 MG/DL — HIGH (ref 0.5–1.3)
EGFR: 35 ML/MIN/1.73M2 — LOW
EOSINOPHIL # BLD AUTO: 0.07 K/UL — SIGNIFICANT CHANGE UP (ref 0–0.5)
EOSINOPHIL NFR BLD AUTO: 0.8 % — SIGNIFICANT CHANGE UP (ref 0–6)
GLUCOSE SERPL-MCNC: 96 MG/DL — SIGNIFICANT CHANGE UP (ref 70–99)
HCT VFR BLD CALC: 30.2 % — LOW (ref 34.5–45)
HGB BLD-MCNC: 9.9 G/DL — LOW (ref 11.5–15.5)
IMM GRANULOCYTES NFR BLD AUTO: 3.8 % — HIGH (ref 0–0.9)
LYMPHOCYTES # BLD AUTO: 1.24 K/UL — SIGNIFICANT CHANGE UP (ref 1–3.3)
LYMPHOCYTES # BLD AUTO: 14.2 % — SIGNIFICANT CHANGE UP (ref 13–44)
MCHC RBC-ENTMCNC: 29.6 PG — SIGNIFICANT CHANGE UP (ref 27–34)
MCHC RBC-ENTMCNC: 32.8 GM/DL — SIGNIFICANT CHANGE UP (ref 32–36)
MCV RBC AUTO: 90.4 FL — SIGNIFICANT CHANGE UP (ref 80–100)
MONOCYTES # BLD AUTO: 0.71 K/UL — SIGNIFICANT CHANGE UP (ref 0–0.9)
MONOCYTES NFR BLD AUTO: 8.1 % — SIGNIFICANT CHANGE UP (ref 2–14)
NEUTROPHILS # BLD AUTO: 6.38 K/UL — SIGNIFICANT CHANGE UP (ref 1.8–7.4)
NEUTROPHILS NFR BLD AUTO: 72.9 % — SIGNIFICANT CHANGE UP (ref 43–77)
PLATELET # BLD AUTO: 94 K/UL — LOW (ref 150–400)
POTASSIUM SERPL-MCNC: 4 MMOL/L — SIGNIFICANT CHANGE UP (ref 3.5–5.3)
POTASSIUM SERPL-SCNC: 4 MMOL/L — SIGNIFICANT CHANGE UP (ref 3.5–5.3)
RBC # BLD: 3.34 M/UL — LOW (ref 3.8–5.2)
RBC # FLD: 16.1 % — HIGH (ref 10.3–14.5)
SODIUM SERPL-SCNC: 138 MMOL/L — SIGNIFICANT CHANGE UP (ref 135–145)
WBC # BLD: 8.75 K/UL — SIGNIFICANT CHANGE UP (ref 3.8–10.5)
WBC # FLD AUTO: 8.75 K/UL — SIGNIFICANT CHANGE UP (ref 3.8–10.5)

## 2023-04-08 PROCEDURE — 99232 SBSQ HOSP IP/OBS MODERATE 35: CPT

## 2023-04-08 RX ORDER — NYSTATIN 500MM UNIT
500000 POWDER (EA) MISCELLANEOUS
Refills: 0 | Status: DISCONTINUED | OUTPATIENT
Start: 2023-04-08 | End: 2023-04-11

## 2023-04-08 RX ORDER — POLYETHYLENE GLYCOL 3350 17 G/17G
17 POWDER, FOR SOLUTION ORAL
Refills: 0 | Status: DISCONTINUED | OUTPATIENT
Start: 2023-04-08 | End: 2023-04-11

## 2023-04-08 RX ORDER — HEPARIN SODIUM 5000 [USP'U]/ML
5000 INJECTION INTRAVENOUS; SUBCUTANEOUS EVERY 12 HOURS
Refills: 0 | Status: DISCONTINUED | OUTPATIENT
Start: 2023-04-08 | End: 2023-04-08

## 2023-04-08 RX ORDER — HEPARIN SODIUM 5000 [USP'U]/ML
5000 INJECTION INTRAVENOUS; SUBCUTANEOUS EVERY 8 HOURS
Refills: 0 | Status: DISCONTINUED | OUTPATIENT
Start: 2023-04-08 | End: 2023-04-11

## 2023-04-08 RX ADMIN — Medication 50 MILLIGRAM(S): at 22:41

## 2023-04-08 RX ADMIN — ATORVASTATIN CALCIUM 20 MILLIGRAM(S): 80 TABLET, FILM COATED ORAL at 22:40

## 2023-04-08 RX ADMIN — PIPERACILLIN AND TAZOBACTAM 25 GRAM(S): 4; .5 INJECTION, POWDER, LYOPHILIZED, FOR SOLUTION INTRAVENOUS at 22:39

## 2023-04-08 RX ADMIN — Medication 650 MILLIGRAM(S): at 14:48

## 2023-04-08 RX ADMIN — POLYETHYLENE GLYCOL 3350 17 GRAM(S): 17 POWDER, FOR SOLUTION ORAL at 22:39

## 2023-04-08 RX ADMIN — PANTOPRAZOLE SODIUM 40 MILLIGRAM(S): 20 TABLET, DELAYED RELEASE ORAL at 06:44

## 2023-04-08 RX ADMIN — Medication 650 MILLIGRAM(S): at 15:15

## 2023-04-08 RX ADMIN — GABAPENTIN 300 MILLIGRAM(S): 400 CAPSULE ORAL at 09:41

## 2023-04-08 RX ADMIN — Medication 600 MILLIGRAM(S): at 09:36

## 2023-04-08 RX ADMIN — Medication 100 MILLIGRAM(S): at 09:36

## 2023-04-08 RX ADMIN — Medication 25 MILLIGRAM(S): at 22:41

## 2023-04-08 RX ADMIN — Medication 500000 UNIT(S): at 18:46

## 2023-04-08 RX ADMIN — Medication 25 MILLIGRAM(S): at 22:42

## 2023-04-08 RX ADMIN — GABAPENTIN 300 MILLIGRAM(S): 400 CAPSULE ORAL at 22:49

## 2023-04-08 RX ADMIN — Medication 1 MILLIGRAM(S): at 09:36

## 2023-04-08 RX ADMIN — LIDOCAINE 1 PATCH: 4 CREAM TOPICAL at 09:38

## 2023-04-08 RX ADMIN — Medication 25 MILLIGRAM(S): at 09:38

## 2023-04-08 RX ADMIN — POLYETHYLENE GLYCOL 3350 17 GRAM(S): 17 POWDER, FOR SOLUTION ORAL at 09:36

## 2023-04-08 RX ADMIN — Medication 25 MILLIGRAM(S): at 09:36

## 2023-04-08 RX ADMIN — Medication 600 MILLIGRAM(S): at 22:41

## 2023-04-08 RX ADMIN — PIPERACILLIN AND TAZOBACTAM 25 GRAM(S): 4; .5 INJECTION, POWDER, LYOPHILIZED, FOR SOLUTION INTRAVENOUS at 14:43

## 2023-04-08 RX ADMIN — SENNA PLUS 2 TABLET(S): 8.6 TABLET ORAL at 22:41

## 2023-04-08 RX ADMIN — LIDOCAINE 1 PATCH: 4 CREAM TOPICAL at 20:00

## 2023-04-08 RX ADMIN — Medication 2 MILLIGRAM(S): at 22:41

## 2023-04-08 RX ADMIN — Medication 50 MILLIGRAM(S): at 09:37

## 2023-04-08 RX ADMIN — HEPARIN SODIUM 5000 UNIT(S): 5000 INJECTION INTRAVENOUS; SUBCUTANEOUS at 22:39

## 2023-04-08 RX ADMIN — LIDOCAINE 1 PATCH: 4 CREAM TOPICAL at 21:00

## 2023-04-08 RX ADMIN — PIPERACILLIN AND TAZOBACTAM 25 GRAM(S): 4; .5 INJECTION, POWDER, LYOPHILIZED, FOR SOLUTION INTRAVENOUS at 06:44

## 2023-04-08 NOTE — DIETITIAN INITIAL EVALUATION ADULT - [id number]_GEN_A_CORE
I&O's Detail    07 Apr 2023 07:01  -  08 Apr 2023 07:00  --------------------------------------------------------  IN:  Total IN: 0 mL    OUT:    Indwelling Catheter - Urethral (mL): 1225 mL  Total OUT: 1225 mL    Total NET: -1225 mL

## 2023-04-08 NOTE — DIETITIAN INITIAL EVALUATION ADULT - PERTINENT MEDS FT
MEDICATIONS  (STANDING):  albuterol    90 MICROgram(s) HFA Inhaler 1 Puff(s) Inhalation every 4 hours  allopurinol 100 milliGRAM(s) Oral daily  atorvastatin 20 milliGRAM(s) Oral at bedtime  bethanechol 50 milliGRAM(s) Oral two times a day  doxazosin 2 milliGRAM(s) Oral at bedtime  folic acid 1 milliGRAM(s) Oral daily  gabapentin 300 milliGRAM(s) Oral two times a day  guaiFENesin  milliGRAM(s) Oral every 12 hours  hydrALAZINE 25 milliGRAM(s) Oral two times a day  lidocaine   4% Patch 1 Patch Transdermal daily  metoprolol tartrate 25 milliGRAM(s) Oral two times a day  ondansetron Injectable 4 milliGRAM(s) IV Push once  pantoprazole    Tablet 40 milliGRAM(s) Oral before breakfast  piperacillin/tazobactam IVPB.. 3.375 Gram(s) IV Intermittent every 8 hours  polyethylene glycol 3350 17 Gram(s) Oral daily  senna 2 Tablet(s) Oral at bedtime  sodium chloride 0.9%. 1000 milliLiter(s) (50 mL/Hr) IV Continuous <Continuous>    MEDICATIONS  (PRN):  acetaminophen     Tablet .. 650 milliGRAM(s) Oral every 6 hours PRN Temp greater or equal to 38C (100.4F), Mild Pain (1 - 3)  albuterol    0.083% 2.5 milliGRAM(s) Nebulizer every 6 hours PRN Shortness of Breath and/or Wheezing  aluminum hydroxide/magnesium hydroxide/simethicone Suspension 30 milliLiter(s) Oral every 4 hours PRN Dyspepsia  benzonatate 100 milliGRAM(s) Oral every 8 hours PRN Cough  melatonin 3 milliGRAM(s) Oral at bedtime PRN Insomnia

## 2023-04-08 NOTE — PROGRESS NOTE ADULT - SUBJECTIVE AND OBJECTIVE BOX
89 y/o F with PMH severe AS, HFrEF, HTN, HLD, frequent UTIs with urinary retention and chronic pérez, gout, arthritis, aspiration  PNA 3/2023 BIBA to ED for fever TMax 103F, confusion. As per Pts daughter, she was told that last night Pt was confused and was coughing,  febrile, also had episode of vomiting. Pt is awake, known in the hospital, reports currently no SOB, but feels very weak and sleepy. Pt c/o feeling cold, body aches, HA.  Pt denies CP, no abd pain.  As per daughter was started  on Macrobid by PCP.   In ED: Was hypoxic in 80s, stable on NC, rest VS are stable. CXR with b/l infiltrates. Pt was given IV Zosyn and VAnco     4/5 feels better, less sob , comfortable at rest , has cough   4/6 had BM after suppostirory last night, hypothermic 95, requiring warming blanket , now temp 97, awake, comfortable at rest, daughter at bedside, consented to PRBC   4/8 sitting in chair, now on room air, feels better respiratory wise afebrile today, has decreased appetite,still  constipated , had small watery stool today     PHYSICAL EXAM:  General: Well developed; frail elderly; in no acute distress  Eyes:  EOMI; conjunctiva and sclera clear  Head: Normocephalic; atraumatic  ENMT: No nasal discharge; airway clear, dry oral mucosa  Respiratory: Decreased BS at bases b/l, with R crackles  No wheezes  Cardiovascular: Regular rate and rhythm. S1 and S2 Normal; +  murmur  Gastrointestinal: Soft non-tender non-distended; Normal bowel sounds  Genitourinary: No  suprapubic  tenderness, pérez in place   Extremities: No edema  Neurological: Alert and oriented x2-3, mildly encephalopathic, non focal   Musculoskeletal: Normal muscle tone, without deformities    labs reviewed      PHYSICAL EXAM:    Daily     Daily     ICU Vital Signs Last 24 Hrs  T(C): 36.3 (08 Apr 2023 15:35), Max: 36.5 (08 Apr 2023 07:55)  T(F): 97.3 (08 Apr 2023 15:35), Max: 97.7 (08 Apr 2023 07:55)  HR: 66 (08 Apr 2023 15:35) (66 - 71)  BP: 144/55 (08 Apr 2023 15:35) (144/55 - 167/60)  BP(mean): --  ABP: --  ABP(mean): --  RR: 18 (08 Apr 2023 15:35) (18 - 18)  SpO2: 95% (08 Apr 2023 15:35) (91% - 95%)    O2 Parameters below as of 08 Apr 2023 15:35  Patient On (Oxygen Delivery Method): room air                                    9.9    8.75  )-----------( 94       ( 08 Apr 2023 07:00 )             30.2       CBC Full  -  ( 08 Apr 2023 07:00 )  WBC Count : 8.75 K/uL  RBC Count : 3.34 M/uL  Hemoglobin : 9.9 g/dL  Hematocrit : 30.2 %  Platelet Count - Automated : 94 K/uL  Mean Cell Volume : 90.4 fl  Mean Cell Hemoglobin : 29.6 pg  Mean Cell Hemoglobin Concentration : 32.8 gm/dL  Auto Neutrophil # : 6.38 K/uL  Auto Lymphocyte # : 1.24 K/uL  Auto Monocyte # : 0.71 K/uL  Auto Eosinophil # : 0.07 K/uL  Auto Basophil # : 0.02 K/uL  Auto Neutrophil % : 72.9 %  Auto Lymphocyte % : 14.2 %  Auto Monocyte % : 8.1 %  Auto Eosinophil % : 0.8 %  Auto Basophil % : 0.2 %      04-08    138  |  110<H>  |  35<H>  ----------------------------<  96  4.0   |  23  |  1.41<H>    Ca    8.3<L>      08 Apr 2023 07:00                              MEDICATIONS  (STANDING):  albuterol    90 MICROgram(s) HFA Inhaler 1 Puff(s) Inhalation every 4 hours  allopurinol 100 milliGRAM(s) Oral daily  atorvastatin 20 milliGRAM(s) Oral at bedtime  bethanechol 50 milliGRAM(s) Oral two times a day  doxazosin 2 milliGRAM(s) Oral at bedtime  folic acid 1 milliGRAM(s) Oral daily  gabapentin 300 milliGRAM(s) Oral two times a day  guaiFENesin  milliGRAM(s) Oral every 12 hours  heparin   Injectable 5000 Unit(s) SubCutaneous every 8 hours  hydrALAZINE 25 milliGRAM(s) Oral two times a day  lidocaine   4% Patch 1 Patch Transdermal daily  metoprolol tartrate 25 milliGRAM(s) Oral two times a day  nystatin    Suspension 950023 Unit(s) Oral four times a day  ondansetron Injectable 4 milliGRAM(s) IV Push once  pantoprazole    Tablet 40 milliGRAM(s) Oral before breakfast  piperacillin/tazobactam IVPB.. 3.375 Gram(s) IV Intermittent every 8 hours  polyethylene glycol 3350 17 Gram(s) Oral two times a day  senna 2 Tablet(s) Oral at bedtime  sodium chloride 0.9%. 1000 milliLiter(s) (50 mL/Hr) IV Continuous <Continuous>

## 2023-04-08 NOTE — DIETITIAN INITIAL EVALUATION ADULT - OTHER INFO
89 y/o F with PMH severe AS, HFrEF, HTN, HLD, frequent UTIs with urinary retention and chronic pérez, gout, arthritis, aspiration  PNA 3/2023 BIBA to ED for fever TMax 103F, confusion. As per Pts daughter, she was told that last night Pt was confused and was coughing,  febrile, also had episode of vomiting. Pt is awake, known in the hospital, reports currently no SOB, but feels very weak and sleepy. Pt c/o feeling cold, body aches, HA.      As per pt, ate large breakfast meal today. States she ate more than she typically does. Reports poor appetite normally. Pt seen previously by RD and met criteria for mod PCM. SLP eval on 4/4 rec'd soft and bite-sized diet, current on easy to chew. Would rec'd to change diet as per SLP recs. Pt willing to trial premier protein shakes BID. Current NFPE reveals varied degrees of muscle/ fat wasting; continues to meet criteria for PCM. Reports UBW ~160-158#. Denies wt changes. Wt on 2/27 at 159#. RD took new bed scale wt on 4/8 at 155#. Would suggest to Confirm goals of care regarding nutrition support - Nutrition support is not recommended due to overall declining medical status/ advanced age which evidenced based studies indicate EN is not effective in prolonging survival and improving quality of life. It can also increase risk of aspiration pneumonia as well as other related issues. RD encouraged compliance w/ ONS and protein-rich foods, pt is receptive. See other recs below.

## 2023-04-08 NOTE — DIETITIAN INITIAL EVALUATION ADULT - ADD RECOMMEND
1) Maintain aspiration precautions, back of bed >45 degrees, 2) change diet rx to soft and bite-sized as per SLP recs, 3) Add premier protein shakes BID to optimize PO intake (provides 150 kcal, 30g protein/ shake), 4) Encourage protein-rich foods, maximize food preferences, 5) MVI w/ minerals daily to ensure 100% RDA met, 6) Consider adding thiamine 100 mg daily 2/2 poor PO intake/ malnutrition, 7) Obtain vitamin D 25OH level to assess nutriture, 8) monitor lytes/ min and replete prn, 9) Monitor bowel movements, if no BM for >3 days, consider implementing bowel regimen, 10) Confirm goals of care regarding nutrition support - Nutrition support is not recommended due to overall declining medical status which evidenced based studies indicate EN is not effective in prolonging survival and improving quality of life. It can also increase risk of aspiration pneumonia as well as other related issues. However, will provide nutrition/ hydration within GOC, 11) Consider adding appetite stimulant such as Remeron or Marinol 2/2 chronically poor appetite/ PO intake. RD will continue to monitor PO intake, labs, hydration, and wt prn.

## 2023-04-08 NOTE — PROGRESS NOTE ADULT - ASSESSMENT
89 y/o F with PMH severe AS, HFrEF, HTN, HLD, frequent UTIs with urinary retention and chronic pérez, gout, arthritis, aspiration PNA admitted for:    PROBLEMS:    Acute Hypoxic Respiratory Failure due to B/L  PNA  Chronic anemia and thrombocytopenia further worsening likely consumptional  from sepsis and zosyn-low probability for HIT  Recent  history of  COVID  Suspected UTI assocated with Chronic Pérez-UA + 3  Chronic diastolic CHF-severe AS  CKD/HTN/Metabolic  encephalopathy     PLAN:    pulmonary stable  Monitor pulse OX-Supplement  O2 via NC prn  S/p Vanco/ IV Zosyn   Blood cx and Ucx neg  Tylenol PRN   Mucinex, Tessalon   Albuterol PRN   Possible aspiration?  Speech and swallow  eval   Monitor Hb/hct  DVT PPX-heparin SQ

## 2023-04-08 NOTE — DIETITIAN INITIAL EVALUATION ADULT - PERTINENT LABORATORY DATA
04-08    138  |  110<H>  |  35<H>  ----------------------------<  96  4.0   |  23  |  1.41<H>    Ca    8.3<L>      08 Apr 2023 07:00

## 2023-04-08 NOTE — DIETITIAN INITIAL EVALUATION ADULT - ORAL INTAKE PTA/DIET HISTORY
DTR reports pt has had poor PO intake/ appetite and constipation. As per pt, she eats small meals and her DTR is very concerned about her poor PO intake. Endorses difficulty chewing.

## 2023-04-08 NOTE — PROGRESS NOTE ADULT - SUBJECTIVE AND OBJECTIVE BOX
Subjective:    pat sitting in chair, no respiratory complaint, on RA.    Home Medications:  allopurinol 100 mg oral tablet: 1 tab(s) orally once a day (04 Apr 2023 11:16)  amLODIPine 10 mg oral tablet: 1 tab(s) orally once a day (04 Apr 2023 11:16)  benzonatate 100 mg oral capsule: 1 cap(s) orally every 8 hours, As needed, Cough (04 Apr 2023 11:16)  bethanechol 50 mg oral tablet: 1 tab(s) orally 2 times a day (04 Apr 2023 11:16)  Colace 100 mg oral capsule: 1 cap(s) orally once a day (at bedtime) (04 Apr 2023 11:16)  dextromethorphan-guaifenesin 10 mg-100 mg/5 mL oral liquid: 10 milliliter(s) orally every 6 hours, As needed, Cough (04 Apr 2023 11:16)  doxazosin 2 mg oral tablet: 1 tab(s) orally once a day (at bedtime) (04 Apr 2023 11:16)  folic acid 1 mg oral tablet: 1 tab(s) orally once a day (04 Apr 2023 11:16)  furosemide 20 mg oral tablet: 1 tab(s) orally once a day (04 Apr 2023 11:16)  hydrALAZINE 25 mg oral tablet: 1 tab(s) orally 2 times a day (04 Apr 2023 11:16)  lidocaine 5% patch: apply topically to affected area once daily as needed (04 Apr 2023 11:16)  metoprolol tartrate 25 mg oral tablet: 1 tab(s) orally 2 times a day (04 Apr 2023 11:16)  Neurontin 300 mg oral capsule: 2 cap(s) orally 2 times a day (04 Apr 2023 11:16)  nitrofurantoin macrocrystals-monohydrate 100 mg oral capsule: 1 cap(s) orally 2 times a day x 7 days ***Course Not Complete*** (04 Apr 2023 11:19)  rosuvastatin 5 mg oral tablet: 1 tab(s) orally once a day (at bedtime) (04 Apr 2023 11:16)  senna leaf extract oral tablet: 2 tab(s) orally once a day (at bedtime) (04 Apr 2023 11:16)  Tylenol Extra Strength 500 mg oral tablet: 1 tab(s) orally every 6 hours, As Needed for pain (04 Apr 2023 11:16)  Vitamin D3 1000 intl units (25 mcg) oral tablet: 1 cap(s) orally once a day (04 Apr 2023 11:16)    MEDICATIONS  (STANDING):  albuterol    90 MICROgram(s) HFA Inhaler 1 Puff(s) Inhalation every 4 hours  allopurinol 100 milliGRAM(s) Oral daily  atorvastatin 20 milliGRAM(s) Oral at bedtime  bethanechol 50 milliGRAM(s) Oral two times a day  doxazosin 2 milliGRAM(s) Oral at bedtime  folic acid 1 milliGRAM(s) Oral daily  gabapentin 300 milliGRAM(s) Oral two times a day  guaiFENesin  milliGRAM(s) Oral every 12 hours  hydrALAZINE 25 milliGRAM(s) Oral two times a day  lidocaine   4% Patch 1 Patch Transdermal daily  metoprolol tartrate 25 milliGRAM(s) Oral two times a day  ondansetron Injectable 4 milliGRAM(s) IV Push once  pantoprazole    Tablet 40 milliGRAM(s) Oral before breakfast  piperacillin/tazobactam IVPB.. 3.375 Gram(s) IV Intermittent every 8 hours  polyethylene glycol 3350 17 Gram(s) Oral daily  senna 2 Tablet(s) Oral at bedtime  sodium chloride 0.9%. 1000 milliLiter(s) (50 mL/Hr) IV Continuous <Continuous>    MEDICATIONS  (PRN):  acetaminophen     Tablet .. 650 milliGRAM(s) Oral every 6 hours PRN Temp greater or equal to 38C (100.4F), Mild Pain (1 - 3)  albuterol    0.083% 2.5 milliGRAM(s) Nebulizer every 6 hours PRN Shortness of Breath and/or Wheezing  aluminum hydroxide/magnesium hydroxide/simethicone Suspension 30 milliLiter(s) Oral every 4 hours PRN Dyspepsia  benzonatate 100 milliGRAM(s) Oral every 8 hours PRN Cough  melatonin 3 milliGRAM(s) Oral at bedtime PRN Insomnia      Allergies    Ceftin (Hives; Rash)    Intolerances        Vital Signs Last 24 Hrs  T(C): 36.5 (08 Apr 2023 07:55), Max: 36.5 (08 Apr 2023 07:55)  T(F): 97.7 (08 Apr 2023 07:55), Max: 97.7 (08 Apr 2023 07:55)  HR: 69 (08 Apr 2023 07:55) (68 - 71)  BP: 149/58 (08 Apr 2023 07:55) (149/58 - 167/60)  BP(mean): --  RR: 18 (08 Apr 2023 07:55) (18 - 19)  SpO2: 94% (08 Apr 2023 07:55) (91% - 94%)    Parameters below as of 08 Apr 2023 07:55  Patient On (Oxygen Delivery Method): room air          PHYSICAL EXAMINATION:    NECK:  Supple. No lymphadenopathy. Jugular venous pressure not elevated. Carotids equal.   HEART:   The cardiac impulse has a normal quality. Reg., Nl S1 and S2.  There are no murmurs, rubs or gallops noted  CHEST:  Chest crackles to auscultation. Normal respiratory effort.  ABDOMEN:  Soft and nontender.   EXTREMITIES:  There is no edema.       LABS:                        9.9    8.75  )-----------( 94       ( 08 Apr 2023 07:00 )             30.2     04-08    138  |  110<H>  |  35<H>  ----------------------------<  96  4.0   |  23  |  1.41<H>    Ca    8.3<L>      08 Apr 2023 07:00

## 2023-04-08 NOTE — PROGRESS NOTE ADULT - ASSESSMENT
4/8 pérez catheter changed in ED as per RN  on IV zosyn for PNA and UTI   thrombocytopneia from PNA improving - HIT abs pending , ok to start heparin SC i dw with heme as low probability for HIT  PPI for gerd  constipation - miralax and senna  oral thrush- nystatin       89 y/o F with PMH severe AS, HFrEF, HTN, HLD, frequent UTIs with urinary retention and chronic pérez, gout, arthritis, aspiration  PNA admitted for:      1. Acute Hypoxic Respiratory Failure due to B/L  PNA  admit to med surg  monitor pulse oc  Supplement  O2 via NC  S/p Vanco  and Zosyn in ED   cefepime   blood cx and Ucx neg  Tylenol PRN   Mucinex, Tessalon   Albuterol PRN   Possible aspiration?  Speech and swallow  eval     # chronic anemia and thrombocytopenia further worsening likely consumptional  from sepsis and zosyn , r/o HIT although low probability for HIT  PRBC tranfuse with lasix - dw with cardio  cw zosyn per ID - PLT stabilizing, pt allergic to ceftin  check HIT abs  4Tscore 3  heme eval    # recent  history of  COVID- ruled out acute covid infection      2.  Suspected UTI assocated with Chronic Pérez   UA +   Past UCX reviewed Started on Zosyn   ID recs appreciated       3.  chronic diastolic CHF,  severe AS  Clinically dehydrated, min PO intake and UO   Strict is and OS   will give PRBC with lasix ,consider resuming  standing  lasix in 24 to 48hrs  re eval daily   Check BNP   F/u with cardio outPt     4. CKD 4. Dehydration   baseline CR around 1.6  renal Fx stable     LAbs in am       5. HTN   Monitor BP  C/w Metoprolol, Hydralazine   Hold amlodipine for now     6. Metabolic  encephalopathy   likely 2/2 acute illness   Supportive  care     7. DVT PPX:  heparin SQ     8. ACP:  full code   I left message for daughter Ms crawford 4/5

## 2023-04-08 NOTE — DIETITIAN INITIAL EVALUATION ADULT - ETIOLOGY
r/t decreased ability to meet increased nutrient needs 2/2 PNA on poor appetite/ chewing difficulties

## 2023-04-09 LAB
ANION GAP SERPL CALC-SCNC: 4 MMOL/L — LOW (ref 5–17)
BASOPHILS # BLD AUTO: 0.03 K/UL — SIGNIFICANT CHANGE UP (ref 0–0.2)
BASOPHILS NFR BLD AUTO: 0.5 % — SIGNIFICANT CHANGE UP (ref 0–2)
BUN SERPL-MCNC: 27 MG/DL — HIGH (ref 7–23)
CALCIUM SERPL-MCNC: 8.5 MG/DL — SIGNIFICANT CHANGE UP (ref 8.5–10.1)
CHLORIDE SERPL-SCNC: 113 MMOL/L — HIGH (ref 96–108)
CO2 SERPL-SCNC: 23 MMOL/L — SIGNIFICANT CHANGE UP (ref 22–31)
CREAT SERPL-MCNC: 1.38 MG/DL — HIGH (ref 0.5–1.3)
CULTURE RESULTS: SIGNIFICANT CHANGE UP
CULTURE RESULTS: SIGNIFICANT CHANGE UP
EGFR: 36 ML/MIN/1.73M2 — LOW
EOSINOPHIL # BLD AUTO: 0.06 K/UL — SIGNIFICANT CHANGE UP (ref 0–0.5)
EOSINOPHIL NFR BLD AUTO: 0.9 % — SIGNIFICANT CHANGE UP (ref 0–6)
GLUCOSE SERPL-MCNC: 106 MG/DL — HIGH (ref 70–99)
GLYCOPROTEIN IV ANTIBODY: NEGATIVE — SIGNIFICANT CHANGE UP
HCT VFR BLD CALC: 31.1 % — LOW (ref 34.5–45)
HGB BLD-MCNC: 10 G/DL — LOW (ref 11.5–15.5)
HLA AB SER QL IA: POSITIVE
IMM GRANULOCYTES NFR BLD AUTO: 5 % — HIGH (ref 0–0.9)
LYMPHOCYTES # BLD AUTO: 1.43 K/UL — SIGNIFICANT CHANGE UP (ref 1–3.3)
LYMPHOCYTES # BLD AUTO: 22.5 % — SIGNIFICANT CHANGE UP (ref 13–44)
MCHC RBC-ENTMCNC: 29.9 PG — SIGNIFICANT CHANGE UP (ref 27–34)
MCHC RBC-ENTMCNC: 32.2 GM/DL — SIGNIFICANT CHANGE UP (ref 32–36)
MCV RBC AUTO: 93.1 FL — SIGNIFICANT CHANGE UP (ref 80–100)
MONOCYTES # BLD AUTO: 0.58 K/UL — SIGNIFICANT CHANGE UP (ref 0–0.9)
MONOCYTES NFR BLD AUTO: 9.1 % — SIGNIFICANT CHANGE UP (ref 2–14)
NEUTROPHILS # BLD AUTO: 3.94 K/UL — SIGNIFICANT CHANGE UP (ref 1.8–7.4)
NEUTROPHILS NFR BLD AUTO: 62 % — SIGNIFICANT CHANGE UP (ref 43–77)
PLAT GP IA/IIA AB SER QL IA: NEGATIVE — SIGNIFICANT CHANGE UP
PLAT GP IB/IX AB SER QL IA: NEGATIVE — SIGNIFICANT CHANGE UP
PLAT GP IIB/IIIA AB SER QL IA: POSITIVE
PLATELET # BLD AUTO: 94 K/UL — LOW (ref 150–400)
POTASSIUM SERPL-MCNC: 4.3 MMOL/L — SIGNIFICANT CHANGE UP (ref 3.5–5.3)
POTASSIUM SERPL-SCNC: 4.3 MMOL/L — SIGNIFICANT CHANGE UP (ref 3.5–5.3)
RBC # BLD: 3.34 M/UL — LOW (ref 3.8–5.2)
RBC # FLD: 15.9 % — HIGH (ref 10.3–14.5)
SODIUM SERPL-SCNC: 140 MMOL/L — SIGNIFICANT CHANGE UP (ref 135–145)
SPECIMEN SOURCE: SIGNIFICANT CHANGE UP
SPECIMEN SOURCE: SIGNIFICANT CHANGE UP
WBC # BLD: 6.36 K/UL — SIGNIFICANT CHANGE UP (ref 3.8–10.5)
WBC # FLD AUTO: 6.36 K/UL — SIGNIFICANT CHANGE UP (ref 3.8–10.5)

## 2023-04-09 PROCEDURE — 99232 SBSQ HOSP IP/OBS MODERATE 35: CPT

## 2023-04-09 RX ADMIN — Medication 500000 UNIT(S): at 11:01

## 2023-04-09 RX ADMIN — LIDOCAINE 1 PATCH: 4 CREAM TOPICAL at 21:32

## 2023-04-09 RX ADMIN — Medication 50 MILLIGRAM(S): at 21:17

## 2023-04-09 RX ADMIN — Medication 600 MILLIGRAM(S): at 09:50

## 2023-04-09 RX ADMIN — Medication 25 MILLIGRAM(S): at 09:50

## 2023-04-09 RX ADMIN — HEPARIN SODIUM 5000 UNIT(S): 5000 INJECTION INTRAVENOUS; SUBCUTANEOUS at 06:30

## 2023-04-09 RX ADMIN — LIDOCAINE 1 PATCH: 4 CREAM TOPICAL at 09:52

## 2023-04-09 RX ADMIN — Medication 500000 UNIT(S): at 23:22

## 2023-04-09 RX ADMIN — Medication 500000 UNIT(S): at 18:33

## 2023-04-09 RX ADMIN — Medication 100 MILLIGRAM(S): at 10:28

## 2023-04-09 RX ADMIN — PIPERACILLIN AND TAZOBACTAM 25 GRAM(S): 4; .5 INJECTION, POWDER, LYOPHILIZED, FOR SOLUTION INTRAVENOUS at 06:25

## 2023-04-09 RX ADMIN — Medication 650 MILLIGRAM(S): at 12:02

## 2023-04-09 RX ADMIN — Medication 25 MILLIGRAM(S): at 21:16

## 2023-04-09 RX ADMIN — Medication 25 MILLIGRAM(S): at 21:17

## 2023-04-09 RX ADMIN — GABAPENTIN 300 MILLIGRAM(S): 400 CAPSULE ORAL at 09:49

## 2023-04-09 RX ADMIN — GABAPENTIN 300 MILLIGRAM(S): 400 CAPSULE ORAL at 21:16

## 2023-04-09 RX ADMIN — HEPARIN SODIUM 5000 UNIT(S): 5000 INJECTION INTRAVENOUS; SUBCUTANEOUS at 21:15

## 2023-04-09 RX ADMIN — Medication 1 MILLIGRAM(S): at 09:50

## 2023-04-09 RX ADMIN — HEPARIN SODIUM 5000 UNIT(S): 5000 INJECTION INTRAVENOUS; SUBCUTANEOUS at 13:57

## 2023-04-09 RX ADMIN — Medication 2 MILLIGRAM(S): at 21:16

## 2023-04-09 RX ADMIN — Medication 500000 UNIT(S): at 06:26

## 2023-04-09 RX ADMIN — ATORVASTATIN CALCIUM 20 MILLIGRAM(S): 80 TABLET, FILM COATED ORAL at 21:16

## 2023-04-09 RX ADMIN — PIPERACILLIN AND TAZOBACTAM 25 GRAM(S): 4; .5 INJECTION, POWDER, LYOPHILIZED, FOR SOLUTION INTRAVENOUS at 21:25

## 2023-04-09 RX ADMIN — POLYETHYLENE GLYCOL 3350 17 GRAM(S): 17 POWDER, FOR SOLUTION ORAL at 09:49

## 2023-04-09 RX ADMIN — SENNA PLUS 2 TABLET(S): 8.6 TABLET ORAL at 21:17

## 2023-04-09 RX ADMIN — Medication 25 MILLIGRAM(S): at 09:49

## 2023-04-09 RX ADMIN — Medication 650 MILLIGRAM(S): at 11:02

## 2023-04-09 RX ADMIN — PANTOPRAZOLE SODIUM 40 MILLIGRAM(S): 20 TABLET, DELAYED RELEASE ORAL at 06:30

## 2023-04-09 RX ADMIN — Medication 3 MILLIGRAM(S): at 21:16

## 2023-04-09 RX ADMIN — PIPERACILLIN AND TAZOBACTAM 25 GRAM(S): 4; .5 INJECTION, POWDER, LYOPHILIZED, FOR SOLUTION INTRAVENOUS at 13:57

## 2023-04-09 RX ADMIN — Medication 500000 UNIT(S): at 02:34

## 2023-04-09 RX ADMIN — LIDOCAINE 1 PATCH: 4 CREAM TOPICAL at 20:16

## 2023-04-09 RX ADMIN — POLYETHYLENE GLYCOL 3350 17 GRAM(S): 17 POWDER, FOR SOLUTION ORAL at 21:16

## 2023-04-09 RX ADMIN — Medication 600 MILLIGRAM(S): at 21:17

## 2023-04-09 RX ADMIN — Medication 50 MILLIGRAM(S): at 09:50

## 2023-04-09 NOTE — PROGRESS NOTE ADULT - SUBJECTIVE AND OBJECTIVE BOX
89 y/o F with PMH severe AS, HFrEF, HTN, HLD, frequent UTIs with urinary retention and chronic pérez, gout, arthritis, aspiration  PNA 3/2023 BIBA to ED for fever TMax 103F, confusion. As per Pts daughter, she was told that last night Pt was confused and was coughing,  febrile, also had episode of vomiting. Pt is awake, known in the hospital, reports currently no SOB, but feels very weak and sleepy. Pt c/o feeling cold, body aches, HA.  Pt denies CP, no abd pain.  As per daughter was started  on Macrobid by PCP.   In ED: Was hypoxic in 80s, stable on NC, rest VS are stable. CXR with b/l infiltrates. Pt was given IV Zosyn and VAnco     4/5 feels better, less sob , comfortable at rest , has cough   4/6 had BM after suppostirory last night, hypothermic 95, requiring warming blanket , now temp 97, awake, comfortable at rest, daughter at bedside, consented to PRBC   4/8 sitting in chair, now on room air, feels better respiratory wise afebrile today, has decreased appetite,still  constipated , had small watery stool today   4/9 sitting in chair , tolerating po , respiratory wise feels well, no sob, had small BM today, nonbloody BM,       PHYSICAL EXAM:    Daily     Daily     ICU Vital Signs Last 24 Hrs  T(C): 36.4 (09 Apr 2023 15:13), Max: 36.4 (09 Apr 2023 07:42)  T(F): 97.5 (09 Apr 2023 15:13), Max: 97.5 (09 Apr 2023 07:42)  HR: 64 (09 Apr 2023 15:13) (64 - 78)  BP: 151/54 (09 Apr 2023 15:13) (146/51 - 164/64)  BP(mean): --  ABP: --  ABP(mean): --  RR: 17 (09 Apr 2023 15:13) (17 - 18)  SpO2: 95% (09 Apr 2023 15:13) (94% - 95%)    O2 Parameters below as of 09 Apr 2023 15:13  Patient On (Oxygen Delivery Method): room air  PHYSICAL EXAM:  General: Well developed; frail elderly; in no acute distress  Eyes:  EOMI; conjunctiva and sclera clear  Head: Normocephalic; atraumatic  ENMT: No nasal discharge; airway clear, dry oral mucosa  Respiratory: crackles at lung bases   No wheezes  Cardiovascular: Regular rate and rhythm. S1 and S2 Normal; +  murmur  Gastrointestinal: Soft non-tender  Normal bowel sounds  Genitourinary: No  suprapubic  tenderness, pérez in place   Extremities: No edema  Neurological: Alert and oriented x2-3, mildly encephalopathic, non focal   Musculoskeletal: Normal muscle tone, without deformities    labs reviewed                                    10.0   6.36  )-----------( 94       ( 09 Apr 2023 06:57 )             31.1       CBC Full  -  ( 09 Apr 2023 06:57 )  WBC Count : 6.36 K/uL  RBC Count : 3.34 M/uL  Hemoglobin : 10.0 g/dL  Hematocrit : 31.1 %  Platelet Count - Automated : 94 K/uL  Mean Cell Volume : 93.1 fl  Mean Cell Hemoglobin : 29.9 pg  Mean Cell Hemoglobin Concentration : 32.2 gm/dL  Auto Neutrophil # : 3.94 K/uL  Auto Lymphocyte # : 1.43 K/uL  Auto Monocyte # : 0.58 K/uL  Auto Eosinophil # : 0.06 K/uL  Auto Basophil # : 0.03 K/uL  Auto Neutrophil % : 62.0 %  Auto Lymphocyte % : 22.5 %  Auto Monocyte % : 9.1 %  Auto Eosinophil % : 0.9 %  Auto Basophil % : 0.5 %      04-09    140  |  113<H>  |  27<H>  ----------------------------<  106<H>  4.3   |  23  |  1.38<H>    Ca    8.5      09 Apr 2023 06:57                              MEDICATIONS  (STANDING):  albuterol    90 MICROgram(s) HFA Inhaler 1 Puff(s) Inhalation every 4 hours  allopurinol 100 milliGRAM(s) Oral daily  atorvastatin 20 milliGRAM(s) Oral at bedtime  bethanechol 50 milliGRAM(s) Oral two times a day  doxazosin 2 milliGRAM(s) Oral at bedtime  folic acid 1 milliGRAM(s) Oral daily  gabapentin 300 milliGRAM(s) Oral two times a day  guaiFENesin  milliGRAM(s) Oral every 12 hours  heparin   Injectable 5000 Unit(s) SubCutaneous every 8 hours  hydrALAZINE 25 milliGRAM(s) Oral two times a day  lidocaine   4% Patch 1 Patch Transdermal daily  metoprolol tartrate 25 milliGRAM(s) Oral two times a day  nystatin    Suspension 386703 Unit(s) Oral four times a day  ondansetron Injectable 4 milliGRAM(s) IV Push once  pantoprazole    Tablet 40 milliGRAM(s) Oral before breakfast  piperacillin/tazobactam IVPB.. 3.375 Gram(s) IV Intermittent every 8 hours  polyethylene glycol 3350 17 Gram(s) Oral two times a day  senna 2 Tablet(s) Oral at bedtime  sodium chloride 0.9%. 1000 milliLiter(s) (50 mL/Hr) IV Continuous <Continuous>

## 2023-04-09 NOTE — PROGRESS NOTE ADULT - ASSESSMENT
89 y/o F with PMH severe AS, HFrEF, HTN, HLD, frequent UTIs with urinary retention and chronic pérez, gout, arthritis, aspiration PNA admitted for:    PROBLEMS:    Acute Hypoxic Respiratory Failure due to B/L  PNA  Chronic anemia and thrombocytopenia further worsening likely consumptional  from sepsis and zosyn-low probability for HIT  Recent  history of  COVID  Suspected UTI assocated with Chronic Pérez-UA + 3  Chronic diastolic CHF-severe AS  CKD/HTN/Metabolic  encephalopathy     PLAN:    pulmonary stable- decd planning  Monitor pulse OX-Supplement  O2 via NC prn  S/p Vanco/ IV Zosyn   Blood cx and Ucx neg  Tylenol PRN   Mucinex, Tessalon   Albuterol PRN   Possible aspiration?  Speech and swallow  eval   Monitor Hb/hct  DVT PPX-heparin SQ

## 2023-04-09 NOTE — PROGRESS NOTE ADULT - SUBJECTIVE AND OBJECTIVE BOX
Subjective:    pat better, sitting in chair, no new complaints.    Home Medications:  allopurinol 100 mg oral tablet: 1 tab(s) orally once a day (04 Apr 2023 11:16)  amLODIPine 10 mg oral tablet: 1 tab(s) orally once a day (04 Apr 2023 11:16)  benzonatate 100 mg oral capsule: 1 cap(s) orally every 8 hours, As needed, Cough (04 Apr 2023 11:16)  bethanechol 50 mg oral tablet: 1 tab(s) orally 2 times a day (04 Apr 2023 11:16)  Colace 100 mg oral capsule: 1 cap(s) orally once a day (at bedtime) (04 Apr 2023 11:16)  dextromethorphan-guaifenesin 10 mg-100 mg/5 mL oral liquid: 10 milliliter(s) orally every 6 hours, As needed, Cough (04 Apr 2023 11:16)  doxazosin 2 mg oral tablet: 1 tab(s) orally once a day (at bedtime) (04 Apr 2023 11:16)  folic acid 1 mg oral tablet: 1 tab(s) orally once a day (04 Apr 2023 11:16)  furosemide 20 mg oral tablet: 1 tab(s) orally once a day (04 Apr 2023 11:16)  hydrALAZINE 25 mg oral tablet: 1 tab(s) orally 2 times a day (04 Apr 2023 11:16)  lidocaine 5% patch: apply topically to affected area once daily as needed (04 Apr 2023 11:16)  metoprolol tartrate 25 mg oral tablet: 1 tab(s) orally 2 times a day (04 Apr 2023 11:16)  Neurontin 300 mg oral capsule: 2 cap(s) orally 2 times a day (04 Apr 2023 11:16)  nitrofurantoin macrocrystals-monohydrate 100 mg oral capsule: 1 cap(s) orally 2 times a day x 7 days ***Course Not Complete*** (04 Apr 2023 11:19)  rosuvastatin 5 mg oral tablet: 1 tab(s) orally once a day (at bedtime) (04 Apr 2023 11:16)  senna leaf extract oral tablet: 2 tab(s) orally once a day (at bedtime) (04 Apr 2023 11:16)  Tylenol Extra Strength 500 mg oral tablet: 1 tab(s) orally every 6 hours, As Needed for pain (04 Apr 2023 11:16)  Vitamin D3 1000 intl units (25 mcg) oral tablet: 1 cap(s) orally once a day (04 Apr 2023 11:16)    MEDICATIONS  (STANDING):  albuterol    90 MICROgram(s) HFA Inhaler 1 Puff(s) Inhalation every 4 hours  allopurinol 100 milliGRAM(s) Oral daily  atorvastatin 20 milliGRAM(s) Oral at bedtime  bethanechol 50 milliGRAM(s) Oral two times a day  doxazosin 2 milliGRAM(s) Oral at bedtime  folic acid 1 milliGRAM(s) Oral daily  gabapentin 300 milliGRAM(s) Oral two times a day  guaiFENesin  milliGRAM(s) Oral every 12 hours  heparin   Injectable 5000 Unit(s) SubCutaneous every 8 hours  hydrALAZINE 25 milliGRAM(s) Oral two times a day  lidocaine   4% Patch 1 Patch Transdermal daily  metoprolol tartrate 25 milliGRAM(s) Oral two times a day  nystatin    Suspension 289636 Unit(s) Oral four times a day  ondansetron Injectable 4 milliGRAM(s) IV Push once  pantoprazole    Tablet 40 milliGRAM(s) Oral before breakfast  piperacillin/tazobactam IVPB.. 3.375 Gram(s) IV Intermittent every 8 hours  polyethylene glycol 3350 17 Gram(s) Oral two times a day  senna 2 Tablet(s) Oral at bedtime  sodium chloride 0.9%. 1000 milliLiter(s) (50 mL/Hr) IV Continuous <Continuous>    MEDICATIONS  (PRN):  acetaminophen     Tablet .. 650 milliGRAM(s) Oral every 6 hours PRN Temp greater or equal to 38C (100.4F), Mild Pain (1 - 3)  albuterol    0.083% 2.5 milliGRAM(s) Nebulizer every 6 hours PRN Shortness of Breath and/or Wheezing  aluminum hydroxide/magnesium hydroxide/simethicone Suspension 30 milliLiter(s) Oral every 4 hours PRN Dyspepsia  benzonatate 100 milliGRAM(s) Oral every 8 hours PRN Cough  melatonin 3 milliGRAM(s) Oral at bedtime PRN Insomnia      Allergies    Ceftin (Hives; Rash)    Intolerances        Vital Signs Last 24 Hrs  T(C): 36.4 (09 Apr 2023 07:42), Max: 36.4 (09 Apr 2023 07:42)  T(F): 97.5 (09 Apr 2023 07:42), Max: 97.5 (09 Apr 2023 07:42)  HR: 78 (09 Apr 2023 09:47) (66 - 78)  BP: 146/51 (09 Apr 2023 09:47) (144/55 - 164/64)  BP(mean): --  RR: 18 (09 Apr 2023 07:42) (18 - 18)  SpO2: 94% (09 Apr 2023 07:42) (94% - 95%)    Parameters below as of 09 Apr 2023 07:42  Patient On (Oxygen Delivery Method): room air          PHYSICAL EXAMINATION:    NECK:  Supple. No lymphadenopathy. Jugular venous pressure not elevated. Carotids equal.   HEART:   The cardiac impulse has a normal quality. Reg., Nl S1 and S2.  There are no murmurs, rubs or gallops noted  CHEST:  Chest crackles to auscultation. Normal respiratory effort.  ABDOMEN:  Soft and nontender.   EXTREMITIES:  There is no edema.       LABS:                        10.0   6.36  )-----------( 94       ( 09 Apr 2023 06:57 )             31.1     04-09    140  |  113<H>  |  27<H>  ----------------------------<  106<H>  4.3   |  23  |  1.38<H>    Ca    8.5      09 Apr 2023 06:57

## 2023-04-09 NOTE — PROGRESS NOTE ADULT - ASSESSMENT
89 y/o F with PMH severe AS, HFrEF, HTN, HLD, frequent UTIs with urinary retention and chronic pérez, gout, arthritis, aspiration  PNA admitted for:       Acute Hypoxic Respiratory Failure due to B/L  PNA possible aspiration PNA  . UTI with ENFA/KLOX associated with indwelling pérez catheter  recent hx covid   S/p Vanco  and Zosyn in ED   on zosyn #6 - complete total of 7 days of abx   pérez changed in ED as per RN   blood cx neg  Tylenol PRN   Mucinex, Tessalon   Albuterol PRN   Speech and swallow  soft bite sized    # chronic anemia and thrombocytopenia further worsening likely consumptional  from sepsis and zosyn , r/o HIT although low probability for HIT  PRBC tranfused with lasix - dw with cardio in lieu of hx AS  cw zosyn per ID - PLT stabilizing, pt allergic to ceftin  -fibrinogen, b12, folate all adequate  check HIT abs  ok to resume heparin sc per heme as probability low   heme eval appreciated     # prior COVID- ruled out acute covid infection    # Oral thrush   nystatin started    #constipation  had small BM today feels better  started miralax , senna   had BM 4 days ago s/p bisacodyl suppository- but does not want suppository again as it caused her abdominal spasms    #chronic diastolic CHF compensated ,  severe AS asymptomatic  stable    CKD 4. stable   baseline CR around 1.6  renal Fx stable       HTN   Monitor BP  C/w Metoprolol, Hydralazine   Hold amlodipine for now      Metabolic  encephalopathy resolved    likely 2/2 acute illness   Supportive  care     7. DVT PPX:  heparin SQ     8. ACP:  full code   i dw with son  who is MD and daughters 4/8    Dispo-  discharge planning once works with PT and constipation resolves may be in 24 to 48 hrs  , f/u with heme re heme w/u ordered

## 2023-04-10 ENCOUNTER — TRANSCRIPTION ENCOUNTER (OUTPATIENT)
Age: 88
End: 2023-04-10

## 2023-04-10 ENCOUNTER — NON-APPOINTMENT (OUTPATIENT)
Age: 88
End: 2023-04-10

## 2023-04-10 LAB
ANION GAP SERPL CALC-SCNC: 4 MMOL/L — LOW (ref 5–17)
ANISOCYTOSIS BLD QL: SLIGHT — SIGNIFICANT CHANGE UP
BASOPHILS # BLD AUTO: 0.06 K/UL — SIGNIFICANT CHANGE UP (ref 0–0.2)
BASOPHILS NFR BLD AUTO: 1 % — SIGNIFICANT CHANGE UP (ref 0–2)
BUN SERPL-MCNC: 23 MG/DL — SIGNIFICANT CHANGE UP (ref 7–23)
CALCIUM SERPL-MCNC: 8.7 MG/DL — SIGNIFICANT CHANGE UP (ref 8.5–10.1)
CHLORIDE SERPL-SCNC: 112 MMOL/L — HIGH (ref 96–108)
CO2 SERPL-SCNC: 24 MMOL/L — SIGNIFICANT CHANGE UP (ref 22–31)
CREAT SERPL-MCNC: 1.27 MG/DL — SIGNIFICANT CHANGE UP (ref 0.5–1.3)
DACRYOCYTES BLD QL SMEAR: SLIGHT — SIGNIFICANT CHANGE UP
EGFR: 39 ML/MIN/1.73M2 — LOW
EOSINOPHIL # BLD AUTO: 0.06 K/UL — SIGNIFICANT CHANGE UP (ref 0–0.5)
EOSINOPHIL NFR BLD AUTO: 1 % — SIGNIFICANT CHANGE UP (ref 0–6)
GLUCOSE SERPL-MCNC: 107 MG/DL — HIGH (ref 70–99)
HCT VFR BLD CALC: 29.2 % — LOW (ref 34.5–45)
HGB BLD-MCNC: 9.7 G/DL — LOW (ref 11.5–15.5)
LYMPHOCYTES # BLD AUTO: 1.35 K/UL — SIGNIFICANT CHANGE UP (ref 1–3.3)
LYMPHOCYTES # BLD AUTO: 21 % — SIGNIFICANT CHANGE UP (ref 13–44)
MACROCYTES BLD QL: SLIGHT — SIGNIFICANT CHANGE UP
MANUAL SMEAR VERIFICATION: SIGNIFICANT CHANGE UP
MCHC RBC-ENTMCNC: 30.1 PG — SIGNIFICANT CHANGE UP (ref 27–34)
MCHC RBC-ENTMCNC: 33.2 GM/DL — SIGNIFICANT CHANGE UP (ref 32–36)
MCV RBC AUTO: 90.7 FL — SIGNIFICANT CHANGE UP (ref 80–100)
METAMYELOCYTES # FLD: 1 % — HIGH (ref 0–0)
MONOCYTES # BLD AUTO: 0.71 K/UL — SIGNIFICANT CHANGE UP (ref 0–0.9)
MONOCYTES NFR BLD AUTO: 11 % — SIGNIFICANT CHANGE UP (ref 2–14)
MYELOCYTES NFR BLD: 3 % — HIGH (ref 0–0)
NEUTROPHILS # BLD AUTO: 3.99 K/UL — SIGNIFICANT CHANGE UP (ref 1.8–7.4)
NEUTROPHILS NFR BLD AUTO: 60 % — SIGNIFICANT CHANGE UP (ref 43–77)
NEUTS BAND # BLD: 2 % — SIGNIFICANT CHANGE UP (ref 0–8)
NRBC # BLD: 0 /100 — SIGNIFICANT CHANGE UP (ref 0–0)
NRBC # BLD: SIGNIFICANT CHANGE UP /100 WBCS (ref 0–0)
OVALOCYTES BLD QL SMEAR: SLIGHT — SIGNIFICANT CHANGE UP
PLAT MORPH BLD: NORMAL — SIGNIFICANT CHANGE UP
PLATELET # BLD AUTO: 100 K/UL — LOW (ref 150–400)
POIKILOCYTOSIS BLD QL AUTO: SLIGHT — SIGNIFICANT CHANGE UP
POTASSIUM SERPL-MCNC: 4.1 MMOL/L — SIGNIFICANT CHANGE UP (ref 3.5–5.3)
POTASSIUM SERPL-SCNC: 4.1 MMOL/L — SIGNIFICANT CHANGE UP (ref 3.5–5.3)
RBC # BLD: 3.22 M/UL — LOW (ref 3.8–5.2)
RBC # FLD: 15.9 % — HIGH (ref 10.3–14.5)
RBC BLD AUTO: ABNORMAL
SODIUM SERPL-SCNC: 140 MMOL/L — SIGNIFICANT CHANGE UP (ref 135–145)
WBC # BLD: 6.43 K/UL — SIGNIFICANT CHANGE UP (ref 3.8–10.5)
WBC # FLD AUTO: 6.43 K/UL — SIGNIFICANT CHANGE UP (ref 3.8–10.5)

## 2023-04-10 PROCEDURE — 99233 SBSQ HOSP IP/OBS HIGH 50: CPT

## 2023-04-10 RX ADMIN — PIPERACILLIN AND TAZOBACTAM 25 GRAM(S): 4; .5 INJECTION, POWDER, LYOPHILIZED, FOR SOLUTION INTRAVENOUS at 21:34

## 2023-04-10 RX ADMIN — Medication 25 MILLIGRAM(S): at 09:55

## 2023-04-10 RX ADMIN — HEPARIN SODIUM 5000 UNIT(S): 5000 INJECTION INTRAVENOUS; SUBCUTANEOUS at 05:52

## 2023-04-10 RX ADMIN — Medication 650 MILLIGRAM(S): at 12:10

## 2023-04-10 RX ADMIN — POLYETHYLENE GLYCOL 3350 17 GRAM(S): 17 POWDER, FOR SOLUTION ORAL at 21:24

## 2023-04-10 RX ADMIN — SENNA PLUS 2 TABLET(S): 8.6 TABLET ORAL at 21:26

## 2023-04-10 RX ADMIN — Medication 500000 UNIT(S): at 12:06

## 2023-04-10 RX ADMIN — PIPERACILLIN AND TAZOBACTAM 25 GRAM(S): 4; .5 INJECTION, POWDER, LYOPHILIZED, FOR SOLUTION INTRAVENOUS at 14:15

## 2023-04-10 RX ADMIN — Medication 50 MILLIGRAM(S): at 09:55

## 2023-04-10 RX ADMIN — Medication 500000 UNIT(S): at 17:07

## 2023-04-10 RX ADMIN — Medication 600 MILLIGRAM(S): at 09:55

## 2023-04-10 RX ADMIN — LIDOCAINE 1 PATCH: 4 CREAM TOPICAL at 09:56

## 2023-04-10 RX ADMIN — Medication 25 MILLIGRAM(S): at 21:26

## 2023-04-10 RX ADMIN — Medication 500000 UNIT(S): at 05:52

## 2023-04-10 RX ADMIN — GABAPENTIN 300 MILLIGRAM(S): 400 CAPSULE ORAL at 21:26

## 2023-04-10 RX ADMIN — ATORVASTATIN CALCIUM 20 MILLIGRAM(S): 80 TABLET, FILM COATED ORAL at 21:26

## 2023-04-10 RX ADMIN — LIDOCAINE 1 PATCH: 4 CREAM TOPICAL at 22:30

## 2023-04-10 RX ADMIN — POLYETHYLENE GLYCOL 3350 17 GRAM(S): 17 POWDER, FOR SOLUTION ORAL at 09:56

## 2023-04-10 RX ADMIN — Medication 100 MILLIGRAM(S): at 09:55

## 2023-04-10 RX ADMIN — Medication 2 MILLIGRAM(S): at 21:25

## 2023-04-10 RX ADMIN — LIDOCAINE 1 PATCH: 4 CREAM TOPICAL at 17:02

## 2023-04-10 RX ADMIN — Medication 25 MILLIGRAM(S): at 21:25

## 2023-04-10 RX ADMIN — Medication 3 MILLIGRAM(S): at 21:26

## 2023-04-10 RX ADMIN — GABAPENTIN 300 MILLIGRAM(S): 400 CAPSULE ORAL at 09:55

## 2023-04-10 RX ADMIN — Medication 1 MILLIGRAM(S): at 09:55

## 2023-04-10 RX ADMIN — HEPARIN SODIUM 5000 UNIT(S): 5000 INJECTION INTRAVENOUS; SUBCUTANEOUS at 21:24

## 2023-04-10 RX ADMIN — Medication 650 MILLIGRAM(S): at 12:40

## 2023-04-10 RX ADMIN — Medication 50 MILLIGRAM(S): at 21:25

## 2023-04-10 RX ADMIN — Medication 600 MILLIGRAM(S): at 21:26

## 2023-04-10 RX ADMIN — HEPARIN SODIUM 5000 UNIT(S): 5000 INJECTION INTRAVENOUS; SUBCUTANEOUS at 14:16

## 2023-04-10 RX ADMIN — PIPERACILLIN AND TAZOBACTAM 25 GRAM(S): 4; .5 INJECTION, POWDER, LYOPHILIZED, FOR SOLUTION INTRAVENOUS at 05:52

## 2023-04-10 RX ADMIN — PANTOPRAZOLE SODIUM 40 MILLIGRAM(S): 20 TABLET, DELAYED RELEASE ORAL at 05:51

## 2023-04-10 NOTE — PROGRESS NOTE ADULT - ASSESSMENT
89 yo female with hx of HFrEF, HTN, UTI, COVID in March, and chronic pérez. Now returning to  with c/o AMS , hypoxia c/w PNA    ANDREW on CKD 3b - baseline Cr 1.5 -1 .6   Renal stable, near baseline/below  Monitor intake/volume status    Anemia/thrombocytopenia   Heme fup

## 2023-04-10 NOTE — PROGRESS NOTE ADULT - ASSESSMENT
4/4/23:  Pt with above history and severe AS with recent COVID-19 (+) and recent syncope related to her AS shy UTI now with fever and probable bilateral PNA.  Would continue to treat her PNA as per medicine and ID and await blood and sputum cultures.  Hopefully no endocarditis.  When clinically bet and no evidence for infections pt is being considered for a TAVR in the future.  Continue as outlined by medicine and ID etal.  Will follow as work-up and treatment progresses.    4/5/23:  Feeling slightly better already without increased SOB, clinical CHF or anginal chest pains.  No palpitations or recurrent syncope.  Tolerating current treatments and antibiotics.  Awaiting formal culture reports.  Continue as outlined by medicine and ID etal.  Will follow as work-up and treatment progresses.    4/6/23:  Temp 95.5 this AM and heating blanket in place.  Given a rectal suppository for constipation yesterday.  Creatinine up yesterday and gently hydrated.  Awaiting AM labs.  No increased SOB but still has cough but slightly better.  No new cardiac symptoms.  Continue as outlined by medicine and ID etal.  Will follow as treatment progresses.    4/7/23:  Feeling better with less cough and less SOB.  Off the heating blanket.  Creatinine coming down as of yesterday.  Transfused 1 unit PRBC's yesterday for Hgb=7.6.  Awaiting AM labs.  No new cardiac symptoms. Continue as outlined by medicine and ID etal.  Will follow intermittently prn as treatment progresses.  Home when ok with medicine etal.  Dr Garcia will be covering me over the weekend if needed.    4/10/23:  Resting comfortably and lying flat without increased SOB or anginal chest pains.  Eager to fo home.  Hgb=10 and renal function slowly improving.  No new cardiac symptoms.  Awaiting AM labs.  No new cardiac symptoms. Continue as outlined by medicine and ID etal.  Will follow intermittently prn as treatment progresses.  Home when ok with medicine etal.

## 2023-04-10 NOTE — PROGRESS NOTE ADULT - SUBJECTIVE AND OBJECTIVE BOX
Hematology/Oncology    Pt seen, comfortable, no cp    MEDICATIONS  (STANDING):  albuterol    90 MICROgram(s) HFA Inhaler 1 Puff(s) Inhalation every 4 hours  allopurinol 100 milliGRAM(s) Oral daily  atorvastatin 20 milliGRAM(s) Oral at bedtime  bethanechol 50 milliGRAM(s) Oral two times a day  doxazosin 2 milliGRAM(s) Oral at bedtime  folic acid 1 milliGRAM(s) Oral daily  gabapentin 300 milliGRAM(s) Oral two times a day  guaiFENesin  milliGRAM(s) Oral every 12 hours  heparin   Injectable 5000 Unit(s) SubCutaneous every 8 hours  hydrALAZINE 25 milliGRAM(s) Oral two times a day  lidocaine   4% Patch 1 Patch Transdermal daily  metoprolol tartrate 25 milliGRAM(s) Oral two times a day  nystatin    Suspension 301366 Unit(s) Oral four times a day  ondansetron Injectable 4 milliGRAM(s) IV Push once  pantoprazole    Tablet 40 milliGRAM(s) Oral before breakfast  piperacillin/tazobactam IVPB.. 3.375 Gram(s) IV Intermittent every 8 hours  polyethylene glycol 3350 17 Gram(s) Oral two times a day  senna 2 Tablet(s) Oral at bedtime  sodium chloride 0.9%. 1000 milliLiter(s) (50 mL/Hr) IV Continuous <Continuous>    MEDICATIONS  (PRN):  acetaminophen     Tablet .. 650 milliGRAM(s) Oral every 6 hours PRN Temp greater or equal to 38C (100.4F), Mild Pain (1 - 3)  albuterol    0.083% 2.5 milliGRAM(s) Nebulizer every 6 hours PRN Shortness of Breath and/or Wheezing  aluminum hydroxide/magnesium hydroxide/simethicone Suspension 30 milliLiter(s) Oral every 4 hours PRN Dyspepsia  benzonatate 100 milliGRAM(s) Oral every 8 hours PRN Cough  melatonin 3 milliGRAM(s) Oral at bedtime PRN Insomnia      ROS  No fever, sweats, chills       Vital Signs Last 24 Hrs  T(C): 36.3 (10 Apr 2023 15:13), Max: 36.5 (09 Apr 2023 21:10)  T(F): 97.3 (10 Apr 2023 15:13), Max: 97.7 (09 Apr 2023 21:10)  HR: 58 (10 Apr 2023 15:13) (58 - 70)  BP: 139/52 (10 Apr 2023 15:13) (139/52 - 161/63)  BP(mean): --  RR: 18 (10 Apr 2023 15:13) (18 - 18)  SpO2: 95% (10 Apr 2023 15:13) (94% - 99%)    Parameters below as of 10 Apr 2023 15:13  Patient On (Oxygen Delivery Method): room air        PE  NAD  Awake, alert  Anicteric, MMM  RRR  CTAB  Abd soft, NT, ND  No c/c/e  No rash grossly  FROM                          9.7    6.43  )-----------( 100      ( 10 Apr 2023 07:06 )             29.2       04-10    140  |  112<H>  |  23  ----------------------------<  107<H>  4.1   |  24  |  1.27    Ca    8.7      10 Apr 2023 07:06

## 2023-04-10 NOTE — PROGRESS NOTE ADULT - SUBJECTIVE AND OBJECTIVE BOX
Patient is a 89y Female who reports no complaints as new     MEDICATIONS  (STANDING):  albuterol    90 MICROgram(s) HFA Inhaler 1 Puff(s) Inhalation every 4 hours  allopurinol 100 milliGRAM(s) Oral daily  atorvastatin 20 milliGRAM(s) Oral at bedtime  bethanechol 50 milliGRAM(s) Oral two times a day  doxazosin 2 milliGRAM(s) Oral at bedtime  folic acid 1 milliGRAM(s) Oral daily  gabapentin 300 milliGRAM(s) Oral two times a day  guaiFENesin  milliGRAM(s) Oral every 12 hours  heparin   Injectable 5000 Unit(s) SubCutaneous every 8 hours  hydrALAZINE 25 milliGRAM(s) Oral two times a day  lidocaine   4% Patch 1 Patch Transdermal daily  metoprolol tartrate 25 milliGRAM(s) Oral two times a day  nystatin    Suspension 516871 Unit(s) Oral four times a day  ondansetron Injectable 4 milliGRAM(s) IV Push once  pantoprazole    Tablet 40 milliGRAM(s) Oral before breakfast  piperacillin/tazobactam IVPB.. 3.375 Gram(s) IV Intermittent every 8 hours  polyethylene glycol 3350 17 Gram(s) Oral two times a day  senna 2 Tablet(s) Oral at bedtime  sodium chloride 0.9%. 1000 milliLiter(s) (50 mL/Hr) IV Continuous <Continuous>    MEDICATIONS  (PRN):  acetaminophen     Tablet .. 650 milliGRAM(s) Oral every 6 hours PRN Temp greater or equal to 38C (100.4F), Mild Pain (1 - 3)  albuterol    0.083% 2.5 milliGRAM(s) Nebulizer every 6 hours PRN Shortness of Breath and/or Wheezing  aluminum hydroxide/magnesium hydroxide/simethicone Suspension 30 milliLiter(s) Oral every 4 hours PRN Dyspepsia  benzonatate 100 milliGRAM(s) Oral every 8 hours PRN Cough  melatonin 3 milliGRAM(s) Oral at bedtime PRN Insomnia        T(C): , Max: 36.5 (04-09-23 @ 21:10)  T(F): , Max: 97.7 (04-09-23 @ 21:10)  HR: 64 (04-10-23 @ 08:17)  BP: 146/60 (04-10-23 @ 08:17)  BP(mean): --  RR: 18 (04-10-23 @ 08:17)  SpO2: 94% (04-10-23 @ 08:17)  Wt(kg): --    04-09 @ 07:01  -  04-10 @ 07:00  --------------------------------------------------------  IN: 300 mL / OUT: 3700 mL / NET: -3400 mL          PHYSICAL EXAM:    Constitutional: NAD, frail  HEENT:  MM  dist  Cardiovascular: S1 and S2   Extremities:   peripheral edema  Neurological: alert       LABS:                        9.7    6.43  )-----------( 100      ( 10 Apr 2023 07:06 )             29.2     10 Apr 2023 07:06    140    |  112    |  23     ----------------------------<  107    4.1     |  24     |  1.27   09 Apr 2023 06:57    140    |  113    |  27     ----------------------------<  106    4.3     |  23     |  1.38   08 Apr 2023 07:00    138    |  110    |  35     ----------------------------<  96     4.0     |  23     |  1.41   07 Apr 2023 07:08    139    |  111    |  37     ----------------------------<  102    4.1     |  21     |  1.46     Ca    8.7        10 Apr 2023 07:06  Ca    8.5        09 Apr 2023 06:57  Ca    8.3        08 Apr 2023 07:00  Ca    8.7        07 Apr 2023 07:08            Urine Studies:          RADIOLOGY & ADDITIONAL STUDIES:

## 2023-04-10 NOTE — PROGRESS NOTE ADULT - SUBJECTIVE AND OBJECTIVE BOX
Subjective:    pat better, sitting in bed, platelets improving, no respiratory distress.    Home Medications:  allopurinol 100 mg oral tablet: 1 tab(s) orally once a day (04 Apr 2023 11:16)  amLODIPine 10 mg oral tablet: 1 tab(s) orally once a day (04 Apr 2023 11:16)  benzonatate 100 mg oral capsule: 1 cap(s) orally every 8 hours, As needed, Cough (04 Apr 2023 11:16)  bethanechol 50 mg oral tablet: 1 tab(s) orally 2 times a day (04 Apr 2023 11:16)  Colace 100 mg oral capsule: 1 cap(s) orally once a day (at bedtime) (04 Apr 2023 11:16)  dextromethorphan-guaifenesin 10 mg-100 mg/5 mL oral liquid: 10 milliliter(s) orally every 6 hours, As needed, Cough (04 Apr 2023 11:16)  doxazosin 2 mg oral tablet: 1 tab(s) orally once a day (at bedtime) (04 Apr 2023 11:16)  folic acid 1 mg oral tablet: 1 tab(s) orally once a day (04 Apr 2023 11:16)  furosemide 20 mg oral tablet: 1 tab(s) orally once a day (04 Apr 2023 11:16)  hydrALAZINE 25 mg oral tablet: 1 tab(s) orally 2 times a day (04 Apr 2023 11:16)  lidocaine 5% patch: apply topically to affected area once daily as needed (04 Apr 2023 11:16)  metoprolol tartrate 25 mg oral tablet: 1 tab(s) orally 2 times a day (04 Apr 2023 11:16)  Neurontin 300 mg oral capsule: 2 cap(s) orally 2 times a day (04 Apr 2023 11:16)  nitrofurantoin macrocrystals-monohydrate 100 mg oral capsule: 1 cap(s) orally 2 times a day x 7 days ***Course Not Complete*** (04 Apr 2023 11:19)  rosuvastatin 5 mg oral tablet: 1 tab(s) orally once a day (at bedtime) (04 Apr 2023 11:16)  senna leaf extract oral tablet: 2 tab(s) orally once a day (at bedtime) (04 Apr 2023 11:16)  Tylenol Extra Strength 500 mg oral tablet: 1 tab(s) orally every 6 hours, As Needed for pain (04 Apr 2023 11:16)  Vitamin D3 1000 intl units (25 mcg) oral tablet: 1 cap(s) orally once a day (04 Apr 2023 11:16)    MEDICATIONS  (STANDING):  albuterol    90 MICROgram(s) HFA Inhaler 1 Puff(s) Inhalation every 4 hours  allopurinol 100 milliGRAM(s) Oral daily  atorvastatin 20 milliGRAM(s) Oral at bedtime  bethanechol 50 milliGRAM(s) Oral two times a day  doxazosin 2 milliGRAM(s) Oral at bedtime  folic acid 1 milliGRAM(s) Oral daily  gabapentin 300 milliGRAM(s) Oral two times a day  guaiFENesin  milliGRAM(s) Oral every 12 hours  heparin   Injectable 5000 Unit(s) SubCutaneous every 8 hours  hydrALAZINE 25 milliGRAM(s) Oral two times a day  lidocaine   4% Patch 1 Patch Transdermal daily  metoprolol tartrate 25 milliGRAM(s) Oral two times a day  nystatin    Suspension 616298 Unit(s) Oral four times a day  ondansetron Injectable 4 milliGRAM(s) IV Push once  pantoprazole    Tablet 40 milliGRAM(s) Oral before breakfast  piperacillin/tazobactam IVPB.. 3.375 Gram(s) IV Intermittent every 8 hours  polyethylene glycol 3350 17 Gram(s) Oral two times a day  senna 2 Tablet(s) Oral at bedtime  sodium chloride 0.9%. 1000 milliLiter(s) (50 mL/Hr) IV Continuous <Continuous>    MEDICATIONS  (PRN):  acetaminophen     Tablet .. 650 milliGRAM(s) Oral every 6 hours PRN Temp greater or equal to 38C (100.4F), Mild Pain (1 - 3)  albuterol    0.083% 2.5 milliGRAM(s) Nebulizer every 6 hours PRN Shortness of Breath and/or Wheezing  aluminum hydroxide/magnesium hydroxide/simethicone Suspension 30 milliLiter(s) Oral every 4 hours PRN Dyspepsia  benzonatate 100 milliGRAM(s) Oral every 8 hours PRN Cough  melatonin 3 milliGRAM(s) Oral at bedtime PRN Insomnia      Allergies    Ceftin (Hives; Rash)    Intolerances        Vital Signs Last 24 Hrs  T(C): 36.3 (10 Apr 2023 15:13), Max: 36.5 (09 Apr 2023 21:10)  T(F): 97.3 (10 Apr 2023 15:13), Max: 97.7 (09 Apr 2023 21:10)  HR: 58 (10 Apr 2023 15:13) (58 - 70)  BP: 139/52 (10 Apr 2023 15:13) (139/52 - 161/63)  BP(mean): --  RR: 18 (10 Apr 2023 15:13) (18 - 18)  SpO2: 95% (10 Apr 2023 15:13) (94% - 99%)    Parameters below as of 10 Apr 2023 15:13  Patient On (Oxygen Delivery Method): room air          PHYSICAL EXAMINATION:    NECK:  Supple. No lymphadenopathy. Jugular venous pressure not elevated. Carotids equal.   HEART:   The cardiac impulse has a normal quality. Reg., Nl S1 and S2.  There are no murmurs, rubs or gallops noted  CHEST:  Chest crackles to auscultation. Normal respiratory effort.  ABDOMEN:  Soft and nontender.   EXTREMITIES:  There is no edema.       LABS:                        9.7    6.43  )-----------( 100      ( 10 Apr 2023 07:06 )             29.2     04-10    140  |  112<H>  |  23  ----------------------------<  107<H>  4.1   |  24  |  1.27    Ca    8.7      10 Apr 2023 07:06

## 2023-04-10 NOTE — PROGRESS NOTE ADULT - SUBJECTIVE AND OBJECTIVE BOX
CHIEF COMPLAINT:  Patient is a 89y old  Female who presents with a chief complaint of     HPI: 23:    Patient is a 89 yo F, well known to me, BIBEMS from home with c/o fever, starting this morning. EMS reports pt's daughter gave pt 650mg of Tylenol @ 0430 with a fever TMAX 103.  She was also recently admitted with ? asp. PNA on 3/20/23 and was COVID (+) at that time.  Pt now found to be hypoxic in triage sating 84% and placed on 6LNC sating 97%. Pt denies chest pain and SOB. Pt is A&OX3 and reports hx of frequent UTI's. EMS reports . Oral temp 99.5 and patient feels very warm to touch.  She was recently admitted with syncope and UTI and was COVID (+) still on 3/31/23.  She has known severe AS by echo with HFpEF (LVEF=65-70%) (see report below).  She seen in the ER with CXR suggesting bilateral lower lobe PNA.  On O2 she is feeling better.  She has no anginal chest pains or recurrent syncope so far.      23:  Feeling slightly better already without increased SOB, clinical CHF or anginal chest pains.  No palpitations or recurrent syncope.  Tolerating current treatments and antibiotics.  Awaiting formal culture reports.      23:  Temp 95.5 this AM and heating blanket in place.  Given a rectal suppository for constipation yesterday.  Creatinine up yesterday and gently hydrated.  Awaiting AM labs.  No increased SOB but still has cough but slightly better.  No new cardiac symptoms.    23:  Feeling better with less cough and less SOB.  Off the heating blanket.  Creatinine coming down as of yesterday.  Transfused 1 unit PRBC's yesterday for Hgb=7.6.  Awaiting AM labs.  No new cardiac symptoms.      4/10/23:  Resting comfortably and lying flat without increased SOB or anginal chest pains.  Eager to fo home.  Hgb=10 and renal function slowly improving.  No new cardiac symptoms.        PMHx:  PAST MEDICAL & SURGICAL HISTORY:  Severe AS  HTN (hypertension)  HLD (hyperlipidemia)  Gout  Osteoarthritis  History of tonsillectomy in childhood      FAMILY HISTORY:   FAMILY HISTORY:  Family history of hypertension (Father, Mother)      ALLERGIES:  Allergies  Ceftin (Hives; Rash)      REVIEW OF SYSTEMS:  10 point ROS was obtained  Pertinent positives and negatives are as above  All other review of systems is negative unless indicated above      Vital Signs Last 24 Hrs  T(C): 36.5 (2023 21:10), Max: 36.5 (2023 21:10)  T(F): 97.7 (2023 21:10), Max: 97.7 (2023 21:10)  HR: 66 (2023 21:10) (64 - 78)  BP: 161/63 (2023 21:10) (146/51 - 161/63)  RR: 18 (2023 21:10) (17 - 18)  SpO2: 99% (2023 21:10) (94% - 99%): room air      PHYSICAL EXAM:   Constitutional: NAD, awake and alert, well-developed  HEENT: PERR, EOMI, Normal Hearing, MMM  Neck: Soft and supple, No LAD, No JVD  Respiratory: Breath sounds scattered rhonchi at bases bilaterally, No rales  Cardiovascular: S1 and S2, regular rate and rhythm, harsh LIANA at base and LLSB as before, (+) S4; no S3 gallop or rubs  Gastrointestinal: Bowel Sounds present, soft, nontender, nondistended, no guarding, no rebound  Extremities: No peripheral edema  Vascular: 2+ peripheral pulses  Neurological: no focal deficits  Skin: No rashes      MEDICATIONS  (STANDING):  albuterol    90 MICROgram(s) HFA Inhaler 1 Puff(s) Inhalation every 4 hours  allopurinol 100 milliGRAM(s) Oral daily  atorvastatin 20 milliGRAM(s) Oral at bedtime  bethanechol 50 milliGRAM(s) Oral two times a day  doxazosin 2 milliGRAM(s) Oral at bedtime  folic acid 1 milliGRAM(s) Oral daily  gabapentin 300 milliGRAM(s) Oral two times a day  guaiFENesin  milliGRAM(s) Oral every 12 hours  heparin   Injectable 5000 Unit(s) SubCutaneous every 8 hours  hydrALAZINE 25 milliGRAM(s) Oral two times a day  lidocaine   4% Patch 1 Patch Transdermal daily  metoprolol tartrate 25 milliGRAM(s) Oral two times a day  nystatin    Suspension 105894 Unit(s) Oral four times a day  ondansetron Injectable 4 milliGRAM(s) IV Push once  pantoprazole    Tablet 40 milliGRAM(s) Oral before breakfast  piperacillin/tazobactam IVPB.. 3.375 Gram(s) IV Intermittent every 8 hours  polyethylene glycol 3350 17 Gram(s) Oral two times a day  senna 2 Tablet(s) Oral at bedtime  sodium chloride 0.9%. 1000 milliLiter(s) (50 mL/Hr) IV Continuous <Continuous>    MEDICATIONS  (PRN):  acetaminophen     Tablet .. 650 milliGRAM(s) Oral every 6 hours PRN Temp greater or equal to 38C (100.4F), Mild Pain (1 - 3)  albuterol    0.083% 2.5 milliGRAM(s) Nebulizer every 6 hours PRN Shortness of Breath and/or Wheezing  aluminum hydroxide/magnesium hydroxide/simethicone Suspension 30 milliLiter(s) Oral every 4 hours PRN Dyspepsia  benzonatate 100 milliGRAM(s) Oral every 8 hours PRN Cough  melatonin 3 milliGRAM(s) Oral at bedtime PRN Insomnia      LABS: All Labs Reviewed:                        10.0   6.36  )-----------( 94       ( 2023 06:57 )             31.1                           7.6    8.81  )-----------( 74       ( 2023 07:44 )             23.4                           8.0    12.70 )-----------( 68       ( 2023 10:35 )             24.5                           8.4    11.91 )-----------( 75       ( 2023 07:54 )             25.7       04-09    140  |  113<H>  |  27<H>  ----------------------------<  106<H>  4.3   |  23  |  1.38<H>    Ca    8.5      2023 06:57      04-06    137  |  112<H>  |  40<H>  ----------------------------<  153<H>  4.4   |  20<L>  |  1.68<H>    Ca    8.3<L>      2023 07:44    TPro  6.2  /  Alb  2.6<L>  /  TBili  0.6  /  DBili  x   /  AST  36  /  ALT  38  /  AlkPhos  60  04-      04-05    135  |  107  |  40<H>  ----------------------------<  156<H>  3.8   |  20<L>  |  2.16<H>    Ca    8.0<L>      2023 10:35    TPro  6.7  /  Alb  3.1<L>  /  TBili  0.5  /  DBili  x   /  AST  21  /  ALT  17  /  AlkPhos  51  04-04      04-    135  |  108  |  26<H>  ----------------------------<  118<H>  3.8   |  21<L>  |  1.58<H>    Ca    8.5      2023 07:54    TPro  6.7  /  Alb  3.1<L>  /  TBili  0.5  /  DBili  x   /  AST  21  /  ALT  17  /  AlkPhos  51  04-04    Pro-Brain Natriuretic Peptide (23 @ 07:54): 1886 pg/mL    Troponin I, High Sensitivity (23 @ 07:54): 14.73    CULTURES:   Culture - Blood (23 @ 07:54)   Specimen Source: .Blood None  Culture Results: No growth to date.    Culture - Blood (23 @ 08:21)   Specimen Source: .Blood None  Culture Results: No growth to date.    Gram Stain Urine -- Gram Stain --  Specimen Source Clean Catch None  Culture-Urine --Organism Klebsiella oxytoca /Raoutella ornithinolytica>100,000 CFU/ml Enterococcus faecalis (23 @ 17:50)   Culture Results: <10,000 CFU/mL Normal Urogenital Kristel (23 @ 10:59)       LIPID PROFILE (23 @ 07:01)   Cholesterol, Serum: 116 mg/dL  Triglycerides, Serum: 64 mg/dL  HDL Cholesterol, Serum: 67 mg/dL  Non HDL Cholesterol: 49      RADIOLOGY:    CT of Chest: 23:  FINDINGS:  LUNGS/AIRWAYS/PLEURA: Slitlike narrowing of the AP diameter of the trachea due to exhalation which can be seen with tracheomalacia. Mosaic attenuation in both lungsfrom air trapping. New consolidation adjacent to atelectasis in the right lower lobe, as well as in the right upper lobe at the apex and adjacent to the oblique fissure. Interlobular septal thickening in both lungs. Small right and trace left pleural effusions and secondary passive atelectasis of both lower lobes.  LYMPH NODES/MEDIASTINUM: Unchanged mediastinal lymph nodes, largest 1 cm in the prevascular space.  HEART/VASCULATURE: Normal heart size. No pericardial effusion. Calcified aortic valve. Coronary artery calcifications.  UPPER ABDOMEN: Cholelithiasis. Medially displaced intimal calcification of the infrarenal aorta, likely chronic. Small hypodensity arising from the left kidney, likely a cyst.  BONES/SOFT TISSUES: Unremarkable.  IMPRESSION: Since 3/5/2023:  New consolidation in the right upper and right lower lobes, favor pneumonia in the appropriate clinical setting.  New small right and trace left pleural effusions, and mild pulmonary edema.      CXR: 23:  FINDINGS:  CATHETERS AND TUBES: None  PULMONARY: Bilateral perihilar/bibasilar diffuse airspace disease. Apices clear. No pneumothorax.  HEART/VASCULAR: The heart and mediastinum size and configuration are within normal limits.  BONES: Visualized osseous thorax intact.  IMPRESSION:  Bilateral perihilar/basilar diffuse airspace disease..    CXR: 3/31/23:  INTERPRETATION:  AP chest on 2023 at 2:17 PM. Patient had a syncopal episode.  Heart magnified by technique.  There is an increasing small infiltrate off the right lower hilum compared to  of this year.  IMPRESSION: Increasing right lower perihilar infiltrate.      EK23:  Normal sinus rhythm  Possible Left atrial enlargement  Left ventricular hypertrophy      TELEMETRY:  NSR      ECHO:  3/1/23:  M-Mode Measurements (cm)   LVEDd: 3.97 cm            LVESd: 2.55 cm   IVSEd: 1.1 cm   LVPWd: 1.1 cm             AO Root Dimension: 2.8 cm                             LA: 3.5 cm                LVOT: 2 cm  Doppler Measurements:   AV Velocity:434 cm/s                  MV Peak E-Wave: 98.7 cm/s   AV Peak Gradient: 75.34 mmHg          MV Peak A-Wave: 131 cm/s   AV Mean Gradient: 40 mmHg             MV E/A Ratio: 0.75 %   AV Area (Continuity):0.85 cm^2        MV Peak Gradient: 3.9 mmHg   TR Velocity:190 cm/s   TR Gradient:14.44 mmHg   Estimated RAP:5 mmHg   RVSP:27 mmHg    Findings  Mitral Valve   The mitral valve leaflets appear thickened.   EA reversal of the mitral inflow consistent with reduced compliance of the left ventricle.   Mild mitral annular calcification is present.   Mild mitral regurgitation is present.    Aortic Valve   Peak and mean transaortic gradients are 75 and 40mmHg respectively; this finding is consistent with severe aortic stenosis.   Mild (1+) aortic regurgitation is present.    Tricuspid Valve   Trace tricuspid valve regurgitation is present.    Pulmonic Valve   Mild pulmonic valvular regurgitation (1+) is present.    Left Atrium   Normal appearing left atrium.    Left Ventricle   Mild concentric left ventricular hypertrophy is present.   The left ventricle is normal in size.   Estimated left ventricular ejection fraction is 65-70 %.    Right Atrium   Normal appearing right atrium.    Right Ventricle   Normal appearing right ventricle structure and function.    Pericardial Effusion   No evidence of pericardial effusion.    Pleural Effusion   No evidence of pleural effusion.    Miscellaneous   The IVC appears normal.    Summary   The mitral valve leaflets appear thickened.   EA reversal of the mitral inflow consistent with reduced compliance of the left ventricle.   Mild mitral annular calcification is present.   Mild mitral regurgitation is present.   Peak and mean transaortic gradients are 75 and 40mmHg respectively; this finding is consistent with severe aortic stenosis.   Mild (1+) aortic regurgitation is present.   Trace tricuspid valve regurgitation is present.   Mild pulmonic valvular regurgitation (1+) is present.   Normal appearing left atrium.   Mild concentric left ventricular hypertrophy is present.   The left ventricle is normal in size.   Estimated left ventricular ejection fraction is 65-70 %.   Normal appearing right atrium.   Normal appearing right ventricle structure and function.   The IVC appears normal.   No evidence of pericardial effusion.   No evidence of pleural effusion.    Signature   ----------------------------------------------------------------   Electronically signed by Sakina Cheatham MD, Director of   Cardiac Cath Lab(Interpreting physician) on 2023 06:42   PM   ----------------------------------------------------------------

## 2023-04-10 NOTE — PROGRESS NOTE ADULT - SUBJECTIVE AND OBJECTIVE BOX
Date of service: 04-10-23 @ 10:58    pt seen and examined  temps down  less cough  no resp distress    ROS: no fever or chills; denies dizziness, no HA,  no abdominal pain, no diarrhea or constipation; no dysuria, no urinary frequency, no legs pain, no rashes      MEDICATIONS  (STANDING):  albuterol    90 MICROgram(s) HFA Inhaler 1 Puff(s) Inhalation every 4 hours  allopurinol 100 milliGRAM(s) Oral daily  atorvastatin 20 milliGRAM(s) Oral at bedtime  bethanechol 50 milliGRAM(s) Oral two times a day  doxazosin 2 milliGRAM(s) Oral at bedtime  folic acid 1 milliGRAM(s) Oral daily  gabapentin 300 milliGRAM(s) Oral two times a day  guaiFENesin  milliGRAM(s) Oral every 12 hours  heparin   Injectable 5000 Unit(s) SubCutaneous every 8 hours  hydrALAZINE 25 milliGRAM(s) Oral two times a day  lidocaine   4% Patch 1 Patch Transdermal daily  metoprolol tartrate 25 milliGRAM(s) Oral two times a day  nystatin    Suspension 630502 Unit(s) Oral four times a day  ondansetron Injectable 4 milliGRAM(s) IV Push once  pantoprazole    Tablet 40 milliGRAM(s) Oral before breakfast  piperacillin/tazobactam IVPB.. 3.375 Gram(s) IV Intermittent every 8 hours  polyethylene glycol 3350 17 Gram(s) Oral two times a day  senna 2 Tablet(s) Oral at bedtime  sodium chloride 0.9%. 1000 milliLiter(s) (50 mL/Hr) IV Continuous <Continuous>      Vital Signs Last 24 Hrs  T(C): 36.4 (10 Apr 2023 08:17), Max: 36.5 (2023 21:10)  T(F): 97.5 (10 Apr 2023 08:17), Max: 97.7 (2023 21:10)  HR: 64 (10 Apr 2023 08:17) (64 - 66)  BP: 146/60 (10 Apr 2023 08:17) (146/60 - 161/63)  BP(mean): --  RR: 18 (10 Apr 2023 08:17) (17 - 18)  SpO2: 94% (10 Apr 2023 08:17) (94% - 99%)    Parameters below as of 10 Apr 2023 08:17  Patient On (Oxygen Delivery Method): room air      PE:  Constitutional: frail looking  HEENT: NC/AT, EOMI, PERRLA, conjunctivae clear; ears and nose atraumatic; pharynx benign  Neck: supple; thyroid not palpable  Back: no tenderness  Respiratory: decreased breath sounds, rhonchi  Cardiovascular: S1S2 regular, no murmurs  Abdomen: soft, not tender, not distended, positive BS; liver and spleen WNL  Genitourinary: no suprapubic tenderness  Lymphatic: no LN palpable  Musculoskeletal: no muscle tenderness, no joint swelling or tenderness  Extremities: no pedal edema  Neurological/ Psychiatric:  moving all extremities  Skin: no rashes; no palpable lesions    Labs: all available labs reviewed                                      9.7    6.43  )-----------( 100      ( 10 Apr 2023 07:06 )             29.2     04-10    140  |  112<H>  |  23  ----------------------------<  107<H>  4.1   |  24  |  1.27    Ca    8.7      10 Apr 2023 07:06        Urinalysis Basic - ( 2023 10:59 )    Color: Yellow / Appearance: Slightly Turbid / S.010 / pH: x  Gluc: x / Ketone: Negative  / Bili: Negative / Urobili: Negative   Blood: x / Protein: 30 mg/dL / Nitrite: Negative   Leuk Esterase: Moderate / RBC: 3-5 /HPF / WBC 26-50 /HPF   Sq Epi: x / Non Sq Epi: Few / Bacteria: Many    Culture - Urine (23 @ 17:50)   - Vancomycin: S 2  - Tetracycline: S <=1  - Tobramycin: S <=2  - Trimethoprim/Sulfamethoxazole: S <=0.5/9.5  - Levofloxacin: S <=0.5  - Levofloxacin: S 1  - Meropenem: S <=1  - Nitrofurantoin: S <=32 Should not be used to treat pyelonephritis  - Nitrofurantoin: S <=32 Should not be used to treat pyelonephritis.  - Piperacillin/Tazobactam: S <=8  - Cefazolin: S <=2  - Cefepime: S <=2  - Cefoxitin: S <=8  - Ceftriaxone: S <=1 Enterobacter, Klebsiella aerogenes, Citrobacter, and Serratia may develop resistance during prolonged therapy  - Ciprofloxacin: S <=0.25  - Ciprofloxacin: S <=1  - Ertapenem: S <=0.5  - Gentamicin: S <=2  - Imipenem: S <=1  - Amikacin: S <=16  - Amoxicillin/Clavulanic Acid: S <=8/4  - Ampicillin: R >16 These ampicillin results predict results for amoxicillin  - Ampicillin: S <=2 Predicts results to ampicillin/sulbactam, amoxacillin-clavulanate and piperacillin-tazobactam.  - Ampicillin/Sulbactam: S 8/4 Enterobacter, Klebsiella aerogenes, Citrobacter, and Serratia may develop resistance during prolonged therapy (3-4 days)  - Aztreonam: S <=4  Specimen Source: Clean Catch None  Culture Results:   >100,000 CFU/ml Klebsiella oxytoca/Raoultella ornithinolytica   >100,000 CFU/ml Enterococcus faecalis  Organism Identification: Klebsiella oxytoca /Raoutella ornithinolytica       Radiology: all available radiological tests reviewed      ACC: [id number] EXAM:  XR CHEST PORTABLE URGENT 1V   ORDERED BY: DIRK BAUER     PROCEDURE DATE:  2023          INTERPRETATION:  Portable chest radiograph    CLINICAL INFORMATION: Fever    TECHNIQUE:  Portable  AP chest radiograph.    COMPARISON: 3/31/2023 chest x-ray .    FINDINGS:  CATHETERS AND TUBES: None    PULMONARY: Bilateral perihilar/bibasilar diffuse airspace disease.  Apices clear. No pneumothorax.    HEART/VASCULAR: The heart and mediastinum size and configuration are   within normal limits.    BONES: Visualized osseous thorax intact.    IMPRESSION:    Bilateral perihilar/basilar diffuse airspace disease..    < from: CT Chest No Cont (23 @ 15:54) >  ACC: [id number] EXAM:  CT CHEST   ORDERED BY: FELECIA YOU     PROCEDURE DATE:  2023          INTERPRETATION:  INDICATION: Shortness of breath    TECHNIQUE: Volumetric images of the chest without intravenous contrast.   Maximum intensity projection images were generated.    COMPARISON: CT chest 3/5/2023.    FINDINGS:    LUNGS/AIRWAYS/PLEURA: Slitlike narrowing of the AP diameter of the   trachea due to exhalation which can be seen with tracheomalacia. Mosaic   attenuation in both lungsfrom air trapping. New consolidation adjacent   to atelectasis in the right lower lobe, as well as in the right upper   lobe at the apex and adjacent to the oblique fissure. Interlobular septal   thickening in both lungs. Small right and trace left pleural effusions   and secondary passive atelectasis of both lower lobes.    LYMPH NODES/MEDIASTINUM: Unchanged mediastinal lymph nodes, largest 1 cm   in the prevascular space.    HEART/VASCULATURE: Normal heart size. No pericardial effusion. Calcified   aortic valve. Coronary artery calcifications.    UPPER ABDOMEN: Cholelithiasis. Medially displaced intimal calcification   of the infrarenal aorta, likely chronic. Small hypodensity arising from   the left kidney, likely a cyst.    BONES/SOFT TISSUES: Unremarkable.      IMPRESSION:    Since 3/5/2023:    New consolidation in the right upper and right lower lobes, favor   pneumonia in the appropriate clinical setting.    New small right and trace left pleural effusions, and mild pulmonary   edema.      Advanced directives addressed: full resuscitation

## 2023-04-10 NOTE — PROGRESS NOTE ADULT - NUTRITIONAL ASSESSMENT
This patient has been assessed with a concern for Malnutrition and has been determined to have a diagnosis/diagnoses of Severe protein-calorie malnutrition.    This patient is being managed with:   Diet Easy to Chew-  Entered: Apr 4 2023  2:42PM  

## 2023-04-10 NOTE — PROGRESS NOTE ADULT - ASSESSMENT
89 y/o F with PMH severe AS, HFrEF, HTN on metoprolol, amlodipine, hydralazine, HLD, frequent UTIs with urinary retention and chronic pérez, gout, arthritis, hx pelvic fx, recent covid/?asp PNA 3/2023 BIBA to ED for fever TMax 103F. Pt seen in  ED 3/31 for syncope, was admitted and discharged 4/2. Pt started on macrobid for UTI by Dr. Waldrop 4/3, took 2 doses. Daughter noticed fever at 4am, took 1G tylenol 4:30am. O2 84% in triage, started on 6L NC, now at 97%. Pt denies CP or SOB at this time. No headache, dizziness, LOC, N/V/D, abdominal pain, ext numbness/weakness.  Here imaging shows b/l pneumonia on xray, UA positive, noted with vomiting episodes in ER, daughter at bedside, was given dose of vanocmycin/zosyn.    1. Fever. UTI with ENFA/KLOX. Aspiration pneumonia. Recent Covid-19 viral syndrome  - imaging and prior chart/cultures reviewed  - CT chest shows new consolidation in R upper and R lower lobes   - 3/31 urine cx grew ENFA/KLOX - sensitivities noted  - on zosyn #7, complete 7 day course   - continue with antibiotic coverage   - aspiration precautions  - fu cbc  - covid not active has had prior in march  - speech/swallow eval noted   - monitor temps  - tolerating abx well so far; no side effects noted  - reason for abx use and side effects reviewed with patient  - supportive care  - fu cbc    2. other issues - care per medicine

## 2023-04-10 NOTE — DISCHARGE NOTE NURSING/CASE MANAGEMENT/SOCIAL WORK - PATIENT PORTAL LINK FT
You can access the FollowMyHealth Patient Portal offered by [facility] by registering at the following website: [email or web address]. By joining [facility]’s FollowMyHealth portal, you will also be able to view your health information using other applications (apps) compatible with our system.

## 2023-04-10 NOTE — PROGRESS NOTE ADULT - ASSESSMENT
89 y/o F with PMH severe AS, HFrEF, HTN, HLD, frequent UTIs with urinary retention and chronic pérez, gout, arthritis, aspiration PNA admitted for:    PROBLEMS:    Acute Hypoxic Respiratory Failure due to B/L  PNA  Chronic anemia and thrombocytopenia further worsening likely consumptional  from sepsis and zosyn-low probability for HIT  Recent  history of  COVID  Suspected UTI assocated with Chronic Pérez-UA + 3  Chronic diastolic CHF-severe AS  CKD/HTN/Metabolic  encephalopathy     PLAN:    pulmonary stable- decd planning  Monitor pulse OX-Supplement  O2 via NC prn  S/p Vanco  IV Zosyn   Blood cx and Ucx neg  Tylenol PRN   Mucinex, Tessalon   Albuterol PRN   Possible aspiration?  Speech and swallow  eval   Monitor Hb/hct  DVT PPX-heparin SQ

## 2023-04-10 NOTE — PROGRESS NOTE ADULT - ASSESSMENT
89 y/o F with extensive PMH of AS, HrEF, HTN, multiple hospitalizations over the last few months for recurrent UTI, admitted with b/l PNA and found to have thrombocytopenia and anemia.    1) Thrombocytopenia  -Suspect likely from infection and antibiotics  -fibrinogen, b12, folate all adequate  -On previous admission in Jan her plt count also did drop to 70K  -plt count continues to improve today and > 90 K  -HIT testing not sent but now that plt improving no need, nonsignificant platelet antibody test sent    2) Anemia  -Has reported history of anemia of CKD  -f/u iron panel  -better post transfusion, would aim to keep hgb > 8 given cardiac disease    Will cont to follow with you.    Elvis Mendes MD  [facility]  Cell: [phone]

## 2023-04-10 NOTE — PROGRESS NOTE ADULT - SUBJECTIVE AND OBJECTIVE BOX
CHIEF COMPLAINT/INTERVAL HISTORY:    Patient is a 89y old  Female who presents with a chief complaint of SOB, hypoxia, bilateral PNA, severe AS, recent COVID-19 (10 Apr 2023 06:48)      HPI:  89 y/o F with PMH severe AS, HFrEF, HTN, HLD, frequent UTIs with urinary retention and chronic pérez, gout, arthritis, aspiration  PNA 3/2023 BIBA to ED for fever TMax 103F, confusion. As per Pts daughter, she was told that last night Pt was confused and was coughing,  febrile, also had episode of vomiting. Pt is awake, known in the hospital, reports currently no SOB, but feels very weak and sleepy. Pt c/o feeling cold, body aches, HA.  Pt denies CP, no abd pain.  As per daughter was started  on Macrobid by PCP.   In ED: Was hypoxic in 80s, stable on NC, rest VS are stable. CXR with b/l infiltrates. Pt was given IV Zosyn and VAnco  (04 Apr 2023 13:20)    Adm for PNA; feels better    ICU Vital Signs Last 24 Hrs  T(C): 36.4 (10 Apr 2023 08:17), Max: 36.5 (09 Apr 2023 21:10)  T(F): 97.5 (10 Apr 2023 08:17), Max: 97.7 (09 Apr 2023 21:10)  HR: 70 (10 Apr 2023 12:11) (64 - 70)  BP: 156/54 (10 Apr 2023 12:11) (146/60 - 161/63)  BP(mean): --  ABP: --  ABP(mean): --  RR: 18 (10 Apr 2023 08:17) (17 - 18)  SpO2: 94% (10 Apr 2023 08:17) (94% - 99%)    O2 Parameters below as of 10 Apr 2023 08:17  Patient On (Oxygen Delivery Method): room air              MEDICATIONS  (STANDING):  albuterol    90 MICROgram(s) HFA Inhaler 1 Puff(s) Inhalation every 4 hours  allopurinol 100 milliGRAM(s) Oral daily  atorvastatin 20 milliGRAM(s) Oral at bedtime  bethanechol 50 milliGRAM(s) Oral two times a day  doxazosin 2 milliGRAM(s) Oral at bedtime  folic acid 1 milliGRAM(s) Oral daily  gabapentin 300 milliGRAM(s) Oral two times a day  guaiFENesin  milliGRAM(s) Oral every 12 hours  heparin   Injectable 5000 Unit(s) SubCutaneous every 8 hours  hydrALAZINE 25 milliGRAM(s) Oral two times a day  lidocaine   4% Patch 1 Patch Transdermal daily  metoprolol tartrate 25 milliGRAM(s) Oral two times a day  nystatin    Suspension 519264 Unit(s) Oral four times a day  ondansetron Injectable 4 milliGRAM(s) IV Push once  pantoprazole    Tablet 40 milliGRAM(s) Oral before breakfast  piperacillin/tazobactam IVPB.. 3.375 Gram(s) IV Intermittent every 8 hours  polyethylene glycol 3350 17 Gram(s) Oral two times a day  senna 2 Tablet(s) Oral at bedtime  sodium chloride 0.9%. 1000 milliLiter(s) (50 mL/Hr) IV Continuous <Continuous>    MEDICATIONS  (PRN):  acetaminophen     Tablet .. 650 milliGRAM(s) Oral every 6 hours PRN Temp greater or equal to 38C (100.4F), Mild Pain (1 - 3)  albuterol    0.083% 2.5 milliGRAM(s) Nebulizer every 6 hours PRN Shortness of Breath and/or Wheezing  aluminum hydroxide/magnesium hydroxide/simethicone Suspension 30 milliLiter(s) Oral every 4 hours PRN Dyspepsia  benzonatate 100 milliGRAM(s) Oral every 8 hours PRN Cough  melatonin 3 milliGRAM(s) Oral at bedtime PRN Insomnia        PHYSICAL EXAM:    GENERAL: NAD, well-groomed, well-developed  NERVOUS SYSTEM:  Alert & Oriented X3, Motor Strength 5/5 B/L upper and lower extremities; DTRs 2+ intact and symmetric  CHEST/LUNG: Clear to auscultation bilaterally; No rales, rhonchi, wheezing, or rubs  HEART: Regular rate and rhythm; No murmurs, rubs, or gallops  ABDOMEN: Soft, Nontender, Nondistended; Bowel sounds present  EXTREMITIES:  2+ Peripheral Pulses, No clubbing, cyanosis, or edema    LABS:                        9.7    6.43  )-----------( 100      ( 10 Apr 2023 07:06 )             29.2     04-10    140  |  112<H>  |  23  ----------------------------<  107<H>  4.1   |  24  |  1.27    Ca    8.7      10 Apr 2023 07:06          * Acute Hypoxic Respiratory Failure due to B/L  PNA possible aspiration PNA  UTI with ENFA/KLOX associated with indwelling pérez catheter  recent hx covid   S/p Vanco  and Zosyn in ED   on zosyn #6 - complete total of 7 days of abx   pérez changed in ED as per RN       # chronic anemia and thrombocytopenia further worsening likely consumptional  from sepsis and zosyn , r/o HIT although low probability for HIT  PRBC tranfused with lasix - dw with cardio in lieu of hx AS  cw zosyn per ID - PLT stabilizing, pt allergic to ceftin  -fibrinogen, b12, folate all adequate  check HIT abs  ok to resume heparin sc per heme as probability low   heme eval appreciated       #chronic diastolic CHF compensated ,  severe AS asymptomatic  stable      pt eval  repeat cbc  dc planning

## 2023-04-11 ENCOUNTER — TRANSCRIPTION ENCOUNTER (OUTPATIENT)
Age: 88
End: 2023-04-11

## 2023-04-11 ENCOUNTER — NON-APPOINTMENT (OUTPATIENT)
Age: 88
End: 2023-04-11

## 2023-04-11 VITALS
OXYGEN SATURATION: 94 % | TEMPERATURE: 98 F | HEART RATE: 64 BPM | DIASTOLIC BLOOD PRESSURE: 56 MMHG | RESPIRATION RATE: 18 BRPM | SYSTOLIC BLOOD PRESSURE: 150 MMHG

## 2023-04-11 LAB
HCT VFR BLD CALC: 29.7 % — LOW (ref 34.5–45)
HGB BLD-MCNC: 9.6 G/DL — LOW (ref 11.5–15.5)
MCHC RBC-ENTMCNC: 29.9 PG — SIGNIFICANT CHANGE UP (ref 27–34)
MCHC RBC-ENTMCNC: 32.3 GM/DL — SIGNIFICANT CHANGE UP (ref 32–36)
MCV RBC AUTO: 92.5 FL — SIGNIFICANT CHANGE UP (ref 80–100)
PLATELET # BLD AUTO: 103 K/UL — LOW (ref 150–400)
RBC # BLD: 3.21 M/UL — LOW (ref 3.8–5.2)
RBC # FLD: 16.1 % — HIGH (ref 10.3–14.5)
WBC # BLD: 7.64 K/UL — SIGNIFICANT CHANGE UP (ref 3.8–10.5)
WBC # FLD AUTO: 7.64 K/UL — SIGNIFICANT CHANGE UP (ref 3.8–10.5)

## 2023-04-11 PROCEDURE — 99239 HOSP IP/OBS DSCHRG MGMT >30: CPT

## 2023-04-11 RX ORDER — NITROFURANTOIN MACROCRYSTAL 50 MG
1 CAPSULE ORAL
Refills: 0 | DISCHARGE

## 2023-04-11 RX ORDER — DOCUSATE SODIUM 100 MG
1 CAPSULE ORAL
Qty: 0 | Refills: 0 | DISCHARGE

## 2023-04-11 RX ORDER — ALBUTEROL 90 UG/1
1 AEROSOL, METERED ORAL
Qty: 0 | Refills: 0 | DISCHARGE
Start: 2023-04-11

## 2023-04-11 RX ADMIN — Medication 500000 UNIT(S): at 07:09

## 2023-04-11 RX ADMIN — Medication 600 MILLIGRAM(S): at 09:14

## 2023-04-11 RX ADMIN — GABAPENTIN 300 MILLIGRAM(S): 400 CAPSULE ORAL at 09:15

## 2023-04-11 RX ADMIN — PANTOPRAZOLE SODIUM 40 MILLIGRAM(S): 20 TABLET, DELAYED RELEASE ORAL at 07:10

## 2023-04-11 RX ADMIN — PIPERACILLIN AND TAZOBACTAM 25 GRAM(S): 4; .5 INJECTION, POWDER, LYOPHILIZED, FOR SOLUTION INTRAVENOUS at 07:10

## 2023-04-11 RX ADMIN — Medication 500000 UNIT(S): at 11:57

## 2023-04-11 RX ADMIN — Medication 500000 UNIT(S): at 03:43

## 2023-04-11 RX ADMIN — Medication 25 MILLIGRAM(S): at 09:15

## 2023-04-11 RX ADMIN — Medication 1 MILLIGRAM(S): at 09:15

## 2023-04-11 RX ADMIN — Medication 650 MILLIGRAM(S): at 13:50

## 2023-04-11 RX ADMIN — HEPARIN SODIUM 5000 UNIT(S): 5000 INJECTION INTRAVENOUS; SUBCUTANEOUS at 13:51

## 2023-04-11 RX ADMIN — LIDOCAINE 1 PATCH: 4 CREAM TOPICAL at 09:13

## 2023-04-11 RX ADMIN — HEPARIN SODIUM 5000 UNIT(S): 5000 INJECTION INTRAVENOUS; SUBCUTANEOUS at 07:10

## 2023-04-11 RX ADMIN — POLYETHYLENE GLYCOL 3350 17 GRAM(S): 17 POWDER, FOR SOLUTION ORAL at 09:13

## 2023-04-11 RX ADMIN — Medication 100 MILLIGRAM(S): at 09:15

## 2023-04-11 RX ADMIN — Medication 50 MILLIGRAM(S): at 09:14

## 2023-04-11 RX ADMIN — Medication 650 MILLIGRAM(S): at 14:30

## 2023-04-11 NOTE — DISCHARGE NOTE PROVIDER - CARE PROVIDERS DIRECT ADDRESSES
,DirectAddress_Unknown,[email or web address]

## 2023-04-11 NOTE — PROGRESS NOTE ADULT - PROVIDER SPECIALTY LIST ADULT
Hospitalist
Hospitalist
Pulmonology
Pulmonology
Heme/Onc
Hospitalist
Infectious Disease
Infectious Disease
Nephrology
Nephrology
Heme/Onc
Hospitalist
Infectious Disease
Infectious Disease
Pulmonology
Pulmonology
Cardiology

## 2023-04-11 NOTE — DISCHARGE NOTE PROVIDER - NSDCMRMEDTOKEN_GEN_ALL_CORE_FT
albuterol 90 mcg/inh inhalation aerosol: 1 puff(s) inhaled every 4 hours  allopurinol 100 mg oral tablet: 1 tab(s) orally once a day  benzonatate 100 mg oral capsule: 1 cap(s) orally every 8 hours, As needed, Cough  bethanechol 50 mg oral tablet: 1 tab(s) orally 2 times a day  dextromethorphan-guaifenesin 10 mg-100 mg/5 mL oral liquid: 10 milliliter(s) orally every 6 hours, As needed, Cough  doxazosin 2 mg oral tablet: 1 tab(s) orally once a day (at bedtime)  folic acid 1 mg oral tablet: 1 tab(s) orally once a day  furosemide 20 mg oral tablet: 1 tab(s) orally once a day  hydrALAZINE 25 mg oral tablet: 1 tab(s) orally 2 times a day  lidocaine 5% patch: apply topically to affected area once daily as needed  metoprolol tartrate 25 mg oral tablet: 1 tab(s) orally 2 times a day  Neurontin 300 mg oral capsule: 2 cap(s) orally 2 times a day  nitrofurantoin macrocrystals-monohydrate 100 mg oral capsule: 1 cap(s) orally 2 times a day x 7 days ***Course Not Complete***  rosuvastatin 5 mg oral tablet: 1 tab(s) orally once a day (at bedtime)  senna leaf extract oral tablet: 2 tab(s) orally once a day (at bedtime)  Tylenol Extra Strength 500 mg oral tablet: 1 tab(s) orally every 6 hours, As Needed for pain  Vitamin D3 1000 intl units (25 mcg) oral tablet: 1 cap(s) orally once a day   allopurinol 100 mg oral tablet: 1 tab(s) orally once a day  benzonatate 100 mg oral capsule: 1 cap(s) orally every 8 hours, As needed, Cough  bethanechol 50 mg oral tablet: 1 tab(s) orally 2 times a day  dextromethorphan-guaifenesin 10 mg-100 mg/5 mL oral liquid: 10 milliliter(s) orally every 6 hours, As needed, Cough  doxazosin 2 mg oral tablet: 1 tab(s) orally once a day (at bedtime)  folic acid 1 mg oral tablet: 1 tab(s) orally once a day  furosemide 20 mg oral tablet: 1 tab(s) orally once a day  hydrALAZINE 25 mg oral tablet: 1 tab(s) orally 2 times a day  lidocaine 5% patch: apply topically to affected area once daily as needed  metoprolol tartrate 25 mg oral tablet: 1 tab(s) orally 2 times a day  Neurontin 300 mg oral capsule: 2 cap(s) orally 2 times a day  rosuvastatin 5 mg oral tablet: 1 tab(s) orally once a day (at bedtime)  senna leaf extract oral tablet: 2 tab(s) orally once a day (at bedtime)  Tylenol Extra Strength 500 mg oral tablet: 1 tab(s) orally every 6 hours, As Needed for pain  Vitamin D3 1000 intl units (25 mcg) oral tablet: 1 cap(s) orally once a day

## 2023-04-11 NOTE — DISCHARGE NOTE PROVIDER - NSDCCPCAREPLAN_GEN_ALL_CORE_FT
PRINCIPAL DISCHARGE DIAGNOSIS  Diagnosis: Multifocal pneumonia  Assessment and Plan of Treatment: you have completed antibiotics.  see dr hendrix for follow up  - stop amlodipine and dr hendrix will tell you when to resume.   return to ER if fever or chills      SECONDARY DISCHARGE DIAGNOSES  Diagnosis: Acute respiratory failure with hypoxia  Assessment and Plan of Treatment:

## 2023-04-11 NOTE — DISCHARGE NOTE PROVIDER - CARE PROVIDER_API CALL
Olvin Collins)  Cardiovascular Disease; Internal Medicine  [address], [address]  [place] [address]  Phone: [phone]  Fax: [phone]  Follow Up Time:     Rojelio Collins)  Urology  [address] [place] [address]  Phone: [phone]  Fax: [phone]  Follow Up Time:

## 2023-04-11 NOTE — PROGRESS NOTE ADULT - SUBJECTIVE AND OBJECTIVE BOX
Subjective:    Home Medications:  allopurinol 100 mg oral tablet: 1 tab(s) orally once a day (04 Apr 2023 11:16)  amLODIPine 10 mg oral tablet: 1 tab(s) orally once a day (04 Apr 2023 11:16)  benzonatate 100 mg oral capsule: 1 cap(s) orally every 8 hours, As needed, Cough (04 Apr 2023 11:16)  bethanechol 50 mg oral tablet: 1 tab(s) orally 2 times a day (04 Apr 2023 11:16)  Colace 100 mg oral capsule: 1 cap(s) orally once a day (at bedtime) (04 Apr 2023 11:16)  dextromethorphan-guaifenesin 10 mg-100 mg/5 mL oral liquid: 10 milliliter(s) orally every 6 hours, As needed, Cough (04 Apr 2023 11:16)  doxazosin 2 mg oral tablet: 1 tab(s) orally once a day (at bedtime) (04 Apr 2023 11:16)  folic acid 1 mg oral tablet: 1 tab(s) orally once a day (04 Apr 2023 11:16)  furosemide 20 mg oral tablet: 1 tab(s) orally once a day (04 Apr 2023 11:16)  hydrALAZINE 25 mg oral tablet: 1 tab(s) orally 2 times a day (04 Apr 2023 11:16)  lidocaine 5% patch: apply topically to affected area once daily as needed (04 Apr 2023 11:16)  metoprolol tartrate 25 mg oral tablet: 1 tab(s) orally 2 times a day (04 Apr 2023 11:16)  Neurontin 300 mg oral capsule: 2 cap(s) orally 2 times a day (04 Apr 2023 11:16)  nitrofurantoin macrocrystals-monohydrate 100 mg oral capsule: 1 cap(s) orally 2 times a day x 7 days ***Course Not Complete*** (04 Apr 2023 11:19)  rosuvastatin 5 mg oral tablet: 1 tab(s) orally once a day (at bedtime) (04 Apr 2023 11:16)  senna leaf extract oral tablet: 2 tab(s) orally once a day (at bedtime) (04 Apr 2023 11:16)  Tylenol Extra Strength 500 mg oral tablet: 1 tab(s) orally every 6 hours, As Needed for pain (04 Apr 2023 11:16)  Vitamin D3 1000 intl units (25 mcg) oral tablet: 1 cap(s) orally once a day (04 Apr 2023 11:16)    MEDICATIONS  (STANDING):  albuterol    90 MICROgram(s) HFA Inhaler 1 Puff(s) Inhalation every 4 hours  allopurinol 100 milliGRAM(s) Oral daily  atorvastatin 20 milliGRAM(s) Oral at bedtime  bethanechol 50 milliGRAM(s) Oral two times a day  doxazosin 2 milliGRAM(s) Oral at bedtime  folic acid 1 milliGRAM(s) Oral daily  gabapentin 300 milliGRAM(s) Oral two times a day  guaiFENesin  milliGRAM(s) Oral every 12 hours  heparin   Injectable 5000 Unit(s) SubCutaneous every 8 hours  hydrALAZINE 25 milliGRAM(s) Oral two times a day  lidocaine   4% Patch 1 Patch Transdermal daily  metoprolol tartrate 25 milliGRAM(s) Oral two times a day  nystatin    Suspension 753953 Unit(s) Oral four times a day  ondansetron Injectable 4 milliGRAM(s) IV Push once  pantoprazole    Tablet 40 milliGRAM(s) Oral before breakfast  piperacillin/tazobactam IVPB.. 3.375 Gram(s) IV Intermittent every 8 hours  polyethylene glycol 3350 17 Gram(s) Oral two times a day  senna 2 Tablet(s) Oral at bedtime  sodium chloride 0.9%. 1000 milliLiter(s) (50 mL/Hr) IV Continuous <Continuous>    MEDICATIONS  (PRN):  acetaminophen     Tablet .. 650 milliGRAM(s) Oral every 6 hours PRN Temp greater or equal to 38C (100.4F), Mild Pain (1 - 3)  albuterol    0.083% 2.5 milliGRAM(s) Nebulizer every 6 hours PRN Shortness of Breath and/or Wheezing  aluminum hydroxide/magnesium hydroxide/simethicone Suspension 30 milliLiter(s) Oral every 4 hours PRN Dyspepsia  benzonatate 100 milliGRAM(s) Oral every 8 hours PRN Cough  melatonin 3 milliGRAM(s) Oral at bedtime PRN Insomnia      Allergies    Ceftin (Hives; Rash)    Intolerances        Vital Signs Last 24 Hrs  T(C): 36.4 (11 Apr 2023 08:02), Max: 36.4 (11 Apr 2023 08:02)  T(F): 97.5 (11 Apr 2023 08:02), Max: 97.5 (11 Apr 2023 08:02)  HR: 64 (11 Apr 2023 08:02) (58 - 70)  BP: 150/56 (11 Apr 2023 08:02) (139/52 - 156/54)  BP(mean): --  RR: 18 (11 Apr 2023 08:02) (18 - 18)  SpO2: 94% (11 Apr 2023 08:02) (94% - 95%)    Parameters below as of 11 Apr 2023 08:02  Patient On (Oxygen Delivery Method): room air          PHYSICAL EXAMINATION:    NECK:  Supple. No lymphadenopathy. Jugular venous pressure not elevated. Carotids equal.   HEART:   The cardiac impulse has a normal quality. Reg., Nl S1 and S2.  There are no murmurs, rubs or gallops noted  CHEST:  Chest is clear to auscultation. Normal respiratory effort.  ABDOMEN:  Soft and nontender.   EXTREMITIES:  There is no edema.       LABS:                        9.6    7.64  )-----------( 103      ( 11 Apr 2023 06:39 )             29.7     04-10    140  |  112<H>  |  23  ----------------------------<  107<H>  4.1   |  24  |  1.27    Ca    8.7      10 Apr 2023 07:06                 Subjective:    pat better, sitting in bed, no new complaint.    Home Medications:  allopurinol 100 mg oral tablet: 1 tab(s) orally once a day (04 Apr 2023 11:16)  amLODIPine 10 mg oral tablet: 1 tab(s) orally once a day (04 Apr 2023 11:16)  benzonatate 100 mg oral capsule: 1 cap(s) orally every 8 hours, As needed, Cough (04 Apr 2023 11:16)  bethanechol 50 mg oral tablet: 1 tab(s) orally 2 times a day (04 Apr 2023 11:16)  Colace 100 mg oral capsule: 1 cap(s) orally once a day (at bedtime) (04 Apr 2023 11:16)  dextromethorphan-guaifenesin 10 mg-100 mg/5 mL oral liquid: 10 milliliter(s) orally every 6 hours, As needed, Cough (04 Apr 2023 11:16)  doxazosin 2 mg oral tablet: 1 tab(s) orally once a day (at bedtime) (04 Apr 2023 11:16)  folic acid 1 mg oral tablet: 1 tab(s) orally once a day (04 Apr 2023 11:16)  furosemide 20 mg oral tablet: 1 tab(s) orally once a day (04 Apr 2023 11:16)  hydrALAZINE 25 mg oral tablet: 1 tab(s) orally 2 times a day (04 Apr 2023 11:16)  lidocaine 5% patch: apply topically to affected area once daily as needed (04 Apr 2023 11:16)  metoprolol tartrate 25 mg oral tablet: 1 tab(s) orally 2 times a day (04 Apr 2023 11:16)  Neurontin 300 mg oral capsule: 2 cap(s) orally 2 times a day (04 Apr 2023 11:16)  nitrofurantoin macrocrystals-monohydrate 100 mg oral capsule: 1 cap(s) orally 2 times a day x 7 days ***Course Not Complete*** (04 Apr 2023 11:19)  rosuvastatin 5 mg oral tablet: 1 tab(s) orally once a day (at bedtime) (04 Apr 2023 11:16)  senna leaf extract oral tablet: 2 tab(s) orally once a day (at bedtime) (04 Apr 2023 11:16)  Tylenol Extra Strength 500 mg oral tablet: 1 tab(s) orally every 6 hours, As Needed for pain (04 Apr 2023 11:16)  Vitamin D3 1000 intl units (25 mcg) oral tablet: 1 cap(s) orally once a day (04 Apr 2023 11:16)    MEDICATIONS  (STANDING):  albuterol    90 MICROgram(s) HFA Inhaler 1 Puff(s) Inhalation every 4 hours  allopurinol 100 milliGRAM(s) Oral daily  atorvastatin 20 milliGRAM(s) Oral at bedtime  bethanechol 50 milliGRAM(s) Oral two times a day  doxazosin 2 milliGRAM(s) Oral at bedtime  folic acid 1 milliGRAM(s) Oral daily  gabapentin 300 milliGRAM(s) Oral two times a day  guaiFENesin  milliGRAM(s) Oral every 12 hours  heparin   Injectable 5000 Unit(s) SubCutaneous every 8 hours  hydrALAZINE 25 milliGRAM(s) Oral two times a day  lidocaine   4% Patch 1 Patch Transdermal daily  metoprolol tartrate 25 milliGRAM(s) Oral two times a day  nystatin    Suspension 190131 Unit(s) Oral four times a day  ondansetron Injectable 4 milliGRAM(s) IV Push once  pantoprazole    Tablet 40 milliGRAM(s) Oral before breakfast  piperacillin/tazobactam IVPB.. 3.375 Gram(s) IV Intermittent every 8 hours  polyethylene glycol 3350 17 Gram(s) Oral two times a day  senna 2 Tablet(s) Oral at bedtime  sodium chloride 0.9%. 1000 milliLiter(s) (50 mL/Hr) IV Continuous <Continuous>    MEDICATIONS  (PRN):  acetaminophen     Tablet .. 650 milliGRAM(s) Oral every 6 hours PRN Temp greater or equal to 38C (100.4F), Mild Pain (1 - 3)  albuterol    0.083% 2.5 milliGRAM(s) Nebulizer every 6 hours PRN Shortness of Breath and/or Wheezing  aluminum hydroxide/magnesium hydroxide/simethicone Suspension 30 milliLiter(s) Oral every 4 hours PRN Dyspepsia  benzonatate 100 milliGRAM(s) Oral every 8 hours PRN Cough  melatonin 3 milliGRAM(s) Oral at bedtime PRN Insomnia      Allergies    Ceftin (Hives; Rash)    Intolerances        Vital Signs Last 24 Hrs  T(C): 36.4 (11 Apr 2023 08:02), Max: 36.4 (11 Apr 2023 08:02)  T(F): 97.5 (11 Apr 2023 08:02), Max: 97.5 (11 Apr 2023 08:02)  HR: 64 (11 Apr 2023 08:02) (58 - 70)  BP: 150/56 (11 Apr 2023 08:02) (139/52 - 156/54)  BP(mean): --  RR: 18 (11 Apr 2023 08:02) (18 - 18)  SpO2: 94% (11 Apr 2023 08:02) (94% - 95%)    Parameters below as of 11 Apr 2023 08:02  Patient On (Oxygen Delivery Method): room air          PHYSICAL EXAMINATION:    NECK:  Supple. No lymphadenopathy. Jugular venous pressure not elevated. Carotids equal.   HEART:   The cardiac impulse has a normal quality. Reg., Nl S1 and S2.  There are no murmurs, rubs or gallops noted  CHEST:  Chest is clear to auscultation. Normal respiratory effort.  ABDOMEN:  Soft and nontender.   EXTREMITIES:  There is no edema.       LABS:                        9.6    7.64  )-----------( 103      ( 11 Apr 2023 06:39 )             29.7     04-10    140  |  112<H>  |  23  ----------------------------<  107<H>  4.1   |  24  |  1.27    Ca    8.7      10 Apr 2023 07:06

## 2023-04-11 NOTE — DISCHARGE NOTE PROVIDER - NSDCFUSCHEDAPPT_GEN_ALL_CORE_FT
Cristina Silver  [place] [facility]  [address]  Scheduled Appointment: 04/14/2023    Cristina Silver  [place] [facility]  [address] [address]  Scheduled Appointment: 04/18/2023    Rojelio Collins  [place] [facility]  UROLOGY [address]  Scheduled Appointment: 05/11/2023

## 2023-04-11 NOTE — PROGRESS NOTE ADULT - SUBJECTIVE AND OBJECTIVE BOX
CHIEF COMPLAINT:  Patient is a 89y old  Female who presents with a chief complaint of     HPI: 23:    Patient is a 89 yo F, well known to me, BIBEMS from home with c/o fever, starting this morning. EMS reports pt's daughter gave pt 650mg of Tylenol @ 0430 with a fever TMAX 103.  She was also recently admitted with ? asp. PNA on 3/20/23 and was COVID (+) at that time.  Pt now found to be hypoxic in triage sating 84% and placed on 6LNC sating 97%. Pt denies chest pain and SOB. Pt is A&OX3 and reports hx of frequent UTI's. EMS reports . Oral temp 99.5 and patient feels very warm to touch.  She was recently admitted with syncope and UTI and was COVID (+) still on 3/31/23.  She has known severe AS by echo with HFpEF (LVEF=65-70%) (see report below).  She seen in the ER with CXR suggesting bilateral lower lobe PNA.  On O2 she is feeling better.  She has no anginal chest pains or recurrent syncope so far.      23:  Feeling slightly better already without increased SOB, clinical CHF or anginal chest pains.  No palpitations or recurrent syncope.  Tolerating current treatments and antibiotics.  Awaiting formal culture reports.      23:  Temp 95.5 this AM and heating blanket in place.  Given a rectal suppository for constipation yesterday.  Creatinine up yesterday and gently hydrated.  Awaiting AM labs.  No increased SOB but still has cough but slightly better.  No new cardiac symptoms.    23:  Feeling better with less cough and less SOB.  Off the heating blanket.  Creatinine coming down as of yesterday.  Transfused 1 unit PRBC's yesterday for Hgb=7.6.  Awaiting AM labs.  No new cardiac symptoms.      4/10/23:  Resting comfortably and lying flat without increased SOB or anginal chest pains.  Eager to fo home.  Hgb=10 and renal function slowly improving.  No new cardiac symptoms.    23:  Continues to improve, lying flat without increased SOB.  Renal function continues to improve as well.  No new cardiac symptoms.  Still lying flat when she sleeps.  Needs more PT.          PMHx:  PAST MEDICAL & SURGICAL HISTORY:  Severe AS  HTN (hypertension)  HLD (hyperlipidemia)  Gout  Osteoarthritis  History of tonsillectomy in childhood      FAMILY HISTORY:   FAMILY HISTORY:  Family history of hypertension (Father, Mother)      ALLERGIES:  Allergies  Ceftin (Hives; Rash)      REVIEW OF SYSTEMS:  10 point ROS was obtained  Pertinent positives and negatives are as above  All other review of systems is negative unless indicated above      Vital Signs Last 24 Hrs  T(C): 36.3 (10 Apr 2023 15:13), Max: 36.4 (10 Apr 2023 08:17)  T(F): 97.3 (10 Apr 2023 15:13), Max: 97.5 (10 Apr 2023 08:17)  HR: 58 (10 Apr 2023 15:13) (58 - 70)  BP: 139/52 (10 Apr 2023 15:13) (139/52 - 156/54)  RR: 18 (10 Apr 2023 15:13) (18 - 18)  SpO2: 95% (10 Apr 2023 15:13) (94% - 95%): room air      PHYSICAL EXAM:   Constitutional: NAD, awake and alert, well-developed  HEENT: PERR, EOMI, Normal Hearing, MMM  Neck: Soft and supple, No LAD, No JVD  Respiratory: Breath sounds scattered rhonchi at bases bilaterally, No rales  Cardiovascular: S1 and S2, regular rate and rhythm, harsh LIANA at base and LLSB as before, (+) S4; no S3 gallop or rubs  Gastrointestinal: Bowel Sounds present, soft, nontender, nondistended, no guarding, no rebound  Extremities: No peripheral edema  Vascular: 2+ peripheral pulses  Neurological: no focal deficits  Skin: No rashes      MEDICATIONS  (STANDING):  albuterol    90 MICROgram(s) HFA Inhaler 1 Puff(s) Inhalation every 4 hours  allopurinol 100 milliGRAM(s) Oral daily  atorvastatin 20 milliGRAM(s) Oral at bedtime  bethanechol 50 milliGRAM(s) Oral two times a day  doxazosin 2 milliGRAM(s) Oral at bedtime  folic acid 1 milliGRAM(s) Oral daily  gabapentin 300 milliGRAM(s) Oral two times a day  guaiFENesin  milliGRAM(s) Oral every 12 hours  heparin   Injectable 5000 Unit(s) SubCutaneous every 8 hours  hydrALAZINE 25 milliGRAM(s) Oral two times a day  lidocaine   4% Patch 1 Patch Transdermal daily  metoprolol tartrate 25 milliGRAM(s) Oral two times a day  nystatin    Suspension 832871 Unit(s) Oral four times a day  ondansetron Injectable 4 milliGRAM(s) IV Push once  pantoprazole    Tablet 40 milliGRAM(s) Oral before breakfast  piperacillin/tazobactam IVPB.. 3.375 Gram(s) IV Intermittent every 8 hours  polyethylene glycol 3350 17 Gram(s) Oral two times a day  senna 2 Tablet(s) Oral at bedtime  sodium chloride 0.9%. 1000 milliLiter(s) (50 mL/Hr) IV Continuous <Continuous>    MEDICATIONS  (PRN):  acetaminophen     Tablet .. 650 milliGRAM(s) Oral every 6 hours PRN Temp greater or equal to 38C (100.4F), Mild Pain (1 - 3)  albuterol    0.083% 2.5 milliGRAM(s) Nebulizer every 6 hours PRN Shortness of Breath and/or Wheezing  aluminum hydroxide/magnesium hydroxide/simethicone Suspension 30 milliLiter(s) Oral every 4 hours PRN Dyspepsia  benzonatate 100 milliGRAM(s) Oral every 8 hours PRN Cough  melatonin 3 milliGRAM(s) Oral at bedtime PRN Insomnia      LABS: All Labs Reviewed:                        9.7    6.43  )-----------( 100      ( 10 Apr 2023 07:06 )             29.2                           10.0   6.36  )-----------( 94       ( 2023 06:57 )             31.1                           7.6    8.81  )-----------( 74       ( 2023 07:44 )             23.4                           8.0    12.70 )-----------( 68       ( 2023 10:35 )             24.5                           8.4    11.91 )-----------( 75       ( 2023 07:54 )             25.7       04-10    140  |  112<H>  |  23  ----------------------------<  107<H>  4.1   |  24  |  1.27    Ca    8.7      10 Apr 2023 07:06      09    140  |  113<H>  |  27<H>  ----------------------------<  106<H>  4.3   |  23  |  1.38<H>    Ca    8.5      2023 06:57      04-06    137  |  112<H>  |  40<H>  ----------------------------<  153<H>  4.4   |  20<L>  |  1.68<H>    Ca    8.3<L>      2023 07:44    TPro  6.2  /  Alb  2.6<L>  /  TBili  0.6  /  DBili  x   /  AST  36  /  ALT  38  /  AlkPhos  60        0405    135  |  107  |  40<H>  ----------------------------<  156<H>  3.8   |  20<L>  |  2.16<H>    Ca    8.0<L>      2023 10:35    TPro  6.7  /  Alb  3.1<L>  /  TBili  0.5  /  DBili  x   /  AST  21  /  ALT  17  /  AlkPhos  51  04-04      04-04    135  |  108  |  26<H>  ----------------------------<  118<H>  3.8   |  21<L>  |  1.58<H>    Ca    8.5      2023 07:54    TPro  6.7  /  Alb  3.1<L>  /  TBili  0.5  /  DBili  x   /  AST  21  /  ALT  17  /  AlkPhos  51  04-04    Pro-Brain Natriuretic Peptide (23 @ 07:54): 1886 pg/mL    Troponin I, High Sensitivity (23 @ 07:54): 14.73    CULTURES:   Culture - Blood (23 @ 07:54)   Specimen Source: .Blood None  Culture Results: No growth to date.    Culture - Blood (23 @ 08:21)   Specimen Source: .Blood None  Culture Results: No growth to date.    Gram Stain Urine -- Gram Stain --  Specimen Source Clean Catch None  Culture-Urine --Organism Klebsiella oxytoca /Raoutella ornithinolytica>100,000 CFU/ml Enterococcus faecalis (23 @ 17:50)   Culture Results: <10,000 CFU/mL Normal Urogenital Kristel (23 @ 10:59)       LIPID PROFILE (23 @ 07:01)   Cholesterol, Serum: 116 mg/dL  Triglycerides, Serum: 64 mg/dL  HDL Cholesterol, Serum: 67 mg/dL  Non HDL Cholesterol: 49      RADIOLOGY:    CT of Chest: 23:  FINDINGS:  LUNGS/AIRWAYS/PLEURA: Slitlike narrowing of the AP diameter of the trachea due to exhalation which can be seen with tracheomalacia. Mosaic attenuation in both lungsfrom air trapping. New consolidation adjacent to atelectasis in the right lower lobe, as well as in the right upper lobe at the apex and adjacent to the oblique fissure. Interlobular septal thickening in both lungs. Small right and trace left pleural effusions and secondary passive atelectasis of both lower lobes.  LYMPH NODES/MEDIASTINUM: Unchanged mediastinal lymph nodes, largest 1 cm in the prevascular space.  HEART/VASCULATURE: Normal heart size. No pericardial effusion. Calcified aortic valve. Coronary artery calcifications.  UPPER ABDOMEN: Cholelithiasis. Medially displaced intimal calcification of the infrarenal aorta, likely chronic. Small hypodensity arising from the left kidney, likely a cyst.  BONES/SOFT TISSUES: Unremarkable.  IMPRESSION: Since 3/5/2023:  New consolidation in the right upper and right lower lobes, favor pneumonia in the appropriate clinical setting.  New small right and trace left pleural effusions, and mild pulmonary edema.      CXR: 23:  FINDINGS:  CATHETERS AND TUBES: None  PULMONARY: Bilateral perihilar/bibasilar diffuse airspace disease. Apices clear. No pneumothorax.  HEART/VASCULAR: The heart and mediastinum size and configuration are within normal limits.  BONES: Visualized osseous thorax intact.  IMPRESSION:  Bilateral perihilar/basilar diffuse airspace disease..    CXR: 3/31/23:  INTERPRETATION:  AP chest on 2023 at 2:17 PM. Patient had a syncopal episode.  Heart magnified by technique.  There is an increasing small infiltrate off the right lower hilum compared to  of this year.  IMPRESSION: Increasing right lower perihilar infiltrate.      EK23:  Normal sinus rhythm  Possible Left atrial enlargement  Left ventricular hypertrophy      TELEMETRY:  NSR      ECHO:  3/1/23:  M-Mode Measurements (cm)   LVEDd: 3.97 cm            LVESd: 2.55 cm   IVSEd: 1.1 cm   LVPWd: 1.1 cm             AO Root Dimension: 2.8 cm                             LA: 3.5 cm                LVOT: 2 cm  Doppler Measurements:   AV Velocity:434 cm/s                  MV Peak E-Wave: 98.7 cm/s   AV Peak Gradient: 75.34 mmHg          MV Peak A-Wave: 131 cm/s   AV Mean Gradient: 40 mmHg             MV E/A Ratio: 0.75 %   AV Area (Continuity):0.85 cm^2        MV Peak Gradient: 3.9 mmHg   TR Velocity:190 cm/s   TR Gradient:14.44 mmHg   Estimated RAP:5 mmHg   RVSP:27 mmHg    Findings  Mitral Valve   The mitral valve leaflets appear thickened.   EA reversal of the mitral inflow consistent with reduced compliance of the left ventricle.   Mild mitral annular calcification is present.   Mild mitral regurgitation is present.    Aortic Valve   Peak and mean transaortic gradients are 75 and 40mmHg respectively; this finding is consistent with severe aortic stenosis.   Mild (1+) aortic regurgitation is present.    Tricuspid Valve   Trace tricuspid valve regurgitation is present.    Pulmonic Valve   Mild pulmonic valvular regurgitation (1+) is present.    Left Atrium   Normal appearing left atrium.    Left Ventricle   Mild concentric left ventricular hypertrophy is present.   The left ventricle is normal in size.   Estimated left ventricular ejection fraction is 65-70 %.    Right Atrium   Normal appearing right atrium.    Right Ventricle   Normal appearing right ventricle structure and function.    Pericardial Effusion   No evidence of pericardial effusion.    Pleural Effusion   No evidence of pleural effusion.    Miscellaneous   The IVC appears normal.    Summary   The mitral valve leaflets appear thickened.   EA reversal of the mitral inflow consistent with reduced compliance of the left ventricle.   Mild mitral annular calcification is present.   Mild mitral regurgitation is present.   Peak and mean transaortic gradients are 75 and 40mmHg respectively; this finding is consistent with severe aortic stenosis.   Mild (1+) aortic regurgitation is present.   Trace tricuspid valve regurgitation is present.   Mild pulmonic valvular regurgitation (1+) is present.   Normal appearing left atrium.   Mild concentric left ventricular hypertrophy is present.   The left ventricle is normal in size.   Estimated left ventricular ejection fraction is 65-70 %.   Normal appearing right atrium.   Normal appearing right ventricle structure and function.   The IVC appears normal.   No evidence of pericardial effusion.   No evidence of pleural effusion.    Signature   ----------------------------------------------------------------   Electronically signed by Sakina Cheatham MD, Director of   Cardiac Cath Lab(Interpreting physician) on 2023 06:42   PM   ----------------------------------------------------------------

## 2023-04-11 NOTE — DISCHARGE NOTE PROVIDER - HOSPITAL COURSE
89 y/o F with PMH severe AS, HFrEF, HTN, HLD, frequent UTIs with urinary retention and chronic pérez, gout, arthritis, aspiration  PNA 3/2023 BIBA to ED for fever TMax 103F, confusion. As per Pts daughter, she was told that last night Pt was confused and was coughing,  febrile, also had episode of vomiting. Pt is awake, known in the hospital, reports currently no SOB, but feels very weak and sleepy. Pt c/o feeling cold, body aches, HA.  Pt denies CP, no abd pain.  As per daughter was started  on Macrobid by PCP.   In ED: Was hypoxic in 80s, stable on NC, rest VS are stable. CXR with b/l infiltrates. Pt was given IV Zosyn and VAnco  (04 Apr 2023 13:20)    Adm for PNA Acute Hypoxic Respiratory Failure due to B/L  PNA possible aspiration PNA and UTI with ENFA/KLOX associated with indwelling pérez catheter.  pt seen by ID and completed 7 days of zosyn.  no further abx upon dc.   pérez changed in ED as per RN. course complicated by chronic anemia and thrombocytopenia further worsening likely consumptional from sepsis and zosyn.    GENERAL: NAD, well-groomed, well-developed  NERVOUS SYSTEM:  Alert & Oriented X3, Motor Strength 5/5 B/L upper and lower extremities; DTRs 2+ intact and symmetric  CHEST/LUNG: Clear to auscultation bilaterally; No rales, rhonchi, wheezing, or rubs  HEART: Regular rate and rhythm; No murmurs, rubs, or gallops  ABDOMEN: Soft, Nontender, Nondistended; Bowel sounds present  EXTREMITIES:  2+ Peripheral Pulses, No clubbing, cyanosis, or edema    * Acute Hypoxic Respiratory Failure due to B/L  PNA possible aspiration PNA  UTI with ENFA/KLOX associated with indwelling pérez catheter  recent hx covid   S/p Vanco  and Zosyn in ED   completed 7 days of zosyn. no further abx as per ID   pérez changed in ED as per RN     # chronic anemia and thrombocytopenia further worsening likely consumptional  from sepsis and zosyn , r/o HIT although low probability for HIT  PRBC tranfused with lasix - dw with cardio in lieu of hx AS  cw zosyn per ID - PLT stabilizing, pt allergic to ceftin  -fibrinogen, b12, folate all adequate  check HIT abs  heme input noted    #chronic diastolic CHF compensated ,  severe AS asymptomatic  stable    dc home  time spent 45 mins. dtr updated at bedside. and has appt with dr rubio hendrix tomorrow. will f/u with dr gatito hendrix for frequent UTI workup

## 2023-04-11 NOTE — PROGRESS NOTE ADULT - REASON FOR ADMISSION
Pneumonia
SOB, hypoxia, bilateral PNA, severe AS, recent COVID-19
SOB, hypoxia, bilateral PNA, severe AS, recent COVID-19
sob
SOB, hypoxia, bilateral PNA, severe AS, recent COVID-19
sob
sob
SOB, hypoxia, bilateral PNA, severe AS, recent COVID-19
SOB, hypoxia, bilateral PNA, severe AS, recent COVID-19

## 2023-04-11 NOTE — DISCHARGE NOTE PROVIDER - DETAILS OF MALNUTRITION DIAGNOSIS/DIAGNOSES
This patient has been assessed with a concern for Malnutrition and was treated during this hospitalization for the following Nutrition diagnosis/diagnoses:     -  04/08/2023: Severe protein-calorie malnutrition

## 2023-04-12 RX ORDER — PANTOPRAZOLE SODIUM 20 MG/1
1 TABLET, DELAYED RELEASE ORAL
Qty: 30 | Refills: 0
Start: 2023-04-12 | End: 2023-05-11

## 2023-04-12 RX ORDER — NYSTATIN 500MM UNIT
4 POWDER (EA) MISCELLANEOUS
Qty: 80 | Refills: 0
Start: 2023-04-12 | End: 2023-04-16

## 2023-04-14 ENCOUNTER — APPOINTMENT (OUTPATIENT)
Dept: RHEUMATOLOGY | Facility: CLINIC | Age: 88
End: 2023-04-14

## 2023-04-14 NOTE — CDI QUERY NOTE - NSCDIOTHERTXTBX_GEN_ALL_CORE_HH
This patient is documented to have Sepsis.      Can you please clarify the status of the sepsis, after review?    -Sepsis ruled out  -Sepsis ruled in and POA  (please also document the criteria you are using to support the diagnosis of sepsis)  - Sepsis ruled in and not POA  (please also document the criteria you are using to support the diagnosis of sepsis)  -Other (please specify)  -Not clinically significant        Chart Documentation/Evidence:    WBC Count: 7.64 K/uL (04.11.23 @ 06:39)   WBC Count: 6.43 K/uL (04.10.23 @ 07:06)   WBC Count: 6.36 K/uL (04.09.23 @ 06:57)   WBC Count: 8.75 K/uL (04.08.23 @ 07:00)   WBC Count: 9.92 K/uL (04.07.23 @ 07:08)   WBC Count: 8.81 K/uL (04.06.23 @ 07:44)   WBC Count: 12.70 K/uL (04.05.23 @ 10:35)   WBC Count: 11.91 K/uL (04.04.23 @ 07:54)     Lactate, Blood: 1.1 mmol/L (04.04.23 @ 07:54)     Specimen Source: .Blood None (04.04.23 @ 08:21)   Specimen Source: .Blood None (04.04.23 @ 07:54)       ED provider documentation  on 4/4/2023:   SEPSIS – was this patient treated for sepsis? No.       H&P documentation on 4/4/2023:  T(C): 36.7 (04 Apr 2023 11:04), Max: 37.5 (04 Apr 2023 06:54)  T(F): 98 (04 Apr 2023 11:04), Max: 99.5 (04 Apr 2023 06:54)  HR: 98 (04 Apr 2023 11:04) (75 - 98)  BP: 119/54 (04 Apr 2023 11:04) (119/54 - 127/45)  BP(mean): --  RR: 21 (04 Apr 2023 11:04) (21 - 23)  SpO2: 93% (04 Apr 2023 11:04) (93% - 97%)    Parameters below as of 04 Apr 2023 11:04  Patient On (Oxygen Delivery Method): nasal cannula  O2 Flow (L/min): 4      1. Acute Hypoxic Respiratory Failure due to B/L  PNA  admit to med surg  monitor pulse oc  Supplement  O2 via NC  S/p Vanco  and Zosyn in ED   Start Zosyn IV as per ID recs   F/u BCX  Tylenol PRN   Mucinex, Tessalon   Albuterol PRN   Possible aspiration?  Speech and swallow  eval       2.  Suspected UTI assocated with Chronic Pérez   UA +   Past UCX reviewed   Started on Zosyn   ID recs appreciated       Discharge summary on 4/11/2023:  Adm for PNA Acute Hypoxic Respiratory Failure due to B/L  PNA possible aspiration PNA and UTI with ENFA/KLOX associated with indwelling pérez catheter.  pt seen by ID and completed 7 days of zosyn.  no further abx upon dc.   pérez changed in ED as per RN. course complicated by chronic anemia and thrombocytopenia further worsening likely consumptional from sepsis and zosyn.      # chronic anemia and thrombocytopenia further worsening likely consumptional  from sepsis and zosyn , r/o HIT although low probability for HIT
Please clarify if COVID is a history or a current condition?  - History of COVID 19  - Current history of COVID 19 ( Please document clinical indicators)  -  Unable to determine  - Other ( Please specify)    CLINICAL INDICATORS:      SARS-CoV-2 Result: NotDetec: (02.27.23 @ 00:50)     SARS-CoV-2 Result: Detected: (03.31.23 @ 13:42)     COVID-19 PCR: Detected (05 Mar 2023 08:38)    Patient not on isolation    Cardiology documentation on 4/4/2023:  Problem/Recommendation - 3:  ·  Problem: 2019 novel coronavirus disease (COVID-19).     Patient is a 89 yo F, well known to me, BIBEMS from home with c/o fever, starting this morning. EMS reports pt's daughter gave pt 650mg of Tylenol @ 0430 with a fever TMAX 103.  She was also recently admitted with ? asp. PNA on 3/20/23 and was COVID (+) at that time.  Pt now found to be hypoxic in triage sating 84% and placed on 6LNC sating 97%. Pt denies chest pain and SOB. Pt is A&OX3 and reports hx of frequent UTI's. EMS reports . Oral temp 99.5 and patient feels very warm to touch.  She was recently admitted with syncope and UTI and was COVID (+) still on 3/31/23.  She has known severe AS by echo with HFpEF (LVEF=65-70%) (see report below).  She seen in the ER with CXR suggesting bilateral lower lobe PNA.  On O2 she is feeling better.  She has no anginal chest pains or recurrent syncope so far.        Infectious disease documentation on 4/4/2023:  Fever. UTI with ENFA/KLOX. Aspiration pneumonia. Recent Covid-19 viral syndrome. ANDREW  - imaging and prior chart/cultures reviewe      Hospitalitis progress note on 4/5/2023:  Acute Hypoxic Respiratory Failure due to B/L  PNA

## 2023-04-18 ENCOUNTER — APPOINTMENT (OUTPATIENT)
Dept: RHEUMATOLOGY | Facility: CLINIC | Age: 88
End: 2023-04-18

## 2023-04-18 ENCOUNTER — APPOINTMENT (OUTPATIENT)
Dept: UROLOGY | Facility: CLINIC | Age: 88
End: 2023-04-18
Payer: MEDICARE

## 2023-04-18 DIAGNOSIS — R33.9 RETENTION OF URINE, UNSPECIFIED: ICD-10-CM

## 2023-04-18 PROCEDURE — 99213 OFFICE O/P EST LOW 20 MIN: CPT

## 2023-04-18 NOTE — ASSESSMENT
[FreeTextEntry1] : 89 year old F with chronic indwelling pérez for urinary retention and 3 UTIs in last 3 months, meeting the criteria for Frequent UTIs. We discussed suppression with D-mannose supplements. We discussed irrigation with antibiotic solution. Also discussed placement of SP tube. Pt and family choose D-mannose and antibiotic irrigation. Catheter change q3 weeks. RTO 6-12 weeks.

## 2023-04-18 NOTE — PHYSICAL EXAM

## 2023-04-18 NOTE — HISTORY OF PRESENT ILLNESS
[FreeTextEntry1] : 89 year old woman  seen 04/18/2023 with complaint of frequent UTI. This began 3 months ago. She gets UTIs about every month. Her usual UTI symptoms are not dysuria, urgency, and bladder pain but rather high fevers and confusion. She had followed with Dr Bonilla for urinary retention and is managed with indwelling pérez. He had recommended CIC, but pt could not perform this. She presents with her daughter for discussion of ways to prevent UTIs. \par \par No family history contributory to Frequent UTIs.
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
## 2023-04-19 DIAGNOSIS — Z88.0 ALLERGY STATUS TO PENICILLIN: ICD-10-CM

## 2023-04-19 DIAGNOSIS — I50.32 CHRONIC DIASTOLIC (CONGESTIVE) HEART FAILURE: ICD-10-CM

## 2023-04-19 DIAGNOSIS — Z79.899 OTHER LONG TERM (CURRENT) DRUG THERAPY: ICD-10-CM

## 2023-04-19 DIAGNOSIS — N18.4 CHRONIC KIDNEY DISEASE, STAGE 4 (SEVERE): ICD-10-CM

## 2023-04-19 DIAGNOSIS — J96.01 ACUTE RESPIRATORY FAILURE WITH HYPOXIA: ICD-10-CM

## 2023-04-19 DIAGNOSIS — T83.511A INFECTION AND INFLAMMATORY REACTION DUE TO INDWELLING URETHRAL CATHETER, INITIAL ENCOUNTER: ICD-10-CM

## 2023-04-19 DIAGNOSIS — N17.9 ACUTE KIDNEY FAILURE, UNSPECIFIED: ICD-10-CM

## 2023-04-19 DIAGNOSIS — D69.6 THROMBOCYTOPENIA, UNSPECIFIED: ICD-10-CM

## 2023-04-19 DIAGNOSIS — E43 UNSPECIFIED SEVERE PROTEIN-CALORIE MALNUTRITION: ICD-10-CM

## 2023-04-19 DIAGNOSIS — A41.9 SEPSIS, UNSPECIFIED ORGANISM: ICD-10-CM

## 2023-04-19 DIAGNOSIS — G93.41 METABOLIC ENCEPHALOPATHY: ICD-10-CM

## 2023-04-19 DIAGNOSIS — M10.9 GOUT, UNSPECIFIED: ICD-10-CM

## 2023-04-19 DIAGNOSIS — J69.0 PNEUMONITIS DUE TO INHALATION OF FOOD AND VOMIT: ICD-10-CM

## 2023-04-19 DIAGNOSIS — I35.0 NONRHEUMATIC AORTIC (VALVE) STENOSIS: ICD-10-CM

## 2023-04-19 DIAGNOSIS — E86.0 DEHYDRATION: ICD-10-CM

## 2023-04-19 DIAGNOSIS — D63.1 ANEMIA IN CHRONIC KIDNEY DISEASE: ICD-10-CM

## 2023-04-19 DIAGNOSIS — Z86.16 PERSONAL HISTORY OF COVID-19: ICD-10-CM

## 2023-04-19 DIAGNOSIS — E78.5 HYPERLIPIDEMIA, UNSPECIFIED: ICD-10-CM

## 2023-04-19 DIAGNOSIS — I13.0 HYPERTENSIVE HEART AND CHRONIC KIDNEY DISEASE WITH HEART FAILURE AND STAGE 1 THROUGH STAGE 4 CHRONIC KIDNEY DISEASE, OR UNSPECIFIED CHRONIC KIDNEY DISEASE: ICD-10-CM

## 2023-04-19 DIAGNOSIS — R33.9 RETENTION OF URINE, UNSPECIFIED: ICD-10-CM

## 2023-04-19 DIAGNOSIS — N39.0 URINARY TRACT INFECTION, SITE NOT SPECIFIED: ICD-10-CM

## 2023-04-24 NOTE — HISTORY OF PRESENT ILLNESS
CAN YOU NOTIFY PATIENT ABOUT REFERRAL FOR VASC US LOWER EXREMITY ARTERIAL DUPLEX BILATERAL WITH CARLOS ORDER, PLEASE AND THANKYOU.  RESPONSE FROM DR. RAMESH WENT TO [place] BUT SHE IS OUT THIS WEEK.  THANKYOU.    [FreeTextEntry1] : Patient with OA, Pseudogout, presents for f/u\par \par \par Left inner knee knot\par cramped up\par She was walking with difficulty for a few days, but now a bit better.  \par \par requesting b/l medial knee fat pad trigger point inj\par doesn't need pab now\par \par next inj for IA knees 10/25/22, they want it closer to a wedding.

## 2023-04-25 ENCOUNTER — NON-APPOINTMENT (OUTPATIENT)
Age: 88
End: 2023-04-25

## 2023-04-27 ENCOUNTER — LABORATORY RESULT (OUTPATIENT)
Age: 88
End: 2023-04-27

## 2023-04-27 ENCOUNTER — APPOINTMENT (OUTPATIENT)
Dept: RHEUMATOLOGY | Facility: CLINIC | Age: 88
End: 2023-04-27
Payer: MEDICARE

## 2023-04-27 VITALS
WEIGHT: 160 LBS | BODY MASS INDEX: 31.41 KG/M2 | OXYGEN SATURATION: 96 % | DIASTOLIC BLOOD PRESSURE: 76 MMHG | SYSTOLIC BLOOD PRESSURE: 166 MMHG | HEART RATE: 59 BPM | HEIGHT: 60 IN | TEMPERATURE: 97.3 F

## 2023-04-27 DIAGNOSIS — M10.9 GOUT, UNSPECIFIED: ICD-10-CM

## 2023-04-27 PROCEDURE — 99214 OFFICE O/P EST MOD 30 MIN: CPT | Mod: 25

## 2023-04-27 PROCEDURE — 20610 DRAIN/INJ JOINT/BURSA W/O US: CPT | Mod: 50

## 2023-04-28 ENCOUNTER — MED ADMIN CHARGE (OUTPATIENT)
Age: 88
End: 2023-04-28

## 2023-04-28 PROBLEM — M10.9 GOUT: Status: ACTIVE | Noted: 2019-03-18

## 2023-04-28 LAB
B PERT IGG+IGM PNL SER: CLEAR
B PERT IGG+IGM PNL SER: CLEAR
COLOR FLD: YELLOW
COLOR FLD: YELLOW
EOSINOPHIL # FLD MANUAL: 0 %
EOSINOPHIL # FLD MANUAL: 0 %
FLUID INTAKE SUBSTANCE CLASS: NORMAL
FLUID INTAKE SUBSTANCE CLASS: NORMAL
LYMPHOCYTES # FLD MANUAL: 60 %
LYMPHOCYTES # FLD MANUAL: 66 %
MESOTHL CELL NFR FLD: 0 %
MESOTHL CELL NFR FLD: 0 %
MONOS+MACROS NFR FLD MANUAL: 20 %
MONOS+MACROS NFR FLD MANUAL: 22 %
NEUTS SEG # FLD MANUAL: 14 %
NEUTS SEG # FLD MANUAL: 18 %
NRBC # FLD: 0 %
NRBC # FLD: 0 %
RBC # FLD MANUAL: 1000 /UL
RBC # FLD MANUAL: 1000 /UL
SYCRY CLARITY: CLEAR
SYCRY COLOR: YELLOW
SYCRY ID: NORMAL
SYCRY TUBE: NORMAL
TOTAL CELLS COUNTED FLD: 294 /UL
TOTAL CELLS COUNTED FLD: 338 /UL
TUBE TYPE: NORMAL
TUBE TYPE: NORMAL
UNIDENT CELLS NFR FLD MANUAL: 0 %
UNIDENT CELLS NFR FLD MANUAL: 0 %
VARIANT LYMPHS # FLD MANUAL: 0 %
VARIANT LYMPHS # FLD MANUAL: 0 %

## 2023-04-28 RX ORDER — TRIAMCINOLONE ACETONIDE 80 MG/ML
80 INJECTION, SUSPENSION INTRA-ARTICULAR; INTRAMUSCULAR
Qty: 16 | Refills: 0 | Status: COMPLETED | OUTPATIENT
Start: 2023-04-28

## 2023-04-28 RX ADMIN — TRIAMCINOLONE ACETONIDE 0 MG/ML: 80 INJECTION, SUSPENSION INTRA-ARTICULAR; INTRAMUSCULAR at 00:00

## 2023-04-28 NOTE — ASSESSMENT
[FreeTextEntry1] : Injected b/L knees with 80mg kenalog.\par 9cc aspirated from right knee and 10 cc in left knee\par Synovial fluid sent for testing\par rpa 3 months\par \par

## 2023-04-28 NOTE — PROCEDURE
[FreeTextEntry1] : Right knee aspiration\par The procedure risks was discussed with the patient.\par Indications: therapeutic purposes \par #1 site identified in the right knee . Verbal consent was obtained. \par Anesthesia was with ethyl chloride.\par The patient was prepped with betadine solution. \par A 21 gauge 1.5 inch needle was used.\par 9cc of fluid aspirated. \par Injectables:80 mg Kenalog\par The patient tolerated the procedure well..\par There were no complications. Handouts/patient instructions were given to patient . patient was instructed to call if redness at site, a decrease in range of motion or an increase in pain is noted after procedure.\par #2: Identical Procedure repeated in left knee, however 10 cc of clear yellow fluid aspirated.\par

## 2023-04-28 NOTE — HISTORY OF PRESENT ILLNESS
[FreeTextEntry1] : Patient with OA, Pseudogout, presents for f/u\par \par She has pain in b/L knees\par The CS injections always help, she states. \par Denies fall.  \par Left knee hurts more than right knee today. \par Pain is worsened on weight bearing and walking. \par \par

## 2023-05-01 ENCOUNTER — NON-APPOINTMENT (OUTPATIENT)
Age: 88
End: 2023-05-01

## 2023-05-02 ENCOUNTER — NON-APPOINTMENT (OUTPATIENT)
Age: 88
End: 2023-05-02

## 2023-05-05 ENCOUNTER — NON-APPOINTMENT (OUTPATIENT)
Age: 88
End: 2023-05-05

## 2023-05-22 RX ORDER — NITROFURANTOIN MACROCRYSTAL 50 MG
1 CAPSULE ORAL
Refills: 0 | DISCHARGE
Start: 2023-05-22 | End: 2023-05-28

## 2023-05-23 ENCOUNTER — NON-APPOINTMENT (OUTPATIENT)
Age: 88
End: 2023-05-23

## 2023-05-23 LAB
APPEARANCE: ABNORMAL
BACTERIA: ABNORMAL /HPF
BILIRUBIN URINE: NEGATIVE
BLOOD URINE: ABNORMAL
COLOR: NORMAL
GLUCOSE QUALITATIVE U: NEGATIVE MG/DL
HYALINE CASTS: NORMAL
KETONES URINE: NEGATIVE MG/DL
LEUKOCYTE ESTERASE URINE: ABNORMAL
MICROSCOPIC-UA: NORMAL
NITRITE URINE: NEGATIVE
PH URINE: 6
PROTEIN URINE: 300 MG/DL
RED BLOOD CELLS URINE: 32 /HPF
SPECIFIC GRAVITY URINE: 1.02
SQUAMOUS EPITHELIAL CELLS: 6
UROBILINOGEN URINE: 0.2 MG/DL
WHITE BLOOD CELLS URINE: 166 /HPF

## 2023-05-23 RX ORDER — NITROFURANTOIN MACROCRYSTAL 50 MG
1 CAPSULE ORAL
Refills: 0 | DISCHARGE
Start: 2023-05-23

## 2023-05-24 ENCOUNTER — NON-APPOINTMENT (OUTPATIENT)
Age: 88
End: 2023-05-24

## 2023-05-24 ENCOUNTER — INPATIENT (INPATIENT)
Facility: HOSPITAL | Age: 88
LOS: 5 days | Discharge: HOME HEALTH | DRG: 698 | End: 2023-05-30
Attending: INTERNAL MEDICINE | Admitting: HOSPITALIST
Payer: MEDICARE

## 2023-05-24 VITALS
HEIGHT: 63 IN | WEIGHT: 160.06 LBS | SYSTOLIC BLOOD PRESSURE: 110 MMHG | DIASTOLIC BLOOD PRESSURE: 39 MMHG | OXYGEN SATURATION: 94 % | RESPIRATION RATE: 20 BRPM | HEART RATE: 94 BPM | TEMPERATURE: 99 F

## 2023-05-24 DIAGNOSIS — Z90.89 ACQUIRED ABSENCE OF OTHER ORGANS: Chronic | ICD-10-CM

## 2023-05-24 DIAGNOSIS — R50.9 FEVER, UNSPECIFIED: ICD-10-CM

## 2023-05-24 LAB
ALBUMIN SERPL ELPH-MCNC: 3.2 G/DL — LOW (ref 3.3–5)
ALP SERPL-CCNC: 81 U/L — SIGNIFICANT CHANGE UP (ref 40–120)
ALT FLD-CCNC: 71 U/L — SIGNIFICANT CHANGE UP (ref 12–78)
ANION GAP SERPL CALC-SCNC: 8 MMOL/L — SIGNIFICANT CHANGE UP (ref 5–17)
ANISOCYTOSIS BLD QL: SLIGHT — SIGNIFICANT CHANGE UP
APPEARANCE UR: CLEAR — SIGNIFICANT CHANGE UP
APTT BLD: 27.8 SEC — SIGNIFICANT CHANGE UP (ref 27.5–35.5)
AST SERPL-CCNC: 90 U/L — HIGH (ref 15–37)
BACTERIA # UR AUTO: ABNORMAL
BASOPHILS # BLD AUTO: 0 K/UL — SIGNIFICANT CHANGE UP (ref 0–0.2)
BASOPHILS NFR BLD AUTO: 0 % — SIGNIFICANT CHANGE UP (ref 0–2)
BILIRUB SERPL-MCNC: 1 MG/DL — SIGNIFICANT CHANGE UP (ref 0.2–1.2)
BILIRUB UR-MCNC: NEGATIVE — SIGNIFICANT CHANGE UP
BUN SERPL-MCNC: 29 MG/DL — HIGH (ref 7–23)
CALCIUM SERPL-MCNC: 8.7 MG/DL — SIGNIFICANT CHANGE UP (ref 8.5–10.1)
CHLORIDE SERPL-SCNC: 100 MMOL/L — SIGNIFICANT CHANGE UP (ref 96–108)
CO2 SERPL-SCNC: 22 MMOL/L — SIGNIFICANT CHANGE UP (ref 22–31)
COLOR SPEC: YELLOW — SIGNIFICANT CHANGE UP
CREAT SERPL-MCNC: 1.95 MG/DL — HIGH (ref 0.5–1.3)
DIFF PNL FLD: ABNORMAL
EGFR: 23 ML/MIN/1.73M2 — LOW
EOSINOPHIL # BLD AUTO: 0 K/UL — SIGNIFICANT CHANGE UP (ref 0–0.5)
EOSINOPHIL NFR BLD AUTO: 0 % — SIGNIFICANT CHANGE UP (ref 0–6)
EPI CELLS # UR: SIGNIFICANT CHANGE UP
FLUAV AG NPH QL: SIGNIFICANT CHANGE UP
FLUBV AG NPH QL: SIGNIFICANT CHANGE UP
GLUCOSE SERPL-MCNC: 116 MG/DL — HIGH (ref 70–99)
GLUCOSE UR QL: NEGATIVE — SIGNIFICANT CHANGE UP
HCT VFR BLD CALC: 29.5 % — LOW (ref 34.5–45)
HGB BLD-MCNC: 9.8 G/DL — LOW (ref 11.5–15.5)
HYPOCHROMIA BLD QL: SLIGHT — SIGNIFICANT CHANGE UP
INR BLD: 1.1 RATIO — SIGNIFICANT CHANGE UP (ref 0.88–1.16)
KETONES UR-MCNC: NEGATIVE — SIGNIFICANT CHANGE UP
LACTATE SERPL-SCNC: 1.9 MMOL/L — SIGNIFICANT CHANGE UP (ref 0.7–2)
LACTATE SERPL-SCNC: 2.5 MMOL/L — HIGH (ref 0.7–2)
LEUKOCYTE ESTERASE UR-ACNC: ABNORMAL
LIDOCAIN IGE QN: 70 U/L — LOW (ref 73–393)
LYMPHOCYTES # BLD AUTO: 0.21 K/UL — LOW (ref 1–3.3)
LYMPHOCYTES # BLD AUTO: 1 % — LOW (ref 13–44)
MANUAL SMEAR VERIFICATION: SIGNIFICANT CHANGE UP
MCHC RBC-ENTMCNC: 30.6 PG — SIGNIFICANT CHANGE UP (ref 27–34)
MCHC RBC-ENTMCNC: 33.2 GM/DL — SIGNIFICANT CHANGE UP (ref 32–36)
MCV RBC AUTO: 92.2 FL — SIGNIFICANT CHANGE UP (ref 80–100)
MONOCYTES # BLD AUTO: 1.05 K/UL — HIGH (ref 0–0.9)
MONOCYTES NFR BLD AUTO: 5 % — SIGNIFICANT CHANGE UP (ref 2–14)
MYELOCYTES NFR BLD: 1 % — HIGH (ref 0–0)
NEUTROPHILS # BLD AUTO: 19.52 K/UL — HIGH (ref 1.8–7.4)
NEUTROPHILS NFR BLD AUTO: 93 % — HIGH (ref 43–77)
NITRITE UR-MCNC: POSITIVE
NRBC # BLD: 0 /100 — SIGNIFICANT CHANGE UP (ref 0–0)
NRBC # BLD: SIGNIFICANT CHANGE UP /100 WBCS (ref 0–0)
PH UR: 5 — SIGNIFICANT CHANGE UP (ref 5–8)
PLAT MORPH BLD: NORMAL — SIGNIFICANT CHANGE UP
PLATELET # BLD AUTO: 107 K/UL — LOW (ref 150–400)
POTASSIUM SERPL-MCNC: 4.1 MMOL/L — SIGNIFICANT CHANGE UP (ref 3.5–5.3)
POTASSIUM SERPL-SCNC: 4.1 MMOL/L — SIGNIFICANT CHANGE UP (ref 3.5–5.3)
PROT SERPL-MCNC: 6.9 GM/DL — SIGNIFICANT CHANGE UP (ref 6–8.3)
PROT UR-MCNC: 30 MG/DL
PROTHROM AB SERPL-ACNC: 12.8 SEC — SIGNIFICANT CHANGE UP (ref 10.5–13.4)
RAPID RVP RESULT: SIGNIFICANT CHANGE UP
RBC # BLD: 3.2 M/UL — LOW (ref 3.8–5.2)
RBC # FLD: 16.2 % — HIGH (ref 10.3–14.5)
RBC BLD AUTO: ABNORMAL
RBC CASTS # UR COMP ASSIST: ABNORMAL /HPF (ref 0–4)
RSV RNA NPH QL NAA+NON-PROBE: SIGNIFICANT CHANGE UP
SARS-COV-2 RNA SPEC QL NAA+PROBE: SIGNIFICANT CHANGE UP
SARS-COV-2 RNA SPEC QL NAA+PROBE: SIGNIFICANT CHANGE UP
SODIUM SERPL-SCNC: 130 MMOL/L — LOW (ref 135–145)
SP GR SPEC: 1.01 — SIGNIFICANT CHANGE UP (ref 1.01–1.02)
TROPONIN I, HIGH SENSITIVITY RESULT: 11.49 NG/L — SIGNIFICANT CHANGE UP
UROBILINOGEN FLD QL: NEGATIVE — SIGNIFICANT CHANGE UP
WBC # BLD: 20.99 K/UL — HIGH (ref 3.8–10.5)
WBC # FLD AUTO: 20.99 K/UL — HIGH (ref 3.8–10.5)
WBC UR QL: >50 /HPF (ref 0–5)

## 2023-05-24 PROCEDURE — 74176 CT ABD & PELVIS W/O CONTRAST: CPT | Mod: 26,MA

## 2023-05-24 PROCEDURE — 97116 GAIT TRAINING THERAPY: CPT | Mod: GP

## 2023-05-24 PROCEDURE — 71045 X-RAY EXAM CHEST 1 VIEW: CPT | Mod: 26

## 2023-05-24 PROCEDURE — 94640 AIRWAY INHALATION TREATMENT: CPT

## 2023-05-24 PROCEDURE — 99223 1ST HOSP IP/OBS HIGH 75: CPT

## 2023-05-24 PROCEDURE — 80048 BASIC METABOLIC PNL TOTAL CA: CPT

## 2023-05-24 PROCEDURE — 99285 EMERGENCY DEPT VISIT HI MDM: CPT

## 2023-05-24 PROCEDURE — 83605 ASSAY OF LACTIC ACID: CPT

## 2023-05-24 PROCEDURE — 97163 PT EVAL HIGH COMPLEX 45 MIN: CPT | Mod: GP

## 2023-05-24 PROCEDURE — 87086 URINE CULTURE/COLONY COUNT: CPT

## 2023-05-24 PROCEDURE — 85027 COMPLETE CBC AUTOMATED: CPT

## 2023-05-24 PROCEDURE — 71045 X-RAY EXAM CHEST 1 VIEW: CPT

## 2023-05-24 PROCEDURE — 0225U NFCT DS DNA&RNA 21 SARSCOV2: CPT

## 2023-05-24 PROCEDURE — 36415 COLL VENOUS BLD VENIPUNCTURE: CPT

## 2023-05-24 PROCEDURE — 0241U: CPT

## 2023-05-24 PROCEDURE — 81001 URINALYSIS AUTO W/SCOPE: CPT

## 2023-05-24 PROCEDURE — 71250 CT THORAX DX C-: CPT | Mod: 26,MA

## 2023-05-24 RX ORDER — ACETAMINOPHEN 500 MG
650 TABLET ORAL ONCE
Refills: 0 | Status: COMPLETED | OUTPATIENT
Start: 2023-05-24 | End: 2023-05-24

## 2023-05-24 RX ORDER — VANCOMYCIN HCL 1 G
1000 VIAL (EA) INTRAVENOUS ONCE
Refills: 0 | Status: COMPLETED | OUTPATIENT
Start: 2023-05-24 | End: 2023-05-24

## 2023-05-24 RX ORDER — ACETAMINOPHEN 500 MG
650 TABLET ORAL EVERY 6 HOURS
Refills: 0 | Status: DISCONTINUED | OUTPATIENT
Start: 2023-05-24 | End: 2023-05-30

## 2023-05-24 RX ORDER — BETHANECHOL CHLORIDE 25 MG
1 TABLET ORAL
Qty: 0 | Refills: 0 | DISCHARGE

## 2023-05-24 RX ORDER — PANTOPRAZOLE SODIUM 20 MG/1
40 TABLET, DELAYED RELEASE ORAL
Refills: 0 | Status: DISCONTINUED | OUTPATIENT
Start: 2023-05-24 | End: 2023-05-30

## 2023-05-24 RX ORDER — GABAPENTIN 400 MG/1
300 CAPSULE ORAL
Refills: 0 | Status: DISCONTINUED | OUTPATIENT
Start: 2023-05-24 | End: 2023-05-30

## 2023-05-24 RX ORDER — ACETAMINOPHEN 500 MG
1 TABLET ORAL
Qty: 0 | Refills: 0 | DISCHARGE

## 2023-05-24 RX ORDER — ATORVASTATIN CALCIUM 80 MG/1
20 TABLET, FILM COATED ORAL AT BEDTIME
Refills: 0 | Status: DISCONTINUED | OUTPATIENT
Start: 2023-05-24 | End: 2023-05-30

## 2023-05-24 RX ORDER — ENOXAPARIN SODIUM 100 MG/ML
30 INJECTION SUBCUTANEOUS EVERY 24 HOURS
Refills: 0 | Status: DISCONTINUED | OUTPATIENT
Start: 2023-05-24 | End: 2023-05-30

## 2023-05-24 RX ORDER — ALLOPURINOL 300 MG
100 TABLET ORAL DAILY
Refills: 0 | Status: DISCONTINUED | OUTPATIENT
Start: 2023-05-24 | End: 2023-05-30

## 2023-05-24 RX ORDER — PIPERACILLIN AND TAZOBACTAM 4; .5 G/20ML; G/20ML
3.38 INJECTION, POWDER, LYOPHILIZED, FOR SOLUTION INTRAVENOUS EVERY 12 HOURS
Refills: 0 | Status: DISCONTINUED | OUTPATIENT
Start: 2023-05-25 | End: 2023-05-30

## 2023-05-24 RX ORDER — LANOLIN ALCOHOL/MO/W.PET/CERES
3 CREAM (GRAM) TOPICAL AT BEDTIME
Refills: 0 | Status: DISCONTINUED | OUTPATIENT
Start: 2023-05-24 | End: 2023-05-30

## 2023-05-24 RX ORDER — SODIUM CHLORIDE 9 MG/ML
1600 INJECTION INTRAMUSCULAR; INTRAVENOUS; SUBCUTANEOUS ONCE
Refills: 0 | Status: COMPLETED | OUTPATIENT
Start: 2023-05-24 | End: 2023-05-24

## 2023-05-24 RX ORDER — PIPERACILLIN AND TAZOBACTAM 4; .5 G/20ML; G/20ML
3.38 INJECTION, POWDER, LYOPHILIZED, FOR SOLUTION INTRAVENOUS ONCE
Refills: 0 | Status: COMPLETED | OUTPATIENT
Start: 2023-05-24 | End: 2023-05-24

## 2023-05-24 RX ORDER — SODIUM CHLORIDE 9 MG/ML
1000 INJECTION INTRAMUSCULAR; INTRAVENOUS; SUBCUTANEOUS
Refills: 0 | Status: DISCONTINUED | OUTPATIENT
Start: 2023-05-24 | End: 2023-05-26

## 2023-05-24 RX ORDER — ONDANSETRON 8 MG/1
4 TABLET, FILM COATED ORAL EVERY 8 HOURS
Refills: 0 | Status: DISCONTINUED | OUTPATIENT
Start: 2023-05-24 | End: 2023-05-30

## 2023-05-24 RX ORDER — SODIUM CHLORIDE 9 MG/ML
1000 INJECTION INTRAMUSCULAR; INTRAVENOUS; SUBCUTANEOUS ONCE
Refills: 0 | Status: COMPLETED | OUTPATIENT
Start: 2023-05-24 | End: 2023-05-24

## 2023-05-24 RX ORDER — PIPERACILLIN AND TAZOBACTAM 4; .5 G/20ML; G/20ML
3.38 INJECTION, POWDER, LYOPHILIZED, FOR SOLUTION INTRAVENOUS ONCE
Refills: 0 | Status: DISCONTINUED | OUTPATIENT
Start: 2023-05-24 | End: 2023-05-24

## 2023-05-24 RX ORDER — PIPERACILLIN AND TAZOBACTAM 4; .5 G/20ML; G/20ML
3.38 INJECTION, POWDER, LYOPHILIZED, FOR SOLUTION INTRAVENOUS ONCE
Refills: 0 | Status: COMPLETED | OUTPATIENT
Start: 2023-05-24 | End: 2023-05-25

## 2023-05-24 RX ORDER — LIDOCAINE 4 G/100G
0 CREAM TOPICAL
Qty: 0 | Refills: 0 | DISCHARGE

## 2023-05-24 RX ADMIN — ENOXAPARIN SODIUM 30 MILLIGRAM(S): 100 INJECTION SUBCUTANEOUS at 23:33

## 2023-05-24 RX ADMIN — ATORVASTATIN CALCIUM 20 MILLIGRAM(S): 80 TABLET, FILM COATED ORAL at 23:33

## 2023-05-24 RX ADMIN — SODIUM CHLORIDE 125 MILLILITER(S): 9 INJECTION INTRAMUSCULAR; INTRAVENOUS; SUBCUTANEOUS at 19:00

## 2023-05-24 RX ADMIN — SODIUM CHLORIDE 1000 MILLILITER(S): 9 INJECTION INTRAMUSCULAR; INTRAVENOUS; SUBCUTANEOUS at 16:00

## 2023-05-24 RX ADMIN — GABAPENTIN 300 MILLIGRAM(S): 400 CAPSULE ORAL at 23:33

## 2023-05-24 RX ADMIN — Medication 250 MILLIGRAM(S): at 18:49

## 2023-05-24 RX ADMIN — Medication 650 MILLIGRAM(S): at 13:44

## 2023-05-24 RX ADMIN — PIPERACILLIN AND TAZOBACTAM 200 GRAM(S): 4; .5 INJECTION, POWDER, LYOPHILIZED, FOR SOLUTION INTRAVENOUS at 17:40

## 2023-05-24 RX ADMIN — Medication 650 MILLIGRAM(S): at 23:33

## 2023-05-24 RX ADMIN — SODIUM CHLORIDE 1600 MILLILITER(S): 9 INJECTION INTRAMUSCULAR; INTRAVENOUS; SUBCUTANEOUS at 13:44

## 2023-05-24 RX ADMIN — SODIUM CHLORIDE 125 MILLILITER(S): 9 INJECTION INTRAMUSCULAR; INTRAVENOUS; SUBCUTANEOUS at 17:40

## 2023-05-24 NOTE — H&P ADULT - ASSESSMENT
89F w/PMH HTN, HLD, severe AS, HFpEF, chronic pérez for UR, recent admit (4/4-4/11) for asp pna and UTI, presents now, BIB dtr for fever, lethargy in presence of chronic pérez  In ED, Wbc 21, Na 130, Cr 1.9 (baseline ~1.5), LA 2.5, temp 99.6, hypotensive, given IVF 1L, pérez changed and pt has not had any Urine output to obtain UA, abx held in the meantime.   CT ch/a/p- no acute path      #acute lethargy, likely d/t infection  #fever, hypotension, leukocytosis- suspect Sepsis, possibly d/t UTI (unable to obtain UA d/t no UOP yet)  #ANDREW  #mild hyponatremia  - monitor vitals  - start IVF- monitor for fluid overload in this pt w/ severe AS  - empiric abs w/ vanco, zosyn  - check cultures, check UA  - ID cs  - if pt continues to be hypotensive- may need ICU eval  - check AM bmp, cbc    #HTN- now hypotensie, will hold home doses of metoprolol, lasix, hydralazine    #PPX- lovenox

## 2023-05-24 NOTE — ED ADULT NURSE NOTE - OBJECTIVE STATEMENT
Pt is a 89yr old female, A&OX3 and ambulatory with walker/wheelchair, c/o generalized weakness and fever starting yesterday. Endorsed lower abdominal pain two days ago, had chronic indwelling Perales changed with a "[place] wash" and outpatient UA showed UTI. Started on Bactrim yesterday. Resp. even and unlabored. Denies chest pain, SOB, HA, dizziness, N/V, and cough. Labs sent. EKG completed. In NAD.

## 2023-05-24 NOTE — ED ADULT NURSE REASSESSMENT NOTE - NS ED NURSE REASSESS COMMENT FT1
Pt arrived with indwelling 16Fr pérez catheter. Pérez discontinued and replaced with new 16Fr pérez in ED. Pt tolerated procedure well. Nursing assistant Anh at bedside as chaperone. Sterile technique maintained. In NAD.

## 2023-05-24 NOTE — ED PROVIDER NOTE - CLINICAL SUMMARY MEDICAL DECISION MAKING FREE TEXT BOX
89 year old female with generalized weakness and subjective fever at home. Differential diagnosis includes, but is not limited, to pneumonia and UTI. Plan: screen EKG, chest x-ray, labs, UA, and reevaluate. 89 year old female with generalized weakness and subjective fever at home. Differential diagnosis includes, but is not limited, to pneumonia and UTI. Plan: screen EKG, chest x-ray, labs, UA, and reevaluate.    Craig DO: 3:19pm- endorsed to Dr. Batista for admission.

## 2023-05-24 NOTE — ED ADULT NURSE NOTE - NSFALLHARMRISKINTERV_ED_ALL_ED
Assistance with ambulation/Communicate risk of Fall with Harm to all staff, patient, and family/Monitor gait and stability/Provide patient with walking aids/Provide visual cue: red socks, yellow wristband, yellow gown, etc/Reinforce activity limits and safety measures with patient and family/Bed in lowest position, wheels locked, appropriate side rails in place/Call bell, personal items and telephone in reach/Instruct patient to call for assistance before getting out of bed/chair/stretcher/Non-slip footwear applied when patient is off stretcher/[place] to call system/Physically safe environment - no spills, clutter or unnecessary equipment/Purposeful Proactive Rounding/Room/bathroom lighting operational, light cord in reach

## 2023-05-24 NOTE — H&P ADULT - NSHPSOURCEINFORD_GEN_ALL_CORE
Chart(s)/Patient/Child FAMILY HISTORY:  Father  Still living? Unknown  FH: diabetes mellitus, Age at diagnosis: Age Unknown  FH: rheumatoid arthritis, Age at diagnosis: Age Unknown

## 2023-05-24 NOTE — ED PROVIDER NOTE - OBJECTIVE STATEMENT
89 year old female with PMHx of severe AS, HFrEF, HTN, HLD, frequent UTIs with urinary retention and chronic Perales, gout, arthritis, and aspiration PNA presents to the ED complaining of intermittent fever and generalized weakness x yesterday. Denies chest pain, SOB, cough,  symptoms, nausea, vomiting, diarrhea, or any other complaints at this time.

## 2023-05-24 NOTE — H&P ADULT - HISTORY OF PRESENT ILLNESS
HPI:  89F w/PMH HTN, HLD, severe AS, HFpEF, chronic pérez for UR, recent admit (4/4-4/11) for asp pna and UTI, presents now, BIB dtr, Theres, available at bedside, for fever and lethargy.  Pt is lethargic, but arousable and answers Qs appropriately and is OX3, follows commands.  Pt states she's here for fevers since last night.  Per dtr, over the weekend had c/o Lower abd pain, which eventually resolved.  On Monday, they called , prescribed macrobid and VNS changed pt's pérez.  Pt went for routine appt w/ PCP and per dtr, she was doing fine and as herself. She started macrobid yesterday, x3 doses.  Last night, pt began to become weak, lethargic and shaking cills.  Family treated w/ tylenol, but his AM they decided to bring her for eval as she became febrile and still lethargic.      In ED, Wbc 21, Na 130, Cr 1.9 (baseline ~1.5), LA 2.5, temp 99.6, hypotensive, given IVF 1L, pérez changed and pt has not had any Urine output to obtain UA, abx held in the meantime.   CT ch/a/p- no acute path    PAST MEDICAL & SURGICAL HISTORY:  severe AS  HFpEF (65%)  chronic pérez for UR  HTN (hypertension)  HLD (hyperlipidemia)  Gout  Osteoarthritis  History of tonsillectomy in childhood      Review of Systems:   CONSTITUTIONAL: ++ fever.  EYES: No eye pain or discharge.  ENMT:  No sinus or throat pain  NECK: No pain or stiffness  RESPIRATORY: No cough, wheezing, chills or hemoptysis; No shortness of breath  CARDIOVASCULAR: No chest pain, palpitations, dizziness, or leg swelling  GASTROINTESTINAL: No abdominal or epigastric pain. No nausea, vomiting, or hematemesis; No diarrhea or constipation. No melena or hematochezia.  GENITOURINARY: No dysuria or incontinence  NEUROLOGICAL: No headaches, memory loss, loss of strength, numbness, or tremors  SKIN: No rashes.  MUSCULOSKELETAL: No joint pain or swelling; No muscle, back, or extremity pain  PSYCHIATRIC: No depression, anxiety, mood swings, or difficulty sleeping    Social History:   lives w/ family, uses FWW  denies smoking    FAMILY HISTORY:  Family history of hypertension (Father, Mother)      MEDICATIONS  (STANDING):  allopurinol 100 milliGRAM(s) Oral daily  atorvastatin 20 milliGRAM(s) Oral at bedtime  enoxaparin Injectable 30 milliGRAM(s) SubCutaneous every 24 hours  gabapentin 600 milliGRAM(s) Oral two times a day  pantoprazole    Tablet 40 milliGRAM(s) Oral before breakfast  piperacillin/tazobactam IVPB. 3.375 Gram(s) IV Intermittent once  piperacillin/tazobactam IVPB.- 3.375 Gram(s) IV Intermittent once  sodium chloride 0.9%. 1000 milliLiter(s) (125 mL/Hr) IV Continuous <Continuous>  vancomycin  IVPB. 1000 milliGRAM(s) IV Intermittent once    MEDICATIONS  (PRN):  acetaminophen     Tablet .. 650 milliGRAM(s) Oral every 6 hours PRN Temp greater or equal to 38C (100.4F), Mild Pain (1 - 3)  aluminum hydroxide/magnesium hydroxide/simethicone Suspension 30 milliLiter(s) Oral every 4 hours PRN Dyspepsia  melatonin 3 milliGRAM(s) Oral at bedtime PRN Insomnia  ondansetron Injectable 4 milliGRAM(s) IV Push every 8 hours PRN Nausea and/or Vomiting      PHYSICAL EXAM:  Vital Signs Last 24 Hrs  T(C): 36.8 (24 May 2023 15:13), Max: 37.6 (24 May 2023 12:59)  T(F): 98.3 (24 May 2023 15:13), Max: 99.6 (24 May 2023 12:59)  HR: 63 (24 May 2023 15:13) (63 - 94)  BP: 105/54 (24 May 2023 15:13) (11/39 - 110/39)  BP(mean): 60 (24 May 2023 15:13) (60 - 60)  RR: 19 (24 May 2023 15:13) (19 - 20)  SpO2: 97% (24 May 2023 15:13) (94% - 97%)    Parameters below as of 24 May 2023 15:13  Patient On (Oxygen Delivery Method): nasal cannula  O2 Flow (L/min): 3  GENERAL: NAD, lethargic but arousable, follows commands  HEAD:  Atraumatic, Normocephalic  EYES: EOMI, PERRLA, conjunctiva and sclera clear  ENT: normal hearing, no nasal discharge, throat clear, dentition normal  NECK: Supple, No JVD, no LAD, no thyromegaly   CHEST/LUNG: Clear to auscultation bilaterally; No wheeze, respirations unlabored  HEART: Regular rate and rhythm; No murmurs, rubs, or gallops  ABDOMEN: Soft, Nontender, Nondistended; Bowel sounds present, no HSM  EXTREMITIES:  2+ Peripheral Pulses, No clubbing, cyanosis, or edema  PSYCH: Ox3, lethargic  NEUROLOGY: non-focal, sensory and cn 2-12 intact, speech/language intact  SKIN: No visible rashes or lesions    LABS:                        9.8    20.99 )-----------( 107      ( 24 May 2023 13:29 )             29.5     05-24    130<L>  |  100  |  29<H>  ----------------------------<  116<H>  4.1   |  22  |  1.95<H>    Ca    8.7      24 May 2023 13:29    TPro  6.9  /  Alb  3.2<L>  /  TBili  1.0  /  DBili  x   /  AST  90<H>  /  ALT  71  /  AlkPhos  81  05-24    PT/INR - ( 24 May 2023 13:29 )   PT: 12.8 sec;   INR: 1.10 ratio         PTT - ( 24 May 2023 13:29 )  PTT:27.8 sec          RADIOLOGY & ADDITIONAL TESTS:    Imaging Personally Reviewed:  CT ch/a/p- no acute path  EKG Personally Reviewed:  n/a  Consultant(s) Notes Reviewed:      Care Discussed with Consultants/Other Providers:

## 2023-05-24 NOTE — PHARMACOTHERAPY INTERVENTION NOTE - COMMENTS
Medication reconciliation completed.  Patient was unable to provide medication information, spoke to daughter Pippa at bedside and they provided current medication list; confirmed with Dr. [facility].

## 2023-05-24 NOTE — ED ADULT TRIAGE NOTE - CHIEF COMPLAINT QUOTE
Pt arrives to ED complaining of weakness, dehydration for past "few days." Hx afib, has chronic pérez.

## 2023-05-25 ENCOUNTER — TRANSCRIPTION ENCOUNTER (OUTPATIENT)
Age: 88
End: 2023-05-25

## 2023-05-25 DIAGNOSIS — N39.0 URINARY TRACT INFECTION, SITE NOT SPECIFIED: ICD-10-CM

## 2023-05-25 DIAGNOSIS — I10 ESSENTIAL (PRIMARY) HYPERTENSION: ICD-10-CM

## 2023-05-25 DIAGNOSIS — I50.32 CHRONIC DIASTOLIC (CONGESTIVE) HEART FAILURE: ICD-10-CM

## 2023-05-25 DIAGNOSIS — E78.5 HYPERLIPIDEMIA, UNSPECIFIED: ICD-10-CM

## 2023-05-25 DIAGNOSIS — I35.0 NONRHEUMATIC AORTIC (VALVE) STENOSIS: ICD-10-CM

## 2023-05-25 LAB
ANION GAP SERPL CALC-SCNC: 7 MMOL/L — SIGNIFICANT CHANGE UP (ref 5–17)
BACTERIA UR CULT: ABNORMAL
BUN SERPL-MCNC: 29 MG/DL — HIGH (ref 7–23)
CALCIUM SERPL-MCNC: 7.5 MG/DL — LOW (ref 8.5–10.1)
CHLORIDE SERPL-SCNC: 107 MMOL/L — SIGNIFICANT CHANGE UP (ref 96–108)
CO2 SERPL-SCNC: 19 MMOL/L — LOW (ref 22–31)
CREAT SERPL-MCNC: 1.8 MG/DL — HIGH (ref 0.5–1.3)
CULTURE RESULTS: SIGNIFICANT CHANGE UP
EGFR: 26 ML/MIN/1.73M2 — LOW
GLUCOSE SERPL-MCNC: 96 MG/DL — SIGNIFICANT CHANGE UP (ref 70–99)
HCT VFR BLD CALC: 26 % — LOW (ref 34.5–45)
HGB BLD-MCNC: 8.2 G/DL — LOW (ref 11.5–15.5)
MCHC RBC-ENTMCNC: 30.3 PG — SIGNIFICANT CHANGE UP (ref 27–34)
MCHC RBC-ENTMCNC: 31.5 GM/DL — LOW (ref 32–36)
MCV RBC AUTO: 95.9 FL — SIGNIFICANT CHANGE UP (ref 80–100)
PLATELET # BLD AUTO: 82 K/UL — LOW (ref 150–400)
POTASSIUM SERPL-MCNC: 4.4 MMOL/L — SIGNIFICANT CHANGE UP (ref 3.5–5.3)
POTASSIUM SERPL-SCNC: 4.4 MMOL/L — SIGNIFICANT CHANGE UP (ref 3.5–5.3)
RBC # BLD: 2.71 M/UL — LOW (ref 3.8–5.2)
RBC # FLD: 16.8 % — HIGH (ref 10.3–14.5)
SODIUM SERPL-SCNC: 133 MMOL/L — LOW (ref 135–145)
SPECIMEN SOURCE: SIGNIFICANT CHANGE UP
WBC # BLD: 14.21 K/UL — HIGH (ref 3.8–10.5)
WBC # FLD AUTO: 14.21 K/UL — HIGH (ref 3.8–10.5)

## 2023-05-25 PROCEDURE — 99232 SBSQ HOSP IP/OBS MODERATE 35: CPT

## 2023-05-25 RX ORDER — LIDOCAINE 4 G/100G
3 CREAM TOPICAL DAILY
Refills: 0 | Status: DISCONTINUED | OUTPATIENT
Start: 2023-05-25 | End: 2023-05-30

## 2023-05-25 RX ADMIN — Medication 650 MILLIGRAM(S): at 04:20

## 2023-05-25 RX ADMIN — Medication 100 MILLIGRAM(S): at 10:39

## 2023-05-25 RX ADMIN — Medication 30 MILLILITER(S): at 13:35

## 2023-05-25 RX ADMIN — PANTOPRAZOLE SODIUM 40 MILLIGRAM(S): 20 TABLET, DELAYED RELEASE ORAL at 04:20

## 2023-05-25 RX ADMIN — SODIUM CHLORIDE 125 MILLILITER(S): 9 INJECTION INTRAMUSCULAR; INTRAVENOUS; SUBCUTANEOUS at 14:33

## 2023-05-25 RX ADMIN — PIPERACILLIN AND TAZOBACTAM 25 GRAM(S): 4; .5 INJECTION, POWDER, LYOPHILIZED, FOR SOLUTION INTRAVENOUS at 00:28

## 2023-05-25 RX ADMIN — GABAPENTIN 300 MILLIGRAM(S): 400 CAPSULE ORAL at 10:39

## 2023-05-25 RX ADMIN — ATORVASTATIN CALCIUM 20 MILLIGRAM(S): 80 TABLET, FILM COATED ORAL at 21:39

## 2023-05-25 RX ADMIN — PIPERACILLIN AND TAZOBACTAM 25 GRAM(S): 4; .5 INJECTION, POWDER, LYOPHILIZED, FOR SOLUTION INTRAVENOUS at 21:39

## 2023-05-25 RX ADMIN — PIPERACILLIN AND TAZOBACTAM 25 GRAM(S): 4; .5 INJECTION, POWDER, LYOPHILIZED, FOR SOLUTION INTRAVENOUS at 10:43

## 2023-05-25 RX ADMIN — GABAPENTIN 300 MILLIGRAM(S): 400 CAPSULE ORAL at 21:39

## 2023-05-25 RX ADMIN — SODIUM CHLORIDE 125 MILLILITER(S): 9 INJECTION INTRAMUSCULAR; INTRAVENOUS; SUBCUTANEOUS at 21:51

## 2023-05-25 RX ADMIN — LIDOCAINE 3 PATCH: 4 CREAM TOPICAL at 21:00

## 2023-05-25 RX ADMIN — Medication 650 MILLIGRAM(S): at 10:44

## 2023-05-25 RX ADMIN — ENOXAPARIN SODIUM 30 MILLIGRAM(S): 100 INJECTION SUBCUTANEOUS at 21:50

## 2023-05-25 RX ADMIN — Medication 650 MILLIGRAM(S): at 11:39

## 2023-05-25 RX ADMIN — LIDOCAINE 1 PATCH: 4 CREAM TOPICAL at 16:15

## 2023-05-25 RX ADMIN — Medication 3 MILLIGRAM(S): at 21:40

## 2023-05-25 NOTE — DISCHARGE NOTE NURSING/CASE MANAGEMENT/SOCIAL WORK - PATIENT PORTAL LINK FT
You can access the FollowMyHealth Patient Portal offered by [facility] by registering at the following website: [email or web address]. By joining [employer]’s FollowMyHealth portal, you will also be able to view your health information using other applications (apps) compatible with our system.

## 2023-05-25 NOTE — DISCHARGE NOTE NURSING/CASE MANAGEMENT/SOCIAL WORK - NSDCPEFALRISK_GEN_ALL_CORE
For information on Fall & Injury Prevention, visit: [email or web address] OR  [email or web address] OR  https://www.cdc.gov/steadi/patient.html

## 2023-05-25 NOTE — PROGRESS NOTE ADULT - ASSESSMENT
· Assessment	  89F w/PMH HTN, HLD, severe AS, HFpEF, chronic pérez for UR, recent admit (4/4-4/11) for asp pna and UTI, presents now, BIB dtr for fever, lethargy in presence of chronic pérez  In ED, Wbc 21, Na 130, Cr 1.9 (baseline ~1.5), LA 2.5, temp 99.6, hypotensive, given IVF 1L, pérez changed and pt has not had any Urine output to obtain UA, abx held in the meantime.   CT ch/a/p- no acute path      #acute lethargy, likely d/t infection  #fever, hypotension, leukocytosis- suspect Sepsis, possibly d/t UTI (unable to obtain UA d/t no UOP yet)  #ANDREW --- improving   #mild hyponatremia --- improving  - monitor vitals  - s/p IV fluids   - empiric abs w/ vanco, zosyn  - check cultures, check UA  - ID cs  - Bp improving- hold off on BP meds for now.   - check AM bmp, cbc    #HTN- now hypotensie, will hold home doses of metoprolol, lasix, hydralazine    #PPX- lovenox   · Assessment	  89F w/PMH HTN, HLD, severe AS, HFpEF, chronic pérez for UR, recent admit (4/4-4/11) for asp pna and UTI, presents now, BIB dtr for fever, lethargy in presence of chronic pérez  In ED, Wbc 21, Na 130, Cr 1.9 (baseline ~1.5), LA 2.5, temp 99.6, hypotensive, given IVF 1L, pérez changed and pt has not had any Urine output to obtain UA, abx held in the meantime.   CT ch/a/p- no acute path      #acute lethargy, likely d/t infection  #fever, hypotension, leukocytosis-  #Sepsis associated with UTI and chronic pérez  #ANDREW --- improving   #mild hyponatremia --- improving  - pérez changed in ED   - monitor vitals  - s/p IV fluids   - empiric abs w/ vanco, zosyn  - check cultures, check UA  - ID cs  - Bp improving- hold off on BP meds for now.   - check AM bmp, cbc    #HTN- now hypotensie, will hold home doses of metoprolol, lasix, hydralazine    #PPX- lovenox

## 2023-05-26 LAB
ANION GAP SERPL CALC-SCNC: 7 MMOL/L — SIGNIFICANT CHANGE UP (ref 5–17)
BUN SERPL-MCNC: 23 MG/DL — SIGNIFICANT CHANGE UP (ref 7–23)
CALCIUM SERPL-MCNC: 7.5 MG/DL — LOW (ref 8.5–10.1)
CHLORIDE SERPL-SCNC: 115 MMOL/L — HIGH (ref 96–108)
CO2 SERPL-SCNC: 18 MMOL/L — LOW (ref 22–31)
CREAT SERPL-MCNC: 1.55 MG/DL — HIGH (ref 0.5–1.3)
EGFR: 31 ML/MIN/1.73M2 — LOW
GLUCOSE SERPL-MCNC: 104 MG/DL — HIGH (ref 70–99)
HCT VFR BLD CALC: 24.6 % — LOW (ref 34.5–45)
HGB BLD-MCNC: 7.9 G/DL — LOW (ref 11.5–15.5)
MCHC RBC-ENTMCNC: 30.2 PG — SIGNIFICANT CHANGE UP (ref 27–34)
MCHC RBC-ENTMCNC: 32.1 GM/DL — SIGNIFICANT CHANGE UP (ref 32–36)
MCV RBC AUTO: 93.9 FL — SIGNIFICANT CHANGE UP (ref 80–100)
PLATELET # BLD AUTO: 93 K/UL — LOW (ref 150–400)
POTASSIUM SERPL-MCNC: 4.1 MMOL/L — SIGNIFICANT CHANGE UP (ref 3.5–5.3)
POTASSIUM SERPL-SCNC: 4.1 MMOL/L — SIGNIFICANT CHANGE UP (ref 3.5–5.3)
RBC # BLD: 2.62 M/UL — LOW (ref 3.8–5.2)
RBC # FLD: 17.2 % — HIGH (ref 10.3–14.5)
SODIUM SERPL-SCNC: 140 MMOL/L — SIGNIFICANT CHANGE UP (ref 135–145)
WBC # BLD: 6.69 K/UL — SIGNIFICANT CHANGE UP (ref 3.8–10.5)
WBC # FLD AUTO: 6.69 K/UL — SIGNIFICANT CHANGE UP (ref 3.8–10.5)

## 2023-05-26 PROCEDURE — 99232 SBSQ HOSP IP/OBS MODERATE 35: CPT

## 2023-05-26 PROCEDURE — 71045 X-RAY EXAM CHEST 1 VIEW: CPT | Mod: 26

## 2023-05-26 RX ORDER — METOPROLOL TARTRATE 50 MG
25 TABLET ORAL
Refills: 0 | Status: DISCONTINUED | OUTPATIENT
Start: 2023-05-26 | End: 2023-05-30

## 2023-05-26 RX ORDER — HYDRALAZINE HCL 50 MG
25 TABLET ORAL
Refills: 0 | Status: DISCONTINUED | OUTPATIENT
Start: 2023-05-26 | End: 2023-05-30

## 2023-05-26 RX ORDER — SENNA PLUS 8.6 MG/1
2 TABLET ORAL AT BEDTIME
Refills: 0 | Status: DISCONTINUED | OUTPATIENT
Start: 2023-05-26 | End: 2023-05-30

## 2023-05-26 RX ORDER — FOLIC ACID 0.8 MG
1 TABLET ORAL DAILY
Refills: 0 | Status: DISCONTINUED | OUTPATIENT
Start: 2023-05-26 | End: 2023-05-30

## 2023-05-26 RX ADMIN — ATORVASTATIN CALCIUM 20 MILLIGRAM(S): 80 TABLET, FILM COATED ORAL at 21:20

## 2023-05-26 RX ADMIN — SENNA PLUS 2 TABLET(S): 8.6 TABLET ORAL at 21:20

## 2023-05-26 RX ADMIN — Medication 1 MILLIGRAM(S): at 09:57

## 2023-05-26 RX ADMIN — GABAPENTIN 300 MILLIGRAM(S): 400 CAPSULE ORAL at 21:20

## 2023-05-26 RX ADMIN — LIDOCAINE 3 PATCH: 4 CREAM TOPICAL at 06:11

## 2023-05-26 RX ADMIN — Medication 25 MILLIGRAM(S): at 21:02

## 2023-05-26 RX ADMIN — GABAPENTIN 300 MILLIGRAM(S): 400 CAPSULE ORAL at 09:57

## 2023-05-26 RX ADMIN — SODIUM CHLORIDE 125 MILLILITER(S): 9 INJECTION INTRAMUSCULAR; INTRAVENOUS; SUBCUTANEOUS at 06:49

## 2023-05-26 RX ADMIN — Medication 25 MILLIGRAM(S): at 13:51

## 2023-05-26 RX ADMIN — SODIUM CHLORIDE 125 MILLILITER(S): 9 INJECTION INTRAMUSCULAR; INTRAVENOUS; SUBCUTANEOUS at 09:52

## 2023-05-26 RX ADMIN — PIPERACILLIN AND TAZOBACTAM 25 GRAM(S): 4; .5 INJECTION, POWDER, LYOPHILIZED, FOR SOLUTION INTRAVENOUS at 21:20

## 2023-05-26 RX ADMIN — Medication 650 MILLIGRAM(S): at 13:51

## 2023-05-26 RX ADMIN — PANTOPRAZOLE SODIUM 40 MILLIGRAM(S): 20 TABLET, DELAYED RELEASE ORAL at 05:34

## 2023-05-26 RX ADMIN — PIPERACILLIN AND TAZOBACTAM 25 GRAM(S): 4; .5 INJECTION, POWDER, LYOPHILIZED, FOR SOLUTION INTRAVENOUS at 09:58

## 2023-05-26 RX ADMIN — Medication 100 MILLIGRAM(S): at 09:58

## 2023-05-26 RX ADMIN — Medication 650 MILLIGRAM(S): at 14:05

## 2023-05-26 NOTE — PROVIDER CONTACT NOTE (OTHER) - ACTION/TREATMENT ORDERED:
As per Dr. Rahman, Dr. Rahman to order Chest X-ray to assess fluid overload and RN to obtain Orthostatic BPs.

## 2023-05-26 NOTE — PROVIDER CONTACT NOTE (OTHER) - SITUATION
Dr Collins is aware that the patient is admitted to the hospital he is already following the patient
Bp remains elevated

## 2023-05-26 NOTE — PROGRESS NOTE ADULT - ASSESSMENT
5/25/23:  Pt with above history presenting with fever and lethargy and an apparent UTI again.  Despite her severe AS and HFpEF, she has no new cardiac issues.  Continue antibiotics and treatment as outlined by medicine.  No indication for further cardiac testing at this time.  Will follow as treatment progresses.    5/26/23:  Feeling better but right shoulder continues to have stiffness and pain (a chronic problem).  WBC coming down.  Hgb low and may benefit from 1 unit PRBC's.  Possibly aggravated by the chronic hemolysis from the AS (beny  effect on the RBC's).  Should be on Folic Acid and I will order.  Otherwise pt is eager to go home.  No new cardiac symptoms.  Home when ok with medicine etal.  I will be away until Tuesday, 5/30/23 and Dr Corazon Collins will be covering me over the weekend if needed.  Will otherwise follow intermittently prn and as an outpt after discharge.

## 2023-05-26 NOTE — PROGRESS NOTE ADULT - ASSESSMENT
Weight reduction of 10-15% can help.    · Assessment	  89F w/PMH HTN, HLD, severe AS, HFpEF, chronic pérez for UR, recent admit (4/4-4/11) for asp pna and UTI, presents now, BIB dtr for fever, lethargy in presence of chronic pérez  In ED, Wbc 21, Na 130, Cr 1.9 (baseline ~1.5), LA 2.5, temp 99.6, hypotensive, given IVF 1L, pérez changed and pt has not had any Urine output to obtain UA, abx held in the meantime.   CT ch/a/p- no acute path      #acute metabolic encephalopathy, likely d/t infection  #fever, hypotension, leukocytosis- resolved  #Sepsis associated with UTI and chronic pérez  #ANDREW on CKD stage 3--- improved, back to baseline   #mild hyponatremia --- improved  - pérez changed in ED   - D/C IV fluids   - Continue IV zosyn  - check cultures, check UA  - ID cs appreciated    #HTN- S/P hypotensie,   Restart home meds metoprolol, lasix, hydralazine    #PPX- lovenox

## 2023-05-26 NOTE — PROGRESS NOTE ADULT - ASSESSMENT
89F w/PMH HTN, HLD, severe AS, HFpEF, chronic pérez for UR, recent admit (4/4-4/11) for asp pna and UTI, presents now, BIB dtr, Theres, available at bedside, for fever and lethargy.  Pt is lethargic, but arousable and answers Qs appropriately and is OX3, follows commands. Per dtr, over the weekend had c/o Lower abd pain, which eventually resolved.  On Monday, they called , prescribed macrobid and VNS changed pt's pérez.  Pt went for routine appt w/ PCP and per dtr, she was doing fine and as herself. She started macrobid  x3 doses.  Last night, pt began to become weak, lethargic and shaking cills.  Family treated w/ tylenol, but his AM they decided to bring her for eval as she became febrile and still lethargic.  In ED, Wbc 21, Na 130, Cr 1.9 (baseline ~1.5), LA 2.5, temp 99.6, hypotensive, given IVF 1L, pérez changed and pt has not had any Urine output to obtain UA, abx held in the meantime.  CT ch/a/p- no acute path UA positive concern for UTI, started on IV zosyn.    1. Sepsis. Pyuria. UTI. Metabolic encephalopathy. Urinary retention with chronic pérez. ANDREW  - imaging reviewed   - on IV zosyn [id number] #2-3  - continue with abx coverage  - f/u urine cx, blood cx - no growth, was on po abx prior to admit - likely sterilized cx   - monitor temps  - tolerating abx well so far; no side effects noted  - reason for abx use and side effects reviewed with patient  - 5 day abx course   - supportive care  - fu cbc    2. other issues - care per medicine

## 2023-05-27 LAB
ANION GAP SERPL CALC-SCNC: 8 MMOL/L — SIGNIFICANT CHANGE UP (ref 5–17)
BUN SERPL-MCNC: 21 MG/DL — SIGNIFICANT CHANGE UP (ref 7–23)
CALCIUM SERPL-MCNC: 9 MG/DL — SIGNIFICANT CHANGE UP (ref 8.5–10.1)
CHLORIDE SERPL-SCNC: 113 MMOL/L — HIGH (ref 96–108)
CO2 SERPL-SCNC: 21 MMOL/L — LOW (ref 22–31)
CREAT SERPL-MCNC: 1.49 MG/DL — HIGH (ref 0.5–1.3)
EGFR: 32 ML/MIN/1.73M2 — LOW
GLUCOSE SERPL-MCNC: 132 MG/DL — HIGH (ref 70–99)
HCT VFR BLD CALC: 28.2 % — LOW (ref 34.5–45)
HGB BLD-MCNC: 9.3 G/DL — LOW (ref 11.5–15.5)
MCHC RBC-ENTMCNC: 30.3 PG — SIGNIFICANT CHANGE UP (ref 27–34)
MCHC RBC-ENTMCNC: 33 GM/DL — SIGNIFICANT CHANGE UP (ref 32–36)
MCV RBC AUTO: 91.9 FL — SIGNIFICANT CHANGE UP (ref 80–100)
PLATELET # BLD AUTO: 113 K/UL — LOW (ref 150–400)
POTASSIUM SERPL-MCNC: 3.9 MMOL/L — SIGNIFICANT CHANGE UP (ref 3.5–5.3)
POTASSIUM SERPL-SCNC: 3.9 MMOL/L — SIGNIFICANT CHANGE UP (ref 3.5–5.3)
RBC # BLD: 3.07 M/UL — LOW (ref 3.8–5.2)
RBC # FLD: 17.2 % — HIGH (ref 10.3–14.5)
SODIUM SERPL-SCNC: 142 MMOL/L — SIGNIFICANT CHANGE UP (ref 135–145)
WBC # BLD: 7.94 K/UL — SIGNIFICANT CHANGE UP (ref 3.8–10.5)
WBC # FLD AUTO: 7.94 K/UL — SIGNIFICANT CHANGE UP (ref 3.8–10.5)

## 2023-05-27 PROCEDURE — 12345: CPT | Mod: NC

## 2023-05-27 PROCEDURE — 99233 SBSQ HOSP IP/OBS HIGH 50: CPT

## 2023-05-27 RX ORDER — FUROSEMIDE 40 MG
40 TABLET ORAL ONCE
Refills: 0 | Status: COMPLETED | OUTPATIENT
Start: 2023-05-27 | End: 2023-05-27

## 2023-05-27 RX ORDER — ALBUTEROL 90 UG/1
2.5 AEROSOL, METERED ORAL EVERY 6 HOURS
Refills: 0 | Status: DISCONTINUED | OUTPATIENT
Start: 2023-05-27 | End: 2023-05-30

## 2023-05-27 RX ORDER — NYSTATIN 500MM UNIT
500000 POWDER (EA) MISCELLANEOUS EVERY 6 HOURS
Refills: 0 | Status: DISCONTINUED | OUTPATIENT
Start: 2023-05-27 | End: 2023-05-30

## 2023-05-27 RX ORDER — HYDRALAZINE HCL 50 MG
5 TABLET ORAL EVERY 4 HOURS
Refills: 0 | Status: DISCONTINUED | OUTPATIENT
Start: 2023-05-27 | End: 2023-05-30

## 2023-05-27 RX ORDER — FUROSEMIDE 40 MG
20 TABLET ORAL DAILY
Refills: 0 | Status: DISCONTINUED | OUTPATIENT
Start: 2023-05-27 | End: 2023-05-30

## 2023-05-27 RX ADMIN — Medication 5 MILLIGRAM(S): at 15:35

## 2023-05-27 RX ADMIN — Medication 3 MILLIGRAM(S): at 21:41

## 2023-05-27 RX ADMIN — Medication 1 MILLIGRAM(S): at 10:22

## 2023-05-27 RX ADMIN — ENOXAPARIN SODIUM 30 MILLIGRAM(S): 100 INJECTION SUBCUTANEOUS at 00:23

## 2023-05-27 RX ADMIN — Medication 25 MILLIGRAM(S): at 10:22

## 2023-05-27 RX ADMIN — Medication 25 MILLIGRAM(S): at 21:49

## 2023-05-27 RX ADMIN — PIPERACILLIN AND TAZOBACTAM 25 GRAM(S): 4; .5 INJECTION, POWDER, LYOPHILIZED, FOR SOLUTION INTRAVENOUS at 21:43

## 2023-05-27 RX ADMIN — ENOXAPARIN SODIUM 30 MILLIGRAM(S): 100 INJECTION SUBCUTANEOUS at 21:47

## 2023-05-27 RX ADMIN — SENNA PLUS 2 TABLET(S): 8.6 TABLET ORAL at 21:40

## 2023-05-27 RX ADMIN — LIDOCAINE 3 PATCH: 4 CREAM TOPICAL at 23:52

## 2023-05-27 RX ADMIN — GABAPENTIN 300 MILLIGRAM(S): 400 CAPSULE ORAL at 10:22

## 2023-05-27 RX ADMIN — GABAPENTIN 300 MILLIGRAM(S): 400 CAPSULE ORAL at 21:40

## 2023-05-27 RX ADMIN — Medication 500000 UNIT(S): at 23:38

## 2023-05-27 RX ADMIN — ALBUTEROL 2.5 MILLIGRAM(S): 90 AEROSOL, METERED ORAL at 03:43

## 2023-05-27 RX ADMIN — ATORVASTATIN CALCIUM 20 MILLIGRAM(S): 80 TABLET, FILM COATED ORAL at 21:40

## 2023-05-27 RX ADMIN — Medication 25 MILLIGRAM(S): at 21:40

## 2023-05-27 RX ADMIN — LIDOCAINE 3 PATCH: 4 CREAM TOPICAL at 10:23

## 2023-05-27 RX ADMIN — PIPERACILLIN AND TAZOBACTAM 25 GRAM(S): 4; .5 INJECTION, POWDER, LYOPHILIZED, FOR SOLUTION INTRAVENOUS at 10:23

## 2023-05-27 RX ADMIN — LIDOCAINE 3 PATCH: 4 CREAM TOPICAL at 18:55

## 2023-05-27 RX ADMIN — PANTOPRAZOLE SODIUM 40 MILLIGRAM(S): 20 TABLET, DELAYED RELEASE ORAL at 05:42

## 2023-05-27 RX ADMIN — Medication 20 MILLIGRAM(S): at 15:35

## 2023-05-27 RX ADMIN — Medication 100 MILLIGRAM(S): at 10:22

## 2023-05-27 RX ADMIN — Medication 40 MILLIGRAM(S): at 00:29

## 2023-05-27 RX ADMIN — Medication 5 MILLIGRAM(S): at 03:24

## 2023-05-27 NOTE — PROGRESS NOTE ADULT - ASSESSMENT
· Assessment	  89F w/PMH HTN, HLD, severe AS, HFpEF, chronic pérez for UR, recent admit (4/4-4/11) for asp pna and UTI, presents now, BIB dtr for fever, lethargy in presence of chronic pérez  In ED, Wbc 21, Na 130, Cr 1.9 (baseline ~1.5), LA 2.5, temp 99.6, hypotensive, given IVF 1L, pérez changed and pt has not had any Urine output to obtain UA, abx held in the meantime.   CT ch/a/p- no acute path      #acute metabolic encephalopathy, likely d/t infection-resolved  #fever, hypotension, leukocytosis- resolved  #Sepsis associated with UTI and chronic pérez  #ANDREW on CKD stage 3--- improved, back to baseline   #mild hyponatremia --- improved  - pérez changed in ED   - D/C IV fluids   - Continue IV zosyn  - ID cs appreciated    # Acute on chr diastolic CHF due to IV fluid  IV fluid discontinued  Received IV lasix 40 mg last night  Continue her outpatient lasix    #HTN- S/P hypotension    Restart home meds metoprolol, lasix, hydralazine    Chr anemia  Heme eval appreciated    #PPX- lovenox    PT eval for d/c planning

## 2023-05-27 NOTE — CONSULT NOTE ADULT - SUBJECTIVE AND OBJECTIVE BOX
Patient is a 89y old  Female who presents with a chief complaint of fever (25 May 2023 06:41)    HPI:    89F w/PMH HTN, HLD, severe AS, HFpEF, chronic pérez for UR, recent admit (-) for asp pna and UTI, presents now, BIB dtr, Theres, available at bedside, for fever and lethargy.  Pt is lethargic, but arousable and answers Qs appropriately and is OX3, follows commands.  Pt states she's here for fevers since last night.  Per dtr, over the weekend had c/o Lower abd pain, which eventually resolved.  On Monday, they called , prescribed macrobid and VNS changed pt's pérez.  Pt went for routine appt w/ PCP and per dtr, she was doing fine and as herself. She started macrobid  x3 doses.  Last night, pt began to become weak, lethargic and shaking cills.  Family treated w/ tylenol, but his AM they decided to bring her for eval as she became febrile and still lethargic.  In ED, Wbc 21, Na 130, Cr 1.9 (baseline ~1.5), LA 2.5, temp 99.6, hypotensive, given IVF 1L, pérez changed and pt has not had any Urine output to obtain UA, abx held in the meantime.  CT ch/a/p- no acute path UA positive concern for UTI, started on IV zosyn.    PAST MEDICAL & SURGICAL HISTORY:  severe AS  HFpEF (65%)  chronic pérez for UR  HTN (hypertension)  HLD (hyperlipidemia)  Gout  Osteoarthritis  History of tonsillectomy in childhood      Meds: per reconciliation sheet, noted below  MEDICATIONS  (STANDING):  allopurinol 100 milliGRAM(s) Oral daily  atorvastatin 20 milliGRAM(s) Oral at bedtime  enoxaparin Injectable 30 milliGRAM(s) SubCutaneous every 24 hours  gabapentin 300 milliGRAM(s) Oral two times a day  pantoprazole    Tablet 40 milliGRAM(s) Oral before breakfast  piperacillin/tazobactam IVPB.. 3.375 Gram(s) IV Intermittent every 12 hours  sodium chloride 0.9%. 1000 milliLiter(s) (125 mL/Hr) IV Continuous <Continuous>    Allergies    Ceftin (Hives; Rash)    Intolerances      Social: no smoking, no alcohol, no illegal drugs; no recent travel, no exposure to TB  FAMILY HISTORY:  Family history of hypertension (Father, Mother)       no history of premature cardiovascular disease in first degree relatives    ROS: + fever/chills, no HA, no dizziness, no sore throat, no blurry vision, no CP, no palpitations, no SOB, no cough, no abdominal pain, no diarrhea, no N/V,  no leg pain, no claudication, no rash, no joint aches, no rectal pain or bleeding, no night sweats    All other systems reviewed and are negative    Vital Signs Last 24 Hrs  T(C): 36.3 (25 May 2023 06:54), Max: 37.6 (24 May 2023 12:59)  T(F): 97.4 (25 May 2023 06:54), Max: 99.6 (24 May 2023 12:59)  HR: 66 (25 May 2023 06:54) (63 - 94)  BP: 115/34 (25 May 2023 06:54) (11/39 - 125/37)  BP(mean): 65 (24 May 2023 20:10) (60 - 65)  RR: 18 (25 May 2023 06:54) (18 - 22)  SpO2: 98% (25 May 2023 06:54) (94% - 100%)    Parameters below as of 25 May 2023 06:54  Patient On (Oxygen Delivery Method): nasal cannula      Daily Height in cm: 160.02 (24 May 2023 12:38)    Daily Weight in k.2 (24 May 2023 23:23)    PE:  Constitutional: frail looking  HEENT: NC/AT, EOMI, PERRLA, conjunctivae clear; ears and nose atraumatic; pharynx benign  Neck: supple; thyroid not palpable  Back: no tenderness  Respiratory: respiratory effort normal; clear to auscultation  Cardiovascular: S1S2 regular, no murmurs  Abdomen: soft, not tender, not distended, positive BS; liver and spleen WNL  Genitourinary: no suprapubic tenderness  Lymphatic: no LN palpable  Musculoskeletal: no muscle tenderness, no joint swelling or tenderness  Extremities: no pedal edema  Neurological/ Psychiatric: AxOx3, Judgement and insight normal;  moving all extremities  Skin: no rashes; no palpable lesions    Labs: all available labs reviewed                        8.2    14.21 )-----------( 82       ( 25 May 2023 07:08 )             26.0     05-    133<L>  |  107  |  29<H>  ----------------------------<  96  4.4   |  19<L>  |  1.80<H>    Ca    7.5<L>      25 May 2023 07:08    TPro  6.9  /  Alb  3.2<L>  /  TBili  1.0  /  DBili  x   /  AST  90<H>  /  ALT  71  /  AlkPhos  81  05-24     LIVER FUNCTIONS - ( 24 May 2023 13:29 )  Alb: 3.2 g/dL / Pro: 6.9 gm/dL / ALK PHOS: 81 U/L / ALT: 71 U/L / AST: 90 U/L / GGT: x           Urinalysis Basic - ( 24 May 2023 17:18 )    Color: Yellow / Appearance: Clear / S.010 / pH: x  Gluc: x / Ketone: Negative  / Bili: Negative / Urobili: Negative   Blood: x / Protein: 30 mg/dL / Nitrite: Positive   Leuk Esterase: Moderate / RBC: 6-10 /HPF / WBC >50 /HPF   Sq Epi: x / Non Sq Epi: x / Bacteria: Many          Radiology: all available radiological tests reviewed  < from: CT Abdomen and Pelvis No Cont (23 @ 14:35) >  ACC: [id number] EXAM:  CT ABDOMEN AND PELVIS   ORDERED BY: SAMREEN CONDON     ACC: [id number] EXAM:  CT CHEST   ORDERED BY: SAMREEN CONDON     PROCEDURE DATE:  2023          INTERPRETATION:  CLINICAL INFORMATION: 89 years  Female with fever.    COMPARISON: Chest CT 2023 and CT abdomen and pelvis 2015    CONTRAST/COMPLICATIONS:  IV Contrast: NONE  Oral Contrast: NONE  Complications: None reported at time of study completion    PROCEDURE:  CT of the Chest, Abdomen and Pelvis was performed.  Sagittal and coronal reformats were performed.    LIMITATIONS: Evaluation of the solid organs, vascular structures and GI   tract is limited without oral and IV contrast.    FINDINGS:  CHEST:  LUNGS AND LARGE AIRWAYS: Tracheal narrowing no longer demonstrated.   Marked improvement in bilateral infiltrates and atelectasis. Mild   residual bibasilar fibrotic changes.  PLEURA: No pleural effusion.  VESSELS: Atherosclerotic aorta without aneurysm. Coronary atherosclerosis.  HEART: Mild cardiomegaly. No pericardial effusion.  MEDIASTINUM AND DIRK: 1.2 x 0.9 cm anterior mediastinal lymph node,   previously 1.7 x 1.1 cm.  CHEST WALL AND LOWER NECK: Within normal limits.    ABDOMEN AND PELVIS:  LIVER: Within normal limits.  BILE DUCTS: Normal caliber.  GALLBLADDER: Cholelithiasis.  SPLEEN: Within normal limits.  PANCREAS: Fatty atrophy.  ADRENALS: Within normal limits.  KIDNEYS/URETERS: Mildly atrophic. No hydronephrosis.    BLADDER: Collapsed around Pérez catheter. Expected droplet of   intraluminal air.  REPRODUCTIVE ORGANS: Atrophic uterus versus supracervical hysterectomy.    BOWEL: No bowel obstruction. Appendix is normal.  PERITONEUM: No ascites.  VESSELS: Atherosclerotic changes. Stable infrarenal aortic displaced   calcifications consistent with calcified plaque or chronic focal type B   dissection.  RETROPERITONEUM/LYMPH NODES: No lymphadenopathy.  ABDOMINAL WALL: Minimal fat-containing left inguinal and umbilical   hernias.  BONES: Within normal limits.    IMPRESSION:  Marked improvement in bibasilar infiltrates. Residual bibasilar fibrosis.   Prominent mediastinal lymph node mildly improved.    No acute intra-abdominal pathology.    Cholelithiasis.    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    Other chronic findings as above.        Advanced directives addressed: full resuscitation
Reason for consult: Anemia    HPI:  HPI:  89F w/PMH HTN, HLD, severe AS, HFpEF, chronic pérez for UR, recent admit (4/4-4/11) for asp pna and UTI, presents now with fever and lethargy.  Sicne admission found to have anemia, elevated wbc, and hypotension.  Her pérez has been changed,   She was initially hypotensive but now hypertensive this am  She received lasix today  Hematology consulted for her anemia    PAST MEDICAL & SURGICAL HISTORY:  severe AS  HFpEF (65%)  chronic pérez for UR  HTN (hypertension)  HLD (hyperlipidemia)  Gout  Osteoarthritis  History of tonsillectomy in childhood      Review of Systems:   CONSTITUTIONAL: ++ fever.  EYES: No eye pain or discharge.  ENMT:  No sinus or throat pain  NECK: No pain or stiffness  RESPIRATORY: No cough, wheezing, chills or hemoptysis; No shortness of breath  CARDIOVASCULAR: No chest pain, palpitations, dizziness, or leg swelling  GASTROINTESTINAL: No abdominal or epigastric pain. No nausea, vomiting, or hematemesis; No diarrhea or constipation. No melena or hematochezia.  GENITOURINARY: No dysuria or incontinence  NEUROLOGICAL: No headaches, memory loss, loss of strength, numbness, or tremors  SKIN: No rashes.  MUSCULOSKELETAL: No joint pain or swelling; No muscle, back, or extremity pain  PSYCHIATRIC: No depression, anxiety, mood swings, or difficulty sleeping    Social History:   lives w/ family, uses FWW  denies smoking    FAMILY HISTORY:  Family history of hypertension (Father, Mother)      MEDICATIONS  (STANDING):  allopurinol 100 milliGRAM(s) Oral daily  atorvastatin 20 milliGRAM(s) Oral at bedtime  enoxaparin Injectable 30 milliGRAM(s) SubCutaneous every 24 hours  gabapentin 600 milliGRAM(s) Oral two times a day  pantoprazole    Tablet 40 milliGRAM(s) Oral before breakfast  piperacillin/tazobactam IVPB. 3.375 Gram(s) IV Intermittent once  piperacillin/tazobactam IVPB.- 3.375 Gram(s) IV Intermittent once  sodium chloride 0.9%. 1000 milliLiter(s) (125 mL/Hr) IV Continuous <Continuous>  vancomycin  IVPB. 1000 milliGRAM(s) IV Intermittent once    MEDICATIONS  (PRN):  acetaminophen     Tablet .. 650 milliGRAM(s) Oral every 6 hours PRN Temp greater or equal to 38C (100.4F), Mild Pain (1 - 3)  aluminum hydroxide/magnesium hydroxide/simethicone Suspension 30 milliLiter(s) Oral every 4 hours PRN Dyspepsia  melatonin 3 milliGRAM(s) Oral at bedtime PRN Insomnia  ondansetron Injectable 4 milliGRAM(s) IV Push every 8 hours PRN Nausea and/or Vomiting      PHYSICAL EXAM:  Vital Signs Last 24 Hrs  T(C): 36.8 (24 May 2023 15:13), Max: 37.6 (24 May 2023 12:59)  T(F): 98.3 (24 May 2023 15:13), Max: 99.6 (24 May 2023 12:59)  HR: 63 (24 May 2023 15:13) (63 - 94)  BP: 105/54 (24 May 2023 15:13) (11/39 - 110/39)  BP(mean): 60 (24 May 2023 15:13) (60 - 60)  RR: 19 (24 May 2023 15:13) (19 - 20)  SpO2: 97% (24 May 2023 15:13) (94% - 97%)    Parameters below as of 24 May 2023 15:13  Patient On (Oxygen Delivery Method): nasal cannula  O2 Flow (L/min): 3  GENERAL: NAD, lethargic but arousable, follows commands  HEAD:  Atraumatic, Normocephalic  EYES: EOMI, PERRLA, conjunctiva and sclera clear  ENT: normal hearing, no nasal discharge, throat clear, dentition normal  NECK: Supple, No JVD, no LAD, no thyromegaly   CHEST/LUNG: Clear to auscultation bilaterally; No wheeze, respirations unlabored  HEART: Regular rate and rhythm; No murmurs, rubs, or gallops  ABDOMEN: Soft, Nontender, Nondistended; Bowel sounds present, no HSM  EXTREMITIES:  2+ Peripheral Pulses, No clubbing, cyanosis, or edema  PSYCH: Ox3, lethargic  NEUROLOGY: non-focal, sensory and cn 2-12 intact, speech/language intact  SKIN: No visible rashes or lesions    LABS:                        9.8    20.99 )-----------( 107      ( 24 May 2023 13:29 )             29.5     05-24    130<L>  |  100  |  29<H>  ----------------------------<  116<H>  4.1   |  22  |  1.95<H>    Ca    8.7      24 May 2023 13:29    TPro  6.9  /  Alb  3.2<L>  /  TBili  1.0  /  DBili  x   /  AST  90<H>  /  ALT  71  /  AlkPhos  81  05-24    PT/INR - ( 24 May 2023 13:29 )   PT: 12.8 sec;   INR: 1.10 ratio         PTT - ( 24 May 2023 13:29 )  PTT:27.8 sec          RADIOLOGY & ADDITIONAL TESTS:    Imaging Personally Reviewed:  CT ch/a/p- no acute path  EKG Personally Reviewed:  n/a  Consultant(s) Notes Reviewed:      Care Discussed with Consultants/Other Providers:   (24 May 2023 16:58)      PAST MEDICAL & SURGICAL HISTORY:  HTN (hypertension)      HLD (hyperlipidemia)      Gout      Osteoarthritis      History of tonsillectomy  in childhood          FAMILY HISTORY:  Family history of hypertension (Father, Mother)        Alochol: Denied  Smoking: Nonsmoker  Drug Use: Denied  Marital Status:         Allergies    Ceftin (Hives; Rash)    Intolerances        MEDICATIONS  (STANDING):  allopurinol 100 milliGRAM(s) Oral daily  atorvastatin 20 milliGRAM(s) Oral at bedtime  enoxaparin Injectable 30 milliGRAM(s) SubCutaneous every 24 hours  folic acid 1 milliGRAM(s) Oral daily  furosemide    Tablet 20 milliGRAM(s) Oral daily  gabapentin 300 milliGRAM(s) Oral two times a day  hydrALAZINE 25 milliGRAM(s) Oral two times a day  lidocaine   4% Patch 3 Patch Transdermal daily  metoprolol tartrate 25 milliGRAM(s) Oral two times a day  pantoprazole    Tablet 40 milliGRAM(s) Oral before breakfast  piperacillin/tazobactam IVPB.. 3.375 Gram(s) IV Intermittent every 12 hours  senna 2 Tablet(s) Oral at bedtime    MEDICATIONS  (PRN):  acetaminophen     Tablet .. 650 milliGRAM(s) Oral every 6 hours PRN Temp greater or equal to 38C (100.4F), Mild Pain (1 - 3)  albuterol    0.083% 2.5 milliGRAM(s) Nebulizer every 6 hours PRN Shortness of Breath and/or Wheezing  aluminum hydroxide/magnesium hydroxide/simethicone Suspension 30 milliLiter(s) Oral every 4 hours PRN Dyspepsia  hydrALAZINE Injectable 5 milliGRAM(s) IV Push every 4 hours PRN Systolic BP>160 mm Hg  melatonin 3 milliGRAM(s) Oral at bedtime PRN Insomnia  ondansetron Injectable 4 milliGRAM(s) IV Push every 8 hours PRN Nausea and/or Vomiting      ROS  No fever, sweats, chills  No epistaxis, HA, sore throat  No CP, SOB, cough, sputum  No n/v/d, abd pain, melena, hematochezia  No edema  No rash  No anxiety  No back pain, joint pain  No bleeding, bruising  No dysuria, hematuria    T(C): 36.7 (05-27-23 @ 15:26), Max: 36.7 (05-26-23 @ 20:44)  HR: 69 (05-27-23 @ 15:26) (63 - 99)  BP: 181/51 (05-27-23 @ 15:26) (104/52 - 200/68)  RR: 18 (05-27-23 @ 15:26) (17 - 20)  SpO2: 98% (05-27-23 @ 15:26) (89% - 100%)  Wt(kg): --    PE  NAD  Awake, alert  Anicteric, MMM  RRR  CTAB  Abd soft, NT, ND  No c/c/e  No rash grossly  FROM                          9.3    7.94  )-----------( 113      ( 27 May 2023 08:10 )             28.2       05-27    142  |  113<H>  |  21  ----------------------------<  132<H>  3.9   |  21<L>  |  1.49<H>    Ca    9.0      27 May 2023 08:10    
Hematology/Oncology    Consult seen early this AM. Full recs to follow    Elvis Mendes MD  [facility]  [phone]
  CHIEF COMPLAINT:  Patient is a 89y old  Female who presents with a chief complaint of fever (24 May 2023 16:58)    HPI: /:  89 F, well known to me, w/PMH HTN, HLD, severe AS, HFpEF, chronic Pérez for UR, recent admit (-) for asp PNA and UTI, presents now, BIB dtr, Pippa, for fever and lethargy.  Pt is lethargic, but arousable and answers Qs appropriately and follows commands.  Pt states she's here for fevers.  Per dtr, over the weekend, she had c/o Lower abd pain, which eventually resolved.  On Monday, they called , prescribed Macrobid and VNS changed pt's pérez.  Pt went for routine appt w/ me and was doing fine and back to herself. She started macrobid on Tuesday, x3 doses.  Tuesday night, pt began to become weak, lethargic and shaking chills.  Family treated w/ Tylenol but yesterday AM they decided to bring her to the ED for eval as she became febrile and still lethargic.      In ED, Wbc 21, Na 130, Cr 1.9 (baseline ~1.5), LA 2.5, temp 99.6, hypotensive, given IVF 1L, Pérez changed and pt has not had any Urine output to obtain UA, abx held in the meantime.   CT ch/a/p- no acute path    Currently she is resting more comfortably without anginal chest pains or increased SOB.  She does have severe AS.  No new cardiac symptoms so far.    PAST MEDICAL & SURGICAL HISTORY:  severe AS  HFpEF (65%)  chronic Pérez for UR  HTN (hypertension)  HLD (hyperlipidemia)  Gout  Osteoarthritis  History of tonsillectomy in childhood      FAMILY HISTORY:   FAMILY HISTORY:  Family history of hypertension (Father, Mother)      ALLERGIES:  Allergies  Ceftin (Hives; Rash)      REVIEW OF SYSTEMS:  10 point ROS was obtained  Pertinent positives and negatives are as above  All other review of systems is negative unless indicated above      Vital Signs Last 24 Hrs  T(C): 36.4 (24 May 2023 23:23), Max: 37.6 (24 May 2023 12:59)  T(F): 97.6 (24 May 2023 23:23), Max: 99.6 (24 May 2023 12:59)  HR: 75 (24 May 2023 23:23) (63 - 94)  BP: 125/37 (24 May 2023 23:23) (11/39 - 125/37)  BP(mean): 65 (24 May 2023 20:10) (60 - 65)  RR: 19 (24 May 2023 23:23) (19 - 22)  SpO2: 97% (24 May 2023 23:23) (94% - 100%): nasal cannula  O2 Flow (L/min): 2      I&O's Summary    24 May 2023 07:01  -  25 May 2023 06:41  --------------------------------------------------------  IN: 0 mL / OUT: 400 mL / NET: -400 mL      PHYSICAL EXAM:   Constitutional: NAD, awake and alert, well-developed  HEENT: PERR, EOMI, Normal Hearing, MMM  Neck: Soft and supple, No LAD, No JVD  Respiratory: Breath sounds are clear bilaterally, No wheezing, rales or rhonchi  Cardiovascular: S1 and S2, regular rate and rhythm, 3/6 late peaking LIANA at base and soft systolic LLSB as before, +S4 but no S3 gallops or rubs  Gastrointestinal: Bowel Sounds present, soft, nontender, nondistended, no guarding, no rebound  Extremities: No peripheral edema  Vascular: 2+ peripheral pulses  Neurological: no focal deficits  Musculoskeletal: 5/5 strength b/l upper and lower extremities  Skin: No rashes      MEDICATIONS  (STANDING):  allopurinol 100 milliGRAM(s) Oral daily  atorvastatin 20 milliGRAM(s) Oral at bedtime  enoxaparin Injectable 30 milliGRAM(s) SubCutaneous every 24 hours  gabapentin 300 milliGRAM(s) Oral two times a day  pantoprazole    Tablet 40 milliGRAM(s) Oral before breakfast  piperacillin/tazobactam IVPB.. 3.375 Gram(s) IV Intermittent every 12 hours  sodium chloride 0.9%. 1000 milliLiter(s) (125 mL/Hr) IV Continuous <Continuous>    MEDICATIONS  (PRN):  acetaminophen     Tablet .. 650 milliGRAM(s) Oral every 6 hours PRN Temp greater or equal to 38C (100.4F), Mild Pain (1 - 3)  aluminum hydroxide/magnesium hydroxide/simethicone Suspension 30 milliLiter(s) Oral every 4 hours PRN Dyspepsia  melatonin 3 milliGRAM(s) Oral at bedtime PRN Insomnia  ondansetron Injectable 4 milliGRAM(s) IV Push every 8 hours PRN Nausea and/or Vomiting      LABS: All Labs Reviewed:                        9.8    20.99 )-----------( 107      ( 24 May 2023 13:29 )             29.5         130<L>  |  100  |  29<H>  ----------------------------<  116<H>  4.1   |  22  |  1.95<H>    Ca    8.7      24 May 2023 13:29    TPro  6.9  /  Alb  3.2<L>  /  TBili  1.0  /  DBili  x   /  AST  90<H>  /  ALT  71  /  AlkPhos  81      PT/INR - ( 24 May 2023 13:29 )   PT: 12.8 sec;   INR: 1.10 ratio      PTT - ( 24 May 2023 13:29 )  PTT:27.8 sec    Troponin I, High Sensitivity (23 @ 13:29)   Troponin I, High Sensitivity Result: 11.49    Lactate, Blood (23 @ 17:23)   Lactate, Blood: 1.9 mmol/L      BLOOD CULTURES:   LIPID PROFILE     RADIOLOGY:      EK23:  Normal sinus rhythm  Minimal voltage criteria for LVH, may be normal variant      TELEMETRY:      ECHO:  3/1/23:  M-Mode Measurements (cm)   LVEDd: 3.97 cm            LVESd: 2.55 cm   IVSEd: 1.1 cm   LVPWd: 1.1 cm             AO Root Dimension: 2.8 cm                             LA: 3.5 cm                LVOT: 2 cm  Doppler Measurements:   AV Velocity:434 cm/s                  MV Peak E-Wave: 98.7 cm/s   AV Peak Gradient: 75.34 mmHg          MV Peak A-Wave: 131 cm/s   AV Mean Gradient: 40 mmHg             MV E/A Ratio: 0.75 %   AV Area (Continuity):0.85 cm^2        MV Peak Gradient: 3.9 mmHg   TR Velocity:190 cm/s   TR Gradient:14.44 mmHg   Estimated RAP:5 mmHg   RVSP:27 mmHg    Findings  Mitral Valve   The mitral valve leaflets appear thickened.   EA reversal of the mitral inflow consistent with reduced compliance of the left ventricle.   Mild mitral annular calcification is present.   Mild mitral regurgitation is present.    Aortic Valve   Peak and mean transaortic gradients are 75 and 40mmHg respectively; this finding is consistent with severe aortic stenosis.   Mild (1+) aortic regurgitation is present.    Tricuspid Valve   Trace tricuspid valve regurgitation is present.    Pulmonic Valve   Mild pulmonic valvular regurgitation (1+) is present.    Left Atrium   Normal appearing left atrium.    Left Ventricle   Mild concentric left ventricular hypertrophy is present.   The left ventricle is normal in size.   Estimated left ventricular ejection fraction is 65-70 %.    Right Atrium   Normal appearing right atrium.    Right Ventricle   Normal appearing right ventricle structure and function.    Pericardial Effusion   No evidence of pericardial effusion.    Pleural Effusion   No evidence of pleural effusion.    Miscellaneous   The IVC appears normal.    Summary   The mitral valve leaflets appear thickened.   EA reversal of the mitral inflow consistent with reduced compliance of the left ventricle.   Mild mitral annular calcification is present.   Mild mitral regurgitation is present.   Peak and mean transaortic gradients are 75 and 40mmHg respectively; this finding is consistent with severe aortic stenosis.   Mild (1+) aortic regurgitation is present.   Trace tricuspid valve regurgitation is present.   Mild pulmonic valvular regurgitation (1+) is present.   Normal appearing left atrium.   Mild concentric left ventricular hypertrophy is present.   The left ventricle is normal in size.   Estimated left ventricular ejection fraction is 65-70 %.   Normal appearing right atrium.   Normal appearing right ventricle structure and function.   The IVC appears normal.   No evidence of pericardial effusion.   No evidence of pleural effusion.    Signature   ----------------------------------------------------------------   Electronically signed by Sakina Cheatham MD, Director of   Cardiac Cath Lab(Interpreting physician) on 2023 06:42   PM   ----------------------------------------------------------------

## 2023-05-27 NOTE — PROVIDER CONTACT NOTE (CHANGE IN STATUS NOTIFICATION) - ACTION/TREATMENT ORDERED:
MD Patel made aware and pt to remain on 2L nc for now, continue to monitor.
MD to order BP meds and respiratory tx.

## 2023-05-27 NOTE — CONSULT NOTE ADULT - ASSESSMENT
5/25/23:  Pt with above history presenting with fever and lethargy and an apparent UTI again.  Despite her severe AS and HFpEF, she has no new cardiac issues.  Continue antibiotics and treatment as outlined by medicine.  No indication for further cardiac testing at this time.  Will follow as treatment progresses.
89F w/PMH HTN, HLD, severe AS, HFpEF, chronic pérez for UR, recent admit (4/4-4/11) for asp pna and UTI, presents now, BIB dtr, Theres, available at bedside, for fever and lethargy.  Pt is lethargic, but arousable and answers Qs appropriately and is OX3, follows commands. Per dtr, over the weekend had c/o Lower abd pain, which eventually resolved.  On Monday, they called , prescribed macrobid and VNS changed pt's pérez.  Pt went for routine appt w/ PCP and per dtr, she was doing fine and as herself. She started macrobid  x3 doses.  Last night, pt began to become weak, lethargic and shaking cills.  Family treated w/ tylenol, but his AM they decided to bring her for eval as she became febrile and still lethargic.  In ED, Wbc 21, Na 130, Cr 1.9 (baseline ~1.5), LA 2.5, temp 99.6, hypotensive, given IVF 1L, pérez changed and pt has not had any Urine output to obtain UA, abx held in the meantime.  CT ch/a/p- no acute path UA positive concern for UTI, started on IV zosyn.    1. Sepsis. Pyuria. UTI. Metabolic encephalopathy. Urinary retention with chronic pérez. ANDREW  - imaging reviewed   - agree with IV zosyn [id number] for now  - f/u urine cx, blood cx  - monitor temps  - tolerating abx well so far; no side effects noted  - reason for abx use and side effects reviewed with patient  - supportive care  - fu cbc    2. other issues - care per medicine 
89F w/PMH HTN, HLD, severe AS, HFpEF, chronic pérez for UR, recent admit (4/4-4/11) for asp pna and UTI, presents now, BIB dtr for fever, lethargy in presence of chronic pérez  In ED, Wbc 21, Na 130, Cr 1.9 (baseline ~1.5), LA 2.5, temp 99.6, hypotensive, given IVF 1L, pérez changed and pt has not had any Urine output to obtain UA, abx held in the meantime.   CT ch/a/p- no acute path  Hematology consulted for anemia    1) Anemia  Has been chronic and attributed to renal disease in past  She has seen Dr Butt for management  check iron panel, b12, folate  may receive outpt procrit  transfuse hgb < 7    2) Thrombocytopenia  -chronic dating back to previous heme visits  discussions have taken place in office about if she has MDS that likely would not want treatment  would cont to monitor    Thank you for the courtesy of this consultation and we will continue to follow.    Elvis Mendes MD  [facility]

## 2023-05-27 NOTE — CHART NOTE - NSCHARTNOTEFT_GEN_A_CORE
Notified by RN staff of patient with uncontrolled HTN despite administration of home medications. CXR ordered revealed significant volume overload. Furosemide 40mg IVP x 1 ordered. Strict I/Os to be followed. Please follow up accordingly in the am. Will follow BP and I/Os accordingly for now. Case/Plan discussed extensively with Nursing.

## 2023-05-28 LAB
ANION GAP SERPL CALC-SCNC: 8 MMOL/L — SIGNIFICANT CHANGE UP (ref 5–17)
BUN SERPL-MCNC: 21 MG/DL — SIGNIFICANT CHANGE UP (ref 7–23)
CALCIUM SERPL-MCNC: 8.6 MG/DL — SIGNIFICANT CHANGE UP (ref 8.5–10.1)
CHLORIDE SERPL-SCNC: 108 MMOL/L — SIGNIFICANT CHANGE UP (ref 96–108)
CO2 SERPL-SCNC: 25 MMOL/L — SIGNIFICANT CHANGE UP (ref 22–31)
CREAT SERPL-MCNC: 1.55 MG/DL — HIGH (ref 0.5–1.3)
EGFR: 31 ML/MIN/1.73M2 — LOW
GLUCOSE SERPL-MCNC: 113 MG/DL — HIGH (ref 70–99)
HCT VFR BLD CALC: 25.1 % — LOW (ref 34.5–45)
HGB BLD-MCNC: 8.3 G/DL — LOW (ref 11.5–15.5)
MCHC RBC-ENTMCNC: 30.4 PG — SIGNIFICANT CHANGE UP (ref 27–34)
MCHC RBC-ENTMCNC: 33.1 GM/DL — SIGNIFICANT CHANGE UP (ref 32–36)
MCV RBC AUTO: 91.9 FL — SIGNIFICANT CHANGE UP (ref 80–100)
PLATELET # BLD AUTO: 109 K/UL — LOW (ref 150–400)
POTASSIUM SERPL-MCNC: 3.6 MMOL/L — SIGNIFICANT CHANGE UP (ref 3.5–5.3)
POTASSIUM SERPL-SCNC: 3.6 MMOL/L — SIGNIFICANT CHANGE UP (ref 3.5–5.3)
RBC # BLD: 2.73 M/UL — LOW (ref 3.8–5.2)
RBC # FLD: 17.2 % — HIGH (ref 10.3–14.5)
SODIUM SERPL-SCNC: 141 MMOL/L — SIGNIFICANT CHANGE UP (ref 135–145)
WBC # BLD: 5.61 K/UL — SIGNIFICANT CHANGE UP (ref 3.8–10.5)
WBC # FLD AUTO: 5.61 K/UL — SIGNIFICANT CHANGE UP (ref 3.8–10.5)

## 2023-05-28 PROCEDURE — 99232 SBSQ HOSP IP/OBS MODERATE 35: CPT

## 2023-05-28 RX ADMIN — Medication 25 MILLIGRAM(S): at 21:15

## 2023-05-28 RX ADMIN — GABAPENTIN 300 MILLIGRAM(S): 400 CAPSULE ORAL at 10:28

## 2023-05-28 RX ADMIN — Medication 100 MILLIGRAM(S): at 10:28

## 2023-05-28 RX ADMIN — ATORVASTATIN CALCIUM 20 MILLIGRAM(S): 80 TABLET, FILM COATED ORAL at 21:15

## 2023-05-28 RX ADMIN — ENOXAPARIN SODIUM 30 MILLIGRAM(S): 100 INJECTION SUBCUTANEOUS at 21:16

## 2023-05-28 RX ADMIN — Medication 500000 UNIT(S): at 21:16

## 2023-05-28 RX ADMIN — GABAPENTIN 300 MILLIGRAM(S): 400 CAPSULE ORAL at 21:14

## 2023-05-28 RX ADMIN — Medication 500000 UNIT(S): at 10:33

## 2023-05-28 RX ADMIN — PIPERACILLIN AND TAZOBACTAM 25 GRAM(S): 4; .5 INJECTION, POWDER, LYOPHILIZED, FOR SOLUTION INTRAVENOUS at 21:15

## 2023-05-28 RX ADMIN — Medication 3 MILLIGRAM(S): at 21:14

## 2023-05-28 RX ADMIN — PIPERACILLIN AND TAZOBACTAM 25 GRAM(S): 4; .5 INJECTION, POWDER, LYOPHILIZED, FOR SOLUTION INTRAVENOUS at 10:33

## 2023-05-28 RX ADMIN — Medication 500000 UNIT(S): at 17:03

## 2023-05-28 RX ADMIN — Medication 25 MILLIGRAM(S): at 10:29

## 2023-05-28 RX ADMIN — Medication 500000 UNIT(S): at 05:23

## 2023-05-28 RX ADMIN — Medication 25 MILLIGRAM(S): at 10:28

## 2023-05-28 RX ADMIN — SENNA PLUS 2 TABLET(S): 8.6 TABLET ORAL at 21:15

## 2023-05-28 RX ADMIN — Medication 20 MILLIGRAM(S): at 10:28

## 2023-05-28 RX ADMIN — Medication 1 MILLIGRAM(S): at 10:29

## 2023-05-28 RX ADMIN — PANTOPRAZOLE SODIUM 40 MILLIGRAM(S): 20 TABLET, DELAYED RELEASE ORAL at 05:22

## 2023-05-28 NOTE — PROGRESS NOTE ADULT - ASSESSMENT
89F w/PMH HTN, HLD, severe AS, HFpEF, chronic pérez for UR, recent admit (4/4-4/11) for asp pna and UTI, presents now, BIB dtr, Theres, available at bedside, for fever and lethargy.  Pt is lethargic, but arousable and answers Qs appropriately and is OX3, follows commands. Per dtr, over the weekend had c/o Lower abd pain, which eventually resolved.  On Monday, they called , prescribed macrobid and VNS changed pt's pérez.  Pt went for routine appt w/ PCP and per dtr, she was doing fine and as herself. She started macrobid  x3 doses.  Last night, pt began to become weak, lethargic and shaking cills.  Family treated w/ tylenol, but his AM they decided to bring her for eval as she became febrile and still lethargic.  In ED, Wbc 21, Na 130, Cr 1.9 (baseline ~1.5), LA 2.5, temp 99.6, hypotensive, given IVF 1L, pérez changed and pt has not had any Urine output to obtain UA, abx held in the meantime.  CT ch/a/p- no acute path UA positive concern for UTI, started on IV zosyn.    1. Sepsis. Pyuria. UTI. Metabolic encephalopathy. Urinary retention with chronic pérez. ANDREW  - imaging reviewed   - on IV zosyn [id number] #4-5  - continue with abx coverage  - f/u urine cx, blood cx - no growth, was on po abx prior to admit - likely sterilized cx   - monitor temps  - tolerating abx well so far; no side effects noted  - reason for abx use and side effects reviewed with patient  - 5 day abx course   - supportive care  - fu cbc    2. other issues - care per medicine

## 2023-05-28 NOTE — PROGRESS NOTE ADULT - ASSESSMENT
· Assessment	  89F w/PMH HTN, HLD, severe AS, HFpEF, chronic pérez for UR, recent admit (4/4-4/11) for asp pna and UTI, presents now, BIB dtr for fever, lethargy in presence of chronic pérez  In ED, Wbc 21, Na 130, Cr 1.9 (baseline ~1.5), LA 2.5, temp 99.6, hypotensive, given IVF 1L, pérez changed and pt has not had any Urine output to obtain UA, abx held in the meantime.   CT ch/a/p- no acute path      #acute metabolic encephalopathy, likely d/t infection-resolved  #fever, hypotension, leukocytosis- resolved  #Sepsis associated with UTI and chronic pérez  #ANDREW on CKD stage 3--- improved, back to baseline   #mild hyponatremia --- improved  - pérez changed in ED   - D/C IV fluids   - Continue IV zosyn  - ID cs appreciated    # Acute on chr diastolic CHF due to IV fluid  Resolving  IV fluid discontinued  Received IV lasix 40 mg on 05/26/23  Continue her outpatient lasix    #HTN- S/P hypotension    Restarted home meds metoprolol, lasix, hydralazine    Chr anemia  Heme eval appreciated    #PPX- lovenox    PT eval for d/c planning

## 2023-05-29 PROCEDURE — 99232 SBSQ HOSP IP/OBS MODERATE 35: CPT

## 2023-05-29 RX ORDER — POLYETHYLENE GLYCOL 3350 17 G/17G
17 POWDER, FOR SOLUTION ORAL DAILY
Refills: 0 | Status: DISCONTINUED | OUTPATIENT
Start: 2023-05-29 | End: 2023-05-30

## 2023-05-29 RX ADMIN — Medication 100 MILLIGRAM(S): at 09:20

## 2023-05-29 RX ADMIN — POLYETHYLENE GLYCOL 3350 17 GRAM(S): 17 POWDER, FOR SOLUTION ORAL at 11:38

## 2023-05-29 RX ADMIN — ATORVASTATIN CALCIUM 20 MILLIGRAM(S): 80 TABLET, FILM COATED ORAL at 21:14

## 2023-05-29 RX ADMIN — Medication 25 MILLIGRAM(S): at 09:20

## 2023-05-29 RX ADMIN — Medication 25 MILLIGRAM(S): at 21:14

## 2023-05-29 RX ADMIN — LIDOCAINE 3 PATCH: 4 CREAM TOPICAL at 19:58

## 2023-05-29 RX ADMIN — PANTOPRAZOLE SODIUM 40 MILLIGRAM(S): 20 TABLET, DELAYED RELEASE ORAL at 05:10

## 2023-05-29 RX ADMIN — GABAPENTIN 300 MILLIGRAM(S): 400 CAPSULE ORAL at 21:14

## 2023-05-29 RX ADMIN — Medication 500000 UNIT(S): at 21:13

## 2023-05-29 RX ADMIN — Medication 500000 UNIT(S): at 17:38

## 2023-05-29 RX ADMIN — LIDOCAINE 3 PATCH: 4 CREAM TOPICAL at 09:20

## 2023-05-29 RX ADMIN — Medication 500000 UNIT(S): at 11:38

## 2023-05-29 RX ADMIN — PIPERACILLIN AND TAZOBACTAM 25 GRAM(S): 4; .5 INJECTION, POWDER, LYOPHILIZED, FOR SOLUTION INTRAVENOUS at 21:14

## 2023-05-29 RX ADMIN — Medication 1 MILLIGRAM(S): at 09:20

## 2023-05-29 RX ADMIN — Medication 25 MILLIGRAM(S): at 09:21

## 2023-05-29 RX ADMIN — ENOXAPARIN SODIUM 30 MILLIGRAM(S): 100 INJECTION SUBCUTANEOUS at 21:13

## 2023-05-29 RX ADMIN — GABAPENTIN 300 MILLIGRAM(S): 400 CAPSULE ORAL at 09:20

## 2023-05-29 RX ADMIN — Medication 500000 UNIT(S): at 05:10

## 2023-05-29 RX ADMIN — Medication 20 MILLIGRAM(S): at 09:22

## 2023-05-29 RX ADMIN — LIDOCAINE 3 PATCH: 4 CREAM TOPICAL at 21:23

## 2023-05-29 RX ADMIN — PIPERACILLIN AND TAZOBACTAM 25 GRAM(S): 4; .5 INJECTION, POWDER, LYOPHILIZED, FOR SOLUTION INTRAVENOUS at 09:19

## 2023-05-29 RX ADMIN — SENNA PLUS 2 TABLET(S): 8.6 TABLET ORAL at 21:14

## 2023-05-29 NOTE — PROGRESS NOTE ADULT - ASSESSMENT
· Assessment	  89F w/PMH HTN, HLD, severe AS, HFpEF, chronic pérez for UR, recent admit (4/4-4/11) for asp pna and UTI, presents now, BIB dtr for fever, lethargy in presence of chronic pérez  In ED, Wbc 21, Na 130, Cr 1.9 (baseline ~1.5), LA 2.5, temp 99.6, hypotensive, given IVF 1L, pérez changed and pt has not had any Urine output to obtain UA, abx held in the meantime.   CT ch/a/p- no acute path      #acute metabolic encephalopathy, likely d/t infection-resolved  #fever, hypotension, leukocytosis- resolved  #Sepsis associated with UTI and chronic pérez  #ANDREW on CKD stage 3--- improved, back to baseline   #mild hyponatremia --- improved  - pérez changed in ED   - D/C IV fluids   - Continue IV zosyn for one more day  - ID cs appreciated    # Acute on chr diastolic CHF due to IV fluid  Resolved acute sate  IV fluid discontinued  Received IV lasix 40 mg on 05/26/23  Continue her outpatient lasix    #HTN- S/P hypotension    Restarted home meds metoprolol, lasix, hydralazine    Chr anemia  Heme eval appreciated    #PPX- lovenox    Possible  home tomorrow

## 2023-05-29 NOTE — PHYSICAL THERAPY INITIAL EVALUATION ADULT - GENERAL OBSERVATIONS, REHAB EVAL
Pt rec'd sitting OOB in chair with pérez and NC (2L/min) both intact. Patient denied any pain or discomfort

## 2023-05-29 NOTE — PHYSICAL THERAPY INITIAL EVALUATION ADULT - ORIENTATION, REHAB EVAL
A+Ox2 to person and place only. Patient unable to recall correct reason for admission and correct date/time

## 2023-05-29 NOTE — PHYSICAL THERAPY INITIAL EVALUATION ADULT - ADDITIONAL COMMENTS
Patient was ambulating with RW and with assistance via HHA at home. Patient's 24/7 HHA assists patient with all ADL's

## 2023-05-29 NOTE — PROGRESS NOTE ADULT - ASSESSMENT
5/25/23:  Pt with above history presenting with fever and lethargy and an apparent UTI again.  Despite her severe AS and HFpEF, she has no new cardiac issues.  Continue antibiotics and treatment as outlined by medicine.  No indication for further cardiac testing at this time.  Will follow as treatment progresses.    5/26/23:  Feeling better but right shoulder continues to have stiffness and pain (a chronic problem).  WBC coming down.  Hgb low and may benefit from 1 unit PRBC's.  Possibly aggravated by the chronic hemolysis from the AS (beny  effect on the RBC's).  Should be on Folic Acid and I will order.  Otherwise pt is eager to go home.  No new cardiac symptoms.  Home when ok with medicine etal.  I will be away until Tuesday, 5/30/23 and Dr Corazon Collins will be covering me over the weekend if needed.  Will otherwise follow intermittently prn and as an outpt after discharge.    5/29/23:  Back to her baseline but still somewhat tired but better.  No increased SOB or chest pains.  Still anemic and previously responded to ProCrit and may benefit now as well.  Otherwise no new cardiac issues.  Home soon when ok with medicine.  Would consider giving her Procrit before discharge.  Will otherwise follow intermittently prn and as an outpt after discharge.

## 2023-05-29 NOTE — PHYSICAL THERAPY INITIAL EVALUATION ADULT - MODALITIES TREATMENT COMMENTS
Pt left OOB in chair with pérez and NC (2L/min) both intact. CBIR. Pt instructed to not get up alone. ESTELA Chilel aware

## 2023-05-30 ENCOUNTER — TRANSCRIPTION ENCOUNTER (OUTPATIENT)
Age: 88
End: 2023-05-30

## 2023-05-30 VITALS
HEART RATE: 68 BPM | RESPIRATION RATE: 18 BRPM | SYSTOLIC BLOOD PRESSURE: 146 MMHG | OXYGEN SATURATION: 97 % | TEMPERATURE: 98 F | DIASTOLIC BLOOD PRESSURE: 73 MMHG

## 2023-05-30 LAB
ANION GAP SERPL CALC-SCNC: 7 MMOL/L — SIGNIFICANT CHANGE UP (ref 5–17)
BUN SERPL-MCNC: 19 MG/DL — SIGNIFICANT CHANGE UP (ref 7–23)
CALCIUM SERPL-MCNC: 9.1 MG/DL — SIGNIFICANT CHANGE UP (ref 8.5–10.1)
CHLORIDE SERPL-SCNC: 107 MMOL/L — SIGNIFICANT CHANGE UP (ref 96–108)
CO2 SERPL-SCNC: 26 MMOL/L — SIGNIFICANT CHANGE UP (ref 22–31)
CREAT SERPL-MCNC: 1.57 MG/DL — HIGH (ref 0.5–1.3)
EGFR: 30 ML/MIN/1.73M2 — LOW
GLUCOSE SERPL-MCNC: 143 MG/DL — HIGH (ref 70–99)
HCT VFR BLD CALC: 27.7 % — LOW (ref 34.5–45)
HGB BLD-MCNC: 9.1 G/DL — LOW (ref 11.5–15.5)
MCHC RBC-ENTMCNC: 30 PG — SIGNIFICANT CHANGE UP (ref 27–34)
MCHC RBC-ENTMCNC: 32.9 GM/DL — SIGNIFICANT CHANGE UP (ref 32–36)
MCV RBC AUTO: 91.4 FL — SIGNIFICANT CHANGE UP (ref 80–100)
PLATELET # BLD AUTO: 133 K/UL — LOW (ref 150–400)
POTASSIUM SERPL-MCNC: 3.4 MMOL/L — LOW (ref 3.5–5.3)
POTASSIUM SERPL-SCNC: 3.4 MMOL/L — LOW (ref 3.5–5.3)
RBC # BLD: 3.03 M/UL — LOW (ref 3.8–5.2)
RBC # FLD: 16.9 % — HIGH (ref 10.3–14.5)
SODIUM SERPL-SCNC: 140 MMOL/L — SIGNIFICANT CHANGE UP (ref 135–145)
WBC # BLD: 6.84 K/UL — SIGNIFICANT CHANGE UP (ref 3.8–10.5)
WBC # FLD AUTO: 6.84 K/UL — SIGNIFICANT CHANGE UP (ref 3.8–10.5)

## 2023-05-30 PROCEDURE — 99239 HOSP IP/OBS DSCHRG MGMT >30: CPT

## 2023-05-30 RX ORDER — LACTULOSE 10 G/15ML
10 SOLUTION ORAL ONCE
Refills: 0 | Status: COMPLETED | OUTPATIENT
Start: 2023-05-30 | End: 2023-05-30

## 2023-05-30 RX ORDER — POTASSIUM CHLORIDE 20 MEQ
40 PACKET (EA) ORAL ONCE
Refills: 0 | Status: COMPLETED | OUTPATIENT
Start: 2023-05-30 | End: 2023-05-30

## 2023-05-30 RX ADMIN — GABAPENTIN 300 MILLIGRAM(S): 400 CAPSULE ORAL at 09:26

## 2023-05-30 RX ADMIN — Medication 500000 UNIT(S): at 05:19

## 2023-05-30 RX ADMIN — Medication 20 MILLIGRAM(S): at 09:29

## 2023-05-30 RX ADMIN — Medication 5 MILLIGRAM(S): at 01:00

## 2023-05-30 RX ADMIN — Medication 1 MILLIGRAM(S): at 09:27

## 2023-05-30 RX ADMIN — Medication 500000 UNIT(S): at 18:49

## 2023-05-30 RX ADMIN — Medication 40 MILLIEQUIVALENT(S): at 12:58

## 2023-05-30 RX ADMIN — Medication 25 MILLIGRAM(S): at 09:28

## 2023-05-30 RX ADMIN — Medication 100 MILLIGRAM(S): at 09:27

## 2023-05-30 RX ADMIN — LACTULOSE 10 GRAM(S): 10 SOLUTION ORAL at 15:55

## 2023-05-30 RX ADMIN — LIDOCAINE 3 PATCH: 4 CREAM TOPICAL at 09:25

## 2023-05-30 RX ADMIN — PIPERACILLIN AND TAZOBACTAM 25 GRAM(S): 4; .5 INJECTION, POWDER, LYOPHILIZED, FOR SOLUTION INTRAVENOUS at 09:23

## 2023-05-30 RX ADMIN — PANTOPRAZOLE SODIUM 40 MILLIGRAM(S): 20 TABLET, DELAYED RELEASE ORAL at 05:19

## 2023-05-30 RX ADMIN — Medication 500000 UNIT(S): at 12:58

## 2023-05-30 NOTE — DISCHARGE NOTE PROVIDER - NSDCMRMEDTOKEN_GEN_ALL_CORE_FT
Allegra 180 mg oral tablet: 1 tab(s) orally once a day as needed for  allergy symptoms  allopurinol 100 mg oral tablet: 1 tab(s) orally once a day  Artificial Tears ophthalmic solution: 1 drop(s) in each eye 2 times a day as needed for  dry eyes  docusate sodium 100 mg oral capsule: 1 cap(s) orally once a day  folic acid 1 mg oral tablet: 1 tab(s) orally once a day  furosemide 20 mg oral tablet: 1 tab(s) orally once a day  hydrALAZINE 25 mg oral tablet: 1 tab(s) orally 2 times a day  melatonin 5 mg oral tablet: 1 tab(s) orally once a day (at bedtime) as needed for sleep  metoprolol tartrate 25 mg oral tablet: 1 tab(s) orally 2 times a day  Neurontin 300 mg oral capsule: 2 cap(s) orally 2 times a day  pantoprazole 40 mg oral delayed release tablet: 1 tab(s) orally once a day  rosuvastatin 5 mg oral tablet: 1 tab(s) orally once a day (at bedtime)  senna leaf extract oral tablet: 2 tab(s) orally once a day (at bedtime)  Vitamin D3 1000 intl units (25 mcg) oral tablet: 1 cap(s) orally once a day

## 2023-05-30 NOTE — PROGRESS NOTE ADULT - PROVIDER SPECIALTY LIST ADULT
Cardiology
Hospitalist
Infectious Disease
Infectious Disease
Hospitalist
Cardiology
Cardiology

## 2023-05-30 NOTE — DISCHARGE NOTE PROVIDER - HOSPITAL COURSE
89F w/PMH HTN, HLD, severe AS, HFpEF, chronic pérez for UR, recent admit (4/4-4/11) for asp pna and UTI, presents now, BIB dtr, Theres, available at bedside, for fever and lethargy.  Pt is lethargic, but arousable and answers Qs appropriately and is OX3, follows commands.  Pt states she's here for fevers since last night.  Per dtr, over the weekend had c/o Lower abd pain, which eventually resolved.  On Monday, they called , prescribed macrobid and VNS changed pt's pérez.  Pt went for routine appt w/ PCP and per dtr, she was doing fine and as herself. She started macrobid yesterday, x3 doses.  Last night, pt began to become weak, lethargic and shaking cills.  Family treated w/ tylenol, but his AM they decided to bring her for eval as she became febrile and still lethargic.    In ED, Wbc 21, Na 130, Cr 1.9 (baseline ~1.5), LA 2.5, temp 99.6, hypotensive, given IVF 1L, pérez changed and pt has not had any Urine output to obtain UA, abx held in the meantime.   CT ch/a/p- no acute path.  She was admitted to medical floor. She was started on IV zosyn, seen by ID. Blood cultures and urine cultures are no growth. She is with chr pérez catheter, changed in ED. Initially on admission her BP was low and BP meds were on hold. She was started on IV fluid. Her BP improved but she developed volume overlaod, received IV lasix and restarted her outpatient po lasix. She was seen by PT, walked 125 feet. Her sepsis resolved. Discussed with daughter multiple times regarding d/c plan. Completing her IV zosyn today.      5/25: laying in bed, tired but feeling better. no pain to pérez. no fever. tolerating abx well. BP improving   05/26/23: No new issues. BP high now.   05/27/23: Patient seen and examined. High BP and was sob last night, received 40 mg IV lasix. Discussed with daughter  at bed side regarding management  and d/c plan.   05/28/23: No sob, feels  somewhat tired.  05/29/23: Sitting in chair, seen by PT and walked 125 feet. No new complaints. Discussed with daughter at bed side regarding management and d/c plan.        PHYSICAL EXAM:    Constitutional: NAD, awake and alert, well-developed  HEENT: PERR, EOMI, Normal Hearing, MMM  Neck: Soft and supple, No LAD, No JVD  Respiratory: Breath sounds are clear bilaterally, No wheezing, rales or rhonchi  Cardiovascular: S1 and S2, regular rate and rhythm, no Murmurs, gallops or rubs  Gastrointestinal: Bowel Sounds present, soft, nontender, nondistended, no guarding, no rebound  Extremities: No peripheral edema  Vascular: 2+ peripheral pulses  Neurological: A/O x 3, no focal deficits  Musculoskeletal: 5/5 strength b/l upper and lower extremities  Skin: No rashes      #acute metabolic encephalopathy, likely d/t infection-resolved  #fever, hypotension, leukocytosis- resolved  #Sepsis associated with UTI and chronic pérez  #ANDREW on CKD stage 3--- improved, back to baseline   #mild hyponatremia --- improved  - pérez changed in ED   - S/P IV fluids   - Completed IV zosyn  - ID cs appreciated    # Acute on chr diastolic CHF due to IV fluid  Resolved acute sate  IV fluid discontinued  Received IV lasix 40 mg on 05/26/23  Continue her outpatient po lasix    #HTN- S/P hypotension    Restarted home meds metoprolol, lasix, hydralazine    Chr anemia  Heme eval appreciated  Needs outpatient follow up    Home today  Spent more than 30 min to prepare the discharge

## 2023-05-30 NOTE — DISCHARGE NOTE PROVIDER - CARE PROVIDER_API CALL
Olvin Collins  Cardiovascular Disease  [address], [address]  [place] [address]  Phone: [phone]  Fax: [phone]  Follow Up Time:

## 2023-05-30 NOTE — DISCHARGE NOTE PROVIDER - NSDCFUSCHEDAPPT_GEN_ALL_CORE_FT
Rojelio Collins  [facility] Physician [facility]  UROLOGY [address]  Scheduled Appointment: 07/18/2023    Cristina Silver  [facility] Physician [facility] [address]  Scheduled Appointment: 07/27/2023

## 2023-05-30 NOTE — PROGRESS NOTE ADULT - ASSESSMENT
5/25/23:  Pt with above history presenting with fever and lethargy and an apparent UTI again.  Despite her severe AS and HFpEF, she has no new cardiac issues.  Continue antibiotics and treatment as outlined by medicine.  No indication for further cardiac testing at this time.  Will follow as treatment progresses.    5/26/23:  Feeling better but right shoulder continues to have stiffness and pain (a chronic problem).  WBC coming down.  Hgb low and may benefit from 1 unit PRBC's.  Possibly aggravated by the chronic hemolysis from the AS (beny  effect on the RBC's).  Should be on Folic Acid and I will order.  Otherwise pt is eager to go home.  No new cardiac symptoms.  Home when ok with medicine etal.  I will be away until Tuesday, 5/30/23 and Dr Corazon Collins will be covering me over the weekend if needed.  Will otherwise follow intermittently prn and as an outpt after discharge.    5/29/23:  Back to her baseline but still somewhat tired but better.  No increased SOB or chest pains.  Still anemic and previously responded to ProCrit and may benefit now as well.  Otherwise no new cardiac issues.  Home soon when ok with medicine.  Would consider giving her Procrit before discharge.  Will otherwise follow intermittently prn and as an outpt after discharge.    5/30/23:  Resting comfortably without new cardiac symptoms.  For possible discharge home today.  Will otherwise follow intermittently prn and as an outpt after discharge.

## 2023-06-01 ENCOUNTER — NON-APPOINTMENT (OUTPATIENT)
Age: 88
End: 2023-06-01

## 2023-06-02 ENCOUNTER — NON-APPOINTMENT (OUTPATIENT)
Age: 88
End: 2023-06-02

## 2023-06-04 DIAGNOSIS — T83.511A INFECTION AND INFLAMMATORY REACTION DUE TO INDWELLING URETHRAL CATHETER, INITIAL ENCOUNTER: ICD-10-CM

## 2023-06-04 DIAGNOSIS — Z20.822 CONTACT WITH AND (SUSPECTED) EXPOSURE TO COVID-19: ICD-10-CM

## 2023-06-04 DIAGNOSIS — G93.41 METABOLIC ENCEPHALOPATHY: ICD-10-CM

## 2023-06-04 DIAGNOSIS — M19.90 UNSPECIFIED OSTEOARTHRITIS, UNSPECIFIED SITE: ICD-10-CM

## 2023-06-04 DIAGNOSIS — E78.5 HYPERLIPIDEMIA, UNSPECIFIED: ICD-10-CM

## 2023-06-04 DIAGNOSIS — I35.0 NONRHEUMATIC AORTIC (VALVE) STENOSIS: ICD-10-CM

## 2023-06-04 DIAGNOSIS — Z88.1 ALLERGY STATUS TO OTHER ANTIBIOTIC AGENTS STATUS: ICD-10-CM
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
## 2023-06-04 DIAGNOSIS — A41.9 SEPSIS, UNSPECIFIED ORGANISM: ICD-10-CM

## 2023-06-04 DIAGNOSIS — I13.0 HYPERTENSIVE HEART AND CHRONIC KIDNEY DISEASE WITH HEART FAILURE AND STAGE 1 THROUGH STAGE 4 CHRONIC KIDNEY DISEASE, OR UNSPECIFIED CHRONIC KIDNEY DISEASE: ICD-10-CM
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
## 2023-06-04 DIAGNOSIS — N18.30 CHRONIC KIDNEY DISEASE, STAGE 3 UNSPECIFIED: ICD-10-CM

## 2023-06-04 DIAGNOSIS — M10.9 GOUT, UNSPECIFIED: ICD-10-CM

## 2023-06-04 DIAGNOSIS — D69.6 THROMBOCYTOPENIA, UNSPECIFIED: ICD-10-CM

## 2023-06-04 DIAGNOSIS — I50.33 ACUTE ON CHRONIC DIASTOLIC (CONGESTIVE) HEART FAILURE: ICD-10-CM

## 2023-06-04 DIAGNOSIS — N17.9 ACUTE KIDNEY FAILURE, UNSPECIFIED: ICD-10-CM

## 2023-06-04 DIAGNOSIS — N39.0 URINARY TRACT INFECTION, SITE NOT SPECIFIED: ICD-10-CM

## 2023-06-04 DIAGNOSIS — R33.9 RETENTION OF URINE, UNSPECIFIED: ICD-10-CM

## 2023-06-04 DIAGNOSIS — E87.1 HYPO-OSMOLALITY AND HYPONATREMIA: ICD-10-CM

## 2023-06-06 ENCOUNTER — APPOINTMENT (OUTPATIENT)
Dept: UROLOGY | Facility: CLINIC | Age: 88
End: 2023-06-06
Payer: MEDICARE

## 2023-06-06 DIAGNOSIS — N32.89 OTHER SPECIFIED DISORDERS OF BLADDER: ICD-10-CM

## 2023-06-06 PROCEDURE — 99213 OFFICE O/P EST LOW 20 MIN: CPT

## 2023-06-06 NOTE — ASSESSMENT
[address] [facility] Progress Note    Date: 2021    Name: Andra Padilla    MRN: [id number]         : 1946      Ms. Nya Goldstein was assessed and education was provided. Ms. Giovanna Bustillo vitals were reviewed and patient was observed for 5 minutes prior to treatment. Visit Vitals  BP (!) 117/55 (BP 1 Location: Left upper arm, BP Patient Position: At rest)   Pulse (!) 46   Temp 97.1 °F (36.2 °C)   Resp 18   SpO2 97%   Breastfeeding No     PIV started x1 attempt in Left AC, brisk blood return noted and flushed with ease. 2g. magnesium was infused over 2 hours as ordered. Upon completion line flushed with 10cc NS. PIV dc'd, catheter intact. 2x2 and coban applied. Ms. Nya Goldstein tolerated the infusion, and had no complaints. Patient armband removed and shredded. Ms. Nya Goldstein was discharged from [place] [address] in stable condition at 1300. She has no further appointments here at [facility]. Ms. Nya Goldstein will follow up with her PCP.     Jaycob Kan RN  2021  1:12 PM [FreeTextEntry1] : 89 year old F with chronic indwelling pérez for urinary retention and frequent UTIs.\par \par For UTIs, continue  D-mannose supplements and irrigation with antibiotic solution. Also discussed placement of SP tube. Pt and family choose D-mannose and antibiotic irrigation. Catheter change q3 weeks.\par \par For bladder spasms, will try higher dose of myrbetriq. If not approved by insurance or not effective will consider cysto with botox.

## 2023-06-06 NOTE — PHYSICAL EXAM

## 2023-06-06 NOTE — HISTORY OF PRESENT ILLNESS
[FreeTextEntry1] : 89 year old woman  seen 04/18/2023 with complaint of frequent UTI. This began 3 months ago. She gets UTIs about every month. Her usual UTI symptoms are not dysuria, urgency, and bladder pain but rather high fevers and confusion. She had followed with Dr Bonilla for urinary retention and is managed with indwelling pérez. He had recommended CIC, but pt could not perform this. She presents with her daughter for discussion of ways to prevent UTIs.  No family history contributory to Frequent UTIs. \par \par 06/06/2023: Patient presents for follow up. She was recently admitted to  for UTI, sepsis. She was treated with 10 days of IV zosyn. She reports daily episodes of severe SP pain and occasional leakage around pérez. This has improved "a little" with myrbetriq 25 mg.

## 2023-06-08 ENCOUNTER — INPATIENT (INPATIENT)
Facility: HOSPITAL | Age: 88
LOS: 4 days | Discharge: HOME HEALTH | DRG: 698 | End: 2023-06-13
Attending: HOSPITALIST | Admitting: HOSPITALIST
Payer: MEDICARE

## 2023-06-08 VITALS
TEMPERATURE: 98 F | DIASTOLIC BLOOD PRESSURE: 54 MMHG | RESPIRATION RATE: 18 BRPM | OXYGEN SATURATION: 96 % | HEIGHT: 63 IN | SYSTOLIC BLOOD PRESSURE: 146 MMHG | WEIGHT: 154.32 LBS | HEART RATE: 105 BPM

## 2023-06-08 DIAGNOSIS — Z90.89 ACQUIRED ABSENCE OF OTHER ORGANS: Chronic | ICD-10-CM

## 2023-06-08 DIAGNOSIS — N39.0 URINARY TRACT INFECTION, SITE NOT SPECIFIED: ICD-10-CM

## 2023-06-08 LAB
ALBUMIN SERPL ELPH-MCNC: 3.5 G/DL — SIGNIFICANT CHANGE UP (ref 3.3–5)
ALP SERPL-CCNC: 72 U/L — SIGNIFICANT CHANGE UP (ref 40–120)
ALT FLD-CCNC: 15 U/L — SIGNIFICANT CHANGE UP (ref 12–78)
ANION GAP SERPL CALC-SCNC: 5 MMOL/L — SIGNIFICANT CHANGE UP (ref 5–17)
ANION GAP SERPL CALC-SCNC: 7 MMOL/L — SIGNIFICANT CHANGE UP (ref 5–17)
APPEARANCE UR: CLEAR — SIGNIFICANT CHANGE UP
AST SERPL-CCNC: 15 U/L — SIGNIFICANT CHANGE UP (ref 15–37)
BACTERIA # UR AUTO: ABNORMAL
BASOPHILS # BLD AUTO: 0.01 K/UL — SIGNIFICANT CHANGE UP (ref 0–0.2)
BASOPHILS NFR BLD AUTO: 0.1 % — SIGNIFICANT CHANGE UP (ref 0–2)
BILIRUB SERPL-MCNC: 0.6 MG/DL — SIGNIFICANT CHANGE UP (ref 0.2–1.2)
BILIRUB UR-MCNC: NEGATIVE — SIGNIFICANT CHANGE UP
BUN SERPL-MCNC: 22 MG/DL — SIGNIFICANT CHANGE UP (ref 7–23)
BUN SERPL-MCNC: 22 MG/DL — SIGNIFICANT CHANGE UP (ref 7–23)
CALCIUM SERPL-MCNC: 7.9 MG/DL — LOW (ref 8.5–10.1)
CALCIUM SERPL-MCNC: 9.1 MG/DL — SIGNIFICANT CHANGE UP (ref 8.5–10.1)
CHLORIDE SERPL-SCNC: 110 MMOL/L — HIGH (ref 96–108)
CHLORIDE SERPL-SCNC: 112 MMOL/L — HIGH (ref 96–108)
CO2 SERPL-SCNC: 22 MMOL/L — SIGNIFICANT CHANGE UP (ref 22–31)
CO2 SERPL-SCNC: 22 MMOL/L — SIGNIFICANT CHANGE UP (ref 22–31)
COLOR SPEC: YELLOW — SIGNIFICANT CHANGE UP
COMMENT - URINE: SIGNIFICANT CHANGE UP
CREAT SERPL-MCNC: 1.54 MG/DL — HIGH (ref 0.5–1.3)
CREAT SERPL-MCNC: 1.67 MG/DL — HIGH (ref 0.5–1.3)
DIFF PNL FLD: ABNORMAL
EGFR: 28 ML/MIN/1.73M2 — LOW
EGFR: 31 ML/MIN/1.73M2 — LOW
EOSINOPHIL # BLD AUTO: 0.02 K/UL — SIGNIFICANT CHANGE UP (ref 0–0.5)
EOSINOPHIL NFR BLD AUTO: 0.2 % — SIGNIFICANT CHANGE UP (ref 0–6)
EPI CELLS # UR: SIGNIFICANT CHANGE UP
GLUCOSE SERPL-MCNC: 129 MG/DL — HIGH (ref 70–99)
GLUCOSE SERPL-MCNC: 130 MG/DL — HIGH (ref 70–99)
GLUCOSE UR QL: NEGATIVE — SIGNIFICANT CHANGE UP
HCT VFR BLD CALC: 32.2 % — LOW (ref 34.5–45)
HGB BLD-MCNC: 10.4 G/DL — LOW (ref 11.5–15.5)
IMM GRANULOCYTES NFR BLD AUTO: 1.2 % — HIGH (ref 0–0.9)
KETONES UR-MCNC: NEGATIVE — SIGNIFICANT CHANGE UP
LACTATE SERPL-SCNC: 2 MMOL/L — SIGNIFICANT CHANGE UP (ref 0.7–2)
LACTATE SERPL-SCNC: 2.4 MMOL/L — HIGH (ref 0.7–2)
LEUKOCYTE ESTERASE UR-ACNC: ABNORMAL
LIDOCAIN IGE QN: 102 U/L — SIGNIFICANT CHANGE UP (ref 73–393)
LYMPHOCYTES # BLD AUTO: 0.44 K/UL — LOW (ref 1–3.3)
LYMPHOCYTES # BLD AUTO: 3.4 % — LOW (ref 13–44)
MAGNESIUM SERPL-MCNC: 1.6 MG/DL — SIGNIFICANT CHANGE UP (ref 1.6–2.6)
MAGNESIUM SERPL-MCNC: 1.8 MG/DL — SIGNIFICANT CHANGE UP (ref 1.6–2.6)
MCHC RBC-ENTMCNC: 30.7 PG — SIGNIFICANT CHANGE UP (ref 27–34)
MCHC RBC-ENTMCNC: 32.3 GM/DL — SIGNIFICANT CHANGE UP (ref 32–36)
MCV RBC AUTO: 95 FL — SIGNIFICANT CHANGE UP (ref 80–100)
MONOCYTES # BLD AUTO: 0.4 K/UL — SIGNIFICANT CHANGE UP (ref 0–0.9)
MONOCYTES NFR BLD AUTO: 3.1 % — SIGNIFICANT CHANGE UP (ref 2–14)
NEUTROPHILS # BLD AUTO: 11.89 K/UL — HIGH (ref 1.8–7.4)
NEUTROPHILS NFR BLD AUTO: 92 % — HIGH (ref 43–77)
NITRITE UR-MCNC: NEGATIVE — SIGNIFICANT CHANGE UP
NT-PROBNP SERPL-SCNC: 1099 PG/ML — HIGH (ref 0–450)
PH UR: 5 — SIGNIFICANT CHANGE UP (ref 5–8)
PHOSPHATE SERPL-MCNC: 2.5 MG/DL — SIGNIFICANT CHANGE UP (ref 2.5–4.5)
PLATELET # BLD AUTO: 145 K/UL — LOW (ref 150–400)
POTASSIUM SERPL-MCNC: 3.6 MMOL/L — SIGNIFICANT CHANGE UP (ref 3.5–5.3)
POTASSIUM SERPL-MCNC: 4.1 MMOL/L — SIGNIFICANT CHANGE UP (ref 3.5–5.3)
POTASSIUM SERPL-SCNC: 3.6 MMOL/L — SIGNIFICANT CHANGE UP (ref 3.5–5.3)
POTASSIUM SERPL-SCNC: 4.1 MMOL/L — SIGNIFICANT CHANGE UP (ref 3.5–5.3)
PROT SERPL-MCNC: 7.3 GM/DL — SIGNIFICANT CHANGE UP (ref 6–8.3)
PROT UR-MCNC: 100
RAPID RVP RESULT: SIGNIFICANT CHANGE UP
RBC # BLD: 3.39 M/UL — LOW (ref 3.8–5.2)
RBC # FLD: 16.9 % — HIGH (ref 10.3–14.5)
RBC CASTS # UR COMP ASSIST: NEGATIVE /HPF — SIGNIFICANT CHANGE UP (ref 0–4)
SARS-COV-2 RNA SPEC QL NAA+PROBE: SIGNIFICANT CHANGE UP
SODIUM SERPL-SCNC: 139 MMOL/L — SIGNIFICANT CHANGE UP (ref 135–145)
SODIUM SERPL-SCNC: 139 MMOL/L — SIGNIFICANT CHANGE UP (ref 135–145)
SP GR SPEC: 1.01 — SIGNIFICANT CHANGE UP (ref 1.01–1.02)
TROPONIN I, HIGH SENSITIVITY RESULT: 22.87 NG/L — SIGNIFICANT CHANGE UP
TROPONIN I, HIGH SENSITIVITY RESULT: 258.79 NG/L — HIGH
TROPONIN I, HIGH SENSITIVITY RESULT: 79.99 NG/L — HIGH
UROBILINOGEN FLD QL: NEGATIVE — SIGNIFICANT CHANGE UP
WBC # BLD: 12.91 K/UL — HIGH (ref 3.8–10.5)
WBC # FLD AUTO: 12.91 K/UL — HIGH (ref 3.8–10.5)
WBC UR QL: >50 /HPF (ref 0–5)

## 2023-06-08 PROCEDURE — 83735 ASSAY OF MAGNESIUM: CPT

## 2023-06-08 PROCEDURE — 93005 ELECTROCARDIOGRAM TRACING: CPT

## 2023-06-08 PROCEDURE — 84484 ASSAY OF TROPONIN QUANT: CPT

## 2023-06-08 PROCEDURE — 36415 COLL VENOUS BLD VENIPUNCTURE: CPT

## 2023-06-08 PROCEDURE — 99291 CRITICAL CARE FIRST HOUR: CPT

## 2023-06-08 PROCEDURE — 74176 CT ABD & PELVIS W/O CONTRAST: CPT | Mod: 26,MA

## 2023-06-08 PROCEDURE — 84100 ASSAY OF PHOSPHORUS: CPT

## 2023-06-08 PROCEDURE — 86900 BLOOD TYPING SEROLOGIC ABO: CPT

## 2023-06-08 PROCEDURE — 86901 BLOOD TYPING SEROLOGIC RH(D): CPT

## 2023-06-08 PROCEDURE — 80048 BASIC METABOLIC PNL TOTAL CA: CPT

## 2023-06-08 PROCEDURE — 93010 ELECTROCARDIOGRAM REPORT: CPT

## 2023-06-08 PROCEDURE — 83550 IRON BINDING TEST: CPT

## 2023-06-08 PROCEDURE — 82728 ASSAY OF FERRITIN: CPT

## 2023-06-08 PROCEDURE — 80053 COMPREHEN METABOLIC PANEL: CPT

## 2023-06-08 PROCEDURE — 84145 PROCALCITONIN (PCT): CPT

## 2023-06-08 PROCEDURE — 83605 ASSAY OF LACTIC ACID: CPT

## 2023-06-08 PROCEDURE — 97163 PT EVAL HIGH COMPLEX 45 MIN: CPT | Mod: GP

## 2023-06-08 PROCEDURE — 93308 TTE F-UP OR LMTD: CPT

## 2023-06-08 PROCEDURE — 86850 RBC ANTIBODY SCREEN: CPT

## 2023-06-08 PROCEDURE — 83540 ASSAY OF IRON: CPT

## 2023-06-08 PROCEDURE — 97116 GAIT TRAINING THERAPY: CPT | Mod: GP

## 2023-06-08 PROCEDURE — 85025 COMPLETE CBC W/AUTO DIFF WBC: CPT

## 2023-06-08 PROCEDURE — 85027 COMPLETE CBC AUTOMATED: CPT

## 2023-06-08 PROCEDURE — 71045 X-RAY EXAM CHEST 1 VIEW: CPT | Mod: 26

## 2023-06-08 RX ORDER — ACETAMINOPHEN 500 MG
1000 TABLET ORAL ONCE
Refills: 0 | Status: COMPLETED | OUTPATIENT
Start: 2023-06-08 | End: 2023-06-08

## 2023-06-08 RX ORDER — SENNA PLUS 8.6 MG/1
2 TABLET ORAL AT BEDTIME
Refills: 0 | Status: DISCONTINUED | OUTPATIENT
Start: 2023-06-08 | End: 2023-06-13

## 2023-06-08 RX ORDER — SODIUM CHLORIDE 9 MG/ML
500 INJECTION INTRAMUSCULAR; INTRAVENOUS; SUBCUTANEOUS ONCE
Refills: 0 | Status: COMPLETED | OUTPATIENT
Start: 2023-06-08 | End: 2023-06-08

## 2023-06-08 RX ORDER — SODIUM,POTASSIUM PHOSPHATES 278-250MG
1 POWDER IN PACKET (EA) ORAL ONCE
Refills: 0 | Status: COMPLETED | OUTPATIENT
Start: 2023-06-08 | End: 2023-06-08

## 2023-06-08 RX ORDER — LIDOCAINE 4 G/100G
1 CREAM TOPICAL EVERY 24 HOURS
Refills: 0 | Status: DISCONTINUED | OUTPATIENT
Start: 2023-06-08 | End: 2023-06-13

## 2023-06-08 RX ORDER — FOLIC ACID 0.8 MG
1 TABLET ORAL DAILY
Refills: 0 | Status: DISCONTINUED | OUTPATIENT
Start: 2023-06-08 | End: 2023-06-13

## 2023-06-08 RX ORDER — MAGNESIUM SULFATE 500 MG/ML
2 VIAL (ML) INJECTION ONCE
Refills: 0 | Status: COMPLETED | OUTPATIENT
Start: 2023-06-08 | End: 2023-06-08

## 2023-06-08 RX ORDER — LORATADINE 10 MG/1
10 TABLET ORAL DAILY
Refills: 0 | Status: DISCONTINUED | OUTPATIENT
Start: 2023-06-08 | End: 2023-06-09

## 2023-06-08 RX ORDER — ALLOPURINOL 300 MG
100 TABLET ORAL DAILY
Refills: 0 | Status: DISCONTINUED | OUTPATIENT
Start: 2023-06-08 | End: 2023-06-13

## 2023-06-08 RX ORDER — NOREPINEPHRINE BITARTRATE/D5W 8 MG/250ML
0.05 PLASTIC BAG, INJECTION (ML) INTRAVENOUS
Qty: 8 | Refills: 0 | Status: DISCONTINUED | OUTPATIENT
Start: 2023-06-08 | End: 2023-06-09

## 2023-06-08 RX ORDER — MEROPENEM 1 G/30ML
1000 INJECTION INTRAVENOUS ONCE
Refills: 0 | Status: DISCONTINUED | OUTPATIENT
Start: 2023-06-08 | End: 2023-06-08

## 2023-06-08 RX ORDER — SODIUM CHLORIDE 9 MG/ML
1000 INJECTION, SOLUTION INTRAVENOUS ONCE
Refills: 0 | Status: COMPLETED | OUTPATIENT
Start: 2023-06-08 | End: 2023-06-08

## 2023-06-08 RX ORDER — SODIUM CHLORIDE 9 MG/ML
500 INJECTION, SOLUTION INTRAVENOUS
Refills: 0 | Status: DISCONTINUED | OUTPATIENT
Start: 2023-06-08 | End: 2023-06-09

## 2023-06-08 RX ORDER — ACETAMINOPHEN 500 MG
650 TABLET ORAL EVERY 6 HOURS
Refills: 0 | Status: DISCONTINUED | OUTPATIENT
Start: 2023-06-08 | End: 2023-06-13

## 2023-06-08 RX ORDER — LANOLIN ALCOHOL/MO/W.PET/CERES
5 CREAM (GRAM) TOPICAL AT BEDTIME
Refills: 0 | Status: DISCONTINUED | OUTPATIENT
Start: 2023-06-08 | End: 2023-06-09

## 2023-06-08 RX ORDER — PANTOPRAZOLE SODIUM 20 MG/1
40 TABLET, DELAYED RELEASE ORAL
Refills: 0 | Status: DISCONTINUED | OUTPATIENT
Start: 2023-06-08 | End: 2023-06-13

## 2023-06-08 RX ORDER — CHLORHEXIDINE GLUCONATE 213 G/1000ML
1 SOLUTION TOPICAL
Refills: 0 | Status: DISCONTINUED | OUTPATIENT
Start: 2023-06-08 | End: 2023-06-09

## 2023-06-08 RX ORDER — MEROPENEM 1 G/30ML
1000 INJECTION INTRAVENOUS EVERY 12 HOURS
Refills: 0 | Status: COMPLETED | OUTPATIENT
Start: 2023-06-08 | End: 2023-06-12

## 2023-06-08 RX ORDER — ATORVASTATIN CALCIUM 80 MG/1
20 TABLET, FILM COATED ORAL AT BEDTIME
Refills: 0 | Status: DISCONTINUED | OUTPATIENT
Start: 2023-06-08 | End: 2023-06-13

## 2023-06-08 RX ORDER — MEROPENEM 1 G/30ML
1000 INJECTION INTRAVENOUS ONCE
Refills: 0 | Status: COMPLETED | OUTPATIENT
Start: 2023-06-08 | End: 2023-06-08

## 2023-06-08 RX ORDER — GABAPENTIN 400 MG/1
600 CAPSULE ORAL
Refills: 0 | Status: DISCONTINUED | OUTPATIENT
Start: 2023-06-08 | End: 2023-06-09

## 2023-06-08 RX ORDER — VANCOMYCIN HCL 1 G
1000 VIAL (EA) INTRAVENOUS ONCE
Refills: 0 | Status: COMPLETED | OUTPATIENT
Start: 2023-06-08 | End: 2023-06-08

## 2023-06-08 RX ORDER — CHOLECALCIFEROL (VITAMIN D3) 125 MCG
1000 CAPSULE ORAL DAILY
Refills: 0 | Status: DISCONTINUED | OUTPATIENT
Start: 2023-06-08 | End: 2023-06-13

## 2023-06-08 RX ORDER — DOCUSATE SODIUM 100 MG
1 CAPSULE ORAL
Refills: 0 | DISCHARGE

## 2023-06-08 RX ADMIN — Medication 5 MILLIGRAM(S): at 22:10

## 2023-06-08 RX ADMIN — Medication 1 PACKET(S): at 22:15

## 2023-06-08 RX ADMIN — GABAPENTIN 600 MILLIGRAM(S): 400 CAPSULE ORAL at 22:09

## 2023-06-08 RX ADMIN — Medication 250 MILLIGRAM(S): at 11:16

## 2023-06-08 RX ADMIN — SENNA PLUS 2 TABLET(S): 8.6 TABLET ORAL at 22:10

## 2023-06-08 RX ADMIN — SODIUM CHLORIDE 500 MILLILITER(S): 9 INJECTION INTRAMUSCULAR; INTRAVENOUS; SUBCUTANEOUS at 12:00

## 2023-06-08 RX ADMIN — SODIUM CHLORIDE 500 MILLILITER(S): 9 INJECTION INTRAMUSCULAR; INTRAVENOUS; SUBCUTANEOUS at 09:43

## 2023-06-08 RX ADMIN — SODIUM CHLORIDE 1000 MILLILITER(S): 9 INJECTION, SOLUTION INTRAVENOUS at 13:00

## 2023-06-08 RX ADMIN — MEROPENEM 100 MILLIGRAM(S): 1 INJECTION INTRAVENOUS at 09:44

## 2023-06-08 RX ADMIN — MEROPENEM 100 MILLIGRAM(S): 1 INJECTION INTRAVENOUS at 22:20

## 2023-06-08 RX ADMIN — SODIUM CHLORIDE 500 MILLILITER(S): 9 INJECTION INTRAMUSCULAR; INTRAVENOUS; SUBCUTANEOUS at 13:00

## 2023-06-08 RX ADMIN — SODIUM CHLORIDE 40 MILLILITER(S): 9 INJECTION, SOLUTION INTRAVENOUS at 17:53

## 2023-06-08 RX ADMIN — ATORVASTATIN CALCIUM 20 MILLIGRAM(S): 80 TABLET, FILM COATED ORAL at 22:10

## 2023-06-08 RX ADMIN — Medication 25 GRAM(S): at 22:22

## 2023-06-08 RX ADMIN — Medication 6.63 MICROGRAM(S)/KG/MIN: at 12:40

## 2023-06-08 RX ADMIN — Medication 400 MILLIGRAM(S): at 09:43

## 2023-06-08 RX ADMIN — Medication 2 DROP(S): at 22:21

## 2023-06-08 RX ADMIN — LIDOCAINE 1 PATCH: 4 CREAM TOPICAL at 20:35

## 2023-06-08 NOTE — ED PROVIDER NOTE - CARE PLAN
Principal Discharge DX:	Urinary tract infection   1 Principal Discharge DX:	Urinary tract infection  Secondary Diagnosis:	Transient hypotension

## 2023-06-08 NOTE — ED PROVIDER NOTE - PROGRESS NOTE DETAILS
Patient was admitted to the hospitalist, but her blood pressure has been decreasing despite gentle IV fluid hydration with 1 L of normal saline.  Patient has a history of heart failure with preserved ejection fraction.  We will start patient on Levophed drip and ICU has been consulted to evaluate the patient at bedside.  Mahin Chacon DO

## 2023-06-08 NOTE — PHARMACOTHERAPY INTERVENTION NOTE - COMMENTS
Medication history complete, reviewed medication with patients daughter Pippa at [phone] and confirmed with DrFirst.

## 2023-06-08 NOTE — ED PROVIDER NOTE - CLINICAL SUMMARY MEDICAL DECISION MAKING FREE TEXT BOX
Patient's presentation is most consistent with catheter associated UTI, possible acute on chronic CHF exacerbation. Patient is tachycardic but not hypotensive. Will perform septic workup, administer broad spectrum antibiotics given recent admission, and cautious IVF hydration given history of CHF. 89 y/o female with PMHx of anemia, on chronic diastolic CHF, CKD, chronic UTI, osteoarthritis, gout, HLD, HTN, recent admission in May for acute metabolic encephalopathy UTI, chronic Perales, treated with IV Zosyn and discharged 5/30 on Macrobid. Patient's presentation is most consistent with catheter associated UTI, possible acute on chronic CHF exacerbation. Patient is tachycardic but not hypotensive. Will perform septic workup, administer broad spectrum antibiotics given recent admission, and cautious IVF hydration given history of CHF.

## 2023-06-08 NOTE — ED PROVIDER NOTE - NSICDXPASTMEDICALHX_GEN_ALL_CORE_FT
PAST MEDICAL HISTORY:  Gout     HLD (hyperlipidemia)     HTN (hypertension)     Osteoarthritis       Acute on chronic diastolic congestive heart failure

## 2023-06-08 NOTE — ED ADULT NURSE NOTE - ALCOHOL PRE SCREEN (AUDIT - C)
Telephone Encounter by Shahana Huff RN at 11/20/18 05:03 PM     Author:  Shahana Huff RN Service:  (none) Author Type:  Registered Nurse     Filed:  11/20/18 05:03 PM Encounter Date:  11/20/2018 Status:  Signed     :  Shahana Huff RN (Registered Nurse)            Left message on answering machine to call back.[EV1.1T]       Revision History        User Key Date/Time User Provider Type Action    > EV1.1 11/20/18 05:03 PM Shahana Huff RN Registered Nurse Sign    T - Template             Statement Selected

## 2023-06-08 NOTE — H&P ADULT - NSICDXPASTMEDICALHX_GEN_ALL_CORE_FT
Next appt none, due back around 05/01/2022  Last appt 11/01/2021    Refill request for   Disp Refills Start End    HYDROcodone-acetaminophen (Norco) 5-325 MG per tablet 30 tablet 0 4/24/2022     Sig - Route: Take 1-2 tablets by mouth every 6 hours as needed for Pain. - Oral      NON PROTOCOL MEDICATION    No future appointment    Routed to providers pool to review, thanks.      PAST MEDICAL HISTORY:  Gout     HLD (hyperlipidemia)     HTN (hypertension)     Osteoarthritis       Acute on chronic diastolic congestive heart failure

## 2023-06-08 NOTE — ED PROVIDER NOTE - PHYSICAL EXAMINATION
CONSTITUTIONAL: Well appearing, awake, alert, oriented to person, place, time/situation and in no apparent distress.  · ENMT: Airway patent, Nasal mucosa clear. Mouth with dry mucosa. Throat has no vesicles, no oropharyngeal exudates and uvula is midline.  · EYES: Clear bilaterally, pupils equal, round and reactive to light.  · CARDIAC: +Tachycardic. Bilateral non pitting LE edema.  · RESPIRATORY: Breath sounds clear and equal bilaterally.  · GASTROINTESTINAL: Abdomen soft. +Tender in the suprapubic region w/o rebound or guarding.   · MUSCULOSKELETAL: Spine appears normal, range of motion is not limited, no muscle or joint tenderness  · NEUROLOGICAL: Alert and oriented, no focal deficits, no motor or sensory deficits.  · SKIN: Hot to touch but normal color. No evidence of rash

## 2023-06-08 NOTE — ED ADULT NURSE NOTE - NS ED NOTE  TALK SOMEONE YN
Called patient and asked to wear a mask to the upcoming appointment. Informed patient of our no visitor policy, if visitor is approved I will document in appointment notes. Told patient to come to main clinic entrance and ask to please come 5 minutes early to get though screening process. Ask COVID screening questions.   Patient demonstrates understanding.      No

## 2023-06-08 NOTE — ED PROVIDER NOTE - NS ED ROS FT
CONSTITUTIONAL: No fevers, no chills  Eyes: no visual changes  Ears: no ear drainage, no ear pain  Nose: no nasal congestion  Mouth/Throat: no sore throat  Cardiovascular: No Chest pain  Respiratory: +SOB  Gastrointestinal: No n/v/d, +abd cramping  Genitourinary: no dysuria, no hematuria  SKIN: no rashes.  NEURO: no headache  PSYCHIATRIC: no known mental health issues.

## 2023-06-08 NOTE — H&P ADULT - ASSESSMENT
A:    89yFemale  HD #    Here for:    1.    This patient requires critical care for support of one or more vital organ systems with a high probability of imminent or life threatening deterioration in his/her condition    P:    Neuro: GCS 15. Monitor for delirium.  Continue to optimize pain control. Serial Neurologic assessments.    HEENT: No issues.    CV: Continue hemodynamic monitoring    Pulm: Pulmonary toilet.  Continue incentive spirometer.  Chest PT.  Encourage OOB to chair and ambulation. Nebs. f/u ABG, CXR.    GI/Nutrition: Cont diet, bowel regimen.    /Renal: Monitor UOP. Monitor BMP.  Replete Lytes as needed.    HEME- Chemical and mechanical DVT ppx. f/u CBC. f/u coags as needed.    ID:  Cont abx. f/u Cx's.    Lines/Tubes:     Endo: Maintain euglycemia.    Skin:  Cont skin care, pressure ulcer prevention.    Dispo: Cont critical care.    TOTAL CRITICAL CARE TIME:   (EXCLUSIVE of any non bundled procedures)    Note: This time spent INCLUDES time spent directly as this patient's bedside with evaluation, review of chart including review of laboratory and imaging studies, interpretation of vital signs and cardiac output measurements, any necessary ventilator management, and time spent discussing plan of care with patient and family, including goals of care discussion.   A:    89F  HD # 1  FULL CODE    Here for:    1. Septic shock 2/2  2. UTI, complicated by  3. Severe AS, and concomitant  4. HFpEF  5. Urinary retention  6. CKD  7. Lactemia    This patient requires critical care for support of one or more vital organ systems with a high probability of imminent or life threatening deterioration in his/her condition    P:    Septic shock 2/2 UTI  Refractory to IVF  In setting of severe AS, HFpEF; this makes fluid mgmt ans resuscitation complex    Give 2L IVF bolus  Sepsis bundle  TTE reviewed  POCUS when in ICU  Broad spectrum abx for UTI; klebsiella with some beta lactam R; start merrem 1g IV BID, s/p vancomycin  Consult ID, Cards,   Exchange pérez  Home med recs  Start diet  f/u labs, replete lytes PRN  BG < 180  No s/s active bleeding    Dispo: Admit to critical care.    TOTAL CRITICAL CARE TIME: 115 minutes   (EXCLUSIVE of any non bundled procedures)    Note: This time spent INCLUDES time spent directly as this patient's bedside with evaluation, review of chart including review of laboratory and imaging studies, interpretation of vital signs and cardiac output measurements, any necessary ventilator management, and time spent discussing plan of care with patient and family, including goals of care discussion.

## 2023-06-08 NOTE — H&P ADULT - NSHPPHYSICALEXAM_GEN_ALL_CORE
O:    Elderly F lying in bed  No JVD trachea midline  S1S2+  CTA B/L  Abd soft NTND  No leg swelling/edema noted  Sleeping but easily awakened, alert and conversive  Skin pink, warm

## 2023-06-08 NOTE — ED PROVIDER NOTE - OBJECTIVE STATEMENT
89 y/o female with PMHx of anemia, on chronic diastolic CHF, CKD, chronic UTI, osteoarthritis, gout, HLD, HTN, recent admission in May for acute metabolic encephalopathy UTI, chronic Perales, treated with IV Zosyn and discharged 5/30 on Macrobid presents to the ED c/o continued bladder spasms, SOB, and febrile in triage. Having rigors at home; daughter described that patient started shaking his morning. Patient c/o cramping intermittent in lower abdomen that feels like spasms.

## 2023-06-08 NOTE — H&P ADULT - NSHPLABSRESULTS_GEN_ALL_CORE
LABS:    CBC Full  -  ( 2023 09:03 )  WBC Count : 12.91 K/uL  RBC Count : 3.39 M/uL  Hemoglobin : 10.4 g/dL  Hematocrit : 32.2 %  Platelet Count - Automated : 145 K/uL  Mean Cell Volume : 95.0 fl  Mean Cell Hemoglobin : 30.7 pg  Mean Cell Hemoglobin Concentration : 32.3 gm/dL  Auto Neutrophil # : 11.89 K/uL  Auto Lymphocyte # : 0.44 K/uL  Auto Monocyte # : 0.40 K/uL  Auto Eosinophil # : 0.02 K/uL  Auto Basophil # : 0.01 K/uL  Auto Neutrophil % : 92.0 %  Auto Lymphocyte % : 3.4 %  Auto Monocyte % : 3.1 %  Auto Eosinophil % : 0.2 %  Auto Basophil % : 0.1 %    -08    139  |  110<H>  |  22  ----------------------------<  129<H>  3.6   |  22  |  1.67<H>    Ca    9.1      2023 09:03  Mg     1.8     06-08    TPro  7.3  /  Alb  3.5  /  TBili  0.6  /  DBili  x   /  AST  15  /  ALT  15  /  AlkPhos  72  06-08      Urinalysis Basic - ( 2023 09:04 )    Color: Yellow / Appearance: Clear / S.010 / pH: x  Gluc: x / Ketone: Negative  / Bili: Negative / Urobili: Negative   Blood: x / Protein: 100 / Nitrite: Negative   Leuk Esterase: Moderate / RBC: Negative /HPF / WBC >50 /HPF   Sq Epi: x / Non Sq Epi: x / Bacteria: Few      CAPILLARY BLOOD GLUCOSE            LIVER FUNCTIONS - ( 2023 09:03 )  Alb: 3.5 g/dL / Pro: 7.3 gm/dL / ALK PHOS: 72 U/L / ALT: 15 U/L / AST: 15 U/L / GGT: x

## 2023-06-08 NOTE — PATIENT PROFILE ADULT - PATIENT REPRESENTATIVE: ( YOU CAN CHOOSE ANY PERSON THAT CAN ASSIST YOU WITH YOUR HEALTH CARE PREFERENCES, DOES NOT HAVE TO BE A SPOUSE, IMMEDIATE FAMILY OR SIGNIFICANT OTHER/PARTNER)
02/08/23 2058   Robert Scale   Sensory Perceptions 3   Moisture 2   Activity 1   Mobility 3   Nutrition 2   Friction and Shear 2   Robert Scale Score 13   Care Plan - Skin/Tissue Integrity Goals   Skin Integrity Remains Intact Monitor for areas of redness and/or skin breakdown;Assess vascular access sites hourly; Every 4-6 hours minimum: Change oxygen saturation probe site; Every 4-6 hours: If on nasal continuous positive airway pressure, respiratory therapy assesses nares and determine need for appliance change or resting period declines

## 2023-06-08 NOTE — PATIENT PROFILE ADULT - FUNCTIONAL ASSESSMENT - BASIC MOBILITY ASSESSMENT TYPE
What Is The Reason For Today's Visit?: History of Melanoma Year Excised?: 2019 Year Excised?: 2002 Admission 1

## 2023-06-08 NOTE — ED ADULT TRIAGE NOTE - CHIEF COMPLAINT QUOTE
Pt BIBEMS from home, c/o SOB since this AM.  As per EMTs, pt has abd pain, vomiting as well.  Pt denies CP.  No COPD hx.  PMH anemia, CKD, chronic UTIs.  Perales is placed at this time.  -allergies

## 2023-06-08 NOTE — H&P ADULT - HISTORY OF PRESENT ILLNESS
HPI:    S:    Pt seen and examined  HD #1  89F with   PMHx of anemia, on chronic diastolic CHF, CKD, chronic UTI, osteoarthritis, gout, HLD, HTN, recent admission in May for acute metabolic encephalopathy UTI, chronic Perales, treated with IV Zosyn and discharged 5/30 on Macrobid presents to the ED c/o continued bladder spasms, SOB, and febrile in triage. Having rigors at home; daughter described that patient started shaking his morning. Patient c/o cramping intermittent in lower abdomen that feels like spasms.    6/8 : Ov levophed briefly, now off, SBP > 140. Awake and alert.    ROS: +fever, malaise    Remainder of systems reviewed, negative

## 2023-06-08 NOTE — ED ADULT NURSE REASSESSMENT NOTE - NS ED NURSE REASSESS COMMENT FT1
pt became hypotensive, received an order for fluids.  Pt did not respond from fluids, received an order for levophed, started the med at 0.05mcg/kg/min, pt's bp improved immediately after starting the infusion.  ICU PA ordered to give more fluids, received an order to stop the infusion, and initiated more fluids.  pt alert, oriented, lethargic.  dtr at the bedside.

## 2023-06-08 NOTE — ED ADULT NURSE NOTE - CHIEF COMPLAINT
The patient is a 89y Female complaining of shortness of breath. Diverticulitis of large intestine with abscess, unspecified bleeding status

## 2023-06-08 NOTE — PATIENT PROFILE ADULT - FALL HARM RISK - HARM RISK INTERVENTIONS

## 2023-06-08 NOTE — CHART NOTE - NSCHARTNOTEFT_GEN_A_CORE
pt seen and examined including chart review. Was notified by ED (Dr Chaney) that patient did not respond to ivf resuscitation and now on levophed awaiting recc from icu. Will hold off on MS admission until further notice.

## 2023-06-09 DIAGNOSIS — I45.2 BIFASCICULAR BLOCK: ICD-10-CM

## 2023-06-09 DIAGNOSIS — I24.8 OTHER FORMS OF ACUTE ISCHEMIC HEART DISEASE: ICD-10-CM

## 2023-06-09 DIAGNOSIS — I35.0 NONRHEUMATIC AORTIC (VALVE) STENOSIS: ICD-10-CM

## 2023-06-09 DIAGNOSIS — A41.9 SEPSIS, UNSPECIFIED ORGANISM: ICD-10-CM

## 2023-06-09 LAB
ALBUMIN SERPL ELPH-MCNC: 2.4 G/DL — LOW (ref 3.3–5)
ALP SERPL-CCNC: 55 U/L — SIGNIFICANT CHANGE UP (ref 40–120)
ALT FLD-CCNC: 13 U/L — SIGNIFICANT CHANGE UP (ref 12–78)
ANION GAP SERPL CALC-SCNC: 5 MMOL/L — SIGNIFICANT CHANGE UP (ref 5–17)
AST SERPL-CCNC: 12 U/L — LOW (ref 15–37)
BASOPHILS # BLD AUTO: 0.02 K/UL — SIGNIFICANT CHANGE UP (ref 0–0.2)
BASOPHILS NFR BLD AUTO: 0.1 % — SIGNIFICANT CHANGE UP (ref 0–2)
BILIRUB SERPL-MCNC: 0.6 MG/DL — SIGNIFICANT CHANGE UP (ref 0.2–1.2)
BUN SERPL-MCNC: 24 MG/DL — HIGH (ref 7–23)
CALCIUM SERPL-MCNC: 8.1 MG/DL — LOW (ref 8.5–10.1)
CHLORIDE SERPL-SCNC: 114 MMOL/L — HIGH (ref 96–108)
CO2 SERPL-SCNC: 21 MMOL/L — LOW (ref 22–31)
CREAT SERPL-MCNC: 1.45 MG/DL — HIGH (ref 0.5–1.3)
EGFR: 33 ML/MIN/1.73M2 — LOW
EOSINOPHIL # BLD AUTO: 0.14 K/UL — SIGNIFICANT CHANGE UP (ref 0–0.5)
EOSINOPHIL NFR BLD AUTO: 0.7 % — SIGNIFICANT CHANGE UP (ref 0–6)
GLUCOSE SERPL-MCNC: 110 MG/DL — HIGH (ref 70–99)
HCT VFR BLD CALC: 26.8 % — LOW (ref 34.5–45)
HGB BLD-MCNC: 8.6 G/DL — LOW (ref 11.5–15.5)
IMM GRANULOCYTES NFR BLD AUTO: 1 % — HIGH (ref 0–0.9)
LYMPHOCYTES # BLD AUTO: 1.38 K/UL — SIGNIFICANT CHANGE UP (ref 1–3.3)
LYMPHOCYTES # BLD AUTO: 6.8 % — LOW (ref 13–44)
MAGNESIUM SERPL-MCNC: 2.6 MG/DL — SIGNIFICANT CHANGE UP (ref 1.6–2.6)
MCHC RBC-ENTMCNC: 30.7 PG — SIGNIFICANT CHANGE UP (ref 27–34)
MCHC RBC-ENTMCNC: 32.1 GM/DL — SIGNIFICANT CHANGE UP (ref 32–36)
MCV RBC AUTO: 95.7 FL — SIGNIFICANT CHANGE UP (ref 80–100)
MONOCYTES # BLD AUTO: 1.1 K/UL — HIGH (ref 0–0.9)
MONOCYTES NFR BLD AUTO: 5.4 % — SIGNIFICANT CHANGE UP (ref 2–14)
NEUTROPHILS # BLD AUTO: 17.44 K/UL — HIGH (ref 1.8–7.4)
NEUTROPHILS NFR BLD AUTO: 86 % — HIGH (ref 43–77)
PHOSPHATE SERPL-MCNC: 3.3 MG/DL — SIGNIFICANT CHANGE UP (ref 2.5–4.5)
PLATELET # BLD AUTO: 130 K/UL — LOW (ref 150–400)
POTASSIUM SERPL-MCNC: 4.2 MMOL/L — SIGNIFICANT CHANGE UP (ref 3.5–5.3)
POTASSIUM SERPL-SCNC: 4.2 MMOL/L — SIGNIFICANT CHANGE UP (ref 3.5–5.3)
PROCALCITONIN SERPL-MCNC: 3.53 NG/ML — HIGH (ref 0.02–0.1)
PROT SERPL-MCNC: 5.7 GM/DL — LOW (ref 6–8.3)
RBC # BLD: 2.8 M/UL — LOW (ref 3.8–5.2)
RBC # FLD: 17.2 % — HIGH (ref 10.3–14.5)
SODIUM SERPL-SCNC: 140 MMOL/L — SIGNIFICANT CHANGE UP (ref 135–145)
WBC # BLD: 20.28 K/UL — HIGH (ref 3.8–10.5)
WBC # FLD AUTO: 20.28 K/UL — HIGH (ref 3.8–10.5)

## 2023-06-09 PROCEDURE — 99497 ADVNCD CARE PLAN 30 MIN: CPT

## 2023-06-09 PROCEDURE — 93308 TTE F-UP OR LMTD: CPT | Mod: 26

## 2023-06-09 PROCEDURE — 99233 SBSQ HOSP IP/OBS HIGH 50: CPT

## 2023-06-09 RX ORDER — LANOLIN ALCOHOL/MO/W.PET/CERES
5 CREAM (GRAM) TOPICAL AT BEDTIME
Refills: 0 | Status: DISCONTINUED | OUTPATIENT
Start: 2023-06-09 | End: 2023-06-13

## 2023-06-09 RX ORDER — GABAPENTIN 400 MG/1
300 CAPSULE ORAL
Refills: 0 | Status: DISCONTINUED | OUTPATIENT
Start: 2023-06-09 | End: 2023-06-13

## 2023-06-09 RX ORDER — CHLORHEXIDINE GLUCONATE 213 G/1000ML
1 SOLUTION TOPICAL
Refills: 0 | Status: DISCONTINUED | OUTPATIENT
Start: 2023-06-09 | End: 2023-06-13

## 2023-06-09 RX ORDER — MIDODRINE HYDROCHLORIDE 2.5 MG/1
5 TABLET ORAL EVERY 8 HOURS
Refills: 0 | Status: DISCONTINUED | OUTPATIENT
Start: 2023-06-09 | End: 2023-06-10

## 2023-06-09 RX ORDER — HEPARIN SODIUM 5000 [USP'U]/ML
5000 INJECTION INTRAVENOUS; SUBCUTANEOUS EVERY 8 HOURS
Refills: 0 | Status: DISCONTINUED | OUTPATIENT
Start: 2023-06-09 | End: 2023-06-13

## 2023-06-09 RX ADMIN — Medication 2 DROP(S): at 22:18

## 2023-06-09 RX ADMIN — HEPARIN SODIUM 5000 UNIT(S): 5000 INJECTION INTRAVENOUS; SUBCUTANEOUS at 22:17

## 2023-06-09 RX ADMIN — CHLORHEXIDINE GLUCONATE 1 APPLICATION(S): 213 SOLUTION TOPICAL at 09:56

## 2023-06-09 RX ADMIN — LIDOCAINE 1 PATCH: 4 CREAM TOPICAL at 10:01

## 2023-06-09 RX ADMIN — LIDOCAINE 1 PATCH: 4 CREAM TOPICAL at 18:55

## 2023-06-09 RX ADMIN — GABAPENTIN 300 MILLIGRAM(S): 400 CAPSULE ORAL at 22:17

## 2023-06-09 RX ADMIN — Medication 1000 UNIT(S): at 09:58

## 2023-06-09 RX ADMIN — LORATADINE 10 MILLIGRAM(S): 10 TABLET ORAL at 09:57

## 2023-06-09 RX ADMIN — SENNA PLUS 2 TABLET(S): 8.6 TABLET ORAL at 22:19

## 2023-06-09 RX ADMIN — Medication 5 MILLIGRAM(S): at 22:17

## 2023-06-09 RX ADMIN — LIDOCAINE 1 PATCH: 4 CREAM TOPICAL at 10:00

## 2023-06-09 RX ADMIN — MEROPENEM 100 MILLIGRAM(S): 1 INJECTION INTRAVENOUS at 22:16

## 2023-06-09 RX ADMIN — ATORVASTATIN CALCIUM 20 MILLIGRAM(S): 80 TABLET, FILM COATED ORAL at 22:18

## 2023-06-09 RX ADMIN — MEROPENEM 100 MILLIGRAM(S): 1 INJECTION INTRAVENOUS at 09:59

## 2023-06-09 RX ADMIN — PANTOPRAZOLE SODIUM 40 MILLIGRAM(S): 20 TABLET, DELAYED RELEASE ORAL at 09:58

## 2023-06-09 RX ADMIN — GABAPENTIN 600 MILLIGRAM(S): 400 CAPSULE ORAL at 09:57

## 2023-06-09 RX ADMIN — Medication 1 MILLIGRAM(S): at 09:58

## 2023-06-09 RX ADMIN — Medication 2 DROP(S): at 09:57

## 2023-06-09 RX ADMIN — LIDOCAINE 1 PATCH: 4 CREAM TOPICAL at 19:00

## 2023-06-09 RX ADMIN — Medication 100 MILLIGRAM(S): at 09:57

## 2023-06-09 RX ADMIN — HEPARIN SODIUM 5000 UNIT(S): 5000 INJECTION INTRAVENOUS; SUBCUTANEOUS at 14:55

## 2023-06-09 RX ADMIN — MIDODRINE HYDROCHLORIDE 5 MILLIGRAM(S): 2.5 TABLET ORAL at 14:57

## 2023-06-09 NOTE — DIETITIAN NUTRITION RISK NOTIFICATION - ADDITIONAL COMMENTS/DIETITIAN RECOMMENDATIONS
1) C/w Regular diet  2) Premier protein shake BID to optimize nutritional needs (provides 160 kcal, 30 g protein/ shake)   3) Obtain vitamin D 25OH level to assess nutriture  4) Please obtain daily weights  5) Consider adding thiamine 100 mg daily 2/2 poor PO intake/ malnutrition  6) Recommend to add MVI w/minerals, Vit C 500 mg BID, add Zinc Sulfate 220 mg x 10 days to promote wound healing.   7) consider checking B6, B12, thiamine, folate, carnitine, and copper levels as malnutrition in cause these to be deficient  8) Encourage protein-rich foods, maximize food preferences  9) Monitor bowel movements, if no BM for >3 days, consider implementing bowel regimen.  10) Consider adding appetite stimulant such as Remeron or Marinol 2/2 chronically poor appetite/ PO intake  11) Confirm goals of care regarding nutrition support - Nutrition support is not recommended due to overall declining medical status which evidenced based studies indicate EN is not effective in prolonging survival and improving quality of life. It can also increase risk of aspiration pneumonia as well as other related issues, however will provide nutrition within GOC.  RD will continue to monitor PO intake, labs, hydration, and wt prn.

## 2023-06-09 NOTE — GOALS OF CARE CONVERSATION - ADVANCED CARE PLANNING - CONVERSATION DETAILS
Gave an update on current situation that she is off pressor, able to get OOB and feels ok     Patient is functional at home, able to walk with a walker, mental status is intact. It seems like she has neurogenic bladder and was having trouble emptying her bladder, failed a few TOVs and got Perales placed, she cannot get any cardiac intervention done while the Perales is in place, chance of successful discontinuation of Perales is very low per daughter.     This is her 6th episode of sepsis from UTI, she is not on chronic suppressive therapy.     We discussed CPR, intubation, life support measures etc., currently patient wants all necessary interventions performed, family will talk amongst themselves about DNR when she is more stable

## 2023-06-09 NOTE — CONSULT NOTE ADULT - CONSULT REASON
Hypotension
Sepsis secondary to UTI
sepsis. hypotension  uti
Urosepsis, transient hypotension, elevated troponin/demand ischemia, severe AS

## 2023-06-09 NOTE — CONSULT NOTE ADULT - SUBJECTIVE AND OBJECTIVE BOX
89F with   PMHx of anemia, on chronic diastolic CHF, CKD, chronic UTI, osteoarthritis, gout, HLD, HTN, recent admission in May for acute metabolic encephalopathy UTI, chronic Pérez, treated with IV Zosyn and discharged  on Macrobid presents to the ED c/o continued bladder spasms, SOB, and febrile in triage. Having rigors at home; daughter described that patient started shaking his morning. Patient c/o cramping intermittent in lower abdomen that feels like spasms.    Called to see pt because of urosepsis.  Pt has chronic pérez since February.  Pt started to have fever at home and was delirious and that is when the pt's 2 daughters brought her to the ER.      Patient History:    Past Medical, Past Surgical, and Family History:  PAST MEDICAL HISTORY:  Gout     HLD (hyperlipidemia)     HTN (hypertension)     Osteoarthritis       Acute on chronic diastolic congestive heart failure.     PAST SURGICAL HISTORY:  History of tonsillectomy in childhood.     FAMILY HISTORY:  Father  Still living? Unknown  Family history of hypertension, Age at diagnosis: Age Unknown    Mother  Still living? No  Family history of hypertension, Age at diagnosis: Age Unknown.     Social History:  · Substance use	No           Allergies and Intolerances:        Allergies:  	Ceftin: Drug, Hives, Rash, PATIENT AND DAUGHTER DENY ALLERGY 21    Home Medications:   * Patient Currently Takes Medications as of 2023 12:33 documented in Structured Notes  · 	nitrofurantoin macrocrystals 100 mg oral capsule: Last Dose Taken:  , 1 cap(s) orally 2 times a day x 7 days ***Course Complete***  · 	pantoprazole 40 mg oral delayed release tablet: Last Dose Taken:  , 1 tab(s) orally once a day  · 	senna leaf extract oral tablet: Last Dose Taken:  , 2 tab(s) orally once a day (at bedtime)  · 	Neurontin 300 mg oral capsule: Last Dose Taken:  , 2 cap(s) orally 2 times a day  · 	Myrbetriq 50 mg oral tablet, extended release: Last Dose Taken:  , 1 tab(s) orally once a day  · 	melatonin 5 mg oral tablet: Last Dose Taken:  , 1 tab(s) orally once a day (at bedtime) as needed for sleep  · 	Allegra 180 mg oral tablet: Last Dose Taken:  , 1 tab(s) orally once a day as needed for  allergy symptoms  · 	Artificial Tears ophthalmic solution: Last Dose Taken:  , 1 drop(s) in each eye 2 times a day as needed for  dry eyes  · 	Vitamin D3 1000 intl units (25 mcg) oral tablet: Last Dose Taken:  , 1 cap(s) orally once a day  · 	folic acid 1 mg oral tablet: Last Dose Taken:  , 1 tab(s) orally once a day  · 	metoprolol tartrate 25 mg oral tablet: Last Dose Taken:  , 1 tab(s) orally once a day (in the evening)  · 	furosemide 20 mg oral tablet: Last Dose Taken:  , 1 tab(s) orally once a day  · 	allopurinol 100 mg oral tablet: Last Dose Taken:  , 1 tab(s) orally once a day  · 	rosuvastatin 5 mg oral tablet: Last Dose Taken:  , 1 tab(s) orally once a day (at bedtime)  · 	hydrALAZINE 25 mg oral tablet: Last Dose Taken:  , 1 tab(s) orally 2 times a day    .     Tobacco Screening:  · Core Measure Site	Yes  · Has the patient used tobacco in the past 30 days?	No      Review of Systems:  Review of Systems: ROS: +fever, malaise    Remainder of systems reviewed, negative  Other Review of Systems: All other review of systems negative, except as noted in HPI      Physical Exam:   Vital Signs Last 24 Hrs  T(C): 35.8 (2023 07:59), Max: 37.7 (2023 21:16)  T(F): 96.5 (2023 07:59), Max: 99.9 (2023 21:16)  HR: 74 (2023 14:00) (63 - 87)  BP: 120/40 (2023 14:00) (89/36 - 147/50)  BP(mean): 58 (2023 14:00) (46 - 76)  RR: 17 (2023 14:00) (13 - 21)  SpO2: 95% (2023 12:00) (92% - 98%)    Parameters below as of 2023 16:45  Patient On (Oxygen Delivery Method): nasal cannula  O2 Flow (L/min): 2    Head: NC/AT no lesions noted  Neck" Soft/supple  Heart:  RRR, S1S2 normal  Lungs: CTA bilat, no rales, rhonchi or wheezes  Back: No CVA tenderness  : Pérez in place- draining clear yellow urine  Lower Ext: no calf tenderness  Skin: Warm and dry       Labs and Results:  Labs, Radiology, Cardiology, and Other Results: LABS:    CBC Full  -  ( 2023 09:03 )  WBC Count : 12.91 K/uL  RBC Count : 3.39 M/uL  Hemoglobin : 10.4 g/dL  Hematocrit : 32.2 %  Platelet Count - Automated : 145 K/uL  Mean Cell Volume : 95.0 fl  Mean Cell Hemoglobin : 30.7 pg  Mean Cell Hemoglobin Concentration : 32.3 gm/dL  Auto Neutrophil # : 11.89 K/uL  Auto Lymphocyte # : 0.44 K/uL  Auto Monocyte # : 0.40 K/uL  Auto Eosinophil # : 0.02 K/uL  Auto Basophil # : 0.01 K/uL  Auto Neutrophil % : 92.0 %  Auto Lymphocyte % : 3.4 %  Auto Monocyte % : 3.1 %  Auto Eosinophil % : 0.2 %  Auto Basophil % : 0.1 %    06-08    139  |  110<H>  |  22  ----------------------------<  129<H>  3.6   |  22  |  1.67<H>    Ca    9.1      2023 09:03  Mg     1.8     06-08    TPro  7.3  /  Alb  3.5  /  TBili  0.6  /  DBili  x   /  AST  15  /  ALT  15  /  AlkPhos  72  06-08      Urinalysis Basic - ( 2023 09:04 )    Color: Yellow / Appearance: Clear / S.010 / pH: x  Gluc: x / Ketone: Negative  / Bili: Negative / Urobili: Negative   Blood: x / Protein: 100 / Nitrite: Negative   Leuk Esterase: Moderate / RBC: Negative /HPF / WBC >50 /HPF   Sq Epi: x / Non Sq Epi: x / Bacteria: Few      CAPILLARY BLOOD GLUCOSE            LIVER FUNCTIONS - ( 2023 09:03 )  Alb: 3.5 g/dL / Pro: 7.3 gm/dL / ALK PHOS: 72 U/L / ALT: 15 U/L / AST: 15 U/L / GGT: x      ACC: [id number] EXAM:  CT ABDOMEN AND PELVIS   ORDERED BY: TAYLOR SOSA     PROCEDURE DATE:  2023          INTERPRETATION:  CLINICAL INFORMATION: Abdominal pain, fever.    COMPARISON: May 24, 2023.    CONTRAST/COMPLICATIONS:  IV Contrast: NONE90 cc administered   10 cc discarded  Oral Contrast: NONE  Complications: None reported at time of study completion    PROCEDURE:  CT of the Abdomen and Pelvis was performed.  Sagittal and coronal reformats were performed.    FINDINGS:  LOWER CHEST: Again is noted subpleural reticulation at the visualized   lung bases, compatible with mild fibrosis. No honeycombing is identified.   Mild bilateral lower lobe dependent subpleural atelectasis.    LIVER: Within normal limits.  BILE DUCTS: Normal caliber.  GALLBLADDER: Within normal limits.  SPLEEN: Within normal limits.  PANCREAS: Severe atrophy and fatty replacement of the pancreas. No   peripancreatic stranding or fluid collection.  ADRENALS: Within normal limits.  KIDNEYS/URETERS: Small left renal upper pole cysts. Mildly atrophic   kidneys. Otherwise, within normal limits.    BLADDER: Mild concentric wall thickening of the collapsed bladder with   Pérez catheter in place.  REPRODUCTIVE ORGANS: Uterus and adnexa within normal limits.    BOWEL: Moderate stool in the sigmoid colon and rectum. No bowel   obstruction. Appendix is normal.  PERITONEUM: No ascites.  VESSELS: Extensive atherosclerotic calcification of the nonaneurysmal   abdominal aorta and iliac arteries.  RETROPERITONEUM/LYMPH NODES: No lymphadenopathy.  ABDOMINAL WALL: Within normal limits.  BONES: Degenerative disc disease of the lower lumbar spine with trace   anterolisthesis of L4 on L5.    IMPRESSION: Stable mildly thickened bladder, compatible with chronic   cystitis.  
89F with PMHx of anemia, on chronic diastolic CHF, CKD, chronic UTI, osteoarthritis, gout, HLD, HTN, recent admission in May for acute metabolic encephalopathy UTI, chronic Pérez, treated with IV Zosyn and discharged  on Macrobid presents to the ED c/o continued bladder spasms, SOB, and febrile in triage. Having rigors at home; daughter described that patient started shaking 6/8 am. Patient c/o cramping intermittent in lower abdomen that feels like spasms. Was on pressors briefly. UA positive, given IV merrem, pérez changed in ER.    PMH: as above  PSH: as above  Meds: per reconciliation sheet, noted below  MEDICATIONS  (STANDING):  allopurinol 100 milliGRAM(s) Oral daily  artificial  tears Solution 2 Drop(s) Both EYES two times a day  atorvastatin 20 milliGRAM(s) Oral at bedtime  chlorhexidine 4% Liquid 1 Application(s) Topical <User Schedule>  cholecalciferol 1000 Unit(s) Oral daily  folic acid 1 milliGRAM(s) Oral daily  gabapentin 300 milliGRAM(s) Oral two times a day  heparin   Injectable 5000 Unit(s) SubCutaneous every 8 hours  lidocaine   4% Patch 1 Patch Transdermal every 24 hours  meropenem  IVPB 1000 milliGRAM(s) IV Intermittent every 12 hours  midodrine 5 milliGRAM(s) Oral every 8 hours  pantoprazole    Tablet 40 milliGRAM(s) Oral before breakfast  senna 2 Tablet(s) Oral at bedtime      Allergies    Ceftin (Hives; Rash)    Intolerances      Social: no smoking, no alcohol, no illegal drugs; no recent travel, no exposure to TB  FAMILY HISTORY:  Family history of hypertension (Father, Mother)       no history of premature cardiovascular disease in first degree relatives    ROS: +fever, + chills, no HA, no dizziness, no sore throat, no blurry vision, no CP, no palpitations, no SOB, no cough, no abdominal pain, no diarrhea, no N/V, + dysuria, no leg pain, no claudication, no rash, no joint aches, no rectal pain or bleeding, no night sweats    All other systems reviewed and are negative    Vital Signs Last 24 Hrs  T(C): 35.8 (2023 07:59), Max: 37.7 (2023 21:16)  T(F): 96.5 (2023 07:59), Max: 99.9 (2023 21:16)  HR: 72 (2023 11:00) (63 - 96)  BP: 132/42 (2023 11:00) (86/41 - 145/46)  BP(mean): 60 (2023 11:00) (46 - 74)  RR: 13 (2023 11:00) (13 - 21)  SpO2: 94% (2023 06:00) (92% - 100%)    Parameters below as of 2023 16:45  Patient On (Oxygen Delivery Method): nasal cannula  O2 Flow (L/min): 2    Daily     Daily Weight in k.4 (2023 06:35)    PE:  Constitutional: frail looking  HEENT: NC/AT, EOMI, PERRLA, conjunctivae clear; ears and nose atraumatic; pharynx benign  Neck: supple; thyroid not palpable  Back: no tenderness  Respiratory: respiratory effort normal; clear to auscultation  Cardiovascular: S1S2 regular, no murmurs  Abdomen: soft, not tender, not distended, positive BS; liver and spleen WNL  Genitourinary: no suprapubic tenderness  Lymphatic: no LN palpable  Musculoskeletal: no muscle tenderness, no joint swelling or tenderness  Extremities: no pedal edema  Neurological/ Psychiatric: AxOx3, Judgement and insight normal;  moving all extremities  Skin: no rashes; no palpable lesions    Labs: all available labs reviewed                        8.6    20.28 )-----------( 130      ( 2023 06:17 )             26.8     06-09    140  |  114<H>  |  24<H>  ----------------------------<  110<H>  4.2   |  21<L>  |  1.45<H>    Ca    8.1<L>      2023 06:17  Phos  3.3     06-  Mg     2.6     06-09    TPro  5.7<L>  /  Alb  2.4<L>  /  TBili  0.6  /  DBili  x   /  AST  12<L>  /  ALT  13  /  AlkPhos  55  09     LIVER FUNCTIONS - ( 2023 06:17 )  Alb: 2.4 g/dL / Pro: 5.7 gm/dL / ALK PHOS: 55 U/L / ALT: 13 U/L / AST: 12 U/L / GGT: x           Urinalysis Basic - ( 2023 09:04 )    Color: Yellow / Appearance: Clear / S.010 / pH: x  Gluc: x / Ketone: Negative  / Bili: Negative / Urobili: Negative   Blood: x / Protein: 100 / Nitrite: Negative   Leuk Esterase: Moderate / RBC: Negative /HPF / WBC >50 /HPF   Sq Epi: x / Non Sq Epi: x / Bacteria: Few          Radiology: all available radiological tests reviewed  < from: Xray Chest 1 View- PORTABLE-Urgent (23 @ 10:39) >    ACC: [id number] EXAM:  XR CHEST PORTABLE URGENT 1V   ORDERED BY: TAYLOR SOSA     PROCEDURE DATE:  2023          INTERPRETATION:  INDICATION: Chest pain    Portable chest 10:23 AM    COMPARISON: 2023    Overlying EKG leads and wires.    FINDINGS:  Heart/Vascular: The heart size, mediastinum, hilum and aorta are within   normal limits for projection. Calcification aortic knob.  Pulmonary: Midline trachea. There is no focal infiltrate, congestion or   effusion.  Bones: There is no fracture.  Lines and catheter: None    Impression:    Previous CHF has apparently resolved. Correlate clinically.    --- End of Report ---    < end of copied text >  < from: CT Abdomen and Pelvis No Cont (23 @ 10:16) >    ACC: [id number] EXAM:  CT ABDOMEN AND PELVIS   ORDERED BY: TAYLOR SOSA     PROCEDURE DATE:  2023          INTERPRETATION:  CLINICAL INFORMATION: Abdominal pain, fever.    COMPARISON: May 24, 2023.    CONTRAST/COMPLICATIONS:  IV Contrast: NONE90 cc administered   10 cc discarded  Oral Contrast: NONE  Complications: None reported at time of study completion    PROCEDURE:  CT of the Abdomen and Pelvis was performed.  Sagittal and coronal reformats were performed.    FINDINGS:  LOWER CHEST: Again is noted subpleural reticulation at the visualized   lung bases, compatible with mild fibrosis. No honeycombing is identified.   Mild bilateral lower lobe dependent subpleural atelectasis.    LIVER: Within normal limits.  BILE DUCTS: Normal caliber.  GALLBLADDER: Within normal limits.  SPLEEN: Within normal limits.  PANCREAS: Severe atrophy and fatty replacement of the pancreas. No   peripancreatic stranding or fluid collection.  ADRENALS: Within normal limits.  KIDNEYS/URETERS: Small left renal upper pole cysts. Mildly atrophic   kidneys. Otherwise, within normal limits.    BLADDER: Mild concentric wall thickening of the collapsed bladder with   Pérez catheter in place.  REPRODUCTIVE ORGANS: Uterus and adnexa within normal limits.    BOWEL: Moderate stool in the sigmoid colon and rectum. No bowel   obstruction. Appendix is normal.  PERITONEUM: No ascites.  VESSELS: Extensive atherosclerotic calcification of the nonaneurysmal   abdominal aorta and iliac arteries.  RETROPERITONEUM/LYMPH NODES: No lymphadenopathy.  ABDOMINAL WALL: Within normal limits.  BONES: Degenerative disc disease of the lower lumbar spine with trace   anterolisthesis of L4 on L5.    IMPRESSION: Stable mildly thickened bladder, compatible with chronic   cystitis.    --- End of Report ---    < end of copied text >    Advanced directives addressed: full resuscitation
ICU Consult Note    HPI:    S:    Pt seen and examined  HD #1  89F with   PMHx of anemia, on chronic diastolic CHF, CKD, chronic UTI, osteoarthritis, gout, HLD, HTN, recent admission in May for acute metabolic encephalopathy UTI, chronic Perales, treated with IV Zosyn and discharged  on Macrobid presents to the ED c/o continued bladder spasms, SOB, and febrile in triage. Having rigors at home; daughter described that patient started shaking his morning. Patient c/o cramping intermittent in lower abdomen that feels like spasms.     : Ov levophed briefly, now off, SBP > 140. Awake and alert.    ROS: +fever, SOB  Remainder of systems reviewed, negative    Allergies    Ceftin (Hives; Rash)    Intolerances    MEDICATIONS  (STANDING):    norepinephrine Infusion 0.05 MICROgram(s)/kG/Min (6.63 mL/Hr) IV Continuous <Continuous>  sodium chloride 0.9% Bolus 500 milliLiter(s) IV Bolus once    MEDICATIONS  (PRN):    Drug Dosing Weight    Height (cm): 160 (2023 08:13)  Weight (kg): 70.7 (2023 09:57)  BMI (kg/m2): 27.6 (2023 09:57)  BSA (m2): 1.74 (2023 09:57)    PAST MEDICAL & SURGICAL HISTORY:    HTN (hypertension)  HLD (hyperlipidemia)  Gout  Osteoarthritis  History of tonsillectomy  in childhood    FAMILY HISTORY:    Family history of hypertension (Father, Mother)    ROS: See HPI; otherwise, all systems reviewed and negative.    O:    ICU Vital Signs Last 24 Hrs  T(C): 37.2 (2023 12:05), Max: 39.1 (2023 08:56)  T(F): 98.9 (2023 12:05), Max: 102.3 (2023 08:56)  HR: 88 (2023 12:05) (88 - 105)  BP: 93/37 (2023 12:05) (93/37 - 146/54)  BP(mean): 53 (2023 12:05) (53 - 53)  ABP: --  ABP(mean): --  RR: 17 (2023 12:05) (17 - 18)  SpO2: 94% (2023 12:05) (94% - 96%)    O2 Parameters below as of 2023 12:05  Patient On (Oxygen Delivery Method): nasal cannula  O2 Flow (L/min): 2    I&O's Detail    2023 07:01  -  2023 13:02  --------------------------------------------------------  IN:    Sodium Chloride 0.9% Bolus: 1000 mL  Total IN: 1000 mL    OUT:  Total OUT: 0 mL    Total NET: 1000 mL    PE:    Elderly F lying in bed  No JVD trachea midline  S1S2+   No dyspnea noted    LABS:    CBC Full  -  ( 2023 09:03 )  WBC Count : 12.91 K/uL  RBC Count : 3.39 M/uL  Hemoglobin : 10.4 g/dL  Hematocrit : 32.2 %  Platelet Count - Automated : 145 K/uL  Mean Cell Volume : 95.0 fl  Mean Cell Hemoglobin : 30.7 pg  Mean Cell Hemoglobin Concentration : 32.3 gm/dL  Auto Neutrophil # : 11.89 K/uL  Auto Lymphocyte # : 0.44 K/uL  Auto Monocyte # : 0.40 K/uL  Auto Eosinophil # : 0.02 K/uL  Auto Basophil # : 0.01 K/uL  Auto Neutrophil % : 92.0 %  Auto Lymphocyte % : 3.4 %  Auto Monocyte % : 3.1 %  Auto Eosinophil % : 0.2 %  Auto Basophil % : 0.1 %        139  |  110<H>  |  22  ----------------------------<  129<H>  3.6   |  22  |  1.67<H>    Ca    9.1      2023 09:03  Mg     1.8     -08    TPro  7.3  /  Alb  3.5  /  TBili  0.6  /  DBili  x   /  AST  15  /  ALT  15  /  AlkPhos  72  06-08      Urinalysis Basic - ( 2023 09:04 )    Color: Yellow / Appearance: Clear / S.010 / pH: x  Gluc: x / Ketone: Negative  / Bili: Negative / Urobili: Negative   Blood: x / Protein: 100 / Nitrite: Negative   Leuk Esterase: Moderate / RBC: Negative /HPF / WBC >50 /HPF   Sq Epi: x / Non Sq Epi: x / Bacteria: Few      CAPILLARY BLOOD GLUCOSE            LIVER FUNCTIONS - ( 2023 09:03 )  Alb: 3.5 g/dL / Pro: 7.3 gm/dL / ALK PHOS: 72 U/L / ALT: 15 U/L / AST: 15 U/L / GGT: x               
  CHIEF COMPLAINT:  Patient is a 89y old  Female who presents with a chief complaint of Hypotension, sepsis (2023 16:31)      HPI: 23:  89F, well known to me, with PMHx of severe AS, anemia, chronic diastolic CHF (HFpEF), CKD, chronic UTI, osteoarthritis, gout, HLD, HTN, recent admission in May for acute metabolic encephalopathy UTI, chronic Perales, treated with IV Zosyn and discharged  on Macrobid presents to the ED c/o continued bladder spasms, SOB, and febrile in triage. Having rigors at home; daughter described that patient started shaking his morning. Patient c/o cramping intermittent in lower abdomen that feels like spasms.  She had transient hypotension and on levophed briefly, now off, SBP > 140. Awake and alert.  She denies anginal chest pains or increased SOB.  Initial EKG with new RBBB/LAHB with sinus tachycardia but this resolved with HB normal c/w rate related bundles.  Mild bump in her Troponin levels with renal insufficiency/CKD.  No other clinical sign for endocarditis and awaiting cultures.  She is not a candidate for a TAVR at this time until sepsis resolves.  No other new cardiac symptoms or clinical CHF.  Pro-BNP lower than her recent admission.      PMHx:  PAST MEDICAL & SURGICAL HISTORY:  Severe AS  Gout  Osteoarthritis  HTN (hypertension)  HLD (hyperlipidemia)  History of tonsillectomy in childhood      FAMILY HISTORY:   FAMILY HISTORY:  Family history of hypertension (Father, Mother)      ALLERGIES:  Allergies  Ceftin (Hives; Rash)      REVIEW OF SYSTEMS:  10 point ROS was obtained  Pertinent positives and negatives are as above  All other review of systems is negative unless indicated above      Vital Signs Last 24 Hrs  T(C): 37.3 (2023 00:11), Max: 39.1 (2023 08:56)  T(F): 99.1 (2023 00:11), Max: 102.3 (2023 08:56)  HR: 71 (2023 06:00) (63 - 105)  BP: 145/46 (2023 06:00) (86/41 - 146/54)  BP(mean): 73 (2023 06:00) (46 - 74)  RR: 19 (2023 06:00) (13 - 21)  SpO2: 94% (2023 06:00) (92% - 100%): nasal cannula  O2 Flow (L/min): 2      I&O's Summary  2023 07:01  -  2023 06:44  --------------------------------------------------------  IN: 3740 mL / OUT: 150 mL / NET: 3590 mL      PHYSICAL EXAM:   Constitutional: NAD, awake and alert, well-developed  HEENT: PERR, EOMI, Normal Hearing, MMM  Neck: Soft and supple, No LAD, No JVD  Respiratory: Breath sounds are clear bilaterally, No wheezing, rales or rhonchi  Cardiovascular: S1 and S2, regular rate and rhythm, late peaking LIANA at base and soft systolic murmur at LLSB as before, no gallops or rubs  Gastrointestinal: Bowel Sounds present, soft, nontender, nondistended, no guarding, no rebound  Extremities: No peripheral edema  Vascular: 2+ peripheral pulses  Neurological: no focal deficits        MEDICATIONS  (STANDING):  allopurinol 100 milliGRAM(s) Oral daily  artificial  tears Solution 2 Drop(s) Both EYES two times a day  atorvastatin 20 milliGRAM(s) Oral at bedtime  chlorhexidine 4% Liquid 1 Application(s) Topical <User Schedule>  cholecalciferol 1000 Unit(s) Oral daily  folic acid 1 milliGRAM(s) Oral daily  gabapentin 600 milliGRAM(s) Oral two times a day  lactated ringers. 500 milliLiter(s) (40 mL/Hr) IV Continuous <Continuous>  lidocaine   4% Patch 1 Patch Transdermal every 24 hours  loratadine 10 milliGRAM(s) Oral daily  melatonin 5 milliGRAM(s) Oral at bedtime  meropenem  IVPB 1000 milliGRAM(s) IV Intermittent every 12 hours  pantoprazole    Tablet 40 milliGRAM(s) Oral before breakfast  senna 2 Tablet(s) Oral at bedtime    MEDICATIONS  (PRN):  acetaminophen     Tablet .. 650 milliGRAM(s) Oral every 6 hours PRN Temp greater or equal to 38C (100.4F)      LABS: All Labs Reviewed:                        10.4   12.91 )-----------( 145      ( 2023 09:03 )             32.2     06-08    139  |  112<H>  |  22  ----------------------------<  130<H>  4.1   |  22  |  1.54<H>    Ca    7.9<L>      2023 21:24  Phos  2.5     06-08  Mg     1.6     06-08    TPro  7.3  /  Alb  3.5  /  TBili  0.6  /  DBili  x   /  AST  15  /  ALT  15  /  AlkPhos  72  06-08    Troponin I, High Sensitivity (23 @ 21:24): 258.79  Troponin I, High Sensitivity (23 @ 11:40): 79.99  Troponin I, High Sensitivity (23 @ 09:03): 22.87    BLOOD CULTURES: pending    LIPID PROFILE     RADIOLOGY:   CXR: 23:  FINDINGS:  Heart/Vascular: The heart size, mediastinum, hilum and aorta are within normal limits for projection. Calcification aortic knob.  Pulmonary: Midline trachea. There is no focal infiltrate, congestion or effusion.  Bones: There is no fracture.  Lines and catheter: None  Impression:  Previous CHF has apparently resolved. Correlate clinically.    CT of Chest & Pelvis: 23:  FINDINGS:  LOWER CHEST: Again is noted subpleural reticulation at the visualized lung bases, compatible with mild fibrosis. No honeycombing is identified. Mild bilateral lower lobe dependent subpleural atelectasis.  LIVER: Within normal limits.  BILE DUCTS: Normal caliber.  GALLBLADDER: Within normal limits.  SPLEEN: Within normal limits.  PANCREAS: Severe atrophy and fatty replacement of the pancreas. No peripancreatic stranding or fluid collection.  ADRENALS: Within normal limits.  KIDNEYS/URETERS: Small left renal upper pole cysts. Mildly atrophic kidneys. Otherwise, within normal limits.  BLADDER: Mild concentric wall thickening of the collapsed bladder with Perales catheter in place.  REPRODUCTIVE ORGANS: Uterus and adnexa within normal limits.  BOWEL: Moderate stool in the sigmoid colon and rectum. No bowel obstruction. Appendix is normal.  PERITONEUM: No ascites.  VESSELS: Extensive atherosclerotic calcification of the nonaneurysmal abdominal aorta and iliac arteries.  RETROPERITONEUM/LYMPH NODES: No lymphadenopathy.  ABDOMINAL WALL: Within normal limits.  BONES: Degenerative disc disease of the lower lumbar spine with trace anterolisthesis of L4 on L5.  IMPRESSION: Stable mildly thickened bladder, compatible with chronic cystitis.       EK23:  Sinus tachycardia  Possible Left atrial enlargement  Right bundle branch block  Left anterior fascicular block  *** Bifascicular block ***  Left ventricular hypertrophy    TELEMETRY:  NSR    ECHO:  3/1/23:  M-Mode Measurements (cm)   LVEDd: 3.97 cm            LVESd: 2.55 cm   IVSEd: 1.1 cm   LVPWd: 1.1 cm             AO Root Dimension: 2.8 cm                             LA: 3.5 cm                LVOT: 2 cm  Doppler Measurements:   AV Velocity:434 cm/s                  MV Peak E-Wave: 98.7 cm/s   AV Peak Gradient: 75.34 mmHg          MV Peak A-Wave: 131 cm/s   AV Mean Gradient: 40 mmHg             MV E/A Ratio: 0.75 %   AV Area (Continuity):0.85 cm^2        MV Peak Gradient: 3.9 mmHg   TR Velocity:190 cm/s   TR Gradient:14.44 mmHg   Estimated RAP:5 mmHg   RVSP:27 mmHg    Findings  Mitral Valve   The mitral valve leaflets appear thickened.   EA reversal of the mitral inflow consistent with reduced compliance of the left ventricle.   Mild mitral annular calcification is present.   Mild mitral regurgitation is present.    Aortic Valve   Peak and mean transaortic gradients are 75 and 40mmHg respectively; this finding is consistent with severe aortic stenosis.   Mild (1+) aortic regurgitation is present.    Tricuspid Valve   Trace tricuspid valve regurgitation is present.    Pulmonic Valve   Mild pulmonic valvular regurgitation (1+) is present.    Left Atrium   Normal appearing left atrium.    Left Ventricle   Mild concentric left ventricular hypertrophy is present.   The left ventricle is normal in size.   Estimated left ventricular ejection fraction is 65-70 %.    Right Atrium   Normal appearing right atrium.    Right Ventricle   Normal appearing right ventricle structure and function.    Pericardial Effusion   No evidence of pericardial effusion.    Pleural Effusion   No evidence of pleural effusion.    Miscellaneous   The IVC appears normal.    Summary   The mitral valve leaflets appear thickened.   EA reversal of the mitral inflow consistent with reduced compliance of the left ventricle.   Mild mitral annular calcification is present.   Mild mitral regurgitation is present.   Peak and mean transaortic gradients are 75 and 40mmHg respectively; this finding is consistent with severe aortic stenosis.   Mild (1+) aortic regurgitation is present.   Trace tricuspid valve regurgitation is present.   Mild pulmonic valvular regurgitation (1+) is present.   Normal appearing left atrium.   Mild concentric left ventricular hypertrophy is present.   The left ventricle is normal in size.   Estimated left ventricular ejection fraction is 65-70 %.   Normal appearing right atrium.   Normal appearing right ventricle structure and function.   The IVC appears normal.   No evidence of pericardial effusion.   No evidence of pleural effusion.    Signature   ----------------------------------------------------------------   Electronically signed by Sakina Cheatham MD, Director of   Cardiac Cath Lab(Interpreting physician) on 2023 06:42   PM   ----------------------------------------------------------------

## 2023-06-09 NOTE — PROGRESS NOTE ADULT - SUBJECTIVE AND OBJECTIVE BOX
Patient is a 89y old  Female who presents with a chief complaint of Hypotension, sepsis (2023 06:43)    24 hour events:     Allergies    Ceftin (Hives; Rash)    Intolerances      REVIEW OF SYSTEMS: SEE BELOW       ICU Vital Signs Last 24 Hrs  T(C): 35.8 (2023 07:59), Max: 37.9 (2023 10:50)  T(F): 96.5 (2023 07:59), Max: 100.2 (2023 10:50)  HR: 71 (2023 06:00) (63 - 96)  BP: 145/46 (2023 06:00) (86/41 - 145/46)  BP(mean): 73 (2023 06:00) (46 - 74)  ABP: --  ABP(mean): --  RR: 19 (2023 06:00) (13 - 21)  SpO2: 94% (2023 06:00) (92% - 100%)    O2 Parameters below as of 2023 16:45  Patient On (Oxygen Delivery Method): nasal cannula  O2 Flow (L/min): 2          CAPILLARY BLOOD GLUCOSE          I&O's Summary    2023 07:01  -  2023 07:00  --------------------------------------------------------  IN: 3740 mL / OUT: 800 mL / NET: 2940 mL            MEDICATIONS  (STANDING):  allopurinol 100 milliGRAM(s) Oral daily  artificial  tears Solution 2 Drop(s) Both EYES two times a day  atorvastatin 20 milliGRAM(s) Oral at bedtime  chlorhexidine 4% Liquid 1 Application(s) Topical <User Schedule>  cholecalciferol 1000 Unit(s) Oral daily  folic acid 1 milliGRAM(s) Oral daily  gabapentin 600 milliGRAM(s) Oral two times a day  lactated ringers. 500 milliLiter(s) (40 mL/Hr) IV Continuous <Continuous>  lidocaine   4% Patch 1 Patch Transdermal every 24 hours  loratadine 10 milliGRAM(s) Oral daily  melatonin 5 milliGRAM(s) Oral at bedtime  meropenem  IVPB 1000 milliGRAM(s) IV Intermittent every 12 hours  pantoprazole    Tablet 40 milliGRAM(s) Oral before breakfast  senna 2 Tablet(s) Oral at bedtime      MEDICATIONS  (PRN):  acetaminophen     Tablet .. 650 milliGRAM(s) Oral every 6 hours PRN Temp greater or equal to 38C (100.4F)      PHYSICAL EXAM: SEE BELOW                          8.6    20.28 )-----------( 130      ( 2023 06:17 )             26.8       0609    140  |  114<H>  |  24<H>  ----------------------------<  110<H>  4.2   |  21<L>  |  1.45<H>    Ca    8.1<L>      2023 06:17  Phos  3.3       Mg     2.6         TPro  5.7<L>  /  Alb  2.4<L>  /  TBili  0.6  /  DBili  x   /  AST  12<L>  /  ALT  13  /  AlkPhos  55  06-    Lactate 2.0           08 @ 11:40            Urinalysis Basic - ( 2023 09:04 )    Color: Yellow / Appearance: Clear / S.010 / pH: x  Gluc: x / Ketone: Negative  / Bili: Negative / Urobili: Negative   Blood: x / Protein: 100 / Nitrite: Negative   Leuk Esterase: Moderate / RBC: Negative /HPF / WBC >50 /HPF   Sq Epi: x / Non Sq Epi: x / Bacteria: Few          Rapid RVP Result: Raine ( @ 09:03)

## 2023-06-09 NOTE — CONSULT NOTE ADULT - ASSESSMENT
6/8/23:  Pt with above history with chronic UTI and indwelling Perales catheter.  Will await cultures.  Clinically better.  Slight rise in Troponin levels related to tachycardia and likely demand ischemia with renal insufficiency and reduced renal clearance of Troponin.  No cardiac cath at this time.  She is not a candidate for AVR/TAVR until sepsis resolves and would not do it until she no longer needs the Perales as she will likely have sepsis again and with an AVR, this would be grave.  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  Continue as outlined by medicine and ID etal.  Dr Garcia will be covering me over the weekend if needed.
A:    89F  HD # 1    Here for:    1. Sepsis 2/2  2. UTI  3. Severe AS    P/recommendations:    Hypotension, suspect 2/2 sepsis  Severe AS; volume mgmt can be challenging, but requires adequate fluids in setting of sepsis  HFpEF; some diastolic dysfxn 2/2 AS    - Pressors off  - Give add'l 1L of fluids to make 30cc/kg IVF in sepsis  - Broad spectrum abx, Cx's    If BP responds to IVF, may go to floor  If Pt remains hypotensive despite adequate IVF, call back ICU for reevaluation    Thanks.     Dispo: Admit to medicine, unless fluid nonresponder then may require vasopressors and ICU admit.   
89F with PMHx of anemia, on chronic diastolic CHF, CKD, chronic UTI, osteoarthritis, gout, HLD, HTN, recent admission in May for acute metabolic encephalopathy UTI, chronic Pérez, treated with IV Zosyn and discharged 5/30 on Macrobid presents to the ED c/o continued bladder spasms, SOB, and febrile in triage. Having rigors at home; daughter described that patient started shaking 6/8 am. Patient c/o cramping intermittent in lower abdomen that feels like spasms. Was on pressors briefly. UA positive, given IV merrem, pérez changed in ER.     1. Septic shock. Pyuria. UTI. Urinary retention with chronic pérez. CKD  - imaging reviewed, shock resolved, off pressors, improving   - f/u urine cx blood cx  - agree with merrem for now 1oba29l  - continue with abx coverage  - pérez care  - monitor temps  - tolerating abx well so far; no side effects noted  - reason for abx use and side effects reviewed with patient  - supportive care  - fu cbc    2. other issues - care per medicine 
A/P:  89 y/o female with sepsis secondary to UTI    Meropenem as per ID  Ck Urine and Blood Cultures and treat when back  Continue pérez  Above discussed with Dr. Collins

## 2023-06-09 NOTE — DIETITIAN INITIAL EVALUATION ADULT - ETIOLOGY
r/t decreased ability to meet increased nutrient needs 2/2 severe sepsis 2/2 UTI and acute encephalopathy

## 2023-06-09 NOTE — DIETITIAN INITIAL EVALUATION ADULT - OTHER INFO
89 y/o F with a PMHx of anemia, on chronic diastolic CHF, CKD, chronic UTI, osteoarthritis, gout, HLD, HTN, recent admission in May for acute metabolic encephalopathy UTI, chronic Perales, treated with IV Zosyn and discharged 5/30 on Macrobid presented to the ED c/o continued bladder spasms, SOB, and febrile in triage. Having rigors at home; daughter described that patient started shaking his morning. Patient c/o cramping intermittent in lower abdomen that feels like spasms. Admitted for septic shock 2/2 UTI, prerenal ANDREW, and acute metabolic encephalopathy ; tx to CCU for pressor support. No MOLST noted in chart.    Unable to obtain meaningful information 2/2 pt sleeping at time of visit and unarousable. Breakfast tray observed at bedside - 0% of tray consumed. RD obtained bedscale wt on 6/9 - 153#. Weight hx reviewed: 155# (taken by RD on 4/8/23; met criteria for severe malnutrition), 159# (taken by RD on 2/27/23; met criteria for moderate malnutrition); unintentional weight loss of 4 (2.5%) x 3 mon - not clinsig. Pt thin, frail appearing. NFPE reveals moderate to severe muscle/ fat wasting, pt continues to meet criteria for PCM at this time. Will trial Premier Protein BID in effort to optimize po intake. C/w Regular diet. Nutrition support is not recommended due to overall declining medical status which evidenced based studies indicate EN is not effective in prolonging survival and improving quality of life. It can also increase risk of aspiration pneumonia as well as other related issues, however will provide nutrition within GOC. Please see additional recommendations below.

## 2023-06-09 NOTE — DIETITIAN INITIAL EVALUATION ADULT - PERTINENT LABORATORY DATA
06-09    140  |  114<H>  |  24<H>  ----------------------------<  110<H>  4.2   |  21<L>  |  1.45<H>    Ca    8.1<L>      09 Jun 2023 06:17  Phos  3.3     06-09  Mg     2.6     06-09    TPro  5.7<L>  /  Alb  2.4<L>  /  TBili  0.6  /  DBili  x   /  AST  12<L>  /  ALT  13  /  AlkPhos  55  06-09    Vitamin B12, Serum: 506 pg/mL (04-06-23 @ 22:44)  Folate, Serum: >20.0 ng/mL (04-06-23 @ 22:44)  Vitamin B12, Serum: 522 pg/mL (03-02-23 @ 10:41)  Folate, Serum: >20.0 ng/mL (03-02-23 @ 10:41)  Iron Total, Serum: 57 ug/dL (03-02-23 @ 10:41)

## 2023-06-09 NOTE — DIETITIAN INITIAL EVALUATION ADULT - PERTINENT MEDS FT
MEDICATIONS  (STANDING):  allopurinol 100 milliGRAM(s) Oral daily  artificial  tears Solution 2 Drop(s) Both EYES two times a day  atorvastatin 20 milliGRAM(s) Oral at bedtime  chlorhexidine 4% Liquid 1 Application(s) Topical <User Schedule>  cholecalciferol 1000 Unit(s) Oral daily  folic acid 1 milliGRAM(s) Oral daily  gabapentin 300 milliGRAM(s) Oral two times a day  heparin   Injectable 5000 Unit(s) SubCutaneous every 8 hours  lidocaine   4% Patch 1 Patch Transdermal every 24 hours  meropenem  IVPB 1000 milliGRAM(s) IV Intermittent every 12 hours  midodrine 5 milliGRAM(s) Oral every 8 hours  pantoprazole    Tablet 40 milliGRAM(s) Oral before breakfast  senna 2 Tablet(s) Oral at bedtime    MEDICATIONS  (PRN):  acetaminophen     Tablet .. 650 milliGRAM(s) Oral every 6 hours PRN Temp greater or equal to 38C (100.4F)  melatonin 5 milliGRAM(s) Oral at bedtime PRN Insomnia    Home Medications:  Allegra 180 mg oral tablet: 1 tab(s) orally once a day as needed for  allergy symptoms (08 Jun 2023 12:29)  allopurinol 100 mg oral tablet: 1 tab(s) orally once a day (08 Jun 2023 12:29)  Artificial Tears ophthalmic solution: 1 drop(s) in each eye 2 times a day as needed for  dry eyes (08 Jun 2023 12:29)  folic acid 1 mg oral tablet: 1 tab(s) orally once a day (08 Jun 2023 12:29)  furosemide 20 mg oral tablet: 1 tab(s) orally once a day (08 Jun 2023 12:29)  hydrALAZINE 25 mg oral tablet: 1 tab(s) orally 2 times a day (08 Jun 2023 12:29)  melatonin 5 mg oral tablet: 1 tab(s) orally once a day (at bedtime) as needed for sleep (08 Jun 2023 12:29)  metoprolol tartrate 25 mg oral tablet: 1 tab(s) orally once a day (in the evening) (08 Jun 2023 12:31)  Myrbetriq 50 mg oral tablet, extended release: 1 tab(s) orally once a day (08 Jun 2023 12:31)  Neurontin 300 mg oral capsule: 2 cap(s) orally 2 times a day (08 Jun 2023 12:29)  nitrofurantoin macrocrystals 100 mg oral capsule: 1 cap(s) orally 2 times a day x 7 days ***Course Complete*** (08 Jun 2023 12:31)  rosuvastatin 5 mg oral tablet: 1 tab(s) orally once a day (at bedtime) (08 Jun 2023 12:29)  senna leaf extract oral tablet: 2 tab(s) orally once a day (at bedtime) (08 Jun 2023 12:29)  Vitamin D3 1000 intl units (25 mcg) oral tablet: 1 cap(s) orally once a day (08 Jun 2023 12:29)

## 2023-06-09 NOTE — DIETITIAN INITIAL EVALUATION ADULT - NSFNSGIIOFT_GEN_A_CORE
I&O's Detail    08 Jun 2023 07:01  -  09 Jun 2023 07:00  --------------------------------------------------------  IN:    Oral Fluid: 240 mL    Sodium Chloride 0.9% Bolus: 3500 mL  Total IN: 3740 mL    OUT:    Indwelling Catheter - Urethral (mL): 800 mL  Total OUT: 800 mL    Total NET: 2940 mL

## 2023-06-09 NOTE — PROGRESS NOTE ADULT - ASSESSMENT
89 y/o female with PMH chronic indwelling Perales, previous UTI, severe AS, HFpEF      Now admitted for:     Septic shock (POA) from UTI   Prerenal ANDREW   Acute metabolic encephalopathy       Plan:     Neuro - appears slightly sedated, will decrease gabapentin dose, change melatonin to prn, daily reorientation, avoid sedating meds     CV - BP stable off Levo, low normal however, will add midodrine, cardiology following - no plan for invasive cardiac intervention at this time     Resp - wean NC O2, aspiration precautions    GI - po diet     ID - continue meropenem, ID consult, BCx and UCx pending, Perales changed in ED, trend WBC, temp curve     Renal - Cr slightly improved, monitor lytes, avoid nephrotoxic meds    Heme - no active bleeding     DVT ppx - sc heparin       Prognosis poor, will discuss GOC with family  89 y/o female with PMH chronic indwelling Perales, previous UTI, severe AS, HFpEF      Now admitted for:     Septic shock (POA) from UTI   Prerenal ANDREW   Acute metabolic encephalopathy       Plan:     Neuro - appears slightly sedated, will decrease gabapentin dose, change melatonin to prn, daily reorientation, avoid sedating meds     CV - BP stable off Levo, low normal however, will add midodrine, cardiology following - no plan for invasive cardiac intervention at this time     Resp - wean NC O2, aspiration precautions    GI - po diet     ID - continue meropenem, ID consult, BCx and UCx pending, Perales changed in ED, trend WBC, temp curve     Renal - Cr slightly improved, monitor lytes, avoid nephrotoxic meds, maintain Perales, pending  consult    Heme - no active bleeding     DVT ppx - sc heparin       Prognosis poor, will discuss GOC with family  89 y/o female with PMH chronic indwelling Perales, previous UTI, severe AS, HFpEF      Now admitted for:     Septic shock (POA) from UTI   Prerenal ANDREW   Acute metabolic encephalopathy       Plan:     Neuro - appears slightly sedated, will decrease gabapentin dose, change melatonin to prn, daily reorientation, avoid sedating meds     CV - BP stable off Levo, low normal however, will add midodrine, cardiology following - no plan for invasive cardiac intervention at this time     Resp - wean NC O2, aspiration precautions, appears euvolemic, hold off on IVF, diuresis at this time     GI - po diet     ID - continue meropenem, ID consult, BCx and UCx pending, Perales changed in ED, trend WBC, temp curve     Renal - Cr slightly improved, monitor lytes, avoid nephrotoxic meds, maintain Perales, pending  consult    Heme - no active bleeding     DVT ppx - sc heparin       Prognosis poor, will discuss GOC with family  89 y/o female with PMH chronic indwelling Perales, previous UTI, severe AS, HFpEF      Now admitted for:     Septic shock (POA) from UTI   Prerenal ANDREW   Acute metabolic encephalopathy       Plan:     Neuro - appears slightly sedated, will decrease gabapentin dose, change melatonin to prn, daily reorientation, avoid sedating meds     CV - BP stable off Levo, low normal however, will add midodrine, cardiology following - no plan for invasive cardiac intervention at this time     Resp - wean NC O2, aspiration precautions, appears euvolemic, hold off on IVF, diuresis at this time     GI - po diet     ID - continue meropenem, ID consult, BCx and UCx pending, Perales changed in ED, trend WBC, temp curve     Renal - Cr slightly improved, monitor lytes, avoid nephrotoxic meds, maintain Perales, pending  consult    Heme - no active bleeding     DVT ppx - sc heparin       Prognosis poor, daughter Pippa updated by phone, see separate GOC note

## 2023-06-10 LAB
ANION GAP SERPL CALC-SCNC: 4 MMOL/L — LOW (ref 5–17)
BLD GP AB SCN SERPL QL: SIGNIFICANT CHANGE UP
BUN SERPL-MCNC: 25 MG/DL — HIGH (ref 7–23)
CALCIUM SERPL-MCNC: 8.4 MG/DL — LOW (ref 8.5–10.1)
CHLORIDE SERPL-SCNC: 113 MMOL/L — HIGH (ref 96–108)
CO2 SERPL-SCNC: 24 MMOL/L — SIGNIFICANT CHANGE UP (ref 22–31)
CREAT SERPL-MCNC: 1.42 MG/DL — HIGH (ref 0.5–1.3)
CULTURE RESULTS: SIGNIFICANT CHANGE UP
EGFR: 34 ML/MIN/1.73M2 — LOW
GLUCOSE SERPL-MCNC: 97 MG/DL — SIGNIFICANT CHANGE UP (ref 70–99)
HCT VFR BLD CALC: 24.2 % — LOW (ref 34.5–45)
HCT VFR BLD CALC: 28.6 % — LOW (ref 34.5–45)
HGB BLD-MCNC: 7.8 G/DL — LOW (ref 11.5–15.5)
HGB BLD-MCNC: 9 G/DL — LOW (ref 11.5–15.5)
MCHC RBC-ENTMCNC: 30.8 PG — SIGNIFICANT CHANGE UP (ref 27–34)
MCHC RBC-ENTMCNC: 30.8 PG — SIGNIFICANT CHANGE UP (ref 27–34)
MCHC RBC-ENTMCNC: 31.5 GM/DL — LOW (ref 32–36)
MCHC RBC-ENTMCNC: 32.2 GM/DL — SIGNIFICANT CHANGE UP (ref 32–36)
MCV RBC AUTO: 95.7 FL — SIGNIFICANT CHANGE UP (ref 80–100)
MCV RBC AUTO: 97.9 FL — SIGNIFICANT CHANGE UP (ref 80–100)
PLATELET # BLD AUTO: 113 K/UL — LOW (ref 150–400)
PLATELET # BLD AUTO: 118 K/UL — LOW (ref 150–400)
POTASSIUM SERPL-MCNC: 4.6 MMOL/L — SIGNIFICANT CHANGE UP (ref 3.5–5.3)
POTASSIUM SERPL-SCNC: 4.6 MMOL/L — SIGNIFICANT CHANGE UP (ref 3.5–5.3)
RBC # BLD: 2.53 M/UL — LOW (ref 3.8–5.2)
RBC # BLD: 2.92 M/UL — LOW (ref 3.8–5.2)
RBC # FLD: 17.2 % — HIGH (ref 10.3–14.5)
RBC # FLD: 17.3 % — HIGH (ref 10.3–14.5)
SODIUM SERPL-SCNC: 141 MMOL/L — SIGNIFICANT CHANGE UP (ref 135–145)
SPECIMEN SOURCE: SIGNIFICANT CHANGE UP
WBC # BLD: 7.31 K/UL — SIGNIFICANT CHANGE UP (ref 3.8–10.5)
WBC # BLD: 8.43 K/UL — SIGNIFICANT CHANGE UP (ref 3.8–10.5)
WBC # FLD AUTO: 7.31 K/UL — SIGNIFICANT CHANGE UP (ref 3.8–10.5)
WBC # FLD AUTO: 8.43 K/UL — SIGNIFICANT CHANGE UP (ref 3.8–10.5)

## 2023-06-10 PROCEDURE — 99232 SBSQ HOSP IP/OBS MODERATE 35: CPT

## 2023-06-10 RX ORDER — HYDRALAZINE HCL 50 MG
10 TABLET ORAL ONCE
Refills: 0 | Status: COMPLETED | OUTPATIENT
Start: 2023-06-10 | End: 2023-06-10

## 2023-06-10 RX ORDER — ONDANSETRON 8 MG/1
4 TABLET, FILM COATED ORAL EVERY 8 HOURS
Refills: 0 | Status: DISCONTINUED | OUTPATIENT
Start: 2023-06-10 | End: 2023-06-13

## 2023-06-10 RX ORDER — ALPRAZOLAM 0.25 MG
0.25 TABLET ORAL ONCE
Refills: 0 | Status: DISCONTINUED | OUTPATIENT
Start: 2023-06-10 | End: 2023-06-10

## 2023-06-10 RX ORDER — METOPROLOL TARTRATE 50 MG
12.5 TABLET ORAL
Refills: 0 | Status: DISCONTINUED | OUTPATIENT
Start: 2023-06-10 | End: 2023-06-11

## 2023-06-10 RX ORDER — BENZOCAINE AND MENTHOL 5; 1 G/100ML; G/100ML
1 LIQUID ORAL ONCE
Refills: 0 | Status: COMPLETED | OUTPATIENT
Start: 2023-06-10 | End: 2023-06-10

## 2023-06-10 RX ORDER — HYDRALAZINE HCL 50 MG
25 TABLET ORAL EVERY 8 HOURS
Refills: 0 | Status: DISCONTINUED | OUTPATIENT
Start: 2023-06-10 | End: 2023-06-11

## 2023-06-10 RX ORDER — METOPROLOL TARTRATE 50 MG
12.5 TABLET ORAL ONCE
Refills: 0 | Status: COMPLETED | OUTPATIENT
Start: 2023-06-10 | End: 2023-06-10

## 2023-06-10 RX ORDER — POLYETHYLENE GLYCOL 3350 17 G/17G
17 POWDER, FOR SOLUTION ORAL
Refills: 0 | Status: DISCONTINUED | OUTPATIENT
Start: 2023-06-10 | End: 2023-06-13

## 2023-06-10 RX ADMIN — Medication 2 DROP(S): at 10:23

## 2023-06-10 RX ADMIN — Medication 25 MILLIGRAM(S): at 14:56

## 2023-06-10 RX ADMIN — Medication 1 MILLIGRAM(S): at 10:22

## 2023-06-10 RX ADMIN — LIDOCAINE 1 PATCH: 4 CREAM TOPICAL at 16:19

## 2023-06-10 RX ADMIN — CHLORHEXIDINE GLUCONATE 1 APPLICATION(S): 213 SOLUTION TOPICAL at 06:41

## 2023-06-10 RX ADMIN — HEPARIN SODIUM 5000 UNIT(S): 5000 INJECTION INTRAVENOUS; SUBCUTANEOUS at 06:41

## 2023-06-10 RX ADMIN — Medication 12.5 MILLIGRAM(S): at 10:48

## 2023-06-10 RX ADMIN — Medication 25 MILLIGRAM(S): at 21:43

## 2023-06-10 RX ADMIN — Medication 10 MILLIGRAM(S): at 23:22

## 2023-06-10 RX ADMIN — LIDOCAINE 1 PATCH: 4 CREAM TOPICAL at 22:04

## 2023-06-10 RX ADMIN — ATORVASTATIN CALCIUM 20 MILLIGRAM(S): 80 TABLET, FILM COATED ORAL at 21:43

## 2023-06-10 RX ADMIN — MEROPENEM 100 MILLIGRAM(S): 1 INJECTION INTRAVENOUS at 21:42

## 2023-06-10 RX ADMIN — PANTOPRAZOLE SODIUM 40 MILLIGRAM(S): 20 TABLET, DELAYED RELEASE ORAL at 10:21

## 2023-06-10 RX ADMIN — SENNA PLUS 2 TABLET(S): 8.6 TABLET ORAL at 21:42

## 2023-06-10 RX ADMIN — GABAPENTIN 300 MILLIGRAM(S): 400 CAPSULE ORAL at 21:44

## 2023-06-10 RX ADMIN — BENZOCAINE AND MENTHOL 1 LOZENGE: 5; 1 LIQUID ORAL at 21:53

## 2023-06-10 RX ADMIN — Medication 10 MILLIGRAM(S): at 22:22

## 2023-06-10 RX ADMIN — Medication 650 MILLIGRAM(S): at 16:25

## 2023-06-10 RX ADMIN — Medication 100 MILLIGRAM(S): at 10:21

## 2023-06-10 RX ADMIN — HEPARIN SODIUM 5000 UNIT(S): 5000 INJECTION INTRAVENOUS; SUBCUTANEOUS at 14:56

## 2023-06-10 RX ADMIN — HEPARIN SODIUM 5000 UNIT(S): 5000 INJECTION INTRAVENOUS; SUBCUTANEOUS at 21:43

## 2023-06-10 RX ADMIN — Medication 650 MILLIGRAM(S): at 15:37

## 2023-06-10 RX ADMIN — GABAPENTIN 300 MILLIGRAM(S): 400 CAPSULE ORAL at 10:21

## 2023-06-10 RX ADMIN — Medication 5 MILLIGRAM(S): at 21:43

## 2023-06-10 RX ADMIN — Medication 12.5 MILLIGRAM(S): at 21:46

## 2023-06-10 RX ADMIN — Medication 1000 UNIT(S): at 10:21

## 2023-06-10 RX ADMIN — LIDOCAINE 1 PATCH: 4 CREAM TOPICAL at 11:05

## 2023-06-10 RX ADMIN — Medication 2 DROP(S): at 21:44

## 2023-06-10 RX ADMIN — Medication 650 MILLIGRAM(S): at 03:05

## 2023-06-10 RX ADMIN — MEROPENEM 100 MILLIGRAM(S): 1 INJECTION INTRAVENOUS at 10:23

## 2023-06-10 NOTE — PROGRESS NOTE ADULT - SUBJECTIVE AND OBJECTIVE BOX
Patient is a 89y old  Female who presents with a chief complaint of Hypotension, sepsis (09 Jun 2023 16:15)    24 hour events:     Allergies    Ceftin (Hives; Rash)    Intolerances      REVIEW OF SYSTEMS: SEE BELOW       ICU Vital Signs Last 24 Hrs  T(C): 36.2 (10 Addison 2023 09:24), Max: 37.1 (10 Addison 2023 06:28)  T(F): 97.1 (10 Addison 2023 09:24), Max: 98.7 (10 Addison 2023 06:28)  HR: 73 (10 Addison 2023 10:00) (63 - 75)  BP: 161/63 (10 Addison 2023 10:00) (120/40 - 184/56)  BP(mean): 87 (10 Addison 2023 10:00) (58 - 87)  ABP: --  ABP(mean): --  RR: 16 (10 Addison 2023 10:00) (13 - 22)  SpO2: 96% (10 Addison 2023 02:00) (95% - 98%)    O2 Parameters below as of 10 Addison 2023 04:00  Patient On (Oxygen Delivery Method): nasal cannula  O2 Flow (L/min): 2          CAPILLARY BLOOD GLUCOSE          I&O's Summary    09 Jun 2023 07:01  -  10 Addison 2023 07:00  --------------------------------------------------------  IN: 0 mL / OUT: 725 mL / NET: -725 mL            MEDICATIONS  (STANDING):  allopurinol 100 milliGRAM(s) Oral daily  artificial  tears Solution 2 Drop(s) Both EYES two times a day  atorvastatin 20 milliGRAM(s) Oral at bedtime  chlorhexidine 4% Liquid 1 Application(s) Topical <User Schedule>  cholecalciferol 1000 Unit(s) Oral daily  folic acid 1 milliGRAM(s) Oral daily  gabapentin 300 milliGRAM(s) Oral two times a day  heparin   Injectable 5000 Unit(s) SubCutaneous every 8 hours  lidocaine   4% Patch 1 Patch Transdermal every 24 hours  meropenem  IVPB 1000 milliGRAM(s) IV Intermittent every 12 hours  metoprolol tartrate 12.5 milliGRAM(s) Oral two times a day  pantoprazole    Tablet 40 milliGRAM(s) Oral before breakfast  polyethylene glycol 3350 17 Gram(s) Oral two times a day  senna 2 Tablet(s) Oral at bedtime      MEDICATIONS  (PRN):  acetaminophen     Tablet .. 650 milliGRAM(s) Oral every 6 hours PRN Temp greater or equal to 38C (100.4F)  aluminum hydroxide/magnesium hydroxide/simethicone Suspension 30 milliLiter(s) Oral every 4 hours PRN Dyspepsia  melatonin 5 milliGRAM(s) Oral at bedtime PRN Insomnia  ondansetron Injectable 4 milliGRAM(s) IV Push every 8 hours PRN Nausea and/or Vomiting      PHYSICAL EXAM: SEE BELOW                          7.8    8.43  )-----------( 113      ( 10 Addison 2023 05:19 )             24.2       06-10    141  |  113<H>  |  25<H>  ----------------------------<  97  4.6   |  24  |  1.42<H>    Ca    8.4<L>      10 Addison 2023 05:19  Phos  3.3     06-09  Mg     2.6     06-09    TPro  5.7<L>  /  Alb  2.4<L>  /  TBili  0.6  /  DBili  x   /  AST  12<L>  /  ALT  13  /  AlkPhos  55  06-09              .Blood None   No growth to date. -- 06-08 @ 09:30  .Blood Blood-Peripheral   No growth to date. -- 06-08 @ 09:03      Rapid RVP Result: Raine (06-08 @ 09:03)

## 2023-06-10 NOTE — PROGRESS NOTE ADULT - ASSESSMENT
89F with PMHx of anemia, on chronic diastolic CHF, CKD, chronic UTI, osteoarthritis, gout, HLD, HTN, recent admission in May for acute metabolic encephalopathy UTI, chronic Pérez, treated with IV Zosyn and discharged 5/30 on Macrobid presents to the ED c/o continued bladder spasms, SOB, and febrile in triage. Having rigors at home; daughter described that patient started shaking 6/8 am. Patient c/o cramping intermittent in lower abdomen that feels like spasms. Was on pressors briefly. UA positive, given IV merrem, pérez changed in ER.     1. Sepsis. Pyuria. UTI. Urinary retention with chronic pérez. CKD  - imaging reviewed, shock resolved, off pressors, improving   - f/u urine cx blood cx no growth  - on merrem [id number] #2-3  - continue with abx coverage  - pérez care  - monitor temps  - tolerating abx well so far; no side effects noted  - reason for abx use and side effects reviewed with patient  - supportive care  - fu cbc    2. other issues - care per medicine

## 2023-06-10 NOTE — PROGRESS NOTE ADULT - SUBJECTIVE AND OBJECTIVE BOX
Patient is a 89y old  Female who presents with a chief complaint of Hypotension, sepsis (10 Addison 2023 15:50)    BRIEF HOSPITAL COURSE:   89 y/o female with PMH chronic indwelling Perales, previous UTI, severe AS, HFpEF. Now admitted for Septic shock (POA) from UTI, Prerenal ANDREW, Acute metabolic encephalopathy.     Events last 24 hours:   Called to bedside by RN as patient noted to be hypertensive to -190s. Given Hydralazine 10mg IVPx1 w/ transient response.  Again called as patient remained hypertensive. Pt seen and examined - Awake, alert and oriented. States she feels anxious w/ her hospital course, transfer out of CCU, as well as the constant monitoring of the blood pressure cuff. Denies CP, SOB, palpitations.   Ordered for additional Hydralazine 10mg IVPx1 and Xanax 0.25mg POx1.     PAST MEDICAL & SURGICAL HISTORY:  HTN (hypertension)  HLD (hyperlipidemia)  Gout  Osteoarthritis  History of tonsillectomy  in childhood    Review of Systems:  CONSTITUTIONAL: No fever, chills, or fatigue  EYES: No eye pain, visual disturbances, or discharge  ENMT:  No difficulty hearing, tinnitus, vertigo; No sinus or throat pain  NECK: No pain or stiffness  RESPIRATORY: No cough, wheezing, chills or hemoptysis; No shortness of breath  CARDIOVASCULAR: No chest pain, palpitations, dizziness, or leg swelling  GASTROINTESTINAL: No abdominal or epigastric pain. No nausea, vomiting, or hematemesis; No diarrhea or constipation. No melena or hematochezia.  GENITOURINARY: No dysuria, frequency, hematuria, or incontinence  NEUROLOGICAL: No headaches, memory loss, loss of strength, numbness, or tremors  SKIN: No itching, burning, rashes, or lesions   MUSCULOSKELETAL: No joint pain or swelling; No muscle, back, or extremity pain  PSYCHIATRIC: No depression, (+)anxiety; no mood swings, or difficulty sleeping    Medications:  meropenem  IVPB 1000 milliGRAM(s) IV Intermittent every 12 hours  hydrALAZINE 25 milliGRAM(s) Oral every 8 hours  hydrALAZINE Injectable 10 milliGRAM(s) IV Push once  metoprolol tartrate 12.5 milliGRAM(s) Oral two times a day  acetaminophen     Tablet .. 650 milliGRAM(s) Oral every 6 hours PRN  ALPRAZolam 0.25 milliGRAM(s) Oral once  gabapentin 300 milliGRAM(s) Oral two times a day  melatonin 5 milliGRAM(s) Oral at bedtime PRN  ondansetron Injectable 4 milliGRAM(s) IV Push every 8 hours PRN  heparin   Injectable 5000 Unit(s) SubCutaneous every 8 hours  aluminum hydroxide/magnesium hydroxide/simethicone Suspension 30 milliLiter(s) Oral every 4 hours PRN  pantoprazole    Tablet 40 milliGRAM(s) Oral before breakfast  polyethylene glycol 3350 17 Gram(s) Oral two times a day  senna 2 Tablet(s) Oral at bedtime  allopurinol 100 milliGRAM(s) Oral daily  atorvastatin 20 milliGRAM(s) Oral at bedtime  cholecalciferol 1000 Unit(s) Oral daily  folic acid 1 milliGRAM(s) Oral daily  artificial  tears Solution 2 Drop(s) Both EYES two times a day  chlorhexidine 4% Liquid 1 Application(s) Topical <User Schedule>  lidocaine   4% Patch 1 Patch Transdermal every 24 hours    ICU Vital Signs Last 24 Hrs  T(C): 36.2 (10 Addison 2023 09:24), Max: 37.1 (10 Addison 2023 06:28)  T(F): 97.1 (10 Addison 2023 09:24), Max: 98.7 (10 Addison 2023 06:28)  HR: 73 (10 Addison 2023 21:00) (63 - 78)  BP: 186/54 (10 Addison 2023 20:00) (144/55 - 188/59)  BP(mean): 79 (10 Addison 2023 20:00) (67 - 92)  ABP: --  ABP(mean): --  RR: 18 (10 Addison 2023 21:00) (13 - 22)  SpO2: 95% (10 Addison 2023 20:00) (93% - 97%)  O2 Parameters below as of 10 Addison 2023 20:00  Patient On (Oxygen Delivery Method): nasal cannula    I&O's Detail  09 Jun 2023 07:01  -  10 Addison 2023 07:00  --------------------------------------------------------  IN:  Total IN: 0 mL  OUT:    Indwelling Catheter - Urethral (mL): 725 mL  Total OUT: 725 mL  Total NET: -725 mL    10 Addison 2023 07:01  -  10 Addison 2023 23:21  --------------------------------------------------------  IN:    IV PiggyBack: 200 mL    Oral Fluid: 100 mL  Total IN: 300 mL  OUT:    Indwelling Catheter - Urethral (mL): 1000 mL    Voided (mL): 1200 mL  Total OUT: 2200 mL  Total NET: -1900 mL    LABS:                      9.0    7.31  )-----------( 118      ( 10 Addison 2023 11:01 )             28.6     06-10  141  |  113<H>  |  25<H>  ----------------------------<  97  4.6   |  24  |  1.42<H>  Ca    8.4<L>      10 Addison 2023 05:19  Phos  3.3     06-09  Mg     2.6     06-09  TPro  5.7<L>  /  Alb  2.4<L>  /  TBili  0.6  /  DBili  x   /  AST  12<L>  /  ALT  13  /  AlkPhos  55  06-09    CAPILLARY BLOOD GLUCOSE    CULTURES:  Culture Results:   No growth to date. (06-08-23 @ 09:30)  Culture Results:   10,000 - 49,000 CFU/mL Candida albicans "Susceptibilities not performed" (06-08-23 @ 09:04)  Rapid RVP Result: NotDetec (06-08-23 @ 09:03)  Culture Results:   No growth to date. (06-08-23 @ 09:03)    Physical Examination:  General: Elderly adult female. Anxious appearing.   HEENT: PERRL.   PULM: Clear to auscultation bilaterally.  CVS: s1/s2.   ABD: Soft, nondistended, nontender, normoactive bowel sounds.  EXT: No edema, nontender.  SKIN: Warm.  NEURO: Alert, oriented, interactive, nonfocal.     RADIOLOGY:   < from: Xray Chest 1 View- PORTABLE-Urgent (06.08.23 @ 10:39) >  ACC: [id number] EXAM:  XR CHEST PORTABLE URGENT 1V   ORDERED BY: TAYLOR SOSA   PROCEDURE DATE:  06/08/2023    Impression:  Previous CHF has apparently resolved. Correlate clinically.    89 y/o female with PMH chronic indwelling Perales, previous UTI, severe AS, HFpEF. Now admitted for Septic shock (POA) from UTI, Prerenal ANDREW, Acute metabolic encephalopathy.  Assessment:  1. Sepsis  2. UTI  3. ANDREW  4. Metabolic Encephalopathy  5. HTN    Plan:  NEURO:   -Mental status improved. Monitor mental status closely, avoid neurosuppresants. Serial neurologic assessments.   -Gabapentin BID. Melatonin for adequate sleep/wake cycle.   -Xanax 0.25mg POx1 given tonight for anxiety.    CV:   -Hydralazine 10mg IVPx2 given for HTN. Will continue to closely monitor. Hydralazine 25mg q8h, Lopressor 12.5mg BID.   -Maintain goal MAP >65.   -Keep K~4 and Mg>2 for optimal arrhythmia suppression.    PULM:   -Satting well on NC, actively titrate FiO2 to maintain goal SpO2 >88%.   -Incentive spirometry, Chest PT/Pulmonary toilet, HOB >30'. Albuterol PRN.     GI:   -Regular Diet.   -Protonix QD.    RENAL:   -Renal function currently w/ ANDREW, improving.   -trend lytes/Scr daily with BMP  -I's and O's, goal UOP 0.5 cc/kg/hr  -renal dose meds and avoid nephrotoxins     ENDO:   -Aggressive glycemic control to limit FS glucose to <180mg/dl.   -Keep Euthyroid.     ID:   -Afebrile, leukocytosis improved. BloodCx NGTD. UrineCx pending. Empiric Meropenem.  -ABX use and/or discontinuation based on discussion with ID in conjunction with clinical features, culture data, and judicious procalcitonin monitoring.    HEME:   -Pharmacologic DVT Prophylaxis in addition to SCD's w/ SQH.    GOC: Full Code.  DISPO: Will monitor BP closely this evening. If improves, stable for downgrade out of CCU.     Time spent on this patient encounter, which includes documenting this note in the electronic medical record, was 45 minutes including assessing the presenting problems with associated risks, reviewing the medical record to prepare for the encounter, and meeting face to face with the patient to obtain additional history. I have also performed an appropriate physical exam, made interventions listed and ordered and interpreted appropriate diagnostic studies as documented. To improve communication and patient safety, I have coordinated care with the multidisciplinary team including the bedside nurse, appropriate attending of record and consultants as needed. Non-inclusive of procedure time.

## 2023-06-10 NOTE — PROGRESS NOTE ADULT - SUBJECTIVE AND OBJECTIVE BOX
Date of service: 06-10-23 @ 15:52    pt seen and examined  feels better  off pressors  high BP  no fevers    ROS: no fever or chills; denies dizziness, no HA, no SOB or cough, no abdominal pain, no diarrhea or constipation; no dysuria, no urinary frequency, no legs pain, no rashes    MEDICATIONS  (STANDING):  allopurinol 100 milliGRAM(s) Oral daily  artificial  tears Solution 2 Drop(s) Both EYES two times a day  atorvastatin 20 milliGRAM(s) Oral at bedtime  chlorhexidine 4% Liquid 1 Application(s) Topical <User Schedule>  cholecalciferol 1000 Unit(s) Oral daily  folic acid 1 milliGRAM(s) Oral daily  gabapentin 300 milliGRAM(s) Oral two times a day  heparin   Injectable 5000 Unit(s) SubCutaneous every 8 hours  hydrALAZINE 25 milliGRAM(s) Oral every 8 hours  lidocaine   4% Patch 1 Patch Transdermal every 24 hours  meropenem  IVPB 1000 milliGRAM(s) IV Intermittent every 12 hours  metoprolol tartrate 12.5 milliGRAM(s) Oral two times a day  pantoprazole    Tablet 40 milliGRAM(s) Oral before breakfast  polyethylene glycol 3350 17 Gram(s) Oral two times a day  senna 2 Tablet(s) Oral at bedtime      Vital Signs Last 24 Hrs  T(C): 36.2 (10 Addison 2023 09:24), Max: 37.1 (10 Addison 2023 06:28)  T(F): 97.1 (10 Addison 2023 09:24), Max: 98.7 (10 Addison 2023 06:28)  HR: 69 (10 Addison 2023 13:00) (63 - 75)  BP: 178/62 (10 Addison 2023 12:00) (144/55 - 184/56)  BP(mean): 91 (10 Addison 2023 12:00) (67 - 91)  RR: 16 (10 Addison 2023 13:00) (13 - 22)  SpO2: 96% (10 Addison 2023 02:00) (95% - 98%)    Parameters below as of 10 Addison 2023 04:00  Patient On (Oxygen Delivery Method): nasal cannula  O2 Flow (L/min): 2            PE:  Constitutional: frail looking  HEENT: NC/AT, EOMI, PERRLA, conjunctivae clear; ears and nose atraumatic; pharynx benign  Neck: supple; thyroid not palpable  Back: no tenderness  Respiratory: respiratory effort normal; clear to auscultation  Cardiovascular: S1S2 regular, no murmurs  Abdomen: soft, not tender, not distended, positive BS; liver and spleen WNL  Genitourinary: no suprapubic tenderness  Lymphatic: no LN palpable  Musculoskeletal: no muscle tenderness, no joint swelling or tenderness  Extremities: no pedal edema  Neurological/ Psychiatric: AxOx3, Judgement and insight normal;  moving all extremities  Skin: no rashes; no palpable lesions    Labs: all available labs reviewed                        9.0    7.31  )-----------( 118      ( 10 Addison 2023 11:01 )             28.6     06-10    141  |  113<H>  |  25<H>  ----------------------------<  97  4.6   |  24  |  1.42<H>    Ca    8.4<L>      10 Addison 2023 05:19  Phos  3.3       Mg     2.6         TPro  5.7<L>  /  Alb  2.4<L>  /  TBili  0.6  /  DBili  x   /  AST  12<L>  /  ALT  13  /  AlkPhos  55          Urinalysis Basic - ( 2023 09:04 )    Color: Yellow / Appearance: Clear / S.010 / pH: x  Gluc: x / Ketone: Negative  / Bili: Negative / Urobili: Negative   Blood: x / Protein: 100 / Nitrite: Negative   Leuk Esterase: Moderate / RBC: Negative /HPF / WBC >50 /HPF   Sq Epi: x / Non Sq Epi: x / Bacteria: Few      Culture - Blood (23 @ 09:30)   Specimen Source: .Blood None  Culture Results:   No growth to date.  Culture - Urine (23 @ 09:04)   Specimen Source: Catheterized Catheterized  Culture Results:   Culture in progress  Culture - Blood (23 @ 09:03)   Specimen Source: .Blood Blood-Peripheral  Culture Results:   No growth to date.    Radiology: all available radiological tests reviewed  < from: Xray Chest 1 View- PORTABLE-Urgent (23 @ 10:39) >    ACC: [id number] EXAM:  XR CHEST PORTABLE URGENT 1V   ORDERED BY: TAYLOR SOSA     PROCEDURE DATE:  2023          INTERPRETATION:  INDICATION: Chest pain    Portable chest 10:23 AM    COMPARISON: 2023    Overlying EKG leads and wires.    FINDINGS:  Heart/Vascular: The heart size, mediastinum, hilum and aorta are within   normal limits for projection. Calcification aortic knob.  Pulmonary: Midline trachea. There is no focal infiltrate, congestion or   effusion.  Bones: There is no fracture.  Lines and catheter: None    Impression:    Previous CHF has apparently resolved. Correlate clinically.    --- End of Report ---    < end of copied text >  < from: CT Abdomen and Pelvis No Cont (23 @ 10:16) >    ACC: [id number] EXAM:  CT ABDOMEN AND PELVIS   ORDERED BY: TAYLOR SOSA     PROCEDURE DATE:  2023          INTERPRETATION:  CLINICAL INFORMATION: Abdominal pain, fever.    COMPARISON: May 24, 2023.    CONTRAST/COMPLICATIONS:  IV Contrast: NONE90 cc administered   10 cc discarded  Oral Contrast: NONE  Complications: None reported at time of study completion    PROCEDURE:  CT of the Abdomen and Pelvis was performed.  Sagittal and coronal reformats were performed.    FINDINGS:  LOWER CHEST: Again is noted subpleural reticulation at the visualized   lung bases, compatible with mild fibrosis. No honeycombing is identified.   Mild bilateral lower lobe dependent subpleural atelectasis.    LIVER: Within normal limits.  BILE DUCTS: Normal caliber.  GALLBLADDER: Within normal limits.  SPLEEN: Within normal limits.  PANCREAS: Severe atrophy and fatty replacement of the pancreas. No   peripancreatic stranding or fluid collection.  ADRENALS: Within normal limits.  KIDNEYS/URETERS: Small left renal upper pole cysts. Mildly atrophic   kidneys. Otherwise, within normal limits.    BLADDER: Mild concentric wall thickening of the collapsed bladder with   Perales catheter in place.  REPRODUCTIVE ORGANS: Uterus and adnexa within normal limits.    BOWEL: Moderate stool in the sigmoid colon and rectum. No bowel   obstruction. Appendix is normal.  PERITONEUM: No ascites.  VESSELS: Extensive atherosclerotic calcification of the nonaneurysmal   abdominal aorta and iliac arteries.  RETROPERITONEUM/LYMPH NODES: No lymphadenopathy.  ABDOMINAL WALL: Within normal limits.  BONES: Degenerative disc disease of the lower lumbar spine with trace   anterolisthesis of L4 on L5.    IMPRESSION: Stable mildly thickened bladder, compatible with chronic   cystitis.    --- End of Report ---    < end of copied text >    Advanced directives addressed: full resuscitation

## 2023-06-10 NOTE — PROGRESS NOTE ADULT - ASSESSMENT
89 y/o female with PMH chronic indwelling Perales, previous UTI, severe AS, HFpEF      Now admitted for:     Septic shock (POA) from UTI   Prerenal ANDREW   Acute metabolic encephalopathy       Above resolved, clinically stable now       Plan:     Neuro - improved with decreased gabapentin dose, continue daily reorientation, avoid sedating meds     CV - Hypertensive - resume low dose metoprolol, add hydralazine in next 24 hrs if BP remains elevated, cardiology following - no plan for invasive cardiac intervention at this time     Resp - wean NC O2, aspiration precautions, appears euvolemic, hold off on IVF, diuresis at this time     GI - po diet, add miralax     ID - continue meropenem, BCx NGTD, UCx pending, WBC improved, no fever, ID following      Renal - renal fn stable, monitor lytes, avoid nephrotoxic meds, maintain Perales    Heme - no active bleeding     DVT ppx - sc heparin       Stable for tele  89 y/o female with PMH chronic indwelling Perales, previous UTI, severe AS, HFpEF      Now admitted for:     Septic shock (POA) from UTI   Prerenal ANDREW   Acute metabolic encephalopathy       Above resolved, clinically stable now       Plan:     Neuro - improved with decreased gabapentin dose, continue daily reorientation, avoid sedating meds     CV - Hypertensive - resume low dose metoprolol, add hydralazine in next 24 hrs if BP remains elevated, cardiology following - no plan for invasive cardiac intervention at this time     Resp - wean NC O2, aspiration precautions, appears euvolemic, hold off on IVF, diuresis at this time     GI - po diet, add miralax     ID - continue meropenem, BCx NGTD, UCx pending, WBC improved, no fever, ID following      Renal - renal fn stable, monitor lytes, avoid nephrotoxic meds, maintain Perales    Heme - no active bleeding     DVT ppx - sc heparin       Stable for tele, sign out given to Dr Adame at 1140 89 y/o female with PMH chronic indwelling Perales, previous UTI, severe AS, HFpEF      Now admitted for:     Septic shock (POA) from UTI   Prerenal ANDREW   Acute metabolic encephalopathy       Above resolved, clinically stable now       Plan:     Neuro - improved with decreased gabapentin dose, continue daily reorientation, avoid sedating meds     CV - Hypertensive - resume low dose metoprolol, add hydralazine in next 24 hrs if BP remains elevated, cardiology following - no plan for invasive cardiac intervention at this time     Resp - wean NC O2, aspiration precautions, appears euvolemic, hold off on IVF, diuresis at this time     GI - po diet, add miralax     ID - continue meropenem, BCx NGTD, UCx pending, WBC improved, no fever, ID following      Renal - renal fn stable, monitor lytes, avoid nephrotoxic meds, maintain Perales    Heme - no active bleeding     DVT ppx - sc heparin       Stable for tele, sign out given to Dr Adame at 1140      Daughter Marlen updated at bedside

## 2023-06-11 LAB
ANION GAP SERPL CALC-SCNC: 6 MMOL/L — SIGNIFICANT CHANGE UP (ref 5–17)
BUN SERPL-MCNC: 23 MG/DL — SIGNIFICANT CHANGE UP (ref 7–23)
CALCIUM SERPL-MCNC: 9.3 MG/DL — SIGNIFICANT CHANGE UP (ref 8.5–10.1)
CHLORIDE SERPL-SCNC: 114 MMOL/L — HIGH (ref 96–108)
CO2 SERPL-SCNC: 22 MMOL/L — SIGNIFICANT CHANGE UP (ref 22–31)
CREAT SERPL-MCNC: 1.2 MG/DL — SIGNIFICANT CHANGE UP (ref 0.5–1.3)
EGFR: 42 ML/MIN/1.73M2 — LOW
GLUCOSE SERPL-MCNC: 94 MG/DL — SIGNIFICANT CHANGE UP (ref 70–99)
HCT VFR BLD CALC: 27.9 % — LOW (ref 34.5–45)
HGB BLD-MCNC: 9.1 G/DL — LOW (ref 11.5–15.5)
MAGNESIUM SERPL-MCNC: 2.5 MG/DL — SIGNIFICANT CHANGE UP (ref 1.6–2.6)
MCHC RBC-ENTMCNC: 30.8 PG — SIGNIFICANT CHANGE UP (ref 27–34)
MCHC RBC-ENTMCNC: 32.6 GM/DL — SIGNIFICANT CHANGE UP (ref 32–36)
MCV RBC AUTO: 94.6 FL — SIGNIFICANT CHANGE UP (ref 80–100)
PHOSPHATE SERPL-MCNC: 3.3 MG/DL — SIGNIFICANT CHANGE UP (ref 2.5–4.5)
PLATELET # BLD AUTO: 128 K/UL — LOW (ref 150–400)
POTASSIUM SERPL-MCNC: 4.6 MMOL/L — SIGNIFICANT CHANGE UP (ref 3.5–5.3)
POTASSIUM SERPL-SCNC: 4.6 MMOL/L — SIGNIFICANT CHANGE UP (ref 3.5–5.3)
RBC # BLD: 2.95 M/UL — LOW (ref 3.8–5.2)
RBC # FLD: 16.8 % — HIGH (ref 10.3–14.5)
SODIUM SERPL-SCNC: 142 MMOL/L — SIGNIFICANT CHANGE UP (ref 135–145)
WBC # BLD: 5.37 K/UL — SIGNIFICANT CHANGE UP (ref 3.8–10.5)
WBC # FLD AUTO: 5.37 K/UL — SIGNIFICANT CHANGE UP (ref 3.8–10.5)

## 2023-06-11 PROCEDURE — 99233 SBSQ HOSP IP/OBS HIGH 50: CPT

## 2023-06-11 RX ORDER — HYDRALAZINE HCL 50 MG
50 TABLET ORAL EVERY 8 HOURS
Refills: 0 | Status: DISCONTINUED | OUTPATIENT
Start: 2023-06-11 | End: 2023-06-12

## 2023-06-11 RX ORDER — METOPROLOL TARTRATE 50 MG
25 TABLET ORAL
Refills: 0 | Status: DISCONTINUED | OUTPATIENT
Start: 2023-06-11 | End: 2023-06-13

## 2023-06-11 RX ORDER — FLUCONAZOLE 150 MG/1
200 TABLET ORAL DAILY
Refills: 0 | Status: DISCONTINUED | OUTPATIENT
Start: 2023-06-11 | End: 2023-06-13

## 2023-06-11 RX ORDER — DIPHENHYDRAMINE HYDROCHLORIDE AND LIDOCAINE HYDROCHLORIDE AND ALUMINUM HYDROXIDE AND MAGNESIUM HYDRO
5 KIT EVERY 6 HOURS
Refills: 0 | Status: DISCONTINUED | OUTPATIENT
Start: 2023-06-11 | End: 2023-06-13

## 2023-06-11 RX ADMIN — Medication 0.25 MILLIGRAM(S): at 00:13

## 2023-06-11 RX ADMIN — Medication 2 DROP(S): at 21:06

## 2023-06-11 RX ADMIN — Medication 1 MILLIGRAM(S): at 09:50

## 2023-06-11 RX ADMIN — HEPARIN SODIUM 5000 UNIT(S): 5000 INJECTION INTRAVENOUS; SUBCUTANEOUS at 06:09

## 2023-06-11 RX ADMIN — MEROPENEM 100 MILLIGRAM(S): 1 INJECTION INTRAVENOUS at 09:50

## 2023-06-11 RX ADMIN — CHLORHEXIDINE GLUCONATE 1 APPLICATION(S): 213 SOLUTION TOPICAL at 06:09

## 2023-06-11 RX ADMIN — PANTOPRAZOLE SODIUM 40 MILLIGRAM(S): 20 TABLET, DELAYED RELEASE ORAL at 06:09

## 2023-06-11 RX ADMIN — FLUCONAZOLE 200 MILLIGRAM(S): 150 TABLET ORAL at 17:16

## 2023-06-11 RX ADMIN — DIPHENHYDRAMINE HYDROCHLORIDE AND LIDOCAINE HYDROCHLORIDE AND ALUMINUM HYDROXIDE AND MAGNESIUM HYDRO 5 MILLILITER(S): KIT at 17:16

## 2023-06-11 RX ADMIN — POLYETHYLENE GLYCOL 3350 17 GRAM(S): 17 POWDER, FOR SOLUTION ORAL at 09:51

## 2023-06-11 RX ADMIN — Medication 100 MILLIGRAM(S): at 09:50

## 2023-06-11 RX ADMIN — GABAPENTIN 300 MILLIGRAM(S): 400 CAPSULE ORAL at 21:07

## 2023-06-11 RX ADMIN — SENNA PLUS 2 TABLET(S): 8.6 TABLET ORAL at 21:09

## 2023-06-11 RX ADMIN — HEPARIN SODIUM 5000 UNIT(S): 5000 INJECTION INTRAVENOUS; SUBCUTANEOUS at 21:07

## 2023-06-11 RX ADMIN — Medication 25 MILLIGRAM(S): at 21:08

## 2023-06-11 RX ADMIN — MEROPENEM 100 MILLIGRAM(S): 1 INJECTION INTRAVENOUS at 21:08

## 2023-06-11 RX ADMIN — POLYETHYLENE GLYCOL 3350 17 GRAM(S): 17 POWDER, FOR SOLUTION ORAL at 21:09

## 2023-06-11 RX ADMIN — ATORVASTATIN CALCIUM 20 MILLIGRAM(S): 80 TABLET, FILM COATED ORAL at 21:07

## 2023-06-11 RX ADMIN — Medication 25 MILLIGRAM(S): at 06:09

## 2023-06-11 RX ADMIN — Medication 25 MILLIGRAM(S): at 09:50

## 2023-06-11 RX ADMIN — GABAPENTIN 300 MILLIGRAM(S): 400 CAPSULE ORAL at 09:50

## 2023-06-11 RX ADMIN — LIDOCAINE 1 PATCH: 4 CREAM TOPICAL at 08:30

## 2023-06-11 RX ADMIN — Medication 2 DROP(S): at 09:50

## 2023-06-11 RX ADMIN — Medication 50 MILLIGRAM(S): at 12:10

## 2023-06-11 RX ADMIN — HEPARIN SODIUM 5000 UNIT(S): 5000 INJECTION INTRAVENOUS; SUBCUTANEOUS at 13:31

## 2023-06-11 RX ADMIN — Medication 1000 UNIT(S): at 09:50

## 2023-06-11 RX ADMIN — Medication 50 MILLIGRAM(S): at 21:08

## 2023-06-11 NOTE — PROGRESS NOTE ADULT - SUBJECTIVE AND OBJECTIVE BOX
CC: Hypotension, sepsis    HPI: 89F with  PMHx of anemia, on chronic diastolic CHF, CKD, chronic UTI, osteoarthritis, gout, HLD, HTN, recent admission in May for acute metabolic encephalopathy UTI, chronic Perales, treated with IV Zosyn and discharged 5/30 on Macrobid presents to the ED c/o continued bladder spasms, SOB, and febrile in triage. Having rigors at home; daughter described that patient started shaking  Patient c/o cramping intermittent in lower abdomen that feels like spasms.    Events last 24 hours:   Called to bedside by RN as patient noted to be hypertensive to -190s. Given Hydralazine 10mg IVPx1 w/ transient response.  Again called as patient remained hypertensive. Pt seen and examined - Awake, alert and oriented. States she feels anxious w/ her hospital course, transfer out of CCU, as well as the constant monitoring of the blood pressure cuff. Denies CP, SOB, palpitations.   Ordered for additional Hydralazine 10mg IVPx1 and Xanax 0.25mg POx1.     INTERVAL HPI/OVERNIGHT EVENTS:    Vital Signs Last 24 Hrs  T(C): 36.2 (11 Jun 2023 09:24), Max: 36.2 (11 Jun 2023 08:00)  T(F): 97.1 (11 Jun 2023 09:24), Max: 97.1 (11 Jun 2023 08:00)  HR: 64 (11 Jun 2023 14:00) (63 - 81)  BP: 109/44 (11 Jun 2023 14:00) (109/44 - 212/72)  BP(mean): 59 (11 Jun 2023 14:00) (59 - 105)  RR: 15 (11 Jun 2023 14:00) (15 - 23)  SpO2: 95% (11 Jun 2023 14:00) (93% - 98%)    Parameters below as of 10 Addison 2023 20:00  Patient On (Oxygen Delivery Method): nasal cannula      I&O's Detail    10 Addison 2023 07:01  -  11 Jun 2023 07:00  --------------------------------------------------------  IN:    IV PiggyBack: 200 mL    Oral Fluid: 100 mL  Total IN: 300 mL    OUT:    Indwelling Catheter - Urethral (mL): 2250 mL    Voided (mL): 1200 mL  Total OUT: 3450 mL    Total NET: -3150 mL        REVIEW OF SYSTEMS:    CONSTITUTIONAL: No weakness, fevers or chills  EYES/ENT: No visual changes;  No vertigo or throat pain   NECK: No pain or stiffness  RESPIRATORY: No cough, wheezing, hemoptysis; No shortness of breath  CARDIOVASCULAR: No chest pain or palpitations  GASTROINTESTINAL: No abdominal or epigastric pain. No nausea, vomiting, or hematemesis; No diarrhea or constipation. No melena or hematochezia.  GENITOURINARY: No dysuria, frequency or hematuria  NEUROLOGICAL: No numbness or weakness  SKIN: No itching, burning, rashes, or lesions   All other review of systems is negative unless indicated above.  PHYSICAL EXAM:    General: Well developed; in no acute distress  Eyes: PERRLA, EOMI; conjunctiva and sclera clear  Head: Normocephalic; atraumatic  ENMT: No nasal discharge; airway clear  Neck: Supple; non tender; no masses  Respiratory: No wheezes, rales or rhonchi  Cardiovascular: Regular rate and rhythm. S1 and S2 Normal; No murmurs, gallops or rubs  Gastrointestinal: Soft non-tender non-distended; Normal bowel sounds  Genitourinary: No  suprapubic  tenderness  Extremities: Normal range of motion, No clubbing, cyanosis or edema  Vascular: Peripheral pulses palpable 2+ bilaterally  Neurological: Alert and oriented x4  Skin: Warm and dry. No acute rash  Lymph Nodes: No acute cervical adenopathy  Musculoskeletal: Normal muscle tone, without deformities  Psychiatric: Cooperative and appropriate                            9.1    5.37  )-----------( 128      ( 11 Jun 2023 06:22 )             27.9     11 Jun 2023 06:22    142    |  114    |  23     ----------------------------<  94     4.6     |  22     |  1.20     Ca    9.3        11 Jun 2023 06:22  Phos  3.3       11 Jun 2023 06:22  Mg     2.5       11 Jun 2023 06:22        MEDICATIONS  (STANDING):  allopurinol 100 milliGRAM(s) Oral daily  artificial  tears Solution 2 Drop(s) Both EYES two times a day  atorvastatin 20 milliGRAM(s) Oral at bedtime  chlorhexidine 4% Liquid 1 Application(s) Topical <User Schedule>  cholecalciferol 1000 Unit(s) Oral daily  folic acid 1 milliGRAM(s) Oral daily  gabapentin 300 milliGRAM(s) Oral two times a day  heparin   Injectable 5000 Unit(s) SubCutaneous every 8 hours  hydrALAZINE 50 milliGRAM(s) Oral every 8 hours  lidocaine   4% Patch 1 Patch Transdermal every 24 hours  meropenem  IVPB 1000 milliGRAM(s) IV Intermittent every 12 hours  metoprolol tartrate 25 milliGRAM(s) Oral two times a day  pantoprazole    Tablet 40 milliGRAM(s) Oral before breakfast  polyethylene glycol 3350 17 Gram(s) Oral two times a day  senna 2 Tablet(s) Oral at bedtime    MEDICATIONS  (PRN):  acetaminophen     Tablet .. 650 milliGRAM(s) Oral every 6 hours PRN Temp greater or equal to 38C (100.4F)  aluminum hydroxide/magnesium hydroxide/simethicone Suspension 30 milliLiter(s) Oral every 4 hours PRN Dyspepsia  melatonin 5 milliGRAM(s) Oral at bedtime PRN Insomnia  ondansetron Injectable 4 milliGRAM(s) IV Push every 8 hours PRN Nausea and/or Vomiting      RADIOLOGY & ADDITIONAL TESTS: CC: Hypotension, sepsis    HPI: 89F with  PMHx of anemia, on chronic diastolic CHF, CKD, chronic UTI, osteoarthritis, gout, HLD, HTN, recent admission in May for acute metabolic encephalopathy UTI, chronic Perales, treated with IV Zosyn and discharged 5/30 on Macrobid presents to the ED c/o continued bladder spasms, SOB, and febrile in triage. Was having rigors at home; daughter described that patient started shaking  Patient c/o cramping intermittent in lower abdomen that felt  like spasms.  In ED was found to  be febrile mildly tachycardic, initial BP was stable, but later became  hypotensive. UA + for  WBCS, few bacteria, + yeast   Pt was initially admitted to CCU for Septic shock. Was On IV abxs and IVF, was briefly on pressors.  Pts BP improved, was hypertensive on 6/10. restarted on BP meds.  BCX so far NGTD. UCX: + Candida albicans       INTERVAL HPI/ OVERNIGHT EVENTS: chart reviewed, Pt was seen and examined, awake and alert, reports feels better today.  States was very anxious yesterday. Pt reports that feel like has " limp" in the throat, has diff to swallow, dry mouth, burning. + Constipation, offered supp but Pt declined.  Was OOB to chair today Results and plan discussed with Pt and daughter     Vital Signs Last 24 Hrs  T(C): 36.2 (11 Jun 2023 09:24), Max: 36.2 (11 Jun 2023 08:00)  T(F): 97.1 (11 Jun 2023 09:24), Max: 97.1 (11 Jun 2023 08:00)  HR: 64 (11 Jun 2023 14:00) (63 - 81)  BP: 109/44 (11 Jun 2023 14:00) (109/44 - 212/72)  BP(mean): 59 (11 Jun 2023 14:00) (59 - 105)  RR: 15 (11 Jun 2023 14:00) (15 - 23)  SpO2: 95% (11 Jun 2023 14:00) (93% - 98%)    Parameters below as of 10 Addison 2023 20:00  Patient On (Oxygen Delivery Method): nasal cannula        REVIEW OF SYSTEMS:   All other review of systems is negative unless indicated above.      PHYSICAL EXAM:  General: Well developed; frail elderly female,  in no acute distress  Eyes: PERRLA, EOMI; conjunctiva and sclera clear  Head: Normocephalic; atraumatic  ENMT: No nasal discharge; airway clear, dry tongue with whitish covering   Respiratory: Decreased BS at bases, No wheezes, rales or rhonchi  Cardiovascular: Regular rate and rhythm. S1 and S2 Normal;  +  murmur   Gastrointestinal: Soft non-tender non-distended; Normal bowel sounds  Genitourinary: No  suprapubic  tenderness  Extremities: No edema  Neurological: Alert and oriented x3, non focal   Skin: Warm and dry. No acute rash  Musculoskeletal: Normal muscle tone, without deformities  Psychiatric: Cooperative and appropriate    LABS:                           9.1    5.37  )-----------( 128      ( 11 Jun 2023 06:22 )             27.9     11 Jun 2023 06:22    142    |  114    |  23     ----------------------------<  94     4.6     |  22     |  1.20     Ca    9.3        11 Jun 2023 06:22  Phos  3.3       11 Jun 2023 06:22  Mg     2.5       11 Jun 2023 06:22        MEDICATIONS  (STANDING):  allopurinol 100 milliGRAM(s) Oral daily  artificial  tears Solution 2 Drop(s) Both EYES two times a day  atorvastatin 20 milliGRAM(s) Oral at bedtime  chlorhexidine 4% Liquid 1 Application(s) Topical <User Schedule>  cholecalciferol 1000 Unit(s) Oral daily  folic acid 1 milliGRAM(s) Oral daily  gabapentin 300 milliGRAM(s) Oral two times a day  heparin   Injectable 5000 Unit(s) SubCutaneous every 8 hours  hydrALAZINE 50 milliGRAM(s) Oral every 8 hours  lidocaine   4% Patch 1 Patch Transdermal every 24 hours  meropenem  IVPB 1000 milliGRAM(s) IV Intermittent every 12 hours  metoprolol tartrate 25 milliGRAM(s) Oral two times a day  pantoprazole    Tablet 40 milliGRAM(s) Oral before breakfast  polyethylene glycol 3350 17 Gram(s) Oral two times a day  senna 2 Tablet(s) Oral at bedtime    MEDICATIONS  (PRN):  acetaminophen     Tablet .. 650 milliGRAM(s) Oral every 6 hours PRN Temp greater or equal to 38C (100.4F)  aluminum hydroxide/magnesium hydroxide/simethicone Suspension 30 milliLiter(s) Oral every 4 hours PRN Dyspepsia  melatonin 5 milliGRAM(s) Oral at bedtime PRN Insomnia  ondansetron Injectable 4 milliGRAM(s) IV Push every 8 hours PRN Nausea and/or Vomiting      RADIOLOGY & ADDITIONAL TESTS:  < from: Xray Chest 1 View- PORTABLE-Urgent (06.08.23 @ 10:39) >    ACC: [id number] EXAM:  XR CHEST PORTABLE URGENT 1V   ORDERED BY: TAYLOR SOSA     PROCEDURE DATE:  06/08/2023          INTERPRETATION:  INDICATION: Chest pain    Portable chest 10:23 AM    COMPARISON: 5/26/2023    Overlying EKG leads and wires.    FINDINGS:  Heart/Vascular: The heart size, mediastinum, hilum and aorta are within   normal limits for projection. Calcification aortic knob.  Pulmonary: Midline trachea. There is no focal infiltrate, congestion or   effusion.  Bones: There is no fracture.  Lines and catheter: None    Impression:    Previous CHF has apparently resolved.         ACC: [id number] EXAM:  CT ABDOMEN AND PELVIS   ORDERED BY: TAYLOR SOSA   PROCEDURE DATE:  06/08/2023        PROCEDURE:  CT of the Abdomen and Pelvis was performed.  Sagittal and coronal reformats were performed.    FINDINGS:  LOWER CHEST: Again is noted subpleural reticulation at the visualized   lung bases, compatible with mild fibrosis. No honeycombing is identified.   Mild bilateral lower lobe dependent subpleural atelectasis.    LIVER: Within normal limits.  BILE DUCTS: Normal caliber.  GALLBLADDER: Within normal limits.  SPLEEN: Within normal limits.  PANCREAS: Severe atrophy and fatty replacement of the pancreas. No   peripancreatic stranding or fluid collection.  ADRENALS: Within normal limits.  KIDNEYS/URETERS: Small left renal upper pole cysts. Mildly atrophic   kidneys. Otherwise, within normal limits.    BLADDER: Mild concentric wall thickening of the collapsed bladder with   Perales catheter in place.  REPRODUCTIVE ORGANS: Uterus and adnexa within normal limits.    BOWEL: Moderate stool in the sigmoid colon and rectum. No bowel   obstruction. Appendix is normal.  PERITONEUM: No ascites.  VESSELS: Extensive atherosclerotic calcification of the nonaneurysmal   abdominal aorta and iliac arteries.  RETROPERITONEUM/LYMPH NODES: No lymphadenopathy.  ABDOMINAL WALL: Within normal limits.  BONES: Degenerative disc disease of the lower lumbar spine with trace   anterolisthesis of L4 on L5.    IMPRESSION: Stable mildly thickened bladder, compatible with chronic   cystitis.

## 2023-06-11 NOTE — PROGRESS NOTE ADULT - ASSESSMENT
89F with PMHx of anemia, on chronic diastolic CHF, CKD, chronic UTI, osteoarthritis, gout, HLD, HTN, recent admission in May for acute metabolic encephalopathy UTI, chronic Pérez, treated with IV Zosyn and discharged 5/30 on Macrobid presents to the ED c/o continued bladder spasms, SOB, and febrile in triage. Having rigors at home; daughter described that patient started shaking 6/8 am. Patient c/o cramping intermittent in lower abdomen that feels like spasms. Was on pressors briefly. UA positive, given IV merrem, pérez changed in ER.     1. Sepsis. Pyuria. UTI. Urinary retention with chronic pérez. CKD. Oropharyngeal thrush.   - imaging reviewed, shock resolved, off pressors, improving   - f/u urine cx- c albicans; blood cx no growth  - on merrem [id number] #4, complete 5 days  - add nystatin/diflucan for thrush/candida in urine   - continue with abx coverage  - pérez care  - monitor temps  - tolerating abx well so far; no side effects noted  - reason for abx use and side effects reviewed with patient  - supportive care  - fu cbc    2. other issues - care per medicine

## 2023-06-11 NOTE — PROGRESS NOTE ADULT - SUBJECTIVE AND OBJECTIVE BOX
Date of service: 23 @ 17:29    pt seen and examined  feels better  has thrush difficulty swallowing  no fevers    ROS: no fever or chills; denies dizziness, no HA, no SOB or cough, no abdominal pain, no diarrhea or constipation;  no legs pain, no rashes    MEDICATIONS  (STANDING):  allopurinol 100 milliGRAM(s) Oral daily  artificial  tears Solution 2 Drop(s) Both EYES two times a day  atorvastatin 20 milliGRAM(s) Oral at bedtime  chlorhexidine 4% Liquid 1 Application(s) Topical <User Schedule>  cholecalciferol 1000 Unit(s) Oral daily  FIRST- Mouthwash  BLM 5 milliLiter(s) Swish and Swallow every 6 hours  fluconAZOLE   Tablet 200 milliGRAM(s) Oral daily  folic acid 1 milliGRAM(s) Oral daily  gabapentin 300 milliGRAM(s) Oral two times a day  heparin   Injectable 5000 Unit(s) SubCutaneous every 8 hours  hydrALAZINE 50 milliGRAM(s) Oral every 8 hours  lidocaine   4% Patch 1 Patch Transdermal every 24 hours  meropenem  IVPB 1000 milliGRAM(s) IV Intermittent every 12 hours  metoprolol tartrate 25 milliGRAM(s) Oral two times a day  pantoprazole    Tablet 40 milliGRAM(s) Oral before breakfast  polyethylene glycol 3350 17 Gram(s) Oral two times a day  senna 2 Tablet(s) Oral at bedtime      Vital Signs Last 24 Hrs  T(C): 36.2 (2023 16:05), Max: 36.2 (2023 08:00)  T(F): 97.1 (2023 16:05), Max: 97.1 (2023 08:00)  HR: 69 (2023 16:00) (63 - 81)  BP: 148/49 (2023 16:00) (109/44 - 212/72)  BP(mean): 78 (2023 16:00) (59 - 105)  RR: 14 (2023 16:00) (14 - 23)  SpO2: 94% (2023 16:00) (93% - 98%)    Parameters below as of 2023 16:00  Patient On (Oxygen Delivery Method): room air              PE:  Constitutional: frail looking  HEENT: NC/AT, EOMI, PERRLA, conjunctivae clear; ears and nose atraumatic; oral thrush  Neck: supple; thyroid not palpable  Back: no tenderness  Respiratory: respiratory effort normal; clear to auscultation  Cardiovascular: S1S2 regular, no murmurs  Abdomen: soft, not tender, not distended, positive BS; liver and spleen WNL  Genitourinary: no suprapubic tenderness  Lymphatic: no LN palpable  Musculoskeletal: no muscle tenderness, no joint swelling or tenderness  Extremities: no pedal edema  Neurological/ Psychiatric: AxOx3, Judgement and insight normal;  moving all extremities  Skin: no rashes; no palpable lesions    Labs: all available labs reviewed                                    9.1    5.37  )-----------( 128      ( 2023 06:22 )             27.9     06-11    142  |  114<H>  |  23  ----------------------------<  94  4.6   |  22  |  1.20    Ca    9.3      2023 06:22  Phos  3.3     06-11  Mg     2.5     06-11          Urinalysis Basic - ( 2023 09:04 )    Color: Yellow / Appearance: Clear / S.010 / pH: x  Gluc: x / Ketone: Negative  / Bili: Negative / Urobili: Negative   Blood: x / Protein: 100 / Nitrite: Negative   Leuk Esterase: Moderate / RBC: Negative /HPF / WBC >50 /HPF   Sq Epi: x / Non Sq Epi: x / Bacteria: Few    Culture - Blood (23 @ 09:30)   Specimen Source: .Blood None  Culture Results:   No growth to date.  Culture - Urine (23 @ 09:04)   Specimen Source: Catheterized Catheterized  Culture Results:   10,000 - 49,000 CFU/mL Candida albicans "Susceptibilities not performed"      Radiology: all available radiological tests reviewed  < from: Xray Chest 1 View- PORTABLE-Urgent (23 @ 10:39) >    ACC: [id number] EXAM:  XR CHEST PORTABLE URGENT 1V   ORDERED BY: TAYLOR SOSA     PROCEDURE DATE:  2023          INTERPRETATION:  INDICATION: Chest pain    Portable chest 10:23 AM    COMPARISON: 2023    Overlying EKG leads and wires.    FINDINGS:  Heart/Vascular: The heart size, mediastinum, hilum and aorta are within   normal limits for projection. Calcification aortic knob.  Pulmonary: Midline trachea. There is no focal infiltrate, congestion or   effusion.  Bones: There is no fracture.  Lines and catheter: None    Impression:    Previous CHF has apparently resolved. Correlate clinically.    --- End of Report ---    < end of copied text >  < from: CT Abdomen and Pelvis No Cont (23 @ 10:16) >    ACC: [id number] EXAM:  CT ABDOMEN AND PELVIS   ORDERED BY: TAYLOR SOSA     PROCEDURE DATE:  2023          INTERPRETATION:  CLINICAL INFORMATION: Abdominal pain, fever.    COMPARISON: May 24, 2023.    CONTRAST/COMPLICATIONS:  IV Contrast: NONE90 cc administered   10 cc discarded  Oral Contrast: NONE  Complications: None reported at time of study completion    PROCEDURE:  CT of the Abdomen and Pelvis was performed.  Sagittal and coronal reformats were performed.    FINDINGS:  LOWER CHEST: Again is noted subpleural reticulation at the visualized   lung bases, compatible with mild fibrosis. No honeycombing is identified.   Mild bilateral lower lobe dependent subpleural atelectasis.    LIVER: Within normal limits.  BILE DUCTS: Normal caliber.  GALLBLADDER: Within normal limits.  SPLEEN: Within normal limits.  PANCREAS: Severe atrophy and fatty replacement of the pancreas. No   peripancreatic stranding or fluid collection.  ADRENALS: Within normal limits.  KIDNEYS/URETERS: Small left renal upper pole cysts. Mildly atrophic   kidneys. Otherwise, within normal limits.    BLADDER: Mild concentric wall thickening of the collapsed bladder with   Perales catheter in place.  REPRODUCTIVE ORGANS: Uterus and adnexa within normal limits.    BOWEL: Moderate stool in the sigmoid colon and rectum. No bowel   obstruction. Appendix is normal.  PERITONEUM: No ascites.  VESSELS: Extensive atherosclerotic calcification of the nonaneurysmal   abdominal aorta and iliac arteries.  RETROPERITONEUM/LYMPH NODES: No lymphadenopathy.  ABDOMINAL WALL: Within normal limits.  BONES: Degenerative disc disease of the lower lumbar spine with trace   anterolisthesis of L4 on L5.    IMPRESSION: Stable mildly thickened bladder, compatible with chronic   cystitis.    --- End of Report ---    < end of copied text >    Advanced directives addressed: full resuscitation

## 2023-06-11 NOTE — PROGRESS NOTE ADULT - ASSESSMENT
89 y/o female with PMH chronic indwelling Perales, previous UTI, severe AS, HFpEF. Now admitted for Septic shock (POA) from UTI, Prerenal ANDREW, Acute metabolic encephalopathy.  Assessment:  1. Sepsis  2. UTI  3. ANDREW  4. Metabolic Encephalopathy  5. HTN    Plan:  NEURO:   -Mental status improved. Monitor mental status closely, avoid neurosuppresants. Serial neurologic assessments.   -Gabapentin BID. Melatonin for adequate sleep/wake cycle.   -Xanax 0.25mg POx1 given tonight for anxiety.    CV:   -Hydralazine 10mg IVPx2 given for HTN. Will continue to closely monitor. Hydralazine 25mg q8h, Lopressor 12.5mg BID.   -Maintain goal MAP >65.   -Keep K~4 and Mg>2 for optimal arrhythmia suppression.    PULM:   -Satting well on NC, actively titrate FiO2 to maintain goal SpO2 >88%.   -Incentive spirometry, Chest PT/Pulmonary toilet, HOB >30'. Albuterol PRN.     GI:   -Regular Diet.   -Protonix QD.    RENAL:   -Renal function currently w/ ANDREW, improving.   -trend lytes/Scr daily with BMP  -I's and O's, goal UOP 0.5 cc/kg/hr  -renal dose meds and avoid nephrotoxins     ENDO:   -Aggressive glycemic control to limit FS glucose to <180mg/dl.   -Keep Euthyroid.     ID:   -Afebrile, leukocytosis improved. BloodCx NGTD. UrineCx pending. Empiric Meropenem.  -ABX use and/or discontinuation based on discussion with ID in conjunction with clinical features, culture data, and judicious procalcitonin monitoring.    HEME:   -Pharmacologic DVT Prophylaxis in addition to SCD's w/ SQH.    GOC: Full Code. 89 y/o female with PMH chronic indwelling Perales, recurrent  UTIs, severe AS, HFpEF  admitted for:       Septic shock (POA) likely 2.2  UTI  Clinically better   Hypotension  resolved, s/p pressors short period  Now BP elevated, on BP meds  Leukocytosis resolved, afebrile   BCX: NGTD  UCX:  + Candida albicans . As per daughter was preTx with Macrobid dose at home   On meropenem, continue as per ID   will add diflucan for oropharyngeal and + Candida in the Urine   D/w Dr Olivares       ANDREW, Prerenal   Cr improved with IVF   Monitor UO   Encourage oral intake   labs in am       Acute metabolic encephalopathy  due to sepsis, improved  Supportive care   Gabapentin BID.   Melatonin for adequate sleep/wake cycle.     Odynophagia, likely oropharyngeal candidiasis   Start magic  mouth wash  Diflucan PO   D/w ID     HTN  Monitor BP  C/w Metoprolol   Hydralazine dose titrated to 50mg TID    H/o HFpEF, stable  S/p IVF for hypovolemia   Lasix on hold  Monitor weight daily  restart on lasix id signs of volume overload     GI PPX: PPis       DVT PPX: he[britt SQ       GOC: Full Code.    PT duane

## 2023-06-12 LAB
ANION GAP SERPL CALC-SCNC: 6 MMOL/L — SIGNIFICANT CHANGE UP (ref 5–17)
BUN SERPL-MCNC: 20 MG/DL — SIGNIFICANT CHANGE UP (ref 7–23)
CALCIUM SERPL-MCNC: 9 MG/DL — SIGNIFICANT CHANGE UP (ref 8.5–10.1)
CHLORIDE SERPL-SCNC: 109 MMOL/L — HIGH (ref 96–108)
CO2 SERPL-SCNC: 24 MMOL/L — SIGNIFICANT CHANGE UP (ref 22–31)
CREAT SERPL-MCNC: 1.31 MG/DL — HIGH (ref 0.5–1.3)
EGFR: 38 ML/MIN/1.73M2 — LOW
GLUCOSE SERPL-MCNC: 102 MG/DL — HIGH (ref 70–99)
HCT VFR BLD CALC: 26 % — LOW (ref 34.5–45)
HGB BLD-MCNC: 8.5 G/DL — LOW (ref 11.5–15.5)
MCHC RBC-ENTMCNC: 30.6 PG — SIGNIFICANT CHANGE UP (ref 27–34)
MCHC RBC-ENTMCNC: 32.7 GM/DL — SIGNIFICANT CHANGE UP (ref 32–36)
MCV RBC AUTO: 93.5 FL — SIGNIFICANT CHANGE UP (ref 80–100)
PLATELET # BLD AUTO: 136 K/UL — LOW (ref 150–400)
POTASSIUM SERPL-MCNC: 4.4 MMOL/L — SIGNIFICANT CHANGE UP (ref 3.5–5.3)
POTASSIUM SERPL-SCNC: 4.4 MMOL/L — SIGNIFICANT CHANGE UP (ref 3.5–5.3)
RBC # BLD: 2.78 M/UL — LOW (ref 3.8–5.2)
RBC # FLD: 16.7 % — HIGH (ref 10.3–14.5)
SODIUM SERPL-SCNC: 139 MMOL/L — SIGNIFICANT CHANGE UP (ref 135–145)
WBC # BLD: 5.58 K/UL — SIGNIFICANT CHANGE UP (ref 3.8–10.5)
WBC # FLD AUTO: 5.58 K/UL — SIGNIFICANT CHANGE UP (ref 3.8–10.5)

## 2023-06-12 PROCEDURE — 99233 SBSQ HOSP IP/OBS HIGH 50: CPT

## 2023-06-12 RX ORDER — HYDRALAZINE HCL 50 MG
75 TABLET ORAL EVERY 8 HOURS
Refills: 0 | Status: DISCONTINUED | OUTPATIENT
Start: 2023-06-12 | End: 2023-06-13

## 2023-06-12 RX ORDER — HYDRALAZINE HCL 50 MG
5 TABLET ORAL ONCE
Refills: 0 | Status: COMPLETED | OUTPATIENT
Start: 2023-06-12 | End: 2023-06-12

## 2023-06-12 RX ORDER — TAMSULOSIN HYDROCHLORIDE 0.4 MG/1
0.4 CAPSULE ORAL AT BEDTIME
Refills: 0 | Status: DISCONTINUED | OUTPATIENT
Start: 2023-06-12 | End: 2023-06-13

## 2023-06-12 RX ADMIN — GABAPENTIN 300 MILLIGRAM(S): 400 CAPSULE ORAL at 10:45

## 2023-06-12 RX ADMIN — Medication 2 DROP(S): at 21:32

## 2023-06-12 RX ADMIN — Medication 1 MILLIGRAM(S): at 10:45

## 2023-06-12 RX ADMIN — Medication 5 MILLIGRAM(S): at 01:34

## 2023-06-12 RX ADMIN — MEROPENEM 100 MILLIGRAM(S): 1 INJECTION INTRAVENOUS at 10:46

## 2023-06-12 RX ADMIN — PANTOPRAZOLE SODIUM 40 MILLIGRAM(S): 20 TABLET, DELAYED RELEASE ORAL at 05:45

## 2023-06-12 RX ADMIN — LIDOCAINE 1 PATCH: 4 CREAM TOPICAL at 21:29

## 2023-06-12 RX ADMIN — Medication 650 MILLIGRAM(S): at 21:39

## 2023-06-12 RX ADMIN — Medication 75 MILLIGRAM(S): at 21:31

## 2023-06-12 RX ADMIN — Medication 50 MILLIGRAM(S): at 05:43

## 2023-06-12 RX ADMIN — FLUCONAZOLE 200 MILLIGRAM(S): 150 TABLET ORAL at 13:26

## 2023-06-12 RX ADMIN — TAMSULOSIN HYDROCHLORIDE 0.4 MILLIGRAM(S): 0.4 CAPSULE ORAL at 21:30

## 2023-06-12 RX ADMIN — Medication 25 MILLIGRAM(S): at 10:45

## 2023-06-12 RX ADMIN — Medication 75 MILLIGRAM(S): at 13:28

## 2023-06-12 RX ADMIN — DIPHENHYDRAMINE HYDROCHLORIDE AND LIDOCAINE HYDROCHLORIDE AND ALUMINUM HYDROXIDE AND MAGNESIUM HYDRO 5 MILLILITER(S): KIT at 18:11

## 2023-06-12 RX ADMIN — Medication 25 MILLIGRAM(S): at 21:31

## 2023-06-12 RX ADMIN — SENNA PLUS 2 TABLET(S): 8.6 TABLET ORAL at 21:31

## 2023-06-12 RX ADMIN — TAMSULOSIN HYDROCHLORIDE 0.4 MILLIGRAM(S): 0.4 CAPSULE ORAL at 16:02

## 2023-06-12 RX ADMIN — Medication 650 MILLIGRAM(S): at 22:33

## 2023-06-12 RX ADMIN — HEPARIN SODIUM 5000 UNIT(S): 5000 INJECTION INTRAVENOUS; SUBCUTANEOUS at 05:43

## 2023-06-12 RX ADMIN — DIPHENHYDRAMINE HYDROCHLORIDE AND LIDOCAINE HYDROCHLORIDE AND ALUMINUM HYDROXIDE AND MAGNESIUM HYDRO 5 MILLILITER(S): KIT at 13:27

## 2023-06-12 RX ADMIN — HEPARIN SODIUM 5000 UNIT(S): 5000 INJECTION INTRAVENOUS; SUBCUTANEOUS at 21:31

## 2023-06-12 RX ADMIN — GABAPENTIN 300 MILLIGRAM(S): 400 CAPSULE ORAL at 21:32

## 2023-06-12 RX ADMIN — Medication 2 DROP(S): at 10:45

## 2023-06-12 RX ADMIN — MEROPENEM 100 MILLIGRAM(S): 1 INJECTION INTRAVENOUS at 21:29

## 2023-06-12 RX ADMIN — Medication 1000 UNIT(S): at 10:45

## 2023-06-12 RX ADMIN — CHLORHEXIDINE GLUCONATE 1 APPLICATION(S): 213 SOLUTION TOPICAL at 06:48

## 2023-06-12 RX ADMIN — Medication 5 MILLIGRAM(S): at 21:31

## 2023-06-12 RX ADMIN — ATORVASTATIN CALCIUM 20 MILLIGRAM(S): 80 TABLET, FILM COATED ORAL at 21:31

## 2023-06-12 RX ADMIN — HEPARIN SODIUM 5000 UNIT(S): 5000 INJECTION INTRAVENOUS; SUBCUTANEOUS at 13:29

## 2023-06-12 RX ADMIN — POLYETHYLENE GLYCOL 3350 17 GRAM(S): 17 POWDER, FOR SOLUTION ORAL at 10:45

## 2023-06-12 RX ADMIN — Medication 100 MILLIGRAM(S): at 11:28

## 2023-06-12 RX ADMIN — DIPHENHYDRAMINE HYDROCHLORIDE AND LIDOCAINE HYDROCHLORIDE AND ALUMINUM HYDROXIDE AND MAGNESIUM HYDRO 5 MILLILITER(S): KIT at 05:43

## 2023-06-12 NOTE — PHYSICAL THERAPY INITIAL EVALUATION ADULT - ADDITIONAL COMMENTS
Patient was ambulating with RW and with assistance via HHA at home. Patient's 24/7 HHA assists patient with all ADL's Patient was ambulating with RW and with assistance via HHA at home. Patient's 7 hours a day with HHA assists patient with all ADL's.

## 2023-06-12 NOTE — PHYSICAL THERAPY INITIAL EVALUATION ADULT - PRECAUTIONS/LIMITATIONS, REHAB EVAL
Supportive call made to patient.  States she is doing very well.  She is working 4 hours each of 3 days per week.  She is able to stay on disability as she is still under the monthly cutoff.  She ws able to attain financial aid to start school in August.  She is going to study food management.  She states she is very excited.  Encouraged patient to talk with Dr. Ridley regarding getting the vaccine as she is now working with the public.  Explained she does not want to take any chances.  Patient verbalized understanding of all discussed.  Encouraged patient to contact [facility] if she has any questions or concerns.  Will follow prn/per referral.     fall precautions

## 2023-06-12 NOTE — CDI QUERY NOTE - NSCDIOTHERTXTBX2_GEN_ALL_CORE_FT
The patient received a nutrition assessment by a Registered Dietician on  6/9 .  The documentation of this assessment states: Malnutrition	Pt meets criteria for severe malnutrition in context of acute illness    RD recommendations: 1) C/w Regular diet  2) Premier protein shake BID to optimize nutritional needs (provides 160 kcal, 30 g protein/ shake)   3) Obtain vitamin D 25OH level to assess nutriture  4) Please obtain daily weights  5) Consider adding thiamine 100 mg daily 2/2 poor PO intake/ malnutrition  6) Recommend to add MVI w/minerals, Vit C 500 mg BID, add Zinc Sulfate 220 mg x 10 days to promote wound healing      Can the treatment plan associated with the clinical diagnosis of severe  malnutrition be further clarified ( please document supplements, diet, vitamins and treatment ordered )

## 2023-06-12 NOTE — PROGRESS NOTE ADULT - SUBJECTIVE AND OBJECTIVE BOX
CC: Hypotension, sepsis    HPI: 89F with  PMHx of anemia, on chronic diastolic CHF, CKD, chronic UTI, osteoarthritis, gout, HLD, HTN, recent admission in May for acute metabolic encephalopathy UTI, chronic Perales, treated with IV Zosyn and discharged 5/30 on Macrobid presents to the ED c/o continued bladder spasms, SOB, and febrile in triage. Was having rigors at home; daughter described that patient started shaking  Patient c/o cramping intermittent in lower abdomen that felt  like spasms.  In ED was found to  be febrile mildly tachycardic, initial BP was stable, but later became  hypotensive. UA + for  WBCS, few bacteria, + yeast   Pt was initially admitted to CCU for Septic shock. Was On IV abxs and IVF, was briefly on pressors.  Pts BP improved, was hypertensive on 6/10. restarted on BP meds.  BCX so far NGTD. UCX: + Candida albicans       INTERVAL HPI/ OVERNIGHT EVENTS: chart reviewed, Pt was seen and examined, awake and alert, reports feels better today.  States was very anxious yesterday. Pt reports that feel like has " limp" in the throat, has diff to swallow, dry mouth, burning. bute with interval improvment. + Constipation, offered supp but Pt declined.  Was OOB to chair today Results and plan discussed with Pt and daughter. Discussed with dr. hendrix. TOV started today. BP elevated. Rx titrated.     Vital Signs Last 24 Hrs  T(C): 36.4 (06-12-23 @ 21:12), Max: 36.8 (06-12-23 @ 08:35)  HR: 72 (06-12-23 @ 21:12) (64 - 72)  BP: 185/61 (06-12-23 @ 21:12) (152/49 - 185/61)  RR: 18 (06-12-23 @ 21:12) (18 - 18)  SpO2: 93% (06-12-23 @ 21:12) (93% - 96%)        REVIEW OF SYSTEMS:   All other review of systems is negative unless indicated above.      PHYSICAL EXAM:  General: Well developed; frail elderly female,  in no acute distress  Eyes: PERRLA, EOMI; conjunctiva and sclera clear  Head: Normocephalic; atraumatic  ENMT: No nasal discharge; airway clear, dry tongue with whitish covering   Respiratory: Decreased BS at bases, No wheezes, rales or rhonchi  Cardiovascular: Regular rate and rhythm. S1 and S2 Normal;  +  murmur   Gastrointestinal: Soft non-tender non-distended; Normal bowel sounds  Genitourinary: No  suprapubic  tenderness  Extremities: No edema  Neurological: Alert and oriented x3, non focal   Skin: Warm and dry. No acute rash  Musculoskeletal: Normal muscle tone, without deformities  Psychiatric: Cooperative and appropriate    LABS:                           8.5    5.58  )-----------( 136      ( 12 Jun 2023 07:38 )             26.0     06-12    139  |  109<H>  |  20  ----------------------------<  102<H>  4.4   |  24  |  1.31<H>    Ca    9.0      12 Jun 2023 07:38  Phos  3.3     06-11  Mg     2.5     06-11      SARS-CoV-2: NotDetec (08 Jun 2023 09:03)  SARS-CoV-2: NotDetec (24 May 2023 17:36)  SARS-CoV-2: Detected (04 Apr 2023 07:45)  COVID-19 PCR: Detected (05 Mar 2023 08:38)  SARS-CoV-2: NotDetec (07 Jan 2023 05:42)    CAPILLARY BLOOD GLUCOSE                                9.1    5.37  )-----------( 128      ( 11 Jun 2023 06:22 )             27.9     11 Jun 2023 06:22    142    |  114    |  23     ----------------------------<  94     4.6     |  22     |  1.20     Ca    9.3        11 Jun 2023 06:22  Phos  3.3       11 Jun 2023 06:22  Mg     2.5       11 Jun 2023 06:22              RADIOLOGY & ADDITIONAL TESTS:  < from: Xray Chest 1 View- PORTABLE-Urgent (06.08.23 @ 10:39) >    ACC: [id number] EXAM:  XR CHEST PORTABLE URGENT 1V   ORDERED BY: TAYLOR SOSA     PROCEDURE DATE:  06/08/2023          INTERPRETATION:  INDICATION: Chest pain    Portable chest 10:23 AM    COMPARISON: 5/26/2023    Overlying EKG leads and wires.    FINDINGS:  Heart/Vascular: The heart size, mediastinum, hilum and aorta are within   normal limits for projection. Calcification aortic knob.  Pulmonary: Midline trachea. There is no focal infiltrate, congestion or   effusion.  Bones: There is no fracture.  Lines and catheter: None    Impression:    Previous CHF has apparently resolved.         ACC: [id number] EXAM:  CT ABDOMEN AND PELVIS   ORDERED BY: TAYLOR SOSA   PROCEDURE DATE:  06/08/2023        PROCEDURE:  CT of the Abdomen and Pelvis was performed.  Sagittal and coronal reformats were performed.    FINDINGS:  LOWER CHEST: Again is noted subpleural reticulation at the visualized   lung bases, compatible with mild fibrosis. No honeycombing is identified.   Mild bilateral lower lobe dependent subpleural atelectasis.    LIVER: Within normal limits.  BILE DUCTS: Normal caliber.  GALLBLADDER: Within normal limits.  SPLEEN: Within normal limits.  PANCREAS: Severe atrophy and fatty replacement of the pancreas. No   peripancreatic stranding or fluid collection.  ADRENALS: Within normal limits.  KIDNEYS/URETERS: Small left renal upper pole cysts. Mildly atrophic   kidneys. Otherwise, within normal limits.    BLADDER: Mild concentric wall thickening of the collapsed bladder with   Perales catheter in place.  REPRODUCTIVE ORGANS: Uterus and adnexa within normal limits.    BOWEL: Moderate stool in the sigmoid colon and rectum. No bowel   obstruction. Appendix is normal.  PERITONEUM: No ascites.  VESSELS: Extensive atherosclerotic calcification of the nonaneurysmal   abdominal aorta and iliac arteries.  RETROPERITONEUM/LYMPH NODES: No lymphadenopathy.  ABDOMINAL WALL: Within normal limits.  BONES: Degenerative disc disease of the lower lumbar spine with trace   anterolisthesis of L4 on L5.    IMPRESSION: Stable mildly thickened bladder, compatible with chronic   cystitis.

## 2023-06-12 NOTE — PROGRESS NOTE ADULT - ASSESSMENT
6/8/23:  Pt with above history with chronic UTI and indwelling Perales catheter.  Will await cultures.  Clinically better.  Slight rise in Troponin levels related to tachycardia and likely demand ischemia with renal insufficiency and reduced renal clearance of Troponin.  No cardiac cath at this time.  She is not a candidate for AVR/TAVR until sepsis resolves and would not do it until she no longer needs the Perales as she will likely have sepsis again and with an AVR, this would be grave.  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  Continue as outlined by medicine and ID etal.  Dr Garcia will be covering me over the weekend if needed.    6/12/23:  Feeling better and tolerating IV antibiotics.  Today is day 5 of IV antibiotics.  Possible discharge home soon.  Repeat echo did not show evidence for a vegetation or endocarditis.  No new cardiac symptoms denying chest pains or increased SOB.  BP somewhat labile.  NSR on the monitor.  Home when ok with medicine and ID etal.  Will follow as an outpt prn.

## 2023-06-12 NOTE — PHYSICAL THERAPY INITIAL EVALUATION ADULT - PERTINENT HX OF CURRENT PROBLEM, REHAB EVAL
89F with  PMHx of anemia, on chronic diastolic CHF, CKD, chronic UTI, osteoarthritis, gout, HLD, HTN, recent admission in May for acute metabolic encephalopathy UTI, chronic Perales, treated with IV Zosyn and discharged 5/30 on Macrobid presents to the ED c/o continued bladder spasms, SOB, and febrile in triage. Pt. s/p Septic shock sec to UTI

## 2023-06-12 NOTE — PROGRESS NOTE ADULT - SUBJECTIVE AND OBJECTIVE BOX
Date of service: 23 @ 14:31    pt seen and examined  feels better  no fevers    ROS: no fever or chills; denies dizziness, no HA, no SOB or cough, no abdominal pain, no diarrhea or constipation;  no legs pain, no rashes    MEDICATIONS  (STANDING):  allopurinol 100 milliGRAM(s) Oral daily  artificial  tears Solution 2 Drop(s) Both EYES two times a day  atorvastatin 20 milliGRAM(s) Oral at bedtime  chlorhexidine 4% Liquid 1 Application(s) Topical <User Schedule>  cholecalciferol 1000 Unit(s) Oral daily  FIRST- Mouthwash  BLM 5 milliLiter(s) Swish and Swallow every 6 hours  fluconAZOLE   Tablet 200 milliGRAM(s) Oral daily  folic acid 1 milliGRAM(s) Oral daily  gabapentin 300 milliGRAM(s) Oral two times a day  heparin   Injectable 5000 Unit(s) SubCutaneous every 8 hours  hydrALAZINE 75 milliGRAM(s) Oral every 8 hours  lidocaine   4% Patch 1 Patch Transdermal every 24 hours  meropenem  IVPB 1000 milliGRAM(s) IV Intermittent every 12 hours  metoprolol tartrate 25 milliGRAM(s) Oral two times a day  pantoprazole    Tablet 40 milliGRAM(s) Oral before breakfast  polyethylene glycol 3350 17 Gram(s) Oral two times a day  senna 2 Tablet(s) Oral at bedtime  tamsulosin 0.4 milliGRAM(s) Oral at bedtime    Vital Signs Last 24 Hrs  T(C): 36.8 (2023 08:35), Max: 36.8 (2023 08:35)  T(F): 98.2 (2023 08:35), Max: 98.2 (2023 08:35)  HR: 71 (2023 08:35) (69 - 74)  BP: 174/58 (2023 08:35) (148/49 - 179/57)  BP(mean): 86 (2023 18:15) (78 - 86)  RR: 18 (2023 08:35) (14 - 18)  SpO2: 93% (2023 08:35) (93% - 94%)    Parameters below as of 2023 08:35  Patient On (Oxygen Delivery Method): room air    PE:  Constitutional: frail looking  HEENT: NC/AT, EOMI, PERRLA, conjunctivae clear; ears and nose atraumatic; oral thrush  Neck: supple; thyroid not palpable  Back: no tenderness  Respiratory: respiratory effort normal; clear to auscultation  Cardiovascular: S1S2 regular, no murmurs  Abdomen: soft, not tender, not distended, positive BS; liver and spleen WNL  Genitourinary: no suprapubic tenderness  Lymphatic: no LN palpable  Musculoskeletal: no muscle tenderness, no joint swelling or tenderness  Extremities: no pedal edema  Neurological/ Psychiatric: AxOx3, Judgement and insight normal;  moving all extremities  Skin: no rashes; no palpable lesions    Labs: all available labs reviewed                                         8.5    5.58  )-----------( 136      ( 2023 07:38 )             26.0     06-12    139  |  109<H>  |  20  ----------------------------<  102<H>  4.4   |  24  |  1.31<H>    Ca    9.0      2023 07:38  Phos  3.3     06-11  Mg     2.5     06-11        Urinalysis Basic - ( 2023 09:04 )    Color: Yellow / Appearance: Clear / S.010 / pH: x  Gluc: x / Ketone: Negative  / Bili: Negative / Urobili: Negative   Blood: x / Protein: 100 / Nitrite: Negative   Leuk Esterase: Moderate / RBC: Negative /HPF / WBC >50 /HPF   Sq Epi: x / Non Sq Epi: x / Bacteria: Few    Culture - Blood (23 @ 09:30)   Specimen Source: .Blood None  Culture Results:   No growth to date.  Culture - Urine (23 @ 09:04)   Specimen Source: Catheterized Catheterized  Culture Results:   10,000 - 49,000 CFU/mL Candida albicans "Susceptibilities not performed"      Radiology: all available radiological tests reviewed  < from: Xray Chest 1 View- PORTABLE-Urgent (23 @ 10:39) >    ACC: [id number] EXAM:  XR CHEST PORTABLE URGENT 1V   ORDERED BY: TAYLOR SOSA     PROCEDURE DATE:  2023          INTERPRETATION:  INDICATION: Chest pain    Portable chest 10:23 AM    COMPARISON: 2023    Overlying EKG leads and wires.    FINDINGS:  Heart/Vascular: The heart size, mediastinum, hilum and aorta are within   normal limits for projection. Calcification aortic knob.  Pulmonary: Midline trachea. There is no focal infiltrate, congestion or   effusion.  Bones: There is no fracture.  Lines and catheter: None    Impression:    Previous CHF has apparently resolved. Correlate clinically.    --- End of Report ---    < end of copied text >  < from: CT Abdomen and Pelvis No Cont (23 @ 10:16) >    ACC: [id number] EXAM:  CT ABDOMEN AND PELVIS   ORDERED BY: TAYLOR SOSA     PROCEDURE DATE:  2023          INTERPRETATION:  CLINICAL INFORMATION: Abdominal pain, fever.    COMPARISON: May 24, 2023.    CONTRAST/COMPLICATIONS:  IV Contrast: NONE90 cc administered   10 cc discarded  Oral Contrast: NONE  Complications: None reported at time of study completion    PROCEDURE:  CT of the Abdomen and Pelvis was performed.  Sagittal and coronal reformats were performed.    FINDINGS:  LOWER CHEST: Again is noted subpleural reticulation at the visualized   lung bases, compatible with mild fibrosis. No honeycombing is identified.   Mild bilateral lower lobe dependent subpleural atelectasis.    LIVER: Within normal limits.  BILE DUCTS: Normal caliber.  GALLBLADDER: Within normal limits.  SPLEEN: Within normal limits.  PANCREAS: Severe atrophy and fatty replacement of the pancreas. No   peripancreatic stranding or fluid collection.  ADRENALS: Within normal limits.  KIDNEYS/URETERS: Small left renal upper pole cysts. Mildly atrophic   kidneys. Otherwise, within normal limits.    BLADDER: Mild concentric wall thickening of the collapsed bladder with   Perales catheter in place.  REPRODUCTIVE ORGANS: Uterus and adnexa within normal limits.    BOWEL: Moderate stool in the sigmoid colon and rectum. No bowel   obstruction. Appendix is normal.  PERITONEUM: No ascites.  VESSELS: Extensive atherosclerotic calcification of the nonaneurysmal   abdominal aorta and iliac arteries.  RETROPERITONEUM/LYMPH NODES: No lymphadenopathy.  ABDOMINAL WALL: Within normal limits.  BONES: Degenerative disc disease of the lower lumbar spine with trace   anterolisthesis of L4 on L5.    IMPRESSION: Stable mildly thickened bladder, compatible with chronic   cystitis.    --- End of Report ---    < end of copied text >    Advanced directives addressed: full resuscitation

## 2023-06-12 NOTE — PHYSICAL THERAPY INITIAL EVALUATION ADULT - GENERAL OBSERVATIONS, REHAB EVAL
Pt. received supine in bed + tele + pérez agreeable to PT. Bed mob with Min A, amb with RW Min A 30 ft. Left sitting on BSC + alarm RN notified.

## 2023-06-12 NOTE — PROGRESS NOTE ADULT - ASSESSMENT
MEDICATIONS  (STANDING):  allopurinol 100 milliGRAM(s) Oral daily  artificial  tears Solution 2 Drop(s) Both EYES two times a day  atorvastatin 20 milliGRAM(s) Oral at bedtime  chlorhexidine 4% Liquid 1 Application(s) Topical <User Schedule>  cholecalciferol 1000 Unit(s) Oral daily  FIRST- Mouthwash  BLM 5 milliLiter(s) Swish and Swallow every 6 hours  fluconAZOLE   Tablet 200 milliGRAM(s) Oral daily  folic acid 1 milliGRAM(s) Oral daily  gabapentin 300 milliGRAM(s) Oral two times a day  heparin   Injectable 5000 Unit(s) SubCutaneous every 8 hours  hydrALAZINE 75 milliGRAM(s) Oral every 8 hours  lidocaine   4% Patch 1 Patch Transdermal every 24 hours  metoprolol tartrate 25 milliGRAM(s) Oral two times a day  pantoprazole    Tablet 40 milliGRAM(s) Oral before breakfast  polyethylene glycol 3350 17 Gram(s) Oral two times a day  senna 2 Tablet(s) Oral at bedtime  tamsulosin 0.4 milliGRAM(s) Oral at bedtime    MEDICATIONS  (PRN):  acetaminophen     Tablet .. 650 milliGRAM(s) Oral every 6 hours PRN Temp greater or equal to 38C (100.4F)  aluminum hydroxide/magnesium hydroxide/simethicone Suspension 30 milliLiter(s) Oral every 4 hours PRN Dyspepsia  melatonin 5 milliGRAM(s) Oral at bedtime PRN Insomnia  ondansetron Injectable 4 milliGRAM(s) IV Push every 8 hours PRN Nausea and/or Vomiting      89 y/o female with PMH chronic indwelling Perales, recurrent  UTIs, severe AS, HFpEF  admitted for:       Septic shock (POA) likely 2.2  UTI  Clinically better   Hypotension  resolved, s/p pressors short period  Now BP elevated, on BP meds  Leukocytosis resolved, afebrile   BCX: NGTD  UCX:  + Candida albicans . As per daughter was preTx with Macrobid dose at home   On meropenem, continue as per ID   will add diflucan for oropharyngeal and + Candida in the Urine . PO diflucan and nystatin x 2 weeks at discharge.   D/w Dr Elise MORALES, Prerenal   Cr improved with IVF   Monitor UO   Encourage oral intake   labs in am   Chronic urinary retention. TOV starteed 6/12      Acute metabolic encephalopathy  due to sepsis, improved  Supportive care   Gabapentin BID.   Melatonin for adequate sleep/wake cycle.     Odynophagia, likely oropharyngeal candidiasis   Start magic  mouth wash  Diflucan PO   D/w ID     HTN  Monitor BP  C/w Metoprolol   Hydralazine dose titrated to 50mg TID    H/o HFpEF, stable  S/p IVF for hypovolemia   Lasix on hold  Monitor weight daily  restart on lasix id signs of volume overload     GI PPX: PPis       DVT PPX: he[britt SQ       GOC: Full Code.    PT eval

## 2023-06-12 NOTE — PROGRESS NOTE ADULT - NUTRITIONAL ASSESSMENT
This patient has been assessed with a concern for Malnutrition and has been determined to have a diagnosis/diagnoses of Severe protein-calorie malnutrition.    This patient is being managed with:   Diet Regular-  Entered: Jun 9 2023 10:17AM  

## 2023-06-12 NOTE — CDI QUERY NOTE - NSCDIOTHERTXTBX_GEN_ALL_CORE_HH
Per Progress Note Adult-Hospitalist Attending 6/11:   " PMH chronic indwelling Pérez, recurrent  UTIs,  admitted for:   Septic shock (POA) likely 2.2  UTI"     Please clarify if the Sepsis is due to or related to the chronic pérez  - Sepsis and UTI due to or related to the chronic indwelling pérez catheter  - Sepsis and UTI not due to the indwelling pérez catheter  - unable to determine if the Sepsis and UTI are due to the chronic indwelling pérez catheter   - Other , please clarify

## 2023-06-12 NOTE — PROGRESS NOTE ADULT - ASSESSMENT
89F with PMHx of anemia, on chronic diastolic CHF, CKD, chronic UTI, osteoarthritis, gout, HLD, HTN, recent admission in May for acute metabolic encephalopathy UTI, chronic Pérez, treated with IV Zosyn and discharged 5/30 on Macrobid presents to the ED c/o continued bladder spasms, SOB, and febrile in triage. Having rigors at home; daughter described that patient started shaking 6/8 am. Patient c/o cramping intermittent in lower abdomen that feels like spasms. Was on pressors briefly. UA positive, given IV merrem, pérez changed in ER.     1. Sepsis. Pyuria. UTI. Urinary retention with chronic pérez. CKD. Oropharyngeal thrush.   - imaging reviewed, shock resolved, off pressors, improving   - f/u urine cx- c albicans; blood cx no growth  - on merrem [id number] #5, complete 5 days  - on nystatin/diflucan for thrush/candida in urine #2  - continue with abx coverage  - pérez care  - monitor temps  - tolerating abx well so far; no side effects noted  - reason for abx use and side effects reviewed with patient  - on dc po diflucan complete 2 weeks with nystatin  - supportive care  - fu cbc    2. other issues - care per medicine

## 2023-06-13 ENCOUNTER — TRANSCRIPTION ENCOUNTER (OUTPATIENT)
Age: 88
End: 2023-06-13

## 2023-06-13 VITALS
SYSTOLIC BLOOD PRESSURE: 153 MMHG | HEART RATE: 61 BPM | OXYGEN SATURATION: 96 % | TEMPERATURE: 98 F | RESPIRATION RATE: 18 BRPM | DIASTOLIC BLOOD PRESSURE: 58 MMHG

## 2023-06-13 LAB
ADD ON TEST-SPECIMEN IN LAB: SIGNIFICANT CHANGE UP
ANION GAP SERPL CALC-SCNC: 5 MMOL/L — SIGNIFICANT CHANGE UP (ref 5–17)
BUN SERPL-MCNC: 24 MG/DL — HIGH (ref 7–23)
CALCIUM SERPL-MCNC: 9.1 MG/DL — SIGNIFICANT CHANGE UP (ref 8.5–10.1)
CHLORIDE SERPL-SCNC: 109 MMOL/L — HIGH (ref 96–108)
CO2 SERPL-SCNC: 24 MMOL/L — SIGNIFICANT CHANGE UP (ref 22–31)
CREAT SERPL-MCNC: 1.29 MG/DL — SIGNIFICANT CHANGE UP (ref 0.5–1.3)
CULTURE RESULTS: SIGNIFICANT CHANGE UP
CULTURE RESULTS: SIGNIFICANT CHANGE UP
EGFR: 38 ML/MIN/1.73M2 — LOW
FERRITIN SERPL-MCNC: 308 NG/ML — HIGH (ref 15–150)
GLUCOSE SERPL-MCNC: 110 MG/DL — HIGH (ref 70–99)
HCT VFR BLD CALC: 25.7 % — LOW (ref 34.5–45)
HGB BLD-MCNC: 8.3 G/DL — LOW (ref 11.5–15.5)
IRON SATN MFR SERPL: 30 % — SIGNIFICANT CHANGE UP (ref 14–50)
IRON SATN MFR SERPL: 71 UG/DL — SIGNIFICANT CHANGE UP (ref 30–160)
MAGNESIUM SERPL-MCNC: 2.2 MG/DL — SIGNIFICANT CHANGE UP (ref 1.6–2.6)
MCHC RBC-ENTMCNC: 30.2 PG — SIGNIFICANT CHANGE UP (ref 27–34)
MCHC RBC-ENTMCNC: 32.3 GM/DL — SIGNIFICANT CHANGE UP (ref 32–36)
MCV RBC AUTO: 93.5 FL — SIGNIFICANT CHANGE UP (ref 80–100)
PHOSPHATE SERPL-MCNC: 4 MG/DL — SIGNIFICANT CHANGE UP (ref 2.5–4.5)
PLATELET # BLD AUTO: 128 K/UL — LOW (ref 150–400)
POTASSIUM SERPL-MCNC: 4.3 MMOL/L — SIGNIFICANT CHANGE UP (ref 3.5–5.3)
POTASSIUM SERPL-SCNC: 4.3 MMOL/L — SIGNIFICANT CHANGE UP (ref 3.5–5.3)
RBC # BLD: 2.75 M/UL — LOW (ref 3.8–5.2)
RBC # FLD: 16.6 % — HIGH (ref 10.3–14.5)
SODIUM SERPL-SCNC: 138 MMOL/L — SIGNIFICANT CHANGE UP (ref 135–145)
SPECIMEN SOURCE: SIGNIFICANT CHANGE UP
SPECIMEN SOURCE: SIGNIFICANT CHANGE UP
TIBC SERPL-MCNC: 240 UG/DL — SIGNIFICANT CHANGE UP (ref 220–430)
TROPONIN I, HIGH SENSITIVITY RESULT: 121.88 NG/L — HIGH
UIBC SERPL-MCNC: 169 UG/DL — SIGNIFICANT CHANGE UP (ref 110–370)
WBC # BLD: 5.03 K/UL — SIGNIFICANT CHANGE UP (ref 3.8–10.5)
WBC # FLD AUTO: 5.03 K/UL — SIGNIFICANT CHANGE UP (ref 3.8–10.5)

## 2023-06-13 PROCEDURE — 99239 HOSP IP/OBS DSCHRG MGMT >30: CPT

## 2023-06-13 PROCEDURE — 93010 ELECTROCARDIOGRAM REPORT: CPT

## 2023-06-13 RX ORDER — FLUCONAZOLE 150 MG/1
1 TABLET ORAL
Qty: 14 | Refills: 0
Start: 2023-06-13 | End: 2023-06-26

## 2023-06-13 RX ORDER — TAMSULOSIN HYDROCHLORIDE 0.4 MG/1
1 CAPSULE ORAL
Qty: 0 | Refills: 0 | DISCHARGE
Start: 2023-06-13

## 2023-06-13 RX ORDER — MIRABEGRON 50 MG/1
1 TABLET, EXTENDED RELEASE ORAL
Refills: 0 | DISCHARGE

## 2023-06-13 RX ORDER — METOPROLOL TARTRATE 50 MG
1 TABLET ORAL
Refills: 0 | DISCHARGE

## 2023-06-13 RX ORDER — HYDRALAZINE HCL 50 MG
1 TABLET ORAL
Qty: 0 | Refills: 0 | DISCHARGE

## 2023-06-13 RX ORDER — HYDRALAZINE HCL 50 MG
3 TABLET ORAL
Qty: 270 | Refills: 0
Start: 2023-06-13 | End: 2023-07-12

## 2023-06-13 RX ORDER — DIPHENHYDRAMINE HYDROCHLORIDE AND LIDOCAINE HYDROCHLORIDE AND ALUMINUM HYDROXIDE AND MAGNESIUM HYDRO
5 KIT
Qty: 1 | Refills: 0
Start: 2023-06-13

## 2023-06-13 RX ORDER — POLYETHYLENE GLYCOL 3350 17 G/17G
17 POWDER, FOR SOLUTION ORAL
Qty: 1020 | Refills: 0
Start: 2023-06-13 | End: 2023-07-12

## 2023-06-13 RX ORDER — METOPROLOL TARTRATE 50 MG
1 TABLET ORAL
Qty: 60 | Refills: 0
Start: 2023-06-13 | End: 2023-07-12

## 2023-06-13 RX ADMIN — Medication 2 DROP(S): at 10:09

## 2023-06-13 RX ADMIN — Medication 75 MILLIGRAM(S): at 13:56

## 2023-06-13 RX ADMIN — Medication 25 MILLIGRAM(S): at 10:11

## 2023-06-13 RX ADMIN — PANTOPRAZOLE SODIUM 40 MILLIGRAM(S): 20 TABLET, DELAYED RELEASE ORAL at 05:49

## 2023-06-13 RX ADMIN — LIDOCAINE 1 PATCH: 4 CREAM TOPICAL at 07:31

## 2023-06-13 RX ADMIN — Medication 75 MILLIGRAM(S): at 05:47

## 2023-06-13 RX ADMIN — DIPHENHYDRAMINE HYDROCHLORIDE AND LIDOCAINE HYDROCHLORIDE AND ALUMINUM HYDROXIDE AND MAGNESIUM HYDRO 5 MILLILITER(S): KIT at 13:55

## 2023-06-13 RX ADMIN — DIPHENHYDRAMINE HYDROCHLORIDE AND LIDOCAINE HYDROCHLORIDE AND ALUMINUM HYDROXIDE AND MAGNESIUM HYDRO 5 MILLILITER(S): KIT at 00:23

## 2023-06-13 RX ADMIN — Medication 1 MILLIGRAM(S): at 10:12

## 2023-06-13 RX ADMIN — Medication 100 MILLIGRAM(S): at 10:12

## 2023-06-13 RX ADMIN — CHLORHEXIDINE GLUCONATE 1 APPLICATION(S): 213 SOLUTION TOPICAL at 05:49

## 2023-06-13 RX ADMIN — FLUCONAZOLE 200 MILLIGRAM(S): 150 TABLET ORAL at 10:12

## 2023-06-13 RX ADMIN — POLYETHYLENE GLYCOL 3350 17 GRAM(S): 17 POWDER, FOR SOLUTION ORAL at 10:12

## 2023-06-13 RX ADMIN — LIDOCAINE 1 PATCH: 4 CREAM TOPICAL at 12:31

## 2023-06-13 RX ADMIN — GABAPENTIN 300 MILLIGRAM(S): 400 CAPSULE ORAL at 10:14

## 2023-06-13 RX ADMIN — HEPARIN SODIUM 5000 UNIT(S): 5000 INJECTION INTRAVENOUS; SUBCUTANEOUS at 05:46

## 2023-06-13 RX ADMIN — DIPHENHYDRAMINE HYDROCHLORIDE AND LIDOCAINE HYDROCHLORIDE AND ALUMINUM HYDROXIDE AND MAGNESIUM HYDRO 5 MILLILITER(S): KIT at 05:47

## 2023-06-13 RX ADMIN — Medication 1000 UNIT(S): at 10:12

## 2023-06-13 RX ADMIN — HEPARIN SODIUM 5000 UNIT(S): 5000 INJECTION INTRAVENOUS; SUBCUTANEOUS at 13:56

## 2023-06-13 NOTE — PROGRESS NOTE ADULT - REASON FOR ADMISSION
Hypotension, sepsis

## 2023-06-13 NOTE — DISCHARGE NOTE PROVIDER - PROVIDER TOKENS
PROVIDER:[TOKEN:[[id number]],FOLLOWUP:[1 week],ESTABLISHEDPATIENT:[T]],PROVIDER:[TOKEN:[[id number]],FOLLOWUP:[2 weeks],ESTABLISHEDPATIENT:[T]]
none

## 2023-06-13 NOTE — DISCHARGE NOTE PROVIDER - NSDCCPCAREPLAN_GEN_ALL_CORE_FT
PRINCIPAL DISCHARGE DIAGNOSIS  Diagnosis: Urinary tract infection  Assessment and Plan of Treatment: Completed course of antibiotics. Successful removal of pérez catheter as one source of infection but this requires close follow up with your urologist. Also with fungus (candida) in the urine cultures. Take anti-fungal as prescribed. Follow up with your primary medical doctor as well.      SECONDARY DISCHARGE DIAGNOSES  Diagnosis: HTN (hypertension)  Assessment and Plan of Treatment: Take blood pressure medications as prescribed.  Check blood pressure twice daily (make sure to use correct size blood pressure cuff).  Maintain a log to be reviewed by outpatient provider managing your blood pressure.  Maintain low sodium diet (less than 2000mg=2 grams daily)    Diagnosis: Aortic stenosis  Assessment and Plan of Treatment: and and resultant chronic Heart Failure as a result of narrowed aortic valve. Requires evaluation for replacement. Follow up with your cardiologist.  For your heart failure:  Weigh yourself daily with the same scale. Any weight gain/loss greater than 2-3 pounds over 2 days or 5 pounds in a week, notify your cardiologist and primary medical doctor as it may indicate that your are retaining too much water or losing too much water. In which case, it may require dose or frequency adjustments to your diuretic(s).  Maintain low salt diet.  -Less than 2 Grams (2 Grams = 2000 milligrams) of daily salt intake. Too much salt intake can potentiate fluid retention.  Get repeat blood work to check your kidney function and electrolytes in 1 week with your cardiologist or primary medical doctor.

## 2023-06-13 NOTE — DISCHARGE NOTE NURSING/CASE MANAGEMENT/SOCIAL WORK - PATIENT PORTAL LINK FT
vomiting You can access the FollowMyHealth Patient Portal offered by [facility] by registering at the following website: [email or web address]. By joining [facility]’s FollowMyHealth portal, you will also be able to view your health information using other applications (apps) compatible with our system.

## 2023-06-13 NOTE — PROGRESS NOTE ADULT - ASSESSMENT
6/8/23:  Pt with above history with chronic UTI and indwelling Perales catheter.  Will await cultures.  Clinically better.  Slight rise in Troponin levels related to tachycardia and likely demand ischemia with renal insufficiency and reduced renal clearance of Troponin.  No cardiac cath at this time.  She is not a candidate for AVR/TAVR until sepsis resolves and would not do it until she no longer needs the Perales as she will likely have sepsis again and with an AVR, this would be grave.  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  Continue as outlined by medicine and ID etal.  Dr Garcia will be covering me over the weekend if needed.    6/12/23:  Feeling better and tolerating IV antibiotics.  Today is day 5 of IV antibiotics.  Possible discharge home soon.  Repeat echo did not show evidence for a vegetation or endocarditis.  No new cardiac symptoms denying chest pains or increased SOB.  BP somewhat labile.  NSR on the monitor.  Home when ok with medicine and ID etal.  Will follow as an outpt prn.    6/13/23:  Feeling better.  Perales removed for voiding trial.  Had about 500 cc in her bladder by scan before she urinated almost 300 cc out.  Bladder scan after showed 91 cc's.  She feels ok.  Remains afebrile and no new cardiac symptoms.  Hopefully can keep Perales out and avoid a suprapubic catheter as well.  Home when ok with medicine and ID etal.  Will follow as an outpt prn.

## 2023-06-13 NOTE — PROGRESS NOTE ADULT - PROVIDER SPECIALTY LIST ADULT
Hospitalist
Critical Care
Infectious Disease
Hospitalist
Infectious Disease
Infectious Disease
Cardiology
Cardiology
Critical Care
Critical Care

## 2023-06-13 NOTE — DISCHARGE NOTE PROVIDER - NSDCMRMEDTOKEN_GEN_ALL_CORE_FT
Allegra 180 mg oral tablet: 1 tab(s) orally once a day as needed for  allergy symptoms  allopurinol 100 mg oral tablet: 1 tab(s) orally once a day  Artificial Tears ophthalmic solution: 1 drop(s) in each eye 2 times a day as needed for  dry eyes  FIRST Mouthwash BLM mucous membrane suspension: 5 milliliter(s) orally 4 times a day Swish and Swallo  fluconazole 200 mg oral tablet: 1 tab(s) orally once a day  folic acid 1 mg oral tablet: 1 tab(s) orally once a day  furosemide 20 mg oral tablet: 1 tab(s) orally once a day as needed for increased shortness of breath or edema  hydrALAZINE 25 mg oral tablet: 3 tab(s) orally every 8 hours  melatonin 5 mg oral tablet: 1 tab(s) orally once a day (at bedtime) as needed for sleep  metoprolol tartrate 25 mg oral tablet: 1 tab(s) orally every 12 hours  Neurontin 300 mg oral capsule: 2 cap(s) orally 2 times a day  pantoprazole 40 mg oral delayed release tablet: 1 tab(s) orally once a day  polyethylene glycol 3350 oral powder for reconstitution: 17 gram(s) orally 2 times a day Hold if greater than 2 bowel movements in 24 hours  rosuvastatin 5 mg oral tablet: 1 tab(s) orally once a day (at bedtime)  senna leaf extract oral tablet: 2 tab(s) orally once a day (at bedtime)  tamsulosin 0.4 mg oral capsule: 1 cap(s) orally once a day (at bedtime)  Vitamin D3 1000 intl units (25 mcg) oral tablet: 1 cap(s) orally once a day

## 2023-06-13 NOTE — DISCHARGE NOTE PROVIDER - HOSPITAL COURSE
FROM H&P:    "89F w/PMH HTN, HLD, severe AS, HFpEF, chronic pérez for UR, recent admit (4/4-4/11) for asp pna and UTI, presents now, BIB dtr, Theres, available at bedside, for fever and lethargy.  Pt is lethargic, but arousable and answers Qs appropriately and is OX3, follows commands.  Pt states she's here for fevers since last night.  Per dtr, over the weekend had c/o Lower abd pain, which eventually resolved.  On Monday, they called , prescribed macrobid and VNS changed pt's pérez.  Pt went for routine appt w/ PCP and per dtr, she was doing fine and as herself. She started macrobid yesterday, x3 doses.  Last night, pt began to become weak, lethargic and shaking cills.  Family treated w/ tylenol, but his AM they decided to bring her for eval as she became febrile and still lethargic.      In ED, Wbc 21, Na 130, Cr 1.9 (baseline ~1.5), LA 2.5, temp 99.6, hypotensive, given IVF 1L, pérez changed and pt has not had any Urine output to obtain UA, abx held in the meantime.   CT ch/a/p- no acute path"    Septic shock (POA) likely 2.2  UTI ( UTI likely related to chronic indwelling pérez)  Clinically better   Hypotension  resolved, s/p pressors short period  Now BP elevated, on BP meds  Leukocytosis resolved, afebrile   BCX: NGTD  UCX:  + Candida albicans . As per daughter was preTx with Macrobid dose at home   On meropenem, continue as per ID   will add diflucan for oropharyngeal and + Candida in the Urine . PO diflucan and nystatin x 2 weeks at discharge.   D/w Dr Elise MORALES, Prerenal   Cr improved with IVF   Monitor UO   Encourage oral intake   labs in am   Chronic urinary retention. TOV starteed 6/12-> Successful and no signs of singificant retention. Close outpatient follow up with Urology.       Acute metabolic encephalopathy  due to sepsis, improved  Supportive care   Gabapentin BID.   Melatonin for adequate sleep/wake cycle.     Odynophagia, likely oropharyngeal candidiasis   Start magic  mouth wash  Diflucan PO   D/w ID     HTN  Monitor BP  C/w Metoprolol   Hydralazine dose titrated to 50mg TID    H/o HFpEF, stable  S/p IVF for hypovolemia   Lasix on hold  Monitor weight daily  restart on lasix id signs of volume overload     Patient clinically stable. Cleared by cardiology. Discharge home with Aids in stable condition and close outpatient follow up with PMD/Cardiologist and Urology.  T(C): 36.8 (06-13-23 @ 08:20), Max: 36.8 (06-13-23 @ 08:20)  HR: 69 (06-13-23 @ 13:44) (64 - 72)  BP: 122/46 (06-13-23 @ 13:44) (122/46 - 185/61)  RR: 18 (06-13-23 @ 08:20) (18 - 18)  SpO2: 93% (06-13-23 @ 08:20) (93% - 96%)  General: frail elderly female,  in no acute distress  Respiratory: Decreased BS at bases, No wheezes, rales or rhonchi  Cardiovascular: Regular rate and rhythm. S1 and S2 Normal;  +  murmur   Gastrointestinal: Soft non-tender non-distended; Normal bowel sounds  Neurological: Alert and oriented x3, non focal   Skin: Warm and dry. No acute rash  Musculoskeletal: Normal muscle tone, without deformities  Psychiatric: Cooperative and appropriate  Discharge Management: 38 minutes  Date of Discharge/Service: 6/13/2023

## 2023-06-13 NOTE — PROGRESS NOTE ADULT - SUBJECTIVE AND OBJECTIVE BOX
CHIEF COMPLAINT:  Patient is a 89y old  Female who presents with a chief complaint of Hypotension, sepsis (2023 16:31)      HPI: 23:  89F, well known to me, with PMHx of severe AS, anemia, chronic diastolic CHF (HFpEF), CKD, chronic UTI, osteoarthritis, gout, HLD, HTN, recent admission in May for acute metabolic encephalopathy UTI, chronic Perales, treated with IV Zosyn and discharged  on Macrobid presents to the ED c/o continued bladder spasms, SOB, and febrile in triage. Having rigors at home; daughter described that patient started shaking his morning. Patient c/o cramping intermittent in lower abdomen that feels like spasms.  She had transient hypotension and on levophed briefly, now off, SBP > 140. Awake and alert.  She denies anginal chest pains or increased SOB.  Initial EKG with new RBBB/LAHB with sinus tachycardia but this resolved with HB normal c/w rate related bundles.  Mild bump in her Troponin levels with renal insufficiency/CKD.  No other clinical sign for endocarditis and awaiting cultures.  She is not a candidate for a TAVR at this time until sepsis resolves.  No other new cardiac symptoms or clinical CHF.  Pro-BNP lower than her recent admission.    23:  Feeling better and tolerating IV antibiotics.  Today is day 5 of IV antibiotics.  Possible discharge home soon.  Repeat echo did not show evidence for a vegetation or endocarditis.  No new cardiac symptoms denying chest pains or increased SOB.  BP somewhat labile.  NSR on the monitor.    23:  Feeling better.  Perales removed for voiding trial.  Had about 500 cc in her bladder by scan before she urinated almost 300 cc out.  Bladder scan after showed 91 cc's.  She feels ok.  Remains afebrile and no new cardiac symptoms.  Hopefully can keep Perales out and avoid a suprapubic catheter as well.  Home soon.      PMHx:  PAST MEDICAL & SURGICAL HISTORY:  Severe AS  Gout  Osteoarthritis  HTN (hypertension)  HLD (hyperlipidemia)  History of tonsillectomy in childhood      FAMILY HISTORY:   FAMILY HISTORY:  Family history of hypertension (Father, Mother)      ALLERGIES:  Allergies  Ceftin (Hives; Rash)      REVIEW OF SYSTEMS:  10 point ROS was obtained  Pertinent positives and negatives are as above  All other review of systems is negative unless indicated above      Vital Signs Last 24 Hrs  T(C): 36.6 (2023 05:37), Max: 36.8 (2023 08:35)  T(F): 97.9 (2023 05:37), Max: 98.2 (2023 08:35)  HR: 68 (2023 05:37) (64 - 72)  BP: 151/59 (2023 05:37) (151/59 - 185/61)  BP(mean): 85 (2023 05:37) (85 - 85)  RR: 18 (2023 22:37) (18 - 18)  SpO2: 95% (2023 05:37) (93% - 96%): room air      I&O's Summary  2023 07:01  -  2023 06:44  --------------------------------------------------------  IN: 3740 mL / OUT: 150 mL / NET: 3590 mL      PHYSICAL EXAM:   Constitutional: NAD, awake and alert, well-developed  HEENT: PERR, EOMI, Normal Hearing, MMM  Neck: Soft and supple, No LAD, No JVD  Respiratory: Breath sounds are clear bilaterally, No wheezing, rales or rhonchi  Cardiovascular: S1 and S2, regular rate and rhythm, late peaking LIANA at base and soft systolic murmur at LLSB as before, no gallops or rubs  Gastrointestinal: Bowel Sounds present, soft, nontender, nondistended, no guarding, no rebound  Extremities: No peripheral edema  Vascular: 2+ peripheral pulses  Neurological: no focal deficits      MEDICATIONS  (STANDING):  allopurinol 100 milliGRAM(s) Oral daily  artificial  tears Solution 2 Drop(s) Both EYES two times a day  atorvastatin 20 milliGRAM(s) Oral at bedtime  chlorhexidine 4% Liquid 1 Application(s) Topical <User Schedule>  cholecalciferol 1000 Unit(s) Oral daily  FIRST- Mouthwash  BLM 5 milliLiter(s) Swish and Swallow every 6 hours  fluconAZOLE   Tablet 200 milliGRAM(s) Oral daily  folic acid 1 milliGRAM(s) Oral daily  gabapentin 300 milliGRAM(s) Oral two times a day  heparin   Injectable 5000 Unit(s) SubCutaneous every 8 hours  hydrALAZINE 75 milliGRAM(s) Oral every 8 hours  lidocaine   4% Patch 1 Patch Transdermal every 24 hours  metoprolol tartrate 25 milliGRAM(s) Oral two times a day  pantoprazole    Tablet 40 milliGRAM(s) Oral before breakfast  polyethylene glycol 3350 17 Gram(s) Oral two times a day  senna 2 Tablet(s) Oral at bedtime  tamsulosin 0.4 milliGRAM(s) Oral at bedtime    MEDICATIONS  (PRN):  acetaminophen     Tablet .. 650 milliGRAM(s) Oral every 6 hours PRN Temp greater or equal to 38C (100.4F)  aluminum hydroxide/magnesium hydroxide/simethicone Suspension 30 milliLiter(s) Oral every 4 hours PRN Dyspepsia  melatonin 5 milliGRAM(s) Oral at bedtime PRN Insomnia  ondansetron Injectable 4 milliGRAM(s) IV Push every 8 hours PRN Nausea and/or Vomiting      LABS: All Labs Reviewed:                        8.5    5.58  )-----------( 136      ( 2023 07:38 )             26.0                           9.1    5.37  )-----------( 128      ( 2023 06:22 )             27.9                           10.4   12.91 )-----------( 145      ( 2023 09:03 )             32.2       06-12    139  |  109<H>  |  20  ----------------------------<  102<H>  4.4   |  24  |  1.31<H>    Ca    9.0      2023 07:38      06-11    142  |  114<H>  |  23  ----------------------------<  94  4.6   |  22  |  1.20    Ca    9.3      2023 06:22  Phos  3.3     06-11  Mg     2.5     06-11      06-08    139  |  112<H>  |  22  ----------------------------<  130<H>  4.1   |  22  |  1.54<H>    Ca    7.9<L>      2023 21:24  Phos  2.5     06-  Mg     1.6     06-08    TPro  7.3  /  Alb  3.5  /  TBili  0.6  /  DBili  x   /  AST  15  /  ALT  15  /  AlkPhos  72  06-08    Troponin I, High Sensitivity (23 @ 21:24): 258.79  Troponin I, High Sensitivity (23 @ 11:40): 79.99  Troponin I, High Sensitivity (23 @ 09:03): 22.87      CULTURES:   Culture - Blood (23 @ 09:30)   Specimen Source: .Blood None  Culture Results: No growth to date.    Culture - Urine (23 @ 09:04)   Specimen Source: Catheterized   Culture Results: 10,000 - 49,000 CFU/mL Candida albicans "Susceptibilities not performed"      LIPID PROFILE     RADIOLOGY:   CXR: 23:  FINDINGS:  Heart/Vascular: The heart size, mediastinum, hilum and aorta are within normal limits for projection. Calcification aortic knob.  Pulmonary: Midline trachea. There is no focal infiltrate, congestion or effusion.  Bones: There is no fracture.  Lines and catheter: None  Impression:  Previous CHF has apparently resolved. Correlate clinically.    CT of Chest & Pelvis: 23:  FINDINGS:  LOWER CHEST: Again is noted subpleural reticulation at the visualized lung bases, compatible with mild fibrosis. No honeycombing is identified. Mild bilateral lower lobe dependent subpleural atelectasis.  LIVER: Within normal limits.  BILE DUCTS: Normal caliber.  GALLBLADDER: Within normal limits.  SPLEEN: Within normal limits.  PANCREAS: Severe atrophy and fatty replacement of the pancreas. No peripancreatic stranding or fluid collection.  ADRENALS: Within normal limits.  KIDNEYS/URETERS: Small left renal upper pole cysts. Mildly atrophic kidneys. Otherwise, within normal limits.  BLADDER: Mild concentric wall thickening of the collapsed bladder with Perales catheter in place.  REPRODUCTIVE ORGANS: Uterus and adnexa within normal limits.  BOWEL: Moderate stool in the sigmoid colon and rectum. No bowel obstruction. Appendix is normal.  PERITONEUM: No ascites.  VESSELS: Extensive atherosclerotic calcification of the nonaneurysmal abdominal aorta and iliac arteries.  RETROPERITONEUM/LYMPH NODES: No lymphadenopathy.  ABDOMINAL WALL: Within normal limits.  BONES: Degenerative disc disease of the lower lumbar spine with trace anterolisthesis of L4 on L5.  IMPRESSION: Stable mildly thickened bladder, compatible with chronic cystitis.       EK23:  Sinus tachycardia  Possible Left atrial enlargement  Right bundle branch block  Left anterior fascicular block  *** Bifascicular block ***  Left ventricular hypertrophy    TELEMETRY:  NSR    ECHO:  3/1/23:  M-Mode Measurements (cm)   LVEDd: 3.97 cm            LVESd: 2.55 cm   IVSEd: 1.1 cm   LVPWd: 1.1 cm             AO Root Dimension: 2.8 cm                             LA: 3.5 cm                LVOT: 2 cm  Doppler Measurements:   AV Velocity:434 cm/s                  MV Peak E-Wave: 98.7 cm/s   AV Peak Gradient: 75.34 mmHg          MV Peak A-Wave: 131 cm/s   AV Mean Gradient: 40 mmHg             MV E/A Ratio: 0.75 %   AV Area (Continuity):0.85 cm^2        MV Peak Gradient: 3.9 mmHg   TR Velocity:190 cm/s   TR Gradient:14.44 mmHg   Estimated RAP:5 mmHg   RVSP:27 mmHg    Findings  Mitral Valve   The mitral valve leaflets appear thickened.   EA reversal of the mitral inflow consistent with reduced compliance of the left ventricle.   Mild mitral annular calcification is present.   Mild mitral regurgitation is present.    Aortic Valve   Peak and mean transaortic gradients are 75 and 40mmHg respectively; this finding is consistent with severe aortic stenosis.   Mild (1+) aortic regurgitation is present.    Tricuspid Valve   Trace tricuspid valve regurgitation is present.    Pulmonic Valve   Mild pulmonic valvular regurgitation (1+) is present.    Left Atrium   Normal appearing left atrium.    Left Ventricle   Mild concentric left ventricular hypertrophy is present.   The left ventricle is normal in size.   Estimated left ventricular ejection fraction is 65-70 %.    Right Atrium   Normal appearing right atrium.    Right Ventricle   Normal appearing right ventricle structure and function.    Pericardial Effusion   No evidence of pericardial effusion.    Pleural Effusion   No evidence of pleural effusion.    Miscellaneous   The IVC appears normal.    Summary   The mitral valve leaflets appear thickened.   EA reversal of the mitral inflow consistent with reduced compliance of the left ventricle.   Mild mitral annular calcification is present.   Mild mitral regurgitation is present.   Peak and mean transaortic gradients are 75 and 40mmHg respectively; this finding is consistent with severe aortic stenosis.   Mild (1+) aortic regurgitation is present.   Trace tricuspid valve regurgitation is present.   Mild pulmonic valvular regurgitation (1+) is present.   Normal appearing left atrium.   Mild concentric left ventricular hypertrophy is present.   The left ventricle is normal in size.   Estimated left ventricular ejection fraction is 65-70 %.   Normal appearing right atrium.   Normal appearing right ventricle structure and function.   The IVC appears normal.   No evidence of pericardial effusion.   No evidence of pleural effusion.    Signature   ----------------------------------------------------------------   Electronically signed by Sakina Cheatham MD, Director of   Cardiac Cath Lab(Interpreting physician) on 2023 06:42   PM   ----------------------------------------------------------------

## 2023-06-13 NOTE — DISCHARGE NOTE PROVIDER - CARE PROVIDER_API CALL
Olvin Collins  Cardiovascular Disease  [address], [address]  [place] [address]  Phone: [phone]  Fax: [phone]  Established Patient  Follow Up Time: 1 week    Rojelio Collins  Urology  [address]  [place] [address]  Phone: [phone]  Fax: [phone]  Established Patient  Follow Up Time: 2 weeks

## 2023-06-13 NOTE — DISCHARGE NOTE PROVIDER - NSDCACTIVITY_GEN_ALL_CORE
Activity/ambulation as tolerated with assistance/assistive device(s)/Do not drive or operate machinery

## 2023-06-13 NOTE — DISCHARGE NOTE PROVIDER - NSDCFUSCHEDAPPT_GEN_ALL_CORE_FT
Rojelio Collins  [facility] Physician [facility]  UROLOGY [address]  Scheduled Appointment: 07/18/2023    Cristina Silver  [facility] Physician [facility] [address]  Scheduled Appointment: 07/27/2023

## 2023-06-15 ENCOUNTER — NON-APPOINTMENT (OUTPATIENT)
Age: 88
End: 2023-06-15

## 2023-06-17 DIAGNOSIS — I13.0 HYPERTENSIVE HEART AND CHRONIC KIDNEY DISEASE WITH HEART FAILURE AND STAGE 1 THROUGH STAGE 4 CHRONIC KIDNEY DISEASE, OR UNSPECIFIED CHRONIC KIDNEY DISEASE: ICD-10-CM

## 2023-06-17 DIAGNOSIS — E86.1 HYPOVOLEMIA: ICD-10-CM

## 2023-06-17 DIAGNOSIS — I35.0 NONRHEUMATIC AORTIC (VALVE) STENOSIS: ICD-10-CM

## 2023-06-17 DIAGNOSIS — B37.0 CANDIDAL STOMATITIS: ICD-10-CM
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
## 2023-06-17 DIAGNOSIS — I50.32 CHRONIC DIASTOLIC (CONGESTIVE) HEART FAILURE: ICD-10-CM

## 2023-06-17 DIAGNOSIS — I24.8 OTHER FORMS OF ACUTE ISCHEMIC HEART DISEASE: ICD-10-CM
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
## 2023-06-17 DIAGNOSIS — N18.9 CHRONIC KIDNEY DISEASE, UNSPECIFIED: ICD-10-CM

## 2023-06-17 DIAGNOSIS — M10.9 GOUT, UNSPECIFIED: ICD-10-CM

## 2023-06-17 DIAGNOSIS — E78.5 HYPERLIPIDEMIA, UNSPECIFIED: ICD-10-CM

## 2023-06-17 DIAGNOSIS — G93.41 METABOLIC ENCEPHALOPATHY: ICD-10-CM

## 2023-06-17 DIAGNOSIS — N17.9 ACUTE KIDNEY FAILURE, UNSPECIFIED: ICD-10-CM

## 2023-06-17 DIAGNOSIS — R33.9 RETENTION OF URINE, UNSPECIFIED: ICD-10-CM

## 2023-06-17 DIAGNOSIS — B37.41 CANDIDAL CYSTITIS AND URETHRITIS: ICD-10-CM

## 2023-06-17 DIAGNOSIS — Z87.440 PERSONAL HISTORY OF URINARY (TRACT) INFECTIONS: ICD-10-CM

## 2023-06-17 DIAGNOSIS — A41.9 SEPSIS, UNSPECIFIED ORGANISM: ICD-10-CM

## 2023-06-17 DIAGNOSIS — R65.21 SEVERE SEPSIS WITH SEPTIC SHOCK: ICD-10-CM

## 2023-06-17 DIAGNOSIS — T83.511A INFECTION AND INFLAMMATORY REACTION DUE TO INDWELLING URETHRAL CATHETER, INITIAL ENCOUNTER: ICD-10-CM

## 2023-06-29 ENCOUNTER — NON-APPOINTMENT (OUTPATIENT)
Age: 88
End: 2023-06-29

## 2023-07-04 LAB
APPEARANCE: CLEAR
BACTERIA UR CULT: NORMAL
BACTERIA: NEGATIVE /HPF
BILIRUBIN URINE: NEGATIVE
BLOOD URINE: NEGATIVE
CAST: 1 /LPF
COLOR: YELLOW
EPITHELIAL CELLS: 2 /HPF
GLUCOSE QUALITATIVE U: NEGATIVE MG/DL
KETONES URINE: NEGATIVE MG/DL
LEUKOCYTE ESTERASE URINE: ABNORMAL
MICROSCOPIC-UA: NORMAL
NITRITE URINE: NEGATIVE
PH URINE: 6.5
PROTEIN URINE: NORMAL MG/DL
RED BLOOD CELLS URINE: 1 /HPF
SPECIFIC GRAVITY URINE: 1.01
UROBILINOGEN URINE: 0.2 MG/DL
WHITE BLOOD CELLS URINE: 8 /HPF

## 2023-07-05 ENCOUNTER — INPATIENT (INPATIENT)
Facility: HOSPITAL | Age: 88
LOS: 4 days | Discharge: HOME HEALTH | DRG: 689 | End: 2023-07-10
Attending: INTERNAL MEDICINE | Admitting: INTERNAL MEDICINE
Payer: MEDICARE

## 2023-07-05 VITALS
RESPIRATION RATE: 18 BRPM | TEMPERATURE: 97 F | DIASTOLIC BLOOD PRESSURE: 46 MMHG | OXYGEN SATURATION: 94 % | HEART RATE: 68 BPM | SYSTOLIC BLOOD PRESSURE: 123 MMHG | HEIGHT: 63 IN | WEIGHT: 143.96 LBS

## 2023-07-05 DIAGNOSIS — Z90.89 ACQUIRED ABSENCE OF OTHER ORGANS: Chronic | ICD-10-CM

## 2023-07-05 DIAGNOSIS — N39.0 URINARY TRACT INFECTION, SITE NOT SPECIFIED: ICD-10-CM

## 2023-07-05 PROBLEM — I50.33 ACUTE ON CHRONIC DIASTOLIC (CONGESTIVE) HEART FAILURE: Chronic | Status: ACTIVE | Noted: 2023-06-12

## 2023-07-05 LAB
ALBUMIN SERPL ELPH-MCNC: 3.6 G/DL — SIGNIFICANT CHANGE UP (ref 3.3–5)
ALP SERPL-CCNC: 64 U/L — SIGNIFICANT CHANGE UP (ref 40–120)
ALT FLD-CCNC: 20 U/L — SIGNIFICANT CHANGE UP (ref 12–78)
ANION GAP SERPL CALC-SCNC: 5 MMOL/L — SIGNIFICANT CHANGE UP (ref 5–17)
APPEARANCE UR: ABNORMAL
APTT BLD: 28.1 SEC — SIGNIFICANT CHANGE UP (ref 27.5–35.5)
AST SERPL-CCNC: 16 U/L — SIGNIFICANT CHANGE UP (ref 15–37)
BACTERIA # UR AUTO: ABNORMAL
BASOPHILS # BLD AUTO: 0.01 K/UL — SIGNIFICANT CHANGE UP (ref 0–0.2)
BASOPHILS NFR BLD AUTO: 0.2 % — SIGNIFICANT CHANGE UP (ref 0–2)
BILIRUB SERPL-MCNC: 0.2 MG/DL — SIGNIFICANT CHANGE UP (ref 0.2–1.2)
BILIRUB UR-MCNC: NEGATIVE — SIGNIFICANT CHANGE UP
BUN SERPL-MCNC: 31 MG/DL — HIGH (ref 7–23)
CALCIUM SERPL-MCNC: 9.5 MG/DL — SIGNIFICANT CHANGE UP (ref 8.5–10.1)
CHLORIDE SERPL-SCNC: 102 MMOL/L — SIGNIFICANT CHANGE UP (ref 96–108)
CO2 SERPL-SCNC: 23 MMOL/L — SIGNIFICANT CHANGE UP (ref 22–31)
COLOR SPEC: YELLOW — SIGNIFICANT CHANGE UP
CREAT SERPL-MCNC: 1.65 MG/DL — HIGH (ref 0.5–1.3)
DIFF PNL FLD: ABNORMAL
EGFR: 29 ML/MIN/1.73M2 — LOW
EOSINOPHIL # BLD AUTO: 0.02 K/UL — SIGNIFICANT CHANGE UP (ref 0–0.5)
EOSINOPHIL NFR BLD AUTO: 0.4 % — SIGNIFICANT CHANGE UP (ref 0–6)
EPI CELLS # UR: SIGNIFICANT CHANGE UP
GLUCOSE SERPL-MCNC: 118 MG/DL — HIGH (ref 70–99)
GLUCOSE UR QL: NEGATIVE — SIGNIFICANT CHANGE UP
HCT VFR BLD CALC: 29.8 % — LOW (ref 34.5–45)
HGB BLD-MCNC: 10 G/DL — LOW (ref 11.5–15.5)
IMM GRANULOCYTES NFR BLD AUTO: 1 % — HIGH (ref 0–0.9)
INR BLD: 1.01 RATIO — SIGNIFICANT CHANGE UP (ref 0.88–1.16)
KETONES UR-MCNC: NEGATIVE — SIGNIFICANT CHANGE UP
LACTATE SERPL-SCNC: 1.2 MMOL/L — SIGNIFICANT CHANGE UP (ref 0.7–2)
LEUKOCYTE ESTERASE UR-ACNC: ABNORMAL
LYMPHOCYTES # BLD AUTO: 1.56 K/UL — SIGNIFICANT CHANGE UP (ref 1–3.3)
LYMPHOCYTES # BLD AUTO: 30.6 % — SIGNIFICANT CHANGE UP (ref 13–44)
MCHC RBC-ENTMCNC: 31 PG — SIGNIFICANT CHANGE UP (ref 27–34)
MCHC RBC-ENTMCNC: 33.6 GM/DL — SIGNIFICANT CHANGE UP (ref 32–36)
MCV RBC AUTO: 92.3 FL — SIGNIFICANT CHANGE UP (ref 80–100)
MONOCYTES # BLD AUTO: 0.71 K/UL — SIGNIFICANT CHANGE UP (ref 0–0.9)
MONOCYTES NFR BLD AUTO: 13.9 % — SIGNIFICANT CHANGE UP (ref 2–14)
NEUTROPHILS # BLD AUTO: 2.74 K/UL — SIGNIFICANT CHANGE UP (ref 1.8–7.4)
NEUTROPHILS NFR BLD AUTO: 53.9 % — SIGNIFICANT CHANGE UP (ref 43–77)
NITRITE UR-MCNC: POSITIVE
PH UR: 6 — SIGNIFICANT CHANGE UP (ref 5–8)
PLATELET # BLD AUTO: 119 K/UL — LOW (ref 150–400)
POTASSIUM SERPL-MCNC: 4.5 MMOL/L — SIGNIFICANT CHANGE UP (ref 3.5–5.3)
POTASSIUM SERPL-SCNC: 4.5 MMOL/L — SIGNIFICANT CHANGE UP (ref 3.5–5.3)
PROT SERPL-MCNC: 7.1 GM/DL — SIGNIFICANT CHANGE UP (ref 6–8.3)
PROT UR-MCNC: 100
PROTHROM AB SERPL-ACNC: 11.7 SEC — SIGNIFICANT CHANGE UP (ref 10.5–13.4)
RBC # BLD: 3.23 M/UL — LOW (ref 3.8–5.2)
RBC # FLD: 15.4 % — HIGH (ref 10.3–14.5)
RBC CASTS # UR COMP ASSIST: ABNORMAL /HPF (ref 0–4)
SODIUM SERPL-SCNC: 130 MMOL/L — LOW (ref 135–145)
SP GR SPEC: 1.01 — SIGNIFICANT CHANGE UP (ref 1.01–1.02)
UROBILINOGEN FLD QL: NEGATIVE — SIGNIFICANT CHANGE UP
WBC # BLD: 5.09 K/UL — SIGNIFICANT CHANGE UP (ref 3.8–10.5)
WBC # FLD AUTO: 5.09 K/UL — SIGNIFICANT CHANGE UP (ref 3.8–10.5)
WBC UR QL: >50 /HPF (ref 0–5)

## 2023-07-05 PROCEDURE — 83930 ASSAY OF BLOOD OSMOLALITY: CPT

## 2023-07-05 PROCEDURE — 83935 ASSAY OF URINE OSMOLALITY: CPT

## 2023-07-05 PROCEDURE — 84443 ASSAY THYROID STIM HORMONE: CPT

## 2023-07-05 PROCEDURE — 85027 COMPLETE CBC AUTOMATED: CPT

## 2023-07-05 PROCEDURE — 80048 BASIC METABOLIC PNL TOTAL CA: CPT

## 2023-07-05 PROCEDURE — 85025 COMPLETE CBC W/AUTO DIFF WBC: CPT

## 2023-07-05 PROCEDURE — 71045 X-RAY EXAM CHEST 1 VIEW: CPT | Mod: 26

## 2023-07-05 PROCEDURE — 74018 RADEX ABDOMEN 1 VIEW: CPT

## 2023-07-05 PROCEDURE — 83880 ASSAY OF NATRIURETIC PEPTIDE: CPT

## 2023-07-05 PROCEDURE — 97161 PT EVAL LOW COMPLEX 20 MIN: CPT | Mod: GP

## 2023-07-05 PROCEDURE — 97116 GAIT TRAINING THERAPY: CPT | Mod: GP

## 2023-07-05 PROCEDURE — 97530 THERAPEUTIC ACTIVITIES: CPT | Mod: GP

## 2023-07-05 PROCEDURE — 36415 COLL VENOUS BLD VENIPUNCTURE: CPT

## 2023-07-05 PROCEDURE — 84300 ASSAY OF URINE SODIUM: CPT

## 2023-07-05 PROCEDURE — 99285 EMERGENCY DEPT VISIT HI MDM: CPT

## 2023-07-05 PROCEDURE — 93005 ELECTROCARDIOGRAM TRACING: CPT

## 2023-07-05 PROCEDURE — 93010 ELECTROCARDIOGRAM REPORT: CPT

## 2023-07-05 RX ORDER — MEROPENEM 1 G/30ML
500 INJECTION INTRAVENOUS EVERY 12 HOURS
Refills: 0 | Status: DISCONTINUED | OUTPATIENT
Start: 2023-07-05 | End: 2023-07-06

## 2023-07-05 RX ORDER — TAMSULOSIN HYDROCHLORIDE 0.4 MG/1
0.4 CAPSULE ORAL AT BEDTIME
Refills: 0 | Status: DISCONTINUED | OUTPATIENT
Start: 2023-07-05 | End: 2023-07-10

## 2023-07-05 RX ORDER — SODIUM CHLORIDE 9 MG/ML
1000 INJECTION INTRAMUSCULAR; INTRAVENOUS; SUBCUTANEOUS
Refills: 0 | Status: DISCONTINUED | OUTPATIENT
Start: 2023-07-05 | End: 2023-07-07

## 2023-07-05 RX ORDER — FOLIC ACID 0.8 MG
1 TABLET ORAL DAILY
Refills: 0 | Status: DISCONTINUED | OUTPATIENT
Start: 2023-07-05 | End: 2023-07-10

## 2023-07-05 RX ORDER — PANTOPRAZOLE SODIUM 20 MG/1
40 TABLET, DELAYED RELEASE ORAL
Refills: 0 | Status: DISCONTINUED | OUTPATIENT
Start: 2023-07-05 | End: 2023-07-10

## 2023-07-05 RX ORDER — LANOLIN ALCOHOL/MO/W.PET/CERES
1 CREAM (GRAM) TOPICAL
Refills: 0 | DISCHARGE

## 2023-07-05 RX ORDER — ATORVASTATIN CALCIUM 80 MG/1
20 TABLET, FILM COATED ORAL AT BEDTIME
Refills: 0 | Status: DISCONTINUED | OUTPATIENT
Start: 2023-07-05 | End: 2023-07-10

## 2023-07-05 RX ORDER — SENNA PLUS 8.6 MG/1
2 TABLET ORAL
Refills: 0 | Status: DISCONTINUED | OUTPATIENT
Start: 2023-07-05 | End: 2023-07-07

## 2023-07-05 RX ORDER — ONDANSETRON 8 MG/1
4 TABLET, FILM COATED ORAL EVERY 8 HOURS
Refills: 0 | Status: DISCONTINUED | OUTPATIENT
Start: 2023-07-05 | End: 2023-07-10

## 2023-07-05 RX ORDER — ACETAMINOPHEN 500 MG
650 TABLET ORAL EVERY 6 HOURS
Refills: 0 | Status: DISCONTINUED | OUTPATIENT
Start: 2023-07-05 | End: 2023-07-10

## 2023-07-05 RX ORDER — METOPROLOL TARTRATE 50 MG
25 TABLET ORAL
Refills: 0 | Status: DISCONTINUED | OUTPATIENT
Start: 2023-07-05 | End: 2023-07-09

## 2023-07-05 RX ORDER — CALCIUM CARBONATE 500(1250)
1 TABLET ORAL
Refills: 0 | Status: DISCONTINUED | OUTPATIENT
Start: 2023-07-05 | End: 2023-07-10

## 2023-07-05 RX ORDER — MEROPENEM 1 G/30ML
1000 INJECTION INTRAVENOUS EVERY 24 HOURS
Refills: 0 | Status: DISCONTINUED | OUTPATIENT
Start: 2023-07-05 | End: 2023-07-05

## 2023-07-05 RX ORDER — MEROPENEM 1 G/30ML
1000 INJECTION INTRAVENOUS ONCE
Refills: 0 | Status: COMPLETED | OUTPATIENT
Start: 2023-07-05 | End: 2023-07-05

## 2023-07-05 RX ORDER — LANOLIN ALCOHOL/MO/W.PET/CERES
3 CREAM (GRAM) TOPICAL AT BEDTIME
Refills: 0 | Status: DISCONTINUED | OUTPATIENT
Start: 2023-07-05 | End: 2023-07-10

## 2023-07-05 RX ORDER — GABAPENTIN 400 MG/1
300 CAPSULE ORAL
Refills: 0 | Status: DISCONTINUED | OUTPATIENT
Start: 2023-07-05 | End: 2023-07-10

## 2023-07-05 RX ORDER — HYDRALAZINE HCL 50 MG
25 TABLET ORAL
Refills: 0 | Status: DISCONTINUED | OUTPATIENT
Start: 2023-07-05 | End: 2023-07-10

## 2023-07-05 RX ORDER — FUROSEMIDE 40 MG
1 TABLET ORAL
Qty: 0 | Refills: 0 | DISCHARGE

## 2023-07-05 RX ORDER — LANOLIN ALCOHOL/MO/W.PET/CERES
5 CREAM (GRAM) TOPICAL AT BEDTIME
Refills: 0 | Status: DISCONTINUED | OUTPATIENT
Start: 2023-07-05 | End: 2023-07-10

## 2023-07-05 RX ORDER — CIPROFLOXACIN LACTATE 400MG/40ML
1 VIAL (ML) INTRAVENOUS
Refills: 0 | DISCHARGE
Start: 2023-07-05 | End: 2023-07-11

## 2023-07-05 RX ORDER — CHOLECALCIFEROL (VITAMIN D3) 125 MCG
1000 CAPSULE ORAL DAILY
Refills: 0 | Status: DISCONTINUED | OUTPATIENT
Start: 2023-07-05 | End: 2023-07-10

## 2023-07-05 RX ORDER — HYDRALAZINE HCL 50 MG
10 TABLET ORAL EVERY 6 HOURS
Refills: 0 | Status: DISCONTINUED | OUTPATIENT
Start: 2023-07-05 | End: 2023-07-05

## 2023-07-05 RX ORDER — LORATADINE 10 MG/1
10 TABLET ORAL DAILY
Refills: 0 | Status: DISCONTINUED | OUTPATIENT
Start: 2023-07-05 | End: 2023-07-10

## 2023-07-05 RX ORDER — HEPARIN SODIUM 5000 [USP'U]/ML
5000 INJECTION INTRAVENOUS; SUBCUTANEOUS EVERY 12 HOURS
Refills: 0 | Status: DISCONTINUED | OUTPATIENT
Start: 2023-07-05 | End: 2023-07-10

## 2023-07-05 RX ORDER — POLYETHYLENE GLYCOL 3350 17 G/17G
17 POWDER, FOR SOLUTION ORAL DAILY
Refills: 0 | Status: DISCONTINUED | OUTPATIENT
Start: 2023-07-05 | End: 2023-07-10

## 2023-07-05 RX ORDER — HYDRALAZINE HCL 50 MG
25 TABLET ORAL ONCE
Refills: 0 | Status: COMPLETED | OUTPATIENT
Start: 2023-07-05 | End: 2023-07-05

## 2023-07-05 RX ORDER — LIDOCAINE 4 G/100G
1 CREAM TOPICAL DAILY
Refills: 0 | Status: DISCONTINUED | OUTPATIENT
Start: 2023-07-05 | End: 2023-07-10

## 2023-07-05 RX ORDER — ALLOPURINOL 300 MG
100 TABLET ORAL DAILY
Refills: 0 | Status: DISCONTINUED | OUTPATIENT
Start: 2023-07-05 | End: 2023-07-10

## 2023-07-05 RX ADMIN — Medication 650 MILLIGRAM(S): at 22:49

## 2023-07-05 RX ADMIN — Medication 25 MILLIGRAM(S): at 19:40

## 2023-07-05 RX ADMIN — Medication 25 MILLIGRAM(S): at 21:49

## 2023-07-05 RX ADMIN — Medication 650 MILLIGRAM(S): at 21:49

## 2023-07-05 RX ADMIN — MEROPENEM 100 MILLIGRAM(S): 1 INJECTION INTRAVENOUS at 15:30

## 2023-07-05 RX ADMIN — Medication 10 MILLIGRAM(S): at 22:26

## 2023-07-05 RX ADMIN — POLYETHYLENE GLYCOL 3350 17 GRAM(S): 17 POWDER, FOR SOLUTION ORAL at 21:51

## 2023-07-05 NOTE — ED ADULT TRIAGE NOTE - CHIEF COMPLAINT QUOTE
Patient presents to the ER with complaints of lethargy, weakness, and decreased po intake since last Thursday. Patient with urinary frequency, positive urine culture from last friday, and difficulty ambulating per family.

## 2023-07-05 NOTE — PATIENT PROFILE ADULT - FALL HARM RISK - HARM RISK INTERVENTIONS
Assistance with ambulation/Assistance OOB with selected safe patient handling equipment/Communicate Risk of Fall with Harm to all staff/Discuss with provider need for PT consult/Monitor gait and stability/Provide patient with walking aids - walker, cane, crutches/Reinforce activity limits and safety measures with patient and family/Tailored Fall Risk Interventions/Use of alarms - bed, chair and/or voice tab/Visual Cue: Yellow wristband and red socks/Bed in lowest position, wheels locked, appropriate side rails in place/Call bell, personal items and telephone in reach/Instruct patient to call for assistance before getting out of bed or chair/Non-slip footwear when patient is out of bed/[place] to call system/Physically safe environment - no spills, clutter or unnecessary equipment/Purposeful Proactive Rounding/Room/bathroom lighting operational, light cord in reach

## 2023-07-05 NOTE — ED PROVIDER NOTE - OBJECTIVE STATEMENT
UA done on June 29.  Not started on antibiotics.  Cultures and sensitivities show that it grew E. coli and Klebsiella.  Sensitive to meropenem and cefepime Zosyn and Bactrim.  Daughter states patient is tired and generally weak lethargic. UA done on June 29.  Not started on antibiotics.  Cultures and sensitivities show that it grew E. coli and Klebsiella.  Sensitive to meropenem and cefepime Zosyn and Bactrim As per chart review..  Daughter states patient is tired and generally weak lethargic.   No fevers no vomiting no abdominal pain.  Patient has history of septic shock from UTIs in the past and has had frequent hospital admissions for that.  Daughter states she brought her in before she got to that point.

## 2023-07-05 NOTE — PATIENT PROFILE ADULT - PATIENT REPRESENTATIVE: ( YOU CAN CHOOSE ANY PERSON THAT CAN ASSIST YOU WITH YOUR HEALTH CARE PREFERENCES, DOES NOT HAVE TO BE A SPOUSE, IMMEDIATE FAMILY OR SIGNIFICANT OTHER/PARTNER)
TRANSFER - OUT REPORT:    Verbal report given to HOSP LEIGHTONCHICHO HAMMER) on Mary Amador  being transferred to [facility](unit) for routine post - op       Report consisted of patients Situation, Background, Assessment and   Recommendations(SBAR). Information from the following report(s) SBAR, Kardex, Procedure Summary and MAR was reviewed with the receiving nurse. Lines:   Peripheral IV 09/30/19 Right Antecubital (Active)   Site Assessment Clean, dry, & intact 9/30/2019 11:45 AM   Phlebitis Assessment 0 9/30/2019 11:45 AM   Infiltration Assessment 0 9/30/2019 11:45 AM   Dressing Status Clean, dry, & intact 9/30/2019 11:45 AM   Dressing Type Transparent 9/30/2019 11:45 AM   Hub Color/Line Status Pink;Flushed 9/30/2019 11:45 AM        Opportunity for questions and clarification was provided.       Patient transported with:   [employer] declines

## 2023-07-05 NOTE — ED ADULT NURSE NOTE - NSFALLUNIVINTERV_ED_ALL_ED
Bed/Stretcher in lowest position, wheels locked, appropriate side rails in place/Call bell, personal items and telephone in reach/Instruct patient to call for assistance before getting out of bed/chair/stretcher/Non-slip footwear applied when patient is off stretcher/[place] to call system/Physically safe environment - no spills, clutter or unnecessary equipment/Purposeful proactive rounding/Room/bathroom lighting operational, light cord in reach

## 2023-07-05 NOTE — PATIENT PROFILE ADULT - FUNCTIONAL ASSESSMENT - BASIC MOBILITY 6.
2-calculated by average/Not able to assess (calculate score using [facility] averaging method)  2-calculated by average/Not able to assess (calculate score using [facility] averaging method)

## 2023-07-05 NOTE — ED PROVIDER NOTE - CLINICAL SUMMARY MEDICAL DECISION MAKING FREE TEXT BOX
Elderly female history of UTIs and urosepsis in the past comes in with UTI.  Positive a week ago never started on antibiotics.  Vitals are normal no fever.  No white count.  Sodium is 130.  Rest of the work-up is negative.  UA is positive again.  Will start on meropenem.  Admitted to medicine for further management.

## 2023-07-05 NOTE — ED ADULT NURSE NOTE - OBJECTIVE STATEMENT
PT presents to er with complaints of left knee pain and swelling from previous arthroscopy with pos Bacteremia, pt sent in for sepsis workup and admission, pt denies chest pain, sob at this time.

## 2023-07-05 NOTE — ED ADULT NURSE NOTE - FINAL NURSING ELECTRONIC SIGNATURE
Visit Information Date & Time Provider Department Dept. Phone Encounter #  
 3/23/2017  7:30 AM Jonatan Faith., [address] [facility] [phone] [id number] Follow-up Instructions Return in about 6 months (around 9/23/2017) for rov, [facility] next visit. Upcoming Health Maintenance Date Due  
 EYE EXAM RETINAL OR DILATED Q1 4/20/2016 INFLUENZA AGE 9 TO ADULT 8/1/2016 FOOT EXAM Q1 3/15/2017 HEMOGLOBIN A1C Q6M 9/15/2017 MICROALBUMIN Q1 3/15/2018 LIPID PANEL Q1 3/15/2018 PAP AKA CERVICAL CYTOLOGY 3/17/2018 BREAST CANCER SCRN MAMMOGRAM 3/8/2019 DTaP/Tdap/Td series (2 - Td) 3/17/2025 COLONOSCOPY 3/17/2025 Allergies as of 3/23/2017  Review Complete On: 3/23/2017 By: Jonatan Faith., DO Severity Noted Reaction Type Reactions Aspirin  02/16/2015    Other (comments) Bleeding Current Immunizations  Reviewed on 3/15/2016 Name Date Influenza Vaccine 3/15/2016 Pneumococcal Polysaccharide (PPSV-23) 3/17/2015 Tdap 3/17/2015 Not reviewed this visit You Were Diagnosed With   
  
 Codes Comments Routine general medical examination at a health care facility    -  Primary ICD-10-CM: Z00.00 ICD-9-CM: V70.0 Pure hypercholesterolemia     ICD-10-CM: E78.00 ICD-9-CM: 272.0 Controlled type 2 diabetes mellitus without complication, without long-term current use of insulin ([facility]     ICD-10-CM: E11.9 ICD-9-CM: 250.00 Irritable bowel syndrome without diarrhea     ICD-10-CM: K58.9 ICD-9-CM: 028.9 Mild single current episode of major depressive disorder (HCC)     ICD-10-CM: F32.0 ICD-9-CM: 296.21 Hep C w/o coma, chronic (HCC)     ICD-10-CM: B18.2 ICD-9-CM: 070.54 Vitals BP Pulse Temp Resp Height(growth percentile) Weight(growth percentile) 123/73 (BP 1 Location: Left arm, BP Patient Position: Sitting) 78 97.7 °F (36.5 °C) (Oral) 16 5' 8\" (1.727 m) 203 lb 6.4 oz (92.3 kg) SpO2 BMI OB Status Smoking Status 95% 30.93 kg/m2 Menopause Former Smoker BMI and BSA Data Body Mass Index Body Surface Area 30.93 kg/m 2 2.1 m 2 Your Updated Medication List  
  
   
This list is accurate as of: 3/23/17  8:05 AM.  Always use your most recent med list.  
  
  
  
  
 amitriptyline 50 mg tablet Commonly known as:  ELAVIL Take 1 Tab by mouth nightly. metFORMIN 500 mg tablet Commonly known as:  GLUCOPHAGE Take 1 Tab by mouth two (2) times daily (with meals). OTHER(NON-FORMULARY) Indications: testosterone and estradio pellets subQ  
  
 progesterone 200 mg capsule Commonly known as:  PROMETRIUM Take 200 mg by mouth daily. raNITIdine 150 mg tablet Commonly known as:  ZANTAC Take 1 Tab by mouth two (2) times a day. sertraline 100 mg tablet Commonly known as:  ZOLOFT Take 1 Tab by mouth daily. simvastatin 20 mg tablet Commonly known as:  ZOCOR Take 1 Tab by mouth nightly. Prescriptions Printed Refills  
 metFORMIN (GLUCOPHAGE) 500 mg tablet 4 Sig: Take 1 Tab by mouth two (2) times daily (with meals). Class: Print Route: Oral  
 amitriptyline (ELAVIL) 50 mg tablet 4 Sig: Take 1 Tab by mouth nightly. Class: Print Route: Oral  
 sertraline (ZOLOFT) 100 mg tablet 4 Sig: Take 1 Tab by mouth daily. Class: Print Route: Oral  
 simvastatin (ZOCOR) 20 mg tablet 4 Sig: Take 1 Tab by mouth nightly. Class: Print Route: Oral  
 raNITIdine (ZANTAC) 150 mg tablet 4 Sig: Take 1 Tab by mouth two (2) times a day. Class: Print Route: Oral  
  
We Performed the Following AMB POC EKG ROUTINE W/ 12 LEADS, INTER & REP [75562 CPT(R)] REFERRAL TO GASTROENTEROLOGY [RYK82 Custom] Comments:  
 Please evaluate patient for Hep C. Follow-up Instructions Return in about 6 months (around 9/23/2017) for BRANT garner [facility] next visit. To-Do List   
 03/23/2017 Lab:  HCV RT-PCR, QUANT (NON-GRAPH) Referral Information Referral ID Referred By Referred To  
  
 [id number] Astrid Hernández, [address] [facility] MD [place]   
   [address] Phone: [phone] Fax: [phone] Visits Status Start Date End Date 1 New Request 3/23/17 3/23/18 If your referral has a status of pending review or denied, additional information will be sent to support the outcome of this decision. Patient Instructions Hepatitis C: Care Instructions Your Care Instructions Hepatitis C is an infection of the liver caused by a virus. This virus spreads when blood or body fluids from an infected person enter another person's body. This occurs most often when people share needles that have the virus on them. In the past, people got the virus through blood transfusions and organ transplants. But since 1992, all donated blood and organs have been screened for hepatitis C. So getting the virus this way is now very rare. Less often, hepatitis C can spread through sex and sharing items such as razor blades or toothbrushes. Needles used for tattoos and body piercings can also spread the virus. The virus doesn't always cause symptoms. But you may feel tired. And you may have a headache, sore muscles, nausea, and pain in the upper right belly. Other symptoms include yellowish skin and dark urine. Home treatment can help ease symptoms. And your doctor may prescribe antiviral medicine. Long-term infection can lead to severe liver damage. So make sure to go to your follow-up appointments. Follow-up care is a key part of your treatment and safety. Be sure to make and go to all appointments, and call your doctor if you are having problems. It's also a good idea to know your test results and keep a list of the medicines you take. How can you care for yourself at home? · Be safe with medicines. If your doctor prescribes antiviral medicine, take it exactly as prescribed. Call your doctor if you think you are having a problem with your medicine. · Do not drink alcohol. Alcohol can damage the liver. Tell your doctor if you need help to quit. Counseling, support groups, and sometimes medicines can help you stay sober. · Do not take drugs or herbal medicines. They can make liver problems worse. · Make sure your doctor knows all of the medicines you take. Some medicines, such as acetaminophen (Tylenol), can make liver problems worse. Do not take any new medicines unless your doctor tells you to. This includes over-the-counter medicines. · Maintain a healthy lifestyle. Get plenty of exercise if you feel up to it. Eat a healthy diet. · Drink plenty of fluids, enough so that your urine is light yellow or clear like water. If you have kidney, heart, or liver disease and have to limit fluids, talk with your doctor before you increase the amount of fluids you drink. · Get the vaccines (if you have not already) to protect yourself from hepatitis A and hepatitis B, influenza, and pneumococcus. · The infection can make you itch. Keep cool and stay out of the sun. Try to wear cotton clothing. Talk to your doctor about using over-the-counter medicines for itching. These include diphenhydramine (Benadryl) and chlorpheniramine (Chlor-Trimeton). Follow the instructions on the label. · If you feel depressed, talk to your doctor about treatment. Many people who have long-term illnesses get depressed. Keep in mind that antiviral medicine can make depression worse. To avoid spreading hepatitis C to others · Tell the people that you live with or have sex with about your illness as soon as you can. · Don't share needles to inject drugs. Don't share other equipment (such as cotton, spoons, and water) with others.  Find out if a needle exchange program is available in your area, and use it. Get into a drug treatment program. 
· Practice safer sex. Reduce your number of sex partners if you have more than one. Unless you are in a long-term relationship in which neither partner has sex with anyone else, always use latex condoms when you have sex. · Don't donate blood or blood products, organs, semen, or eggs (ova). · Make sure that all equipment is sterilized if you get a tattoo, have your body pierced, or have acupuncture. · Do not share your personal items. These include razors, toothbrushes, towels, and nail files. · Tell your doctor, dentist, and anyone else who may come in contact with your blood about your illness. · Prevent others from coming in contact with your blood and other body fluids. Keep any cuts, scrapes, or blisters covered. · Wash your handsand any object that has come in contact with your bloodthoroughly with water and soap. When should you call for help? Call 911 anytime you think you may need emergency care. For example, call if: 
· You passed out (lost consciousness). · You have severe trouble breathing. · You feel very confused and can't think clearly. · You vomit blood or what looks like coffee grounds. · You pass maroon or very bloody stools. Call your doctor now or seek immediate medical care if: 
· You have any trouble breathing. · You have new bruises or blood spots under your skin. · Your stools are black and tarlike or have streaks of blood. · You have signs of needing more fluids. You have sunken eyes and a dry mouth, and you pass only a little dark urine. Watch closely for changes in your health, and be sure to contact your doctor if: 
· You have a nosebleed. · Your gums bleed when you brush your teeth. Where can you learn more? Go to http://brandon-anderson.info/. Enter G977 in the search box to learn more about \"Hepatitis C: Care Instructions. \" Current as of: May 24, 2016 Content Version: 11.1 © 0076-7994 [employer]. Care instructions adapted under license by [employer] (which disclaims liability or warranty for this information). If you have questions about a medical condition or this instruction, always ask your healthcare professional. [address] any warranty or liability for your use of this information. Learning About Hepatitis C What is hepatitis C? Hepatitis C is a liver infection. It is caused by the hepatitis C virus. The virus is spread through infected blood and body fluids. Hepatitis C is often spread when a person shares infected needles used to inject illegal drugs. It also can be spread if a person uses a needle that has infected blood on it. This could happen when you get a tattoo or piercing. Or it can happen when you get a shot in some developing countries where they use needles more than once to give shots. In rare cases, a mother with hepatitis C can spread the virus to her baby at birth. Or a health care worker may accidentally be exposed to blood that is infected with hepatitis C. There is a very small risk of getting the virus through sexual contact. The risk is higher if your sex partner also has HIV or another sexually transmitted infection, or if you have many sex partners. You can't get hepatitis C from casual contact. This is contact such as hugging, kissing, sneezing, coughing, and sharing food or drinks. What happens when you have hepatitis C? Some people who get hepatitis C have it for a short time and then get better. This is called acute hepatitis C. But most people get long-term, or chronic, hepatitis C. This can lead to liver damage as well as cirrhosis, liver cancer, and liver failure. Experts recommend that certain groups of people get tested for the virus.  These include people who have signs of liver disease or have ever shared needles while using illegal drugs. Ask your doctor if testing is right for you. You can also buy a home test called a Home Access Hepatitis C Check kit at most [facility]. If the test shows that you have been exposed to the virus in the past, be sure to talk to your doctor to find out if you have the virus now. What are the symptoms? Most people who get hepatitis C do not have symptoms at first. Symptoms may include: · Tiredness. · Headache. · Sore muscles. · Nausea. · Pain in the upper right belly. · Yellowing of your skin and eyes (jaundice). · Dark urine. How can you prevent hepatitis C? There is no vaccine to prevent the disease. Anyone who has hepatitis C can spread the virus to someone else. You can take steps to make infection less likely. · Do not share needles to inject drugs. · Follow safety guidelines if you work in a health care setting. Wear protective gloves and clothing. Dispose of needles and other sharp objects properly. · Make sure all instruments and supplies are sterilized if you get a tattoo, have your body pierced, or have acupuncture. To avoid spreading hepatitis C if you have it: 
· Do not share needles or other equipment, such as cotton, spoons, and water, if you use needles to inject drugs. · Keep cuts, scrapes, and blisters covered. This will prevent others from coming in contact with your blood and other body fluids. Throw out any blood-soaked items such as used bandages. · Do not donate blood or sperm. · Wash your hands and anything that has come in contact with your blood. Use soap and water. · Do not share your toothbrush, razor, nail clippers, or anything else that might have your blood on it. · Use latex condoms during sex if you have HIV, multiple sex partners, or a sexually transmitted infection. How is hepatitis C treated? · If you have acute hepatitis C, your doctor will probably prescribe medicine. · If you have chronic hepatitis C, your treatment depends on whether you have liver damage, other health problems you may have, and how much virus is in your body and what type it is. · You will need to see your doctor regularly to have blood tests to check your liver. Follow-up care is a key part of your treatment and safety. Be sure to make and go to all appointments, and call your doctor if you are having problems. It's also a good idea to know your test results and keep a list of the medicines you take. Where can you learn more? Go to http://brandon-andesron.info/. Enter C666 in the search box to learn more about \"Learning About Hepatitis C.\" 
Current as of: May 24, 2016 Content Version: 11.1 © 7219-7404 [employer]ax [employer]. Care instructions adapted under license by [employer] (which disclaims liability or warranty for this information). If you have questions about a medical condition or this instruction, always ask your healthcare professional. [address] any warranty or liability for your use of this information. Introducing \[facility] [place] [facility]\"" & HEALTH SERVICES! Bhumi Pickering introduces Next audience patient portal. Now you can access parts of your medical record, email your doctor's office, and request medication refills online. 1. In your internet browser, go to [email or web address] 2. Click on the First Time User? Click Here link in the Sign In box. You will see the New Member Sign Up page. 3. Enter your Next audience Access Code exactly as it appears below. You will not need to use this code after youve completed the sign-up process. If you do not sign up before the expiration date, you must request a new code. · Next audience Access Code: [email or web address] Expires: 6/4/2017  9:32 AM 
 
4. Enter the last four digits of your Social Security Number (xxxx) and Date of Birth (mm/dd/yyyy) as indicated and click Submit.  You will be taken to the next sign-up page. 5. Create a [employer]ium Software ID. This will be your [employer] login ID and cannot be changed, so think of one that is secure and easy to remember. 6. Create a [employer] password. You can change your password at any time. 7. Enter your Password Reset Question and Answer. This can be used at a later time if you forget your password. 8. Enter your e-mail address. You will receive e-mail notification when new information is available in [address]. 9. Click Sign Up. You can now view and download portions of your medical record. 10. Click the Download Summary menu link to download a portable copy of your medical information. If you have questions, please visit the Frequently Asked Questions section of the [employer] website. Remember, [employer] Software is NOT to be used for urgent needs. For medical emergencies, dial 911. Now available from your iPhone and Android! Please provide this summary of care documentation to your next provider. Your primary care clinician is listed as [id number] [facility]. If you have any questions after today's visit, please call [phone]. 05-Jul-2023 20:17

## 2023-07-06 DIAGNOSIS — I34.0 NONRHEUMATIC MITRAL (VALVE) INSUFFICIENCY: ICD-10-CM

## 2023-07-06 DIAGNOSIS — I35.0 NONRHEUMATIC AORTIC (VALVE) STENOSIS: ICD-10-CM

## 2023-07-06 DIAGNOSIS — N39.0 URINARY TRACT INFECTION, SITE NOT SPECIFIED: ICD-10-CM

## 2023-07-06 LAB
ADD ON TEST-SPECIMEN IN LAB: SIGNIFICANT CHANGE UP
ANION GAP SERPL CALC-SCNC: 6 MMOL/L — SIGNIFICANT CHANGE UP (ref 5–17)
BASOPHILS # BLD AUTO: 0.01 K/UL — SIGNIFICANT CHANGE UP (ref 0–0.2)
BASOPHILS NFR BLD AUTO: 0.2 % — SIGNIFICANT CHANGE UP (ref 0–2)
BUN SERPL-MCNC: 25 MG/DL — HIGH (ref 7–23)
CALCIUM SERPL-MCNC: 9.4 MG/DL — SIGNIFICANT CHANGE UP (ref 8.5–10.1)
CHLORIDE SERPL-SCNC: 107 MMOL/L — SIGNIFICANT CHANGE UP (ref 96–108)
CO2 SERPL-SCNC: 22 MMOL/L — SIGNIFICANT CHANGE UP (ref 22–31)
CREAT SERPL-MCNC: 1.28 MG/DL — SIGNIFICANT CHANGE UP (ref 0.5–1.3)
EGFR: 39 ML/MIN/1.73M2 — LOW
EOSINOPHIL # BLD AUTO: 0.02 K/UL — SIGNIFICANT CHANGE UP (ref 0–0.5)
EOSINOPHIL NFR BLD AUTO: 0.4 % — SIGNIFICANT CHANGE UP (ref 0–6)
GLUCOSE SERPL-MCNC: 107 MG/DL — HIGH (ref 70–99)
HCT VFR BLD CALC: 29 % — LOW (ref 34.5–45)
HGB BLD-MCNC: 9.9 G/DL — LOW (ref 11.5–15.5)
IMM GRANULOCYTES NFR BLD AUTO: 1.5 % — HIGH (ref 0–0.9)
LYMPHOCYTES # BLD AUTO: 1.8 K/UL — SIGNIFICANT CHANGE UP (ref 1–3.3)
LYMPHOCYTES # BLD AUTO: 38.1 % — SIGNIFICANT CHANGE UP (ref 13–44)
MCHC RBC-ENTMCNC: 31.1 PG — SIGNIFICANT CHANGE UP (ref 27–34)
MCHC RBC-ENTMCNC: 34.1 GM/DL — SIGNIFICANT CHANGE UP (ref 32–36)
MCV RBC AUTO: 91.2 FL — SIGNIFICANT CHANGE UP (ref 80–100)
MONOCYTES # BLD AUTO: 0.57 K/UL — SIGNIFICANT CHANGE UP (ref 0–0.9)
MONOCYTES NFR BLD AUTO: 12.1 % — SIGNIFICANT CHANGE UP (ref 2–14)
NEUTROPHILS # BLD AUTO: 2.26 K/UL — SIGNIFICANT CHANGE UP (ref 1.8–7.4)
NEUTROPHILS NFR BLD AUTO: 47.7 % — SIGNIFICANT CHANGE UP (ref 43–77)
OSMOLALITY SERPL: 285 MOSMOL/KG — SIGNIFICANT CHANGE UP (ref 280–301)
PLATELET # BLD AUTO: 122 K/UL — LOW (ref 150–400)
POTASSIUM SERPL-MCNC: 4.3 MMOL/L — SIGNIFICANT CHANGE UP (ref 3.5–5.3)
POTASSIUM SERPL-SCNC: 4.3 MMOL/L — SIGNIFICANT CHANGE UP (ref 3.5–5.3)
RBC # BLD: 3.18 M/UL — LOW (ref 3.8–5.2)
RBC # FLD: 15.4 % — HIGH (ref 10.3–14.5)
SODIUM SERPL-SCNC: 135 MMOL/L — SIGNIFICANT CHANGE UP (ref 135–145)
TSH SERPL-MCNC: 3.87 UU/ML — SIGNIFICANT CHANGE UP (ref 0.34–4.82)
WBC # BLD: 4.73 K/UL — SIGNIFICANT CHANGE UP (ref 3.8–10.5)
WBC # FLD AUTO: 4.73 K/UL — SIGNIFICANT CHANGE UP (ref 3.8–10.5)

## 2023-07-06 PROCEDURE — 99497 ADVNCD CARE PLAN 30 MIN: CPT | Mod: 25

## 2023-07-06 PROCEDURE — 12345: CPT | Mod: NC

## 2023-07-06 PROCEDURE — 99223 1ST HOSP IP/OBS HIGH 75: CPT

## 2023-07-06 RX ORDER — MEROPENEM 1 G/30ML
1000 INJECTION INTRAVENOUS EVERY 12 HOURS
Refills: 0 | Status: DISCONTINUED | OUTPATIENT
Start: 2023-07-06 | End: 2023-07-08

## 2023-07-06 RX ORDER — LACTOBACILLUS ACIDOPHILUS 100MM CELL
1 CAPSULE ORAL
Refills: 0 | Status: DISCONTINUED | OUTPATIENT
Start: 2023-07-06 | End: 2023-07-10

## 2023-07-06 RX ADMIN — Medication 1 MILLIGRAM(S): at 10:37

## 2023-07-06 RX ADMIN — MEROPENEM 100 MILLIGRAM(S): 1 INJECTION INTRAVENOUS at 22:45

## 2023-07-06 RX ADMIN — Medication 1 DROP(S): at 22:46

## 2023-07-06 RX ADMIN — GABAPENTIN 300 MILLIGRAM(S): 400 CAPSULE ORAL at 00:12

## 2023-07-06 RX ADMIN — Medication 25 MILLIGRAM(S): at 15:54

## 2023-07-06 RX ADMIN — Medication 1 TABLET(S): at 10:36

## 2023-07-06 RX ADMIN — Medication 650 MILLIGRAM(S): at 13:35

## 2023-07-06 RX ADMIN — MEROPENEM 100 MILLIGRAM(S): 1 INJECTION INTRAVENOUS at 10:37

## 2023-07-06 RX ADMIN — MEROPENEM 100 MILLIGRAM(S): 1 INJECTION INTRAVENOUS at 00:11

## 2023-07-06 RX ADMIN — Medication 100 MILLIGRAM(S): at 10:36

## 2023-07-06 RX ADMIN — Medication 650 MILLIGRAM(S): at 12:24

## 2023-07-06 RX ADMIN — Medication 5 MILLIGRAM(S): at 22:49

## 2023-07-06 RX ADMIN — Medication 1 TABLET(S): at 16:47

## 2023-07-06 RX ADMIN — TAMSULOSIN HYDROCHLORIDE 0.4 MILLIGRAM(S): 0.4 CAPSULE ORAL at 22:50

## 2023-07-06 RX ADMIN — Medication 1 DROP(S): at 10:37

## 2023-07-06 RX ADMIN — HEPARIN SODIUM 5000 UNIT(S): 5000 INJECTION INTRAVENOUS; SUBCUTANEOUS at 22:47

## 2023-07-06 RX ADMIN — SENNA PLUS 2 TABLET(S): 8.6 TABLET ORAL at 22:50

## 2023-07-06 RX ADMIN — HEPARIN SODIUM 5000 UNIT(S): 5000 INJECTION INTRAVENOUS; SUBCUTANEOUS at 10:37

## 2023-07-06 RX ADMIN — SODIUM CHLORIDE 75 MILLILITER(S): 9 INJECTION INTRAMUSCULAR; INTRAVENOUS; SUBCUTANEOUS at 00:48

## 2023-07-06 RX ADMIN — ATORVASTATIN CALCIUM 20 MILLIGRAM(S): 80 TABLET, FILM COATED ORAL at 22:46

## 2023-07-06 RX ADMIN — GABAPENTIN 300 MILLIGRAM(S): 400 CAPSULE ORAL at 10:36

## 2023-07-06 RX ADMIN — Medication 1000 UNIT(S): at 10:36

## 2023-07-06 RX ADMIN — POLYETHYLENE GLYCOL 3350 17 GRAM(S): 17 POWDER, FOR SOLUTION ORAL at 10:36

## 2023-07-06 RX ADMIN — Medication 25 MILLIGRAM(S): at 22:49

## 2023-07-06 RX ADMIN — LORATADINE 10 MILLIGRAM(S): 10 TABLET ORAL at 10:36

## 2023-07-06 RX ADMIN — ONDANSETRON 4 MILLIGRAM(S): 8 TABLET, FILM COATED ORAL at 15:59

## 2023-07-06 RX ADMIN — GABAPENTIN 300 MILLIGRAM(S): 400 CAPSULE ORAL at 22:46

## 2023-07-06 RX ADMIN — Medication 25 MILLIGRAM(S): at 22:50

## 2023-07-06 NOTE — PHYSICAL THERAPY INITIAL EVALUATION ADULT - MANUAL MUSCLE TESTING RESULTS, REHAB EVAL
except R shld 2-, L shld 3+, L KE 3+, knee flex at least 2+, B DF 4, R knee 3+/no strength deficits were identified

## 2023-07-06 NOTE — CONSULT NOTE ADULT - SUBJECTIVE AND OBJECTIVE BOX
HPI:  89 y/o F with PMH HTN, HLD, severe AS, HFpEF, hx of pérez, hx of aspiration pneumonia and UTI presents with weakness. The pt stated that she had increased BP today and has been constipated. I spoke to her daughter Marlen over the phone who stated that her mother has had a flat affect the last few days and increasing weakness. She has been unable to stand on her own. She went to Dr. Rojelio Collins on 6/28 and had a UCx completed and she was told it was positive today. Denies fevers, chills, chest pain, SOB, abdominal pain, N/V, diarrhea/constipation.     Prior admission   - 6/13/23: septic shock secondary to UTI, UCx with candida and pt was on Meropenem and Diflucan, AMS     ER course: //61. Labs: Hb 10 (baseline ~9), , immature granulocytes 1.0%, Na 130, BUN 31, Cr 1.65 (baseline ~1.5), glucose 118. UA: cloudy, protein 100, positive nitrite, moderate luekocyte esterase, moderate blood, WBC > 50, RBC 6-10, bacteria TNTC. EKG: NSR with HR 76 bpm, 1st degree AV block (), no ST segment changes, no T wave inversions (personally reviewed).  CXR: no consolidation, no effusion, no pneumothorax (personally reviewed).     Pt was given Meropenem, hydralazine PO x 1 dose. She is being admitted to med/surg for further management.  (06 Jul 2023 03:16)    89 yo female with PMH as above admitted for weakness. In ED required pérez placement for urinary retention, pt with recent admission for UTI/urosepsis with urinary retention was discharged without pérez but required pérez placement on this admission again due to urinary retention unclear PVR. Pt is a limited historian reports she was voiding freely at home and is unsure if she was having any incontinence, dysuria or other symptoms.     PAST MEDICAL & SURGICAL HISTORY:  HTN (hypertension)      HLD (hyperlipidemia)      Gout      Osteoarthritis      Acute on chronic diastolic congestive heart failure      Aortic stenosis      H/O CHF      Pneumonia, aspiration      Pneumonia      History of tonsillectomy  in childhood          REVIEW OF SYSTEMS      All other review of systems neg, except as noted in HPI  MEDICATIONS  (STANDING):  allopurinol 100 milliGRAM(s) Oral daily  artificial  tears Solution 1 Drop(s) Both EYES two times a day  atorvastatin 20 milliGRAM(s) Oral at bedtime  cholecalciferol 1000 Unit(s) Oral daily  enalaprilat Injectable 1.25 milliGRAM(s) IV Push once  folic acid 1 milliGRAM(s) Oral daily  gabapentin 300 milliGRAM(s) Oral two times a day  heparin   Injectable 5000 Unit(s) SubCutaneous every 12 hours  hydrALAZINE 25 milliGRAM(s) Oral two times a day  lactobacillus acidophilus 1 Tablet(s) Oral two times a day with meals  lidocaine   4% Patch 1 Patch Transdermal daily  loratadine 10 milliGRAM(s) Oral daily  melatonin 5 milliGRAM(s) Oral at bedtime  meropenem  IVPB 1000 milliGRAM(s) IV Intermittent every 12 hours  metoprolol tartrate 25 milliGRAM(s) Oral two times a day  senna 2 Tablet(s) Oral <User Schedule>  sodium chloride 0.9%. 1000 milliLiter(s) (75 mL/Hr) IV Continuous <Continuous>  tamsulosin 0.4 milliGRAM(s) Oral at bedtime    MEDICATIONS  (PRN):  acetaminophen     Tablet .. 650 milliGRAM(s) Oral every 6 hours PRN Temp greater or equal to 38C (100.4F), Mild Pain (1 - 3)  aluminum hydroxide/magnesium hydroxide/simethicone Suspension 30 milliLiter(s) Oral every 4 hours PRN Dyspepsia  calcium carbonate    500 mG (Tums) Chewable 1 Tablet(s) Chew four times a day PRN Indigestion  enalaprilat Injectable 1.25 milliGRAM(s) IV Push every 6 hours PRN SBP > 160  melatonin 3 milliGRAM(s) Oral at bedtime PRN Insomnia  ondansetron Injectable 4 milliGRAM(s) IV Push every 8 hours PRN Nausea and/or Vomiting  pantoprazole    Tablet 40 milliGRAM(s) Oral before breakfast PRN for indigestion  polyethylene glycol 3350 17 Gram(s) Oral daily PRN Constipation      Allergies    Ceftin (Hives; Rash)    Intolerances        SOCIAL HISTORY:    FAMILY HISTORY:  Family history of hypertension (Father, Mother)        Vital Signs Last 24 Hrs  T(C): 36.4 (06 Jul 2023 07:11), Max: 36.7 (05 Jul 2023 23:30)  T(F): 97.5 (06 Jul 2023 07:11), Max: 98 (05 Jul 2023 23:30)  HR: 68 (06 Jul 2023 09:10) (61 - 82)  BP: 150/47 (06 Jul 2023 09:10) (123/46 - 196/61)  BP(mean): 96 (05 Jul 2023 15:41) (96 - 96)  RR: 18 (06 Jul 2023 07:11) (17 - 18)  SpO2: 95% (06 Jul 2023 07:11) (94% - 96%)    Parameters below as of 06 Jul 2023 07:11  Patient On (Oxygen Delivery Method): room air        PHYSICAL EXAM:      General: No distress, No anxiety  VITALS  T(C): 36.4 (07-06-23 @ 07:11), Max: 36.7 (07-05-23 @ 23:30)  HR: 68 (07-06-23 @ 09:10) (61 - 82)  BP: 150/47 (07-06-23 @ 09:10) (123/46 - 196/61)  RR: 18 (07-06-23 @ 07:11) (17 - 18)  SpO2: 95% (07-06-23 @ 07:11) (94% - 96%)               HEENT: Normocephalic, no icterus , EOM full , No epistaxis  Lung    : No resp distress  Abdo:   : Soft, Non tender, No guarding, No distension   Back    : No CVAT b/l  Genitalia Female: Pérez with yellow urine           LABS:                        9.9    4.73  )-----------( 122      ( 06 Jul 2023 08:37 )             29.0     07-06    135  |  107  |  25<H>  ----------------------------<  107<H>  4.3   |  22  |  1.28    Ca    9.4      06 Jul 2023 08:37    TPro  7.1  /  Alb  3.6  /  TBili  0.2  /  DBili  x   /  AST  16  /  ALT  20  /  AlkPhos  64  07-05    PT/INR - ( 05 Jul 2023 13:15 )   PT: 11.7 sec;   INR: 1.01 ratio         PTT - ( 05 Jul 2023 13:15 )  PTT:28.1 sec  Urinalysis Basic - ( 06 Jul 2023 08:37 )    Color: x / Appearance: x / SG: x / pH: x  Gluc: 107 mg/dL / Ketone: x  / Bili: x / Urobili: x   Blood: x / Protein: x / Nitrite: x   Leuk Esterase: x / RBC: x / WBC x   Sq Epi: x / Non Sq Epi: x / Bacteria: x        RADIOLOGY & ADDITIONAL STUDIES:  < from: CT Abdomen and Pelvis No Cont (06.08.23 @ 10:16) >  ACC: [id number] EXAM:  CT ABDOMEN AND PELVIS   ORDERED BY: TAYLOR SOSA     PROCEDURE DATE:  06/08/2023          INTERPRETATION:  CLINICAL INFORMATION: Abdominal pain, fever.    COMPARISON: May 24, 2023.    CONTRAST/COMPLICATIONS:  IV Contrast: [id number] cc administered   10 cc discarded  Oral Contrast: NONE  Complications: None reported at time of study completion    PROCEDURE:  CT of the Abdomen and Pelvis was performed.  Sagittal and coronal reformats were performed.    FINDINGS:  LOWER CHEST: Again is noted subpleural reticulation at the visualized   lung bases, compatible with mild fibrosis. No honeycombing is identified.   Mild bilateral lower lobe dependent subpleural atelectasis.    LIVER: Within normal limits.  BILE DUCTS: Normal caliber.  GALLBLADDER: Within normal limits.  SPLEEN: Within normal limits.  PANCREAS: Severe atrophy and fatty replacement of the pancreas. No   peripancreatic stranding or fluid collection.  ADRENALS: Within normal limits.  KIDNEYS/URETERS: Small left renal upper pole cysts. Mildly atrophic   kidneys. Otherwise, within normal limits.    BLADDER: Mild concentric wall thickening of the collapsed bladder with   Pérez catheter in place.  REPRODUCTIVE ORGANS: Uterus and adnexa within normal limits.    BOWEL: Moderate stool in the sigmoid colon and rectum. No bowel   obstruction. Appendix is normal.  PERITONEUM: No ascites.  VESSELS: Extensive atherosclerotic calcification of the nonaneurysmal   abdominal aorta and iliac arteries.  RETROPERITONEUM/LYMPH NODES: No lymphadenopathy.  ABDOMINAL WALL: Within normal limits.  BONES: Degenerative disc disease of the lower lumbar spine with trace   anterolisthesis of L4 on L5.    IMPRESSION: Stable mildly thickened bladder, compatible with chronic   cystitis.    --- End of Report ---            ANAMARIA ALMEIDA MD; Attending Radiologist  This document has been electronically signed. Jun 8 2023 10:21AM    < end of copied text >  
  CHIEF COMPLAINT:  Patient is a 89y old  Female who presents with a chief complaint of Weakness (2023 03:16)      HPI:  23:  89 y/o F, well known to me, with PMH HTN, HLD, severe AS, HFpEF, hx of [place] hx of aspiration pneumonia and UTI presents with weakness. The pt stated that she had increased BP today and has been constipated. I spoke to her daughter Marlen over the phone who stated that her mother has had a flat affect the last few days and increasing weakness. She has been unable to stand on her own. She went to Dr. Rojelio Collins on  and had a UCx completed and she was told it was positive today. Denies fevers, chills, abdominal pain, N/V, diarrhea/constipation.  This AM she denies anginal chest pains or increased SOB.  No new cardiac symptoms.    Prior admission   - 23: septic shock secondary to UTI, UCx with candida and pt was on Meropenem and Diflucan, AMS     ER course: //61. Labs: Hb 10 (baseline ~9), , immature granulocytes 1.0%, Na 130, BUN 31, Cr 1.65 (baseline ~1.5), glucose 118. UA: cloudy, protein 100, positive nitrite, moderate luekocyte esterase, moderate blood, WBC > 50, RBC 6-10, bacteria TNTC. EKG: NSR with HR 76 bpm, 1st degree AV block (), no ST segment changes, no T wave inversions (personally reviewed).  CXR: no consolidation, no effusion, no pneumothorax (personally reviewed).   Pt was given Meropenem, hydralazine PO x 1 dose. She is being admitted to med/surg for further management.        PMHx:  PAST MEDICAL & SURGICAL HISTORY:  Gout  Osteoarthritis  HTN (hypertension)  HLD (hyperlipidemia)  Acute on chronic diastolic congestive heart failure  Aortic stenosis  H/O CHF  Pneumonia, aspiration  Pneumonia  History of tonsillectomy in childhood      FAMILY HISTORY:   FAMILY HISTORY:  Family history of hypertension (Father, Mother)      ALLERGIES:  Allergies  Ceftin (Hives; Rash)      REVIEW OF SYSTEMS:  10 point ROS was obtained  Pertinent positives and negatives are as above  All other review of systems is negative unless indicated above      Vital Signs Last 24 Hrs  T(C): 36.4 (2023 07:11), Max: 36.7 (2023 23:30)  T(F): 97.5 (2023 07:11), Max: 98 (2023 23:30)  HR: 62 (2023 07:11) (61 - 82)  BP: 143/45 (2023 07:11) (123/46 - 196/61)  BP(mean): 96 (2023 15:41) (96 - 96)  RR: 18 (2023 07:11) (17 - 18)  SpO2: 95% (2023 07:11) (94% - 96%): room air      I&O's Summary  2023 07:01  -  2023 07:00  --------------------------------------------------------  IN: 0 mL / OUT: 700 mL / NET: -700 mL      PHYSICAL EXAM:   Constitutional: NAD, awake and alert, well-developed  HEENT: PERR, EOMI, Normal Hearing, MMM  Neck: Soft and supple, No LAD, No JVD  Respiratory: Breath sounds are clear bilaterally, No wheezing, rales or rhonchi  Cardiovascular: S1 and S2, regular rate and rhythm, harsh LIANA at base and soft systolic murmur LLSB as before, (+) S4 gallop; no rubs  Gastrointestinal: Bowel Sounds present, soft, nontender, nondistended, no guarding, no rebound  Extremities: No peripheral edema  Vascular: 2+ peripheral pulses  Neurological: no focal deficits      MEDICATIONS  (STANDING):  allopurinol 100 milliGRAM(s) Oral daily  artificial  tears Solution 1 Drop(s) Both EYES two times a day  atorvastatin 20 milliGRAM(s) Oral at bedtime  cholecalciferol 1000 Unit(s) Oral daily  enalaprilat Injectable 1.25 milliGRAM(s) IV Push once  folic acid 1 milliGRAM(s) Oral daily  gabapentin 300 milliGRAM(s) Oral two times a day  heparin   Injectable 5000 Unit(s) SubCutaneous every 12 hours  hydrALAZINE 25 milliGRAM(s) Oral two times a day  lactobacillus acidophilus 1 Tablet(s) Oral two times a day with meals  lidocaine   4% Patch 1 Patch Transdermal daily  loratadine 10 milliGRAM(s) Oral daily  melatonin 5 milliGRAM(s) Oral at bedtime  meropenem  IVPB 500 milliGRAM(s) IV Intermittent every 12 hours  metoprolol tartrate 25 milliGRAM(s) Oral two times a day  senna 2 Tablet(s) Oral <User Schedule>  sodium chloride 0.9%. 1000 milliLiter(s) (75 mL/Hr) IV Continuous <Continuous>  tamsulosin 0.4 milliGRAM(s) Oral at bedtime    MEDICATIONS  (PRN):  acetaminophen     Tablet .. 650 milliGRAM(s) Oral every 6 hours PRN Temp greater or equal to 38C (100.4F), Mild Pain (1 - 3)  aluminum hydroxide/magnesium hydroxide/simethicone Suspension 30 milliLiter(s) Oral every 4 hours PRN Dyspepsia  calcium carbonate    500 mG (Tums) Chewable 1 Tablet(s) Chew four times a day PRN Indigestion  enalaprilat Injectable 1.25 milliGRAM(s) IV Push every 6 hours PRN SBP > 160  melatonin 3 milliGRAM(s) Oral at bedtime PRN Insomnia  ondansetron Injectable 4 milliGRAM(s) IV Push every 8 hours PRN Nausea and/or Vomiting  pantoprazole    Tablet 40 milliGRAM(s) Oral before breakfast PRN for indigestion  polyethylene glycol 3350 17 Gram(s) Oral daily PRN Constipation      LABS: All Labs Reviewed:                        10.0   5.09  )-----------( 119      ( 2023 13:15 )             29.8     07-05    130<L>  |  102  |  31<H>  ----------------------------<  118<H>  4.5   |  23  |  1.65<H>    Ca    9.5      2023 13:15    TPro  7.1  /  Alb  3.6  /  TBili  0.2  /  DBili  x   /  AST  16  /  ALT  20  /  AlkPhos  64  07-05    PT/INR - ( 2023 13:15 )   PT: 11.7 sec;   INR: 1.01 ratio       PTT - ( 2023 13:15 )  PTT:28.1 sec      BLOOD CULTURES:   LIPID PROFILE     RADIOLOGY:    CXR: 23:  FINDINGS:  CATHETERS AND TUBES: None  PULMONARY: The visualized lungs are clear of airspace consolidations or pleural effusions. No pneumothorax.  HEART/VASCULAR: The heart is mildly enlarged in transverse diameter.  BONES: Visualized osseous thorax intact.  IMPRESSION:   No radiographic evidence of active chest disease.      EK23:  Sinus rhythm with 1st degree A-V block  Possible Left atrial enlargement  Left ventricular hypertrophy  As before    TELEMETRY:      ECHO:  23:  Findings:  Left Ventricle   Followup study to evaluate for endocarditis.   Valves appear normal - no evidence of endocarditis.    Summary   Followup study to evaluate for endocarditis.   Valves appear normal - no evidence of endocarditis.    Signature   ----------------------------------------------------------------   Electronically signed by Dave Monet MD(Interpreting   physician) on 2023 05:17 PM   ----------------------------------------------------------------    ECHO:  3/1/23:  M-Mode Measurements (cm)   LVEDd: 3.97 cm            LVESd: 2.55 cm   IVSEd: 1.1 cm   LVPWd: 1.1 cm             AO Root Dimension: 2.8 cm                             LA: 3.5 cm                LVOT: 2 cm  Doppler Measurements:   AV Velocity:434 cm/s                  MV Peak E-Wave: 98.7 cm/s   AV Peak Gradient: 75.34 mmHg          MV Peak A-Wave: 131 cm/s   AV Mean Gradient: 40 mmHg             MV E/A Ratio: 0.75 %   AV Area (Continuity):0.85 cm^2        MV Peak Gradient: 3.9 mmHg   TR Velocity:190 cm/s   TR Gradient:14.44 mmHg   Estimated RAP:5 mmHg   RVSP:27 mmHg    Findings  Mitral Valve   The mitral valve leaflets appear thickened.   EA reversal of the mitral inflow consistent with reduced compliance of the left ventricle.   Mild mitral annular calcification is present.   Mild mitral regurgitation is present.    Aortic Valve   Peak and mean transaortic gradients are 75 and 40mmHg respectively; this finding is consistent with severe aortic stenosis.   Mild (1+) aortic regurgitation is present.    Tricuspid Valve   Trace tricuspid valve regurgitation is present.    Pulmonic Valve   Mild pulmonic valvular regurgitation (1+) is present.    Left Atrium   Normal appearing left atrium.    Left Ventricle   Mild concentric left ventricular hypertrophy is present.   The left ventricle is normal in size.   Estimated left ventricular ejection fraction is 65-70 %.    Right Atrium   Normal appearing right atrium.    Right Ventricle   Normal appearing right ventricle structure and function.    Pericardial Effusion   No evidence of pericardial effusion.    Pleural Effusion   No evidence of pleural effusion.    Miscellaneous   The IVC appears normal.    Summary   The mitral valve leaflets appear thickened.   EA reversal of the mitral inflow consistent with reduced compliance of the left ventricle.   Mild mitral annular calcification is present.   Mild mitral regurgitation is present.   Peak and mean transaortic gradients are 75 and 40mmHg respectively; this finding is consistent with severe aortic stenosis.   Mild (1+) aortic regurgitation is present.   Trace tricuspid valve regurgitation is present.   Mild pulmonic valvular regurgitation (1+) is present.   Normal appearing left atrium.   Mild concentric left ventricular hypertrophy is present.   The left ventricle is normal in size.   Estimated left ventricular ejection fraction is 65-70 %.   Normal appearing right atrium.   Normal appearing right ventricle structure and function.   The IVC appears normal.   No evidence of pericardial effusion.   No evidence of pleural effusion.    Signature   ----------------------------------------------------------------   Electronically signed by Sakina Cheatham MD, Director of   Cardiac Cath Lab(Interpreting physician) on 2023 06:42   PM   ---------------------------------------------------------------- 
Patient is a 89y old  Female who presents with a chief complaint of Weakness (06 Jul 2023 07:33)    HPI:  89 y/o F with PMH HTN, HLD, severe AS, HFpEF, hx of pérez, hx of aspiration pneumonia and UTI presents with weakness. The pt stated that she had increased BP and has been constipated. per daughter she has been weak and unable to stand on her own. She went to Dr. Rojelio Collins on 6/28 and had a UCx completed and she was told it was positive. Denies fevers, chills, chest pain, SOB, abdominal pain, N/V, diarrhea/constipation. Prior admission  6/13/23: septic shock secondary to UTI, UCx with candida and pt was on Meropenem and Diflucan, AMS ER course: //61. Labs: Hb 10 (baseline ~9), , immature granulocytes 1.0%, Na 130, BUN 31, Cr 1.65 (baseline ~1.5), glucose 118. UA: cloudy, protein 100, positive nitrite, moderate leukocyte esterase, moderate blood, WBC > 50, RBC 6-10, bacteria TNTC. Pt was given Meropenem, hydralazine PO x 1 dose. She was admitted to med/surg for further management.        PMH: as above  PSH: as above  Meds: per reconciliation sheet, noted below  MEDICATIONS  (STANDING):  allopurinol 100 milliGRAM(s) Oral daily  artificial  tears Solution 1 Drop(s) Both EYES two times a day  atorvastatin 20 milliGRAM(s) Oral at bedtime  cholecalciferol 1000 Unit(s) Oral daily  enalaprilat Injectable 1.25 milliGRAM(s) IV Push once  folic acid 1 milliGRAM(s) Oral daily  gabapentin 300 milliGRAM(s) Oral two times a day  heparin   Injectable 5000 Unit(s) SubCutaneous every 12 hours  hydrALAZINE 25 milliGRAM(s) Oral two times a day  lactobacillus acidophilus 1 Tablet(s) Oral two times a day with meals  lidocaine   4% Patch 1 Patch Transdermal daily  loratadine 10 milliGRAM(s) Oral daily  melatonin 5 milliGRAM(s) Oral at bedtime  meropenem  IVPB 500 milliGRAM(s) IV Intermittent every 12 hours  metoprolol tartrate 25 milliGRAM(s) Oral two times a day  senna 2 Tablet(s) Oral <User Schedule>  sodium chloride 0.9%. 1000 milliLiter(s) (75 mL/Hr) IV Continuous <Continuous>  tamsulosin 0.4 milliGRAM(s) Oral at bedtime      Allergies    Ceftin (Hives; Rash)    Intolerances      Social: no smoking, no alcohol, no illegal drugs; no recent travel, no exposure to TB  FAMILY HISTORY:  Family history of hypertension (Father, Mother)       no history of premature cardiovascular disease in first degree relatives    ROS: the patient denies fever, no chills, no HA, no dizziness, no sore throat, no blurry vision, no CP, no palpitations, no SOB, no cough, no abdominal pain, no diarrhea, no N/V, + dysuria, no leg pain, no claudication, no rash, no joint aches, no rectal pain or bleeding, no night sweats    All other systems reviewed and are negative    Vital Signs Last 24 Hrs  T(C): 36.4 (06 Jul 2023 07:11), Max: 36.7 (05 Jul 2023 23:30)  T(F): 97.5 (06 Jul 2023 07:11), Max: 98 (05 Jul 2023 23:30)  HR: 68 (06 Jul 2023 09:10) (61 - 82)  BP: 150/47 (06 Jul 2023 09:10) (123/46 - 196/61)  BP(mean): 96 (05 Jul 2023 15:41) (96 - 96)  RR: 18 (06 Jul 2023 07:11) (17 - 18)  SpO2: 95% (06 Jul 2023 07:11) (94% - 96%)    Parameters below as of 06 Jul 2023 07:11  Patient On (Oxygen Delivery Method): room air      Daily Height in cm: 160.02 (05 Jul 2023 12:27)    Daily     PE:  Constitutional: NAD   HEENT: NC/AT, EOMI, PERRLA, conjunctivae clear; ears and nose atraumatic; pharynx benign  Neck: supple; thyroid not palpable  Back: no tenderness  Respiratory: respiratory effort normal; clear to auscultation  Cardiovascular: S1S2 regular, no murmurs  Abdomen: soft, not tender, not distended, positive BS; liver and spleen WNL  Genitourinary: no suprapubic tenderness  Lymphatic: no LN palpable  Musculoskeletal: no muscle tenderness, no joint swelling or tenderness  Extremities: no pedal edema  Neurological/ Psychiatric: AxOx3, Judgement and insight normal;  moving all extremities  Skin: no rashes; no palpable lesions    Labs: all available labs reviewed                        9.9    4.73  )-----------( 122      ( 06 Jul 2023 08:37 )             29.0     07-06    135  |  107  |  25<H>  ----------------------------<  107<H>  4.3   |  22  |  1.28    Ca    9.4      06 Jul 2023 08:37    TPro  7.1  /  Alb  3.6  /  TBili  0.2  /  DBili  x   /  AST  16  /  ALT  20  /  AlkPhos  64  07-05     LIVER FUNCTIONS - ( 05 Jul 2023 13:15 )  Alb: 3.6 g/dL / Pro: 7.1 gm/dL / ALK PHOS: 64 U/L / ALT: 20 U/L / AST: 16 U/L / GGT: x           Urinalysis Basic - ( 06 Jul 2023 08:37 )    Color: x / Appearance: x / SG: x / pH: x  Gluc: 107 mg/dL / Ketone: x  / Bili: x / Urobili: x   Blood: x / Protein: x / Nitrite: x   Leuk Esterase: x / RBC: x / WBC x   Sq Epi: x / Non Sq Epi: x / Bacteria: x          Radiology: all available radiological tests reviewed    Advanced directives addressed: full resuscitation

## 2023-07-06 NOTE — H&P ADULT - NSICDXPASTMEDICALHX_GEN_ALL_CORE_FT
PAST MEDICAL HISTORY:  Acute on chronic diastolic congestive heart failure     Aortic stenosis     Gout     H/O CHF     HLD (hyperlipidemia)     HTN (hypertension)     Osteoarthritis     Pneumonia     Pneumonia, aspiration

## 2023-07-06 NOTE — H&P ADULT - ASSESSMENT
89 y/o F presented with weakness     1. Urinary tract infection   - Admit to med/surg   - Does not meet SIRS criteria   - UA: cloudy, protein 100, positive nitrite, moderate luekocyte esterase, moderate blood, WBC > 50, RBC 6-10, bacteria TNTC   - UCx (6/29/23): E. coli and Klebsiella   - s/p Meropenem in ER; will cotninue   - f/u UCx, BCx x 2; adjust abx based on sensitivities  - Trend WBC, monitor for temperatures   - Tylenol for temperatures PRN   - ID consult - Dr. Qiu     2. Hypertensive urgency   - //61, monitor closely   - c/w home anti-HTN agents   - Enlaprilat PRN for SBP > 160   - Do not drop BP by more than 25% in 6 hours  - May need to add additional anti-HTN agent     3. Normocytic anemia   - Hb 10 (baseline ~9), monitor     4. Mild hyponatremia   - Na 130, monitor   - Ordered Sosm, Uosm, Rhonda, TSH   - Do not correct Na by more than 6 to 12 mEq in 24 hours   - Strict I+Os, daily weights     5. History of HTN, HLD, severe AS, HFpEF, chronic pérez for UR, recent admit (4/4-4/11) for aspiration pneumonia and UTI  - c/w home medications; verified with pt at the bedside  - , immature granulocytes 1.0%, glucose 118 -> monitor  - Cr 1.65 (baseline ~1.5), monitor     DVT ppx: Heparin 5,000 units Q12H (hold for PLT <50,000)   Code status: Full code. Discussed with pt and her daughter who states that this is the pt's wish.   Emergency contact: Marlen Ga (daughter) [phone]     I spent a total of 80 minutes on the date of this encounter coordinating the patient's care. This includes reviewing prior documentation, results and imaging in addition to completing a full history and physical examination on the patient. Further tests, medications, and procedures have been ordered as indicated. Laboratory results and the plan of care were communicated to the patient and/or their family member. Supporting documentation was completed and added to the patient's chart.  89 y/o F presented with weakness     1. Urinary tract infection   - Admit to med/surg   - Does not meet SIRS criteria   - UA: cloudy, protein 100, positive nitrite, moderate leukocyte esterase, moderate blood, WBC > 50, RBC 6-10, bacteria TNTC   - UCx (6/29/23): E. coli and Klebsiella   - s/p Meropenem in ER; will continue   - f/u UCx, BCx x 2; adjust abx based on sensitivities  - Trend WBC, monitor for temperatures   - Tylenol for temperatures PRN   - ID consult - Dr. Qiu     2. Hypertensive urgency   - //61, monitor closely   - c/w home anti-HTN agents   - Enlaprilat PRN for SBP > 160   - Do not drop BP by more than 25% in 6 hours  - May need to add additional anti-HTN agent     3. Normocytic anemia   - Hb 10 (baseline ~9), monitor     4. Mild hyponatremia   - Na 130, monitor   - Ordered Sosm, Uosm, Rhonda, TSH   - Do not correct Na by more than 6 to 12 mEq in 24 hours   - Strict I+Os, daily weights     5. History of HTN, HLD, severe AS, HFpEF, hx of pérez, hx of aspiration pneumonia and UTI  - c/w home medications; verified with pt at the bedside  - , immature granulocytes 1.0%, glucose 118 -> monitor  - Cr 1.65 (baseline ~1.5), monitor     DVT ppx: Heparin 5,000 units Q12H (hold for PLT <50,000)   Code status: Full code. Discussed with pt and her daughter who states that this is the pt's wish.   Emergency contact: Marlen Ga (daughter) [phone]     I spent a total of 75 minutes on the date of this encounter coordinating the patient's care. This includes reviewing prior documentation, results and imaging in addition to completing a full history and physical examination on the patient. Further tests, medications, and procedures have been ordered as indicated. Laboratory results and the plan of care were communicated to the patient and/or their family member. Supporting documentation was completed and added to the patient's chart.

## 2023-07-06 NOTE — PHYSICAL THERAPY INITIAL EVALUATION ADULT - ACTIVE RANGE OF MOTION EXAMINATION, REHAB EVAL
except R shld min elev (reports h/o RTC issue), B hip/knee flex ltd in supine (L worse vs R)/bilateral upper extremity Active ROM was WFL (within functional limits)/bilateral  lower extremity Active ROM was WFL (within functional limits)

## 2023-07-06 NOTE — PHARMACOTHERAPY INTERVENTION NOTE - COMMENTS
Medication reconciliation completed.  Patient was unable to provide medication information, spoke to daughter Chanelle at bedside and they provided current medication list; confirmed with Dr. [facility]. 
Modified "Ceftin" allergy to state patient tolerated meropenem during this admission.    Balta Lombardi, Dannielle  Clinical Pharmacy Specialist, Infectious Diseases  Tele-[facility]wardship Program (Tele[facility])  Tele[facility] Phone: [phone]

## 2023-07-06 NOTE — CONSULT NOTE ADULT - ASSESSMENT
89 y/o F with PMH HTN, HLD, severe AS, HFpEF, hx of pérez, hx of aspiration pneumonia and UTI presents with weakness. The pt stated that she had increased BP and has been constipated. per daughter she has been weak and unable to stand on her own. She went to Dr. Rojelio Collins on 6/28 and had a UCx completed and she was told it was positive. Denies fevers, chills, chest pain, SOB, abdominal pain, N/V, diarrhea/constipation. Prior admission  6/13/23: septic shock secondary to UTI, UCx with candida and pt was on Meropenem and Diflucan, AMS ER course: //61. Labs: Hb 10 (baseline ~9), , immature granulocytes 1.0%, Na 130, BUN 31, Cr 1.65 (baseline ~1.5), glucose 118. UA: cloudy, protein 100, positive nitrite, moderate leukocyte esterase, moderate blood, WBC > 50, RBC 6-10, bacteria TNTC. Pt was given Meropenem, hydralazine PO x 1 dose. She was admitted to med/surg for further management.    1. Pyuria. Urinary retention. UTI. Metabolic encephalopathy.  - imaging reviewed  - agree with merrem adjust dose to [id number]  - continue with abx coverage  - urology f/u   - pérez care  - fu cbc  - monitor temps  - tolerating abx well so far; no side effects noted  - reason for abx use and side effects reviewed with patient  - supportive care  - fu cbc     2. other issues - care per medicine 
89 yo female with PMH as above admitted for weakness. In ED required pérez placement for urinary retention, pt with recent admission for UTI/urosepsis with urinary retention was discharged without pérez but required pérez placement on this admission again due to urinary retention unclear PVR.  Recommend  - Continue antibiotics, adjust as per c/s and ID recs  - Continue indwelling pérez  - D/C home with indwelling pérez and follow up with Dr. Collins in 1-2 weeks for pérez management and further work up    Case discussed with Dr. Collins
7/6/23:  Pt with above history and recurrent UTI.  Severe AS clinically stable though and highly doubt SBE.  May need chronic UTI suppression therapy.  No need for another echo or invasive cardiac testing at this time.  Continue as per medicine and ID etal.  Will follow as work-up and treatment progresses.

## 2023-07-06 NOTE — PROVIDER CONTACT NOTE (OTHER) - SITUATION
Dr Collins is aware that the patient is admitted to the hospital he is already seeing the patient
notified Dr Spann's office of admission

## 2023-07-06 NOTE — H&P ADULT - NSHPREVIEWOFSYSTEMS_GEN_ALL_CORE
Constitutional: negative for fatigue, negative for fever, negative for chills, negative for decreased appetite, positive weakness  Skin: negative for rashes, negative for open wounds, negative for jaundice.   Eyes: negative for blurry vision, negative for double vision.   Ears, nose, throat: negative for ear pain, negative for nasal congestion, negative for sore throat, negative for lymph node swelling.   Cardiovascular: negative for chest pain, negative for palpitations, negative for lower extremity swelling.   Respiratory: negative for shortness of breath, negative for wheezing, negative for cough.   Gastrointestinal: negative for abdominal pain, negative for nausea, negative for vomiting, negative for diarrhea, positive for constipation, negative for blood in the stool, negative for black tarry stools.   Genitourinary: negative for burning on urination, negative for urinary urgency or frequency, negative for blood in the urine.   Endocrine: negative for cold intolerance, negative for heat intolerance, negative for increased thirst.   Hematologic: negative for easy bruising or bleeding.   Musculoskeletal: negative for muscle/joint pain, negative for decreased range of motion.   Neurological: negative for dizziness, negative for headaches, negative for loss of consciousness, negative for motor weakness, negative for sensory deficits.   Psychiatric: negative for depression, negative for anxiety.

## 2023-07-06 NOTE — H&P ADULT - NSHPPHYSICALEXAM_GEN_ALL_CORE
ICU Vital Signs Last 24 Hrs  T(C): 36.7 (06 Jul 2023 00:30), Max: 36.7 (05 Jul 2023 23:30)  T(F): 98 (06 Jul 2023 00:30), Max: 98 (05 Jul 2023 23:30)  HR: 66 (06 Jul 2023 00:30) (66 - 82)  BP: 145/51 (06 Jul 2023 00:30) (123/46 - 196/61)  BP(mean): 96 (05 Jul 2023 15:41) (96 - 96)  RR: 18 (06 Jul 2023 00:30) (17 - 18)  SpO2: 96% (06 Jul 2023 00:30) (94% - 96%)    O2 Parameters below as of 06 Jul 2023 00:30  Patient On (Oxygen Delivery Method): room air    General: Awake and alert, cooperative with exam. No acute distress.   Skin: Warm, dry, and pink.   Eyes: Pupils equal and reactive to light. Extraocular eye movements intact. No conjunctival injection, discharge, or scleral icterus.   HEENT: Atraumatic, normocephalic. Moist mucus membranes.   Cardiology: Normal S1, S2. No murmurs, rubs, or gallops. Regular rate and rhythm.   Respiratory: Lungs clear to ascultation bilaterally. Good air exchange. No wheezes, rales, or rhonchi. Normal chest expansion.   Gastrointestinal: Positive bowel sounds. Soft, non-tender, non-distended. No guarding, rigidity, or rebound tenderness. No hepatosplenomegaly.   Musculoskeletal: 5/5 motor strength in all extremities. Normal range of motion.   Extremities: No peripheral edema bilaterally. Dorsalis pedis pulses 2+ bilaterally.   Neurological: A+Ox3 (person, place, and time). Cranial nerves 2-12 intact. Normal speech. No facial droop. No focal neurological deficits.   Psychiatric: Normal affect. Normal mood.

## 2023-07-06 NOTE — H&P ADULT - HISTORY OF PRESENT ILLNESS
89 y/o F with PMH HTN, HLD, severe AS, HFpEF, hx of pérez, hx of aspiration pneumonia and UTI presents with weakness. The pt stated that she had increased BP today and has been constipated. I spoke to her daughter Marlen over the phone who stated that her mother has had a flat affect the last few days and increasing weakness. She has been unable to stand on her own. She went to Dr. Rojelio Collins on 6/28 and had a UCx completed and she was told it was positive today. Denies fevers, chills, chest pain, SOB, abdominal pain, N/V, diarrhea/constipation.     Prior admission   - 6/13/23: septic shock secondary to UTI, UCx with candida and pt was on Meropenem and Diflucan, AMS     ER course: //61. Labs: Hb 10 (baseline ~9), , immature granulocytes 1.0%, Na 130, BUN 31, Cr 1.65 (baseline ~1.5), glucose 118. UA: cloudy, protein 100, positive nitrite, moderate luekocyte esterase, moderate blood, WBC > 50, RBC 6-10, bacteria TNTC. EKG: NSR with HR 76 bpm, 1st degree AV block (), no ST segment changes, no T wave inversions (personally reviewed).  CXR: no consolidation, no effusion, no pneumothorax (personally reviewed).     Pt was given Meropenem, hydralazine PO x 1 dose. She is being admitted to med/surg for further management.

## 2023-07-07 ENCOUNTER — APPOINTMENT (OUTPATIENT)
Dept: UROLOGY | Facility: CLINIC | Age: 88
End: 2023-07-07

## 2023-07-07 LAB
-  AMIKACIN: SIGNIFICANT CHANGE UP
-  AMOXICILLIN/CLAVULANIC ACID: SIGNIFICANT CHANGE UP
-  AMPICILLIN/SULBACTAM: SIGNIFICANT CHANGE UP
-  AMPICILLIN: SIGNIFICANT CHANGE UP
-  AZTREONAM: SIGNIFICANT CHANGE UP
-  CEFAZOLIN: SIGNIFICANT CHANGE UP
-  CEFEPIME: SIGNIFICANT CHANGE UP
-  CEFOXITIN: SIGNIFICANT CHANGE UP
-  CEFTRIAXONE: SIGNIFICANT CHANGE UP
-  CEFUROXIME: SIGNIFICANT CHANGE UP
-  CIPROFLOXACIN: SIGNIFICANT CHANGE UP
-  ERTAPENEM: SIGNIFICANT CHANGE UP
-  GENTAMICIN: SIGNIFICANT CHANGE UP
-  IMIPENEM: SIGNIFICANT CHANGE UP
-  LEVOFLOXACIN: SIGNIFICANT CHANGE UP
-  MEROPENEM: SIGNIFICANT CHANGE UP
-  NITROFURANTOIN: SIGNIFICANT CHANGE UP
-  PIPERACILLIN/TAZOBACTAM: SIGNIFICANT CHANGE UP
-  TOBRAMYCIN: SIGNIFICANT CHANGE UP
-  TRIMETHOPRIM/SULFAMETHOXAZOLE: SIGNIFICANT CHANGE UP
ANION GAP SERPL CALC-SCNC: 5 MMOL/L — SIGNIFICANT CHANGE UP (ref 5–17)
BUN SERPL-MCNC: 29 MG/DL — HIGH (ref 7–23)
CALCIUM SERPL-MCNC: 9.2 MG/DL — SIGNIFICANT CHANGE UP (ref 8.5–10.1)
CHLORIDE SERPL-SCNC: 108 MMOL/L — SIGNIFICANT CHANGE UP (ref 96–108)
CO2 SERPL-SCNC: 21 MMOL/L — LOW (ref 22–31)
CREAT SERPL-MCNC: 1.57 MG/DL — HIGH (ref 0.5–1.3)
CULTURE RESULTS: SIGNIFICANT CHANGE UP
EGFR: 30 ML/MIN/1.73M2 — LOW
GLUCOSE SERPL-MCNC: 134 MG/DL — HIGH (ref 70–99)
HCT VFR BLD CALC: 28.2 % — LOW (ref 34.5–45)
HGB BLD-MCNC: 9.2 G/DL — LOW (ref 11.5–15.5)
MCHC RBC-ENTMCNC: 30.9 PG — SIGNIFICANT CHANGE UP (ref 27–34)
MCHC RBC-ENTMCNC: 32.6 GM/DL — SIGNIFICANT CHANGE UP (ref 32–36)
MCV RBC AUTO: 94.6 FL — SIGNIFICANT CHANGE UP (ref 80–100)
METHOD TYPE: SIGNIFICANT CHANGE UP
ORGANISM # SPEC MICROSCOPIC CNT: SIGNIFICANT CHANGE UP
ORGANISM # SPEC MICROSCOPIC CNT: SIGNIFICANT CHANGE UP
OSMOLALITY UR: 293 MOSM/KG — SIGNIFICANT CHANGE UP (ref 50–1200)
PLATELET # BLD AUTO: 113 K/UL — LOW (ref 150–400)
POTASSIUM SERPL-MCNC: 5.1 MMOL/L — SIGNIFICANT CHANGE UP (ref 3.5–5.3)
POTASSIUM SERPL-SCNC: 5.1 MMOL/L — SIGNIFICANT CHANGE UP (ref 3.5–5.3)
RBC # BLD: 2.98 M/UL — LOW (ref 3.8–5.2)
RBC # FLD: 15.8 % — HIGH (ref 10.3–14.5)
SODIUM SERPL-SCNC: 134 MMOL/L — LOW (ref 135–145)
SODIUM UR-SCNC: 61 MMOL/L — SIGNIFICANT CHANGE UP
SPECIMEN SOURCE: SIGNIFICANT CHANGE UP
WBC # BLD: 5.86 K/UL — SIGNIFICANT CHANGE UP (ref 3.8–10.5)
WBC # FLD AUTO: 5.86 K/UL — SIGNIFICANT CHANGE UP (ref 3.8–10.5)

## 2023-07-07 PROCEDURE — 74018 RADEX ABDOMEN 1 VIEW: CPT | Mod: 26

## 2023-07-07 PROCEDURE — 99232 SBSQ HOSP IP/OBS MODERATE 35: CPT

## 2023-07-07 RX ORDER — MINERAL OIL
133 OIL (ML) MISCELLANEOUS ONCE
Refills: 0 | Status: COMPLETED | OUTPATIENT
Start: 2023-07-07 | End: 2023-07-07

## 2023-07-07 RX ORDER — SENNA PLUS 8.6 MG/1
2 TABLET ORAL AT BEDTIME
Refills: 0 | Status: DISCONTINUED | OUTPATIENT
Start: 2023-07-07 | End: 2023-07-10

## 2023-07-07 RX ADMIN — Medication 1 DROP(S): at 21:07

## 2023-07-07 RX ADMIN — Medication 1 MILLIGRAM(S): at 09:26

## 2023-07-07 RX ADMIN — HEPARIN SODIUM 5000 UNIT(S): 5000 INJECTION INTRAVENOUS; SUBCUTANEOUS at 21:09

## 2023-07-07 RX ADMIN — Medication 5 MILLIGRAM(S): at 21:08

## 2023-07-07 RX ADMIN — Medication 1 TABLET(S): at 16:01

## 2023-07-07 RX ADMIN — LORATADINE 10 MILLIGRAM(S): 10 TABLET ORAL at 09:27

## 2023-07-07 RX ADMIN — MEROPENEM 100 MILLIGRAM(S): 1 INJECTION INTRAVENOUS at 09:27

## 2023-07-07 RX ADMIN — HEPARIN SODIUM 5000 UNIT(S): 5000 INJECTION INTRAVENOUS; SUBCUTANEOUS at 09:27

## 2023-07-07 RX ADMIN — GABAPENTIN 300 MILLIGRAM(S): 400 CAPSULE ORAL at 09:27

## 2023-07-07 RX ADMIN — ATORVASTATIN CALCIUM 20 MILLIGRAM(S): 80 TABLET, FILM COATED ORAL at 21:08

## 2023-07-07 RX ADMIN — Medication 1 DROP(S): at 09:27

## 2023-07-07 RX ADMIN — Medication 25 MILLIGRAM(S): at 16:01

## 2023-07-07 RX ADMIN — Medication 100 MILLIGRAM(S): at 09:27

## 2023-07-07 RX ADMIN — MEROPENEM 100 MILLIGRAM(S): 1 INJECTION INTRAVENOUS at 21:10

## 2023-07-07 RX ADMIN — Medication 1 TABLET(S): at 09:26

## 2023-07-07 RX ADMIN — Medication 25 MILLIGRAM(S): at 21:09

## 2023-07-07 RX ADMIN — Medication 1000 UNIT(S): at 09:27

## 2023-07-07 RX ADMIN — GABAPENTIN 300 MILLIGRAM(S): 400 CAPSULE ORAL at 21:15

## 2023-07-07 RX ADMIN — POLYETHYLENE GLYCOL 3350 17 GRAM(S): 17 POWDER, FOR SOLUTION ORAL at 09:27

## 2023-07-07 RX ADMIN — TAMSULOSIN HYDROCHLORIDE 0.4 MILLIGRAM(S): 0.4 CAPSULE ORAL at 21:08

## 2023-07-07 NOTE — CHART NOTE - NSCHARTNOTEFT_GEN_A_CORE
Called by RN staff to evaluate patient as patient has reportedly not had a proper BM x 203 days. Both Abdominal XR and physical exam were consistent with extensive fecal impaction. Senna 2 tabs po qhs, Miralax 17g po daily, and Fleets enema x 1 ordered. VSS, PE unchanged as noted in prior documentation earlier in the day. Note patient's mentation AAOx3.

## 2023-07-08 LAB
ANION GAP SERPL CALC-SCNC: 5 MMOL/L — SIGNIFICANT CHANGE UP (ref 5–17)
BUN SERPL-MCNC: 28 MG/DL — HIGH (ref 7–23)
CALCIUM SERPL-MCNC: 9.1 MG/DL — SIGNIFICANT CHANGE UP (ref 8.5–10.1)
CHLORIDE SERPL-SCNC: 105 MMOL/L — SIGNIFICANT CHANGE UP (ref 96–108)
CO2 SERPL-SCNC: 22 MMOL/L — SIGNIFICANT CHANGE UP (ref 22–31)
CREAT SERPL-MCNC: 1.42 MG/DL — HIGH (ref 0.5–1.3)
EGFR: 34 ML/MIN/1.73M2 — LOW
GLUCOSE SERPL-MCNC: 109 MG/DL — HIGH (ref 70–99)
HCT VFR BLD CALC: 26.8 % — LOW (ref 34.5–45)
HGB BLD-MCNC: 8.9 G/DL — LOW (ref 11.5–15.5)
MCHC RBC-ENTMCNC: 31.3 PG — SIGNIFICANT CHANGE UP (ref 27–34)
MCHC RBC-ENTMCNC: 33.2 GM/DL — SIGNIFICANT CHANGE UP (ref 32–36)
MCV RBC AUTO: 94.4 FL — SIGNIFICANT CHANGE UP (ref 80–100)
NT-PROBNP SERPL-SCNC: 1060 PG/ML — HIGH (ref 0–450)
PLATELET # BLD AUTO: 108 K/UL — LOW (ref 150–400)
POTASSIUM SERPL-MCNC: 5.2 MMOL/L — SIGNIFICANT CHANGE UP (ref 3.5–5.3)
POTASSIUM SERPL-SCNC: 5.2 MMOL/L — SIGNIFICANT CHANGE UP (ref 3.5–5.3)
RBC # BLD: 2.84 M/UL — LOW (ref 3.8–5.2)
RBC # FLD: 15.5 % — HIGH (ref 10.3–14.5)
SODIUM SERPL-SCNC: 132 MMOL/L — LOW (ref 135–145)
WBC # BLD: 4.67 K/UL — SIGNIFICANT CHANGE UP (ref 3.8–10.5)
WBC # FLD AUTO: 4.67 K/UL — SIGNIFICANT CHANGE UP (ref 3.8–10.5)

## 2023-07-08 PROCEDURE — 99232 SBSQ HOSP IP/OBS MODERATE 35: CPT

## 2023-07-08 RX ORDER — LACTULOSE 10 G/15ML
10 SOLUTION ORAL ONCE
Refills: 0 | Status: COMPLETED | OUTPATIENT
Start: 2023-07-08 | End: 2023-07-08

## 2023-07-08 RX ORDER — CEFTRIAXONE 500 MG/1
1000 INJECTION, POWDER, FOR SOLUTION INTRAMUSCULAR; INTRAVENOUS EVERY 24 HOURS
Refills: 0 | Status: DISCONTINUED | OUTPATIENT
Start: 2023-07-08 | End: 2023-07-09

## 2023-07-08 RX ORDER — SOD SULF/SODIUM/NAHCO3/KCL/PEG
1000 SOLUTION, RECONSTITUTED, ORAL ORAL ONCE
Refills: 0 | Status: COMPLETED | OUTPATIENT
Start: 2023-07-08 | End: 2023-07-08

## 2023-07-08 RX ADMIN — Medication 25 MILLIGRAM(S): at 23:21

## 2023-07-08 RX ADMIN — Medication 5 MILLIGRAM(S): at 23:21

## 2023-07-08 RX ADMIN — Medication 25 MILLIGRAM(S): at 09:21

## 2023-07-08 RX ADMIN — Medication 1 DROP(S): at 23:24

## 2023-07-08 RX ADMIN — Medication 1 DROP(S): at 09:21

## 2023-07-08 RX ADMIN — GABAPENTIN 300 MILLIGRAM(S): 400 CAPSULE ORAL at 23:24

## 2023-07-08 RX ADMIN — HEPARIN SODIUM 5000 UNIT(S): 5000 INJECTION INTRAVENOUS; SUBCUTANEOUS at 09:21

## 2023-07-08 RX ADMIN — MEROPENEM 100 MILLIGRAM(S): 1 INJECTION INTRAVENOUS at 09:23

## 2023-07-08 RX ADMIN — LACTULOSE 10 GRAM(S): 10 SOLUTION ORAL at 01:08

## 2023-07-08 RX ADMIN — Medication 25 MILLIGRAM(S): at 09:22

## 2023-07-08 RX ADMIN — Medication 1 TABLET(S): at 23:22

## 2023-07-08 RX ADMIN — CEFTRIAXONE 1000 MILLIGRAM(S): 500 INJECTION, POWDER, FOR SOLUTION INTRAMUSCULAR; INTRAVENOUS at 10:54

## 2023-07-08 RX ADMIN — Medication 1000 MILLILITER(S): at 15:48

## 2023-07-08 RX ADMIN — SENNA PLUS 2 TABLET(S): 8.6 TABLET ORAL at 00:08

## 2023-07-08 RX ADMIN — Medication 1 TABLET(S): at 16:50

## 2023-07-08 RX ADMIN — TAMSULOSIN HYDROCHLORIDE 0.4 MILLIGRAM(S): 0.4 CAPSULE ORAL at 23:21

## 2023-07-08 RX ADMIN — Medication 133 MILLILITER(S): at 00:07

## 2023-07-08 RX ADMIN — SENNA PLUS 2 TABLET(S): 8.6 TABLET ORAL at 23:20

## 2023-07-08 RX ADMIN — HEPARIN SODIUM 5000 UNIT(S): 5000 INJECTION INTRAVENOUS; SUBCUTANEOUS at 23:20

## 2023-07-08 RX ADMIN — GABAPENTIN 300 MILLIGRAM(S): 400 CAPSULE ORAL at 09:25

## 2023-07-08 RX ADMIN — LORATADINE 10 MILLIGRAM(S): 10 TABLET ORAL at 09:23

## 2023-07-08 RX ADMIN — Medication 100 MILLIGRAM(S): at 09:21

## 2023-07-08 RX ADMIN — ATORVASTATIN CALCIUM 20 MILLIGRAM(S): 80 TABLET, FILM COATED ORAL at 23:20

## 2023-07-08 RX ADMIN — POLYETHYLENE GLYCOL 3350 17 GRAM(S): 17 POWDER, FOR SOLUTION ORAL at 05:43

## 2023-07-08 RX ADMIN — Medication 1000 UNIT(S): at 09:21

## 2023-07-08 RX ADMIN — Medication 1 TABLET(S): at 09:23

## 2023-07-08 RX ADMIN — Medication 1 MILLIGRAM(S): at 09:21

## 2023-07-09 LAB
ANION GAP SERPL CALC-SCNC: 6 MMOL/L — SIGNIFICANT CHANGE UP (ref 5–17)
BUN SERPL-MCNC: 22 MG/DL — SIGNIFICANT CHANGE UP (ref 7–23)
CALCIUM SERPL-MCNC: 9.3 MG/DL — SIGNIFICANT CHANGE UP (ref 8.5–10.1)
CHLORIDE SERPL-SCNC: 108 MMOL/L — SIGNIFICANT CHANGE UP (ref 96–108)
CO2 SERPL-SCNC: 24 MMOL/L — SIGNIFICANT CHANGE UP (ref 22–31)
CREAT SERPL-MCNC: 1.13 MG/DL — SIGNIFICANT CHANGE UP (ref 0.5–1.3)
EGFR: 45 ML/MIN/1.73M2 — LOW
GLUCOSE SERPL-MCNC: 101 MG/DL — HIGH (ref 70–99)
HCT VFR BLD CALC: 27.3 % — LOW (ref 34.5–45)
HGB BLD-MCNC: 9.1 G/DL — LOW (ref 11.5–15.5)
MCHC RBC-ENTMCNC: 31 PG — SIGNIFICANT CHANGE UP (ref 27–34)
MCHC RBC-ENTMCNC: 33.3 GM/DL — SIGNIFICANT CHANGE UP (ref 32–36)
MCV RBC AUTO: 92.9 FL — SIGNIFICANT CHANGE UP (ref 80–100)
PLATELET # BLD AUTO: 100 K/UL — LOW (ref 150–400)
POTASSIUM SERPL-MCNC: 4.7 MMOL/L — SIGNIFICANT CHANGE UP (ref 3.5–5.3)
POTASSIUM SERPL-SCNC: 4.7 MMOL/L — SIGNIFICANT CHANGE UP (ref 3.5–5.3)
RBC # BLD: 2.94 M/UL — LOW (ref 3.8–5.2)
RBC # FLD: 15.5 % — HIGH (ref 10.3–14.5)
SODIUM SERPL-SCNC: 138 MMOL/L — SIGNIFICANT CHANGE UP (ref 135–145)
WBC # BLD: 4.46 K/UL — SIGNIFICANT CHANGE UP (ref 3.8–10.5)
WBC # FLD AUTO: 4.46 K/UL — SIGNIFICANT CHANGE UP (ref 3.8–10.5)

## 2023-07-09 PROCEDURE — 99232 SBSQ HOSP IP/OBS MODERATE 35: CPT

## 2023-07-09 RX ORDER — METOPROLOL TARTRATE 50 MG
50 TABLET ORAL
Refills: 0 | Status: DISCONTINUED | OUTPATIENT
Start: 2023-07-09 | End: 2023-07-10

## 2023-07-09 RX ORDER — CEFUROXIME AXETIL 250 MG
500 TABLET ORAL EVERY 12 HOURS
Refills: 0 | Status: DISCONTINUED | OUTPATIENT
Start: 2023-07-10 | End: 2023-07-10

## 2023-07-09 RX ORDER — NYSTATIN 500MM UNIT
500000 POWDER (EA) MISCELLANEOUS THREE TIMES A DAY
Refills: 0 | Status: DISCONTINUED | OUTPATIENT
Start: 2023-07-09 | End: 2023-07-10

## 2023-07-09 RX ADMIN — Medication 500000 UNIT(S): at 21:30

## 2023-07-09 RX ADMIN — HEPARIN SODIUM 5000 UNIT(S): 5000 INJECTION INTRAVENOUS; SUBCUTANEOUS at 21:31

## 2023-07-09 RX ADMIN — Medication 650 MILLIGRAM(S): at 12:12

## 2023-07-09 RX ADMIN — POLYETHYLENE GLYCOL 3350 17 GRAM(S): 17 POWDER, FOR SOLUTION ORAL at 10:28

## 2023-07-09 RX ADMIN — HEPARIN SODIUM 5000 UNIT(S): 5000 INJECTION INTRAVENOUS; SUBCUTANEOUS at 10:28

## 2023-07-09 RX ADMIN — Medication 50 MILLIGRAM(S): at 21:30

## 2023-07-09 RX ADMIN — Medication 25 MILLIGRAM(S): at 10:30

## 2023-07-09 RX ADMIN — Medication 25 MILLIGRAM(S): at 21:31

## 2023-07-09 RX ADMIN — LORATADINE 10 MILLIGRAM(S): 10 TABLET ORAL at 10:27

## 2023-07-09 RX ADMIN — LIDOCAINE 1 PATCH: 4 CREAM TOPICAL at 12:12

## 2023-07-09 RX ADMIN — Medication 500000 UNIT(S): at 14:36

## 2023-07-09 RX ADMIN — SENNA PLUS 2 TABLET(S): 8.6 TABLET ORAL at 21:31

## 2023-07-09 RX ADMIN — GABAPENTIN 300 MILLIGRAM(S): 400 CAPSULE ORAL at 21:30

## 2023-07-09 RX ADMIN — Medication 5 MILLIGRAM(S): at 21:31

## 2023-07-09 RX ADMIN — Medication 100 MILLIGRAM(S): at 10:28

## 2023-07-09 RX ADMIN — GABAPENTIN 300 MILLIGRAM(S): 400 CAPSULE ORAL at 10:30

## 2023-07-09 RX ADMIN — Medication 650 MILLIGRAM(S): at 12:50

## 2023-07-09 RX ADMIN — Medication 25 MILLIGRAM(S): at 10:27

## 2023-07-09 RX ADMIN — Medication 1000 UNIT(S): at 10:28

## 2023-07-09 RX ADMIN — TAMSULOSIN HYDROCHLORIDE 0.4 MILLIGRAM(S): 0.4 CAPSULE ORAL at 21:31

## 2023-07-09 RX ADMIN — Medication 1 TABLET(S): at 17:37

## 2023-07-09 RX ADMIN — Medication 1 DROP(S): at 10:30

## 2023-07-09 RX ADMIN — ATORVASTATIN CALCIUM 20 MILLIGRAM(S): 80 TABLET, FILM COATED ORAL at 21:31

## 2023-07-09 RX ADMIN — Medication 1 MILLIGRAM(S): at 10:28

## 2023-07-09 RX ADMIN — Medication 1 TABLET(S): at 10:27

## 2023-07-09 RX ADMIN — Medication 1 DROP(S): at 21:43

## 2023-07-09 RX ADMIN — CEFTRIAXONE 1000 MILLIGRAM(S): 500 INJECTION, POWDER, FOR SOLUTION INTRAMUSCULAR; INTRAVENOUS at 10:28

## 2023-07-10 ENCOUNTER — TRANSCRIPTION ENCOUNTER (OUTPATIENT)
Age: 88
End: 2023-07-10

## 2023-07-10 VITALS
TEMPERATURE: 98 F | SYSTOLIC BLOOD PRESSURE: 181 MMHG | RESPIRATION RATE: 17 BRPM | DIASTOLIC BLOOD PRESSURE: 47 MMHG | HEART RATE: 67 BPM | OXYGEN SATURATION: 94 %

## 2023-07-10 PROCEDURE — 99239 HOSP IP/OBS DSCHRG MGMT >30: CPT

## 2023-07-10 RX ORDER — NYSTATIN 500MM UNIT
5 POWDER (EA) MISCELLANEOUS
Qty: 105 | Refills: 0
Start: 2023-07-10 | End: 2023-07-16

## 2023-07-10 RX ORDER — LACTOBACILLUS ACIDOPHILUS 100MM CELL
2 CAPSULE ORAL
Qty: 60 | Refills: 0
Start: 2023-07-10 | End: 2023-08-08

## 2023-07-10 RX ORDER — CEFUROXIME AXETIL 250 MG
1 TABLET ORAL
Qty: 4 | Refills: 0
Start: 2023-07-10 | End: 2023-07-11

## 2023-07-10 RX ORDER — NYSTATIN 500MM UNIT
5 POWDER (EA) MISCELLANEOUS
Qty: 105 | Refills: 0 | DISCHARGE
Start: 2023-07-10 | End: 2023-07-16

## 2023-07-10 RX ORDER — POLYETHYLENE GLYCOL 3350 17 G/17G
17 POWDER, FOR SOLUTION ORAL
Qty: 0 | Refills: 0 | DISCHARGE
Start: 2023-07-10

## 2023-07-10 RX ORDER — CEFUROXIME AXETIL 250 MG
1 TABLET ORAL
Qty: 4 | Refills: 0 | DISCHARGE
Start: 2023-07-10 | End: 2023-07-11

## 2023-07-10 RX ADMIN — Medication 1 TABLET(S): at 11:41

## 2023-07-10 RX ADMIN — Medication 1 MILLIGRAM(S): at 11:42

## 2023-07-10 RX ADMIN — GABAPENTIN 300 MILLIGRAM(S): 400 CAPSULE ORAL at 11:43

## 2023-07-10 RX ADMIN — Medication 1 TABLET(S): at 05:46

## 2023-07-10 RX ADMIN — Medication 500000 UNIT(S): at 05:39

## 2023-07-10 RX ADMIN — HEPARIN SODIUM 5000 UNIT(S): 5000 INJECTION INTRAVENOUS; SUBCUTANEOUS at 11:41

## 2023-07-10 RX ADMIN — Medication 650 MILLIGRAM(S): at 12:00

## 2023-07-10 RX ADMIN — Medication 100 MILLIGRAM(S): at 11:43

## 2023-07-10 RX ADMIN — LIDOCAINE 1 PATCH: 4 CREAM TOPICAL at 11:44

## 2023-07-10 RX ADMIN — Medication 1 DROP(S): at 12:05

## 2023-07-10 RX ADMIN — Medication 25 MILLIGRAM(S): at 11:47

## 2023-07-10 RX ADMIN — LORATADINE 10 MILLIGRAM(S): 10 TABLET ORAL at 11:47

## 2023-07-10 RX ADMIN — Medication 500 MILLIGRAM(S): at 11:42

## 2023-07-10 RX ADMIN — Medication 1000 UNIT(S): at 11:43

## 2023-07-10 RX ADMIN — Medication 50 MILLIGRAM(S): at 11:51

## 2023-07-10 NOTE — PROGRESS NOTE ADULT - PROVIDER SPECIALTY LIST ADULT
Hospitalist
Infectious Disease
Cardiology
Hospitalist
Infectious Disease
Infectious Disease
Cardiology

## 2023-07-10 NOTE — DISCHARGE NOTE PROVIDER - NSDCCAREPROVSEEN_GEN_ALL_CORE_FT
Karina, Olvin Olivares, Anabella Valderrama, Kassie Miller, Letty Veliz, Jason Landrum, Alma Rahman, Uday Qiu, Gene Drake, Moisés

## 2023-07-10 NOTE — DISCHARGE NOTE NURSING/CASE MANAGEMENT/SOCIAL WORK - NSDCPEFALRISK_GEN_ALL_CORE
For information on Fall & Injury Prevention, visit: [email or web address] OR  [email or web address] OR  https://www.cdc.gov/steadi/patient.html

## 2023-07-10 NOTE — DISCHARGE NOTE PROVIDER - NSDCCPCAREPLAN_GEN_ALL_CORE_FT
PRINCIPAL DISCHARGE DIAGNOSIS  Diagnosis: Acute UTI  Assessment and Plan of Treatment: A urinary tract infection (UTI) is caused by bacteria that get inside your urinary tract. Your urinary tract includes your kidneys, ureters, bladder, and urethra. Continue to stay hydrated. To prevent urinary tract infections – wear cotton underwear or loose clothing, women should wipe front to back after urinating or having a bowel movement, drink cranberry juice or use cranberry supplements may help prevent UTIs. **Monitor for the following signs/symptoms: Fever > 101, chills, pain or burning with urination, foul smelling or cloudy urine, confusion, weakness or fatigue. If you experience these signs/symptoms please alert your primary care provider or if your signs/symptoms are severe please return to the ED**   ~*~*~ Continue to take PO Ceftin 500mg twice a day for 2 more days~*~*

## 2023-07-10 NOTE — DISCHARGE NOTE PROVIDER - NSDCPNSUBOBJ_GEN_ALL_CORE
All 10 systems reviewed and found to be negative with the exception of what has been described above.    Vital Signs Last 24 Hrs  T(C): 36.6 (10 Jul 2023 09:18), Max: 36.9 (09 Jul 2023 21:28)  T(F): 97.8 (10 Jul 2023 09:18), Max: 98.4 (09 Jul 2023 21:28)  HR: 67 (10 Jul 2023 09:18) (56 - 74)  BP: 181/47 (10 Jul 2023 09:18) (153/56 - 192/68)  BP(mean): --  RR: 17 (10 Jul 2023 09:18) (16 - 17)  SpO2: 94% (10 Jul 2023 09:18) (94% - 96%)    Parameters below as of 10 Jul 2023 09:18  Patient On (Oxygen Delivery Method): room air        PHYSICAL EXAM:    Constitutional: NAD, awake and alert, elderly, frail appearing  HEENT: PERR, EOMI, Normal Hearing, MMM  Neck: Soft and supple, No LAD, No JVD  Respiratory: Breath sounds are clear bilaterally, No wheezing, rales or rhonchi  Cardiovascular: S1 and S2, regular rate and rhythm, no Murmurs, gallops or rubs  Gastrointestinal: Bowel Sounds present, soft, nontender, mildly distended, no guarding, no rebound  Extremities: No peripheral edema  Vascular: 2+ peripheral pulses  Neurological: A/O x 3, no focal deficits  Musculoskeletal: 5/5 strength b/l upper and lower extremities  Skin: No rashes      LABS                         9.1    4.46  )-----------( 100      ( 09 Jul 2023 08:09 )             27.3     07-09    138  |  108  |  22  ----------------------------<  101<H>  4.7   |  24  |  1.13    Ca    9.3      09 Jul 2023 08:09    Urinalysis Basic - ( 09 Jul 2023 08:09 )    Color: x / Appearance: x / SG: x / pH: x  Gluc: 101 mg/dL / Ketone: x  / Bili: x / Urobili: x   Blood: x / Protein: x / Nitrite: x   Leuk Esterase: x / RBC: x / WBC x   Sq Epi: x / Non Sq Epi: x / Bacteria: x

## 2023-07-10 NOTE — PROGRESS NOTE ADULT - ASSESSMENT
89 y/o F presented with weakness     1. Urinary tract infection   - Does not meet SIRS criteria   - UA: cloudy, protein 100, positive nitrite, moderate leukocyte esterase, moderate blood, WBC > 50, RBC 6-10, bacteria TNTC   - UCx (6/29/23): E. coli and Klebsiella   - s/p Meropenem in ER; will continue   - f/u UCx, BCx x 2; adjust abx based on sensitivities  - Trend WBC, monitor for temperatures   - Tylenol for temperatures PRN   - ID consult - Dr. Qiu     2. Hypertensive urgency   - //61, monitor closely   - c/w home anti-HTN agents   - Enlaprilat PRN for SBP > 160   - conservative management of Bp at this time, given advanced age.   - cardio - dr. ruma bergeron     3. Normocytic anemia   - Hb 10 (baseline ~9), monitor     4. Mild hyponatremia   - Na 130, monitor   - Ordered Sosm, Uosm, Rhonda, TSH   - Do not correct Na by more than 6 to 12 mEq in 24 hours   - Strict I+Os, daily weights     5. History of HTN, HLD, severe AS, HFpEF, hx of pérez, hx of aspiration pneumonia and UTI  - c/w home medications; verified with pt at the bedside  - , immature granulocytes 1.0%, glucose 118 -> monitor  - Cr 1.65 (baseline ~1.5), monitor     DVT ppx: Heparin 5,000 units Q12H (hold for PLT <50,000)   Code status: Full code. Discussed with pt and her daughter who states that this is the pt's wish.   Emergency contact: Marlen Ga (daughter) [phone]   
7/6/23:  Pt with above history and recurrent UTI.  Severe AS clinically stable though and highly doubt SBE.  May need chronic UTI suppression therapy.  No need for another echo or invasive cardiac testing at this time.  Continue as per medicine and ID etal.  Will follow as work-up and treatment progresses.    7/7/23:  Resting comfortably without new cardiac complaints.  UTI being treated and seen by  as well.  Continue as per medicine and ID etal.  Will continue to follow intermittently prn as work-up and treatment progresses.  Dr Garcia will be covering me over the weekend if needed.
89 y/o F with PMH HTN, HLD, severe AS, HFpEF, hx of pérez, hx of aspiration pneumonia and UTI presents with weakness. The pt stated that she had increased BP and has been constipated. per daughter she has been weak and unable to stand on her own. She went to Dr. Rojelio Collins on 6/28 and had a UCx completed and she was told it was positive. Denies fevers, chills, chest pain, SOB, abdominal pain, N/V, diarrhea/constipation. Prior admission  6/13/23: septic shock secondary to UTI, UCx with candida and pt was on Meropenem and Diflucan, AMS ER course: //61. Labs: Hb 10 (baseline ~9), , immature granulocytes 1.0%, Na 130, BUN 31, Cr 1.65 (baseline ~1.5), glucose 118. UA: cloudy, protein 100, positive nitrite, moderate leukocyte esterase, moderate blood, WBC > 50, RBC 6-10, bacteria TNTC. Pt was given Meropenem, hydralazine PO x 1 dose. She was admitted to med/surg for further management.    1. Pyuria. Urinary retention. UTI. Metabolic encephalopathy.  - imaging reviewed  - on merrem [id number] #2   - continue with abx coverage  - urology f/u   - pérez care  - monitor temps  - tolerating abx well so far; no side effects noted  - reason for abx use and side effects reviewed with patient  - supportive care  - fu cbc     2. other issues - care per medicine 
89 y/o F with PMH HTN, HLD, severe AS, HFpEF, hx of pérez, hx of aspiration pneumonia and UTI presents with weakness. The pt stated that she had increased BP and has been constipated. per daughter she has been weak and unable to stand on her own. She went to Dr. Rojelio Collins on 6/28 and had a UCx completed and she was told it was positive. Denies fevers, chills, chest pain, SOB, abdominal pain, N/V, diarrhea/constipation. Prior admission  6/13/23: septic shock secondary to UTI, UCx with candida and pt was on Meropenem and Diflucan, AMS ER course: //61. Labs: Hb 10 (baseline ~9), , immature granulocytes 1.0%, Na 130, BUN 31, Cr 1.65 (baseline ~1.5), glucose 118. UA: cloudy, protein 100, positive nitrite, moderate leukocyte esterase, moderate blood, WBC > 50, RBC 6-10, bacteria TNTC. Pt was given Meropenem, hydralazine PO x 1 dose. She was admitted to med/surg for further management.    1. Pyuria. Urinary retention. Ecoli UTI. Metabolic encephalopathy.  - imaging reviewed  - urine cx growing ecoli - blood cx no growth   - s/p merrem -#2 changed to rocephin 2gm daily   - continue with abx coverage  - urology f/u noted   - pérez care  - monitor temps  - tolerating abx well so far; no side effects noted  - reason for abx use and side effects reviewed with patient  - complete 5-7 day abx course   - supportive care  - fu cbc     2. other issues - care per medicine 
89 y/o F with PMH HTN, HLD, severe AS, HFpEF, hx of pérez, hx of aspiration pneumonia and UTI presents with weakness. The pt stated that she had increased BP and has been constipated. per daughter she has been weak and unable to stand on her own. She went to Dr. Rojelio Collins on 6/28 and had a UCx completed and she was told it was positive. Denies fevers, chills, chest pain, SOB, abdominal pain, N/V, diarrhea/constipation. Prior admission  6/13/23: septic shock secondary to UTI, UCx with candida and pt was on Meropenem and Diflucan, AMS ER course: //61. Labs: Hb 10 (baseline ~9), , immature granulocytes 1.0%, Na 130, BUN 31, Cr 1.65 (baseline ~1.5), glucose 118. UA: cloudy, protein 100, positive nitrite, moderate leukocyte esterase, moderate blood, WBC > 50, RBC 6-10, bacteria TNTC. Pt was given Meropenem, hydralazine PO x 1 dose. She was admitted to med/surg for further management.    1. Pyuria. Urinary retention. Ecoli UTI. Metabolic encephalopathy.  - imaging reviewed  - urine cx growing ecoli - blood cx no growth   - s/p merrem -#2 s/p rocephin 2gm daily #2  - continue with abx coverage- dc with po ceftin 3 more days   - urology f/u noted   - pérez care  - monitor temps  - tolerating abx well so far; no side effects noted  - reason for abx use and side effects reviewed with patient  - supportive care  - fu cbc     2. other issues - care per medicine 
89 y/o F presented with weakness     1. Urinary tract infection   - Does not meet SIRS criteria   - UA: cloudy, protein 100, positive nitrite, moderate leukocyte esterase, moderate blood, WBC > 50, RBC 6-10, bacteria TNTC   - UCx  E.coli - sensitivities pending   - cont w/ meropenum   - f/u UCx, BCx x 2; adjust abx based on sensitivities  - Trend WBC, monitor for temperatures   - Tylenol for temperatures PRN   - ID consult - Dr. Qiu     2. Hypertensive urgency   - //61, monitor closely   - c/w home anti-HTN agents   - Enlaprilat PRN for SBP > 160   - conservative management of Bp at this time, given advanced age.   - cardio - dr. ruma bergeron     3. Normocytic anemia   - Hb 10 (baseline ~9), monitor     4. Mild hyponatremia   - Na 130,-- 135-- 134 monitor   - urine osmo 293   - encourage PO intake  - would avoid IV fluids for correction due to HTN   - Strict I+Os, daily weights     5. History of HTN, HLD, severe AS, HFpEF, hx of pérez, hx of aspiration pneumonia and UTI  - c/w home medications; verified with pt at the bedside  - , immature granulocytes 1.0%, glucose 118 -> monitor  - Cr 1.65 (baseline ~1.5), monitor     DVT ppx: Heparin 5,000 units Q12H (hold for PLT <50,000)   Code status: Full code. Discussed with pt and her daughter who states that this is the pt's wish.   Emergency contact: Marlen Ga (daughter) [phone]   
7/6/23:  Pt with above history and recurrent UTI.  Severe AS clinically stable though and highly doubt SBE.  May need chronic UTI suppression therapy.  No need for another echo or invasive cardiac testing at this time.  Continue as per medicine and ID etal.  Will follow as work-up and treatment progresses.    7/7/23:  Resting comfortably without new cardiac complaints.  UTI being treated and seen by  as well.  Continue as per medicine and ID etal.  Will continue to follow intermittently prn as work-up and treatment progresses.  Dr Garcia will be covering me over the weekend if needed.    7/10/23:  Feeling better and less confused.  Eager to go home.  No new cardiac issues.  Perales catheter in place.  Continue as per medicine and ID etal.  Home when ok with medicine.  Will continue to follow intermittently prn and as an outpt as needed.

## 2023-07-10 NOTE — DISCHARGE NOTE PROVIDER - NSDCCPTREATMENT_GEN_ALL_CORE_FT
PRINCIPAL PROCEDURE  Procedure: Insertion of Perales catheter  Findings and Treatment: How do I care for my catheter and drainage bag?   - Wash your hands often. Wash before and after you touch your catheter, tubing, or drainage bag.   - Clean your genital area 2 times every day. For men: Use a soapy cloth to clean the tip of your penis. Start where the catheter enters  - Secure the catheter tube so you do not pull or move the catheter. This helps prevent pain and bladder spasms.   - Keep the drainage bag below the level of your waist. This helps stop urine from moving back up the tubing and into your bladder  - Empty the drainage bag when needed. The weight of a full drainage bag can be painful. Empty the drainage bag every 3 to 6 hours or when it is 500mL full.

## 2023-07-10 NOTE — DISCHARGE NOTE PROVIDER - CARE PROVIDER_API CALL
Rojelio Collins Calos  Urology  [address] [place] [address], [address]  [place] [address]  Phone: [phone]  Fax: [phone]  Follow Up Time: 1 week

## 2023-07-10 NOTE — DISCHARGE NOTE PROVIDER - NSDCMRMEDTOKEN_GEN_ALL_CORE_FT
Allegra 180 mg oral tablet: 1 tab(s) orally once a day as needed for  allergy symptoms  allopurinol 100 mg oral tablet: 1 tab(s) orally once a day  Artificial Tears ophthalmic solution: 1 drop(s) in each eye 2 times a day as needed for  dry eyes  bisacodyl 10 mg rectal suppository: 1 suppository(ies) rectal once a day As needed Constipation  cefuroxime 500 mg oral tablet: 1 tab(s) orally every 12 hours  Colace 100 mg oral capsule: 1 cap(s) orally once a day  folic acid 1 mg oral tablet: 1 tab(s) orally once a day  hydrALAZINE 25 mg oral tablet: 1 tab(s) orally 2 times a day ***DC instructions were to take 3 tabs 3x a day but daughter confirms pt has been taking 1 tab 2x a day***  lactobacillus acidophilus oral tablet: 2 tab(s) orally once a day  lidocaine 4% topical cream: Apply topically to affected area once a day as needed for pain  Lidoderm 5% topical film: Apply topically to affected area once a day as needed for pain lidopatch  melatonin 5 mg oral tablet: 1 tab(s) orally once a day (at bedtime)  metoprolol tartrate 25 mg oral tablet: 1 tab(s) orally every 12 hours  Neurontin 300 mg oral capsule: 2 cap(s) orally 2 times a day  nystatin 100,000 units/mL oral suspension: 5 milliliter(s) orally 3 times a day  pantoprazole 40 mg oral delayed release tablet: 1 tab(s) orally once a day as needed for  indigestion  polyethylene glycol 3350 oral powder for reconstitution: 17 gram(s) orally once a day As needed Constipation  rosuvastatin 5 mg oral tablet: 1 tab(s) orally once a day (at bedtime)  senna (sennosides) 8.6 mg oral tablet: 2 tab(s) orally every other day (at bedtime)  tamsulosin 0.4 mg oral capsule: 1 cap(s) orally once a day  Tylenol 8 HR Arthritis Pain 650 mg oral tablet, extended release: 2 tab(s) orally 3 times a day as needed for pain  Vitamin D3 1000 intl units (25 mcg) oral tablet: 1 cap(s) orally once a day

## 2023-07-10 NOTE — DISCHARGE NOTE PROVIDER - NSDCFUSCHEDAPPT_GEN_ALL_CORE_FT
Rojelio Collins  [facility] Physician [facility]  UROLOGY [address]  Scheduled Appointment: 07/18/2023    Cristina Silver  [facility] Physician [facility] [address]  Scheduled Appointment: 07/27/2023

## 2023-07-10 NOTE — DISCHARGE NOTE NURSING/CASE MANAGEMENT/SOCIAL WORK - PATIENT PORTAL LINK FT
You can access the FollowMyHealth Patient Portal offered by [facility] by registering at the following website: [email or web address]. By joining [employer]’s FollowMyHealth portal, you will also be able to view your health information using other applications (apps) compatible with our system.

## 2023-07-10 NOTE — DISCHARGE NOTE PROVIDER - HOSPITAL COURSE
89 y/o F with PMH HTN, HLD, severe AS, HFpEF, hx of pérez, hx of aspiration pneumonia and UTI presents with weakness. The pt stated that she had increased BP today and has been constipated. I spoke to her daughter Marlen over the phone who stated that her mother has had a flat affect the last few days and increasing weakness. She has been unable to stand on her own. She went to Dr. Rojelio Collins on 6/28 and had a UCx completed and she was told it was positive today. Denies fevers, chills, chest pain, SOB, abdominal pain, N/V, diarrhea/constipation.       pt admitted for acute urinary tract infection. had urinary retention, had pérez catheter placed in ED , pt was evaled by Urology Dr. Collins will be dc home with pérez and trial of void out pt in 1 week. pt urine culture with E.coli - placed on lexa x 3 day for hx of esbl, was de-esclated to ceftriaxone and PO Ceftin. course complicated by constipation, was given enema and PO Movi-Prep with + BM . pt was followed by Dr. Collins of cardiology for Severe AS clinically stable though and highly doubt SBE no interventions. folow up out pt as needed.       1. Urinary tract infection / urinary retention   - Does not meet SIRS criteria   - UA: cloudy, protein 100, positive nitrite, moderate leukocyte esterase, moderate blood, WBC > 50, RBC 6-10, bacteria TNTC   - BCx no growth x 2   - UCx  E.coli: pan sensitive  - s/p 2 days meropenem, given rocephin and switched to PO Ceftin. completed 5 days of IV and PO abx   - pérez to be continued upon dc and follow up with Dr. Collins in 1 week.     Constipation:  -xray consistant  -s/p enema  -movi-prep with productive BM     Hypertensive urgency   - c/w home anti-HTN agents   - cardio consult appreciated - no further recommendations      Normocytic anemia   - Hb 10 (baseline ~9), monitor     Mild hyponatremia: Stable  - encourage PO intake      History of HTN, HLD, severe AS, HFpEF, hx of pérez, hx of aspiration pneumonia and UTI  - c/w home medications      DVT ppx:   -s/p Heparin 5,000 units Q12H     Code status:   -Full    Dispo:  dc home with home care      89 y/o F with PMH HTN, HLD, severe AS, HFpEF, hx of pérez, hx of aspiration pneumonia and UTI presents with weakness. The pt stated that she had increased BP today and has been constipated. I spoke to her daughter Marlen over the phone who stated that her mother has had a flat affect the last few days and increasing weakness. She has been unable to stand on her own. She went to Dr. Rojelio Collins on 6/28 and had a UCx completed and she was told it was positive today. Denies fevers, chills, chest pain, SOB, abdominal pain, N/V, diarrhea/constipation.       pt admitted for acute urinary tract infection. had urinary retention, had pérez catheter placed in ED , pt was evaled by Urology Dr. Collins will be dc home with pérez and trial of void out pt in 1 week. pt urine culture with E.coli - placed on lexa x 3 day for hx of esbl, was de-esclated to ceftriaxone and PO Ceftin. course complicated by constipation, was given enema and PO Movi-Prep with + BM . pt was followed by Dr. Collins of cardiology for Severe AS clinically stable though and highly doubt SBE no interventions. folow up out pt as needed.       1. Urinary tract infection / urinary retention   - Does not meet SIRS criteria   - UA: cloudy, protein 100, positive nitrite, moderate leukocyte esterase, moderate blood, WBC > 50, RBC 6-10, bacteria TNTC   - BCx no growth x 2   - UCx  E.coli: pan sensitive  - s/p 2 days meropenem, given rocephin and switched to PO Ceftin. completed 5 days of IV and PO abx   - pérez to be continued upon dc and follow up with Dr. Collins in 1 week.     Constipation:  -xray consistant  -s/p enema  -movi-prep with productive BM     Hypertensive urgency   - c/w home anti-HTN agents   - cardio consult appreciated - no further recommendations      Normocytic anemia   - Hb 10 (baseline ~9), monitor     Mild hyponatremia: Stable  - encourage PO intake      History of HTN, HLD, severe AS, HFpEF, hx of pérez, hx of aspiration pneumonia and UTI  - c/w home medications      DVT ppx:   -s/p Heparin 5,000 units Q12H     Code status:   -Full    Dispo:  dc home with home care     Attending Attestation:   I agree with the assessment and plan of NP Diallo as stated and discussed.

## 2023-07-10 NOTE — PROGRESS NOTE ADULT - SUBJECTIVE AND OBJECTIVE BOX
CHIEF COMPLAINT:  Patient is a 89y old  Female who presents with a chief complaint of Weakness (2023 03:16)      HPI:  23:  89 y/o F, well known to me, with PMH HTN, HLD, severe AS, HFpEF, hx of [place] hx of aspiration pneumonia and UTI presents with weakness. The pt stated that she had increased BP today and has been constipated. I spoke to her daughter Marlen over the phone who stated that her mother has had a flat affect the last few days and increasing weakness. She has been unable to stand on her own. She went to Dr. Rojelio Collins on  and had a UCx completed and she was told it was positive today. Denies fevers, chills, abdominal pain, N/V, diarrhea/constipation.  This AM she denies anginal chest pains or increased SOB.  No new cardiac symptoms.    Prior admission   - 23: septic shock secondary to UTI, UCx with candida and pt was on Meropenem and Diflucan, AMS     ER course: //61. Labs: Hb 10 (baseline ~9), , immature granulocytes 1.0%, Na 130, BUN 31, Cr 1.65 (baseline ~1.5), glucose 118. UA: cloudy, protein 100, positive nitrite, moderate leukocyte esterase, moderate blood, WBC > 50, RBC 6-10, bacteria TNTC. EKG: NSR with HR 76 bpm, 1st degree AV block (), no ST segment changes, no T wave inversions (personally reviewed).  CXR: no consolidation, no effusion, no pneumothorax (personally reviewed).   Pt was given Meropenem, hydralazine PO x 1 dose. She is being admitted to med/surg for further management.      23:  Resting comfortably without new cardiac complaints.  UTI being treated and seen by  as well.        PMHx:  PAST MEDICAL & SURGICAL HISTORY:  Gout  Osteoarthritis  HTN (hypertension)  HLD (hyperlipidemia)  Acute on chronic diastolic congestive heart failure  Aortic stenosis  H/O CHF  Pneumonia, aspiration  Pneumonia  History of tonsillectomy in childhood      FAMILY HISTORY:   FAMILY HISTORY:  Family history of hypertension (Father, Mother)      ALLERGIES:  Allergies  Ceftin (Hives; Rash)      REVIEW OF SYSTEMS:  10 point ROS was obtained  Pertinent positives and negatives are as above  All other review of systems is negative unless indicated above      Vital Signs Last 24 Hrs  T(C): 36.7 (2023 00:20), Max: 36.8 (2023 22:41)  T(F): 98 (2023 00:20), Max: 98.2 (2023 22:41)  HR: 60 (2023 00:20) (60 - 68)  BP: 137/44 (2023 00:20) (113/99 - 150/47)  RR: 18 (2023 00:20) (18 - 18)  SpO2: 91% (2023 00:20) (91% - 95%): room air      I&O's Summary  2023 07:01  -  2023 07:00  --------------------------------------------------------  IN: 0 mL / OUT: 700 mL / NET: -700 mL      PHYSICAL EXAM:   Constitutional: NAD, awake and alert, well-developed  HEENT: PERR, EOMI, Normal Hearing, MMM  Neck: Soft and supple, No LAD, No JVD  Respiratory: Breath sounds are clear bilaterally, No wheezing, rales or rhonchi  Cardiovascular: S1 and S2, regular rate and rhythm, harsh LIANA at base and soft systolic murmur LLSB as before, (+) S4 gallop; no rubs  Gastrointestinal: Bowel Sounds present, soft, nontender, nondistended, no guarding, no rebound  Extremities: No peripheral edema  Vascular: 2+ peripheral pulses  Neurological: no focal deficits      MEDICATIONS  (STANDING):  allopurinol 100 milliGRAM(s) Oral daily  artificial  tears Solution 1 Drop(s) Both EYES two times a day  atorvastatin 20 milliGRAM(s) Oral at bedtime  cholecalciferol 1000 Unit(s) Oral daily  enalaprilat Injectable 1.25 milliGRAM(s) IV Push once  folic acid 1 milliGRAM(s) Oral daily  gabapentin 300 milliGRAM(s) Oral two times a day  heparin   Injectable 5000 Unit(s) SubCutaneous every 12 hours  hydrALAZINE 25 milliGRAM(s) Oral two times a day  lactobacillus acidophilus 1 Tablet(s) Oral two times a day with meals  lidocaine   4% Patch 1 Patch Transdermal daily  loratadine 10 milliGRAM(s) Oral daily  melatonin 5 milliGRAM(s) Oral at bedtime  meropenem  IVPB 1000 milliGRAM(s) IV Intermittent every 12 hours  metoprolol tartrate 25 milliGRAM(s) Oral two times a day  senna 2 Tablet(s) Oral <User Schedule>  sodium chloride 0.9%. 1000 milliLiter(s) (75 mL/Hr) IV Continuous <Continuous>  tamsulosin 0.4 milliGRAM(s) Oral at bedtime    MEDICATIONS  (PRN):  acetaminophen     Tablet .. 650 milliGRAM(s) Oral every 6 hours PRN Temp greater or equal to 38C (100.4F), Mild Pain (1 - 3)  aluminum hydroxide/magnesium hydroxide/simethicone Suspension 30 milliLiter(s) Oral every 4 hours PRN Dyspepsia  calcium carbonate    500 mG (Tums) Chewable 1 Tablet(s) Chew four times a day PRN Indigestion  enalaprilat Injectable 1.25 milliGRAM(s) IV Push every 6 hours PRN SBP > 160  melatonin 3 milliGRAM(s) Oral at bedtime PRN Insomnia  ondansetron Injectable 4 milliGRAM(s) IV Push every 8 hours PRN Nausea and/or Vomiting  pantoprazole    Tablet 40 milliGRAM(s) Oral before breakfast PRN for indigestion  polyethylene glycol 3350 17 Gram(s) Oral daily PRN Constipation      LABS: All Labs Reviewed:                        9.9    4.73  )-----------( 122      ( 2023 08:37 )             29.0                           10.0   5.09  )-----------( 119      ( 2023 13:15 )             29.8       07-06    135  |  107  |  25<H>  ----------------------------<  107<H>  4.3   |  22  |  1.28    Ca    9.4      2023 08:37    TPro  7.1  /  Alb  3.6  /  TBili  0.2  /  DBili  x   /  AST  16  /  ALT  20  /  AlkPhos  64  07-05      07-05    130<L>  |  102  |  31<H>  ----------------------------<  118<H>  4.5   |  23  |  1.65<H>    Ca    9.5      2023 13:15    TPro  7.1  /  Alb  3.6  /  TBili  0.2  /  DBili  x   /  AST  16  /  ALT  20  /  AlkPhos  64  07-05    PT/INR - ( 2023 13:15 )   PT: 11.7 sec;   INR: 1.01 ratio       PTT - ( 2023 13:15 )  PTT:28.1 sec      BLOOD CULTURES:   LIPID PROFILE     RADIOLOGY:    CXR: 23:  FINDINGS:  CATHETERS AND TUBES: None  PULMONARY: The visualized lungs are clear of airspace consolidations or pleural effusions. No pneumothorax.  HEART/VASCULAR: The heart is mildly enlarged in transverse diameter.  BONES: Visualized osseous thorax intact.  IMPRESSION:   No radiographic evidence of active chest disease.      EK23:  Sinus rhythm with 1st degree A-V block  Possible Left atrial enlargement  Left ventricular hypertrophy  As before    TELEMETRY:      ECHO:  23:  Findings:  Left Ventricle   Followup study to evaluate for endocarditis.   Valves appear normal - no evidence of endocarditis.    Summary   Followup study to evaluate for endocarditis.   Valves appear normal - no evidence of endocarditis.    Signature   ----------------------------------------------------------------   Electronically signed by Dave Monet MD(Interpreting   physician) on 2023 05:17 PM   ----------------------------------------------------------------    ECHO:  3/1/23:  M-Mode Measurements (cm)   LVEDd: 3.97 cm            LVESd: 2.55 cm   IVSEd: 1.1 cm   LVPWd: 1.1 cm             AO Root Dimension: 2.8 cm                             LA: 3.5 cm                LVOT: 2 cm  Doppler Measurements:   AV Velocity:434 cm/s                  MV Peak E-Wave: 98.7 cm/s   AV Peak Gradient: 75.34 mmHg          MV Peak A-Wave: 131 cm/s   AV Mean Gradient: 40 mmHg             MV E/A Ratio: 0.75 %   AV Area (Continuity):0.85 cm^2        MV Peak Gradient: 3.9 mmHg   TR Velocity:190 cm/s   TR Gradient:14.44 mmHg   Estimated RAP:5 mmHg   RVSP:27 mmHg    Findings  Mitral Valve   The mitral valve leaflets appear thickened.   EA reversal of the mitral inflow consistent with reduced compliance of the left ventricle.   Mild mitral annular calcification is present.   Mild mitral regurgitation is present.    Aortic Valve   Peak and mean transaortic gradients are 75 and 40mmHg respectively; this finding is consistent with severe aortic stenosis.   Mild (1+) aortic regurgitation is present.    Tricuspid Valve   Trace tricuspid valve regurgitation is present.    Pulmonic Valve   Mild pulmonic valvular regurgitation (1+) is present.    Left Atrium   Normal appearing left atrium.    Left Ventricle   Mild concentric left ventricular hypertrophy is present.   The left ventricle is normal in size.   Estimated left ventricular ejection fraction is 65-70 %.    Right Atrium   Normal appearing right atrium.    Right Ventricle   Normal appearing right ventricle structure and function.    Pericardial Effusion   No evidence of pericardial effusion.    Pleural Effusion   No evidence of pleural effusion.    Miscellaneous   The IVC appears normal.    Summary   The mitral valve leaflets appear thickened.   EA reversal of the mitral inflow consistent with reduced compliance of the left ventricle.   Mild mitral annular calcification is present.   Mild mitral regurgitation is present.   Peak and mean transaortic gradients are 75 and 40mmHg respectively; this finding is consistent with severe aortic stenosis.   Mild (1+) aortic regurgitation is present.   Trace tricuspid valve regurgitation is present.   Mild pulmonic valvular regurgitation (1+) is present.   Normal appearing left atrium.   Mild concentric left ventricular hypertrophy is present.   The left ventricle is normal in size.   Estimated left ventricular ejection fraction is 65-70 %.   Normal appearing right atrium.   Normal appearing right ventricle structure and function.   The IVC appears normal.   No evidence of pericardial effusion.   No evidence of pleural effusion.    Signature   ----------------------------------------------------------------   Electronically signed by Sakina Cheatham MD, Director of   Cardiac Cath Lab(Interpreting physician) on 2023 06:42   PM   ---------------------------------------------------------------- 
chief complaint of Weakness (06 Jul 2023 12:15)      SUBJECTIVE:   HPI:  89 y/o F with PMH HTN, HLD, severe AS, HFpEF, hx of pérez, hx of aspiration pneumonia and UTI presents with weakness. The pt stated that she had increased BP today and has been constipated. I spoke to her daughter Marlen over the phone who stated that her mother has had a flat affect the last few days and increasing weakness. She has been unable to stand on her own. She went to Dr. Rojelio Collins on 6/28 and had a UCx completed and she was told it was positive today. Denies fevers, chills, chest pain, SOB, abdominal pain, N/V, diarrhea/constipation.     Prior admission   - 6/13/23: septic shock secondary to UTI, UCx with candida and pt was on Meropenem and Diflucan, AMS     ER course: //61. Labs: Hb 10 (baseline ~9), , immature granulocytes 1.0%, Na 130, BUN 31, Cr 1.65 (baseline ~1.5), glucose 118. UA: cloudy, protein 100, positive nitrite, moderate luekocyte esterase, moderate blood, WBC > 50, RBC 6-10, bacteria TNTC. EKG: NSR with HR 76 bpm, 1st degree AV block (), no ST segment changes, no T wave inversions (personally reviewed).  CXR: no consolidation, no effusion, no pneumothorax (personally reviewed).     Pt was given Meropenem, hydralazine PO x 1 dose. She is being admitted to med/surg for further management.  (06 Jul 2023 03:16)    S:  7/6: sitting up in bed, feeling well today, no pain or discomfort. plan for iv abx and awaiting urine culture results. tolerating pérez catether.   7/7: seen and examined sitting up in bed, no pain. tolerating abx. discussed management. needs to keep pérez in until uro eval.   7/8: Awake alert. Per daughter confused overnight, likely sundowning.  No fever, chills. + constipated.  Discussed plan of care with daughter via phone.  UTI ecoli pan sensitive.    REVIEW OF SYSTEMS: All other review of systems is negative unless indicated above.    Vital Signs Last 24 Hrs  T(C): 36.4 (08 Jul 2023 09:25), Max: 36.7 (08 Jul 2023 00:00)  T(F): 97.6 (08 Jul 2023 09:25), Max: 98.1 (08 Jul 2023 00:00)  HR: 64 (08 Jul 2023 09:25) (64 - 71)  BP: 158/52 (08 Jul 2023 09:25) (153/45 - 162/62)  BP(mean): --  RR: 16 (08 Jul 2023 09:25) (16 - 18)  SpO2: 94% (08 Jul 2023 09:25) (94% - 97%)    Parameters below as of 08 Jul 2023 09:25  Patient On (Oxygen Delivery Method): room air      PHYSICAL EXAM:    Constitutional: NAD, awake and alert, elderly, frail appearing  HEENT: PERR, EOMI, Normal Hearing, MMM  Neck: Soft and supple, No LAD, No JVD  Respiratory: Breath sounds are clear bilaterally, No wheezing, rales or rhonchi  Cardiovascular: S1 and S2, regular rate and rhythm, no Murmurs, gallops or rubs  Gastrointestinal: Bowel Sounds present, soft, nontender, mildly distended, no guarding, no rebound  Extremities: No peripheral edema  Vascular: 2+ peripheral pulses  Neurological: A/O x 3, no focal deficits  Musculoskeletal: 5/5 strength b/l upper and lower extremities  Skin: No rashes      MEDICATIONS  (STANDING):  allopurinol 100 milliGRAM(s) Oral daily  artificial  tears Solution 1 Drop(s) Both EYES two times a day  atorvastatin 20 milliGRAM(s) Oral at bedtime  cefTRIAXone Injectable. 1000 milliGRAM(s) IV Push every 24 hours  cholecalciferol 1000 Unit(s) Oral daily  enalaprilat Injectable 1.25 milliGRAM(s) IV Push once  folic acid 1 milliGRAM(s) Oral daily  gabapentin 300 milliGRAM(s) Oral two times a day  heparin   Injectable 5000 Unit(s) SubCutaneous every 12 hours  hydrALAZINE 25 milliGRAM(s) Oral two times a day  lactobacillus acidophilus 1 Tablet(s) Oral two times a day with meals  lidocaine   4% Patch 1 Patch Transdermal daily  loratadine 10 milliGRAM(s) Oral daily  melatonin 5 milliGRAM(s) Oral at bedtime  metoprolol tartrate 25 milliGRAM(s) Oral two times a day  polyethylene glycol/electrolyte Solution 1000 milliLiter(s) Oral once  senna 2 Tablet(s) Oral at bedtime  tamsulosin 0.4 milliGRAM(s) Oral at bedtime    MEDICATIONS  (PRN):  acetaminophen     Tablet .. 650 milliGRAM(s) Oral every 6 hours PRN Temp greater or equal to 38C (100.4F), Mild Pain (1 - 3)  aluminum hydroxide/magnesium hydroxide/simethicone Suspension 30 milliLiter(s) Oral every 4 hours PRN Dyspepsia  bisacodyl Suppository 10 milliGRAM(s) Rectal daily PRN Constipation  calcium carbonate    500 mG (Tums) Chewable 1 Tablet(s) Chew four times a day PRN Indigestion  enalaprilat Injectable 1.25 milliGRAM(s) IV Push every 6 hours PRN SBP > 160  melatonin 3 milliGRAM(s) Oral at bedtime PRN Insomnia  ondansetron Injectable 4 milliGRAM(s) IV Push every 8 hours PRN Nausea and/or Vomiting  pantoprazole    Tablet 40 milliGRAM(s) Oral before breakfast PRN for indigestion  polyethylene glycol 3350 17 Gram(s) Oral daily PRN Constipation                                8.9    4.67  )-----------( 108      ( 08 Jul 2023 08:11 )             26.8     07-08    132<L>  |  105  |  28<H>  ----------------------------<  109<H>  5.2   |  22  |  1.42<H>    Ca    9.1      08 Jul 2023 08:11      CAPILLARY BLOOD GLUCOSE            Urinalysis Basic - ( 08 Jul 2023 08:11 )    Color: x / Appearance: x / SG: x / pH: x  Gluc: 109 mg/dL / Ketone: x  / Bili: x / Urobili: x   Blood: x / Protein: x / Nitrite: x   Leuk Esterase: x / RBC: x / WBC x   Sq Epi: x / Non Sq Epi: x / Bacteria: x          Assessment and Plan:  89 y/o F presented with weakness     1. Urinary tract infection   - Does not meet SIRS criteria   - UA: cloudy, protein 100, positive nitrite, moderate leukocyte esterase, moderate blood, WBC > 50, RBC 6-10, bacteria TNTC   - BCx no growth x 2  - UCx  E.coli: pan sensitive  - s/p 2 days meropenem    Constipation:  -xray consistant  -s/p enema  -trial movi-prep    Hypertensive urgency   - c/w home anti-HTN agents   - cardio consult appreciated - no further recommendations      Normocytic anemia   - Hb 10 (baseline ~9), monitor     Mild hyponatremia: Stable  - encourage PO intake      History of HTN, HLD, severe AS, HFpEF, hx of pérez, hx of aspiration pneumonia and UTI  - c/w home medications      DVT ppx:   -Heparin 5,000 units Q12H     Code status:   -Full    Dispo:  -needs BM, constipated.  Eventual d/c home with home care/PT.  Updated daughter Pippa on plan of care.      
Patient is a 89y old  Female who presents with a chief complaint of Weakness (06 Jul 2023 12:15)      SUBJECTIVE:   HPI:  89 y/o F with PMH HTN, HLD, severe AS, HFpEF, hx of pérez, hx of aspiration pneumonia and UTI presents with weakness. The pt stated that she had increased BP today and has been constipated. I spoke to her daughter Marlen over the phone who stated that her mother has had a flat affect the last few days and increasing weakness. She has been unable to stand on her own. She went to Dr. Rojelio Collins on 6/28 and had a UCx completed and she was told it was positive today. Denies fevers, chills, chest pain, SOB, abdominal pain, N/V, diarrhea/constipation.     Prior admission   - 6/13/23: septic shock secondary to UTI, UCx with candida and pt was on Meropenem and Diflucan, AMS     ER course: //61. Labs: Hb 10 (baseline ~9), , immature granulocytes 1.0%, Na 130, BUN 31, Cr 1.65 (baseline ~1.5), glucose 118. UA: cloudy, protein 100, positive nitrite, moderate luekocyte esterase, moderate blood, WBC > 50, RBC 6-10, bacteria TNTC. EKG: NSR with HR 76 bpm, 1st degree AV block (), no ST segment changes, no T wave inversions (personally reviewed).  CXR: no consolidation, no effusion, no pneumothorax (personally reviewed).     Pt was given Meropenem, hydralazine PO x 1 dose. She is being admitted to med/surg for further management.  (06 Jul 2023 03:16)      7/6: sitting up in bed, feeling well today, no pain or discomfort. plan for iv abx and awaiting urine culture results. tolerating pérez catether.       REVIEW OF SYSTEMS:    CONSTITUTIONAL: No weakness, fevers or chills  EYES/ENT: No visual changes;  No vertigo or throat pain   NECK: No pain or stiffness  RESPIRATORY: No cough, wheezing, hemoptysis; No shortness of breath  CARDIOVASCULAR: No chest pain or palpitations  GASTROINTESTINAL: No abdominal or epigastric pain. No nausea, vomiting, or hematemesis; No diarrhea or constipation. No melena or hematochezia.  GENITOURINARY: No dysuria, frequency or hematuria  NEUROLOGICAL: No numbness or weakness  SKIN: No itching, burning, rashes, or lesions   All other review of systems is negative unless indicated above      Weight (kg): 70.3 (07-05 @ 20:34)    Vital Signs Last 24 Hrs  T(C): 36.4 (06 Jul 2023 07:11), Max: 36.7 (05 Jul 2023 23:30)  T(F): 97.5 (06 Jul 2023 07:11), Max: 98 (05 Jul 2023 23:30)  HR: 68 (06 Jul 2023 09:10) (61 - 82)  BP: 150/47 (06 Jul 2023 09:10) (143/45 - 196/61)  BP(mean): 96 (05 Jul 2023 15:41) (96 - 96)  RR: 18 (06 Jul 2023 07:11) (17 - 18)  SpO2: 95% (06 Jul 2023 07:11) (94% - 96%) ---  room air        I&O's Summary    05 Jul 2023 07:01  -  06 Jul 2023 07:00  --------------------------------------------------------  IN: 0 mL / OUT: 700 mL / NET: -700 mL        PHYSICAL EXAM:    Constitutional: NAD, awake and alert, well-developed  HEENT: PERR, EOMI, Normal Hearing, MMM  Neck: Soft and supple, No LAD, No JVD  Respiratory: Breath sounds are clear bilaterally, No wheezing, rales or rhonchi  Cardiovascular: S1 and S2, regular rate and rhythm, no Murmurs, gallops or rubs  Gastrointestinal: Bowel Sounds present, soft, nontender, nondistended, no guarding, no rebound  Extremities: No peripheral edema  Vascular: 2+ peripheral pulses  Neurological: A/O x 3, no focal deficits  Musculoskeletal: 5/5 strength b/l upper and lower extremities  Skin: No rashes    MEDICATIONS:  MEDICATIONS  (STANDING):  allopurinol 100 milliGRAM(s) Oral daily  artificial  tears Solution 1 Drop(s) Both EYES two times a day  atorvastatin 20 milliGRAM(s) Oral at bedtime  cholecalciferol 1000 Unit(s) Oral daily  enalaprilat Injectable 1.25 milliGRAM(s) IV Push once  folic acid 1 milliGRAM(s) Oral daily  gabapentin 300 milliGRAM(s) Oral two times a day  heparin   Injectable 5000 Unit(s) SubCutaneous every 12 hours  hydrALAZINE 25 milliGRAM(s) Oral two times a day  lactobacillus acidophilus 1 Tablet(s) Oral two times a day with meals  lidocaine   4% Patch 1 Patch Transdermal daily  loratadine 10 milliGRAM(s) Oral daily  melatonin 5 milliGRAM(s) Oral at bedtime  meropenem  IVPB 1000 milliGRAM(s) IV Intermittent every 12 hours  metoprolol tartrate 25 milliGRAM(s) Oral two times a day  senna 2 Tablet(s) Oral <User Schedule>  sodium chloride 0.9%. 1000 milliLiter(s) (75 mL/Hr) IV Continuous <Continuous>  tamsulosin 0.4 milliGRAM(s) Oral at bedtime      LABS: All Labs Reviewed:                        9.9    4.73  )-----------( 122      ( 06 Jul 2023 08:37 )             29.0     07-06    135  |  107  |  25<H>  ----------------------------<  107<H>  4.3   |  22  |  1.28    Ca    9.4      06 Jul 2023 08:37    TPro  7.1  /  Alb  3.6  /  TBili  0.2  /  DBili  x   /  AST  16  /  ALT  20  /  AlkPhos  64  07-05    PT/INR - ( 05 Jul 2023 13:15 )   PT: 11.7 sec;   INR: 1.01 ratio  PTT - ( 05 Jul 2023 13:15 )  PTT:28.1 sec      Blood Culture:     RADIOLOGY/EKG:          
Date of service: 07-10-23 @ 13:55    pt seen and examined  feels better  no fevers      ROS: no fever or chills; denies dizziness, no HA, no SOB or cough, no abdominal pain, no diarrhea or constipation;  no legs pain, no rashes    MEDICATIONS  (STANDING):  allopurinol 100 milliGRAM(s) Oral daily  artificial  tears Solution 1 Drop(s) Both EYES two times a day  atorvastatin 20 milliGRAM(s) Oral at bedtime  cefuroxime   Tablet 500 milliGRAM(s) Oral every 12 hours  cholecalciferol 1000 Unit(s) Oral daily  enalaprilat Injectable 1.25 milliGRAM(s) IV Push once  folic acid 1 milliGRAM(s) Oral daily  gabapentin 300 milliGRAM(s) Oral two times a day  heparin   Injectable 5000 Unit(s) SubCutaneous every 12 hours  hydrALAZINE 25 milliGRAM(s) Oral two times a day  lactobacillus acidophilus 1 Tablet(s) Oral two times a day with meals  lidocaine   4% Patch 1 Patch Transdermal daily  loratadine 10 milliGRAM(s) Oral daily  melatonin 5 milliGRAM(s) Oral at bedtime  metoprolol tartrate 50 milliGRAM(s) Oral two times a day  nystatin    Suspension 053236 Unit(s) Swish and Swallow three times a day  senna 2 Tablet(s) Oral at bedtime  tamsulosin 0.4 milliGRAM(s) Oral at bedtime    Vital Signs Last 24 Hrs  T(C): 36.6 (10 Jul 2023 09:18), Max: 36.9 (09 Jul 2023 21:28)  T(F): 97.8 (10 Jul 2023 09:18), Max: 98.4 (09 Jul 2023 21:28)  HR: 67 (10 Jul 2023 09:18) (56 - 74)  BP: 181/47 (10 Jul 2023 09:18) (153/56 - 192/68)  BP(mean): --  RR: 17 (10 Jul 2023 09:18) (16 - 17)  SpO2: 94% (10 Jul 2023 09:18) (94% - 96%)    Parameters below as of 10 Jul 2023 09:18  Patient On (Oxygen Delivery Method): room air      PE:  Constitutional: NAD   HEENT: NC/AT, EOMI, PERRLA, conjunctivae clear; ears and nose atraumatic; pharynx benign  Neck: supple; thyroid not palpable  Back: no tenderness  Respiratory: respiratory effort normal; clear to auscultation  Cardiovascular: S1S2 regular, no murmurs  Abdomen: soft, not tender, not distended, positive BS; liver and spleen WNL  Genitourinary: no suprapubic tenderness  Lymphatic: no LN palpable  Musculoskeletal: no muscle tenderness, no joint swelling or tenderness  Extremities: no pedal edema  Neurological/ Psychiatric: AxOx3, Judgement and insight normal;  moving all extremities  Skin: no rashes; no palpable lesions    Labs: all available labs reviewed                                                         9.1    4.46  )-----------( 100      ( 09 Jul 2023 08:09 )             27.3     07-09    138  |  108  |  22  ----------------------------<  101<H>  4.7   |  24  |  1.13    Ca    9.3      09 Jul 2023 08:09        Urinalysis Basic - ( 06 Jul 2023 08:37 )    Color: x / Appearance: x / SG: x / pH: x  Gluc: 107 mg/dL / Ketone: x  / Bili: x / Urobili: x   Blood: x / Protein: x / Nitrite: x   Leuk Esterase: x / RBC: x / WBC x   Sq Epi: x / Non Sq Epi: x / Bacteria: x      Culture - Urine (07.05.23 @ 15:05)   Specimen Source: Clean Catch None  Culture Results:   >100,000 CFU/ml Escherichia coli  Culture - Blood (07.05.23 @ 13:15)   Specimen Source: .Blood Blood-Peripheral  Culture Results:   No growth at 24 hours  Culture - Blood (07.05.23 @ 13:15)   Specimen Source: .Blood Blood-Peripheral  Culture Results:   No growth at 24 hours    Radiology: all available radiological tests reviewed      ACC: [id number] EXAM:  XR CHEST PORTABLE URGENT 1V   ORDERED BY: ZAHRA BARNHART     PROCEDURE DATE:  07/05/2023          INTERPRETATION:  Portable chest radiograph    CLINICAL INFORMATION: Cough    TECHNIQUE:  Portable  AP chest radiograph.    COMPARISON: 6/8/2023 chest x-ray .    FINDINGS:  CATHETERS AND TUBES: None    PULMONARY: The visualized lungs are clear of airspace consolidations or   pleural effusions.   No pneumothorax.    HEART/VASCULAR: The heart is mildly enlarged in transverse diameter.    BONES: Visualized osseous thorax intact.    IMPRESSION:   No radiographic evidence of active chest disease.        Advanced directives addressed: full resuscitation
chief complaint of Weakness (06 Jul 2023 12:15)      SUBJECTIVE:   HPI:  89 y/o F with PMH HTN, HLD, severe AS, HFpEF, hx of pérez, hx of aspiration pneumonia and UTI presents with weakness. The pt stated that she had increased BP today and has been constipated. I spoke to her daughter Marlen over the phone who stated that her mother has had a flat affect the last few days and increasing weakness. She has been unable to stand on her own. She went to Dr. Rojelio Collins on 6/28 and had a UCx completed and she was told it was positive today. Denies fevers, chills, chest pain, SOB, abdominal pain, N/V, diarrhea/constipation.     Prior admission   - 6/13/23: septic shock secondary to UTI, UCx with candida and pt was on Meropenem and Diflucan, AMS     ER course: //61. Labs: Hb 10 (baseline ~9), , immature granulocytes 1.0%, Na 130, BUN 31, Cr 1.65 (baseline ~1.5), glucose 118. UA: cloudy, protein 100, positive nitrite, moderate luekocyte esterase, moderate blood, WBC > 50, RBC 6-10, bacteria TNTC. EKG: NSR with HR 76 bpm, 1st degree AV block (), no ST segment changes, no T wave inversions (personally reviewed).  CXR: no consolidation, no effusion, no pneumothorax (personally reviewed).     Pt was given Meropenem, hydralazine PO x 1 dose. She is being admitted to med/surg for further management.  (06 Jul 2023 03:16)    S:  7/6: sitting up in bed, feeling well today, no pain or discomfort. plan for iv abx and awaiting urine culture results. tolerating pérez catether.   7/7: seen and examined sitting up in bed, no pain. tolerating abx. discussed management. needs to keep pérez in until uro eval.   7/8: Awake alert. Per daughter confused overnight, likely sundowning.  No fever, chills. + constipated.  Discussed plan of care with daughter via phone.  UTI ecoli pan sensitive.  7/9: Lying in bed, calm, denies fever, chills, n, v.   Pt comfortable.     REVIEW OF SYSTEMS: All other review of systems is negative unless indicated above.    Vital Signs Last 24 Hrs  T(C): 36.3 (09 Jul 2023 10:12), Max: 36.5 (08 Jul 2023 16:24)  T(F): 97.3 (09 Jul 2023 10:12), Max: 97.7 (08 Jul 2023 16:24)  HR: 65 (09 Jul 2023 10:12) (64 - 73)  BP: 158/42 (09 Jul 2023 10:12) (158/42 - 172/62)  BP(mean): --  RR: 16 (09 Jul 2023 10:12) (16 - 18)  SpO2: 94% (09 Jul 2023 10:12) (93% - 94%)    Parameters below as of 09 Jul 2023 10:12  Patient On (Oxygen Delivery Method): room air        PHYSICAL EXAM:    Constitutional: NAD, awake and alert, elderly, frail appearing  HEENT: PERR, EOMI, Normal Hearing, MMM  Neck: Soft and supple, No LAD, No JVD  Respiratory: Breath sounds are clear bilaterally, No wheezing, rales or rhonchi  Cardiovascular: S1 and S2, regular rate and rhythm, no Murmurs, gallops or rubs  Gastrointestinal: Bowel Sounds present, soft, nontender, mildly distended, no guarding, no rebound  Extremities: No peripheral edema  Vascular: 2+ peripheral pulses  Neurological: A/O x 3, no focal deficits  Musculoskeletal: 5/5 strength b/l upper and lower extremities  Skin: No rashes      MEDICATIONS  (STANDING):  allopurinol 100 milliGRAM(s) Oral daily  artificial  tears Solution 1 Drop(s) Both EYES two times a day  atorvastatin 20 milliGRAM(s) Oral at bedtime  cefTRIAXone Injectable. 1000 milliGRAM(s) IV Push every 24 hours  cholecalciferol 1000 Unit(s) Oral daily  enalaprilat Injectable 1.25 milliGRAM(s) IV Push once  folic acid 1 milliGRAM(s) Oral daily  gabapentin 300 milliGRAM(s) Oral two times a day  heparin   Injectable 5000 Unit(s) SubCutaneous every 12 hours  hydrALAZINE 25 milliGRAM(s) Oral two times a day  lactobacillus acidophilus 1 Tablet(s) Oral two times a day with meals  lidocaine   4% Patch 1 Patch Transdermal daily  loratadine 10 milliGRAM(s) Oral daily  melatonin 5 milliGRAM(s) Oral at bedtime  metoprolol tartrate 25 milliGRAM(s) Oral two times a day  polyethylene glycol/electrolyte Solution 1000 milliLiter(s) Oral once  senna 2 Tablet(s) Oral at bedtime  tamsulosin 0.4 milliGRAM(s) Oral at bedtime    MEDICATIONS  (PRN):  acetaminophen     Tablet .. 650 milliGRAM(s) Oral every 6 hours PRN Temp greater or equal to 38C (100.4F), Mild Pain (1 - 3)  aluminum hydroxide/magnesium hydroxide/simethicone Suspension 30 milliLiter(s) Oral every 4 hours PRN Dyspepsia  bisacodyl Suppository 10 milliGRAM(s) Rectal daily PRN Constipation  calcium carbonate    500 mG (Tums) Chewable 1 Tablet(s) Chew four times a day PRN Indigestion  enalaprilat Injectable 1.25 milliGRAM(s) IV Push every 6 hours PRN SBP > 160  melatonin 3 milliGRAM(s) Oral at bedtime PRN Insomnia  ondansetron Injectable 4 milliGRAM(s) IV Push every 8 hours PRN Nausea and/or Vomiting  pantoprazole    Tablet 40 milliGRAM(s) Oral before breakfast PRN for indigestion  polyethylene glycol 3350 17 Gram(s) Oral daily PRN Constipation                                9.1    4.46  )-----------( 100      ( 09 Jul 2023 08:09 )             27.3     07-09    138  |  108  |  22  ----------------------------<  101<H>  4.7   |  24  |  1.13    Ca    9.3      09 Jul 2023 08:09      CAPILLARY BLOOD GLUCOSE            Urinalysis Basic - ( 09 Jul 2023 08:09 )    Color: x / Appearance: x / SG: x / pH: x  Gluc: 101 mg/dL / Ketone: x  / Bili: x / Urobili: x   Blood: x / Protein: x / Nitrite: x   Leuk Esterase: x / RBC: x / WBC x   Sq Epi: x / Non Sq Epi: x / Bacteria: x          Assessment and Plan:  89 y/o F presented with weakness     1. Urinary tract infection   - Does not meet SIRS criteria   - UA: cloudy, protein 100, positive nitrite, moderate leukocyte esterase, moderate blood, WBC > 50, RBC 6-10, bacteria TNTC   - BCx no growth x 2  - UCx  E.coli: pan sensitive  - s/p meropenem now on ceftriaxone to complete 5-7 days    Constipation: IMPROVED  -xray consistent  -s/p enema  -bowel regimen     Hypertensive urgency   - c/w home anti-HTN agents   -increase metoprolol 25mg BID to 50mg BID and watch HR  - cardio consult appreciated - no further recommendations      Normocytic anemia   - Hb 10 (baseline ~9), monitor     Mild hyponatremia: Stable  - encourage PO intake      History of HTN, HLD, severe AS, HFpEF, hx of pérez, hx of aspiration pneumonia and UTI  - c/w home medications      DVT ppx:   -Heparin 5,000 units Q12H     Code status:   -Full    Dispo:  -d/c home with home care/PT tomorrow.  Updated daughter Pippa on plan of care 7/8.      
Date of service: 07-07-23 @ 11:22    pt seen and examined  feels better  no fevers  has some weakness    ROS: no fever or chills; denies dizziness, no HA, no SOB or cough, no abdominal pain, no diarrhea or constipation;  no legs pain, no rashes    MEDICATIONS  (STANDING):  allopurinol 100 milliGRAM(s) Oral daily  artificial  tears Solution 1 Drop(s) Both EYES two times a day  atorvastatin 20 milliGRAM(s) Oral at bedtime  cholecalciferol 1000 Unit(s) Oral daily  enalaprilat Injectable 1.25 milliGRAM(s) IV Push once  folic acid 1 milliGRAM(s) Oral daily  gabapentin 300 milliGRAM(s) Oral two times a day  heparin   Injectable 5000 Unit(s) SubCutaneous every 12 hours  hydrALAZINE 25 milliGRAM(s) Oral two times a day  lactobacillus acidophilus 1 Tablet(s) Oral two times a day with meals  lidocaine   4% Patch 1 Patch Transdermal daily  loratadine 10 milliGRAM(s) Oral daily  melatonin 5 milliGRAM(s) Oral at bedtime  meropenem  IVPB 1000 milliGRAM(s) IV Intermittent every 12 hours  metoprolol tartrate 25 milliGRAM(s) Oral two times a day  senna 2 Tablet(s) Oral <User Schedule>  sodium chloride 0.9%. 1000 milliLiter(s) (75 mL/Hr) IV Continuous <Continuous>  tamsulosin 0.4 milliGRAM(s) Oral at bedtime      Vital Signs Last 24 Hrs  T(C): 36.8 (07 Jul 2023 09:18), Max: 36.8 (06 Jul 2023 22:41)  T(F): 98.2 (07 Jul 2023 09:18), Max: 98.2 (06 Jul 2023 22:41)  HR: 64 (07 Jul 2023 09:18) (60 - 64)  BP: 132/44 (07 Jul 2023 09:18) (113/99 - 137/44)  BP(mean): --  RR: 16 (07 Jul 2023 09:18) (16 - 18)  SpO2: 93% (07 Jul 2023 09:18) (91% - 93%)    Parameters below as of 07 Jul 2023 09:18  Patient On (Oxygen Delivery Method): room air        PE:  Constitutional: NAD   HEENT: NC/AT, EOMI, PERRLA, conjunctivae clear; ears and nose atraumatic; pharynx benign  Neck: supple; thyroid not palpable  Back: no tenderness  Respiratory: respiratory effort normal; clear to auscultation  Cardiovascular: S1S2 regular, no murmurs  Abdomen: soft, not tender, not distended, positive BS; liver and spleen WNL  Genitourinary: no suprapubic tenderness  Lymphatic: no LN palpable  Musculoskeletal: no muscle tenderness, no joint swelling or tenderness  Extremities: no pedal edema  Neurological/ Psychiatric: AxOx3, Judgement and insight normal;  moving all extremities  Skin: no rashes; no palpable lesions    Labs: all available labs reviewed                                              9.9    4.73  )-----------( 122      ( 06 Jul 2023 08:37 )             29.0     07-06    135  |  107  |  25<H>  ----------------------------<  107<H>  4.3   |  22  |  1.28    Ca    9.4      06 Jul 2023 08:37    TPro  7.1  /  Alb  3.6  /  TBili  0.2  /  DBili  x   /  AST  16  /  ALT  20  /  AlkPhos  64  07-05        Urinalysis Basic - ( 06 Jul 2023 08:37 )    Color: x / Appearance: x / SG: x / pH: x  Gluc: 107 mg/dL / Ketone: x  / Bili: x / Urobili: x   Blood: x / Protein: x / Nitrite: x   Leuk Esterase: x / RBC: x / WBC x   Sq Epi: x / Non Sq Epi: x / Bacteria: x      Culture - Urine (07.05.23 @ 15:05)   Specimen Source: Clean Catch None  Culture Results:   >100,000 CFU/ml Escherichia coli  Culture - Blood (07.05.23 @ 13:15)   Specimen Source: .Blood Blood-Peripheral  Culture Results:   No growth at 24 hours  Culture - Blood (07.05.23 @ 13:15)   Specimen Source: .Blood Blood-Peripheral  Culture Results:   No growth at 24 hours    Radiology: all available radiological tests reviewed      ACC: [id number] EXAM:  XR CHEST PORTABLE URGENT 1V   ORDERED BY: ZAHRA BARNHART     PROCEDURE DATE:  07/05/2023          INTERPRETATION:  Portable chest radiograph    CLINICAL INFORMATION: Cough    TECHNIQUE:  Portable  AP chest radiograph.    COMPARISON: 6/8/2023 chest x-ray .    FINDINGS:  CATHETERS AND TUBES: None    PULMONARY: The visualized lungs are clear of airspace consolidations or   pleural effusions.   No pneumothorax.    HEART/VASCULAR: The heart is mildly enlarged in transverse diameter.    BONES: Visualized osseous thorax intact.    IMPRESSION:   No radiographic evidence of active chest disease.        Advanced directives addressed: full resuscitation
Date of service: 07-08-23 @ 19:02    pt seen and examined  feels better  no fevers  has some weakness    ROS: no fever or chills; denies dizziness, no HA, no SOB or cough, no abdominal pain, no diarrhea or constipation;  no legs pain, no rashes    MEDICATIONS  (STANDING):  allopurinol 100 milliGRAM(s) Oral daily  artificial  tears Solution 1 Drop(s) Both EYES two times a day  atorvastatin 20 milliGRAM(s) Oral at bedtime  cefTRIAXone Injectable. 1000 milliGRAM(s) IV Push every 24 hours  cholecalciferol 1000 Unit(s) Oral daily  enalaprilat Injectable 1.25 milliGRAM(s) IV Push once  folic acid 1 milliGRAM(s) Oral daily  gabapentin 300 milliGRAM(s) Oral two times a day  heparin   Injectable 5000 Unit(s) SubCutaneous every 12 hours  hydrALAZINE 25 milliGRAM(s) Oral two times a day  lactobacillus acidophilus 1 Tablet(s) Oral two times a day with meals  lidocaine   4% Patch 1 Patch Transdermal daily  loratadine 10 milliGRAM(s) Oral daily  melatonin 5 milliGRAM(s) Oral at bedtime  metoprolol tartrate 25 milliGRAM(s) Oral two times a day  senna 2 Tablet(s) Oral at bedtime  tamsulosin 0.4 milliGRAM(s) Oral at bedtime    Vital Signs Last 24 Hrs  T(C): 36.5 (08 Jul 2023 16:24), Max: 36.7 (08 Jul 2023 00:00)  T(F): 97.7 (08 Jul 2023 16:24), Max: 98.1 (08 Jul 2023 00:00)  HR: 64 (08 Jul 2023 16:24) (64 - 71)  BP: 166/51 (08 Jul 2023 16:24) (153/48 - 166/51)  BP(mean): --  RR: 18 (08 Jul 2023 16:24) (16 - 18)  SpO2: 94% (08 Jul 2023 16:24) (94% - 97%)    Parameters below as of 08 Jul 2023 16:24  Patient On (Oxygen Delivery Method): room air    PE:  Constitutional: NAD   HEENT: NC/AT, EOMI, PERRLA, conjunctivae clear; ears and nose atraumatic; pharynx benign  Neck: supple; thyroid not palpable  Back: no tenderness  Respiratory: respiratory effort normal; clear to auscultation  Cardiovascular: S1S2 regular, no murmurs  Abdomen: soft, not tender, not distended, positive BS; liver and spleen WNL  Genitourinary: no suprapubic tenderness  Lymphatic: no LN palpable  Musculoskeletal: no muscle tenderness, no joint swelling or tenderness  Extremities: no pedal edema  Neurological/ Psychiatric: AxOx3, Judgement and insight normal;  moving all extremities  Skin: no rashes; no palpable lesions    Labs: all available labs reviewed                                              8.9    4.67  )-----------( 108      ( 08 Jul 2023 08:11 )             26.8     07-08    132<L>  |  105  |  28<H>  ----------------------------<  109<H>  5.2   |  22  |  1.42<H>    Ca    9.1      08 Jul 2023 08:11      Urinalysis Basic - ( 06 Jul 2023 08:37 )    Color: x / Appearance: x / SG: x / pH: x  Gluc: 107 mg/dL / Ketone: x  / Bili: x / Urobili: x   Blood: x / Protein: x / Nitrite: x   Leuk Esterase: x / RBC: x / WBC x   Sq Epi: x / Non Sq Epi: x / Bacteria: x      Culture - Urine (07.05.23 @ 15:05)   Specimen Source: Clean Catch None  Culture Results:   >100,000 CFU/ml Escherichia coli  Culture - Blood (07.05.23 @ 13:15)   Specimen Source: .Blood Blood-Peripheral  Culture Results:   No growth at 24 hours  Culture - Blood (07.05.23 @ 13:15)   Specimen Source: .Blood Blood-Peripheral  Culture Results:   No growth at 24 hours    Radiology: all available radiological tests reviewed      ACC: [id number] EXAM:  XR CHEST PORTABLE URGENT 1V   ORDERED BY: ZAHRA BARNHART     PROCEDURE DATE:  07/05/2023          INTERPRETATION:  Portable chest radiograph    CLINICAL INFORMATION: Cough    TECHNIQUE:  Portable  AP chest radiograph.    COMPARISON: 6/8/2023 chest x-ray .    FINDINGS:  CATHETERS AND TUBES: None    PULMONARY: The visualized lungs are clear of airspace consolidations or   pleural effusions.   No pneumothorax.    HEART/VASCULAR: The heart is mildly enlarged in transverse diameter.    BONES: Visualized osseous thorax intact.    IMPRESSION:   No radiographic evidence of active chest disease.        Advanced directives addressed: full resuscitation
Patient is a 89y old  Female who presents with a chief complaint of Weakness (06 Jul 2023 12:15)      SUBJECTIVE:   HPI:  89 y/o F with PMH HTN, HLD, severe AS, HFpEF, hx of pérez, hx of aspiration pneumonia and UTI presents with weakness. The pt stated that she had increased BP today and has been constipated. I spoke to her daughter Marlen over the phone who stated that her mother has had a flat affect the last few days and increasing weakness. She has been unable to stand on her own. She went to Dr. Rojelio Collins on 6/28 and had a UCx completed and she was told it was positive today. Denies fevers, chills, chest pain, SOB, abdominal pain, N/V, diarrhea/constipation.     Prior admission   - 6/13/23: septic shock secondary to UTI, UCx with candida and pt was on Meropenem and Diflucan, AMS     ER course: //61. Labs: Hb 10 (baseline ~9), , immature granulocytes 1.0%, Na 130, BUN 31, Cr 1.65 (baseline ~1.5), glucose 118. UA: cloudy, protein 100, positive nitrite, moderate luekocyte esterase, moderate blood, WBC > 50, RBC 6-10, bacteria TNTC. EKG: NSR with HR 76 bpm, 1st degree AV block (), no ST segment changes, no T wave inversions (personally reviewed).  CXR: no consolidation, no effusion, no pneumothorax (personally reviewed).     Pt was given Meropenem, hydralazine PO x 1 dose. She is being admitted to med/surg for further management.  (06 Jul 2023 03:16)      7/6: sitting up in bed, feeling well today, no pain or discomfort. plan for iv abx and awaiting urine culture results. tolerating pérez catether.   7/7: seen and examined sitting up in bed, no pain. tolerating abx. discussed management. needs to keep pérez in until uro eval.       REVIEW OF SYSTEMS:    CONSTITUTIONAL: No weakness, fevers or chills  EYES/ENT: No visual changes;  No vertigo or throat pain   NECK: No pain or stiffness  RESPIRATORY: No cough, wheezing, hemoptysis; No shortness of breath  CARDIOVASCULAR: No chest pain or palpitations  GASTROINTESTINAL: No abdominal or epigastric pain. No nausea, vomiting, or hematemesis; No diarrhea or constipation. No melena or hematochezia.  GENITOURINARY: No dysuria, frequency or hematuria  NEUROLOGICAL: No numbness or weakness  SKIN: No itching, burning, rashes, or lesions   All other review of systems is negative unless indicated above      Vital Signs Last 24 Hrs  T(C): 36.8 (07 Jul 2023 09:18), Max: 36.8 (06 Jul 2023 22:41)  T(F): 98.2 (07 Jul 2023 09:18), Max: 98.2 (06 Jul 2023 22:41)  HR: 64 (07 Jul 2023 09:18) (60 - 64)  BP: 132/44 (07 Jul 2023 09:18) (113/99 - 137/44)  BP(mean): --  RR: 16 (07 Jul 2023 09:18) (16 - 18)  SpO2: 93% (07 Jul 2023 09:18) (91% - 93%)    Parameters below as of 07 Jul 2023 09:18  Patient On (Oxygen Delivery Method): room air          I&O's Summary    05 Jul 2023 07:01  -  06 Jul 2023 07:00  --------------------------------------------------------  IN: 0 mL / OUT: 700 mL / NET: -700 mL        PHYSICAL EXAM:    Constitutional: NAD, awake and alert, well-developed  HEENT: PERR, EOMI, Normal Hearing, MMM  Neck: Soft and supple, No LAD, No JVD  Respiratory: Breath sounds are clear bilaterally, No wheezing, rales or rhonchi  Cardiovascular: S1 and S2, regular rate and rhythm, no Murmurs, gallops or rubs  Gastrointestinal: Bowel Sounds present, soft, nontender, nondistended, no guarding, no rebound  Extremities: No peripheral edema  Vascular: 2+ peripheral pulses  Neurological: A/O x 3, no focal deficits  Musculoskeletal: 5/5 strength b/l upper and lower extremities  Skin: No rashes      MEDICATIONS:  MEDICATIONS  (STANDING):  allopurinol 100 milliGRAM(s) Oral daily  artificial  tears Solution 1 Drop(s) Both EYES two times a day  atorvastatin 20 milliGRAM(s) Oral at bedtime  cholecalciferol 1000 Unit(s) Oral daily  enalaprilat Injectable 1.25 milliGRAM(s) IV Push once  folic acid 1 milliGRAM(s) Oral daily  gabapentin 300 milliGRAM(s) Oral two times a day  heparin   Injectable 5000 Unit(s) SubCutaneous every 12 hours  hydrALAZINE 25 milliGRAM(s) Oral two times a day  lactobacillus acidophilus 1 Tablet(s) Oral two times a day with meals  lidocaine   4% Patch 1 Patch Transdermal daily  loratadine 10 milliGRAM(s) Oral daily  melatonin 5 milliGRAM(s) Oral at bedtime  meropenem  IVPB 1000 milliGRAM(s) IV Intermittent every 12 hours  metoprolol tartrate 25 milliGRAM(s) Oral two times a day  senna 2 Tablet(s) Oral <User Schedule>  sodium chloride 0.9%. 1000 milliLiter(s) (75 mL/Hr) IV Continuous <Continuous>  tamsulosin 0.4 milliGRAM(s) Oral at bedtime      LABS: All Labs Reviewed:                                   9.2    5.86  )-----------( 113      ( 07 Jul 2023 10:52 )             28.2     07-07    134<L>  |  108  |  29<H>  ----------------------------<  134<H>  5.1   |  21<L>  |  1.57<H>    Ca    9.2      07 Jul 2023 10:52      Urinalysis Basic - ( 07 Jul 2023 10:52 )    Color: x / Appearance: x / SG: x / pH: x  Gluc: 134 mg/dL / Ketone: x  / Bili: x / Urobili: x   Blood: x / Protein: x / Nitrite: x   Leuk Esterase: x / RBC: x / WBC x   Sq Epi: x / Non Sq Epi: x / Bacteria: x        Blood Culture:     RADIOLOGY/EKG:          
  CHIEF COMPLAINT:  Patient is a 89y old  Female who presents with a chief complaint of Weakness (2023 03:16)      HPI:  23:  89 y/o F, well known to me, with PMH HTN, HLD, severe AS, HFpEF, hx of Perales hx of aspiration pneumonia and UTI presents with weakness. The pt stated that she had increased BP today and has been constipated. I spoke to her daughter Marlen over the phone who stated that her mother has had a flat affect the last few days and increasing weakness. She has been unable to stand on her own. She went to Dr. Rojelio Collins on  and had a UCx completed and she was told it was positive today. Denies fevers, chills, abdominal pain, N/V, diarrhea/constipation.  This AM she denies anginal chest pains or increased SOB.  No new cardiac symptoms.    Prior admission   - 23: septic shock secondary to UTI, UCx with candida and pt was on Meropenem and Diflucan, AMS     ER course: //61. Labs: Hb 10 (baseline ~9), , immature granulocytes 1.0%, Na 130, BUN 31, Cr 1.65 (baseline ~1.5), glucose 118. UA: cloudy, protein 100, positive nitrite, moderate leukocyte esterase, moderate blood, WBC > 50, RBC 6-10, bacteria TNTC. EKG: NSR with HR 76 bpm, 1st degree AV block (), no ST segment changes, no T wave inversions (personally reviewed).  CXR: no consolidation, no effusion, no pneumothorax (personally reviewed).   Pt was given Meropenem, hydralazine PO x 1 dose. She is being admitted to med/surg for further management.      23:  Resting comfortably without new cardiac complaints.  UTI being treated and seen by  as well.      7/10/23:  Feeling better and less confused.  Eager to go home.  No new cardiac issues.  Perales catheter in place.      PMHx:  PAST MEDICAL & SURGICAL HISTORY:  Gout  Osteoarthritis  HTN (hypertension)  HLD (hyperlipidemia)  Acute on chronic diastolic congestive heart failure  Aortic stenosis  H/O CHF  Pneumonia, aspiration  Pneumonia  History of tonsillectomy in childhood      FAMILY HISTORY:   FAMILY HISTORY:  Family history of hypertension (Father, Mother)      ALLERGIES:  Allergies  Ceftin (Hives; Rash)      REVIEW OF SYSTEMS:  10 point ROS was obtained  Pertinent positives and negatives are as above  All other review of systems is negative unless indicated above      Vital Signs Last 24 Hrs  T(C): 36.9 (2023 21:28), Max: 36.9 (2023 21:28)  T(F): 98.4 (2023 21:28), Max: 98.4 (2023 21:28)  HR: 74 (:) (56 - 74)  BP: 154/44 (10 Jul 2023 05:44) (153/56 - 192/68)  RR: 17 (2023 21:28) (16 - 17)  SpO2: 94% (2023 21:28) (94% - 96%): room air      I&O's Summary  2023 07:01  -  2023 07:00  --------------------------------------------------------  IN: 0 mL / OUT: 700 mL / NET: -700 mL      PHYSICAL EXAM:   Constitutional: NAD, awake and alert, well-developed  HEENT: PERR, EOMI, Normal Hearing, MMM  Neck: Soft and supple, No LAD, No JVD  Respiratory: Breath sounds are clear bilaterally, No wheezing, rales or rhonchi  Cardiovascular: S1 and S2, regular rate and rhythm, harsh LIANA at base and soft systolic murmur LLSB as before, (+) S4 gallop; no rubs  Gastrointestinal: Bowel Sounds present, soft, nontender, nondistended, no guarding, no rebound  Extremities: No peripheral edema  Vascular: 2+ peripheral pulses  Neurological: no focal deficits      MEDICATIONS  (STANDING):  allopurinol 100 milliGRAM(s) Oral daily  artificial  tears Solution 1 Drop(s) Both EYES two times a day  atorvastatin 20 milliGRAM(s) Oral at bedtime  cefuroxime   Tablet 500 milliGRAM(s) Oral every 12 hours  cholecalciferol 1000 Unit(s) Oral daily  enalaprilat Injectable 1.25 milliGRAM(s) IV Push once  folic acid 1 milliGRAM(s) Oral daily  gabapentin 300 milliGRAM(s) Oral two times a day  heparin   Injectable 5000 Unit(s) SubCutaneous every 12 hours  hydrALAZINE 25 milliGRAM(s) Oral two times a day  lactobacillus acidophilus 1 Tablet(s) Oral two times a day with meals  lidocaine   4% Patch 1 Patch Transdermal daily  loratadine 10 milliGRAM(s) Oral daily  melatonin 5 milliGRAM(s) Oral at bedtime  metoprolol tartrate 50 milliGRAM(s) Oral two times a day  nystatin    Suspension 523024 Unit(s) Swish and Swallow three times a day  senna 2 Tablet(s) Oral at bedtime  tamsulosin 0.4 milliGRAM(s) Oral at bedtime    MEDICATIONS  (PRN):  acetaminophen     Tablet .. 650 milliGRAM(s) Oral every 6 hours PRN Temp greater or equal to 38C (100.4F), Mild Pain (1 - 3)  aluminum hydroxide/magnesium hydroxide/simethicone Suspension 30 milliLiter(s) Oral every 4 hours PRN Dyspepsia  bisacodyl Suppository 10 milliGRAM(s) Rectal daily PRN Constipation  calcium carbonate    500 mG (Tums) Chewable 1 Tablet(s) Chew four times a day PRN Indigestion  enalaprilat Injectable 1.25 milliGRAM(s) IV Push every 6 hours PRN SBP > 160  melatonin 3 milliGRAM(s) Oral at bedtime PRN Insomnia  ondansetron Injectable 4 milliGRAM(s) IV Push every 8 hours PRN Nausea and/or Vomiting  pantoprazole    Tablet 40 milliGRAM(s) Oral before breakfast PRN for indigestion  polyethylene glycol 3350 17 Gram(s) Oral daily PRN Constipation      LABS: All Labs Reviewed:                        9.1    4.46  )-----------( 100      ( 2023 08:09 )             27.3                           9.9    4.73  )-----------( 122      ( 2023 08:37 )             29.0                           10.0   5.09  )-----------( 119      ( 2023 13:15 )             29.8       07-09    138  |  108  |  22  ----------------------------<  101<H>  4.7   |  24  |  1.13    Ca    9.3      2023 08:09      07-06    135  |  107  |  25<H>  ----------------------------<  107<H>  4.3   |  22  |  1.28    Ca    9.4      2023 08:37    TPro  7.1  /  Alb  3.6  /  TBili  0.2  /  DBili  x   /  AST  16  /  ALT  20  /  AlkPhos  64  07-      07-05    130<L>  |  102  |  31<H>  ----------------------------<  118<H>  4.5   |  23  |  1.65<H>    Ca    9.5      2023 13:15    TPro  7.1  /  Alb  3.6  /  TBili  0.2  /  DBili  x   /  AST  16  /  ALT  20  /  AlkPhos  64      PT/INR - ( 2023 13:15 )   PT: 11.7 sec;   INR: 1.01 ratio       PTT - ( 2023 13:15 )  PTT:28.1 sec    Pro-Brain Natriuretic Peptide (23 @ 08:11): 1060 pg/mL  Pro-Brain Natriuretic Peptide (23 @ 09:03): 1099 pg/mL  Pro-Brain Natriuretic Peptide (23 @ 07:54): 1886 pg/mL      BLOOD CULTURES:   LIPID PROFILE     RADIOLOGY:    X-Ray of Abd: :  Frontal view of the abdomen shows a normal gas pattern. Fecal residue projects throughout the colon. Left upper quadrant calcifications are similar. No definite free air is identified.  IMPRESSION:  Compatible with constipation. Clinical correlation is suggested.    CXR: 23:  FINDINGS:  CATHETERS AND TUBES: None  PULMONARY: The visualized lungs are clear of airspace consolidations or pleural effusions. No pneumothorax.  HEART/VASCULAR: The heart is mildly enlarged in transverse diameter.  BONES: Visualized osseous thorax intact.  IMPRESSION:   No radiographic evidence of active chest disease.      EK23:  Sinus rhythm with 1st degree A-V block  Possible Left atrial enlargement  Left ventricular hypertrophy  As before    TELEMETRY:      ECHO:  23:  Findings:  Left Ventricle   Followup study to evaluate for endocarditis.   Valves appear normal - no evidence of endocarditis.    Summary   Followup study to evaluate for endocarditis.   Valves appear normal - no evidence of endocarditis.    Signature   ----------------------------------------------------------------   Electronically signed by Dave Monet MD(Interpreting   physician) on 2023 05:17 PM   ----------------------------------------------------------------    ECHO:  3/1/23:  M-Mode Measurements (cm)   LVEDd: 3.97 cm            LVESd: 2.55 cm   IVSEd: 1.1 cm   LVPWd: 1.1 cm             AO Root Dimension: 2.8 cm                             LA: 3.5 cm                LVOT: 2 cm  Doppler Measurements:   AV Velocity:434 cm/s                  MV Peak E-Wave: 98.7 cm/s   AV Peak Gradient: 75.34 mmHg          MV Peak A-Wave: 131 cm/s   AV Mean Gradient: 40 mmHg             MV E/A Ratio: 0.75 %   AV Area (Continuity):0.85 cm^2        MV Peak Gradient: 3.9 mmHg   TR Velocity:190 cm/s   TR Gradient:14.44 mmHg   Estimated RAP:5 mmHg   RVSP:27 mmHg    Findings  Mitral Valve   The mitral valve leaflets appear thickened.   EA reversal of the mitral inflow consistent with reduced compliance of the left ventricle.   Mild mitral annular calcification is present.   Mild mitral regurgitation is present.    Aortic Valve   Peak and mean transaortic gradients are 75 and 40mmHg respectively; this finding is consistent with severe aortic stenosis.   Mild (1+) aortic regurgitation is present.    Tricuspid Valve   Trace tricuspid valve regurgitation is present.    Pulmonic Valve   Mild pulmonic valvular regurgitation (1+) is present.    Left Atrium   Normal appearing left atrium.    Left Ventricle   Mild concentric left ventricular hypertrophy is present.   The left ventricle is normal in size.   Estimated left ventricular ejection fraction is 65-70 %.    Right Atrium   Normal appearing right atrium.    Right Ventricle   Normal appearing right ventricle structure and function.    Pericardial Effusion   No evidence of pericardial effusion.    Pleural Effusion   No evidence of pleural effusion.    Miscellaneous   The IVC appears normal.    Summary   The mitral valve leaflets appear thickened.   EA reversal of the mitral inflow consistent with reduced compliance of the left ventricle.   Mild mitral annular calcification is present.   Mild mitral regurgitation is present.   Peak and mean transaortic gradients are 75 and 40mmHg respectively; this finding is consistent with severe aortic stenosis.   Mild (1+) aortic regurgitation is present.   Trace tricuspid valve regurgitation is present.   Mild pulmonic valvular regurgitation (1+) is present.   Normal appearing left atrium.   Mild concentric left ventricular hypertrophy is present.   The left ventricle is normal in size.   Estimated left ventricular ejection fraction is 65-70 %.   Normal appearing right atrium.   Normal appearing right ventricle structure and function.   The IVC appears normal.   No evidence of pericardial effusion.   No evidence of pleural effusion.    Signature   ----------------------------------------------------------------   Electronically signed by Sakina Cheatham MD, Director of   Cardiac Cath Lab(Interpreting physician) on 2023 06:42   PM   ----------------------------------------------------------------

## 2023-07-12 ENCOUNTER — NON-APPOINTMENT (OUTPATIENT)
Age: 88
End: 2023-07-12

## 2023-07-13 ENCOUNTER — APPOINTMENT (OUTPATIENT)
Dept: RHEUMATOLOGY | Facility: CLINIC | Age: 88
End: 2023-07-13

## 2023-07-16 DIAGNOSIS — E78.5 HYPERLIPIDEMIA, UNSPECIFIED: ICD-10-CM

## 2023-07-16 DIAGNOSIS — K59.00 CONSTIPATION, UNSPECIFIED: ICD-10-CM

## 2023-07-16 DIAGNOSIS — I44.0 ATRIOVENTRICULAR BLOCK, FIRST DEGREE: ICD-10-CM

## 2023-07-16 DIAGNOSIS — I16.0 HYPERTENSIVE URGENCY: ICD-10-CM

## 2023-07-16 DIAGNOSIS — B96.20 UNSPECIFIED ESCHERICHIA COLI [E. COLI] AS THE CAUSE OF DISEASES CLASSIFIED ELSEWHERE: ICD-10-CM

## 2023-07-16 DIAGNOSIS — G93.41 METABOLIC ENCEPHALOPATHY: ICD-10-CM

## 2023-07-16 DIAGNOSIS — B96.1 KLEBSIELLA PNEUMONIAE [K. PNEUMONIAE] AS THE CAUSE OF DISEASES CLASSIFIED ELSEWHERE: ICD-10-CM

## 2023-07-16 DIAGNOSIS — M10.9 GOUT, UNSPECIFIED: ICD-10-CM

## 2023-07-16 DIAGNOSIS — N39.0 URINARY TRACT INFECTION, SITE NOT SPECIFIED: ICD-10-CM

## 2023-07-16 DIAGNOSIS — D64.9 ANEMIA, UNSPECIFIED: ICD-10-CM

## 2023-07-16 DIAGNOSIS — I08.0 RHEUMATIC DISORDERS OF BOTH MITRAL AND AORTIC VALVES: ICD-10-CM

## 2023-07-16 DIAGNOSIS — I11.0 HYPERTENSIVE HEART DISEASE WITH HEART FAILURE: ICD-10-CM

## 2023-07-16 DIAGNOSIS — R33.9 RETENTION OF URINE, UNSPECIFIED: ICD-10-CM

## 2023-07-16 DIAGNOSIS — I50.32 CHRONIC DIASTOLIC (CONGESTIVE) HEART FAILURE: ICD-10-CM

## 2023-07-16 DIAGNOSIS — E87.1 HYPO-OSMOLALITY AND HYPONATREMIA: ICD-10-CM

## 2023-07-18 ENCOUNTER — APPOINTMENT (OUTPATIENT)
Dept: UROLOGY | Facility: CLINIC | Age: 88
End: 2023-07-18
Payer: MEDICARE

## 2023-07-18 ENCOUNTER — NON-APPOINTMENT (OUTPATIENT)
Age: 88
End: 2023-07-18

## 2023-07-18 PROCEDURE — 99442: CPT

## 2023-07-18 NOTE — ASSESSMENT
[FreeTextEntry1] : 89 year old F with chronic indwelling pérez for urinary retention and frequent UTIs.\par \par For UTIs, continue  D-mannose supplements and irrigation with antibiotic solution.  Catheter change q3 weeks. Also discussed placement of SP tube. Family will think about it.

## 2023-07-18 NOTE — HISTORY OF PRESENT ILLNESS
[FreeTextEntry1] : 89 year old woman  seen 04/18/2023 with complaint of frequent UTI. This began 3 months ago. She gets UTIs about every month. Her usual UTI symptoms are not dysuria, urgency, and bladder pain but rather high fevers and confusion. She had followed with Dr Bonilla for urinary retention and is managed with indwelling pérez. He had recommended CIC, but pt could not perform this. She presents with her daughter for discussion of ways to prevent UTIs.  No family history contributory to Frequent UTIs. \par \par 06/06/2023: Patient presents for follow up. She was recently admitted to  for UTI, sepsis. She was treated with 10 days of IV zosyn. She reports daily episodes of severe SP pain and occasional leakage around pérez. This has improved "a little" with myrbetriq 25 mg. \par \par 07/18/2023: Patient's daughter call for TTM. Pt has been readmitted to  for UTI, now every month for 7 months. They are using D-mannose and daily abx bladder irrigation. Daughter is asking what other preventative measures can be taken.

## 2023-07-20 ENCOUNTER — INPATIENT (INPATIENT)
Facility: HOSPITAL | Age: 88
LOS: 6 days | Discharge: HOME HEALTH | DRG: 871 | End: 2023-07-27
Attending: INTERNAL MEDICINE | Admitting: STUDENT IN AN ORGANIZED HEALTH CARE EDUCATION/TRAINING PROGRAM
Payer: MEDICARE

## 2023-07-20 VITALS
TEMPERATURE: 98 F | HEIGHT: 63 IN | RESPIRATION RATE: 18 BRPM | OXYGEN SATURATION: 87 % | DIASTOLIC BLOOD PRESSURE: 41 MMHG | WEIGHT: 130.07 LBS | HEART RATE: 88 BPM | SYSTOLIC BLOOD PRESSURE: 98 MMHG

## 2023-07-20 DIAGNOSIS — Z90.89 ACQUIRED ABSENCE OF OTHER ORGANS: Chronic | ICD-10-CM

## 2023-07-20 DIAGNOSIS — R53.1 WEAKNESS: ICD-10-CM

## 2023-07-20 PROBLEM — Z86.79 PERSONAL HISTORY OF OTHER DISEASES OF THE CIRCULATORY SYSTEM: Chronic | Status: ACTIVE | Noted: 2023-07-06

## 2023-07-20 PROBLEM — J69.0 PNEUMONITIS DUE TO INHALATION OF FOOD AND VOMIT: Chronic | Status: ACTIVE | Noted: 2023-07-06

## 2023-07-20 PROBLEM — I35.0 NONRHEUMATIC AORTIC (VALVE) STENOSIS: Chronic | Status: ACTIVE | Noted: 2023-07-06

## 2023-07-20 PROBLEM — J18.9 PNEUMONIA, UNSPECIFIED ORGANISM: Chronic | Status: ACTIVE | Noted: 2023-07-06

## 2023-07-20 LAB
ADD ON TEST-SPECIMEN IN LAB: SIGNIFICANT CHANGE UP
ALBUMIN SERPL ELPH-MCNC: 3.6 G/DL — SIGNIFICANT CHANGE UP (ref 3.3–5)
ALP SERPL-CCNC: 61 U/L — SIGNIFICANT CHANGE UP (ref 40–120)
ALT FLD-CCNC: 32 U/L — SIGNIFICANT CHANGE UP (ref 12–78)
ANION GAP SERPL CALC-SCNC: 8 MMOL/L — SIGNIFICANT CHANGE UP (ref 5–17)
APPEARANCE UR: CLEAR — SIGNIFICANT CHANGE UP
APTT BLD: 25.7 SEC — LOW (ref 27.5–35.5)
AST SERPL-CCNC: 38 U/L — HIGH (ref 15–37)
BACTERIA # UR AUTO: ABNORMAL
BASOPHILS # BLD AUTO: 0.01 K/UL — SIGNIFICANT CHANGE UP (ref 0–0.2)
BASOPHILS NFR BLD AUTO: 0.1 % — SIGNIFICANT CHANGE UP (ref 0–2)
BILIRUB SERPL-MCNC: 0.6 MG/DL — SIGNIFICANT CHANGE UP (ref 0.2–1.2)
BILIRUB UR-MCNC: NEGATIVE — SIGNIFICANT CHANGE UP
BUN SERPL-MCNC: 25 MG/DL — HIGH (ref 7–23)
CALCIUM SERPL-MCNC: 8.9 MG/DL — SIGNIFICANT CHANGE UP (ref 8.5–10.1)
CHLORIDE SERPL-SCNC: 105 MMOL/L — SIGNIFICANT CHANGE UP (ref 96–108)
CO2 SERPL-SCNC: 20 MMOL/L — LOW (ref 22–31)
COLOR SPEC: YELLOW — SIGNIFICANT CHANGE UP
CREAT SERPL-MCNC: 1.45 MG/DL — HIGH (ref 0.5–1.3)
DIFF PNL FLD: ABNORMAL
EGFR: 33 ML/MIN/1.73M2 — LOW
EOSINOPHIL # BLD AUTO: 0.01 K/UL — SIGNIFICANT CHANGE UP (ref 0–0.5)
EOSINOPHIL NFR BLD AUTO: 0.1 % — SIGNIFICANT CHANGE UP (ref 0–6)
EPI CELLS # UR: SIGNIFICANT CHANGE UP
GLUCOSE SERPL-MCNC: 113 MG/DL — HIGH (ref 70–99)
GLUCOSE UR QL: NEGATIVE — SIGNIFICANT CHANGE UP
HCT VFR BLD CALC: 29.8 % — LOW (ref 34.5–45)
HGB BLD-MCNC: 10.2 G/DL — LOW (ref 11.5–15.5)
HYALINE CASTS # UR AUTO: ABNORMAL /LPF
IMM GRANULOCYTES NFR BLD AUTO: 0.8 % — SIGNIFICANT CHANGE UP (ref 0–0.9)
INR BLD: 1.02 RATIO — SIGNIFICANT CHANGE UP (ref 0.88–1.16)
KETONES UR-MCNC: NEGATIVE — SIGNIFICANT CHANGE UP
LACTATE SERPL-SCNC: 1.5 MMOL/L — SIGNIFICANT CHANGE UP (ref 0.7–2)
LEUKOCYTE ESTERASE UR-ACNC: ABNORMAL
LYMPHOCYTES # BLD AUTO: 0.16 K/UL — LOW (ref 1–3.3)
LYMPHOCYTES # BLD AUTO: 1.4 % — LOW (ref 13–44)
MAGNESIUM SERPL-MCNC: 1.9 MG/DL — SIGNIFICANT CHANGE UP (ref 1.6–2.6)
MCHC RBC-ENTMCNC: 31.8 PG — SIGNIFICANT CHANGE UP (ref 27–34)
MCHC RBC-ENTMCNC: 34.2 GM/DL — SIGNIFICANT CHANGE UP (ref 32–36)
MCV RBC AUTO: 92.8 FL — SIGNIFICANT CHANGE UP (ref 80–100)
MONOCYTES # BLD AUTO: 0.62 K/UL — SIGNIFICANT CHANGE UP (ref 0–0.9)
MONOCYTES NFR BLD AUTO: 5.5 % — SIGNIFICANT CHANGE UP (ref 2–14)
NEUTROPHILS # BLD AUTO: 10.32 K/UL — HIGH (ref 1.8–7.4)
NEUTROPHILS NFR BLD AUTO: 92.1 % — HIGH (ref 43–77)
NITRITE UR-MCNC: NEGATIVE — SIGNIFICANT CHANGE UP
NT-PROBNP SERPL-SCNC: 2123 PG/ML — HIGH (ref 0–450)
PH UR: 5 — SIGNIFICANT CHANGE UP (ref 5–8)
PHOSPHATE SERPL-MCNC: 2.7 MG/DL — SIGNIFICANT CHANGE UP (ref 2.5–4.5)
PLATELET # BLD AUTO: 116 K/UL — LOW (ref 150–400)
POTASSIUM SERPL-MCNC: 3.8 MMOL/L — SIGNIFICANT CHANGE UP (ref 3.5–5.3)
POTASSIUM SERPL-SCNC: 3.8 MMOL/L — SIGNIFICANT CHANGE UP (ref 3.5–5.3)
PROT SERPL-MCNC: 7.1 GM/DL — SIGNIFICANT CHANGE UP (ref 6–8.3)
PROT UR-MCNC: 30 MG/DL
PROTHROM AB SERPL-ACNC: 11.8 SEC — SIGNIFICANT CHANGE UP (ref 10.5–13.4)
RAPID RVP RESULT: SIGNIFICANT CHANGE UP
RBC # BLD: 3.21 M/UL — LOW (ref 3.8–5.2)
RBC # FLD: 14.6 % — HIGH (ref 10.3–14.5)
RBC CASTS # UR COMP ASSIST: ABNORMAL /HPF (ref 0–4)
SARS-COV-2 RNA SPEC QL NAA+PROBE: SIGNIFICANT CHANGE UP
SODIUM SERPL-SCNC: 133 MMOL/L — LOW (ref 135–145)
SP GR SPEC: 1 — LOW (ref 1.01–1.02)
TROPONIN I, HIGH SENSITIVITY RESULT: 17.94 NG/L — SIGNIFICANT CHANGE UP
TSH SERPL-MCNC: 2.76 UU/ML — SIGNIFICANT CHANGE UP (ref 0.34–4.82)
UROBILINOGEN FLD QL: NEGATIVE — SIGNIFICANT CHANGE UP
WBC # BLD: 11.21 K/UL — HIGH (ref 3.8–10.5)
WBC # FLD AUTO: 11.21 K/UL — HIGH (ref 3.8–10.5)
WBC UR QL: ABNORMAL /HPF (ref 0–5)

## 2023-07-20 PROCEDURE — 93005 ELECTROCARDIOGRAM TRACING: CPT

## 2023-07-20 PROCEDURE — 83880 ASSAY OF NATRIURETIC PEPTIDE: CPT

## 2023-07-20 PROCEDURE — 36415 COLL VENOUS BLD VENIPUNCTURE: CPT

## 2023-07-20 PROCEDURE — 71275 CT ANGIOGRAPHY CHEST: CPT | Mod: 26,MA

## 2023-07-20 PROCEDURE — 84484 ASSAY OF TROPONIN QUANT: CPT

## 2023-07-20 PROCEDURE — 83540 ASSAY OF IRON: CPT

## 2023-07-20 PROCEDURE — 99497 ADVNCD CARE PLAN 30 MIN: CPT | Mod: 25

## 2023-07-20 PROCEDURE — 80048 BASIC METABOLIC PNL TOTAL CA: CPT

## 2023-07-20 PROCEDURE — 82803 BLOOD GASES ANY COMBINATION: CPT

## 2023-07-20 PROCEDURE — 97116 GAIT TRAINING THERAPY: CPT | Mod: GP

## 2023-07-20 PROCEDURE — 93010 ELECTROCARDIOGRAM REPORT: CPT

## 2023-07-20 PROCEDURE — P9047: CPT

## 2023-07-20 PROCEDURE — 80053 COMPREHEN METABOLIC PANEL: CPT

## 2023-07-20 PROCEDURE — 83735 ASSAY OF MAGNESIUM: CPT

## 2023-07-20 PROCEDURE — 93971 EXTREMITY STUDY: CPT | Mod: LT

## 2023-07-20 PROCEDURE — 71045 X-RAY EXAM CHEST 1 VIEW: CPT | Mod: 26

## 2023-07-20 PROCEDURE — 0225U NFCT DS DNA&RNA 21 SARSCOV2: CPT

## 2023-07-20 PROCEDURE — 36600 WITHDRAWAL OF ARTERIAL BLOOD: CPT

## 2023-07-20 PROCEDURE — 85027 COMPLETE CBC AUTOMATED: CPT

## 2023-07-20 PROCEDURE — 83550 IRON BINDING TEST: CPT

## 2023-07-20 PROCEDURE — 83605 ASSAY OF LACTIC ACID: CPT

## 2023-07-20 PROCEDURE — 74176 CT ABD & PELVIS W/O CONTRAST: CPT | Mod: 26

## 2023-07-20 PROCEDURE — 73030 X-RAY EXAM OF SHOULDER: CPT | Mod: 26,RT

## 2023-07-20 PROCEDURE — 71250 CT THORAX DX C-: CPT

## 2023-07-20 PROCEDURE — 99291 CRITICAL CARE FIRST HOUR: CPT

## 2023-07-20 PROCEDURE — 97163 PT EVAL HIGH COMPLEX 45 MIN: CPT | Mod: GP

## 2023-07-20 PROCEDURE — 99223 1ST HOSP IP/OBS HIGH 75: CPT

## 2023-07-20 PROCEDURE — 70450 CT HEAD/BRAIN W/O DYE: CPT | Mod: 26

## 2023-07-20 PROCEDURE — 93308 TTE F-UP OR LMTD: CPT

## 2023-07-20 PROCEDURE — 70450 CT HEAD/BRAIN W/O DYE: CPT

## 2023-07-20 PROCEDURE — 84100 ASSAY OF PHOSPHORUS: CPT

## 2023-07-20 PROCEDURE — 74176 CT ABD & PELVIS W/O CONTRAST: CPT

## 2023-07-20 PROCEDURE — 97530 THERAPEUTIC ACTIVITIES: CPT | Mod: GP

## 2023-07-20 PROCEDURE — 85025 COMPLETE CBC W/AUTO DIFF WBC: CPT

## 2023-07-20 RX ORDER — VANCOMYCIN HCL 1 G
1000 VIAL (EA) INTRAVENOUS ONCE
Refills: 0 | Status: COMPLETED | OUTPATIENT
Start: 2023-07-20 | End: 2023-07-20

## 2023-07-20 RX ORDER — ACETAMINOPHEN 500 MG
650 TABLET ORAL ONCE
Refills: 0 | Status: COMPLETED | OUTPATIENT
Start: 2023-07-20 | End: 2023-07-20

## 2023-07-20 RX ORDER — FUROSEMIDE 40 MG
1 TABLET ORAL
Refills: 0 | DISCHARGE

## 2023-07-20 RX ORDER — PANTOPRAZOLE SODIUM 20 MG/1
40 TABLET, DELAYED RELEASE ORAL
Refills: 0 | Status: DISCONTINUED | OUTPATIENT
Start: 2023-07-20 | End: 2023-07-27

## 2023-07-20 RX ORDER — CHOLECALCIFEROL (VITAMIN D3) 125 MCG
1000 CAPSULE ORAL DAILY
Refills: 0 | Status: DISCONTINUED | OUTPATIENT
Start: 2023-07-20 | End: 2023-07-27

## 2023-07-20 RX ORDER — HEPARIN SODIUM 5000 [USP'U]/ML
5000 INJECTION INTRAVENOUS; SUBCUTANEOUS EVERY 8 HOURS
Refills: 0 | Status: DISCONTINUED | OUTPATIENT
Start: 2023-07-20 | End: 2023-07-27

## 2023-07-20 RX ORDER — FOLIC ACID 0.8 MG
1 TABLET ORAL DAILY
Refills: 0 | Status: DISCONTINUED | OUTPATIENT
Start: 2023-07-20 | End: 2023-07-27

## 2023-07-20 RX ORDER — PHENYLEPHRINE HYDROCHLORIDE 10 MG/ML
0.5 INJECTION INTRAVENOUS
Qty: 40 | Refills: 0 | Status: DISCONTINUED | OUTPATIENT
Start: 2023-07-20 | End: 2023-07-23

## 2023-07-20 RX ORDER — PANTOPRAZOLE SODIUM 20 MG/1
1 TABLET, DELAYED RELEASE ORAL
Refills: 0 | DISCHARGE

## 2023-07-20 RX ORDER — MEROPENEM 1 G/30ML
500 INJECTION INTRAVENOUS EVERY 12 HOURS
Refills: 0 | Status: DISCONTINUED | OUTPATIENT
Start: 2023-07-20 | End: 2023-07-24

## 2023-07-20 RX ORDER — LIDOCAINE 4 G/100G
1 CREAM TOPICAL
Refills: 0 | DISCHARGE

## 2023-07-20 RX ORDER — SODIUM CHLORIDE 9 MG/ML
500 INJECTION INTRAMUSCULAR; INTRAVENOUS; SUBCUTANEOUS ONCE
Refills: 0 | Status: COMPLETED | OUTPATIENT
Start: 2023-07-20 | End: 2023-07-20

## 2023-07-20 RX ORDER — PHENYLEPHRINE HYDROCHLORIDE 10 MG/ML
0.5 INJECTION INTRAVENOUS
Qty: 160 | Refills: 0 | Status: DISCONTINUED | OUTPATIENT
Start: 2023-07-20 | End: 2023-07-20

## 2023-07-20 RX ORDER — FOLIC ACID 0.8 MG
1 TABLET ORAL DAILY
Refills: 0 | Status: DISCONTINUED | OUTPATIENT
Start: 2023-07-20 | End: 2023-07-20

## 2023-07-20 RX ORDER — ACETAMINOPHEN 500 MG
2 TABLET ORAL
Refills: 0 | DISCHARGE

## 2023-07-20 RX ORDER — CHLORHEXIDINE GLUCONATE 213 G/1000ML
1 SOLUTION TOPICAL
Refills: 0 | Status: DISCONTINUED | OUTPATIENT
Start: 2023-07-20 | End: 2023-07-27

## 2023-07-20 RX ORDER — SENNA PLUS 8.6 MG/1
2 TABLET ORAL
Refills: 0 | DISCHARGE

## 2023-07-20 RX ORDER — HYDRALAZINE HCL 50 MG
1 TABLET ORAL
Refills: 0 | DISCHARGE

## 2023-07-20 RX ORDER — MIDODRINE HYDROCHLORIDE 2.5 MG/1
10 TABLET ORAL EVERY 8 HOURS
Refills: 0 | Status: DISCONTINUED | OUTPATIENT
Start: 2023-07-20 | End: 2023-07-20

## 2023-07-20 RX ORDER — TAMSULOSIN HYDROCHLORIDE 0.4 MG/1
1 CAPSULE ORAL
Refills: 0 | DISCHARGE

## 2023-07-20 RX ORDER — GABAPENTIN 400 MG/1
300 CAPSULE ORAL
Refills: 0 | Status: DISCONTINUED | OUTPATIENT
Start: 2023-07-20 | End: 2023-07-27

## 2023-07-20 RX ORDER — FOLIC ACID 0.8 MG
1 TABLET ORAL
Qty: 0 | Refills: 0 | DISCHARGE

## 2023-07-20 RX ORDER — CHOLECALCIFEROL (VITAMIN D3) 125 MCG
1 CAPSULE ORAL
Qty: 0 | Refills: 0 | DISCHARGE

## 2023-07-20 RX ORDER — DOCUSATE SODIUM 100 MG
1 CAPSULE ORAL
Refills: 0 | DISCHARGE

## 2023-07-20 RX ORDER — LANOLIN ALCOHOL/MO/W.PET/CERES
3 CREAM (GRAM) TOPICAL AT BEDTIME
Refills: 0 | Status: DISCONTINUED | OUTPATIENT
Start: 2023-07-20 | End: 2023-07-23

## 2023-07-20 RX ORDER — ONDANSETRON 8 MG/1
4 TABLET, FILM COATED ORAL EVERY 8 HOURS
Refills: 0 | Status: DISCONTINUED | OUTPATIENT
Start: 2023-07-20 | End: 2023-07-23

## 2023-07-20 RX ORDER — CHOLECALCIFEROL (VITAMIN D3) 125 MCG
1 CAPSULE ORAL
Refills: 0 | DISCHARGE

## 2023-07-20 RX ORDER — ALLOPURINOL 300 MG
1 TABLET ORAL
Qty: 0 | Refills: 0 | DISCHARGE

## 2023-07-20 RX ORDER — GABAPENTIN 400 MG/1
600 CAPSULE ORAL
Refills: 0 | Status: DISCONTINUED | OUTPATIENT
Start: 2023-07-20 | End: 2023-07-20

## 2023-07-20 RX ORDER — ATORVASTATIN CALCIUM 80 MG/1
20 TABLET, FILM COATED ORAL AT BEDTIME
Refills: 0 | Status: DISCONTINUED | OUTPATIENT
Start: 2023-07-20 | End: 2023-07-27

## 2023-07-20 RX ORDER — LANOLIN ALCOHOL/MO/W.PET/CERES
1 CREAM (GRAM) TOPICAL
Refills: 0 | DISCHARGE

## 2023-07-20 RX ORDER — LIDOCAINE 4 G/100G
1 CREAM TOPICAL EVERY 24 HOURS
Refills: 0 | Status: DISCONTINUED | OUTPATIENT
Start: 2023-07-20 | End: 2023-07-27

## 2023-07-20 RX ORDER — SENNA PLUS 8.6 MG/1
2 TABLET ORAL AT BEDTIME
Refills: 0 | Status: DISCONTINUED | OUTPATIENT
Start: 2023-07-20 | End: 2023-07-27

## 2023-07-20 RX ORDER — MEROPENEM 1 G/30ML
1000 INJECTION INTRAVENOUS EVERY 8 HOURS
Refills: 0 | Status: DISCONTINUED | OUTPATIENT
Start: 2023-07-20 | End: 2023-07-20

## 2023-07-20 RX ORDER — ASCORBIC ACID 60 MG
500 TABLET,CHEWABLE ORAL DAILY
Refills: 0 | Status: DISCONTINUED | OUTPATIENT
Start: 2023-07-20 | End: 2023-07-27

## 2023-07-20 RX ORDER — ROSUVASTATIN CALCIUM 5 MG/1
1 TABLET ORAL
Qty: 0 | Refills: 0 | DISCHARGE

## 2023-07-20 RX ORDER — NOREPINEPHRINE BITARTRATE/D5W 8 MG/250ML
0.05 PLASTIC BAG, INJECTION (ML) INTRAVENOUS
Qty: 8 | Refills: 0 | Status: DISCONTINUED | OUTPATIENT
Start: 2023-07-20 | End: 2023-07-22

## 2023-07-20 RX ORDER — FEXOFENADINE HCL 30 MG
1 TABLET ORAL
Refills: 0 | DISCHARGE

## 2023-07-20 RX ORDER — ASCORBIC ACID 60 MG
1 TABLET,CHEWABLE ORAL
Refills: 0 | DISCHARGE

## 2023-07-20 RX ORDER — MIDODRINE HYDROCHLORIDE 2.5 MG/1
10 TABLET ORAL ONCE
Refills: 0 | Status: DISCONTINUED | OUTPATIENT
Start: 2023-07-20 | End: 2023-07-20

## 2023-07-20 RX ORDER — ACETAMINOPHEN 500 MG
650 TABLET ORAL EVERY 6 HOURS
Refills: 0 | Status: DISCONTINUED | OUTPATIENT
Start: 2023-07-20 | End: 2023-07-23

## 2023-07-20 RX ORDER — SODIUM CHLORIDE 9 MG/ML
1000 INJECTION INTRAMUSCULAR; INTRAVENOUS; SUBCUTANEOUS ONCE
Refills: 0 | Status: COMPLETED | OUTPATIENT
Start: 2023-07-20 | End: 2023-07-20

## 2023-07-20 RX ADMIN — Medication 650 MILLIGRAM(S): at 20:07

## 2023-07-20 RX ADMIN — SODIUM CHLORIDE 500 MILLILITER(S): 9 INJECTION INTRAMUSCULAR; INTRAVENOUS; SUBCUTANEOUS at 18:24

## 2023-07-20 RX ADMIN — PHENYLEPHRINE HYDROCHLORIDE 11.1 MICROGRAM(S)/KG/MIN: 10 INJECTION INTRAVENOUS at 22:06

## 2023-07-20 RX ADMIN — Medication 250 MILLIGRAM(S): at 20:37

## 2023-07-20 RX ADMIN — SODIUM CHLORIDE 1000 MILLILITER(S): 9 INJECTION INTRAMUSCULAR; INTRAVENOUS; SUBCUTANEOUS at 18:23

## 2023-07-20 RX ADMIN — GABAPENTIN 300 MILLIGRAM(S): 400 CAPSULE ORAL at 20:07

## 2023-07-20 RX ADMIN — SODIUM CHLORIDE 1000 MILLILITER(S): 9 INJECTION INTRAMUSCULAR; INTRAVENOUS; SUBCUTANEOUS at 12:41

## 2023-07-20 RX ADMIN — Medication 650 MILLIGRAM(S): at 14:33

## 2023-07-20 RX ADMIN — MEROPENEM 100 MILLIGRAM(S): 1 INJECTION INTRAVENOUS at 19:53

## 2023-07-20 RX ADMIN — LIDOCAINE 1 PATCH: 4 CREAM TOPICAL at 19:47

## 2023-07-20 RX ADMIN — Medication 5.53 MICROGRAM(S)/KG/MIN: at 17:57

## 2023-07-20 RX ADMIN — HEPARIN SODIUM 5000 UNIT(S): 5000 INJECTION INTRAVENOUS; SUBCUTANEOUS at 23:13

## 2023-07-20 NOTE — H&P ADULT - ASSESSMENT
89 y/o F presented with weakness     1. Urinary tract infection   - Admit to med/surg   - Does not meet SIRS criteria   - Temp 100.1F, BP 97/41-98/41, WBC 11.21  - UCx: > 100,000 E. coli (pt completed course of Keflex upon d/c)   - s/p Levaquin in ER; will continue   - f/u UCx, BCx x 2; adjust abx based on sensitivities  - Trend WBC, monitor for temperatures   - Tylenol for temperatures PRN   - s/p 1L of NS, c/w 125 ml/hr (monitor for fluid overload)   - f/u CT abd/pelvis     2. Hypotension   - s/p 500 ml of NS in the ER   - I gave an additional 1500 ml of NS   - Consulted ICU, started on Levophed     3. Acute hypoxic respiratory distress likely secondary to severe aortic stenosis and mild CHF   - SpO2 87% -> 98% on 2L of NS  - Supplemental O2 to keep SpO2 > 92%   - BNP 2123, pt is euvolemic on exam   - ECHO (3/1/23): severe aortic stenosis, mild 1+ AR, mild OR, LVH, EF 65-70%   - c/w home medications: beta blockers    - Strict I+Os, daily weights   - 2L H20 restriction, 2g sodium restriction      - Keep K > 4 and Mg > 2   - BP low/normal currently, monitor off lasix for now (gentle hydration to avoid fluid overload)    - Cardiology consult - Dr. Collins     4. Sinus bradycardia/junctional rhythm   - Ordered TSH   - Cardiology consulted     5. Normocytic anemia   - Hb 10.2 (baseline ~10), monitor    6. History of HTN, HLD, severe AS, HFpEF, hx of pérez, hx of aspiration pneumonia and UTI   - c/w home medications; verified with pt at the bedside  - , Cr 1.45 (baseline ~1.4), glucose 113, AST 38, monitor     DVT ppx: Lovenox 40 mg subcutaneous daily   Code status: Full code (pt agrees to chest compressions and intubation if required).   Emergency contact: Pippa Sotelomariela (daughter) [phone] or Dr. Evan Escoto (son) [phone]     I spent a total of 80 minutes on the date of this encounter coordinating the patient's care. This includes reviewing prior documentation, results and imaging in addition to completing a full history and physical examination on the patient. Further tests, medications, and procedures have been ordered as indicated. Laboratory results and the plan of care were communicated to the patient and/or their family member. Supporting documentation was completed and added to the patient's chart.  89 y/o F presented with weakness     1. Urinary tract infection   - Admit to med/surg   - Does not meet SIRS criteria   - Temp 100.1F, BP 97/41-98/41, WBC 11.21  - UCx: > 100,000 E. coli (pt completed course of Keflex upon d/c)   - s/p Levaquin in ER; will continue Meropenem   - f/u UCx, BCx x 2; adjust abx based on sensitivities  - Trend WBC, monitor for temperatures   - Tylenol for temperatures PRN   - s/p 1L of NS, c/w 125 ml/hr (monitor for fluid overload)   - f/u CT abd/pelvis   - ID consult - Dr. Qiu    2. Hypotension   - s/p 500 ml of NS in the ER   - I gave an additional 1500 ml of NS   - Consulted ICU, started on Levophed     3. Acute hypoxic respiratory distress likely secondary to severe aortic stenosis and mild CHF   - SpO2 87% -> 98% on 2L of NS  - Supplemental O2 to keep SpO2 > 92%   - BNP 2123, pt is euvolemic on exam   - ECHO (3/1/23): severe aortic stenosis, mild 1+ AR, mild AK, LVH, EF 65-70%   - c/w home medications: beta blockers    - Strict I+Os, daily weights   - 2L H20 restriction, 2g sodium restriction      - Keep K > 4 and Mg > 2   - BP low/normal currently, monitor off lasix for now (gentle hydration to avoid fluid overload)    - Cardiology consult - Dr. Collins     4. Sinus bradycardia/junctional rhythm   - Ordered TSH   - Cardiology consulted     5. Normocytic anemia   - Hb 10.2 (baseline ~10), monitor    6. History of HTN, HLD, severe AS, HFpEF, hx of pérez, hx of aspiration pneumonia and UTI   - c/w home medications; verified with pt at the bedside  - , Cr 1.45 (baseline ~1.4), glucose 113, AST 38, monitor     DVT ppx: Heparin subcutaneous   Code status: Full code (pt agrees to chest compressions and intubation if required).   Emergency contact: Pippa Valdovinosrancho (daughter) [phone] or Dr. Evan Escoto (son) [phone]     I spent a total of 80 minutes on the date of this encounter coordinating the patient's care. This includes reviewing prior documentation, results and imaging in addition to completing a full history and physical examination on the patient. Further tests, medications, and procedures have been ordered as indicated. Laboratory results and the plan of care were communicated to the patient and/or their family member. Supporting documentation was completed and added to the patient's chart.

## 2023-07-20 NOTE — ED PROVIDER NOTE - CARE PLAN
1 Principal Discharge DX:	Weakness   Principal Discharge DX:	Acute UTI  Secondary Diagnosis:	Weakness  Secondary Diagnosis:	Hypoxia

## 2023-07-20 NOTE — ED PROVIDER NOTE - CLINICAL SUMMARY MEDICAL DECISION MAKING FREE TEXT BOX
Piyush Lorenzo,  PGY-3: 89-year-old female past medical history of CHF, hypertension, gout presents ED complaining of 1 day of generalized weakness, low blood pressure and hypoxia.  Patient in triage had O2 sat on room air of 88%.  Patient not on home O2.  Patient endorsing subjective chills and rigors but no documented fever.  Denies shortness of breath, chest pain, abdominal pain, hematuria, diarrhea, nausea, vomiting.  Patient has chronic Perales for urinary retention which has been in for 12 days and due to be exchanged today. Patient hypoxic on room air, hemodynamically stable, on exam with bilateral crackles at lung bases but otherwise nonfocal exam.  Differential includes not limited to pneumonia, UTI, CHF exacerbation, low suspicion for PE but in the setting of new hypoxia and not tachycardic in setting of beta-blockade.  Plan for labs, rectal time, chest x-ray, CTA chest, EKG, telemetry monitoring, urine, exchange Perales.  Reassess

## 2023-07-20 NOTE — ED ADULT NURSE REASSESSMENT NOTE - NSFALLRISKFACTORS_ED_ALL_ED
No indicators present Xerosis Normal Treatment: I recommended application of Cetaphil or CeraVe numerous times a day and before going to bed to all dry areas.

## 2023-07-20 NOTE — ED PROVIDER NOTE - PHYSICAL EXAMINATION
GEN: Patient awake alert NAD, on 2L NC.   HEENT: normocephalic, atraumatic, EOMI, no scleral icterus, dry MM  CARDIAC: RRR, S1, S2, no murmur.   PULM: Bilateral crackles at lung bases  ABD: soft NT, ND, no rebound no guarding, no CVA tenderness.   MSK: Moving all extremities, no edema.  NEURO: A&Ox3, no focal neurological deficits  SKIN: warm, dry, no rash.

## 2023-07-20 NOTE — CONSULT NOTE ADULT - SUBJECTIVE AND OBJECTIVE BOX
Patient is a 89y old  Female who presents with a chief complaint of Weakness (2023 17:26)      BRIEF HOSPITAL COURSE: 89F with HTN, HLD, severe AS, HFpEF, hx aspiration PNA, hx recurrent UTIs brought to ED with acute onset chills and dizziness. Recently admitted to  for E Coli UTI and urinary retention treated with Meropenem x3days thereafter de-escalated to CTX with discharge home on PO Ceftin with pérez on 7/10/23. Labs on this admission notable for WBC 11.21, neutrophils 92.1%, Cr 1.45 (baseline ~1.4), BNP 2123. UA positive for UTI, s/p levaquin in ED. EKG with new onset junctional rhythm with PVCs HR 51 bpm, normal intervals, LVH. Originally admitted to medicine with sepsis s/t UTI, ICU consulted iso persistent hypotension despite 2L IVF requiring initiation of levophed.       PAST MEDICAL & SURGICAL HISTORY:  HTN (hypertension)      HLD (hyperlipidemia)      Gout      Osteoarthritis      Acute on chronic diastolic congestive heart failure      Aortic stenosis      H/O CHF      Pneumonia, aspiration      Pneumonia      History of tonsillectomy  in childhood          Review of Systems:  CONSTITUTIONAL: No fever, chills, or fatigue  EYES: No eye pain, visual disturbances, or discharge  ENMT:  No difficulty hearing, tinnitus, vertigo; No sinus or throat pain  NECK: No pain or stiffness  RESPIRATORY: No cough, wheezing, chills or hemoptysis; No shortness of breath  CARDIOVASCULAR: No chest pain, palpitations, dizziness, or leg swelling  GASTROINTESTINAL: No abdominal or epigastric pain. No nausea, vomiting, or hematemesis; No diarrhea or constipation. No melena or hematochezia.  GENITOURINARY: +suprapubic pain. No dysuria, frequency, hematuria, or incontinence  NEUROLOGICAL: No headaches, memory loss, loss of strength, numbness, or tremors  SKIN: No itching, burning, rashes, or lesions   MUSCULOSKELETAL: No joint pain or swelling; No muscle, back, or extremity pain  PSYCHIATRIC: No depression, anxiety, mood swings, or difficulty sleeping      Medications:  meropenem  IVPB 500 milliGRAM(s) IV Intermittent every 12 hours    midodrine 10 milliGRAM(s) Oral every 8 hours  norepinephrine Infusion 0.05 MICROgram(s)/kG/Min IV Continuous <Continuous>      acetaminophen     Tablet .. 650 milliGRAM(s) Oral every 6 hours PRN  melatonin 3 milliGRAM(s) Oral at bedtime PRN  ondansetron Injectable 4 milliGRAM(s) IV Push every 8 hours PRN      heparin   Injectable 5000 Unit(s) SubCutaneous every 8 hours    aluminum hydroxide/magnesium hydroxide/simethicone Suspension 30 milliLiter(s) Oral every 4 hours PRN        sodium chloride 0.9% Bolus 500 milliLiter(s) IV Bolus once  sodium chloride 0.9% Bolus 1000 milliLiter(s) IV Bolus once      chlorhexidine 2% Cloths 1 Application(s) Topical <User Schedule>            ICU Vital Signs Last 24 Hrs  T(C): 37.8 (2023 11:15), Max: 37.8 (2023 11:15)  T(F): 100.1 (2023 11:15), Max: 100.1 (2023 11:15)  HR: 91 (2023 13:00) (87 - 93)  BP: 97/41 (2023 13:00) (97/41 - 111/53)  BP(mean): 58 (2023 13:00) (58 - 86)  ABP: --  ABP(mean): --  RR: 17 (2023 13:00) (16 - 18)  SpO2: 98% (2023 13:00) (87% - 98%)    O2 Parameters below as of 2023 13:00  Patient On (Oxygen Delivery Method): nasal cannula  O2 Flow (L/min): 2              I&O's Detail        LABS:                        10.2   11.21 )-----------( 116      ( 2023 10:40 )             29.8     07-20    133<L>  |  105  |  25<H>  ----------------------------<  113<H>  3.8   |  20<L>  |  1.45<H>    Ca    8.9      2023 10:40  Phos  2.7     07-20  Mg     1.9     07-20    TPro  7.1  /  Alb  3.6  /  TBili  0.6  /  DBili  x   /  AST  38<H>  /  ALT  32  /  AlkPhos  61  07-20          CAPILLARY BLOOD GLUCOSE        PT/INR - ( 2023 10:40 )   PT: 11.8 sec;   INR: 1.02 ratio         PTT - ( 2023 10:40 )  PTT:25.7 sec  Urinalysis Basic - ( 2023 10:40 )    Color: Yellow / Appearance: Clear / S.005 / pH: x  Gluc: 113 mg/dL / Ketone: Negative  / Bili: Negative / Urobili: Negative   Blood: x / Protein: 30 mg/dL / Nitrite: Negative   Leuk Esterase: Moderate / RBC: 3-5 /HPF / WBC 11-25 /HPF   Sq Epi: x / Non Sq Epi: x / Bacteria: Moderate      CULTURES:  Rapid RVP Result: NotDetec (23 @ 13:53)      Physical Examination:    General: No acute distress.  Alert, oriented, interactive, nonfocal, following simple commands and moving all extremities     HEENT: Pupils equal, reactive to light.  Symmetric.    PULM: Breathing comfortabley, good BS B/L with no Rales or Rhonchi, no significant sputum production    CVS: Regular rate and rhythm, no murmurs, rubs, or gallops    ABD: BS+ Soft, nondistended, nontender, no masses    EXT: No edema, nontender    SKIN: Warm and well perfused, no rashes noted.    RADIOLOGY:   < from: Xray Shoulder 2 Views, Right (23 @ 12:08) >  IMPRESSION: Mild CHF increased from prior. Degeneration right shoulder.    --- End of Report ---    < end of copied text >  < from: CT Angio Chest PE Protocol w/ IV Cont (23 @ 11:58) >  IMPRESSION:    No pulmonary embolism.    No pneumonia.    New trace pleural effusions.    --- End of Report ---    < end of copied text >      CRITICAL CARE TIME SPENT: 65 mins of time have been spent evaluating, reviewing all available lab and radiologic material, reviewing the EMR and treating this critically ill patient, who has complex medical problems and life threatening organ dysfunction. This also included speaking with the staff, consultants, and family.

## 2023-07-20 NOTE — ED PROVIDER NOTE - NS ED ROS FT
GENERAL: No fever, +chills  EYES: no vision changes, no discharge.   ENT: no difficulty swallowing or speaking   CARDIAC: no chest pain/pressure, SOB, lower extremity swelling  PULMONARY: no cough, SOB  GI: no abdominal pain, n/v/d  : no dysuria, no hematuria  SKIN: no rashes, no ecchymosis  NEURO: no headache, lightheadedness  MSK: No joint pain, myalgia, +weakness.

## 2023-07-20 NOTE — H&P ADULT - HISTORY OF PRESENT ILLNESS
89 y/o F with PMH HTN, HLD, severe AS, HFpEF, hx of pérez, hx of aspiration pneumonia and UTI presents with weakness. Pt reports feeling cold and shaking when she woke up this moning. She also felt dizzy and felt warm. Pt was recently admitted for UTI and urinary retention. She was discharged on PO antibiotics which were completed and a pérez upon discharge. Denies fevers, chest pain, SOB, abdominal pain, N/V, diarrhea/constipation.     Prior admission:  - 7/10/23: Weakness -> UTI/urinary retention -> PO Ceftin, pérez was continued upon d/c     ER course: Temp 100.1F, BP 97/41-98/41, SpO2 87% -> 98% on 2L of NS. Labs: WBC 11.21, Hb 10.2 (baseline ~10), , neutrophils 92.1%, CO2 20, Cr 1.45 (baseline ~1.4), glucose 113, AST 38, BNP 2123. UA: moderate leukocyte esterase, small blood, WBC 11-25, RBC 3-5, moderate bacteria. COVID and RVP negative.   EKG: Junctional rhythm with PVCs HR 51 bpm, normal intervals, LVH (personally reviewed).     Imaging:   - CTA: No pulmonary embolism. No pneumonia. New trace pleural effusions. Unchanged anterior mediastinal nodules, largest approximately 1.5 cm. Unchanged soft tissue density along the right wall of the midesophagus, stable dating back to 2/21/2021.   - CXR: increased vascular cognestion, no consolidation, no effusion, no pneumothorax (personally reviewed).  - XR right shoulder: Mild CHF increased from prior. Degeneration right shoulder.    Pt was given Levaquin, 500 ml of NS, Tylenol. She is being admitted to med/surg for further management.  89 y/o F with PMH HTN, HLD, severe AS, HFpEF, hx of pérez, hx of aspiration pneumonia and UTI presents with weakness. Pt reports feeling cold and shaking when she woke up this morning. She also felt dizzy and felt warm. Pt was recently admitted for UTI and urinary retention. She was discharged on PO antibiotics which were completed and a pérez upon discharge. Denies fevers, chest pain, SOB, abdominal pain, N/V, diarrhea/constipation.     Prior admission:  - 7/10/23: Weakness -> UTI/urinary retention -> PO Ceftin, pérez was continued upon d/c     ER course: Temp 100.1F, BP 97/41-98/41, SpO2 87% -> 98% on 2L of NS. Labs: WBC 11.21, Hb 10.2 (baseline ~10), , neutrophils 92.1%, CO2 20, Cr 1.45 (baseline ~1.4), glucose 113, AST 38, BNP 2123. UA: moderate leukocyte esterase, small blood, WBC 11-25, RBC 3-5, moderate bacteria. COVID and RVP negative.   EKG: Junctional rhythm with PVCs HR 51 bpm, normal intervals, LVH (personally reviewed).     Imaging:   - CTA: No pulmonary embolism. No pneumonia. New trace pleural effusions. Unchanged anterior mediastinal nodules, largest approximately 1.5 cm. Unchanged soft tissue density along the right wall of the midesophagus, stable dating back to 2/21/2021.   - CXR: increased vascular congestion, no consolidation, no effusion, no pneumothorax (personally reviewed).  - XR right shoulder: Mild CHF increased from prior. Degeneration right shoulder.    Pt was given Levaquin, 500 ml of NS, Tylenol. She is being admitted to med/surg for further management.

## 2023-07-20 NOTE — H&P ADULT - NSHPPHYSICALEXAM_GEN_ALL_CORE
ICU Vital Signs Last 24 Hrs  T(C): 37.8 (20 Jul 2023 11:15), Max: 37.8 (20 Jul 2023 11:15)  T(F): 100.1 (20 Jul 2023 11:15), Max: 100.1 (20 Jul 2023 11:15)  HR: 91 (20 Jul 2023 13:00) (87 - 93)  BP: 97/41 (20 Jul 2023 13:00) (97/41 - 111/53)  BP(mean): 58 (20 Jul 2023 13:00) (58 - 86)  RR: 17 (20 Jul 2023 13:00) (16 - 18)  SpO2: 98% (20 Jul 2023 13:00) (87% - 98%)    O2 Parameters below as of 20 Jul 2023 13:00  Patient On (Oxygen Delivery Method): nasal cannula  O2 Flow (L/min): 2    General: Awake and alert, cooperative with exam. No acute distress.   Skin: Warm, dry, and pink.   Eyes: Pupils equal and reactive to light. Extraocular eye movements intact. No conjunctival injection, discharge, or scleral icterus.   HEENT: Atraumatic, normocephalic. Dry mucus membranes.   Cardiology: Normal S1, S2. Systolic ejection murmur. Bradycardia.   Respiratory: Lungs clear to ascultation bilaterally. Good air exchange. No wheezes, rales, or rhonchi. Normal chest expansion.   Gastrointestinal: Positive bowel sounds. Soft, non-tender, non-distended. No guarding, rigidity, or rebound tenderness. No hepatosplenomegaly.   Genitourinary: Perales with minimal outpt, clear yellow urine.   Musculoskeletal: 5/5 motor strength in all extremities. Normal range of motion.   Extremities: No peripheral edema bilaterally. Dorsalis pedis pulses 2+ bilaterally.   Neurological: A+Ox3 (person, place, and time). Cranial nerves 2-12 intact. Normal speech. No facial droop. No focal neurological deficits.   Psychiatric: Normal affect. Normal mood.

## 2023-07-20 NOTE — ED ADULT NURSE NOTE - OBJECTIVE STATEMENT
Pt presented to the ER with c/o generalized weakness. Pt stated that the symptoms started this morning. Per EMS pt's daughter was checking her O2 and it was 89% on RA. Pt has a pérez in place pérez changed in ED, per EMS it was last changed 12 days ago here. Pt does not wear O2 at home. Pt also c/o being "dry and having chills".

## 2023-07-20 NOTE — ED PROVIDER NOTE - PROGRESS NOTE DETAILS
Rodrigo Avila for attending Dr. London: 89 y/o female with a PMHx of acute on chronic diastolic CHF, aortic stenosis, gout, HTN, HLD, osteoarthritis, PNA presents to the ED with daughter for increasing weakness. Pt was started Macrobid yesterday. Daughter reports every time pt takes Macrobid she always has complications. No vomiting, diarrhea. Pt denies CP, SOB. Pt reports increasing shoulder pain. Exam with crackles at lung bases. Pain with passive ROM right shoulder. Distal NVI. Trace pedal edema. Differential diagnosis includes urosepsis vs PNA. Plan: labs, change pérez, XR, CT. Piyush Lorenzo, DO PGY-3: We will give a dose of Levaquin as per patient's previous culture data, and history of cephalosporin allergy.  We will admit for new hypoxia which could be related to CHF and UTI.  Other labs nonactionable.

## 2023-07-20 NOTE — ED PROVIDER NOTE - OBJECTIVE STATEMENT
89-year-old female past medical history of CHF, hypertension, gout presents ED complaining of 1 day of generalized weakness, low blood pressure and hypoxia.  Patient in triage had O2 sat on room air of 88%.  Patient not on home O2.  Patient endorsing subjective chills and rigors but no documented fever.  Denies shortness of breath, chest pain, abdominal pain, hematuria, diarrhea, nausea, vomiting.  Patient has chronic Perales for urinary retention which has been in for 12 days and due to be exchanged today.

## 2023-07-20 NOTE — ED ADULT NURSE REASSESSMENT NOTE - NSFALLUNIVINTERV_ED_ALL_ED
Bed/Stretcher in lowest position, wheels locked, appropriate side rails in place/Call bell, personal items and telephone in reach/Instruct patient to call for assistance before getting out of bed/chair/stretcher/Non-slip footwear applied when patient is off stretcher/[place] to call system/Physically safe environment - no spills, clutter or unnecessary equipment/Purposeful proactive rounding/Room/bathroom lighting operational, light cord in reach

## 2023-07-20 NOTE — ED PROVIDER NOTE - ATTENDING CONTRIBUTION TO CARE
Elements for critical care include direct patient care (not related to procedure), additional history taking, interpretation of diagnostic studies, documentation, consultation with other physicians, consult w/ pt's family directly relating to pts condition     I, Mauricio London MD, personally saw the patient with the resident, and completed the key components of the history and physical exam. I then discussed the management plan with the resident.

## 2023-07-20 NOTE — ED ADULT NURSE REASSESSMENT NOTE - NS ED NURSE REASSESS COMMENT FT1
PT was found to be hypotensive and bradycardic. Levophed initiated at .05mcg, has since been titrated to .11mcg/kg/. Map of 68. Transferred to CCU. Ruslan Pino RN

## 2023-07-20 NOTE — ED ADULT NURSE NOTE - NSFALLHARMRISKINTERV_ED_ALL_ED
Assistance OOB with selected safe patient handling equipment if applicable/Assistance with ambulation/Communicate risk of Fall with Harm to all staff, patient, and family/Monitor gait and stability/Provide visual cue: red socks, yellow wristband, yellow gown, etc/Reinforce activity limits and safety measures with patient and family/Bed in lowest position, wheels locked, appropriate side rails in place/Call bell, personal items and telephone in reach/Instruct patient to call for assistance before getting out of bed/chair/stretcher/Non-slip footwear applied when patient is off stretcher/[place] to call system/Physically safe environment - no spills, clutter or unnecessary equipment/Purposeful Proactive Rounding/Room/bathroom lighting operational, light cord in reach

## 2023-07-20 NOTE — H&P ADULT - NSHPREVIEWOFSYSTEMS_GEN_ALL_CORE
Constitutional: positive for fatigue, negative for fever, negative for chills, negative for decreased appetite, positive for weakness   Skin: negative for rashes, negative for open wounds, negative for jaundice.   Eyes: negative for blurry vision, negative for double vision.   Ears, nose, throat: negative for ear pain, negative for nasal congestion, negative for sore throat, negative for lymph node swelling.   Cardiovascular: negative for chest pain, negative for palpitations, negative for lower extremity swelling.   Respiratory: negative for shortness of breath, negative for wheezing, negative for cough.   Gastrointestinal: negative for abdominal pain, negative for nausea, negative for vomiting, negative for diarrhea, negative for constipation, negative for blood in the stool, negative for black tarry stools.   Genitourinary: negative for burning on urination, negative for urinary urgency or frequency, negative for blood in the urine.   Endocrine: negative for cold intolerance, negative for heat intolerance, negative for increased thirst.   Hematologic: negative for easy bruising or bleeding.   Musculoskeletal: negative for muscle/joint pain, negative for decreased range of motion.   Neurological: negative for dizziness, negative for headaches, negative for loss of consciousness, negative for motor weakness, negative for sensory deficits.   Psychiatric: negative for depression, negative for anxiety.

## 2023-07-20 NOTE — PHARMACOTHERAPY INTERVENTION NOTE - COMMENTS
Recommended changing total daily dose of Gabapentin from 1200mg/day to 600mg/day (300mg BID) due to CrCl of 22 mL/min.

## 2023-07-20 NOTE — ED ADULT TRIAGE NOTE - CHIEF COMPLAINT QUOTE
Pt presented to the ER with c/o generalized weakness. Pt stated that the symptoms started this morning. Per EMS pt's daughter was checking her O2 and it was 89% on RA. Pt has a pérez in place, per EMS it was last changed 12 days ago here. Pt does not wear O2 at home. Pt also c/o being "dry and having chills".

## 2023-07-21 DIAGNOSIS — I35.0 NONRHEUMATIC AORTIC (VALVE) STENOSIS: ICD-10-CM

## 2023-07-21 DIAGNOSIS — I34.0 NONRHEUMATIC MITRAL (VALVE) INSUFFICIENCY: ICD-10-CM

## 2023-07-21 DIAGNOSIS — A41.9 SEPSIS, UNSPECIFIED ORGANISM: ICD-10-CM

## 2023-07-21 DIAGNOSIS — I47.1 SUPRAVENTRICULAR TACHYCARDIA: ICD-10-CM

## 2023-07-21 DIAGNOSIS — R00.1 BRADYCARDIA, UNSPECIFIED: ICD-10-CM

## 2023-07-21 LAB
ALBUMIN SERPL ELPH-MCNC: 3 G/DL — LOW (ref 3.3–5)
ALBUMIN SERPL ELPH-MCNC: 3.1 G/DL — LOW (ref 3.3–5)
ALP SERPL-CCNC: 56 U/L — SIGNIFICANT CHANGE UP (ref 40–120)
ALP SERPL-CCNC: 64 U/L — SIGNIFICANT CHANGE UP (ref 40–120)
ALT FLD-CCNC: 48 U/L — SIGNIFICANT CHANGE UP (ref 12–78)
ALT FLD-CCNC: 79 U/L — HIGH (ref 12–78)
ANION GAP SERPL CALC-SCNC: 10 MMOL/L — SIGNIFICANT CHANGE UP (ref 5–17)
ANION GAP SERPL CALC-SCNC: 8 MMOL/L — SIGNIFICANT CHANGE UP (ref 5–17)
ANION GAP SERPL CALC-SCNC: 9 MMOL/L — SIGNIFICANT CHANGE UP (ref 5–17)
AST SERPL-CCNC: 49 U/L — HIGH (ref 15–37)
AST SERPL-CCNC: 92 U/L — HIGH (ref 15–37)
BASE EXCESS BLDA CALC-SCNC: -13.3 MMOL/L — LOW (ref -2–3)
BASOPHILS # BLD AUTO: 0 K/UL — SIGNIFICANT CHANGE UP (ref 0–0.2)
BASOPHILS # BLD AUTO: 0.05 K/UL — SIGNIFICANT CHANGE UP (ref 0–0.2)
BASOPHILS NFR BLD AUTO: 0 % — SIGNIFICANT CHANGE UP (ref 0–2)
BASOPHILS NFR BLD AUTO: 0.2 % — SIGNIFICANT CHANGE UP (ref 0–2)
BILIRUB SERPL-MCNC: 0.6 MG/DL — SIGNIFICANT CHANGE UP (ref 0.2–1.2)
BILIRUB SERPL-MCNC: 0.8 MG/DL — SIGNIFICANT CHANGE UP (ref 0.2–1.2)
BUN SERPL-MCNC: 28 MG/DL — HIGH (ref 7–23)
BUN SERPL-MCNC: 30 MG/DL — HIGH (ref 7–23)
BUN SERPL-MCNC: 36 MG/DL — HIGH (ref 7–23)
CALCIUM SERPL-MCNC: 8.3 MG/DL — LOW (ref 8.5–10.1)
CALCIUM SERPL-MCNC: 8.3 MG/DL — LOW (ref 8.5–10.1)
CALCIUM SERPL-MCNC: 8.4 MG/DL — LOW (ref 8.5–10.1)
CHLORIDE SERPL-SCNC: 105 MMOL/L — SIGNIFICANT CHANGE UP (ref 96–108)
CHLORIDE SERPL-SCNC: 106 MMOL/L — SIGNIFICANT CHANGE UP (ref 96–108)
CHLORIDE SERPL-SCNC: 106 MMOL/L — SIGNIFICANT CHANGE UP (ref 96–108)
CO2 SERPL-SCNC: 13 MMOL/L — LOW (ref 22–31)
CO2 SERPL-SCNC: 16 MMOL/L — LOW (ref 22–31)
CO2 SERPL-SCNC: 17 MMOL/L — LOW (ref 22–31)
CREAT SERPL-MCNC: 1.82 MG/DL — HIGH (ref 0.5–1.3)
CREAT SERPL-MCNC: 2.03 MG/DL — HIGH (ref 0.5–1.3)
CREAT SERPL-MCNC: 2.04 MG/DL — HIGH (ref 0.5–1.3)
CULTURE RESULTS: NO GROWTH — SIGNIFICANT CHANGE UP
EGFR: 22 ML/MIN/1.73M2 — LOW
EGFR: 22 ML/MIN/1.73M2 — LOW
EGFR: 25 ML/MIN/1.73M2 — LOW
EOSINOPHIL # BLD AUTO: 0 K/UL — SIGNIFICANT CHANGE UP (ref 0–0.5)
EOSINOPHIL # BLD AUTO: 0 K/UL — SIGNIFICANT CHANGE UP (ref 0–0.5)
EOSINOPHIL NFR BLD AUTO: 0 % — SIGNIFICANT CHANGE UP (ref 0–6)
EOSINOPHIL NFR BLD AUTO: 0 % — SIGNIFICANT CHANGE UP (ref 0–6)
GLUCOSE SERPL-MCNC: 121 MG/DL — HIGH (ref 70–99)
GLUCOSE SERPL-MCNC: 134 MG/DL — HIGH (ref 70–99)
GLUCOSE SERPL-MCNC: 159 MG/DL — HIGH (ref 70–99)
HCO3 BLDA-SCNC: 12 MMOL/L — LOW (ref 21–28)
HCT VFR BLD CALC: 26.4 % — LOW (ref 34.5–45)
HCT VFR BLD CALC: 29.9 % — LOW (ref 34.5–45)
HGB BLD-MCNC: 9 G/DL — LOW (ref 11.5–15.5)
HGB BLD-MCNC: 9.8 G/DL — LOW (ref 11.5–15.5)
IMM GRANULOCYTES NFR BLD AUTO: 1.2 % — HIGH (ref 0–0.9)
LACTATE SERPL-SCNC: 3.1 MMOL/L — HIGH (ref 0.7–2)
LACTATE SERPL-SCNC: 3.2 MMOL/L — HIGH (ref 0.7–2)
LACTATE SERPL-SCNC: 4.8 MMOL/L — CRITICAL HIGH (ref 0.7–2)
LYMPHOCYTES # BLD AUTO: 1.51 K/UL — SIGNIFICANT CHANGE UP (ref 1–3.3)
LYMPHOCYTES # BLD AUTO: 1.55 K/UL — SIGNIFICANT CHANGE UP (ref 1–3.3)
LYMPHOCYTES # BLD AUTO: 6 % — LOW (ref 13–44)
LYMPHOCYTES # BLD AUTO: 6.3 % — LOW (ref 13–44)
MACROCYTES BLD QL: SLIGHT — SIGNIFICANT CHANGE UP
MAGNESIUM SERPL-MCNC: 1.9 MG/DL — SIGNIFICANT CHANGE UP (ref 1.6–2.6)
MAGNESIUM SERPL-MCNC: 2.1 MG/DL — SIGNIFICANT CHANGE UP (ref 1.6–2.6)
MANUAL SMEAR VERIFICATION: SIGNIFICANT CHANGE UP
MCHC RBC-ENTMCNC: 31.7 PG — SIGNIFICANT CHANGE UP (ref 27–34)
MCHC RBC-ENTMCNC: 32.4 PG — SIGNIFICANT CHANGE UP (ref 27–34)
MCHC RBC-ENTMCNC: 32.8 GM/DL — SIGNIFICANT CHANGE UP (ref 32–36)
MCHC RBC-ENTMCNC: 34.1 GM/DL — SIGNIFICANT CHANGE UP (ref 32–36)
MCV RBC AUTO: 95 FL — SIGNIFICANT CHANGE UP (ref 80–100)
MCV RBC AUTO: 96.8 FL — SIGNIFICANT CHANGE UP (ref 80–100)
MONOCYTES # BLD AUTO: 1.58 K/UL — HIGH (ref 0–0.9)
MONOCYTES # BLD AUTO: 2.58 K/UL — HIGH (ref 0–0.9)
MONOCYTES NFR BLD AUTO: 10 % — SIGNIFICANT CHANGE UP (ref 2–14)
MONOCYTES NFR BLD AUTO: 6.6 % — SIGNIFICANT CHANGE UP (ref 2–14)
NEUTROPHILS # BLD AUTO: 20.48 K/UL — HIGH (ref 1.8–7.4)
NEUTROPHILS # BLD AUTO: 21.16 K/UL — HIGH (ref 1.8–7.4)
NEUTROPHILS NFR BLD AUTO: 81 % — HIGH (ref 43–77)
NEUTROPHILS NFR BLD AUTO: 85.7 % — HIGH (ref 43–77)
NEUTS BAND # BLD: 1 % — SIGNIFICANT CHANGE UP (ref 0–8)
NRBC # BLD: 0 /100 — SIGNIFICANT CHANGE UP (ref 0–0)
NRBC # BLD: SIGNIFICANT CHANGE UP /100 WBCS (ref 0–0)
NT-PROBNP SERPL-SCNC: HIGH PG/ML (ref 0–450)
OVALOCYTES BLD QL SMEAR: SLIGHT — SIGNIFICANT CHANGE UP
PCO2 BLDA: 26 MMHG — LOW (ref 32–45)
PH BLDA: 7.27 — LOW (ref 7.35–7.45)
PHOSPHATE SERPL-MCNC: 2.7 MG/DL — SIGNIFICANT CHANGE UP (ref 2.5–4.5)
PHOSPHATE SERPL-MCNC: 3.2 MG/DL — SIGNIFICANT CHANGE UP (ref 2.5–4.5)
PLAT MORPH BLD: NORMAL — SIGNIFICANT CHANGE UP
PLATELET # BLD AUTO: 131 K/UL — LOW (ref 150–400)
PLATELET # BLD AUTO: 143 K/UL — LOW (ref 150–400)
PO2 BLDA: 99 MMHG — SIGNIFICANT CHANGE UP (ref 83–108)
POIKILOCYTOSIS BLD QL AUTO: SLIGHT — SIGNIFICANT CHANGE UP
POTASSIUM SERPL-MCNC: 4.8 MMOL/L — SIGNIFICANT CHANGE UP (ref 3.5–5.3)
POTASSIUM SERPL-MCNC: 4.9 MMOL/L — SIGNIFICANT CHANGE UP (ref 3.5–5.3)
POTASSIUM SERPL-MCNC: 5.4 MMOL/L — HIGH (ref 3.5–5.3)
POTASSIUM SERPL-SCNC: 4.8 MMOL/L — SIGNIFICANT CHANGE UP (ref 3.5–5.3)
POTASSIUM SERPL-SCNC: 4.9 MMOL/L — SIGNIFICANT CHANGE UP (ref 3.5–5.3)
POTASSIUM SERPL-SCNC: 5.4 MMOL/L — HIGH (ref 3.5–5.3)
PROT SERPL-MCNC: 6.4 GM/DL — SIGNIFICANT CHANGE UP (ref 6–8.3)
PROT SERPL-MCNC: 6.7 GM/DL — SIGNIFICANT CHANGE UP (ref 6–8.3)
RBC # BLD: 2.78 M/UL — LOW (ref 3.8–5.2)
RBC # BLD: 3.09 M/UL — LOW (ref 3.8–5.2)
RBC # FLD: 14.9 % — HIGH (ref 10.3–14.5)
RBC # FLD: 15.3 % — HIGH (ref 10.3–14.5)
RBC BLD AUTO: ABNORMAL
SAO2 % BLDA: 99 % — HIGH (ref 94–98)
SCHISTOCYTES BLD QL AUTO: SLIGHT — SIGNIFICANT CHANGE UP
SODIUM SERPL-SCNC: 128 MMOL/L — LOW (ref 135–145)
SODIUM SERPL-SCNC: 130 MMOL/L — LOW (ref 135–145)
SODIUM SERPL-SCNC: 132 MMOL/L — LOW (ref 135–145)
SPECIMEN SOURCE: SIGNIFICANT CHANGE UP
VARIANT LYMPHS # BLD: 2 % — SIGNIFICANT CHANGE UP (ref 0–6)
WBC # BLD: 23.91 K/UL — HIGH (ref 3.8–10.5)
WBC # BLD: 25.8 K/UL — HIGH (ref 3.8–10.5)
WBC # FLD AUTO: 23.91 K/UL — HIGH (ref 3.8–10.5)
WBC # FLD AUTO: 25.8 K/UL — HIGH (ref 3.8–10.5)

## 2023-07-21 PROCEDURE — 99497 ADVNCD CARE PLAN 30 MIN: CPT | Mod: 25

## 2023-07-21 PROCEDURE — 99223 1ST HOSP IP/OBS HIGH 75: CPT

## 2023-07-21 PROCEDURE — 93010 ELECTROCARDIOGRAM REPORT: CPT

## 2023-07-21 PROCEDURE — 93308 TTE F-UP OR LMTD: CPT | Mod: 26

## 2023-07-21 PROCEDURE — 99498 ADVNCD CARE PLAN ADDL 30 MIN: CPT

## 2023-07-21 PROCEDURE — 99221 1ST HOSP IP/OBS SF/LOW 40: CPT

## 2023-07-21 RX ORDER — SODIUM ZIRCONIUM CYCLOSILICATE 10 G/10G
10 POWDER, FOR SUSPENSION ORAL ONCE
Refills: 0 | Status: COMPLETED | OUTPATIENT
Start: 2023-07-21 | End: 2023-07-21

## 2023-07-21 RX ORDER — HYDROCORTISONE 20 MG
50 TABLET ORAL ONCE
Refills: 0 | Status: COMPLETED | OUTPATIENT
Start: 2023-07-21 | End: 2023-07-21

## 2023-07-21 RX ORDER — THIAMINE MONONITRATE (VIT B1) 100 MG
100 TABLET ORAL DAILY
Refills: 0 | Status: DISCONTINUED | OUTPATIENT
Start: 2023-07-21 | End: 2023-07-27

## 2023-07-21 RX ORDER — SODIUM CHLORIDE 9 MG/ML
500 INJECTION, SOLUTION INTRAVENOUS ONCE
Refills: 0 | Status: COMPLETED | OUTPATIENT
Start: 2023-07-21 | End: 2023-07-21

## 2023-07-21 RX ORDER — SODIUM CHLORIDE 9 MG/ML
500 INJECTION INTRAMUSCULAR; INTRAVENOUS; SUBCUTANEOUS
Refills: 0 | Status: DISCONTINUED | OUTPATIENT
Start: 2023-07-21 | End: 2023-07-22

## 2023-07-21 RX ORDER — SODIUM BICARBONATE 1 MEQ/ML
0.19 SYRINGE (ML) INTRAVENOUS
Qty: 150 | Refills: 0 | Status: DISCONTINUED | OUTPATIENT
Start: 2023-07-21 | End: 2023-07-22

## 2023-07-21 RX ORDER — POLYETHYLENE GLYCOL 3350 17 G/17G
17 POWDER, FOR SOLUTION ORAL
Refills: 0 | Status: DISCONTINUED | OUTPATIENT
Start: 2023-07-21 | End: 2023-07-27

## 2023-07-21 RX ORDER — AMIODARONE HYDROCHLORIDE 400 MG/1
150 TABLET ORAL ONCE
Refills: 0 | Status: COMPLETED | OUTPATIENT
Start: 2023-07-21 | End: 2023-07-21

## 2023-07-21 RX ORDER — ALBUMIN HUMAN 25 %
50 VIAL (ML) INTRAVENOUS ONCE
Refills: 0 | Status: COMPLETED | OUTPATIENT
Start: 2023-07-21 | End: 2023-07-21

## 2023-07-21 RX ADMIN — ATORVASTATIN CALCIUM 20 MILLIGRAM(S): 80 TABLET, FILM COATED ORAL at 21:44

## 2023-07-21 RX ADMIN — SODIUM CHLORIDE 500 MILLILITER(S): 9 INJECTION, SOLUTION INTRAVENOUS at 13:09

## 2023-07-21 RX ADMIN — Medication 50 MILLIGRAM(S): at 04:21

## 2023-07-21 RX ADMIN — HEPARIN SODIUM 5000 UNIT(S): 5000 INJECTION INTRAVENOUS; SUBCUTANEOUS at 06:37

## 2023-07-21 RX ADMIN — Medication 1 MILLIGRAM(S): at 11:04

## 2023-07-21 RX ADMIN — SODIUM CHLORIDE 100 MILLILITER(S): 9 INJECTION INTRAMUSCULAR; INTRAVENOUS; SUBCUTANEOUS at 05:39

## 2023-07-21 RX ADMIN — SODIUM ZIRCONIUM CYCLOSILICATE 10 GRAM(S): 10 POWDER, FOR SUSPENSION ORAL at 19:43

## 2023-07-21 RX ADMIN — ONDANSETRON 4 MILLIGRAM(S): 8 TABLET, FILM COATED ORAL at 14:24

## 2023-07-21 RX ADMIN — MEROPENEM 100 MILLIGRAM(S): 1 INJECTION INTRAVENOUS at 21:43

## 2023-07-21 RX ADMIN — Medication 50 MILLILITER(S): at 19:08

## 2023-07-21 RX ADMIN — MEROPENEM 100 MILLIGRAM(S): 1 INJECTION INTRAVENOUS at 11:03

## 2023-07-21 RX ADMIN — Medication 3 MILLIGRAM(S): at 21:52

## 2023-07-21 RX ADMIN — GABAPENTIN 300 MILLIGRAM(S): 400 CAPSULE ORAL at 11:04

## 2023-07-21 RX ADMIN — Medication 500 MILLIGRAM(S): at 11:04

## 2023-07-21 RX ADMIN — HEPARIN SODIUM 5000 UNIT(S): 5000 INJECTION INTRAVENOUS; SUBCUTANEOUS at 14:24

## 2023-07-21 RX ADMIN — LIDOCAINE 1 PATCH: 4 CREAM TOPICAL at 22:06

## 2023-07-21 RX ADMIN — LIDOCAINE 1 PATCH: 4 CREAM TOPICAL at 06:34

## 2023-07-21 RX ADMIN — LIDOCAINE 1 PATCH: 4 CREAM TOPICAL at 17:00

## 2023-07-21 RX ADMIN — Medication 75 MEQ/KG/HR: at 20:29

## 2023-07-21 RX ADMIN — HEPARIN SODIUM 5000 UNIT(S): 5000 INJECTION INTRAVENOUS; SUBCUTANEOUS at 21:44

## 2023-07-21 RX ADMIN — PHENYLEPHRINE HYDROCHLORIDE 11.1 MICROGRAM(S)/KG/MIN: 10 INJECTION INTRAVENOUS at 03:54

## 2023-07-21 RX ADMIN — Medication 650 MILLIGRAM(S): at 12:57

## 2023-07-21 RX ADMIN — Medication 1000 UNIT(S): at 11:04

## 2023-07-21 RX ADMIN — GABAPENTIN 300 MILLIGRAM(S): 400 CAPSULE ORAL at 21:44

## 2023-07-21 NOTE — PROGRESS NOTE ADULT - ASSESSMENT
Pt is a 89 y/o F pmhx of HTN, HLD, severe AS, HFpEF, recurrent UTI who presented to  ED w/ complaints of chills and dizziness. In ED pt found to have + UA treated w/ Levaquin (previously admitted for UTI treated w/ meropenem and deescalated to CTX and d/c'ed home w/ chronic pérez and on PO Ceftin on 7/10/23). Went into junctional rhythm w/ PVC's.     1. Septic shock   2. UTI  3. Junctional rhythm   4. Acute hypoxic respiratory failure   5. HFpEF   6. Severe AS     Plan:     Neuro: At baseline, avoid sedating medications.     CV: Septic shock, now w/ HR in 130's w/ more occasional PVC's, switched to phenylephrine from levo, continue to titrate to maintain MAP>65. TTE from 3/1/23 w/ severe AS and EF of 65-70% avoid excesses IVF resuscitation.      Pulm: Acute hypoxic respiratory failure satting well on NC, continue to monitor for signs of pulmonary edema. Will use CPAP if needed for positive pressure ventilation.     GI: Diet: NPO, protonix for GI PPX     Renal: No active issues, at baseline Cr. continue to monitor and avoid nephrotoxic meds.     Endo: Glucose<180, mag>2, K>4 for arrythmia suppression.     Heme: Heparin for DVT PPX     ID: Septic shock secondary to suspected UTI, switched to meropenem by day team, will continue given hx of UTI's, continue to follow cultures and narrow abx as indicated. Trend markers of infection daily.

## 2023-07-21 NOTE — ED ADULT TRIAGE NOTE - AS HEIGHT TYPE
Got him on the schedule for next available in November but as been seeing the RD somewhat regularly too.  Nayana Escobar RN  
stated

## 2023-07-21 NOTE — CONSULT NOTE ADULT - SUBJECTIVE AND OBJECTIVE BOX
HPI: Pt is a 89y old Female with hx of       PAIN: ( )Yes   ( )No  Level:  Location:  Intensity:    /10  Quality:  Aggravating Factors:  Alleviating Factors:  Radiation:  Duration/Timing:  Impact on ADLs:    DYSPNEA: ( ) Yes  ( ) No  Level:    PAST MEDICAL & SURGICAL HISTORY:  HTN (hypertension)  HLD (hyperlipidemia)  Gout  Osteoarthritis  Acute on chronic diastolic congestive heart failure  Aortic stenosis  H/O CHF  Pneumonia, aspiration  Pneumonia  History of tonsillectomy in childhood    SOCIAL HX:    Hx opiate tolerance ( )YES  ( )NO    Baseline ADLs  (Prior to Admission)  ( ) Independent   ( )Dependent    FAMILY HISTORY:  Family history of hypertension (Father, Mother)    Review of Systems:    Anxiety-  Depression-  Physical Discomfort-  Dyspnea-  Constipation-  Diarrhea-  Nausea-  Vomiting-  Anorexia-  Weight Loss-   Cough-  Secretions-  Fatigue-  Weakness-  Delirium-    All other systems reviewed and negative  Unable to obtain/Limited due to:      PHYSICAL EXAM:    Vital Signs Last 24 Hrs  T(C): 37.9 (21 Jul 2023 05:00), Max: 37.9 (20 Jul 2023 21:00)  T(F): 100.2 (21 Jul 2023 05:00), Max: 100.2 (20 Jul 2023 21:00)  HR: 52 (21 Jul 2023 07:00) (50 - 123)  BP: 167/56 (21 Jul 2023 07:00) (77/32 - 167/56)  BP(mean): 81 (21 Jul 2023 07:00) (42 - 86)  RR: 22 (21 Jul 2023 07:00) (8 - 31)  SpO2: 98% (21 Jul 2023 04:00) (85% - 98%)    Parameters below as of 20 Jul 2023 18:02  Patient On (Oxygen Delivery Method): nasal cannula  O2 Flow (L/min): 2    Daily     Daily     PPSV2:   %  FAST:    General:  Mental Status:  HEENT:  Lungs:  Cardiac:  GI:  :  Ext:  Neuro:      LABS:                        9.8    23.91 )-----------( 143      ( 21 Jul 2023 06:10 )             29.9     07-21    128<L>  |  105  |  30<H>  ----------------------------<  159<H>  4.8   |  13<L>  |  2.03<H>    Ca    8.3<L>      21 Jul 2023 06:10  Phos  3.2     07-21  Mg     2.1     07-21    TPro  6.7  /  Alb  3.1<L>  /  TBili  0.8  /  DBili  x   /  AST  92<H>  /  ALT  79<H>  /  AlkPhos  64  07-21    PT/INR - ( 20 Jul 2023 10:40 )   PT: 11.8 sec;   INR: 1.02 ratio         PTT - ( 20 Jul 2023 10:40 )  PTT:25.7 sec  Albumin: Albumin: 3.1 g/dL (07-21 @ 06:10)      Allergies    Ceftin (Hives; Rash)    Intolerances      MEDICATIONS  (STANDING):  ascorbic acid 500 milliGRAM(s) Oral daily  atorvastatin 20 milliGRAM(s) Oral at bedtime  chlorhexidine 2% Cloths 1 Application(s) Topical <User Schedule>  cholecalciferol 1000 Unit(s) Oral daily  folic acid 1 milliGRAM(s) Oral daily  gabapentin 300 milliGRAM(s) Oral two times a day  heparin   Injectable 5000 Unit(s) SubCutaneous every 8 hours  lidocaine   4% Patch 1 Patch Transdermal every 24 hours  meropenem  IVPB 500 milliGRAM(s) IV Intermittent every 12 hours  norepinephrine Infusion 0.05 MICROgram(s)/kG/Min (5.53 mL/Hr) IV Continuous <Continuous>  pantoprazole    Tablet 40 milliGRAM(s) Oral before breakfast  phenylephrine    Infusion 0.5 MICROgram(s)/kG/Min (11.1 mL/Hr) IV Continuous <Continuous>  senna 2 Tablet(s) Oral at bedtime  sodium chloride 0.9%. 500 milliLiter(s) (100 mL/Hr) IV Continuous <Continuous>    MEDICATIONS  (PRN):  acetaminophen     Tablet .. 650 milliGRAM(s) Oral every 6 hours PRN Temp greater or equal to 38C (100.4F), Mild Pain (1 - 3)  aluminum hydroxide/magnesium hydroxide/simethicone Suspension 30 milliLiter(s) Oral every 4 hours PRN Dyspepsia  melatonin 3 milliGRAM(s) Oral at bedtime PRN Insomnia  ondansetron Injectable 4 milliGRAM(s) IV Push every 8 hours PRN Nausea and/or Vomiting      RADIOLOGY/ADDITIONAL STUDIES:    < from: CT Abdomen and Pelvis No Cont (07.20.23 @ 19:01) >  ACC: [id number] EXAM:  CT ABDOMEN AND PELVIS   ORDERED BY: INOCENCIA NEWMAN     PROCEDURE DATE:  07/20/2023          INTERPRETATION:  CLINICAL INFORMATION: Urinary tract infection.    COMPARISON: 6/8/2023    CONTRAST/COMPLICATIONS:  IV Contrast: NONE  Oral Contrast: None  Complications: None    PROCEDURE:  CT of the Abdomen and Pelvis was performed.  Sagittal and coronal reformats were performed.    FINDINGS:  Evaluation of solid organs and vascular structures is limited without   intravenous contrast.    LOWER CHEST: Mild fibrotic changes again noted. Trace bilateral pleural   effusions. Cardiomegaly. Coronary calcifications. Small hiatal hernia.    LIVER: Within normal limits.  BILE DUCTS: Normal caliber.  GALLBLADDER: Cholelithiasis.. No pericholecystic inflammation.  SPLEEN: Within normal limits.  PANCREAS: Fatty atrophy.  ADRENALS: Within normal limits.  KIDNEYS/URETERS: Excreted contrast within the collecting systems and   ureters from recent exam is slightly limited evaluation of. No   hydronephrosis or obstructing stone. Left renal cyst.    BLADDER: Collapsed around Perales catheter, containing excreted contrast   material.  REPRODUCTIVE ORGANS: No pelvic mass.    BOWEL: No bowel obstruction. Appendix is normal. Large rectal stool   burden and overall moderate colonic stool burden. Mild wall thickening of   the ascending colon, transverse colon and proximal descending colon.  PERITONEUM: No ascites.  VESSELS: Atherosclerotic changes.  RETROPERITONEUM/LYMPH NODES: No lymphadenopathy.  ABDOMINAL WALL: Small fat-containing umbilical hernia.  BONES: Degenerative changes. Osteopenia.    IMPRESSION:  No hydronephrosis or obstructing stone.    Mild wall thickening of the ascending colon, transverse colon proximal   descending colonmay reflect underdistention or colitis. Clinical   correlation recommended.    < end of copied text >  < from: CT Head No Cont (07.20.23 @ 18:58) >    ACC: [id number] EXAM:  CT BRAIN   ORDERED BY: INOCENCIA NEWMAN     PROCEDURE DATE:  07/20/2023          INTERPRETATION:  CLINICAL INFORMATION:  confusion    TECHNIQUE: Transverse images obtained from the foramen magnum to the   vertex without the administration of IV contrast material.  Multiplanar   2D reformations obtained from thin slice transverse reconstructions.    COMPARISON:  March 31, 2023    FINDINGS:    No acute intracranial abnormalities.    No evidence of acute cortical infarction or hemorrhage.    No mass effect or edema.    Small vessel and atrophic changes.    No sinusitis or mastoiditis.    No skull fracture. Atherosclerotic vascular calcification present skull   base.    IMPRESSION:    No acute intracranial abnormalities.    Small vessel and atrophic changes.    --- End of Report ---            < end of copied text >    < from: CT Angio Chest PE Protocol w/ IV Cont (07.20.23 @ 11:58) >    ACC: [id number] EXAM:  CT ANGIO CHEST PULM [facility]   ORDERED BY: CJ ESPOSITO     PROCEDURE DATE:  07/20/2023          INTERPRETATION:  INDICATION: New hypoxia, rule out pulmonary embolism   versus pneumonia versus congestive heart failure    TECHNIQUE: Helical acquisition of the chest after the administration of   54 mL of Omnipaque 350. Maximum intensity projection images were   generated.    COMPARISON: CT chest 5/24/2023.    FINDINGS:    PULMONARY ANGIOGRAM:  No pulmonary embolism.    LUNGS/AIRWAYS/PLEURA: Patent trachea and bronchi. Unchanged mild   interlobular septal thickening in both lungs. Mild passive atelectasis in   the lower lobes. New small linear subpleural opacities in both upper   lobes, likely scarring or atelectasis. New trace pleural effusions.   Unchanged partially calcified focal left pleural thickening.    LYMPH NODES/MEDIASTINUM: Unchanged anterior mediastinal nodules, largest   approximately 1.5 cm. Unchanged soft tissue density along the right wall   of themidesophagus, stable dating back to 2/21/2021. Small hiatal hernia.    HEART/VASCULATURE: Normal heart size. No pericardial effusion. Calcified   coronary arteries and aortic valve.    UPPER ABDOMEN: Left renal cyst.    BONES/SOFT TISSUES: Unremarkable.      IMPRESSION:    No pulmonary embolism.    No pneumonia.    New trace pleural effusions.    --- End of Report ---      < end of copied text >     HPI: Pt is a 89y old Female with hx of HTN, HLD, severe AS, HFpEF, hx of pérez, hx of aspiration pneumonia and UTI presents with weakness. Pt reports feeling cold and shaking when she woke up this morning. She also felt dizzy and felt warm. Pt was recently admitted for UTI and urinary retention. She was discharged on PO antibiotics which were completed and a pérez upon discharge. Denies fevers, chest pain, SOB, abdominal pain, N/V, diarrhea/constipation. Palliative medicine consulted to help establish GOC and advance care planning     7/21/2023 pt seen and examined with no family at bedside, pt denies any complaints at this time is just very tired. GOC meeting schedule for 3:30       PAIN: ( )Yes   (x )No  pt denies   DYSPNEA: ( ) Yes  (x ) No  Level: not at this time     PAST MEDICAL & SURGICAL HISTORY:  HTN (hypertension)  HLD (hyperlipidemia)  Gout  Osteoarthritis  Acute on chronic diastolic congestive heart failure  Aortic stenosis  H/O CHF  Pneumonia, aspiration  Pneumonia  History of tonsillectomy in childhood    SOCIAL HX:    Hx opiate tolerance ( )YES  (x )NO    Baseline ADLs  (Prior to Admission)  (x ) Independent   ( )Dependent  with assistance     FAMILY HISTORY:  Family history of hypertension (Father, Mother)    Review of Systems:    Dyspnea- yes  Weakness- yes    All other systems reviewed and negative  Unable to obtain/Limited due to: patient states she is very tired       PHYSICAL EXAM:    Vital Signs Last 24 Hrs  T(C): 37.9 (21 Jul 2023 05:00), Max: 37.9 (20 Jul 2023 21:00)  T(F): 100.2 (21 Jul 2023 05:00), Max: 100.2 (20 Jul 2023 21:00)  HR: 52 (21 Jul 2023 07:00) (50 - 123)  BP: 167/56 (21 Jul 2023 07:00) (77/32 - 167/56)  BP(mean): 81 (21 Jul 2023 07:00) (42 - 86)  RR: 22 (21 Jul 2023 07:00) (8 - 31)  SpO2: 98% (21 Jul 2023 04:00) (85% - 98%)    Parameters below as of 20 Jul 2023 18:02  Patient On (Oxygen Delivery Method): nasal cannula  O2 Flow (L/min): 2    PPSV2: 30  %  FAST:    General: elderly pleasant female in bed, NAD   Mental Status: alert and oriented   HEENT: nasal cannula in place   Lungs: cta b/l   Cardiac: s1s2 +   GI: nontender, non distended +BS   : pérez in place   Ext: no edema, LUX   Neuro: weakness       LABS:                        9.8    23.91 )-----------( 143      ( 21 Jul 2023 06:10 )             29.9     07-21    128<L>  |  105  |  30<H>  ----------------------------<  159<H>  4.8   |  13<L>  |  2.03<H>    Ca    8.3<L>      21 Jul 2023 06:10  Phos  3.2     07-21  Mg     2.1     07-21    TPro  6.7  /  Alb  3.1<L>  /  TBili  0.8  /  DBili  x   /  AST  92<H>  /  ALT  79<H>  /  AlkPhos  64  07-21    PT/INR - ( 20 Jul 2023 10:40 )   PT: 11.8 sec;   INR: 1.02 ratio         PTT - ( 20 Jul 2023 10:40 )  PTT:25.7 sec  Albumin: Albumin: 3.1 g/dL (07-21 @ 06:10)      Allergies    Ceftin (Hives; Rash)    Intolerances      MEDICATIONS  (STANDING):  ascorbic acid 500 milliGRAM(s) Oral daily  atorvastatin 20 milliGRAM(s) Oral at bedtime  chlorhexidine 2% Cloths 1 Application(s) Topical <User Schedule>  cholecalciferol 1000 Unit(s) Oral daily  folic acid 1 milliGRAM(s) Oral daily  gabapentin 300 milliGRAM(s) Oral two times a day  heparin   Injectable 5000 Unit(s) SubCutaneous every 8 hours  lidocaine   4% Patch 1 Patch Transdermal every 24 hours  meropenem  IVPB 500 milliGRAM(s) IV Intermittent every 12 hours  norepinephrine Infusion 0.05 MICROgram(s)/kG/Min (5.53 mL/Hr) IV Continuous <Continuous>  pantoprazole    Tablet 40 milliGRAM(s) Oral before breakfast  phenylephrine    Infusion 0.5 MICROgram(s)/kG/Min (11.1 mL/Hr) IV Continuous <Continuous>  senna 2 Tablet(s) Oral at bedtime  sodium chloride 0.9%. 500 milliLiter(s) (100 mL/Hr) IV Continuous <Continuous>    MEDICATIONS  (PRN):  acetaminophen     Tablet .. 650 milliGRAM(s) Oral every 6 hours PRN Temp greater or equal to 38C (100.4F), Mild Pain (1 - 3)  aluminum hydroxide/magnesium hydroxide/simethicone Suspension 30 milliLiter(s) Oral every 4 hours PRN Dyspepsia  melatonin 3 milliGRAM(s) Oral at bedtime PRN Insomnia  ondansetron Injectable 4 milliGRAM(s) IV Push every 8 hours PRN Nausea and/or Vomiting      RADIOLOGY/ADDITIONAL STUDIES:    < from: CT Abdomen and Pelvis No Cont (07.20.23 @ 19:01) >  ACC: [id number] EXAM:  CT ABDOMEN AND PELVIS   ORDERED BY: INOCENCIA NEWMAN     PROCEDURE DATE:  07/20/2023          INTERPRETATION:  CLINICAL INFORMATION: Urinary tract infection.    COMPARISON: 6/8/2023    CONTRAST/COMPLICATIONS:  IV Contrast: NONE  Oral Contrast: None  Complications: None    PROCEDURE:  CT of the Abdomen and Pelvis was performed.  Sagittal and coronal reformats were performed.    FINDINGS:  Evaluation of solid organs and vascular structures is limited without   intravenous contrast.    LOWER CHEST: Mild fibrotic changes again noted. Trace bilateral pleural   effusions. Cardiomegaly. Coronary calcifications. Small hiatal hernia.    LIVER: Within normal limits.  BILE DUCTS: Normal caliber.  GALLBLADDER: Cholelithiasis.. No pericholecystic inflammation.  SPLEEN: Within normal limits.  PANCREAS: Fatty atrophy.  ADRENALS: Within normal limits.  KIDNEYS/URETERS: Excreted contrast within the collecting systems and   ureters from recent exam is slightly limited evaluation of. No   hydronephrosis or obstructing stone. Left renal cyst.    BLADDER: Collapsed around Pérez catheter, containing excreted contrast   material.  REPRODUCTIVE ORGANS: No pelvic mass.    BOWEL: No bowel obstruction. Appendix is normal. Large rectal stool   burden and overall moderate colonic stool burden. Mild wall thickening of   the ascending colon, transverse colon and proximal descending colon.  PERITONEUM: No ascites.  VESSELS: Atherosclerotic changes.  RETROPERITONEUM/LYMPH NODES: No lymphadenopathy.  ABDOMINAL WALL: Small fat-containing umbilical hernia.  BONES: Degenerative changes. Osteopenia.    IMPRESSION:  No hydronephrosis or obstructing stone.    Mild wall thickening of the ascending colon, transverse colon proximal   descending colonmay reflect underdistention or colitis. Clinical   correlation recommended.    < end of copied text >  < from: CT Head No Cont (07.20.23 @ 18:58) >    ACC: [id number] EXAM:  CT BRAIN   ORDERED BY: INOCENCIA NEWMAN     PROCEDURE DATE:  07/20/2023          INTERPRETATION:  CLINICAL INFORMATION:  confusion    TECHNIQUE: Transverse images obtained from the foramen magnum to the   vertex without the administration of IV contrast material.  Multiplanar   2D reformations obtained from thin slice transverse reconstructions.    COMPARISON:  March 31, 2023    FINDINGS:    No acute intracranial abnormalities.    No evidence of acute cortical infarction or hemorrhage.    No mass effect or edema.    Small vessel and atrophic changes.    No sinusitis or mastoiditis.    No skull fracture. Atherosclerotic vascular calcification present skull   base.    IMPRESSION:    No acute intracranial abnormalities.    Small vessel and atrophic changes.    --- End of Report ---            < end of copied text >    < from: CT Angio Chest PE Protocol w/ IV Cont (07.20.23 @ 11:58) >    ACC: [id number] EXAM:  CT ANGIO CHEST PULM [facility]   ORDERED BY: CJ ESPOSITO     PROCEDURE DATE:  07/20/2023          INTERPRETATION:  INDICATION: New hypoxia, rule out pulmonary embolism   versus pneumonia versus congestive heart failure    TECHNIQUE: Helical acquisition of the chest after the administration of   54 mL of Omnipaque 350. Maximum intensity projection images were   generated.    COMPARISON: CT chest 5/24/2023.    FINDINGS:    PULMONARY ANGIOGRAM:  No pulmonary embolism.    LUNGS/AIRWAYS/PLEURA: Patent trachea and bronchi. Unchanged mild   interlobular septal thickening in both lungs. Mild passive atelectasis in   the lower lobes. New small linear subpleural opacities in both upper   lobes, likely scarring or atelectasis. New trace pleural effusions.   Unchanged partially calcified focal left pleural thickening.    LYMPH NODES/MEDIASTINUM: Unchanged anterior mediastinal nodules, largest   approximately 1.5 cm. Unchanged soft tissue density along the right wall   of themidesophagus, stable dating back to 2/21/2021. Small hiatal hernia.    HEART/VASCULATURE: Normal heart size. No pericardial effusion. Calcified   coronary arteries and aortic valve.    UPPER ABDOMEN: Left renal cyst.    BONES/SOFT TISSUES: Unremarkable.      IMPRESSION:    No pulmonary embolism.    No pneumonia.    New trace pleural effusions.    --- End of Report ---      < end of copied text >

## 2023-07-21 NOTE — DIETITIAN INITIAL EVALUATION ADULT - NS FNS DIET ORDER
Diet, NPO:   Except Medications     Special Instructions for Nursing:  Except Medications (07-20-23 @ 17:42)

## 2023-07-21 NOTE — DIETITIAN NUTRITION RISK NOTIFICATION - ADDITIONAL COMMENTS/DIETITIAN RECOMMENDATIONS
1) Advance diet to Regular as soon as medically feasible  2) Add ensure plus high protein BID to optimize PO intake (provides 350 kcal, 20g protein/ shake) when diet is advanced  3) Obtain vitamin D 25OH level to assess nutriture  4) Please obtain daily weights  5) Consider adding thiamine 100 mg daily 2/2 poor PO intake/ malnutrition  6) Recommend to add MVI w/minerals, Vit C 500 mg BID, add Zinc Sulfate 220 mg x 10 days to promote wound healing.   7) Encourage protein-rich foods, maximize food preferences  8) Monitor bowel movements, if no BM for >3 days, consider implementing bowel regimen.  9) Confirm goals of care regarding nutrition support - Nutrition support is not recommended due to overall declining medical status which evidenced based studies indicate EN is not effective in prolonging survival and improving quality of life. It can also increase risk of aspiration pneumonia as well as other related issues, however will provide nutrition within GOC.   RD will continue to monitor PO intake, labs, hydration, and wt prn.

## 2023-07-21 NOTE — DIETITIAN INITIAL EVALUATION ADULT - PERTINENT LABORATORY DATA
07-21    128<L>  |  105  |  30<H>  ----------------------------<  159<H>  4.8   |  13<L>  |  2.03<H>    Ca    8.3<L>      21 Jul 2023 06:10  Phos  3.2     07-21  Mg     2.1     07-21    TPro  6.7  /  Alb  3.1<L>  /  TBili  0.8  /  DBili  x   /  AST  92<H>  /  ALT  79<H>  /  AlkPhos  64  07-21    Iron Total, Serum: 71 ug/dL (06-13-23 @ 12:13)  Vitamin B12, Serum: 506 pg/mL (04-06-23 @ 22:44)  Folate, Serum: >20.0 ng/mL (04-06-23 @ 22:44)  Vitamin B12, Serum: 522 pg/mL (03-02-23 @ 10:41)  Folate, Serum: >20.0 ng/mL (03-02-23 @ 10:41)  Iron Total, Serum: 57 ug/dL (03-02-23 @ 10:41)

## 2023-07-21 NOTE — DIETITIAN INITIAL EVALUATION ADULT - OTHER INFO
89 y/o F with a PMHx of HTN, HLD, severe AS, HFpEF, hx of pérez, hx of aspiration pneumonia and UTI presented with weakness. Pt reports feeling cold and shaking when she woke up this morning. She also felt dizzy and felt warm. Pt was recently admitted for UTI and urinary retention. She was discharged on PO antibiotics which were completed and a pérez upon discharge. Admitted for sepsis due to UTI, hypotension, and acute hypoxic respiratory distress likely secondary to severe aortic stenosis and mild CHF; tx to CCU for pressor support. FULL CODE.    Unable to obtain meaningful information 2/2 pt sleeping and unarousable at time of visit; known to RD from previous admissions. Pt NPO at time of visit. RD obtained bedscale wt on 7/21 - 155#. Weight hx reviewed: 153# (taken by RD on 6/9/23; met criteria for severe malnutrition), 155# (taken by RD on 4/8/23; met criteria for severe malnutrition), 159# (taken by RD on 2/2723; met criteria for moderate malnutrition); weight loss of 4# (2.5%) x 5 mon - not clinsig. NFPE reveals mild to moderate muscle/ fat wasting, pt continues to meet criteria for PCM at this time. Recommend to advance diet to diet to Regular as soon a medically feasible in effort to maximize caloric and nutrient intake. Will trial Ensure Plus High Protein BID in effort to optimize PO intake and optimize wound healing when diet is advanced. Nutrition support is not recommended due to overall declining medical status which evidenced based studies indicate EN is not effective in prolonging survival and improving quality of life. It can also increase risk of aspiration pneumonia as well as other related issues, however will provide nutrition within GOC. Please see additional recommendations below.

## 2023-07-21 NOTE — CONSULT NOTE ADULT - SUBJECTIVE AND OBJECTIVE BOX
HPI:  89 y/o F with PMH HTN, HLD, severe AS, HFpEF, hx of pérez, hx of aspiration pneumonia and UTI presents with weakness. Pt reports feeling cold and shaking when she woke up this morning. She also felt dizzy and felt warm. Pt was recently admitted for UTI and urinary retention. She was discharged on PO antibiotics which were completed and a pérez upon discharge. Denies fevers, chest pain, SOB, abdominal pain, N/V, diarrhea/constipation.   Prior admission:  - 7/10/23: Weakness -> UTI/urinary retention -> PO Ceftin, pérez was continued upon d/c   ER course: Temp 100.1F, BP 97/41-98/41, SpO2 87% -> 98% on 2L of NS. Labs: WBC 11.21, Hb 10.2 (baseline ~10), , neutrophils 92.1%, CO2 20, Cr 1.45 (baseline ~1.4), glucose 113, AST 38, BNP 2123. UA: moderate leukocyte esterase, small blood, WBC 11-25, RBC 3-5, moderate bacteria. COVID and RVP negative.   EKG: Junctional rhythm with PVCs HR 51 bpm, normal intervals, LVH (personally reviewed).   Imaging:   - CTA: No pulmonary embolism. No pneumonia. New trace pleural effusions. Unchanged anterior mediastinal nodules, largest approximately 1.5 cm. Unchanged soft tissue density along the right wall of the midesophagus, stable dating back to 2/21/2021.   - CXR: increased vascular congestion, no consolidation, no effusion, no pneumothorax (personally reviewed).  - XR right shoulder: Mild CHF increased from prior. Degeneration right shoulder.  Pt was given Levaquin, 500 ml of NS, Tylenol. She is being admitted to med/surg for further management.  (20 Jul 2023 17:26)    7/21/23:  EP asked to evaluate for junctional bradycardia.  Patient seen at bedside with children present. Patient previously started on Levophed gtt but developed tachycardia so it was discontinued. Patient currently on phenylephrine gtt. Patient currently junctional rhythm with rates 35-70.  Denies any CP, palpitations, SOB, dizziness, lightheadedness.    PAST MEDICAL & SURGICAL HISTORY:  HTN (hypertension)  HLD (hyperlipidemia)  Gout  Osteoarthritis  Acute on chronic diastolic congestive heart failure  Aortic stenosis  H/O CHF  Pneumonia, aspiration  Pneumonia  History of tonsillectomy  in childhood          MEDICATIONS  (STANDING):  ascorbic acid 500 milliGRAM(s) Oral daily  atorvastatin 20 milliGRAM(s) Oral at bedtime  chlorhexidine 2% Cloths 1 Application(s) Topical <User Schedule>  cholecalciferol 1000 Unit(s) Oral daily  folic acid 1 milliGRAM(s) Oral daily  gabapentin 300 milliGRAM(s) Oral two times a day  heparin   Injectable 5000 Unit(s) SubCutaneous every 8 hours  lidocaine   4% Patch 1 Patch Transdermal every 24 hours  meropenem  IVPB 500 milliGRAM(s) IV Intermittent every 12 hours  norepinephrine Infusion 0.05 MICROgram(s)/kG/Min (5.53 mL/Hr) IV Continuous <Continuous>  pantoprazole    Tablet 40 milliGRAM(s) Oral before breakfast  phenylephrine    Infusion 0.5 MICROgram(s)/kG/Min (11.1 mL/Hr) IV Continuous <Continuous>  senna 2 Tablet(s) Oral at bedtime  sodium chloride 0.9%. 500 milliLiter(s) (100 mL/Hr) IV Continuous <Continuous>  thiamine 100 milliGRAM(s) Oral daily    MEDICATIONS  (PRN):  acetaminophen     Tablet .. 650 milliGRAM(s) Oral every 6 hours PRN Temp greater or equal to 38C (100.4F), Mild Pain (1 - 3)  aluminum hydroxide/magnesium hydroxide/simethicone Suspension 30 milliLiter(s) Oral every 4 hours PRN Dyspepsia  melatonin 3 milliGRAM(s) Oral at bedtime PRN Insomnia  ondansetron Injectable 4 milliGRAM(s) IV Push every 8 hours PRN Nausea and/or Vomiting      Allergies    Ceftin (Hives; Rash)        SOCIAL HISTORY: Denies tobacco, etoh abuse or illicit drug use    FAMILY HISTORY:  Family history of hypertension (Father, Mother)        Vital Signs Last 24 Hrs  T(C): 37.1 (21 Jul 2023 08:00), Max: 37.9 (20 Jul 2023 21:00)  T(F): 98.8 (21 Jul 2023 08:00), Max: 100.2 (20 Jul 2023 21:00)  HR: 68 (21 Jul 2023 17:00) (42 - 123)  BP: 232/71 (21 Jul 2023 17:00) (77/32 - 232/71)  BP(mean): 109 (21 Jul 2023 17:00) (42 - 113)  RR: 20 (21 Jul 2023 17:00) (8 - 31)  SpO2: 96% (21 Jul 2023 17:00) (85% - 100%)    Parameters below as of 20 Jul 2023 18:02  Patient On (Oxygen Delivery Method): nasal cannula  O2 Flow (L/min): 2      REVIEW OF SYSTEMS:    CONSTITUTIONAL:  As per HPI.  HEENT:  Eyes:  No diplopia or blurred vision. ENT:  No earache, sore throat or runny nose.  CARDIOVASCULAR:  No pressure, squeezing, strangling, tightness, heaviness or aching about the chest, neck, axilla or epigastrium.  RESPIRATORY:  No cough, shortness of breath, PND or orthopnea.  GASTROINTESTINAL:  No nausea, vomiting or diarrhea.  GENITOURINARY:  No dysuria, frequency or urgency.  MUSCULOSKELETAL:  As per HPI.  SKIN:  No change in skin, hair or nails.  NEUROLOGIC:  No paresthesias, fasciculations, seizures or weakness.  PSYCHIATRIC:  No disorder of thought or mood.  ENDOCRINE:  No heat or cold intolerance, polyuria or polydipsia.  HEMATOLOGICAL:  No easy bruising or bleedings:  .     PHYSICAL EXAMINATION:    GENERAL APPEARANCE:  Pt. is not currently dyspneic, in no distress. Pt. is alert, oriented, and pleasant.  HEENT:  Pupils are normal and react normally. No icterus. Mucous membranes well colored.  NECK:  Supple. No lymphadenopathy. Jugular venous pressure not elevated. Carotids equal.   HEART:   The cardiac impulse has a normal quality. There are no murmurs, rubs or gallops noted  CHEST:  Chest is clear to auscultation. Normal respiratory effort.  ABDOMEN:  Soft and nontender.   EXTREMITIES:  There is no edema.   SKIN:  No rash or significant lesions are noted.    I&O's Summary    20 Jul 2023 07:01  -  21 Jul 2023 07:00  --------------------------------------------------------  IN: 550 mL / OUT: 310 mL / NET: 240 mL    21 Jul 2023 07:01  -  21 Jul 2023 17:46  --------------------------------------------------------  IN: 450 mL / OUT: 0 mL / NET: 450 mL        LABS:                        9.8    23.91 )-----------( 143      ( 21 Jul 2023 06:10 )             29.9     07-21    128<L>  |  105  |  30<H>  ----------------------------<  159<H>  4.8   |  13<L>  |  2.03<H>    Ca    8.3<L>      21 Jul 2023 06:10  Phos  3.2     07-21  Mg     2.1     07-21    TPro  6.7  /  Alb  3.1<L>  /  TBili  0.8  /  DBili  x   /  AST  92<H>  /  ALT  79<H>  /  AlkPhos  64  07-21    LIVER FUNCTIONS - ( 21 Jul 2023 06:10 )  Alb: 3.1 g/dL / Pro: 6.7 gm/dL / ALK PHOS: 64 U/L / ALT: 79 U/L / AST: 92 U/L / GGT: x           PT/INR - ( 20 Jul 2023 10:40 )   PT: 11.8 sec;   INR: 1.02 ratio         PTT - ( 20 Jul 2023 10:40 )  PTT:25.7 sec      Urinalysis Basic - ( 21 Jul 2023 06:10 )    Color: x / Appearance: x / SG: x / pH: x  Gluc: 159 mg/dL / Ketone: x  / Bili: x / Urobili: x   Blood: x / Protein: x / Nitrite: x   Leuk Esterase: x / RBC: x / WBC x   Sq Epi: x / Non Sq Epi: x / Bacteria: x          EKG: Junctional bradycardia@43bpm  QRS: 94ms  QT/QTc: 496/419ms    TELEMETRY: Junctional 35-70    CARDIAC TESTS:  < from: TTE Echo Complete w/o Contrast w/ Doppler (03.01.23 @ 11:37) >   Impression     Summary     The mitral valve leaflets appear thickened.   EA reversal of the mitral inflow consistent with reduced compliance of   the   left ventricle.   Mild mitral annular calcification is present.   Mild mitral regurgitation is present.   Peak and mean transaortic gradients are 75 and 40mmHg respectively; this   finding is consistent with severe aortic stenosis.   Mild (1+) aortic regurgitation is present.   Trace tricuspid valve regurgitation is present.   Mild pulmonic valvular regurgitation (1+) is present.   Normal appearing left atrium.   Mild concentric left ventricular hypertrophy is present.   The left ventricle is normal in size.   Estimated left ventricular ejection fraction is 65-70 %.   Normal appearingright atrium.   Normal appearing right ventricle structure and function.   The IVC appears normal.   No evidence of pericardial effusion.   No evidence of pleural effusion.      RADIOLOGY & ADDITIONAL STUDIES:  < from: Xray Chest 1 View- PORTABLE-Urgent (07.20.23 @ 10:13) >  INTERPRETATION:  Chest and right shoulder. Patient has weakness of right   shoulder pain.    AP erect chest on July 20, 2023 at 10:05 AM.    Heart magnified by technique.    There is a mild symmetric congestive picture increased from July 5 this   year.    Right shoulder. 3 views. There is mild degeneration particularly at the   glenohumeral articulation. No fracture.    IMPRESSION: Mild CHF increased from prior. Degeneration right shoulder.

## 2023-07-21 NOTE — GOALS OF CARE CONVERSATION - ADVANCED CARE PLANNING - CONVERSATION DETAILS
HPI:  89 y/o F with PMH HTN, HLD, severe AS, HFpEF, hx of pérez, hx of aspiration pneumonia and UTI presents with weakness. Pt reports feeling cold and shaking when she woke up this morning.  (20 Jul 2023 17:26)      PERTINENT PMH REVIEWED:  [ X ] YES [ ] NO           Mental Status: Pt. is alert and oriented but lethargic   Concerns of Depression [  ]- Not identified   Anxiety [   ]- Not identified   Baseline ADLs (prior to admission):  Independent [ ] moderately [ ] fully   Dependent   [ X ] moderately [ ]fully    Anticipated Grief: Patient[  ] Family [  X  ]    Caregiver [place] Assessed: Yes [ X  ] No [  ]    Orthodoxy: Anabaptist     Spiritual Concerns: Not identified,  available as needed     Goals of Care: Comfort [  ] Rehabilitation [  ] Curative [  ] Life Prolonging [  ]    Previous Services: [employer] aides     ADVANCE DIRECTIVES:     Anticipated D/C Plan:                     Summary:    Team met with pts family to discuss goals of care, assist with planning and provide supportive counseling. Pt. was residing HPI:  89 y/o F with PMH HTN, HLD, severe AS, HFpEF, hx of pérez, hx of aspiration pneumonia and UTI presents with weakness. Pt reports feeling cold and shaking when she woke up this morning.  (20 Jul 2023 17:26)      PERTINENT PMH REVIEWED:  [ X ] YES [ ] NO           Mental Status: Pt. is alert and oriented   Concerns of Depression [  ]- Not identified   Anxiety [   ]- Not identified   Baseline ADLs (prior to admission):  Independent [ ] moderately [ ] fully   Dependent   [ X ] moderately [ ] fully    Anticipated Grief: Patient [  X  ] Family [  X  ]    Caregiver [place] Assessed: Yes [ X  ] No [  ]    Hinduism: Shinto     Spiritual Concerns: Not identified,  available as needed     Goals of Care: Continue with current medical management     Previous Services: Medicaid aides day time hours     ADVANCE DIRECTIVES: MOLST completed DNR/DNI     Anticipated D/C Plan: to be further determined                      Summary:    Team met with pt. and her family to discuss goals of care, assist with planning and provide supportive counseling. Pt. was residing at home with her daughter Pippa and had aides during the day time hours while her daughter worked and then her family was home at night. Pt. was using RW and needed some assistance with ADLs.     Pts current medical condition and goals of care discussed. We reviewed pts underlying conditions including AS and discussed her multiple hospital admissions. We discussed with pt. and family her wishes regarding resuscitation and intubation. Team explained in detail with pts underlying conditions why these interventions would not be recommended as they are unlikely to be successful. Pt shared that she would want a natural passing to happen when its her time. Feelings explored and support provided. Pt consented to DNR/DNI. MOLST completed to reflect DNR/DNI. Pts family was supportive of pts decision.    We discussed possible d/c options including [facility] however, will give pt. the weekend and follow up Monday. D/c plan to be further determined. PT seeing pt.     Plan at this time is to continue with current medical management and continue discussion on Monday. Emotional support provided. Our team will continue to follow.

## 2023-07-21 NOTE — PROGRESS NOTE ADULT - SUBJECTIVE AND OBJECTIVE BOX
89 y/o F with PMH HTN, HLD, severe AS, HFpEF, hx of pérez, hx of aspiration pneumonia and UTI presents with weakness. Pt reports feeling cold and shaking when she woke up this morning. She also felt dizzy and felt warm. Pt was recently admitted for UTI and urinary retention. She was discharged on PO antibiotics which were completed and a pérez upon discharge. Denies fevers, chest pain, SOB, abdominal pain, N/V, diarrhea/constipation.     Came to see pt who is well known to Dr. Collins.  Pt has a chronic pérez and was not feeling good and dizzy.  Came to the ER.  Pt now feeling better in the CCU.    Patient History:    Past Medical, Past Surgical, and Family History:  PAST MEDICAL HISTORY:  Acute on chronic diastolic congestive heart failure     Aortic stenosis     Gout     H/O CHF     HLD (hyperlipidemia)     HTN (hypertension)     Osteoarthritis     Pneumonia     Pneumonia, aspiration.     PAST SURGICAL HISTORY:  History of tonsillectomy in childhood.     Allergies and Intolerances:        Allergies:  	Ceftin: Drug, Hives, Rash, PATIENT AND DAUGHTER DENY ALLERGY 2-21-21. Tolerated meropenem during 7/2023 admission.    Home Medications:   * Patient Currently Takes Medications as of 20-Jul-2023 14:41 documented in Structured Notes  · 	cefuroxime 500 mg oral tablet: Last Dose Taken:  , 1 tab(s) orally every 12 hours ***Course Complete***  · 	nystatin 100,000 units/mL oral suspension: Last Dose Taken:  , 5 milliliter(s) orally 3 times a day as needed  · 	ciprofloxacin 250 mg oral tablet: Last Dose Taken:  , 1 tab(s) orally 2 times a day x 7 days ***Course Complete***  · 	metoprolol tartrate 25 mg oral tablet: Last Dose Taken:  , 1 tab(s) orally every 12 hours  · 	Neurontin 300 mg oral capsule: Last Dose Taken:  , 2 cap(s) orally 2 times a day  · 	cholecalciferol 25 mcg (1000 intl units) oral tablet: Last Dose Taken:  , 1 tab(s) orally once a day  · 	Artificial Tears ophthalmic solution: Last Dose Taken:  , 1 drop(s) in each eye 2 times a day as needed for  dry eyes  · 	senna (sennosides) 8.6 mg oral tablet: Last Dose Taken:  , 2 tab(s) orally every other day (at bedtime)  · 	ascorbic acid 500 mg oral tablet: Last Dose Taken:  , 1 tab(s) orally once a day  · 	folic acid 1 mg oral tablet: Last Dose Taken:  , 1 tab(s) orally once a day  · 	hydrALAZINE 25 mg oral tablet: Last Dose Taken:  , 1 tab(s) orally 3 times a day  · 	Tylenol 8 HR Arthritis Pain 650 mg oral tablet, extended release: Last Dose Taken:  , 2 tab(s) orally 3 times a day as needed for pain  · 	allopurinol 100 mg oral tablet: Last Dose Taken:  , 1 tab(s) orally once a day  · 	rosuvastatin 5 mg oral tablet: Last Dose Taken:  , 1 tab(s) orally once a day (at bedtime)  · 	pantoprazole 40 mg oral delayed release tablet: Last Dose Taken:  , 1 tab(s) orally once a day  · 	Colace 100 mg oral capsule: Last Dose Taken:  , 1 cap(s) orally once a day  · 	lidocaine 5% topical film: Last Dose Taken:  , Apply topically to affected area once a day as needed for pain    FAMILY HISTORY:  Father  Still living? Unknown  Family history of hypertension, Age at diagnosis: Age Unknown    Mother  Still living? No  Family history of hypertension, Age at diagnosis: Age Unknown.     Review of Systems:  Review of Systems: Constitutional: positive for fatigue, negative for fever, negative for chills, negative for decreased appetite, positive for weakness   Skin: negative for rashes, negative for open wounds, negative for jaundice.   Eyes: negative for blurry vision, negative for double vision.   Ears, nose, throat: negative for ear pain, negative for nasal congestion, negative for sore throat, negative for lymph node swelling.   Cardiovascular: negative for chest pain, negative for palpitations, negative for lower extremity swelling.   Respiratory: negative for shortness of breath, negative for wheezing, negative for cough.   Gastrointestinal: negative for abdominal pain, negative for nausea, negative for vomiting, negative for diarrhea, negative for constipation, negative for blood in the stool, negative for black tarry stools.   Genitourinary: negative for burning on urination, negative for urinary urgency or frequency, negative for blood in the urine.   Endocrine: negative for cold intolerance, negative for heat intolerance, negative for increased thirst.   Hematologic: negative for easy bruising or bleeding.   Musculoskeletal: negative for muscle/joint pain, negative for decreased range of motion.   Neurological: negative for dizziness, negative for headaches, negative for loss of consciousness, negative for motor weakness, negative for sensory deficits.   Psychiatric: negative for depression, negative for anxiety.    PE  Vital Signs Last 24 Hrs  T(C): 37.9 (21 Jul 2023 05:00), Max: 37.9 (20 Jul 2023 21:00)  T(F): 100.2 (21 Jul 2023 05:00), Max: 100.2 (20 Jul 2023 21:00)  HR: 52 (21 Jul 2023 07:00) (50 - 123)  BP: 167/56 (21 Jul 2023 07:00) (77/32 - 167/56)  BP(mean): 81 (21 Jul 2023 07:00) (42 - 86)  RR: 22 (21 Jul 2023 07:00) (8 - 31)  SpO2: 98% (21 Jul 2023 04:00) (85% - 98%)    Parameters below as of 20 Jul 2023 18:02  Patient On (Oxygen Delivery Method): nasal cannula  O2 Flow (L/min): 2    Skin: warm and dry  Cardiac:  RRR, S1 S2   Lungs: CtA bilat, no rales, rhonchi or wheezes  Back: No CVA tenderness  Abd:  Soft NT/ND +BS           No bladder palp  :  Pérez in place - 570 cc, Urine yellow  Lower Ext: no calf tenderness      LABS:                          9.8    23.91 )-----------( 143      ( 21 Jul 2023 06:10 )             29.9     07-21    128<L>  |  105  |  30<H>  ----------------------------<  159<H>  4.8   |  13<L>  |  2.03<H>    Ca    8.3<L>      21 Jul 2023 06:10  Phos  3.2     07-21  Mg     2.1     07-21    TPro  6.7  /  Alb  3.1<L>  /  TBili  0.8  /  DBili  x   /  AST  92<H>  /  ALT  79<H>  /  AlkPhos  64  07-21    LIVER FUNCTIONS - ( 21 Jul 2023 06:10 )  Alb: 3.1 g/dL / Pro: 6.7 gm/dL / ALK PHOS: 64 U/L / ALT: 79 U/L / AST: 92 U/L / GGT: x           PT/INR - ( 20 Jul 2023 10:40 )   PT: 11.8 sec;   INR: 1.02 ratio         PTT - ( 20 Jul 2023 10:40 )  PTT:25.7 sec  Urinalysis Basic - ( 21 Jul 2023 06:10 )    Color: x / Appearance: x / SG: x / pH: x  Gluc: 159 mg/dL / Ketone: x  / Bili: x / Urobili: x   Blood: x / Protein: x / Nitrite: x   Leuk Esterase: x / RBC: x / WBC x   Sq Epi: x / Non Sq Epi: x / Bacteria: x    ACC: [id number] EXAM:  CT ABDOMEN AND PELVIS   ORDERED BY: INOCENCIA NEWMAN     PROCEDURE DATE:  07/20/2023          INTERPRETATION:  CLINICAL INFORMATION: Urinary tract infection.    COMPARISON: 6/8/2023    CONTRAST/COMPLICATIONS:  IV Contrast: NONE  Oral Contrast: None  Complications: None    PROCEDURE:  CT of the Abdomen and Pelvis was performed.  Sagittal and coronal reformats were performed.    FINDINGS:  Evaluation of solid organs and vascular structures is limited without   intravenous contrast.    LOWER CHEST: Mild fibrotic changes again noted. Trace bilateral pleural   effusions. Cardiomegaly. Coronary calcifications. Small hiatal hernia.    LIVER: Within normal limits.  BILE DUCTS: Normal caliber.  GALLBLADDER: Cholelithiasis.. No pericholecystic inflammation.  SPLEEN: Within normal limits.  PANCREAS: Fatty atrophy.  ADRENALS: Within normal limits.  KIDNEYS/URETERS: Excreted contrast within the collecting systems and   ureters from recent exam is slightly limited evaluation of. No   hydronephrosis or obstructing stone. Left renal cyst.    BLADDER: Collapsed around Pérez catheter, containing excreted contrast   material.  REPRODUCTIVE ORGANS: No pelvic mass.    BOWEL: No bowel obstruction. Appendix is normal. Large rectal stool   burden and overall moderate colonic stool burden. Mild wall thickening of   the ascending colon, transverse colon and proximal descending colon.  PERITONEUM: No ascites.  VESSELS: Atherosclerotic changes.  RETROPERITONEUM/LYMPH NODES: No lymphadenopathy.  ABDOMINAL WALL: Small fat-containing umbilical hernia.  BONES: Degenerative changes. Osteopenia.    IMPRESSION:  No hydronephrosis or obstructing stone.    Mild wall thickening of the ascending colon, transverse colon proximal   descending colonmay reflect underdistention or colitis. Clinical   correlation recommended.

## 2023-07-21 NOTE — CONSULT NOTE ADULT - SUBJECTIVE AND OBJECTIVE BOX
CHIEF COMPLAINT:  Patient is a 89y old  Female who presents with a chief complaint of Weakness (21 Jul 2023 00:14)      HPI: 7/21/23:  89 y/o F, well known to me, with PMH HTN, HLD, severe AS, HFpEF, hx of Pérez, hx of aspiration pneumonia and UTI presents with weakness. Pt reports feeling cold and shaking when she woke up this morning. She also felt dizzy and felt warm. Pt was recently admitted for UTI and urinary retention. She was discharged on PO antibiotics which were completed and a pérez upon discharge. Denies fevers, chest pain, SOB, abdominal pain, N/V, diarrhea/constipation.     Prior admission:  - 7/10/23: Weakness -> UTI/urinary retention -> PO Ceftin, pérez was continued upon d/c     ER course: Temp 100.1F, BP 97/41-98/41, SpO2 87% -> 98% on 2L of NS. Labs: WBC 11.21, Hb 10.2 (baseline ~10), , neutrophils 92.1%, CO2 20, Cr 1.45 (baseline ~1.4), glucose 113, AST 38, BNP 2123. UA: moderate leukocyte esterase, small blood, WBC 11-25, RBC 3-5, moderate bacteria. COVID and RVP negative.   EKG: Junctional rhythm with PVCs HR 51 bpm, normal intervals, LVH (personally reviewed).     Pt was given Levaquin, 500 ml of NS, Tylenol. She is being admitted to med/surg for further management.   Her BP was low and started on Levophed but developed rapid SVT that broke to junctional bradycardia when stopped and then placed on Talon and again had SVT with nausea and vomiting.  Now back on low dose Levophed and maintaining a junctional rhythm with good BP.  She is feeling slightly better.          PMHx:  PAST MEDICAL & SURGICAL HISTORY:  Gout  Osteoarthritis  HTN (hypertension)  HLD (hyperlipidemia)  Acute on chronic diastolic congestive heart failure  Aortic stenosis- severe  H/O CHF  Pneumonia, aspiration  History of tonsillectomy in childhood      FAMILY HISTORY:   FAMILY HISTORY:  Family history of hypertension (Father, Mother)      ALLERGIES:  Allergies  Ceftin (Hives; Rash)      REVIEW OF SYSTEMS:  10 point ROS was obtained  Pertinent positives and negatives are as above  All other review of systems is negative unless indicated above      Vital Signs Last 24 Hrs  T(C): 36.5 (20 Jul 2023 19:37), Max: 37.8 (20 Jul 2023 11:15)  T(F): 97.7 (20 Jul 2023 19:37), Max: 100.1 (20 Jul 2023 11:15)  HR: 100 (20 Jul 2023 22:26) (60 - 114)  BP: 136/62 (20 Jul 2023 22:26) (87/38 - 144/55)  BP(mean): 80 (20 Jul 2023 22:26) (53 - 86)  RR: 19 (20 Jul 2023 22:19) (16 - 20)  SpO2: 96% (20 Jul 2023 22:19) (87% - 98%): nasal cannula  O2 Flow (L/min): 2      PHYSICAL EXAM:   Constitutional: NAD, awake and alert, well-developed  HEENT: PERR, EOMI, Normal Hearing, MMM  Neck: Soft and supple, No LAD, No JVD  Respiratory: Breath sounds are clear bilaterally, No wheezing, rales or rhonchi  Cardiovascular: S1 and S2, regular rate and rhythm, harsh LIANA at base and LLSBas before, no gallops or rubs  Gastrointestinal: Bowel Sounds present, soft, nontender, nondistended, no guarding, no rebound  Vascular: 2+ peripheral pulses  Neurological: no focal deficits      MEDICATIONS  (STANDING):  ascorbic acid 500 milliGRAM(s) Oral daily  atorvastatin 20 milliGRAM(s) Oral at bedtime  chlorhexidine 2% Cloths 1 Application(s) Topical <User Schedule>  cholecalciferol 1000 Unit(s) Oral daily  folic acid 1 milliGRAM(s) Oral daily  gabapentin 300 milliGRAM(s) Oral two times a day  heparin   Injectable 5000 Unit(s) SubCutaneous every 8 hours  lidocaine   4% Patch 1 Patch Transdermal every 24 hours  meropenem  IVPB 500 milliGRAM(s) IV Intermittent every 12 hours  norepinephrine Infusion 0.05 MICROgram(s)/kG/Min (5.53 mL/Hr) IV Continuous <Continuous>  pantoprazole    Tablet 40 milliGRAM(s) Oral before breakfast  phenylephrine    Infusion 0.5 MICROgram(s)/kG/Min (11.1 mL/Hr) IV Continuous <Continuous>  senna 2 Tablet(s) Oral at bedtime  sodium chloride 0.9%. 500 milliLiter(s) (100 mL/Hr) IV Continuous <Continuous>    MEDICATIONS  (PRN):  acetaminophen     Tablet .. 650 milliGRAM(s) Oral every 6 hours PRN Temp greater or equal to 38C (100.4F), Mild Pain (1 - 3)  aluminum hydroxide/magnesium hydroxide/simethicone Suspension 30 milliLiter(s) Oral every 4 hours PRN Dyspepsia  melatonin 3 milliGRAM(s) Oral at bedtime PRN Insomnia  ondansetron Injectable 4 milliGRAM(s) IV Push every 8 hours PRN Nausea and/or Vomiting      LABS: All Labs Reviewed:                        9.8    23.91 )-----------( 143      ( 21 Jul 2023 06:10 )             29.9     07-21    128<L>  |  105  |  30<H>  ----------------------------<  159<H>  4.8   |  13<L>  |  2.03<H>    Ca    8.3<L>      21 Jul 2023 06:10  Phos  3.2     07-21  Mg     2.1     07-21    TPro  6.7  /  Alb  3.1<L>  /  TBili  0.8  /  DBili  x   /  AST  92<H>  /  ALT  79<H>  /  AlkPhos  64  07-21    PT/INR - ( 20 Jul 2023 10:40 )   PT: 11.8 sec;   INR: 1.02 ratio       PTT - ( 20 Jul 2023 10:40 )  PTT:25.7 sec    Pro-Brain Natriuretic Peptide (07.21.23 @ 06:10): 78689 pg/mL  Pro-Brain Natriuretic Peptide (07.20.23 @ 10:40): 2123 pg/mL    Troponin I, High Sensitivity (07.20.23 @ 10:40): 17.94    BLOOD CULTURES:   LIPID PROFILE     RADIOLOGY:    CXR: 7/20/23:  INTERPRETATION:  Chest and right shoulder. Patient has weakness of right shoulder pain.  AP erect chest on July 20, 2023 at 10:05 AM.  Heart magnified by technique.  There is a mild symmetric congestive picture increased from July 5 this year.  Right shoulder. 3 views. There is mild degeneration particularly at the glenohumeral articulation. No fracture.  IMPRESSION: Mild CHF increased from prior. Degeneration right shoulder.      CTA of Chest: 7/20/23:  FINDINGS:  PULMONARY ANGIOGRAM:  No pulmonary embolism.  LUNGS/AIRWAYS/PLEURA: Patent trachea and bronchi. Unchanged mild interlobular septal thickening in both lungs. Mild passive atelectasis in the lower lobes. New small linear subpleural opacities in both upper lobes, likely scarring or atelectasis. New trace pleural effusions. Unchanged partially calcified focal left pleural thickening.  LYMPH NODES/MEDIASTINUM: Unchanged anterior mediastinal nodules, largest approximately 1.5 cm. Unchanged soft tissue density along the right wall of themidesophagus, stable dating back to 2/21/2021. Small hiatal hernia.  HEART/VASCULATURE: Normal heart size. No pericardial effusion. Calcified coronary arteries and aortic valve.  UPPER ABDOMEN: Left renal cyst.  BONES/SOFT TISSUES: Unremarkable.  IMPRESSION:  No pulmonary embolism.  No pneumonia.  New trace pleural effusions.      CT of Abd: 7/20/23:  FINDINGS:  Evaluation of solid organs and vascular structures is limited without intravenous contrast.  LOWER CHEST: Mild fibrotic changes again noted. Trace bilateral pleural effusions. Cardiomegaly. Coronary calcifications. Small hiatal hernia.  LIVER: Within normal limits.  BILE DUCTS: Normal caliber.  GALLBLADDER: Cholelithiasis.. No pericholecystic inflammation.  SPLEEN: Within normal limits.  PANCREAS: Fatty atrophy.  ADRENALS: Within normal limits.  KIDNEYS/URETERS: Excreted contrast within the collecting systems and ureters from recent exam is slightly limited evaluation of. No hydronephrosis or obstructing stone. Left renal cyst.  BLADDER: Collapsed around Pérez catheter, containing excreted contrast material.  REPRODUCTIVE ORGANS: No pelvic mass.  BOWEL: No bowel obstruction. Appendix is normal. Large rectal stool burden and overall moderate colonic stool burden. Mild wall thickening of the ascending colon, transverse colon and proximal descending colon.  PERITONEUM: No ascites.  VESSELS: Atherosclerotic changes.  RETROPERITONEUM/LYMPH NODES: No lymphadenopathy.  ABDOMINAL WALL: Small fat-containing umbilical hernia.  BONES: Degenerative changes. Osteopenia.  IMPRESSION:  No hydronephrosis or obstructing stone.  Mild wall thickening of the ascending colon, transverse colon proximal descending colon may reflect underdistention or colitis. Clinical correlation recommended.      CT of Head: 7/20/23:  FINDINGS:  No acute intracranial abnormalities.  No evidence of acute cortical infarction or hemorrhage.  No mass effect or edema.  Small vessel and atrophic changes.  No sinusitis or mastoiditis.  No skull fracture. Atherosclerotic vascular calcification present skull base.  IMPRESSION:  No acute intracranial abnormalities.  Small vessel and atrophic changes.      EKG:  Junctional bradycardia with LVH and PVC and APC    TELEMETRY:  Junctional bradycardia with LVH and PVC and APC => SVT => junctional mary => SVT => junctional bradycardia    ECHO:  6/9/23:  Findings:  Left Ventricle   Followup study to evaluate for endocarditis.   Valves appear normal - no evidence of endocarditis.    Summary   Followup study to evaluate for endocarditis.   Valves appear normal - no evidence of endocarditis.    Signature   ----------------------------------------------------------------   Electronically signed by Dave Monet MD(Interpreting   physician) on 06/09/2023 05:17 PM   ----------------------------------------------------------------    ECHO:  3/1/23:  M-Mode Measurements (cm)   LVEDd: 3.97 cm            LVESd: 2.55 cm   IVSEd: 1.1 cm   LVPWd: 1.1 cm             AO Root Dimension: 2.8 cm                             LA: 3.5 cm                LVOT: 2 cm  Doppler Measurements:   AV Velocity:434 cm/s                  MV Peak E-Wave: 98.7 cm/s   AV Peak Gradient: 75.34 mmHg          MV Peak A-Wave: 131 cm/s   AV Mean Gradient: 40 mmHg             MV E/A Ratio: 0.75 %   AV Area (Continuity):0.85 cm^2        MV Peak Gradient: 3.9 mmHg   TR Velocity:190 cm/s   TR Gradient:14.44 mmHg   Estimated RAP:5 mmHg   RVSP:27 mmHg    Findings  Mitral Valve   The mitral valve leaflets appear thickened.   EA reversal of the mitral inflow consistent with reduced compliance of the left ventricle.   Mild mitral annular calcification is present.   Mild mitral regurgitation is present.    Aortic Valve   Peak and mean transaortic gradients are 75 and 40mmHg respectively; this finding is consistent with severe aortic stenosis.   Mild (1+) aortic regurgitation is present.    Tricuspid Valve   Trace tricuspid valve regurgitation is present.    Pulmonic Valve   Mild pulmonic valvular regurgitation (1+) is present.    Left Atrium   Normal appearing left atrium.    Left Ventricle   Mild concentric left ventricular hypertrophy is present.   The left ventricle is normal in size.   Estimated left ventricular ejection fraction is 65-70 %.    Right Atrium   Normal appearing right atrium.    Right Ventricle   Normal appearing right ventricle structure and function.    Pericardial Effusion   No evidence of pericardial effusion.    Pleural Effusion   No evidence of pleural effusion.    Miscellaneous   The IVC appears normal.    Summary   The mitral valve leaflets appear thickened.   EA reversal of the mitral inflow consistent with reduced compliance of the left ventricle.   Mild mitral annular calcification is present.   Mild mitral regurgitation is present.   Peak and mean transaortic gradients are 75 and 40mmHg respectively; this finding is consistent with severe aortic stenosis.   Mild (1+) aortic regurgitation is present.   Trace tricuspid valve regurgitation is present.   Mild pulmonic valvular regurgitation (1+) is present.   Normal appearing left atrium.   Mild concentric left ventricular hypertrophy is present.   The left ventricle is normal in size.   Estimated left ventricular ejection fraction is 65-70 %.   Normal appearing right atrium.   Normal appearing right ventricle structure and function.   The IVC appears normal.   No evidence of pericardial effusion.   No evidence of pleural effusion.    Signature   ----------------------------------------------------------------   Electronically signed by Sakina Cheatham MD, Director of   Cardiac Cath Lab(Interpreting physician) on 03/01/2023 06:42   PM   ----------------------------------------------------------------

## 2023-07-21 NOTE — DIETITIAN INITIAL EVALUATION ADULT - NSFNSGIIOFT_GEN_A_CORE
I&O's Detail    20 Jul 2023 07:01  -  21 Jul 2023 07:00  --------------------------------------------------------  IN:    Norepinephrine: 50 mL    sodium chloride 0.9%: 500 mL  Total IN: 550 mL    OUT:    Indwelling Catheter - Urethral (mL): 310 mL  Total OUT: 310 mL    Total NET: 240 mL      21 Jul 2023 07:01  -  21 Jul 2023 08:59  --------------------------------------------------------  IN:    Phenylephrine: 450 mL  Total IN: 450 mL    OUT:  Total OUT: 0 mL    Total NET: 450 mL

## 2023-07-21 NOTE — CONSULT NOTE ADULT - CONVERSATION DETAILS
Team met with pt. and her family to discuss goals of care, assist with planning and provide supportive counseling. Pt. was residing at home with her daughter Pippa and had aides during the day time hours while her daughter worked and then her family was home at night. Pt. was using RW and needed some assistance with ADLs.     Pts current medical condition and goals of care discussed. We reviewed pts underlying conditions including AS and discussed her multiple hospital admissions. We discussed with pt. and family her wishes regarding resuscitation and intubation. Team explained in detail with pts underlying conditions why these interventions would not be recommended as they are unlikely to be successful. Pt shared that she would want a natural passing to happen when its her time. Feelings explored and support provided. Pt consented to DNR/DNI. MOLST completed to reflect DNR/DNI. Pts family was supportive of pts decision.    We discussed possible d/c options including [facility] however, will give pt. the weekend and follow up Monday. D/c plan to be further determined. PT seeing pt.     Plan at this time is to continue with current medical management and continue discussion on Monday. Emotional support provided. Our team will continue to follow.

## 2023-07-21 NOTE — PROGRESS NOTE ADULT - ASSESSMENT
A/P:  89 y/o female with possible UTI    Leave pérez to bag drainage  Irrigate pérez PRN  Ck Urine Cx  Abve discussed with Dr. Collins

## 2023-07-21 NOTE — PATIENT PROFILE ADULT - FALL HARM RISK - FACTORS
Frequent toileting needed/Impaired gait/Impaired vision/Other medical problems/Poor balance/Weakness

## 2023-07-21 NOTE — DIETITIAN NUTRITION RISK NOTIFICATION - TREATMENT: THE FOLLOWING DIET HAS BEEN RECOMMENDED
Diet, NPO:   Except Medications     Special Instructions for Nursing:  Except Medications (07-20-23 @ 17:42) [Active]

## 2023-07-21 NOTE — PROGRESS NOTE ADULT - NS PANP COMMENT GEN_ALL_CORE FT
89 y/o F with recurrent UTI, severe AS, admitted to ccu for septic shock likely urinary source.  Now complicated with worsening ANDREW with slow increase of hyperkalemia,  also with persistent bradycardia junctional rhythm.  EP consulted,  no acute intervention.  Palliative consult, s/p family meeting, overall patient is DNI/DNR, but will continue full treatment.   WIll start bicarb drip, and will order albumin bolus.  Follow BMP, UOP and monitor kidney function.  Continue phenyl for now.  If no improvement, may consider trial of vasopressin to wean off phenyl, given levo cause tachycardia that can cause aortic stenosis worsening.

## 2023-07-21 NOTE — PHYSICAL THERAPY INITIAL EVALUATION ADULT - PERTINENT HX OF CURRENT PROBLEM, REHAB EVAL
Patient is a 89y old  Female who presents with a chief complaint of Weakness. PMH HTN, HLD, severe AS, HFpEF, hx of Pérez, hx of aspiration pneumonia and UTI.  Pt was recently admitted for UTI and urinary retention. She was discharged on PO antibiotics which were completed and a pérez upon discharge. Degeneration right shoulder pain (-) fx. s/p[ Severe aortic stenosis. Patient is a 89y old  Female who presents with a chief complaint of Weakness. PMH HTN, HLD, severe AS, HFpEF, hx of Pérez, hx of aspiration pneumonia and UTI.  Pt was recently admitted for UTI and urinary retention. She was discharged on PO antibiotics which were completed and a pérez upon discharge. Degeneration right shoulder pain (-) fx. s/p Severe aortic stenosis. UTI?

## 2023-07-21 NOTE — DIETITIAN INITIAL EVALUATION ADULT - PERTINENT MEDS FT
MEDICATIONS  (STANDING):  ascorbic acid 500 milliGRAM(s) Oral daily  atorvastatin 20 milliGRAM(s) Oral at bedtime  chlorhexidine 2% Cloths 1 Application(s) Topical <User Schedule>  cholecalciferol 1000 Unit(s) Oral daily  folic acid 1 milliGRAM(s) Oral daily  gabapentin 300 milliGRAM(s) Oral two times a day  heparin   Injectable 5000 Unit(s) SubCutaneous every 8 hours  lidocaine   4% Patch 1 Patch Transdermal every 24 hours  meropenem  IVPB 500 milliGRAM(s) IV Intermittent every 12 hours  norepinephrine Infusion 0.05 MICROgram(s)/kG/Min (5.53 mL/Hr) IV Continuous <Continuous>  pantoprazole    Tablet 40 milliGRAM(s) Oral before breakfast  phenylephrine    Infusion 0.5 MICROgram(s)/kG/Min (11.1 mL/Hr) IV Continuous <Continuous>  senna 2 Tablet(s) Oral at bedtime  sodium chloride 0.9%. 500 milliLiter(s) (100 mL/Hr) IV Continuous <Continuous>    MEDICATIONS  (PRN):  acetaminophen     Tablet .. 650 milliGRAM(s) Oral every 6 hours PRN Temp greater or equal to 38C (100.4F), Mild Pain (1 - 3)  aluminum hydroxide/magnesium hydroxide/simethicone Suspension 30 milliLiter(s) Oral every 4 hours PRN Dyspepsia  melatonin 3 milliGRAM(s) Oral at bedtime PRN Insomnia  ondansetron Injectable 4 milliGRAM(s) IV Push every 8 hours PRN Nausea and/or Vomiting    Home Medications:  allopurinol 100 mg oral tablet: 1 tab(s) orally once a day (20 Jul 2023 14:19)  Artificial Tears ophthalmic solution: 1 drop(s) in each eye 2 times a day as needed for  dry eyes (20 Jul 2023 14:19)  ascorbic acid 500 mg oral tablet: 1 tab(s) orally once a day (20 Jul 2023 14:39)  cefuroxime 500 mg oral tablet: 1 tab(s) orally every 12 hours ***Course Complete*** (20 Jul 2023 14:25)  cholecalciferol 25 mcg (1000 intl units) oral tablet: 1 tab(s) orally once a day (20 Jul 2023 14:34)  ciprofloxacin 250 mg oral tablet: 1 tab(s) orally 2 times a day x 7 days ***Course Complete*** (20 Jul 2023 14:40)  Colace 100 mg oral capsule: 1 cap(s) orally once a day (20 Jul 2023 14:34)  folic acid 1 mg oral tablet: 1 tab(s) orally once a day (20 Jul 2023 14:19)  hydrALAZINE 25 mg oral tablet: 1 tab(s) orally 2 times a day (20 Jul 2023 18:11)  Lasix 20 mg oral tablet: 1 tab(s) orally once a day (20 Jul 2023 18:12)  lidocaine 5% topical film: Apply topically to affected area once a day as needed for pain (20 Jul 2023 14:34)  Neurontin 300 mg oral capsule: 2 cap(s) orally 2 times a day (20 Jul 2023 14:19)  nystatin 100,000 units/mL oral suspension: 5 milliliter(s) orally 3 times a day as needed (20 Jul 2023 14:26)  pantoprazole 40 mg oral delayed release tablet: 1 tab(s) orally once a day (20 Jul 2023 14:34)  rosuvastatin 5 mg oral tablet: 1 tab(s) orally once a day (at bedtime) (20 Jul 2023 14:19)  senna (sennosides) 8.6 mg oral tablet: 2 tab(s) orally every other day (at bedtime) (20 Jul 2023 14:30)  Tylenol 8 HR Arthritis Pain 650 mg oral tablet, extended release: 2 tab(s) orally 3 times a day as needed for pain (20 Jul 2023 14:19)

## 2023-07-21 NOTE — PATIENT PROFILE ADULT - FALL HARM RISK - HARM RISK INTERVENTIONS
Assistance with ambulation/Assistance OOB with selected safe patient handling equipment/Communicate Risk of Fall with Harm to all staff/Discuss with provider need for PT consult/Monitor gait and stability/Provide patient with walking aids - walker, cane, crutches/Reinforce activity limits and safety measures with patient and family/Tailored Fall Risk Interventions/Toileting schedule using arm’s reach rule for commode and bathroom/Visual Cue: Yellow wristband and red socks/Bed in lowest position, wheels locked, appropriate side rails in place/Call bell, personal items and telephone in reach/Instruct patient to call for assistance before getting out of bed or chair/Non-slip footwear when patient is out of bed/[place] to call system/Physically safe environment - no spills, clutter or unnecessary equipment/Purposeful Proactive Rounding/Room/bathroom lighting operational, light cord in reach

## 2023-07-21 NOTE — PROGRESS NOTE ADULT - SUBJECTIVE AND OBJECTIVE BOX
Patient is a 89y old  Female who presents with a chief complaint of Weakness (2023 17:44)      BRIEF HOSPITAL COURSE: Pt is a 89 y/o F pmhx of HTN, HLD, severe AS, HFpEF, recurrent UTI who presented to  ED w/ complaints of chills and dizziness. In ED pt found to have + UA treated w/ Levaquin (previously admitted for UTI treated w/ meropenem and deescalated to CTX and d/c'ed home w/ chronic pérez and on PO Ceftin on 7/10/23). Went into junctional rhythm w/ PVC's, admitted to medicine.     Events last 24 hours: ICU consulted for persistent hypotension following 2L IVF, started on levophed and admitted to CCU.     PAST MEDICAL & SURGICAL HISTORY:  HTN (hypertension)      HLD (hyperlipidemia)      Gout      Osteoarthritis      Acute on chronic diastolic congestive heart failure      Aortic stenosis      H/O CHF      Pneumonia, aspiration      Pneumonia      History of tonsillectomy  in childhood          Review of Systems:  CONSTITUTIONAL: No fever, chills, or fatigue  EYES: No eye pain, visual disturbances, or discharge  ENMT:  No difficulty hearing, tinnitus, vertigo; No sinus or throat pain  NECK: No pain or stiffness  RESPIRATORY: No cough, wheezing, chills or hemoptysis; No shortness of breath  CARDIOVASCULAR: No chest pain, palpitations, dizziness, or leg swelling  GASTROINTESTINAL: No abdominal or epigastric pain. No nausea, vomiting, or hematemesis; No diarrhea or constipation. No melena or hematochezia.  GENITOURINARY: No dysuria, frequency, hematuria, or incontinence  NEUROLOGICAL: No headaches, memory loss, loss of strength, numbness, or tremors  SKIN: No itching, burning, rashes, or lesions   MUSCULOSKELETAL: No joint pain or swelling; No muscle, back, or extremity pain  PSYCHIATRIC: No depression, anxiety, mood swings, or difficulty sleeping      Medications:  meropenem  IVPB 500 milliGRAM(s) IV Intermittent every 12 hours    norepinephrine Infusion 0.05 MICROgram(s)/kG/Min IV Continuous <Continuous>  phenylephrine    Infusion 0.5 MICROgram(s)/kG/Min IV Continuous <Continuous>      acetaminophen     Tablet .. 650 milliGRAM(s) Oral every 6 hours PRN  gabapentin 300 milliGRAM(s) Oral two times a day  melatonin 3 milliGRAM(s) Oral at bedtime PRN  ondansetron Injectable 4 milliGRAM(s) IV Push every 8 hours PRN      heparin   Injectable 5000 Unit(s) SubCutaneous every 8 hours    aluminum hydroxide/magnesium hydroxide/simethicone Suspension 30 milliLiter(s) Oral every 4 hours PRN  pantoprazole    Tablet 40 milliGRAM(s) Oral before breakfast  senna 2 Tablet(s) Oral at bedtime      atorvastatin 20 milliGRAM(s) Oral at bedtime    ascorbic acid 500 milliGRAM(s) Oral daily  cholecalciferol 1000 Unit(s) Oral daily  folic acid 1 milliGRAM(s) Oral daily      chlorhexidine 2% Cloths 1 Application(s) Topical <User Schedule>  lidocaine   4% Patch 1 Patch Transdermal every 24 hours            ICU Vital Signs Last 24 Hrs  T(C): 36.5 (2023 19:37), Max: 37.8 (2023 11:15)  T(F): 97.7 (2023 19:37), Max: 100.1 (2023 11:15)  HR: 100 (2023 22:26) (60 - 114)  BP: 136/62 (2023 22:26) (87/38 - 144/55)  BP(mean): 80 (2023 22:26) (53 - 86)  ABP: --  ABP(mean): --  RR: 19 (2023 22:19) (16 - 20)  SpO2: 96% (2023 22:19) (87% - 98%)    O2 Parameters below as of 2023 18:02  Patient On (Oxygen Delivery Method): nasal cannula  O2 Flow (L/min): 2              I&O's Detail        LABS:                        10.2   11.21 )-----------( 116      ( 2023 10:40 )             29.8     07-20    133<L>  |  105  |  25<H>  ----------------------------<  113<H>  3.8   |  20<L>  |  1.45<H>    Ca    8.9      2023 10:40  Phos  2.7     07-20  Mg     1.9     07-20    TPro  7.1  /  Alb  3.6  /  TBili  0.6  /  DBili  x   /  AST  38<H>  /  ALT  32  /  AlkPhos  61  07-20          CAPILLARY BLOOD GLUCOSE        PT/INR - ( 2023 10:40 )   PT: 11.8 sec;   INR: 1.02 ratio         PTT - ( 2023 10:40 )  PTT:25.7 sec  Urinalysis Basic - ( 2023 10:40 )    Color: Yellow / Appearance: Clear / S.005 / pH: x  Gluc: 113 mg/dL / Ketone: Negative  / Bili: Negative / Urobili: Negative   Blood: x / Protein: 30 mg/dL / Nitrite: Negative   Leuk Esterase: Moderate / RBC: 3-5 /HPF / WBC 11-25 /HPF   Sq Epi: x / Non Sq Epi: x / Bacteria: Moderate      CULTURES:  Rapid RVP Result: NotDetec (23 @ 13:53)      Physical Examination:    General: Pt laying in hospital bed in no acute distress. Alert, minimally Responsive, interactive    HEENT: Pupils equal, reactive to light.  Symmetric.    PULM: Clear to auscultation bilaterally, no significant sputum production    CVS: Regular rate and rhythm, no murmurs, rubs, or gallops    ABD: Soft, nondistended, nontender, normoactive bowel sounds, no masses    EXT: No edema, nontender    SKIN: Warm and well perfused.       RADIOLOGY: < from: CT Abdomen and Pelvis No Cont (23 @ 19:01) >  ACC: [id number] EXAM:  CT ABDOMEN AND PELVIS   ORDERED BY: INOCENCIA NEWMAN     PROCEDURE DATE:  2023          INTERPRETATION:  CLINICAL INFORMATION: Urinary tract infection.    COMPARISON: 2023    CONTRAST/COMPLICATIONS:  IV Contrast: NONE  Oral Contrast: None  Complications: None    PROCEDURE:  CT of the Abdomen and Pelvis was performed.  Sagittal and coronal reformats were performed.    FINDINGS:  Evaluation of solid organs and vascular structures is limited without   intravenous contrast.    LOWER CHEST: Mild fibrotic changes again noted. Trace bilateral pleural   effusions. Cardiomegaly. Coronary calcifications. Small hiatal hernia.    LIVER: Within normal limits.  BILE DUCTS: Normal caliber.  GALLBLADDER: Cholelithiasis.. No pericholecystic inflammation.  SPLEEN: Within normal limits.  PANCREAS: Fatty atrophy.  ADRENALS: Within normal limits.  KIDNEYS/URETERS: Excreted contrast within the collecting systems and   ureters from recent exam is slightly limited evaluation of. No   hydronephrosis or obstructing stone. Left renal cyst.    BLADDER: Collapsed around Pérez catheter, containing excreted contrast   material.  REPRODUCTIVE ORGANS: No pelvic mass.    BOWEL: No bowel obstruction. Appendix is normal. Large rectal stool   burden and overall moderate colonic stool burden. Mild wall thickening of   the ascending colon, transverse colon and proximal descending colon.  PERITONEUM: No ascites.  VESSELS: Atherosclerotic changes.  RETROPERITONEUM/LYMPH NODES: No lymphadenopathy.  ABDOMINAL WALL: Small fat-containing umbilical hernia.  BONES: Degenerative changes. Osteopenia.    IMPRESSION:  No hydronephrosis or obstructing stone.    Mild wall thickening of the ascending colon, transverse colon proximal   descending colonmay reflect underdistention or colitis. Clinical   correlation recommended.        --- End of Report ---            ROSIBEL RODRIGUEZ MD; Attending Radiologist  This document has been electronically signed. 2023  7:13PM          CRITICAL CARE TIME SPENT: 32 minutes assessing presenting problems of acute illness, which pose high probability of life threatening deterioration or end organ damage/dysfunction, as well as medical decision making including initiating plan of care, reviewing data, reviewing radiologic exams, discussing with multidisciplinary team,  discussing goals of care with patient/family, and writing this note.  Non-inclusive of procedures performed,

## 2023-07-22 LAB
ANION GAP SERPL CALC-SCNC: 8 MMOL/L — SIGNIFICANT CHANGE UP (ref 5–17)
BUN SERPL-MCNC: 39 MG/DL — HIGH (ref 7–23)
CALCIUM SERPL-MCNC: 8.2 MG/DL — LOW (ref 8.5–10.1)
CHLORIDE SERPL-SCNC: 104 MMOL/L — SIGNIFICANT CHANGE UP (ref 96–108)
CO2 SERPL-SCNC: 23 MMOL/L — SIGNIFICANT CHANGE UP (ref 22–31)
CREAT SERPL-MCNC: 1.85 MG/DL — HIGH (ref 0.5–1.3)
EGFR: 25 ML/MIN/1.73M2 — LOW
GLUCOSE SERPL-MCNC: 124 MG/DL — HIGH (ref 70–99)
HCT VFR BLD CALC: 24.2 % — LOW (ref 34.5–45)
HGB BLD-MCNC: 8.3 G/DL — LOW (ref 11.5–15.5)
MAGNESIUM SERPL-MCNC: 2.3 MG/DL — SIGNIFICANT CHANGE UP (ref 1.6–2.6)
MCHC RBC-ENTMCNC: 31.6 PG — SIGNIFICANT CHANGE UP (ref 27–34)
MCHC RBC-ENTMCNC: 34.3 GM/DL — SIGNIFICANT CHANGE UP (ref 32–36)
MCV RBC AUTO: 92 FL — SIGNIFICANT CHANGE UP (ref 80–100)
PHOSPHATE SERPL-MCNC: 2.9 MG/DL — SIGNIFICANT CHANGE UP (ref 2.5–4.5)
PLATELET # BLD AUTO: 82 K/UL — LOW (ref 150–400)
POTASSIUM SERPL-MCNC: 4.1 MMOL/L — SIGNIFICANT CHANGE UP (ref 3.5–5.3)
POTASSIUM SERPL-SCNC: 4.1 MMOL/L — SIGNIFICANT CHANGE UP (ref 3.5–5.3)
RBC # BLD: 2.63 M/UL — LOW (ref 3.8–5.2)
RBC # FLD: 15.1 % — HIGH (ref 10.3–14.5)
SODIUM SERPL-SCNC: 135 MMOL/L — SIGNIFICANT CHANGE UP (ref 135–145)
WBC # BLD: 6.78 K/UL — SIGNIFICANT CHANGE UP (ref 3.8–10.5)
WBC # FLD AUTO: 6.78 K/UL — SIGNIFICANT CHANGE UP (ref 3.8–10.5)

## 2023-07-22 PROCEDURE — 93971 EXTREMITY STUDY: CPT | Mod: 26,LT

## 2023-07-22 PROCEDURE — 99291 CRITICAL CARE FIRST HOUR: CPT

## 2023-07-22 RX ORDER — ACETAMINOPHEN 500 MG
1000 TABLET ORAL ONCE
Refills: 0 | Status: COMPLETED | OUTPATIENT
Start: 2023-07-22 | End: 2023-07-22

## 2023-07-22 RX ADMIN — Medication 650 MILLIGRAM(S): at 10:31

## 2023-07-22 RX ADMIN — Medication 100 MILLIGRAM(S): at 10:32

## 2023-07-22 RX ADMIN — LIDOCAINE 1 PATCH: 4 CREAM TOPICAL at 21:49

## 2023-07-22 RX ADMIN — MEROPENEM 100 MILLIGRAM(S): 1 INJECTION INTRAVENOUS at 10:33

## 2023-07-22 RX ADMIN — PANTOPRAZOLE SODIUM 40 MILLIGRAM(S): 20 TABLET, DELAYED RELEASE ORAL at 10:32

## 2023-07-22 RX ADMIN — ATORVASTATIN CALCIUM 20 MILLIGRAM(S): 80 TABLET, FILM COATED ORAL at 21:22

## 2023-07-22 RX ADMIN — HEPARIN SODIUM 5000 UNIT(S): 5000 INJECTION INTRAVENOUS; SUBCUTANEOUS at 05:21

## 2023-07-22 RX ADMIN — HEPARIN SODIUM 5000 UNIT(S): 5000 INJECTION INTRAVENOUS; SUBCUTANEOUS at 15:30

## 2023-07-22 RX ADMIN — MEROPENEM 100 MILLIGRAM(S): 1 INJECTION INTRAVENOUS at 21:21

## 2023-07-22 RX ADMIN — HEPARIN SODIUM 5000 UNIT(S): 5000 INJECTION INTRAVENOUS; SUBCUTANEOUS at 21:22

## 2023-07-22 RX ADMIN — Medication 400 MILLIGRAM(S): at 20:40

## 2023-07-22 RX ADMIN — Medication 1000 UNIT(S): at 10:32

## 2023-07-22 RX ADMIN — GABAPENTIN 300 MILLIGRAM(S): 400 CAPSULE ORAL at 10:32

## 2023-07-22 RX ADMIN — Medication 1 MILLIGRAM(S): at 10:32

## 2023-07-22 RX ADMIN — GABAPENTIN 300 MILLIGRAM(S): 400 CAPSULE ORAL at 21:22

## 2023-07-22 RX ADMIN — CHLORHEXIDINE GLUCONATE 1 APPLICATION(S): 213 SOLUTION TOPICAL at 05:20

## 2023-07-22 RX ADMIN — Medication 500 MILLIGRAM(S): at 10:32

## 2023-07-22 RX ADMIN — POLYETHYLENE GLYCOL 3350 17 GRAM(S): 17 POWDER, FOR SOLUTION ORAL at 21:22

## 2023-07-22 NOTE — PROGRESS NOTE ADULT - ASSESSMENT
Pt is a 89 y/o F pmhx of HTN, HLD, severe AS, HFpEF, recurrent UTI who presented to  ED w/ complaints of chills and dizziness. In ED pt found to have + UA treated w/ Levaquin (previously admitted for UTI treated w/ meropenem and deescalated to CTX and d/c'ed home w/ chronic pérez and on PO Ceftin on 7/10/23). Went into junctional rhythm w/ PVC's.     1. Septic shock   2. UTI  3. Junctional rhythm   4. Acute hypoxic respiratory failure   5. HFpEF   6. Severe AS     Plan:     Neuro: At baseline, avoid sedating medications.     CV: Septic shock, remains on phenylephrine gtt for HD support, deemed no intervention at this time for junctional arrythmia as likely secondary to shock state.       Pulm: Acute hypoxic respiratory failure satting well on NC, continue to monitor for signs of pulmonary edema. Will use CPAP if needed for positive pressure ventilation.     GI: Diet: NPO, protonix for GI PPX     Renal: Now w/ ANDREW liekly secondary to shock state, continue to monitor and avoid nephrotoxic meds, bicarb gtt added.     Endo: Glucose<180, mag>2, K>4 for arrythmia suppression.     Heme: Heparin for DVT PPX     ID: Septic shock secondary to suspected UTI, remains on meropenem  continue to follow cultures and narrow abx as indicated. Trend markers of infection daily.

## 2023-07-22 NOTE — PROGRESS NOTE ADULT - SUBJECTIVE AND OBJECTIVE BOX
CHIEF COMPLAINT: Patient is a 89y old  Female who presents with a chief complaint of Weakness (22 Jul 2023 00:29)      HPI:  89 y/o F with PMH HTN, HLD, severe AS, HFpEF, hx of pérez, hx of aspiration pneumonia and UTI presents with weakness. Pt reports feeling cold and shaking when she woke up this morning. She also felt dizzy and felt warm. Pt was recently admitted for UTI and urinary retention. She was discharged on PO antibiotics which were completed and a pérez upon discharge. Denies fevers, chest pain, SOB, abdominal pain, N/V, diarrhea/constipation.     Prior admission:  - 7/10/23: Weakness -> UTI/urinary retention -> PO Ceftin, pérez was continued upon d/c     ER course: Temp 100.1F, BP 97/41-98/41, SpO2 87% -> 98% on 2L of NS. Labs: WBC 11.21, Hb 10.2 (baseline ~10), , neutrophils 92.1%, CO2 20, Cr 1.45 (baseline ~1.4), glucose 113, AST 38, BNP 2123. UA: moderate leukocyte esterase, small blood, WBC 11-25, RBC 3-5, moderate bacteria. COVID and RVP negative.   EKG: Junctional rhythm with PVCs HR 51 bpm, normal intervals, LVH (personally reviewed).     Imaging:   - CTA: No pulmonary embolism. No pneumonia. New trace pleural effusions. Unchanged anterior mediastinal nodules, largest approximately 1.5 cm. Unchanged soft tissue density along the right wall of the midesophagus, stable dating back to 2/21/2021.   - CXR: increased vascular congestion, no consolidation, no effusion, no pneumothorax (personally reviewed).  - XR right shoulder: Mild CHF increased from prior. Degeneration right shoulder.    Pt was given Levaquin, 500 ml of NS, Tylenol. She is being admitted to med/surg for further management.  (20 Jul 2023 17:26)    7/21/23:  Pt with above history and severe AS and recurrent sepsis from recurrent UTI's making doing a TAVR impossible at this time.  Continue to treat sepsis and hopefully the rhythm will stabilize.  Bradycardia is usually better for AS than tachycardia.  Continue supportive care and treatment as outlined by medicine etal.     7/22-EP evaluated patient for junctional bradycardia.  Patient previously started on Levophed gtt but developed tachycardia so it was discontinued. Patient currently on phenylephrine gtt. Patient currently junctional rhythm with rates 35-70.  Denies any CP, palpitations, SOB, dizziness, lightheadedness.  Junctional bradycardia in setting of severe sepsis, severe Aortic stenosis; Phenylephrine likely contributing to junctional rhythm.  Aortic stenosis is primary issue causing the other secondary issues with recurrent UTI/sepsis preventing TAVR.  EP service felt  intervention indicated at this time and to continue to treat underlying infection.        Overnight patient with sinus bradycardia, pauses no longer than 2 seconds with sinus pause.   Sinus bradycardia overnight with brief 5 beat run of afib.  In addition, BP running high now and no signs of shock but still elevated lactate level.  WBC still in the 20's, renal insufficiency, hyponatremia still in play and BNP in the 16000's (was in the 2000's) and f/u ECHO with EF 60% as per report      PMHx: PAST MEDICAL & SURGICAL HISTORY:  HTN (hypertension)      HLD (hyperlipidemia)      Gout      Osteoarthritis      Acute on chronic diastolic congestive heart failure      Aortic stenosis      H/O CHF      Pneumonia, aspiration      Pneumonia      History of tonsillectomy  in childhood          Allergies: Allergies    Ceftin (Hives; Rash)    Intolerances          REVIEW OF SYSTEMS:    CONSTITUTIONAL: weakness, no fevers or chills  EYES/ENT: No visual changes;  No vertigo or throat pain   NECK: No pain or stiffness  RESPIRATORY: No cough, wheezing, hemoptysis; No shortness of breath  CARDIOVASCULAR: palpitations  GASTROINTESTINAL: No abdominal or epigastric pain. No nausea, vomiting, or hematemesis; No diarrhea or constipation. No melena or hematochezia.  GENITOURINARY: No dysuria, frequency or hematuria      Vital Signs Last 24 Hrs  T(C): 38.6 (21 Jul 2023 22:00), Max: 38.6 (21 Jul 2023 22:00)  T(F): 101.4 (21 Jul 2023 22:00), Max: 101.4 (21 Jul 2023 22:00)  HR: 61 (22 Jul 2023 06:00) (33 - 74)  BP: 173/61 (22 Jul 2023 06:00) (75/63 - 232/71)  BP(mean): 86 (22 Jul 2023 06:00) (43 - 113)  RR: 14 (22 Jul 2023 06:00) (12 - 28)  SpO2: 95% (22 Jul 2023 06:00) (93% - 100%)    Parameters below as of 22 Jul 2023 06:00  Patient On (Oxygen Delivery Method): nasal cannula w/ humidification        I&O's Summary    20 Jul 2023 07:01  -  21 Jul 2023 07:00  --------------------------------------------------------  IN: 750 mL / OUT: 310 mL / NET: 440 mL    21 Jul 2023 07:01  -  22 Jul 2023 06:53  --------------------------------------------------------  IN: 1284.1 mL / OUT: 678 mL / NET: 606.1 mL            PHYSICAL EXAM:   Constitutional: NAD, awake and alert, well-developed  HEENT: PERR, EOMI, Normal Hearing, MMM  Neck:  JVD  Respiratory: Breath sounds are clear bilaterally, No wheezing, rales or rhonchi  Cardiovascular: S1 and S2, irregular rate and rhythm, Murmurs, no gallops or rubs  Gastrointestinal: Bowel Sounds present, soft, nontender, nondistended, no guarding, no rebound  Extremities: peripheral edema  Vascular: 2+ peripheral pulses      MEDICATIONS:  MEDICATIONS  (STANDING):  ascorbic acid 500 milliGRAM(s) Oral daily  atorvastatin 20 milliGRAM(s) Oral at bedtime  chlorhexidine 2% Cloths 1 Application(s) Topical <User Schedule>  cholecalciferol 1000 Unit(s) Oral daily  folic acid 1 milliGRAM(s) Oral daily  gabapentin 300 milliGRAM(s) Oral two times a day  heparin   Injectable 5000 Unit(s) SubCutaneous every 8 hours  lidocaine   4% Patch 1 Patch Transdermal every 24 hours  meropenem  IVPB 500 milliGRAM(s) IV Intermittent every 12 hours  norepinephrine Infusion 0.05 MICROgram(s)/kG/Min (5.53 mL/Hr) IV Continuous <Continuous>  pantoprazole    Tablet 40 milliGRAM(s) Oral before breakfast  phenylephrine    Infusion 0.5 MICROgram(s)/kG/Min (11.1 mL/Hr) IV Continuous <Continuous>  polyethylene glycol 3350 17 Gram(s) Oral two times a day  senna 2 Tablet(s) Oral at bedtime  sodium bicarbonate  Infusion 0.191 mEq/kG/Hr (75 mL/Hr) IV Continuous <Continuous>  sodium chloride 0.9%. 500 milliLiter(s) (100 mL/Hr) IV Continuous <Continuous>  thiamine 100 milliGRAM(s) Oral daily      LABS: All Labs Reviewed:                        9.8    23.91 )-----------( 143      ( 21 Jul 2023 06:10 )             29.9     07-21    130<L>  |  106  |  36<H>  ----------------------------<  121<H>  5.4<H>   |  16<L>  |  2.04<H>    Ca    8.4<L>      21 Jul 2023 17:16  Phos  3.2     07-21  Mg     2.1     07-21    TPro  6.7  /  Alb  3.1<L>  /  TBili  0.8  /  DBili  x   /  AST  92<H>  /  ALT  79<H>  /  AlkPhos  64  07-21    PT/INR - ( 20 Jul 2023 10:40 )   PT: 11.8 sec;   INR: 1.02 ratio         PTT - ( 20 Jul 2023 10:40 )  PTT:25.7 sec      Blood Culture: Organism --  Gram Stain Blood -- Gram Stain --  Specimen Source .Blood None  Culture-Blood --    Organism --  Gram Stain Blood -- Gram Stain --  Specimen Source Clean Catch None  Culture-Blood --      ABG - ( 21 Jul 2023 08:31 )  pH, Arterial: 7.27  pH, Blood: x     /  pCO2: 26    /  pO2: 99    / HCO3: 12    / Base Excess: -13.3 /  SaO2: 99                  BNP     RADIOLOGY:    EKG:      Telemetry:    ECHO:

## 2023-07-22 NOTE — PROGRESS NOTE ADULT - ASSESSMENT
patient with persistent sepsis with elevated lactate, WBC and with increasing BNP and severe AS playing a complicating role.   F/u ECHO with EF 60%  1-AS-untreatable  5-PPP-llugmkdqe in nature-on saline drip due to hyponatremia at 75cc/hr-care not to overload patient as BP is stable-should be off inotropic support  3-arrhythmia-EPS involved and felt nothing further to do.    7-uaqswa-mcwfuhid IV abx, O2 and supportive care  5-HTN-if patient maintains BP elevated in the 150-170's range off pressors, consider restarting hydralazine (was on 25mg TID at home) and eventually, once bradycardia resolve, restarting metoprolol 25mg daily (taking at home as well)

## 2023-07-22 NOTE — PROGRESS NOTE ADULT - SUBJECTIVE AND OBJECTIVE BOX
Patient is a 89y old  Female who presents with a chief complaint of Weakness (22 Jul 2023 06:53)    24 hour events:     Allergies    Ceftin (Hives; Rash)    Intolerances      REVIEW OF SYSTEMS: SEE BELOW       ICU Vital Signs Last 24 Hrs  T(C): 36.2 (22 Jul 2023 11:37), Max: 38.6 (21 Jul 2023 22:00)  T(F): 97.1 (22 Jul 2023 11:37), Max: 101.4 (21 Jul 2023 22:00)  HR: 62 (22 Jul 2023 09:00) (33 - 74)  BP: 167/71 (22 Jul 2023 07:30) (75/63 - 232/71)  BP(mean): 89 (22 Jul 2023 07:30) (43 - 113)  ABP: --  ABP(mean): --  RR: 23 (22 Jul 2023 09:00) (12 - 28)  SpO2: 99% (22 Jul 2023 08:30) (93% - 100%)    O2 Parameters below as of 22 Jul 2023 06:00  Patient On (Oxygen Delivery Method): nasal cannula w/ humidification            CAPILLARY BLOOD GLUCOSE          I&O's Summary    21 Jul 2023 07:01  -  22 Jul 2023 07:00  --------------------------------------------------------  IN: 1284.1 mL / OUT: 678 mL / NET: 606.1 mL            MEDICATIONS  (STANDING):  ascorbic acid 500 milliGRAM(s) Oral daily  atorvastatin 20 milliGRAM(s) Oral at bedtime  chlorhexidine 2% Cloths 1 Application(s) Topical <User Schedule>  cholecalciferol 1000 Unit(s) Oral daily  folic acid 1 milliGRAM(s) Oral daily  gabapentin 300 milliGRAM(s) Oral two times a day  heparin   Injectable 5000 Unit(s) SubCutaneous every 8 hours  lidocaine   4% Patch 1 Patch Transdermal every 24 hours  meropenem  IVPB 500 milliGRAM(s) IV Intermittent every 12 hours  pantoprazole    Tablet 40 milliGRAM(s) Oral before breakfast  phenylephrine    Infusion 0.5 MICROgram(s)/kG/Min (11.1 mL/Hr) IV Continuous <Continuous>  polyethylene glycol 3350 17 Gram(s) Oral two times a day  senna 2 Tablet(s) Oral at bedtime  thiamine 100 milliGRAM(s) Oral daily      MEDICATIONS  (PRN):  acetaminophen     Tablet .. 650 milliGRAM(s) Oral every 6 hours PRN Temp greater or equal to 38C (100.4F), Mild Pain (1 - 3)  aluminum hydroxide/magnesium hydroxide/simethicone Suspension 30 milliLiter(s) Oral every 4 hours PRN Dyspepsia  melatonin 3 milliGRAM(s) Oral at bedtime PRN Insomnia  ondansetron Injectable 4 milliGRAM(s) IV Push every 8 hours PRN Nausea and/or Vomiting      PHYSICAL EXAM: SEE BELOW                          8.3    6.78  )-----------( 82       ( 22 Jul 2023 08:10 )             24.2       07-22    135  |  104  |  39<H>  ----------------------------<  124<H>  4.1   |  23  |  1.85<H>    Ca    8.2<L>      22 Jul 2023 08:10  Phos  2.9     07-22  Mg     2.3     07-22    TPro  6.7  /  Alb  3.1<L>  /  TBili  0.8  /  DBili  x   /  AST  92<H>  /  ALT  79<H>  /  AlkPhos  64  07-21    Lactate 3.1           07-21 @ 17:16            Urinalysis Basic - ( 22 Jul 2023 08:10 )    Color: x / Appearance: x / SG: x / pH: x  Gluc: 124 mg/dL / Ketone: x  / Bili: x / Urobili: x   Blood: x / Protein: x / Nitrite: x   Leuk Esterase: x / RBC: x / WBC x   Sq Epi: x / Non Sq Epi: x / Bacteria: x      .Blood None   No growth at 24 hours -- 07-20 @ 11:35  Clean Catch None   No growth -- 07-20 @ 10:40      Rapid RVP Result: Raine (07-20 @ 13:53)

## 2023-07-22 NOTE — PROGRESS NOTE ADULT - ASSESSMENT
89 y/o female PMH severe AS, HFpEF, recurrent UTI       Admitted for:     Septic shock due to UTI   ANDREW from ATN   Metabolic acidosis   Junctional rhythm       Plan:     Neuro ok   Continue neurontin     BP labile   On nati at 0.1 - wean off if MAP>60   Did not tolerate levo due to tachyarrythmia     On meropenem   Current BCx, UCx neg   Previously with E.coli in urine, pansensitive   WBC improving     Renal fn improving   Acidosis resolved, will d/c HCO3 drip     LUE duplex neg for DVT  PIV removed   Monitor site     DVT ppx with sc heparin       Patient critically ill on pressor, poor prognosis, DNR, DNI

## 2023-07-23 LAB
ALBUMIN SERPL ELPH-MCNC: 3 G/DL — LOW (ref 3.3–5)
ALP SERPL-CCNC: 81 U/L — SIGNIFICANT CHANGE UP (ref 40–120)
ALT FLD-CCNC: 132 U/L — HIGH (ref 12–78)
ANION GAP SERPL CALC-SCNC: 5 MMOL/L — SIGNIFICANT CHANGE UP (ref 5–17)
AST SERPL-CCNC: 81 U/L — HIGH (ref 15–37)
BILIRUB SERPL-MCNC: 0.7 MG/DL — SIGNIFICANT CHANGE UP (ref 0.2–1.2)
BUN SERPL-MCNC: 33 MG/DL — HIGH (ref 7–23)
CALCIUM SERPL-MCNC: 8.4 MG/DL — LOW (ref 8.5–10.1)
CHLORIDE SERPL-SCNC: 109 MMOL/L — HIGH (ref 96–108)
CO2 SERPL-SCNC: 24 MMOL/L — SIGNIFICANT CHANGE UP (ref 22–31)
CREAT SERPL-MCNC: 1.51 MG/DL — HIGH (ref 0.5–1.3)
EGFR: 32 ML/MIN/1.73M2 — LOW
GLUCOSE SERPL-MCNC: 99 MG/DL — SIGNIFICANT CHANGE UP (ref 70–99)
HCT VFR BLD CALC: 24.5 % — LOW (ref 34.5–45)
HGB BLD-MCNC: 8.2 G/DL — LOW (ref 11.5–15.5)
MAGNESIUM SERPL-MCNC: 2.3 MG/DL — SIGNIFICANT CHANGE UP (ref 1.6–2.6)
MCHC RBC-ENTMCNC: 31.3 PG — SIGNIFICANT CHANGE UP (ref 27–34)
MCHC RBC-ENTMCNC: 33.5 GM/DL — SIGNIFICANT CHANGE UP (ref 32–36)
MCV RBC AUTO: 93.5 FL — SIGNIFICANT CHANGE UP (ref 80–100)
PHOSPHATE SERPL-MCNC: 2.8 MG/DL — SIGNIFICANT CHANGE UP (ref 2.5–4.5)
PLATELET # BLD AUTO: 100 K/UL — LOW (ref 150–400)
POTASSIUM SERPL-MCNC: 4 MMOL/L — SIGNIFICANT CHANGE UP (ref 3.5–5.3)
POTASSIUM SERPL-SCNC: 4 MMOL/L — SIGNIFICANT CHANGE UP (ref 3.5–5.3)
PROT SERPL-MCNC: 6.1 GM/DL — SIGNIFICANT CHANGE UP (ref 6–8.3)
RBC # BLD: 2.62 M/UL — LOW (ref 3.8–5.2)
RBC # FLD: 15 % — HIGH (ref 10.3–14.5)
SODIUM SERPL-SCNC: 138 MMOL/L — SIGNIFICANT CHANGE UP (ref 135–145)
WBC # BLD: 2.94 K/UL — LOW (ref 3.8–10.5)
WBC # FLD AUTO: 2.94 K/UL — LOW (ref 3.8–10.5)

## 2023-07-23 PROCEDURE — 99233 SBSQ HOSP IP/OBS HIGH 50: CPT

## 2023-07-23 RX ORDER — ACETAMINOPHEN 500 MG
1000 TABLET ORAL ONCE
Refills: 0 | Status: COMPLETED | OUTPATIENT
Start: 2023-07-23 | End: 2023-07-23

## 2023-07-23 RX ORDER — ONDANSETRON 8 MG/1
4 TABLET, FILM COATED ORAL EVERY 8 HOURS
Refills: 0 | Status: DISCONTINUED | OUTPATIENT
Start: 2023-07-23 | End: 2023-07-27

## 2023-07-23 RX ORDER — LANOLIN ALCOHOL/MO/W.PET/CERES
3 CREAM (GRAM) TOPICAL AT BEDTIME
Refills: 0 | Status: DISCONTINUED | OUTPATIENT
Start: 2023-07-23 | End: 2023-07-27

## 2023-07-23 RX ORDER — ACETAMINOPHEN 500 MG
650 TABLET ORAL EVERY 6 HOURS
Refills: 0 | Status: DISCONTINUED | OUTPATIENT
Start: 2023-07-23 | End: 2023-07-23

## 2023-07-23 RX ORDER — HYDRALAZINE HCL 50 MG
25 TABLET ORAL THREE TIMES A DAY
Refills: 0 | Status: DISCONTINUED | OUTPATIENT
Start: 2023-07-23 | End: 2023-07-24

## 2023-07-23 RX ADMIN — Medication 650 MILLIGRAM(S): at 05:38

## 2023-07-23 RX ADMIN — GABAPENTIN 300 MILLIGRAM(S): 400 CAPSULE ORAL at 11:15

## 2023-07-23 RX ADMIN — HEPARIN SODIUM 5000 UNIT(S): 5000 INJECTION INTRAVENOUS; SUBCUTANEOUS at 14:54

## 2023-07-23 RX ADMIN — GABAPENTIN 300 MILLIGRAM(S): 400 CAPSULE ORAL at 21:11

## 2023-07-23 RX ADMIN — SENNA PLUS 2 TABLET(S): 8.6 TABLET ORAL at 21:11

## 2023-07-23 RX ADMIN — Medication 400 MILLIGRAM(S): at 21:53

## 2023-07-23 RX ADMIN — Medication 100 MILLIGRAM(S): at 11:14

## 2023-07-23 RX ADMIN — Medication 3 MILLIGRAM(S): at 21:54

## 2023-07-23 RX ADMIN — Medication 1000 UNIT(S): at 11:14

## 2023-07-23 RX ADMIN — MEROPENEM 100 MILLIGRAM(S): 1 INJECTION INTRAVENOUS at 11:20

## 2023-07-23 RX ADMIN — HEPARIN SODIUM 5000 UNIT(S): 5000 INJECTION INTRAVENOUS; SUBCUTANEOUS at 05:33

## 2023-07-23 RX ADMIN — HEPARIN SODIUM 5000 UNIT(S): 5000 INJECTION INTRAVENOUS; SUBCUTANEOUS at 21:11

## 2023-07-23 RX ADMIN — Medication 25 MILLIGRAM(S): at 14:44

## 2023-07-23 RX ADMIN — MEROPENEM 100 MILLIGRAM(S): 1 INJECTION INTRAVENOUS at 21:11

## 2023-07-23 RX ADMIN — ATORVASTATIN CALCIUM 20 MILLIGRAM(S): 80 TABLET, FILM COATED ORAL at 21:11

## 2023-07-23 RX ADMIN — Medication 500 MILLIGRAM(S): at 11:15

## 2023-07-23 RX ADMIN — LIDOCAINE 1 PATCH: 4 CREAM TOPICAL at 21:53

## 2023-07-23 RX ADMIN — POLYETHYLENE GLYCOL 3350 17 GRAM(S): 17 POWDER, FOR SOLUTION ORAL at 11:19

## 2023-07-23 RX ADMIN — Medication 1 MILLIGRAM(S): at 11:14

## 2023-07-23 RX ADMIN — PANTOPRAZOLE SODIUM 40 MILLIGRAM(S): 20 TABLET, DELAYED RELEASE ORAL at 11:14

## 2023-07-23 RX ADMIN — Medication 25 MILLIGRAM(S): at 21:11

## 2023-07-23 RX ADMIN — Medication 25 MILLIGRAM(S): at 05:33

## 2023-07-23 RX ADMIN — Medication 650 MILLIGRAM(S): at 11:19

## 2023-07-23 NOTE — PROGRESS NOTE ADULT - CARDIOVASCULAR
[facility]  calls and leaves message requesting to speak to RN or Dr. Wolfe.     Returned call    CPS has been working with the family since February. She wanted to make sure Dr. Wolfe is aware of some of the things pt has been saying, including \"Bipin is saying that his mom's boyfriend is putting his finger and soap in his butt.\"     regular rate and rhythm/S1 S2 present negative

## 2023-07-23 NOTE — PROGRESS NOTE ADULT - ASSESSMENT
patient with resolving septic shock  1-AS-not intervenable at this time  2-BP stable and elevated, restart hydralzaine 25mg TID; do not start metoprolol at this time  3-CHF-due to valve disease  will be followed by Dr. Karina agee

## 2023-07-23 NOTE — PROGRESS NOTE ADULT - SUBJECTIVE AND OBJECTIVE BOX
CHIEF COMPLAINT: Patient is a 89y old  Female who presents with a chief complaint of Weakness (22 Jul 2023 12:59)      HPI:  89 y/o F with PMH HTN, HLD, severe AS, HFpEF, hx of pérez, hx of aspiration pneumonia and UTI presents with weakness. Pt reports feeling cold and shaking when she woke up this morning. She also felt dizzy and felt warm. Pt was recently admitted for UTI and urinary retention. She was discharged on PO antibiotics which were completed and a pérez upon discharge. Denies fevers, chest pain, SOB, abdominal pain, N/V, diarrhea/constipation.     Prior admission:  - 7/10/23: Weakness -> UTI/urinary retention -> PO Ceftin, pérez was continued upon d/c     ER course: Temp 100.1F, BP 97/41-98/41, SpO2 87% -> 98% on 2L of NS. Labs: WBC 11.21, Hb 10.2 (baseline ~10), , neutrophils 92.1%, CO2 20, Cr 1.45 (baseline ~1.4), glucose 113, AST 38, BNP 2123. UA: moderate leukocyte esterase, small blood, WBC 11-25, RBC 3-5, moderate bacteria. COVID and RVP negative.   EKG: Junctional rhythm with PVCs HR 51 bpm, normal intervals, LVH (personally reviewed).     Imaging:   - CTA: No pulmonary embolism. No pneumonia. New trace pleural effusions. Unchanged anterior mediastinal nodules, largest approximately 1.5 cm. Unchanged soft tissue density along the right wall of the midesophagus, stable dating back to 2/21/2021.   - CXR: increased vascular congestion, no consolidation, no effusion, no pneumothorax (personally reviewed).  - XR right shoulder: Mild CHF increased from prior. Degeneration right shoulder.    Pt was given Levaquin, 500 ml of NS, Tylenol. She is being admitted to med/surg for further management.  (20 Jul 2023 17:26)    7/21/23:  Pt with above history and severe AS and recurrent sepsis from recurrent UTI's making doing a TAVR impossible at this time.  Continue to treat sepsis and hopefully the rhythm will stabilize.  Bradycardia is usually better for AS than tachycardia.  Continue supportive care and treatment as outlined by medicine etal.     7/22-EP evaluated patient for junctional bradycardia.  Patient previously started on Levophed gtt but developed tachycardia so it was discontinued. Patient currently on phenylephrine gtt. Patient currently junctional rhythm with rates 35-70.  Denies any CP, palpitations, SOB, dizziness, lightheadedness.  Junctional bradycardia in setting of severe sepsis, severe Aortic stenosis; Phenylephrine likely contributing to junctional rhythm.  Aortic stenosis is primary issue causing the other secondary issues with recurrent UTI/sepsis preventing TAVR.  EP service felt  intervention indicated at this time and to continue to treat underlying infection.        Overnight patient with sinus bradycardia, pauses no longer than 2 seconds with sinus pause.   Sinus bradycardia overnight with brief 5 beat run of afib.  In addition, BP running high now and no signs of shock but still elevated lactate level.  WBC still in the 20's, renal insufficiency, hyponatremia still in play and BNP in the 16000's (was in the 2000's) and f/u ECHO with EF 60% as per report    7/23-patient with no further arrhythmia, off pressors and BP stable in the 160's.  No further fluctuations.    PMHx: PAST MEDICAL & SURGICAL HISTORY:  HTN (hypertension)      HLD (hyperlipidemia)      Gout      Osteoarthritis      Acute on chronic diastolic congestive heart failure      Aortic stenosis      H/O CHF      Pneumonia, aspiration      Pneumonia      History of tonsillectomy  in childhood          Allergies: Allergies    Ceftin (Hives; Rash)    Intolerances          REVIEW OF SYSTEMS:    CONSTITUTIONAL: weakness, fevers or chills  EYES/ENT: No visual changes;  No vertigo or throat pain   NECK: No pain or stiffness  RESPIRATORY: No cough, wheezing, hemoptysis; No shortness of breath  CARDIOVASCULAR: No chest pain or palpitations  GASTROINTESTINAL: No abdominal or epigastric pain. No nausea, vomiting, or hematemesis; No diarrhea or constipation. No melena or hematochezia.  GENITOURINARY: No dysuria, frequency or hematuria      Vital Signs Last 24 Hrs  T(C): 35.7 (23 Jul 2023 00:21), Max: 36.3 (22 Jul 2023 17:00)  T(F): 96.2 (23 Jul 2023 00:21), Max: 97.4 (22 Jul 2023 17:00)  HR: 60 (23 Jul 2023 04:00) (57 - 74)  BP: 167/55 (23 Jul 2023 04:00) (128/44 - 192/53)  BP(mean): 78 (23 Jul 2023 04:00) (63 - 99)  RR: 10 (23 Jul 2023 04:00) (10 - 24)  SpO2: 100% (23 Jul 2023 04:00) (94% - 100%)    Parameters below as of 22 Jul 2023 18:30  Patient On (Oxygen Delivery Method): nasal cannula  O2 Flow (L/min): 2      I&O's Summary    21 Jul 2023 07:01  -  22 Jul 2023 07:00  --------------------------------------------------------  IN: 1284.1 mL / OUT: 678 mL / NET: 606.1 mL    22 Jul 2023 07:01  -  23 Jul 2023 05:11  --------------------------------------------------------  IN: 0 mL / OUT: 1450 mL / NET: -1450 mL            PHYSICAL EXAM:   Constitutional: NAD, awake and alert, well-developed  HEENT: PERR, EOMI, Normal Hearing, MMM  Neck:  JVD  Respiratory: Breath sounds are clear bilaterally, No wheezing, rales or rhonchi  Cardiovascular: S1 and S2, regular rate and rhythm,  Murmurs, no gallops or rubs  Gastrointestinal: Bowel Sounds present, soft, nontender, nondistended, no guarding, no rebound  Extremities:  peripheral edema      MEDICATIONS:  MEDICATIONS  (STANDING):  ascorbic acid 500 milliGRAM(s) Oral daily  atorvastatin 20 milliGRAM(s) Oral at bedtime  chlorhexidine 2% Cloths 1 Application(s) Topical <User Schedule>  cholecalciferol 1000 Unit(s) Oral daily  folic acid 1 milliGRAM(s) Oral daily  gabapentin 300 milliGRAM(s) Oral two times a day  heparin   Injectable 5000 Unit(s) SubCutaneous every 8 hours  lidocaine   4% Patch 1 Patch Transdermal every 24 hours  meropenem  IVPB 500 milliGRAM(s) IV Intermittent every 12 hours  pantoprazole    Tablet 40 milliGRAM(s) Oral before breakfast  polyethylene glycol 3350 17 Gram(s) Oral two times a day  senna 2 Tablet(s) Oral at bedtime  thiamine 100 milliGRAM(s) Oral daily      LABS: All Labs Reviewed:                        8.3    6.78  )-----------( 82       ( 22 Jul 2023 08:10 )             24.2     07-22    135  |  104  |  39<H>  ----------------------------<  124<H>  4.1   |  23  |  1.85<H>    Ca    8.2<L>      22 Jul 2023 08:10  Phos  2.9     07-22  Mg     2.3     07-22    TPro  6.7  /  Alb  3.1<L>  /  TBili  0.8  /  DBili  x   /  AST  92<H>  /  ALT  79<H>  /  AlkPhos  64  07-21          Blood Culture: Organism --  Gram Stain Blood -- Gram Stain --  Specimen Source .Blood None  Culture-Blood --    Organism --  Gram Stain Blood -- Gram Stain --  Specimen Source Clean Catch None  Culture-Blood --      ABG - ( 21 Jul 2023 08:31 )  pH, Arterial: 7.27  pH, Blood: x     /  pCO2: 26    /  pO2: 99    / HCO3: 12    / Base Excess: -13.3 /  SaO2: 99                  BNP     RADIOLOGY:    EKG:      Telemetry:    ECHO:

## 2023-07-23 NOTE — PROGRESS NOTE ADULT - SUBJECTIVE AND OBJECTIVE BOX
Patient is a 89y old  Female who presents with a chief complaint of Weakness (23 Jul 2023 05:10)    24 hour events:     Allergies    Ceftin (Hives; Rash)    Intolerances      REVIEW OF SYSTEMS: SEE BELOW       ICU Vital Signs Last 24 Hrs  T(C): 35.7 (23 Jul 2023 04:10), Max: 36.3 (22 Jul 2023 17:00)  T(F): 96.3 (23 Jul 2023 04:10), Max: 97.4 (22 Jul 2023 17:00)  HR: 69 (23 Jul 2023 05:30) (57 - 74)  BP: 176/47 (23 Jul 2023 05:30) (128/44 - 192/53)  BP(mean): 80 (23 Jul 2023 05:30) (63 - 99)  ABP: --  ABP(mean): --  RR: 15 (23 Jul 2023 05:30) (10 - 24)  SpO2: 98% (23 Jul 2023 05:30) (94% - 100%)    O2 Parameters below as of 22 Jul 2023 18:30  Patient On (Oxygen Delivery Method): nasal cannula  O2 Flow (L/min): 2          CAPILLARY BLOOD GLUCOSE          I&O's Summary    22 Jul 2023 07:01  -  23 Jul 2023 07:00  --------------------------------------------------------  IN: 0 mL / OUT: 1950 mL / NET: -1950 mL            MEDICATIONS  (STANDING):  ascorbic acid 500 milliGRAM(s) Oral daily  atorvastatin 20 milliGRAM(s) Oral at bedtime  chlorhexidine 2% Cloths 1 Application(s) Topical <User Schedule>  cholecalciferol 1000 Unit(s) Oral daily  folic acid 1 milliGRAM(s) Oral daily  gabapentin 300 milliGRAM(s) Oral two times a day  heparin   Injectable 5000 Unit(s) SubCutaneous every 8 hours  hydrALAZINE 25 milliGRAM(s) Oral three times a day  lidocaine   4% Patch 1 Patch Transdermal every 24 hours  meropenem  IVPB 500 milliGRAM(s) IV Intermittent every 12 hours  pantoprazole    Tablet 40 milliGRAM(s) Oral before breakfast  polyethylene glycol 3350 17 Gram(s) Oral two times a day  senna 2 Tablet(s) Oral at bedtime  thiamine 100 milliGRAM(s) Oral daily      MEDICATIONS  (PRN):  acetaminophen     Tablet .. 650 milliGRAM(s) Oral every 6 hours PRN Temp greater or equal to 38C (100.4F), Mild Pain (1 - 3)  aluminum hydroxide/magnesium hydroxide/simethicone Suspension 30 milliLiter(s) Oral every 4 hours PRN Dyspepsia  ondansetron Injectable 4 milliGRAM(s) IV Push every 8 hours PRN Nausea and/or Vomiting      PHYSICAL EXAM: SEE BELOW                          8.2    2.94  )-----------( 100      ( 23 Jul 2023 06:14 )             24.5       07-23    138  |  109<H>  |  33<H>  ----------------------------<  99  4.0   |  24  |  1.51<H>    Ca    8.4<L>      23 Jul 2023 06:14  Phos  2.8     07-23  Mg     2.3     07-23    TPro  6.1  /  Alb  3.0<L>  /  TBili  0.7  /  DBili  x   /  AST  81<H>  /  ALT  132<H>  /  AlkPhos  81  07-23            Urinalysis Basic - ( 23 Jul 2023 06:14 )    Color: x / Appearance: x / SG: x / pH: x  Gluc: 99 mg/dL / Ketone: x  / Bili: x / Urobili: x   Blood: x / Protein: x / Nitrite: x   Leuk Esterase: x / RBC: x / WBC x   Sq Epi: x / Non Sq Epi: x / Bacteria: x      .Blood None   No growth at 48 Hours -- 07-20 @ 11:35  Clean Catch None   No growth -- 07-20 @ 10:40      Rapid RVP Result: Joelletec (07-20 @ 13:53)

## 2023-07-23 NOTE — PROGRESS NOTE ADULT - ASSESSMENT
89 y/o female PMH severe AS, HFpEF, recurrent UTI, chronic indwelling Perales cath       Admitted for:     Septic shock due to UTI   ANDREW from [facility]   Metabolic acidosis   Junctional rhythm       Plan:     Neuro ok   Continue neurontin     Off pressor since 7/22 am   Hypertensive now, hydralazine added overnight   Continue holding metoprolol for recent junctional bradycardia   Continue statin     Appears euvolemic   No need for IVF/diuresis at this time     On meropenem   Current BCx, UCx neg   Previously with E.coli in urine, pansensitive     Renal fn improving   Acidosis resolved  Chronic Perales (Dr Rojelio Collins)     LUE looks improved     PT eval, OOB     DVT ppx with sc heparin     DNR, DNI, prognosis poor overall       Stable for tele 89 y/o female PMH severe AS, HFpEF, recurrent UTI, chronic indwelling Perales cath       Admitted for:     Septic shock due to UTI   ANDREW from [facility]   Metabolic acidosis   Junctional rhythm       Plan:     Neuro ok   Continue neurontin     Off pressor since 7/22 am   Hypertensive now, hydralazine added overnight   Continue holding metoprolol for recent junctional bradycardia   Continue statin     Appears euvolemic   No need for IVF/diuresis at this time     On meropenem   Current BCx, UCx neg   Previously with E.coli in urine, pansensitive   Leucopenic now, trend WBC     Renal fn improving   Acidosis resolved  Chronic Perales (Dr Rojelio Collins)     LUE looks improved     PT eval, OOB     DVT ppx with sc heparin     DNR, DNI, prognosis poor overall       Stable for tele 89 y/o female PMH severe AS, HFpEF, recurrent UTI, chronic indwelling Perales cath       Admitted for:     Septic shock due to UTI   ANDREW from [facility]   Metabolic acidosis   Junctional rhythm       Plan:     Neuro ok   Continue neurontin     Off pressor since 7/22 am   Hypertensive now, hydralazine added overnight   Continue holding metoprolol for recent junctional bradycardia   Continue statin     Appears euvolemic   No need for IVF/diuresis at this time     On meropenem   Current BCx, UCx neg   Previously with E.coli in urine, pansensitive   Leucopenic now, trend WBC     Renal fn improving   Acidosis resolved  Chronic Perales (Dr Rojelio Collins)     LUE looks improved     PT eval, OOB     DVT ppx with sc heparin     DNR, DNI, prognosis poor overall       Stable for tele, signed out to Dr Randall at [id number]

## 2023-07-24 LAB
ADD ON TEST-SPECIMEN IN LAB: SIGNIFICANT CHANGE UP
ANION GAP SERPL CALC-SCNC: 5 MMOL/L — SIGNIFICANT CHANGE UP (ref 5–17)
BUN SERPL-MCNC: 26 MG/DL — HIGH (ref 7–23)
CALCIUM SERPL-MCNC: 8.2 MG/DL — LOW (ref 8.5–10.1)
CHLORIDE SERPL-SCNC: 111 MMOL/L — HIGH (ref 96–108)
CO2 SERPL-SCNC: 23 MMOL/L — SIGNIFICANT CHANGE UP (ref 22–31)
CREAT SERPL-MCNC: 1.38 MG/DL — HIGH (ref 0.5–1.3)
EGFR: 35 ML/MIN/1.73M2 — LOW
GLUCOSE SERPL-MCNC: 101 MG/DL — HIGH (ref 70–99)
HCT VFR BLD CALC: 23.7 % — LOW (ref 34.5–45)
HGB BLD-MCNC: 7.8 G/DL — LOW (ref 11.5–15.5)
IRON SATN MFR SERPL: 24 % — SIGNIFICANT CHANGE UP (ref 14–50)
IRON SATN MFR SERPL: 53 UG/DL — SIGNIFICANT CHANGE UP (ref 30–160)
MAGNESIUM SERPL-MCNC: 2.4 MG/DL — SIGNIFICANT CHANGE UP (ref 1.6–2.6)
MCHC RBC-ENTMCNC: 30.8 PG — SIGNIFICANT CHANGE UP (ref 27–34)
MCHC RBC-ENTMCNC: 32.9 GM/DL — SIGNIFICANT CHANGE UP (ref 32–36)
MCV RBC AUTO: 93.7 FL — SIGNIFICANT CHANGE UP (ref 80–100)
NT-PROBNP SERPL-SCNC: HIGH PG/ML (ref 0–450)
PHOSPHATE SERPL-MCNC: 3.4 MG/DL — SIGNIFICANT CHANGE UP (ref 2.5–4.5)
PLATELET # BLD AUTO: 107 K/UL — LOW (ref 150–400)
POTASSIUM SERPL-MCNC: 4.3 MMOL/L — SIGNIFICANT CHANGE UP (ref 3.5–5.3)
POTASSIUM SERPL-SCNC: 4.3 MMOL/L — SIGNIFICANT CHANGE UP (ref 3.5–5.3)
RBC # BLD: 2.53 M/UL — LOW (ref 3.8–5.2)
RBC # FLD: 14.9 % — HIGH (ref 10.3–14.5)
SODIUM SERPL-SCNC: 139 MMOL/L — SIGNIFICANT CHANGE UP (ref 135–145)
TIBC SERPL-MCNC: 224 UG/DL — SIGNIFICANT CHANGE UP (ref 220–430)
TROPONIN I, HIGH SENSITIVITY RESULT: 79.04 NG/L — HIGH
TROPONIN I, HIGH SENSITIVITY RESULT: 84.36 NG/L — HIGH
TROPONIN I, HIGH SENSITIVITY RESULT: 89.6 NG/L — HIGH
UIBC SERPL-MCNC: 171 UG/DL — SIGNIFICANT CHANGE UP (ref 110–370)
WBC # BLD: 3.85 K/UL — SIGNIFICANT CHANGE UP (ref 3.8–10.5)
WBC # FLD AUTO: 3.85 K/UL — SIGNIFICANT CHANGE UP (ref 3.8–10.5)

## 2023-07-24 PROCEDURE — 93010 ELECTROCARDIOGRAM REPORT: CPT

## 2023-07-24 PROCEDURE — 71250 CT THORAX DX C-: CPT | Mod: 26

## 2023-07-24 PROCEDURE — 99233 SBSQ HOSP IP/OBS HIGH 50: CPT

## 2023-07-24 RX ORDER — HYDRALAZINE HCL 50 MG
50 TABLET ORAL THREE TIMES A DAY
Refills: 0 | Status: DISCONTINUED | OUTPATIENT
Start: 2023-07-24 | End: 2023-07-26

## 2023-07-24 RX ORDER — FUROSEMIDE 40 MG
40 TABLET ORAL EVERY 12 HOURS
Refills: 0 | Status: DISCONTINUED | OUTPATIENT
Start: 2023-07-24 | End: 2023-07-26

## 2023-07-24 RX ORDER — VALSARTAN 80 MG/1
160 TABLET ORAL DAILY
Refills: 0 | Status: DISCONTINUED | OUTPATIENT
Start: 2023-07-24 | End: 2023-07-26

## 2023-07-24 RX ORDER — ACETAMINOPHEN 500 MG
650 TABLET ORAL EVERY 6 HOURS
Refills: 0 | Status: DISCONTINUED | OUTPATIENT
Start: 2023-07-24 | End: 2023-07-27

## 2023-07-24 RX ORDER — HYDRALAZINE HCL 50 MG
25 TABLET ORAL ONCE
Refills: 0 | Status: COMPLETED | OUTPATIENT
Start: 2023-07-24 | End: 2023-07-24

## 2023-07-24 RX ORDER — HYDRALAZINE HCL 50 MG
TABLET ORAL
Refills: 0 | Status: DISCONTINUED | OUTPATIENT
Start: 2023-07-24 | End: 2023-07-26

## 2023-07-24 RX ADMIN — ATORVASTATIN CALCIUM 20 MILLIGRAM(S): 80 TABLET, FILM COATED ORAL at 21:16

## 2023-07-24 RX ADMIN — PANTOPRAZOLE SODIUM 40 MILLIGRAM(S): 20 TABLET, DELAYED RELEASE ORAL at 06:04

## 2023-07-24 RX ADMIN — GABAPENTIN 300 MILLIGRAM(S): 400 CAPSULE ORAL at 09:49

## 2023-07-24 RX ADMIN — LIDOCAINE 1 PATCH: 4 CREAM TOPICAL at 07:30

## 2023-07-24 RX ADMIN — MEROPENEM 100 MILLIGRAM(S): 1 INJECTION INTRAVENOUS at 09:51

## 2023-07-24 RX ADMIN — Medication 500 MILLIGRAM(S): at 09:49

## 2023-07-24 RX ADMIN — Medication 50 MILLIGRAM(S): at 21:16

## 2023-07-24 RX ADMIN — SENNA PLUS 2 TABLET(S): 8.6 TABLET ORAL at 22:29

## 2023-07-24 RX ADMIN — GABAPENTIN 300 MILLIGRAM(S): 400 CAPSULE ORAL at 21:16

## 2023-07-24 RX ADMIN — Medication 25 MILLIGRAM(S): at 06:40

## 2023-07-24 RX ADMIN — POLYETHYLENE GLYCOL 3350 17 GRAM(S): 17 POWDER, FOR SOLUTION ORAL at 22:29

## 2023-07-24 RX ADMIN — Medication 1000 UNIT(S): at 09:49

## 2023-07-24 RX ADMIN — Medication 50 MILLIGRAM(S): at 13:09

## 2023-07-24 RX ADMIN — POLYETHYLENE GLYCOL 3350 17 GRAM(S): 17 POWDER, FOR SOLUTION ORAL at 09:49

## 2023-07-24 RX ADMIN — Medication 650 MILLIGRAM(S): at 14:07

## 2023-07-24 RX ADMIN — Medication 1 MILLIGRAM(S): at 09:49

## 2023-07-24 RX ADMIN — VALSARTAN 160 MILLIGRAM(S): 80 TABLET ORAL at 11:12

## 2023-07-24 RX ADMIN — Medication 100 MILLIGRAM(S): at 09:49

## 2023-07-24 RX ADMIN — HEPARIN SODIUM 5000 UNIT(S): 5000 INJECTION INTRAVENOUS; SUBCUTANEOUS at 06:04

## 2023-07-24 RX ADMIN — Medication 650 MILLIGRAM(S): at 13:07

## 2023-07-24 RX ADMIN — HEPARIN SODIUM 5000 UNIT(S): 5000 INJECTION INTRAVENOUS; SUBCUTANEOUS at 21:16

## 2023-07-24 RX ADMIN — Medication 25 MILLIGRAM(S): at 06:04

## 2023-07-24 RX ADMIN — HEPARIN SODIUM 5000 UNIT(S): 5000 INJECTION INTRAVENOUS; SUBCUTANEOUS at 13:08

## 2023-07-24 RX ADMIN — Medication 3 MILLIGRAM(S): at 22:29

## 2023-07-24 RX ADMIN — LIDOCAINE 1 PATCH: 4 CREAM TOPICAL at 09:00

## 2023-07-24 RX ADMIN — Medication 40 MILLIGRAM(S): at 15:21

## 2023-07-24 NOTE — PROGRESS NOTE ADULT - SUBJECTIVE AND OBJECTIVE BOX
CHIEF COMPLAINT: Patient is a 89y old  Female who presents with a chief complaint of Weakness (22 Jul 2023 12:59)      HPI:  89 y/o F with PMH HTN, HLD, severe AS, HFpEF, hx of pérez, hx of aspiration pneumonia and UTI presents with weakness. Pt reports feeling cold and shaking when she woke up this morning. She also felt dizzy and felt warm. Pt was recently admitted for UTI and urinary retention. She was discharged on PO antibiotics which were completed and a pérez upon discharge. Denies fevers, chest pain, SOB, abdominal pain, N/V, diarrhea/constipation.     Prior admission:  - 7/10/23: Weakness -> UTI/urinary retention -> PO Ceftin, pérez was continued upon d/c     ER course: Temp 100.1F, BP 97/41-98/41, SpO2 87% -> 98% on 2L of NS. Labs: WBC 11.21, Hb 10.2 (baseline ~10), , neutrophils 92.1%, CO2 20, Cr 1.45 (baseline ~1.4), glucose 113, AST 38, BNP 2123. UA: moderate leukocyte esterase, small blood, WBC 11-25, RBC 3-5, moderate bacteria. COVID and RVP negative.   EKG: Junctional rhythm with PVCs HR 51 bpm, normal intervals, LVH (personally reviewed).     Imaging:   - CTA: No pulmonary embolism. No pneumonia. New trace pleural effusions. Unchanged anterior mediastinal nodules, largest approximately 1.5 cm. Unchanged soft tissue density along the right wall of the midesophagus, stable dating back to 2/21/2021.   - CXR: increased vascular congestion, no consolidation, no effusion, no pneumothorax (personally reviewed).  - XR right shoulder: Mild CHF increased from prior. Degeneration right shoulder.    Pt was given Levaquin, 500 ml of NS, Tylenol. She is being admitted to med/surg for further management.  (20 Jul 2023 17:26)    7/21/23:  Pt with above history and severe AS and recurrent sepsis from recurrent UTI's making doing a TAVR impossible at this time.  Continue to treat sepsis and hopefully the rhythm will stabilize.  Bradycardia is usually better for AS than tachycardia.  Continue supportive care and treatment as outlined by medicine etal.     7/22/23-EP evaluated patient for junctional bradycardia.  Patient previously started on Levophed gtt but developed tachycardia so it was discontinued. Patient currently on phenylephrine gtt. Patient currently junctional rhythm with rates 35-70.  Denies any CP, palpitations, SOB, dizziness, lightheadedness.  Junctional bradycardia in setting of severe sepsis, severe Aortic stenosis; Phenylephrine likely contributing to junctional rhythm.  Aortic stenosis is primary issue causing the other secondary issues with recurrent UTI/sepsis preventing TAVR.  EP service felt  intervention indicated at this time and to continue to treat underlying infection.        Overnight patient with sinus bradycardia, pauses no longer than 2 seconds with sinus pause.   Sinus bradycardia overnight with brief 5 beat run of afib.  In addition, BP running high now and no signs of shock but still elevated lactate level.  WBC still in the 20's, renal insufficiency, hyponatremia still in play and BNP in the 16000's (was in the 2000's) and f/u ECHO with EF 60% as per report    7/23/23-patient with no further arrhythmia, off pressors and BP stable in the 160's.  No further fluctuations.      7/24/23:  BP running high despite starting Hydralazine 25 mg tid.  (-180's systolic).  Will increase Hydralazine to 50 mg tid.    PMHx: PAST MEDICAL & SURGICAL HISTORY:  HTN (hypertension)  HLD (hyperlipidemia)  Gout  Osteoarthritis  Acute on chronic diastolic congestive heart failure  Aortic stenosis  H/O CHF  Pneumonia, aspiration  Pneumonia  History of tonsillectomy in childhood      Allergies: Allergies  Ceftin (Hives; Rash)      REVIEW OF SYSTEMS:    CONSTITUTIONAL: weakness, fevers or chills  EYES/ENT: No visual changes;  No vertigo or throat pain   NECK: No pain or stiffness  RESPIRATORY: No cough, wheezing, hemoptysis; No shortness of breath  CARDIOVASCULAR: No chest pain or palpitations  GASTROINTESTINAL: No abdominal or epigastric pain. No nausea, vomiting, or hematemesis; No diarrhea or constipation. No melena or hematochezia.  GENITOURINARY: No dysuria, frequency or hematuria      Vital Signs Last 24 Hrs  T(C): 36.3 (23 Jul 2023 15:00), Max: 36.4 (23 Jul 2023 08:00)  T(F): 97.3 (23 Jul 2023 15:00), Max: 97.6 (23 Jul 2023 08:00)  HR: 62 (24 Jul 2023 01:00) (57 - 70)  BP: 165/54 (24 Jul 2023 00:00) (148/49 - 177/45)  BP(mean): 80 (24 Jul 2023 00:00) (70 - 93)  RR: 11 (24 Jul 2023 01:00) (10 - 27)  SpO2: 99% (24 Jul 2023 01:00) (89% - 100%): nasal cannula      I&O's Summary    21 Jul 2023 07:01  -  22 Jul 2023 07:00  --------------------------------------------------------  IN: 1284.1 mL / OUT: 678 mL / NET: 606.1 mL    22 Jul 2023 07:01  -  23 Jul 2023 05:11  --------------------------------------------------------  IN: 0 mL / OUT: 1450 mL / NET: -1450 mL        PHYSICAL EXAM:   Constitutional: NAD, awake and alert, well-developed  HEENT: PERR, EOMI, Normal Hearing, MMM  Neck:  JVD  Respiratory: Breath sounds are clear bilaterally, No wheezing, rales or rhonchi  Cardiovascular: S1 and S2, regular rate and rhythm,  Murmurs, no gallops or rubs  Gastrointestinal: Bowel Sounds present, soft, nontender, nondistended, no guarding, no rebound  Extremities:  peripheral edema      MEDICATIONS  (STANDING):  ascorbic acid 500 milliGRAM(s) Oral daily  atorvastatin 20 milliGRAM(s) Oral at bedtime  chlorhexidine 2% Cloths 1 Application(s) Topical <User Schedule>  cholecalciferol 1000 Unit(s) Oral daily  folic acid 1 milliGRAM(s) Oral daily  gabapentin 300 milliGRAM(s) Oral two times a day  heparin   Injectable 5000 Unit(s) SubCutaneous every 8 hours  hydrALAZINE 25 milliGRAM(s) Oral once  hydrALAZINE 50 milliGRAM(s) Oral three times a day  hydrALAZINE      lidocaine   4% Patch 1 Patch Transdermal every 24 hours  melatonin 3 milliGRAM(s) Oral at bedtime  meropenem  IVPB 500 milliGRAM(s) IV Intermittent every 12 hours  pantoprazole    Tablet 40 milliGRAM(s) Oral before breakfast  polyethylene glycol 3350 17 Gram(s) Oral two times a day  senna 2 Tablet(s) Oral at bedtime  thiamine 100 milliGRAM(s) Oral daily    MEDICATIONS  (PRN):  aluminum hydroxide/magnesium hydroxide/simethicone Suspension 30 milliLiter(s) Oral every 4 hours PRN Dyspepsia  ondansetron Injectable 4 milliGRAM(s) IV Push every 8 hours PRN Nausea and/or Vomiting      LABS: All Labs Reviewed:                        9.8    23.91 )-----------( 143      ( 21 Jul 2023 06:10 )             29.9     07-21    128<L>  |  105  |  30<H>  ----------------------------<  159<H>  4.8   |  13<L>  |  2.03<H>    Ca    8.3<L>      21 Jul 2023 06:10  Phos  3.2     07-21  Mg     2.1     07-21    TPro  6.7  /  Alb  3.1<L>  /  TBili  0.8  /  DBili  x   /  AST  92<H>  /  ALT  79<H>  /  AlkPhos  64  07-21    PT/INR - ( 20 Jul 2023 10:40 )   PT: 11.8 sec;   INR: 1.02 ratio       PTT - ( 20 Jul 2023 10:40 )  PTT:25.7 sec    Pro-Brain Natriuretic Peptide (07.21.23 @ 06:10): 33882 pg/mL  Pro-Brain Natriuretic Peptide (07.20.23 @ 10:40): 2123 pg/mL    Troponin I, High Sensitivity (07.20.23 @ 10:40): 17.94                            7.8    3.85  )-----------( 107      ( 24 Jul 2023 05:27 )             23.7                         8.3    6.78  )-----------( 82       ( 22 Jul 2023 08:10 )             24.2       07-24    139  |  111<H>  |  26<H>  ----------------------------<  101<H>  4.3   |  23  |  1.38<H>    Ca    8.2<L>      24 Jul 2023 05:27  Phos  3.4     07-24  Mg     2.4     07-24    TPro  6.1  /  Alb  3.0<L>  /  TBili  0.7  /  DBili  x   /  AST  81<H>  /  ALT  132<H>  /  AlkPhos  81  07-23 07-22    135  |  104  |  39<H>  ----------------------------<  124<H>  4.1   |  23  |  1.85<H>    Ca    8.2<L>      22 Jul 2023 08:10  Phos  2.9     07-22  Mg     2.3     07-22    TPro  6.7  /  Alb  3.1<L>  /  TBili  0.8  /  DBili  x   /  AST  92<H>  /  ALT  79<H>  /  AlkPhos  64  07-21      Blood Culture: Organism --  Gram Stain Blood -- Gram Stain --  Specimen Source .Blood None  Culture-Blood --Culture Results: No growth at 72 Hours (07.20.23 @ 11:35)     Organism --  Gram Stain Urine -- Gram Stain --  Specimen Source Clean Catch None  Culture-Urine --Culture Results: No growth      ABG - ( 21 Jul 2023 08:31 )  pH, Arterial: 7.27  pH, Blood: x     /  pCO2: 26    /  pO2: 99    / HCO3: 12    / Base Excess: -13.3 /  SaO2: 99          RADIOLOGY:    Venous Doppler U/S of Legs: 7/22/23:  FINDINGS:  The left internal jugular, subclavian, axillary and brachial veins are patent and compressible where applicable.  The basilic vein (superficial vein) is patent and without thrombus.  The cephalic vein (superficial vein) is patent and without thrombus.  Doppler examination shows normal spontaneous and phasic flow.  IMPRESSION:  No evidence of left upper extremity deep venous thrombosis.    CXR: 7/20/23:  INTERPRETATION:  Chest and right shoulder. Patient has weakness of right shoulder pain.  AP erect chest on July 20, 2023 at 10:05 AM.  Heart magnified by technique.  There is a mild symmetric congestive picture increased from July 5 this year.  Right shoulder. 3 views. There is mild degeneration particularly at the glenohumeral articulation. No fracture.  IMPRESSION: Mild CHF increased from prior. Degeneration right shoulder.      CTA of Chest: 7/20/23:  FINDINGS:  PULMONARY ANGIOGRAM:  No pulmonary embolism.  LUNGS/AIRWAYS/PLEURA: Patent trachea and bronchi. Unchanged mild interlobular septal thickening in both lungs. Mild passive atelectasis in the lower lobes. New small linear subpleural opacities in both upper lobes, likely scarring or atelectasis. New trace pleural effusions. Unchanged partially calcified focal left pleural thickening.  LYMPH NODES/MEDIASTINUM: Unchanged anterior mediastinal nodules, largest approximately 1.5 cm. Unchanged soft tissue density along the right wall of themidesophagus, stable dating back to 2/21/2021. Small hiatal hernia.  HEART/VASCULATURE: Normal heart size. No pericardial effusion. Calcified coronary arteries and aortic valve.  UPPER ABDOMEN: Left renal cyst.  BONES/SOFT TISSUES: Unremarkable.  IMPRESSION:  No pulmonary embolism.  No pneumonia.  New trace pleural effusions.      CT of Abd: 7/20/23:  FINDINGS:  Evaluation of solid organs and vascular structures is limited without intravenous contrast.  LOWER CHEST: Mild fibrotic changes again noted. Trace bilateral pleural effusions. Cardiomegaly. Coronary calcifications. Small hiatal hernia.  LIVER: Within normal limits.  BILE DUCTS: Normal caliber.  GALLBLADDER: Cholelithiasis.. No pericholecystic inflammation.  SPLEEN: Within normal limits.  PANCREAS: Fatty atrophy.  ADRENALS: Within normal limits.  KIDNEYS/URETERS: Excreted contrast within the collecting systems and ureters from recent exam is slightly limited evaluation of. No hydronephrosis or obstructing stone. Left renal cyst.  BLADDER: Collapsed around Pérez catheter, containing excreted contrast material.  REPRODUCTIVE ORGANS: No pelvic mass.  BOWEL: No bowel obstruction. Appendix is normal. Large rectal stool burden and overall moderate colonic stool burden. Mild wall thickening of the ascending colon, transverse colon and proximal descending colon.  PERITONEUM: No ascites.  VESSELS: Atherosclerotic changes.  RETROPERITONEUM/LYMPH NODES: No lymphadenopathy.  ABDOMINAL WALL: Small fat-containing umbilical hernia.  BONES: Degenerative changes. Osteopenia.  IMPRESSION:  No hydronephrosis or obstructing stone.  Mild wall thickening of the ascending colon, transverse colon proximal descending colon may reflect underdistention or colitis. Clinical correlation recommended.      CT of Head: 7/20/23:  FINDINGS:  No acute intracranial abnormalities.  No evidence of acute cortical infarction or hemorrhage.  No mass effect or edema.  Small vessel and atrophic changes.  No sinusitis or mastoiditis.  No skull fracture. Atherosclerotic vascular calcification present skull base.  IMPRESSION:  No acute intracranial abnormalities.  Small vessel and atrophic changes.      EKG:  Junctional bradycardia with LVH and PVC and APC    TELEMETRY:  Junctional bradycardia with LVH and PVC and APC => SVT => junctional mary => SVT => junctional bradycardia    ECHO:  6/9/23:  Findings:  Left Ventricle   Followup study to evaluate for endocarditis.   Valves appear normal - no evidence of endocarditis.    Summary   Followup study to evaluate for endocarditis.   Valves appear normal - no evidence of endocarditis.    Signature   ----------------------------------------------------------------   Electronically signed by Dave Monet MD(Interpreting   physician) on 06/09/2023 05:17 PM   ----------------------------------------------------------------    ECHO:  3/1/23:  M-Mode Measurements (cm)   LVEDd: 3.97 cm            LVESd: 2.55 cm   IVSEd: 1.1 cm   LVPWd: 1.1 cm             AO Root Dimension: 2.8 cm                             LA: 3.5 cm                LVOT: 2 cm  Doppler Measurements:   AV Velocity:434 cm/s                  MV Peak E-Wave: 98.7 cm/s   AV Peak Gradient: 75.34 mmHg          MV Peak A-Wave: 131 cm/s   AV Mean Gradient: 40 mmHg             MV E/A Ratio: 0.75 %   AV Area (Continuity):0.85 cm^2        MV Peak Gradient: 3.9 mmHg   TR Velocity:190 cm/s   TR Gradient:14.44 mmHg   Estimated RAP:5 mmHg   RVSP:27 mmHg    Findings  Mitral Valve   The mitral valve leaflets appear thickened.   EA reversal of the mitral inflow consistent with reduced compliance of the left ventricle.   Mild mitral annular calcification is present.   Mild mitral regurgitation is present.    Aortic Valve   Peak and mean transaortic gradients are 75 and 40mmHg respectively; this finding is consistent with severe aortic stenosis.   Mild (1+) aortic regurgitation is present.    Tricuspid Valve   Trace tricuspid valve regurgitation is present.    Pulmonic Valve   Mild pulmonic valvular regurgitation (1+) is present.    Left Atrium   Normal appearing left atrium.    Left Ventricle   Mild concentric left ventricular hypertrophy is present.   The left ventricle is normal in size.   Estimated left ventricular ejection fraction is 65-70 %.    Right Atrium   Normal appearing right atrium.    Right Ventricle   Normal appearing right ventricle structure and function.    Pericardial Effusion   No evidence of pericardial effusion.    Pleural Effusion   No evidence of pleural effusion.    Miscellaneous   The IVC appears normal.    Summary   The mitral valve leaflets appear thickened.   EA reversal of the mitral inflow consistent with reduced compliance of the left ventricle.   Mild mitral annular calcification is present.   Mild mitral regurgitation is present.   Peak and mean transaortic gradients are 75 and 40mmHg respectively; this finding is consistent with severe aortic stenosis.   Mild (1+) aortic regurgitation is present.   Trace tricuspid valve regurgitation is present.   Mild pulmonic valvular regurgitation (1+) is present.   Normal appearing left atrium.   Mild concentric left ventricular hypertrophy is present.   The left ventricle is normal in size.   Estimated left ventricular ejection fraction is 65-70 %.   Normal appearing right atrium.   Normal appearing right ventricle structure and function.   The IVC appears normal.   No evidence of pericardial effusion.   No evidence of pleural effusion.    Signature   ----------------------------------------------------------------   Electronically signed by Sakina Cheatham MD, Director of   Cardiac Cath Lab(Interpreting physician) on 03/01/2023 06:42   PM   ----------------------------------------------------------------

## 2023-07-24 NOTE — CONSULT NOTE ADULT - ASSESSMENT
7/21/23:  Pt with above history and severe AS and recurrent sepsis from recurrent UTI's making doing a TAVR impossible at this time.  Continue to treat sepsis and hopefully the rhythm will stabilize.  Bradycardia is usually better for AS than tachycardia.  Continue supportive care and treatment as outlined by medicine etal.  Dr Ferreira will be covering mo over the weekend.
89 y/o F with PMH HTN, HLD, severe AS, HFpEF, hx of pérez, hx of aspiration pneumonia and UTI presents with weakness. Pt reports feeling cold and shaking when she woke up this morning. She also felt dizzy and felt warm. Pt was recently admitted for UTI and urinary retention. She was discharged on PO antibiotics which were completed and a pérez upon discharge. Denies fevers, chest pain, SOB, abdominal pain, N/V, diarrhea/constipation.  Prior admission  7/10/23: Weakness -> UTI/urinary retention -> PO Ceftin, pérez was continued upon d/c  ER course: Temp 100.1F, BP 97/41-98/41, SpO2 87% -> 98% on 2L of NS. Labs: WBC 11.21, Hb 10.2 (baseline ~10), , neutrophils 92.1%, CO2 20, Cr 1.45 (baseline ~1.4), glucose 113, AST 38, BNP 2123. UA: moderate leukocyte esterase, small blood, WBC 11-25, RBC 3-5, moderate bacteria. COVID and RVP negative. Imaging:   - CTA: No pulmonary embolism. No pneumonia. New trace pleural effusions. Unchanged anterior mediastinal nodules, largest approximately 1.5 cm. Unchanged soft tissue density along the right wall of the midesophagus, stable dating back to 2/21/2021.  Pt was given Levaquin, 500 ml of NS, Tylenol. She was admitted to med/surg for further management. tx to ICU d/t hypotension, was on pressors then taken on 7/22 am. Given IV merrem 7/20-7/24 for UTI.     1. Pyuria. Recurrent UTI s/p abx course. Hypotension. Urinary retention with pérez  - imaging reviewed, off pressors  - s/p levaquin x 1 7/20, and merrem 7/20-7/24  - urine cx, blood cx no growth  - completed abx course  - agree with monitor off further abx  - fu cbc  - monitor temps  - supportive care    2. other issues - care per medicine 
89 yo Female with PMH as above known to Dr. Collins for recurrent UTIs admitted for weakness and UTI. Pt known to Dr. Collins and has an indwelling pérez and currently is using Dmannose and antibiotic irrigations for preventative measures  Recommend    - Continue antibiotics, adjust as per cultures/ sensitivities / ID recs  - No acute uro surgical intervention  - Follow up with Dr. Collins outpatient for further management    Case discussed with Dr. Collins
ASSESSMENT & PLAN: 89 year old female with above history severe AS, recurrent sepsis with junctional bradycardia.  EP asked to evaluate for junctional bradycardia.    Junctional bradycardia in setting of severe sepsis, severe Aortic stenosis  Phenylephrine likely contributing to junctional rhythm  Aortic stenosis is primary issue causing the other secondary issues  Recurrent UTI/sepsis preventing TAVR  No EP intervention indicated at this time  Continue to treat underlying infection  Further management per CCU team  Plan discussed with patient, family at bedside, CCU team and Dr. Santizo  
89F with HTN, HLD, severe AS, HFpEF, hx aspiration PNA, hx recurrent UTIs brought to ED with acute onset chills and dizziness. Recently admitted to  for E Coli UTI and urinary retention treated with Meropenem x3days thereafter de-escalated to CTX with discharge home on PO Ceftin with pérez on 7/10/23. Labs on this admission notable for WBC 11.21, neutrophils 92.1%, Cr 1.45 (baseline ~1.4), BNP 2123. UA positive for UTI, s/p levaquin in ED. EKG with new onset junctional rhythm with PVCs HR 51 bpm, normal intervals, LVH. Originally admitted to medicine with sepsis s/t UTI, ICU consulted iso persistent hypotension despite 2L IVF requiring initiation of levophed.     Septic Shock iso UTI  Acute Hypoxic Respiratory Failure iso mild CHF exacerbation  New onset Junctional rhythm    Plan:  NEURO: A&Ox3 but intermittent confused. F/U pending CTH to r/o acute intracranial pathology.  CARDIAC: Actively titrating levophed to maintain goal MAP>65. Holding home GDMT iso ongoing shock. TTE from 3/1/23 with EF 65-70%, severe AS, mild MR, mild WV, mild LVH - no current need to repeat ECHO. Bedside TTE with grossly normal EF, IVC 2.26cm with minimal respiratory variation. Troponin negative, EKG with new onset junctional rhythm. Follows with Dr Collins, consult cardiology.  RESPIRATORY: CXR with mild pulmonary edema and small bilateral pleural effusions, will hold off on aggressive diuresis given ongoing shock, appears euvolemic on exam. Actively titrating NC to goal SpO2>92%.  GI: NPO awaiting bedside swallow given ongoing confusion.  : Pérez replaced on ED arrival. Trend Cr and monitor I&Os. Goal UOP >0.5cc/kg/hr.  ENDO: No active issues. Goal -180. F/U TSH.  ID: Afebrile, WBC 11k, lactate 1.5. UA positive, prior UCx from 7/5/23 with E Coli. S/p levaquin x1 in ED. Broaden to Vanco x1 (prior hx Enterococcus UTI) and Meropenem. F/U pending UCx, BCx, MRSA PCR, and CT A&P to further investigate possible etiologies such as stones to explain recurrent UTIs. Narrow abx when able.  HEME: SQH for DVT ppx. SCDs.    Dispo: Admit to CCU for further management of septic shock s/t UTI requiring levophed.  CODE STATUS: Patient wants FULL CODE. Pippa Kulkarni (daughter) [phone] or Dr. Evan Escoto (son) [phone]     Case discussed with CCU Attending Dr Carmen.
Assessment:     Process of Care  --Reviewed dx/treatment problems and alignment with Goals of Care    Physical Aspects of Care  --Pain  patient denies at this time  - will add PRN IV Tylenol     --Bowel Regimen  denies constipation  risk for constipation d/t immobility  daily dulcolax    --Dyspnea  No SOB at this time  comfortable and in NAD    --Nausea Vomiting  denies    --Weakness  PT as tolerated     Psychological and Psychiatric Aspects of Care:   --Greif/Bereavment: emotional support provided  --Hx of psychiatric dx: none  -Pastoral Care Available PRN     Social Aspects of Care  -SW involved     Cultural Aspects  -Primary Language: English      Ethical and Legal Aspects:   NA        Capacity- pt has capacity     HCP/Surrogate: HCP on one content naming her daughter Pippa     Code Status- DNR/I   MOLST- on chart   Dispo Plan- ongoing     Discussed With: Dr. Anderson and Dr. Anderson coordinated with attending and SW and RN     Time Spent: 90 minutes including the care, coordination and counseling of this patient, 50% of which was spent coordinating and counseling.

## 2023-07-24 NOTE — CONSULT NOTE ADULT - SUBJECTIVE AND OBJECTIVE BOX
HPI:  89 y/o F with PMH HTN, HLD, severe AS, HFpEF, hx of pérez, hx of aspiration pneumonia and UTI presents with weakness. Pt reports feeling cold and shaking when she woke up this morning. She also felt dizzy and felt warm. Pt was recently admitted for UTI and urinary retention. She was discharged on PO antibiotics which were completed and a pérez upon discharge. Denies fevers, chest pain, SOB, abdominal pain, N/V, diarrhea/constipation.     Prior admission:  - 7/10/23: Weakness -> UTI/urinary retention -> PO Ceftin, pérez was continued upon d/c     ER course: Temp 100.1F, BP 97/41-98/41, SpO2 87% -> 98% on 2L of NS. Labs: WBC 11.21, Hb 10.2 (baseline ~10), , neutrophils 92.1%, CO2 20, Cr 1.45 (baseline ~1.4), glucose 113, AST 38, BNP 2123. UA: moderate leukocyte esterase, small blood, WBC 11-25, RBC 3-5, moderate bacteria. COVID and RVP negative.   EKG: Junctional rhythm with PVCs HR 51 bpm, normal intervals, LVH (personally reviewed).     Imaging:   - CTA: No pulmonary embolism. No pneumonia. New trace pleural effusions. Unchanged anterior mediastinal nodules, largest approximately 1.5 cm. Unchanged soft tissue density along the right wall of the midesophagus, stable dating back to 2/21/2021.   - CXR: increased vascular congestion, no consolidation, no effusion, no pneumothorax (personally reviewed).  - XR right shoulder: Mild CHF increased from prior. Degeneration right shoulder.    Pt was given Levaquin, 500 ml of NS, Tylenol. She is being admitted to med/surg for further management.  (20 Jul 2023 17:26)    89 yo Female with PMH as above known to Dr. Collins for recurrent UTIs admitted for weakness and UTI. Pt known to Dr. Collins and has an indwelling pérez and currently is using Dmannose and antibiotic irrigations for preventative measures. She denies any current abd/flank pain, fevers or chills.        PAST MEDICAL & SURGICAL HISTORY:  HTN (hypertension)      HLD (hyperlipidemia)      Gout      Osteoarthritis      Acute on chronic diastolic congestive heart failure      Aortic stenosis      H/O CHF      Pneumonia, aspiration      Pneumonia      History of tonsillectomy  in childhood          REVIEW OF SYSTEMS  All other review of systems neg, except as noted in HPI    MEDICATIONS  (STANDING):  ascorbic acid 500 milliGRAM(s) Oral daily  atorvastatin 20 milliGRAM(s) Oral at bedtime  chlorhexidine 2% Cloths 1 Application(s) Topical <User Schedule>  cholecalciferol 1000 Unit(s) Oral daily  folic acid 1 milliGRAM(s) Oral daily  gabapentin 300 milliGRAM(s) Oral two times a day  heparin   Injectable 5000 Unit(s) SubCutaneous every 8 hours  hydrALAZINE 50 milliGRAM(s) Oral three times a day  hydrALAZINE      lidocaine   4% Patch 1 Patch Transdermal every 24 hours  melatonin 3 milliGRAM(s) Oral at bedtime  pantoprazole    Tablet 40 milliGRAM(s) Oral before breakfast  polyethylene glycol 3350 17 Gram(s) Oral two times a day  senna 2 Tablet(s) Oral at bedtime  thiamine 100 milliGRAM(s) Oral daily  valsartan 160 milliGRAM(s) Oral daily    MEDICATIONS  (PRN):  acetaminophen     Tablet .. 650 milliGRAM(s) Oral every 6 hours PRN Temp greater or equal to 38.5C (101.3F), Mild Pain (1 - 3)  aluminum hydroxide/magnesium hydroxide/simethicone Suspension 30 milliLiter(s) Oral every 4 hours PRN Dyspepsia  ondansetron Injectable 4 milliGRAM(s) IV Push every 8 hours PRN Nausea and/or Vomiting      Allergies    Ceftin (Hives; Rash)    Intolerances        SOCIAL HISTORY:    FAMILY HISTORY:  Family history of hypertension (Father, Mother)        Vital Signs Last 24 Hrs  T(C): 36.3 (23 Jul 2023 15:00), Max: 36.3 (23 Jul 2023 15:00)  T(F): 97.3 (23 Jul 2023 15:00), Max: 97.3 (23 Jul 2023 15:00)  HR: 70 (24 Jul 2023 11:00) (57 - 70)  BP: 166/85 (24 Jul 2023 11:00) (158/75 - 188/53)  BP(mean): 104 (24 Jul 2023 11:00) (80 - 104)  RR: 16 (24 Jul 2023 11:00) (10 - 27)  SpO2: 99% (24 Jul 2023 10:00) (89% - 100%)    Parameters below as of 24 Jul 2023 10:00  Patient On (Oxygen Delivery Method): nasal cannula  O2 Flow (L/min): 3      PHYSICAL EXAM:      General: No distress, No anxiety  VITALS  T(C): 36.3 (07-23-23 @ 15:00), Max: 36.3 (07-23-23 @ 15:00)  HR: 70 (07-24-23 @ 11:00) (57 - 70)  BP: 166/85 (07-24-23 @ 11:00) (158/75 - 188/53)  RR: 16 (07-24-23 @ 11:00) (10 - 27)  SpO2: 99% (07-24-23 @ 10:00) (89% - 100%)               Lung    : No resp distress  Abdo:   : Soft, Non tender, No guarding, No distension   Back    : No CVAT b/l  Genitalia female: Pérez with yellow urine     LABS:                        7.8    3.85  )-----------( 107      ( 24 Jul 2023 05:27 )             23.7     07-24    139  |  111<H>  |  26<H>  ----------------------------<  101<H>  4.3   |  23  |  1.38<H>    Ca    8.2<L>      24 Jul 2023 05:27  Phos  3.4     07-24  Mg     2.4     07-24    TPro  6.1  /  Alb  3.0<L>  /  TBili  0.7  /  DBili  x   /  AST  81<H>  /  ALT  132<H>  /  AlkPhos  81  07-23      Urinalysis Basic - ( 24 Jul 2023 05:27 )    Color: x / Appearance: x / SG: x / pH: x  Gluc: 101 mg/dL / Ketone: x  / Bili: x / Urobili: x   Blood: x / Protein: x / Nitrite: x   Leuk Esterase: x / RBC: x / WBC x   Sq Epi: x / Non Sq Epi: x / Bacteria: x        RADIOLOGY & ADDITIONAL STUDIES:  < from: CT Abdomen and Pelvis No Cont (07.20.23 @ 19:01) >  ACC: [id number] EXAM:  CT ABDOMEN AND PELVIS   ORDERED BY: INOCENCIA NEWMAN     PROCEDURE DATE:  07/20/2023          INTERPRETATION:  CLINICAL INFORMATION: Urinary tract infection.    COMPARISON: 6/8/2023    CONTRAST/COMPLICATIONS:  IV Contrast: NONE  Oral Contrast: None  Complications: None    PROCEDURE:  CT of the Abdomen and Pelvis was performed.  Sagittal and coronal reformats were performed.    FINDINGS:  Evaluation of solid organs and vascular structures is limited without   intravenous contrast.    LOWER CHEST: Mild fibrotic changes again noted. Trace bilateral pleural   effusions. Cardiomegaly. Coronary calcifications. Small hiatal hernia.    LIVER: Within normal limits.  BILE DUCTS: Normal caliber.  GALLBLADDER: Cholelithiasis.. No pericholecystic inflammation.  SPLEEN: Within normal limits.  PANCREAS: Fatty atrophy.  ADRENALS: Within normal limits.  KIDNEYS/URETERS: Excreted contrast within the collecting systems and   ureters from recent exam is slightly limited evaluation of. No   hydronephrosis or obstructing stone. Left renal cyst.    BLADDER: Collapsed around Pérez catheter, containing excreted contrast   material.  REPRODUCTIVE ORGANS: No pelvic mass.    BOWEL: No bowel obstruction. Appendix is normal. Large rectal stool   burden and overall moderate colonic stool burden. Mild wall thickening of   the ascending colon, transverse colon and proximal descending colon.  PERITONEUM: No ascites.  VESSELS: Atherosclerotic changes.  RETROPERITONEUM/LYMPH NODES: No lymphadenopathy.  ABDOMINAL WALL: Small fat-containing umbilical hernia.  BONES: Degenerative changes. Osteopenia.    IMPRESSION:  No hydronephrosis or obstructing stone.    Mild wall thickening of the ascending colon, transverse colon proximal   descending colonmay reflect underdistention or colitis. Clinical   correlation recommended.        --- End of Report ---            ROSIBEL RODRIGUEZ MD; Attending Radiologist  This document has been electronically signed. Jul 20 2023  7:13PM    < end of copied text >

## 2023-07-24 NOTE — CONSULT NOTE ADULT - SUBJECTIVE AND OBJECTIVE BOX
Patient is a 89y old  Female who presents with a chief complaint of Weakness (24 Jul 2023 11:18)    HPI:  89 y/o F with PMH HTN, HLD, severe AS, HFpEF, hx of pérez, hx of aspiration pneumonia and UTI presents with weakness. Pt reports feeling cold and shaking when she woke up this morning. She also felt dizzy and felt warm. Pt was recently admitted for UTI and urinary retention. She was discharged on PO antibiotics which were completed and a pérez upon discharge. Denies fevers, chest pain, SOB, abdominal pain, N/V, diarrhea/constipation.  Prior admission  7/10/23: Weakness -> UTI/urinary retention -> PO Ceftin, pérez was continued upon d/c  ER course: Temp 100.1F, BP 97/41-98/41, SpO2 87% -> 98% on 2L of NS. Labs: WBC 11.21, Hb 10.2 (baseline ~10), , neutrophils 92.1%, CO2 20, Cr 1.45 (baseline ~1.4), glucose 113, AST 38, BNP 2123. UA: moderate leukocyte esterase, small blood, WBC 11-25, RBC 3-5, moderate bacteria. COVID and RVP negative. Imaging:   - CTA: No pulmonary embolism. No pneumonia. New trace pleural effusions. Unchanged anterior mediastinal nodules, largest approximately 1.5 cm. Unchanged soft tissue density along the right wall of the midesophagus, stable dating back to 2/21/2021.  Pt was given Levaquin, 500 ml of NS, Tylenol. She was admitted to med/surg for further management. tx to ICU d/t hypotension, was on pressors then taken on 7/22 am. Given IV merrem 7/20-7/24 for UTI.       PMH: as above  PSH: as above  Meds: per reconciliation sheet, noted below  MEDICATIONS  (STANDING):  ascorbic acid 500 milliGRAM(s) Oral daily  atorvastatin 20 milliGRAM(s) Oral at bedtime  chlorhexidine 2% Cloths 1 Application(s) Topical <User Schedule>  cholecalciferol 1000 Unit(s) Oral daily  folic acid 1 milliGRAM(s) Oral daily  gabapentin 300 milliGRAM(s) Oral two times a day  heparin   Injectable 5000 Unit(s) SubCutaneous every 8 hours  hydrALAZINE 50 milliGRAM(s) Oral three times a day  hydrALAZINE      lidocaine   4% Patch 1 Patch Transdermal every 24 hours  melatonin 3 milliGRAM(s) Oral at bedtime  pantoprazole    Tablet 40 milliGRAM(s) Oral before breakfast  polyethylene glycol 3350 17 Gram(s) Oral two times a day  senna 2 Tablet(s) Oral at bedtime  thiamine 100 milliGRAM(s) Oral daily  valsartan 160 milliGRAM(s) Oral daily      Allergies    Ceftin (Hives; Rash)    Intolerances      Social: no smoking, no alcohol, no illegal drugs; no recent travel, no exposure to TB  FAMILY HISTORY:  Family history of hypertension (Father, Mother)       no history of premature cardiovascular disease in first degree relatives    ROS: the patient denies fever, no chills, no HA, no dizziness, no sore throat, no blurry vision, no CP, no palpitations, no SOB, no cough, no abdominal pain, no diarrhea, no N/V, no dysuria, no leg pain, no claudication, no rash, no joint aches, no rectal pain or bleeding, no night sweats    All other systems reviewed and are negative    Vital Signs Last 24 Hrs  T(C): 36.3 (23 Jul 2023 15:00), Max: 36.3 (23 Jul 2023 15:00)  T(F): 97.3 (23 Jul 2023 15:00), Max: 97.3 (23 Jul 2023 15:00)  HR: 70 (24 Jul 2023 11:00) (57 - 70)  BP: 166/85 (24 Jul 2023 11:00) (158/75 - 188/53)  BP(mean): 104 (24 Jul 2023 11:00) (80 - 104)  RR: 16 (24 Jul 2023 11:00) (10 - 27)  SpO2: 99% (24 Jul 2023 10:00) (89% - 100%)    Parameters below as of 24 Jul 2023 10:00  Patient On (Oxygen Delivery Method): nasal cannula  O2 Flow (L/min): 3    Daily     Daily     PE:  Constitutional: NAD   HEENT: NC/AT, EOMI, PERRLA, conjunctivae clear; ears and nose atraumatic; pharynx benign  Neck: supple; thyroid not palpable  Back: no tenderness  Respiratory: respiratory effort normal; clear to auscultation  Cardiovascular: S1S2 regular, no murmurs  Abdomen: soft, not tender, not distended, positive BS; liver and spleen WNL  Genitourinary: no suprapubic tenderness  Lymphatic: no LN palpable  Musculoskeletal: no muscle tenderness, no joint swelling or tenderness  Extremities: no pedal edema  Neurological/ Psychiatric: AxOx3, Judgement and insight normal;  moving all extremities  Skin: no rashes; no palpable lesions    Labs: all available labs reviewed                        7.8    3.85  )-----------( 107      ( 24 Jul 2023 05:27 )             23.7     07-24    139  |  111<H>  |  26<H>  ----------------------------<  101<H>  4.3   |  23  |  1.38<H>    Ca    8.2<L>      24 Jul 2023 05:27  Phos  3.4     07-24  Mg     2.4     07-24    TPro  6.1  /  Alb  3.0<L>  /  TBili  0.7  /  DBili  x   /  AST  81<H>  /  ALT  132<H>  /  AlkPhos  81  07-23     LIVER FUNCTIONS - ( 23 Jul 2023 06:14 )  Alb: 3.0 g/dL / Pro: 6.1 gm/dL / ALK PHOS: 81 U/L / ALT: 132 U/L / AST: 81 U/L / GGT: x           Urinalysis Basic - ( 24 Jul 2023 05:27 )    Color: x / Appearance: x / SG: x / pH: x  Gluc: 101 mg/dL / Ketone: x  / Bili: x / Urobili: x   Blood: x / Protein: x / Nitrite: x   Leuk Esterase: x / RBC: x / WBC x   Sq Epi: x / Non Sq Epi: x / Bacteria: x    Culture - Blood (07.20.23 @ 11:35)   Specimen Source: .Blood None  Culture Results:   No growth at 72 HoursCulture - Blood (07.20.23 @ 11:35)   Specimen Source: .Blood None  Culture Results:   No growth at 72 HoursCulture - Urine (07.20.23 @ 10:40)   Specimen Source: Clean Catch None  Culture Results:   No growth      Radiology: all available radiological tests reviewed  < from: US Duplex Venous Upper Ext Ltd, Left (07.22.23 @ 12:07) >    ACC: [id number] EXAM:  US DPLX UPR EXT VEINS LTD LT   ORDERED BY: YVROSE DARBY     PROCEDURE DATE:  07/22/2023          INTERPRETATION:  CLINICAL INFORMATION: Left arm swelling.    COMPARISON: None available.    TECHNIQUE: Duplex sonography of theLEFT UPPER extremity veins with color   and spectral Doppler, with and without compression.    FINDINGS:    The left internal jugular, subclavian, axillary and brachial veins are   patent and compressible where applicable.  The basilic vein (superficial   vein) is patent and without thrombus.  The cephalic vein (superficial   vein) is patent and without thrombus.    Doppler examination shows normal spontaneous and phasic flow.    IMPRESSION:  No evidence of left upper extremity deep venous thrombosis.    < end of copied text >  < from: CT Abdomen and Pelvis No Cont (07.20.23 @ 19:01) >    ACC: [id number] EXAM:  CT ABDOMEN AND PELVIS   ORDERED BY: INOCENCIA NEWMAN     PROCEDURE DATE:  07/20/2023          INTERPRETATION:  CLINICAL INFORMATION: Urinary tract infection.    COMPARISON: 6/8/2023    CONTRAST/COMPLICATIONS:  IV Contrast: NONE  Oral Contrast: None  Complications: None    PROCEDURE:  CT of the Abdomen and Pelvis was performed.  Sagittal and coronal reformats were performed.    FINDINGS:  Evaluation of solid organs and vascular structures is limited without   intravenous contrast.    LOWER CHEST: Mild fibrotic changes again noted. Trace bilateral pleural   effusions. Cardiomegaly. Coronary calcifications. Small hiatal hernia.    LIVER: Within normal limits.  BILE DUCTS: Normal caliber.  GALLBLADDER: Cholelithiasis.. No pericholecystic inflammation.  SPLEEN: Within normal limits.  PANCREAS: Fatty atrophy.  ADRENALS: Within normal limits.  KIDNEYS/URETERS: Excreted contrast within the collecting systems and   ureters from recent exam is slightly limited evaluation of. No   hydronephrosis or obstructing stone. Left renal cyst.    BLADDER: Collapsed around Pérez catheter, containing excreted contrast   material.  REPRODUCTIVE ORGANS: No pelvic mass.    BOWEL: No bowel obstruction. Appendix is normal. Large rectal stool   burden and overall moderate colonic stool burden. Mild wall thickening of   the ascending colon, transverse colon and proximal descending colon.  PERITONEUM: No ascites.  VESSELS: Atherosclerotic changes.  RETROPERITONEUM/LYMPH NODES: No lymphadenopathy.  ABDOMINAL WALL: Small fat-containing umbilical hernia.  BONES: Degenerative changes. Osteopenia.    IMPRESSION:  No hydronephrosis or obstructing stone.    Mild wall thickening of the ascending colon, transverse colon proximal   descending colonmay reflect underdistention or colitis. Clinical   correlation recommended.        Advanced directives addressed: full resuscitation

## 2023-07-24 NOTE — PROGRESS NOTE ADULT - SUBJECTIVE AND OBJECTIVE BOX
CHIEF COMPLAINT/INTERVAL HISTORY:    Patient is a 89y old  Female who presents with a chief complaint of Weakness (24 Jul 2023 06:34)      HPI:  89 y/o F with PMH HTN, HLD, severe AS, HFpEF, hx of pérez, hx of aspiration pneumonia and UTI presents with weakness. Pt reports feeling cold and shaking when she woke up this morning. She also felt dizzy and felt warm. Pt was recently admitted for UTI and urinary retention. She was discharged on PO antibiotics which were completed and a pérez upon discharge.    ER course: Temp 100.1F, BP 97/41-98/41, SpO2 87% -> 98% on 2L of NS. Labs: WBC 11.21, Hb 10.2 (baseline ~10), , neutrophils 92.1%, CO2 20, Cr 1.45 (baseline ~1.4), glucose 113, AST 38, BNP 2123. UA: moderate leukocyte esterase, small blood, WBC 11-25, RBC 3-5, moderate bacteria. COVID and RVP negative.   EKG: Junctional rhythm with PVCs HR 51 bpm, normal intervals, LVH (personally reviewed).     Imaging:   - CTA: No pulmonary embolism. No pneumonia. New trace pleural effusions. Unchanged anterior mediastinal nodules, largest approximately 1.5 cm. Unchanged soft tissue density along the right wall of the midesophagus, stable dating back to 2/21/2021.   - CXR: increased vascular congestion, no consolidation, no effusion, no pneumothorax (personally reviewed).  - XR right shoulder: Mild CHF increased from prior. Degeneration right shoulder.    Pt was given Levaquin, 500 ml of NS, Tylenol. She is being admitted to med/surg for further management.  (20 Jul 2023 17:26)    Downgraded from ICU    ICU Vital Signs Last 24 Hrs  T(C): 36.3 (23 Jul 2023 15:00), Max: 36.4 (23 Jul 2023 08:00)  T(F): 97.3 (23 Jul 2023 15:00), Max: 97.6 (23 Jul 2023 08:00)  HR: 59 (24 Jul 2023 06:00) (57 - 70)  BP: 186/61 (24 Jul 2023 02:00) (148/49 - 186/61)  BP(mean): 93 (24 Jul 2023 02:00) (71 - 93)  ABP: --  ABP(mean): --  RR: 16 (24 Jul 2023 06:00) (10 - 27)  SpO2: 98% (24 Jul 2023 06:00) (89% - 100%)    O2 Parameters below as of 24 Jul 2023 00:00  Patient On (Oxygen Delivery Method): nasal cannula              MEDICATIONS  (STANDING):  ascorbic acid 500 milliGRAM(s) Oral daily  atorvastatin 20 milliGRAM(s) Oral at bedtime  chlorhexidine 2% Cloths 1 Application(s) Topical <User Schedule>  cholecalciferol 1000 Unit(s) Oral daily  folic acid 1 milliGRAM(s) Oral daily  gabapentin 300 milliGRAM(s) Oral two times a day  heparin   Injectable 5000 Unit(s) SubCutaneous every 8 hours  hydrALAZINE 50 milliGRAM(s) Oral three times a day  hydrALAZINE      lidocaine   4% Patch 1 Patch Transdermal every 24 hours  melatonin 3 milliGRAM(s) Oral at bedtime  meropenem  IVPB 500 milliGRAM(s) IV Intermittent every 12 hours  pantoprazole    Tablet 40 milliGRAM(s) Oral before breakfast  polyethylene glycol 3350 17 Gram(s) Oral two times a day  senna 2 Tablet(s) Oral at bedtime  thiamine 100 milliGRAM(s) Oral daily    MEDICATIONS  (PRN):  aluminum hydroxide/magnesium hydroxide/simethicone Suspension 30 milliLiter(s) Oral every 4 hours PRN Dyspepsia  ondansetron Injectable 4 milliGRAM(s) IV Push every 8 hours PRN Nausea and/or Vomiting        PHYSICAL EXAM:    GENERAL: NAD, well-groomed, well-developed  NERVOUS SYSTEM:  Alert & Oriented X3, Motor Strength 5/5 B/L upper and lower extremities; DTRs 2+ intact and symmetric  CHEST/LUNG: Clear to auscultation bilaterally; No rales, rhonchi, wheezing, or rubs  HEART: Regular rate and rhythm; No murmurs, rubs, or gallops  ABDOMEN: Soft, Nontender, Nondistended; Bowel sounds present  EXTREMITIES:  2+ Peripheral Pulses, No clubbing, cyanosis, or edema    LABS:                        7.8    3.85  )-----------( 107      ( 24 Jul 2023 05:27 )             23.7     07-24    139  |  111<H>  |  26<H>  ----------------------------<  101<H>  4.3   |  23  |  1.38<H>    Ca    8.2<L>      24 Jul 2023 05:27  Phos  3.4     07-24  Mg     2.4     07-24    TPro  6.1  /  Alb  3.0<L>  /  TBili  0.7  /  DBili  x   /  AST  81<H>  /  ALT  132<H>  /  AlkPhos  81  07-23      Urinalysis Basic - ( 24 Jul 2023 05:27 )    Color: x / Appearance: x / SG: x / pH: x  Gluc: 101 mg/dL / Ketone: x  / Bili: x / Urobili: x   Blood: x / Protein: x / Nitrite: x   Leuk Esterase: x / RBC: x / WBC x   Sq Epi: x / Non Sq Epi: x / Bacteria: x        CAPILLARY BLOOD GLUCOSE          Acute UTI  Severe aortic stenosis  Mitral regurgitation  Sepsis, unspecified organism  Junctional bradycardia  Supraventricular tachycardia          Off pressor since 7/22 am   Hypertensive now, hydralazine added overnight   Continue holding metoprolol for recent junctional bradycardia   Continue statin   On meropenem   Current BCx, UCx neg   Previously with E.coli in urine, pansensitive   Leucopenic now, trend WBC   BP running high despite starting Hydralazine 25 mg tid,  increase Hydralazine to 50 mg   Renal fn improving   Acidosis resolved  Chronic Pérez (Dr Rojelio Collins)     LUE looks improved           DNR, DNI, prognosis poor overall        BP running high despite starting Hydralazine 25 mg tid.  (-180's systolic).  Will increase Hydralazine to 50 mg  CHIEF COMPLAINT/INTERVAL HISTORY:    Patient is a 89y old  Female who presents with a chief complaint of Weakness (24 Jul 2023 06:34)      HPI:  89 y/o F with PMH HTN, HLD, severe AS, HFpEF, hx of pérez, hx of aspiration pneumonia and UTI presents with weakness. Pt reports feeling cold and shaking when she woke up this morning. She also felt dizzy and felt warm. Pt was recently admitted for UTI and urinary retention. She was discharged on PO antibiotics which were completed and a pérez upon discharge.    ER course: Temp 100.1F, BP 97/41-98/41, SpO2 87% -> 98% on 2L of NS. Labs: WBC 11.21, Hb 10.2 (baseline ~10), , neutrophils 92.1%, CO2 20, Cr 1.45 (baseline ~1.4), glucose 113, AST 38, BNP 2123. UA: moderate leukocyte esterase, small blood, WBC 11-25, RBC 3-5, moderate bacteria. COVID and RVP negative.   EKG: Junctional rhythm with PVCs HR 51 bpm, normal intervals, LVH (personally reviewed).     Imaging:   - CTA: No pulmonary embolism. No pneumonia. New trace pleural effusions. Unchanged anterior mediastinal nodules, largest approximately 1.5 cm. Unchanged soft tissue density along the right wall of the midesophagus, stable dating back to 2/21/2021.   - CXR: increased vascular congestion, no consolidation, no effusion, no pneumothorax (personally reviewed).  - XR right shoulder: Mild CHF increased from prior. Degeneration right shoulder.    Pt was given Levaquin, 500 ml of NS, Tylenol. She is being admitted to med/surg for further management.  (20 Jul 2023 17:26)    Downgraded from ICU  HH dropped  Pt has a cronic Pérez    ICU Vital Signs Last 24 Hrs  T(C): 36.3 (23 Jul 2023 15:00), Max: 36.4 (23 Jul 2023 08:00)  T(F): 97.3 (23 Jul 2023 15:00), Max: 97.6 (23 Jul 2023 08:00)  HR: 59 (24 Jul 2023 06:00) (57 - 70)  BP: 186/61 (24 Jul 2023 02:00) (148/49 - 186/61)  BP(mean): 93 (24 Jul 2023 02:00) (71 - 93)  ABP: --  ABP(mean): --  RR: 16 (24 Jul 2023 06:00) (10 - 27)  SpO2: 98% (24 Jul 2023 06:00) (89% - 100%)    O2 Parameters below as of 24 Jul 2023 00:00  Patient On (Oxygen Delivery Method): nasal cannula              MEDICATIONS  (STANDING):  ascorbic acid 500 milliGRAM(s) Oral daily  atorvastatin 20 milliGRAM(s) Oral at bedtime  chlorhexidine 2% Cloths 1 Application(s) Topical <User Schedule>  cholecalciferol 1000 Unit(s) Oral daily  folic acid 1 milliGRAM(s) Oral daily  gabapentin 300 milliGRAM(s) Oral two times a day  heparin   Injectable 5000 Unit(s) SubCutaneous every 8 hours  hydrALAZINE 50 milliGRAM(s) Oral three times a day  hydrALAZINE      lidocaine   4% Patch 1 Patch Transdermal every 24 hours  melatonin 3 milliGRAM(s) Oral at bedtime  meropenem  IVPB 500 milliGRAM(s) IV Intermittent every 12 hours  pantoprazole    Tablet 40 milliGRAM(s) Oral before breakfast  polyethylene glycol 3350 17 Gram(s) Oral two times a day  senna 2 Tablet(s) Oral at bedtime  thiamine 100 milliGRAM(s) Oral daily    MEDICATIONS  (PRN):  aluminum hydroxide/magnesium hydroxide/simethicone Suspension 30 milliLiter(s) Oral every 4 hours PRN Dyspepsia  ondansetron Injectable 4 milliGRAM(s) IV Push every 8 hours PRN Nausea and/or Vomiting        PHYSICAL EXAM:    GENERAL: NAD, well-groomed, well-developed  NERVOUS SYSTEM:  Alert & Oriented X3, Motor Strength 5/5 B/L upper and lower extremities; DTRs 2+ intact and symmetric  CHEST/LUNG: Clear to auscultation bilaterally; No rales, rhonchi, wheezing, or rubs  HEART: Regular rate and rhythm; III/VI LIANA rubs, or gallops  ABDOMEN: Soft, Nontender, Nondistended; Bowel sounds present  EXTREMITIES:  2+ Peripheral Pulses, No clubbing, cyanosis, or edema    LABS:                        7.8    3.85  )-----------( 107      ( 24 Jul 2023 05:27 )             23.7     07-24    139  |  111<H>  |  26<H>  ----------------------------<  101<H>  4.3   |  23  |  1.38<H>    Ca    8.2<L>      24 Jul 2023 05:27  Phos  3.4     07-24  Mg     2.4     07-24    TPro  6.1  /  Alb  3.0<L>  /  TBili  0.7  /  DBili  x   /  AST  81<H>  /  ALT  132<H>  /  AlkPhos  81  07-23      Urinalysis Basic - ( 24 Jul 2023 05:27 )    Color: x / Appearance: x / SG: x / pH: x  Gluc: 101 mg/dL / Ketone: x  / Bili: x / Urobili: x   Blood: x / Protein: x / Nitrite: x   Leuk Esterase: x / RBC: x / WBC x   Sq Epi: x / Non Sq Epi: x / Bacteria: x        CAPILLARY BLOOD GLUCOSE          Acute UTI  Severe aortic stenosis  Mitral regurgitation  Sepsis, unspecified organism  Junctional bradycardia  Supraventricular tachycardia          Off pressor since 7/22 am   Hypertensive now, hydralazine added overnight   Continue holding metoprolol for recent junctional bradycardia   Continue statin   dc meropenem   Current BCx, UCx neg   BP running high despite starting Hydralazine 25 mg tid,  increase Hydralazine to 50 mg   add Diovan also  Chronic Diaz (Dr Rojelio Collins)     PT [place]         CHIEF COMPLAINT/INTERVAL HISTORY:    Patient is a 89y old  Female who presents with a chief complaint of Weakness (24 Jul 2023 06:34)      HPI:  89 y/o F with PMH HTN, HLD, severe AS, HFpEF, hx of pérez, hx of aspiration pneumonia and UTI presents with weakness. Pt reports feeling cold and shaking when she woke up this morning. She also felt dizzy and felt warm. Pt was recently admitted for UTI and urinary retention. She was discharged on PO antibiotics which were completed and a pérez upon discharge.    ER course: Temp 100.1F, BP 97/41-98/41, SpO2 87% -> 98% on 2L of NS. Labs: WBC 11.21, Hb 10.2 (baseline ~10), , neutrophils 92.1%, CO2 20, Cr 1.45 (baseline ~1.4), glucose 113, AST 38, BNP 2123. UA: moderate leukocyte esterase, small blood, WBC 11-25, RBC 3-5, moderate bacteria. COVID and RVP negative.   EKG: Junctional rhythm with PVCs HR 51 bpm, normal intervals, LVH (personally reviewed).     Imaging:   - CTA: No pulmonary embolism. No pneumonia. New trace pleural effusions. Unchanged anterior mediastinal nodules, largest approximately 1.5 cm. Unchanged soft tissue density along the right wall of the midesophagus, stable dating back to 2/21/2021.   - CXR: increased vascular congestion, no consolidation, no effusion, no pneumothorax (personally reviewed).  - XR right shoulder: Mild CHF increased from prior. Degeneration right shoulder.    Pt was given Levaquin, 500 ml of NS, Tylenol. She is being admitted to med/surg for further management.  (20 Jul 2023 17:26)    Downgraded from ICU  HH dropped  Pt has a cronic Pérez  Pt c/o CP today, TERRENCE - diffuse B lines, CT confirmed pulm edema    Vital Signs Last 24 Hrs  T(C): --  T(F): --  HR: 65 (07-24-23 @ 13:00) (57 - 70)  BP: 201/59 (07-24-23 @ 13:00) (162/62 - 201/59)  BP(mean): 91 (07-24-23 @ 13:00) (80 - 104)  RR: 24 (07-24-23 @ 13:00) (10 - 24)  SpO2: 97% (07-24-23 @ 13:00) (95% - 100%)                  MEDICATIONS  (STANDING):  ascorbic acid 500 milliGRAM(s) Oral daily  atorvastatin 20 milliGRAM(s) Oral at bedtime  chlorhexidine 2% Cloths 1 Application(s) Topical <User Schedule>  cholecalciferol 1000 Unit(s) Oral daily  folic acid 1 milliGRAM(s) Oral daily  gabapentin 300 milliGRAM(s) Oral two times a day  heparin   Injectable 5000 Unit(s) SubCutaneous every 8 hours  hydrALAZINE 50 milliGRAM(s) Oral three times a day  hydrALAZINE      lidocaine   4% Patch 1 Patch Transdermal every 24 hours  melatonin 3 milliGRAM(s) Oral at bedtime  meropenem  IVPB 500 milliGRAM(s) IV Intermittent every 12 hours  pantoprazole    Tablet 40 milliGRAM(s) Oral before breakfast  polyethylene glycol 3350 17 Gram(s) Oral two times a day  senna 2 Tablet(s) Oral at bedtime  thiamine 100 milliGRAM(s) Oral daily    MEDICATIONS  (PRN):  aluminum hydroxide/magnesium hydroxide/simethicone Suspension 30 milliLiter(s) Oral every 4 hours PRN Dyspepsia  ondansetron Injectable 4 milliGRAM(s) IV Push every 8 hours PRN Nausea and/or Vomiting        PHYSICAL EXAM:    GENERAL: NAD, well-groomed, well-developed  NERVOUS SYSTEM:  Alert & Oriented X3, Motor Strength 5/5 B/L upper and lower extremities; DTRs 2+ intact and symmetric  CHEST/LUNG: Clear to auscultation bilaterally; No rales, rhonchi, wheezing, or rubs  HEART: Regular rate and rhythm; III/VI LIANA rubs, or gallops  ABDOMEN: Soft, Nontender, Nondistended; Bowel sounds present  EXTREMITIES:  2+ Peripheral Pulses, No clubbing, cyanosis, or edema    LABS:                        7.8    3.85  )-----------( 107      ( 24 Jul 2023 05:27 )             23.7     07-24    139  |  111<H>  |  26<H>  ----------------------------<  101<H>  4.3   |  23  |  1.38<H>    Ca    8.2<L>      24 Jul 2023 05:27  Phos  3.4     07-24  Mg     2.4     07-24    TPro  6.1  /  Alb  3.0<L>  /  TBili  0.7  /  DBili  x   /  AST  81<H>  /  ALT  132<H>  /  AlkPhos  81  07-23      Urinalysis Basic - ( 24 Jul 2023 05:27 )    Color: x / Appearance: x / SG: x / pH: x  Gluc: 101 mg/dL / Ketone: x  / Bili: x / Urobili: x   Blood: x / Protein: x / Nitrite: x   Leuk Esterase: x / RBC: x / WBC x   Sq Epi: x / Non Sq Epi: x / Bacteria: x        CAPILLARY BLOOD GLUCOSE          Acute UTI  Severe aortic stenosis  Mitral regurgitation  Sepsis, unspecified organism  Junctional bradycardia  Supraventricular tachycardia  Pulm edema        start IV Lasix  Hypertensive now, hydralazine added overnight   Continue holding metoprolol for recent junctional bradycardia   Continue statin   dc meropenem   Current BCx, UCx neg   BP running high despite starting Hydralazine 25 mg tid,  increase Hydralazine to 50 mg   add Diovan also  Chronic Diaz (Dr Rojelio Collins)     PT eval when stable  might need rehab  trend trops  EKG non ischemic  d/w daughter

## 2023-07-24 NOTE — CONSULT NOTE ADULT - CONSULT REQUESTED DATE/TIME
24-Jul-2023 11:33
24-Jul-2023
20-Jul-2023 17:44
21-Jul-2023 10:30
21-Jul-2023 17:30
21-Jul-2023 07:22

## 2023-07-24 NOTE — PROGRESS NOTE ADULT - ASSESSMENT
7/21/23:  Pt with above history and severe AS and recurrent sepsis from recurrent UTI's making doing a TAVR impossible at this time.  Continue to treat sepsis and hopefully the rhythm will stabilize.  Bradycardia is usually better for AS than tachycardia.  Continue supportive care and treatment as outlined by medicine etal.  Dr Ferreira will be covering mo over the weekend.    7/22/23:  patient with persistent sepsis with elevated lactate, WBC and with increasing BNP and severe AS playing a complicating role.   F/u ECHO with EF 60%  1-AS-untreatable  4-BFA-lztgisbuq in nature-on saline drip due to hyponatremia at 75cc/hr-care not to overload patient as BP is stable-should be off inotropic support  3-arrhythmia-EPS involved and felt nothing further to do.    9-iranmd-zbutippx IV abx, O2 and supportive care  5-HTN-if patient maintains BP elevated in the 150-170's range off pressors, consider restarting hydralazine (was on 25mg TID at home) and eventually, once bradycardia resolve, restarting metoprolol 25mg daily (taking at home as well)    7/23/23:    patient with resolving septic shock  1-AS-not intervenable at this time  2-BP stable and elevated, restart Hydralazine 25mg TID; do not start metoprolol at this time  3-CHF-due to valve disease    7/24/23:  BP running high despite starting Hydralazine 25 mg tid.  (-180's systolic).  Will increase Hydralazine to 50 mg tid.  Continue as per medicine etal.  Will follow.  will be followed by Dr. Collins Monday

## 2023-07-24 NOTE — CONSULT NOTE ADULT - CONSULT REASON
GOC
Junctional bradycardia
Septic Shock iso UTI
hypotension  uti
UTI
Sepsis, SVT's => junctional bradycardia, hypotension

## 2023-07-25 LAB
ADD ON TEST-SPECIMEN IN LAB: SIGNIFICANT CHANGE UP
ANION GAP SERPL CALC-SCNC: 2 MMOL/L — LOW (ref 5–17)
BUN SERPL-MCNC: 26 MG/DL — HIGH (ref 7–23)
CALCIUM SERPL-MCNC: 8.9 MG/DL — SIGNIFICANT CHANGE UP (ref 8.5–10.1)
CHLORIDE SERPL-SCNC: 109 MMOL/L — HIGH (ref 96–108)
CO2 SERPL-SCNC: 30 MMOL/L — SIGNIFICANT CHANGE UP (ref 22–31)
CREAT SERPL-MCNC: 1.33 MG/DL — HIGH (ref 0.5–1.3)
CULTURE RESULTS: SIGNIFICANT CHANGE UP
CULTURE RESULTS: SIGNIFICANT CHANGE UP
EGFR: 37 ML/MIN/1.73M2 — LOW
GLUCOSE SERPL-MCNC: 100 MG/DL — HIGH (ref 70–99)
HCT VFR BLD CALC: 25.2 % — LOW (ref 34.5–45)
HGB BLD-MCNC: 8.5 G/DL — LOW (ref 11.5–15.5)
MCHC RBC-ENTMCNC: 31.5 PG — SIGNIFICANT CHANGE UP (ref 27–34)
MCHC RBC-ENTMCNC: 33.7 GM/DL — SIGNIFICANT CHANGE UP (ref 32–36)
MCV RBC AUTO: 93.3 FL — SIGNIFICANT CHANGE UP (ref 80–100)
PLATELET # BLD AUTO: 120 K/UL — LOW (ref 150–400)
POTASSIUM SERPL-MCNC: 4.6 MMOL/L — SIGNIFICANT CHANGE UP (ref 3.5–5.3)
POTASSIUM SERPL-SCNC: 4.6 MMOL/L — SIGNIFICANT CHANGE UP (ref 3.5–5.3)
RBC # BLD: 2.7 M/UL — LOW (ref 3.8–5.2)
RBC # FLD: 14.9 % — HIGH (ref 10.3–14.5)
SODIUM SERPL-SCNC: 141 MMOL/L — SIGNIFICANT CHANGE UP (ref 135–145)
SPECIMEN SOURCE: SIGNIFICANT CHANGE UP
SPECIMEN SOURCE: SIGNIFICANT CHANGE UP
WBC # BLD: 3.93 K/UL — SIGNIFICANT CHANGE UP (ref 3.8–10.5)
WBC # FLD AUTO: 3.93 K/UL — SIGNIFICANT CHANGE UP (ref 3.8–10.5)

## 2023-07-25 PROCEDURE — 99233 SBSQ HOSP IP/OBS HIGH 50: CPT

## 2023-07-25 PROCEDURE — 99497 ADVNCD CARE PLAN 30 MIN: CPT

## 2023-07-25 PROCEDURE — 99232 SBSQ HOSP IP/OBS MODERATE 35: CPT

## 2023-07-25 RX ORDER — NYSTATIN 500MM UNIT
500000 POWDER (EA) MISCELLANEOUS
Refills: 0 | Status: DISCONTINUED | OUTPATIENT
Start: 2023-07-25 | End: 2023-07-27

## 2023-07-25 RX ADMIN — Medication 1 MILLIGRAM(S): at 08:49

## 2023-07-25 RX ADMIN — HEPARIN SODIUM 5000 UNIT(S): 5000 INJECTION INTRAVENOUS; SUBCUTANEOUS at 08:47

## 2023-07-25 RX ADMIN — Medication 100 MILLIGRAM(S): at 08:49

## 2023-07-25 RX ADMIN — VALSARTAN 160 MILLIGRAM(S): 80 TABLET ORAL at 08:49

## 2023-07-25 RX ADMIN — Medication 500 MILLIGRAM(S): at 08:49

## 2023-07-25 RX ADMIN — Medication 40 MILLIGRAM(S): at 21:05

## 2023-07-25 RX ADMIN — ATORVASTATIN CALCIUM 20 MILLIGRAM(S): 80 TABLET, FILM COATED ORAL at 21:08

## 2023-07-25 RX ADMIN — HEPARIN SODIUM 5000 UNIT(S): 5000 INJECTION INTRAVENOUS; SUBCUTANEOUS at 21:04

## 2023-07-25 RX ADMIN — CHLORHEXIDINE GLUCONATE 1 APPLICATION(S): 213 SOLUTION TOPICAL at 08:42

## 2023-07-25 RX ADMIN — Medication 40 MILLIGRAM(S): at 08:47

## 2023-07-25 RX ADMIN — PANTOPRAZOLE SODIUM 40 MILLIGRAM(S): 20 TABLET, DELAYED RELEASE ORAL at 08:48

## 2023-07-25 RX ADMIN — Medication 50 MILLIGRAM(S): at 14:01

## 2023-07-25 RX ADMIN — GABAPENTIN 300 MILLIGRAM(S): 400 CAPSULE ORAL at 21:05

## 2023-07-25 RX ADMIN — HEPARIN SODIUM 5000 UNIT(S): 5000 INJECTION INTRAVENOUS; SUBCUTANEOUS at 14:01

## 2023-07-25 RX ADMIN — Medication 500000 UNIT(S): at 18:01

## 2023-07-25 RX ADMIN — Medication 50 MILLIGRAM(S): at 08:47

## 2023-07-25 RX ADMIN — Medication 50 MILLIGRAM(S): at 21:08

## 2023-07-25 RX ADMIN — LIDOCAINE 1 PATCH: 4 CREAM TOPICAL at 21:09

## 2023-07-25 RX ADMIN — Medication 1000 UNIT(S): at 08:49

## 2023-07-25 RX ADMIN — Medication 3 MILLIGRAM(S): at 21:05

## 2023-07-25 RX ADMIN — SENNA PLUS 2 TABLET(S): 8.6 TABLET ORAL at 21:05

## 2023-07-25 RX ADMIN — POLYETHYLENE GLYCOL 3350 17 GRAM(S): 17 POWDER, FOR SOLUTION ORAL at 21:05

## 2023-07-25 RX ADMIN — GABAPENTIN 300 MILLIGRAM(S): 400 CAPSULE ORAL at 08:49

## 2023-07-25 NOTE — PROGRESS NOTE ADULT - SUBJECTIVE AND OBJECTIVE BOX
CHIEF COMPLAINT/INTERVAL HISTORY:    Patient is a 89y old  Female who presents with a chief complaint of Weakness (24 Jul 2023 06:34)      HPI:  89 y/o F with PMH HTN, HLD, severe AS, HFpEF, hx of pérez, hx of aspiration pneumonia and UTI presents with weakness. Pt reports feeling cold and shaking when she woke up this morning. She also felt dizzy and felt warm. Pt was recently admitted for UTI and urinary retention. She was discharged on PO antibiotics which were completed and a pérez upon discharge.    ER course: Temp 100.1F, BP 97/41-98/41, SpO2 87% -> 98% on 2L of NS. Labs: WBC 11.21, Hb 10.2 (baseline ~10), , neutrophils 92.1%, CO2 20, Cr 1.45 (baseline ~1.4), glucose 113, AST 38, BNP 2123. UA: moderate leukocyte esterase, small blood, WBC 11-25, RBC 3-5, moderate bacteria. COVID and RVP negative.   EKG: Junctional rhythm with PVCs HR 51 bpm, normal intervals, LVH (personally reviewed).     Imaging:   - CTA: No pulmonary embolism. No pneumonia. New trace pleural effusions. Unchanged anterior mediastinal nodules, largest approximately 1.5 cm. Unchanged soft tissue density along the right wall of the midesophagus, stable dating back to 2/21/2021.   - CXR: increased vascular congestion, no consolidation, no effusion, no pneumothorax (personally reviewed).  - XR right shoulder: Mild CHF increased from prior. Degeneration right shoulder.    Pt was given Levaquin, 500 ml of NS, Tylenol. She is being admitted to med/surg for further management.  (20 Jul 2023 17:26)    Downgraded from ICU  HH dropped  Pt has a cronic Pérez  Pt c/o CP today, TERRENCE - diffuse B lines, CT confirmed pulm edema- started on IV LAsix    Vital Signs Last 24 Hrs  T(C): --  T(F): --  HR: 65 (07-24-23 @ 13:00) (57 - 70)  BP: 201/59 (07-24-23 @ 13:00) (162/62 - 201/59)  BP(mean): 91 (07-24-23 @ 13:00) (80 - 104)  RR: 24 (07-24-23 @ 13:00) (10 - 24)  SpO2: 97% (07-24-23 @ 13:00) (95% - 100%)                  MEDICATIONS  (STANDING):  ascorbic acid 500 milliGRAM(s) Oral daily  atorvastatin 20 milliGRAM(s) Oral at bedtime  chlorhexidine 2% Cloths 1 Application(s) Topical <User Schedule>  cholecalciferol 1000 Unit(s) Oral daily  folic acid 1 milliGRAM(s) Oral daily  furosemide   Injectable 40 milliGRAM(s) IV Push every 12 hours  gabapentin 300 milliGRAM(s) Oral two times a day  heparin   Injectable 5000 Unit(s) SubCutaneous every 8 hours  hydrALAZINE 50 milliGRAM(s) Oral three times a day  hydrALAZINE      lidocaine   4% Patch 1 Patch Transdermal every 24 hours  melatonin 3 milliGRAM(s) Oral at bedtime  pantoprazole    Tablet 40 milliGRAM(s) Oral before breakfast  polyethylene glycol 3350 17 Gram(s) Oral two times a day  senna 2 Tablet(s) Oral at bedtime  thiamine 100 milliGRAM(s) Oral daily  valsartan 160 milliGRAM(s) Oral daily        PHYSICAL EXAM:    GENERAL: NAD, well-groomed, well-developed  NERVOUS SYSTEM:  Alert & Oriented X3, Motor Strength 5/5 B/L upper and lower extremities; DTRs 2+ intact and symmetric  CHEST/LUNG: Clear to auscultation bilaterally; No rales, rhonchi, wheezing, or rubs  HEART: Regular rate and rhythm; III/VI LIANA rubs, or gallops  ABDOMEN: Soft, Nontender, Nondistended; Bowel sounds present  EXTREMITIES:  2+ Peripheral Pulses, No clubbing, cyanosis, or edema    LABS:                                     8.5    3.93  )-----------( 120      ( 25 Jul 2023 06:32 )             25.2           Acute UTI  Severe aortic stenosis  Mitral regurgitation  Sepsis, unspecified organism  Junctional bradycardia  Supraventricular tachycardia  Pulm edema        started IV Lasix for pulm edema   Hypertensive now, hydralazine added overnight   Continue holding metoprolol for recent junctional bradycardia   Continue statin   dc meropenem   Current BCx, UCx neg   BP running high despite starting Hydralazine 25 mg tid,  increased Hydralazine to 50 mg   added Diovan also  Chronic Pérez (Dr Rojelio Collins)     PT eval when stable  might need rehab  trend trops  EKG non ischemic  d/w daughter

## 2023-07-25 NOTE — PROGRESS NOTE ADULT - SUBJECTIVE AND OBJECTIVE BOX
CHIEF COMPLAINT: Patient is a 89y old  Female who presents with a chief complaint of Weakness (22 Jul 2023 12:59)      HPI:  89 y/o F with PMH HTN, HLD, severe AS, HFpEF, hx of pérez, hx of aspiration pneumonia and UTI presents with weakness. Pt reports feeling cold and shaking when she woke up this morning. She also felt dizzy and felt warm. Pt was recently admitted for UTI and urinary retention. She was discharged on PO antibiotics which were completed and a pérez upon discharge. Denies fevers, chest pain, SOB, abdominal pain, N/V, diarrhea/constipation.     Prior admission:  - 7/10/23: Weakness -> UTI/urinary retention -> PO Ceftin, pérez was continued upon d/c     ER course: Temp 100.1F, BP 97/41-98/41, SpO2 87% -> 98% on 2L of NS. Labs: WBC 11.21, Hb 10.2 (baseline ~10), , neutrophils 92.1%, CO2 20, Cr 1.45 (baseline ~1.4), glucose 113, AST 38, BNP 2123. UA: moderate leukocyte esterase, small blood, WBC 11-25, RBC 3-5, moderate bacteria. COVID and RVP negative.   EKG: Junctional rhythm with PVCs HR 51 bpm, normal intervals, LVH (personally reviewed).     Imaging:   - CTA: No pulmonary embolism. No pneumonia. New trace pleural effusions. Unchanged anterior mediastinal nodules, largest approximately 1.5 cm. Unchanged soft tissue density along the right wall of the midesophagus, stable dating back to 2/21/2021.   - CXR: increased vascular congestion, no consolidation, no effusion, no pneumothorax (personally reviewed).  - XR right shoulder: Mild CHF increased from prior. Degeneration right shoulder.    Pt was given Levaquin, 500 ml of NS, Tylenol. She is being admitted to med/surg for further management.  (20 Jul 2023 17:26)    7/21/23:  Pt with above history and severe AS and recurrent sepsis from recurrent UTI's making doing a TAVR impossible at this time.  Continue to treat sepsis and hopefully the rhythm will stabilize.  Bradycardia is usually better for AS than tachycardia.  Continue supportive care and treatment as outlined by medicine etal.     7/22/23-EP evaluated patient for junctional bradycardia.  Patient previously started on Levophed gtt but developed tachycardia so it was discontinued. Patient currently on phenylephrine gtt. Patient currently junctional rhythm with rates 35-70.  Denies any CP, palpitations, SOB, dizziness, lightheadedness.  Junctional bradycardia in setting of severe sepsis, severe Aortic stenosis; Phenylephrine likely contributing to junctional rhythm.  Aortic stenosis is primary issue causing the other secondary issues with recurrent UTI/sepsis preventing TAVR.  EP service felt  intervention indicated at this time and to continue to treat underlying infection.        Overnight patient with sinus bradycardia, pauses no longer than 2 seconds with sinus pause.   Sinus bradycardia overnight with brief 5 beat run of afib.  In addition, BP running high now and no signs of shock but still elevated lactate level.  WBC still in the 20's, renal insufficiency, hyponatremia still in play and BNP in the 16000's (was in the 2000's) and f/u ECHO with EF 60% as per report    7/23/23-patient with no further arrhythmia, off pressors and BP stable in the 160's.  No further fluctuations.      7/24/23:  BP running high despite starting Hydralazine 25 mg tid.  (-180's systolic).  Will increase Hydralazine to 50 mg tid.    7/25/23:  Feeling better after IV Lasix for her pulmonary edema.  BP better on Hydralazine.  No anginal chest pains.  NSR on the monitor.    PMHx: PAST MEDICAL & SURGICAL HISTORY:  HTN (hypertension)  HLD (hyperlipidemia)  Gout  Osteoarthritis  Acute on chronic diastolic congestive heart failure  Aortic stenosis  H/O CHF  Pneumonia, aspiration  Pneumonia  History of tonsillectomy in childhood      Allergies: Allergies  Ceftin (Hives; Rash)      REVIEW OF SYSTEMS:    CONSTITUTIONAL: weakness, fevers or chills  EYES/ENT: No visual changes;  No vertigo or throat pain   NECK: No pain or stiffness  RESPIRATORY: No cough, wheezing, hemoptysis; No shortness of breath  CARDIOVASCULAR: No chest pain or palpitations  GASTROINTESTINAL: No abdominal or epigastric pain. No nausea, vomiting, or hematemesis; No diarrhea or constipation. No melena or hematochezia.  GENITOURINARY: No dysuria, frequency or hematuria      Vital Signs Last 24 Hrs  T(C): 36.3 (24 Jul 2023 21:55), Max: 36.6 (24 Jul 2023 10:00)  T(F): 97.3 (24 Jul 2023 21:55), Max: 97.8 (24 Jul 2023 10:00)  HR: 75 (24 Jul 2023 21:55) (57 - 75)  BP: 168/72 (25 Jul 2023 00:30) (155/49 - 201/59)  BP(mean): 91 (24 Jul 2023 20:00) (76 - 104)  RR: 18 (24 Jul 2023 23:00) (10 - 24)  SpO2: 93% (24 Jul 2023 23:00) (90% - 100%): nasal cannula  O2 Flow (L/min): 2      I&O's Summary    21 Jul 2023 07:01  -  22 Jul 2023 07:00  --------------------------------------------------------  IN: 1284.1 mL / OUT: 678 mL / NET: 606.1 mL    22 Jul 2023 07:01  -  23 Jul 2023 05:11  --------------------------------------------------------  IN: 0 mL / OUT: 1450 mL / NET: -1450 mL        PHYSICAL EXAM:   Constitutional: NAD, awake and alert, well-developed  HEENT: PERR, EOMI, Normal Hearing, MMM  Neck:  JVD  Respiratory: Breath sounds are clear bilaterally, No wheezing, rales or rhonchi  Cardiovascular: S1 and S2, regular rate and rhythm,  Murmurs, no gallops or rubs  Gastrointestinal: Bowel Sounds present, soft, nontender, nondistended, no guarding, no rebound  Extremities:  peripheral edema      MEDICATIONS  (STANDING):  ascorbic acid 500 milliGRAM(s) Oral daily  atorvastatin 20 milliGRAM(s) Oral at bedtime  chlorhexidine 2% Cloths 1 Application(s) Topical <User Schedule>  cholecalciferol 1000 Unit(s) Oral daily  folic acid 1 milliGRAM(s) Oral daily  furosemide   Injectable 40 milliGRAM(s) IV Push every 12 hours  gabapentin 300 milliGRAM(s) Oral two times a day  heparin   Injectable 5000 Unit(s) SubCutaneous every 8 hours  hydrALAZINE 50 milliGRAM(s) Oral three times a day  hydrALAZINE      lidocaine   4% Patch 1 Patch Transdermal every 24 hours  melatonin 3 milliGRAM(s) Oral at bedtime  pantoprazole    Tablet 40 milliGRAM(s) Oral before breakfast  polyethylene glycol 3350 17 Gram(s) Oral two times a day  senna 2 Tablet(s) Oral at bedtime  thiamine 100 milliGRAM(s) Oral daily  valsartan 160 milliGRAM(s) Oral daily    MEDICATIONS  (PRN):  acetaminophen     Tablet .. 650 milliGRAM(s) Oral every 6 hours PRN Temp greater or equal to 38.5C (101.3F), Mild Pain (1 - 3)  aluminum hydroxide/magnesium hydroxide/simethicone Suspension 30 milliLiter(s) Oral every 4 hours PRN Dyspepsia  ondansetron Injectable 4 milliGRAM(s) IV Push every 8 hours PRN Nausea and/or Vomiting    LABS: All Labs Reviewed:                        8.5    3.93  )-----------( 120      ( 25 Jul 2023 06:32 )             25.2                           7.8    3.85  )-----------( 107      ( 24 Jul 2023 05:27 )             23.7                         8.3    6.78  )-----------( 82       ( 22 Jul 2023 08:10 )             24.2                           9.8    23.91 )-----------( 143      ( 21 Jul 2023 06:10 )             29.9       07-24    139  |  111<H>  |  26<H>  ----------------------------<  101<H>  4.3   |  23  |  1.38<H>    Ca    8.2<L>      24 Jul 2023 05:27  Phos  3.4     07-24  Mg     2.4     07-24    TPro  6.1  /  Alb  3.0<L>  /  TBili  0.7  /  DBili  x   /  AST  81<H>  /  ALT  132<H>  /  AlkPhos  81  07-23 07-22    135  |  104  |  39<H>  ----------------------------<  124<H>  4.1   |  23  |  1.85<H>    Ca    8.2<L>      22 Jul 2023 08:10  Phos  2.9     07-22  Mg     2.3     07-22    TPro  6.7  /  Alb  3.1<L>  /  TBili  0.8  /  DBili  x   /  AST  92<H>  /  ALT  79<H>  /  AlkPhos  64  07-21 07-21    128<L>  |  105  |  30<H>  ----------------------------<  159<H>  4.8   |  13<L>  |  2.03<H>    Ca    8.3<L>      21 Jul 2023 06:10  Phos  3.2     07-21  Mg     2.1     07-21    TPro  6.7  /  Alb  3.1<L>  /  TBili  0.8  /  DBili  x   /  AST  92<H>  /  ALT  79<H>  /  AlkPhos  64  07-21    PT/INR - ( 20 Jul 2023 10:40 )   PT: 11.8 sec;   INR: 1.02 ratio       PTT - ( 20 Jul 2023 10:40 )  PTT:25.7 sec    Pro-Brain Natriuretic Peptide (07.21.23 @ 06:10): 05298 pg/mL  Pro-Brain Natriuretic Peptide (07.20.23 @ 10:40): 2123 pg/mL    Troponin I, High Sensitivity (07.20.23 @ 10:40): 17.94      Blood Culture: Organism --  Gram Stain Blood -- Gram Stain --  Specimen Source .Blood None  Culture-Blood --Culture Results: No growth at 72 Hours (07.20.23 @ 11:35)     Organism --  Gram Stain Urine -- Gram Stain --  Specimen Source Clean Catch None  Culture-Urine --Culture Results: No growth      ABG - ( 21 Jul 2023 08:31 )  pH, Arterial: 7.27  pH, Blood: x     /  pCO2: 26    /  pO2: 99    / HCO3: 12    / Base Excess: -13.3 /  SaO2: 99          RADIOLOGY:    CT of Chest: 7/24/23:  FINDINGS:  AIRWAYS, LUNGS, PLEURA: Diffuse peribronchial thickening. Interlobular septal thickening. Peripheral scarring in the lingula unchanged. Subsegmental bibasilar atelectasis. Small bilateral pleural effusions have increased.  MEDIASTINUM: Left ventricle qualitatively enlarged. No pericardial effusion. Aortic valve calcifications. Normal caliber thoracic aorta. Few mildly enlarged, but unchanged mediastinal nodes, likely reactive.  IMAGED ABDOMEN: Left renal superior pole lesion measuring 1.6 cm, likely cysts.  SOFT TISSUES: Unremarkable.  BONES: Unremarkable.  IMPRESSION:.  Pulmonary edema with small bilateral pleural effusions.    Venous Doppler U/S of Legs: 7/22/23:  FINDINGS:  The left internal jugular, subclavian, axillary and brachial veins are patent and compressible where applicable.  The basilic vein (superficial vein) is patent and without thrombus.  The cephalic vein (superficial vein) is patent and without thrombus.  Doppler examination shows normal spontaneous and phasic flow.  IMPRESSION:  No evidence of left upper extremity deep venous thrombosis.    CXR: 7/20/23:  INTERPRETATION:  Chest and right shoulder. Patient has weakness of right shoulder pain.  AP erect chest on July 20, 2023 at 10:05 AM.  Heart magnified by technique.  There is a mild symmetric congestive picture increased from July 5 this year.  Right shoulder. 3 views. There is mild degeneration particularly at the glenohumeral articulation. No fracture.  IMPRESSION: Mild CHF increased from prior. Degeneration right shoulder.      CTA of Chest: 7/20/23:  FINDINGS:  PULMONARY ANGIOGRAM:  No pulmonary embolism.  LUNGS/AIRWAYS/PLEURA: Patent trachea and bronchi. Unchanged mild interlobular septal thickening in both lungs. Mild passive atelectasis in the lower lobes. New small linear subpleural opacities in both upper lobes, likely scarring or atelectasis. New trace pleural effusions. Unchanged partially calcified focal left pleural thickening.  LYMPH NODES/MEDIASTINUM: Unchanged anterior mediastinal nodules, largest approximately 1.5 cm. Unchanged soft tissue density along the right wall of themidesophagus, stable dating back to 2/21/2021. Small hiatal hernia.  HEART/VASCULATURE: Normal heart size. No pericardial effusion. Calcified coronary arteries and aortic valve.  UPPER ABDOMEN: Left renal cyst.  BONES/SOFT TISSUES: Unremarkable.  IMPRESSION:  No pulmonary embolism.  No pneumonia.  New trace pleural effusions.      CT of Abd: 7/20/23:  FINDINGS:  Evaluation of solid organs and vascular structures is limited without intravenous contrast.  LOWER CHEST: Mild fibrotic changes again noted. Trace bilateral pleural effusions. Cardiomegaly. Coronary calcifications. Small hiatal hernia.  LIVER: Within normal limits.  BILE DUCTS: Normal caliber.  GALLBLADDER: Cholelithiasis.. No pericholecystic inflammation.  SPLEEN: Within normal limits.  PANCREAS: Fatty atrophy.  ADRENALS: Within normal limits.  KIDNEYS/URETERS: Excreted contrast within the collecting systems and ureters from recent exam is slightly limited evaluation of. No hydronephrosis or obstructing stone. Left renal cyst.  BLADDER: Collapsed around Pérez catheter, containing excreted contrast material.  REPRODUCTIVE ORGANS: No pelvic mass.  BOWEL: No bowel obstruction. Appendix is normal. Large rectal stool burden and overall moderate colonic stool burden. Mild wall thickening of the ascending colon, transverse colon and proximal descending colon.  PERITONEUM: No ascites.  VESSELS: Atherosclerotic changes.  RETROPERITONEUM/LYMPH NODES: No lymphadenopathy.  ABDOMINAL WALL: Small fat-containing umbilical hernia.  BONES: Degenerative changes. Osteopenia.  IMPRESSION:  No hydronephrosis or obstructing stone.  Mild wall thickening of the ascending colon, transverse colon proximal descending colon may reflect underdistention or colitis. Clinical correlation recommended.      CT of Head: 7/20/23:  FINDINGS:  No acute intracranial abnormalities.  No evidence of acute cortical infarction or hemorrhage.  No mass effect or edema.  Small vessel and atrophic changes.  No sinusitis or mastoiditis.  No skull fracture. Atherosclerotic vascular calcification present skull base.  IMPRESSION:  No acute intracranial abnormalities.  Small vessel and atrophic changes.      EKG:  Junctional bradycardia with LVH and PVC and APC    TELEMETRY:  Junctional bradycardia with LVH and PVC and APC => SVT => junctional mary => SVT => junctional bradycardia    ECHO:  6/9/23:  Findings:  Left Ventricle   Followup study to evaluate for endocarditis.   Valves appear normal - no evidence of endocarditis.    Summary   Followup study to evaluate for endocarditis.   Valves appear normal - no evidence of endocarditis.    Signature   ----------------------------------------------------------------   Electronically signed by Dave Monet MD(Interpreting   physician) on 06/09/2023 05:17 PM   ----------------------------------------------------------------    ECHO:  3/1/23:  M-Mode Measurements (cm)   LVEDd: 3.97 cm            LVESd: 2.55 cm   IVSEd: 1.1 cm   LVPWd: 1.1 cm             AO Root Dimension: 2.8 cm                             LA: 3.5 cm                LVOT: 2 cm  Doppler Measurements:   AV Velocity:434 cm/s                  MV Peak E-Wave: 98.7 cm/s   AV Peak Gradient: 75.34 mmHg          MV Peak A-Wave: 131 cm/s   AV Mean Gradient: 40 mmHg             MV E/A Ratio: 0.75 %   AV Area (Continuity):0.85 cm^2        MV Peak Gradient: 3.9 mmHg   TR Velocity:190 cm/s   TR Gradient:14.44 mmHg   Estimated RAP:5 mmHg   RVSP:27 mmHg    Findings  Mitral Valve   The mitral valve leaflets appear thickened.   EA reversal of the mitral inflow consistent with reduced compliance of the left ventricle.   Mild mitral annular calcification is present.   Mild mitral regurgitation is present.    Aortic Valve   Peak and mean transaortic gradients are 75 and 40mmHg respectively; this finding is consistent with severe aortic stenosis.   Mild (1+) aortic regurgitation is present.    Tricuspid Valve   Trace tricuspid valve regurgitation is present.    Pulmonic Valve   Mild pulmonic valvular regurgitation (1+) is present.    Left Atrium   Normal appearing left atrium.    Left Ventricle   Mild concentric left ventricular hypertrophy is present.   The left ventricle is normal in size.   Estimated left ventricular ejection fraction is 65-70 %.    Right Atrium   Normal appearing right atrium.    Right Ventricle   Normal appearing right ventricle structure and function.    Pericardial Effusion   No evidence of pericardial effusion.    Pleural Effusion   No evidence of pleural effusion.    Miscellaneous   The IVC appears normal.    Summary   The mitral valve leaflets appear thickened.   EA reversal of the mitral inflow consistent with reduced compliance of the left ventricle.   Mild mitral annular calcification is present.   Mild mitral regurgitation is present.   Peak and mean transaortic gradients are 75 and 40mmHg respectively; this finding is consistent with severe aortic stenosis.   Mild (1+) aortic regurgitation is present.   Trace tricuspid valve regurgitation is present.   Mild pulmonic valvular regurgitation (1+) is present.   Normal appearing left atrium.   Mild concentric left ventricular hypertrophy is present.   The left ventricle is normal in size.   Estimated left ventricular ejection fraction is 65-70 %.   Normal appearing right atrium.   Normal appearing right ventricle structure and function.   The IVC appears normal.   No evidence of pericardial effusion.   No evidence of pleural effusion.    Signature   ----------------------------------------------------------------   Electronically signed by Sakina Cheatham MD, Director of   Cardiac Cath Lab(Interpreting physician) on 03/01/2023 06:42   PM   ----------------------------------------------------------------

## 2023-07-25 NOTE — CHART NOTE - NSCHARTNOTEFT_GEN_A_CORE
This SW met with pt. and her sister yesterday to follow up and provide support. This SW spent time exploring feelings and offering emotional support to pt. Mariajose Lucas NP met with pts daughter Chanelle today and she had some social work related questions. This SW attempted to go follow up with pts daughter however, she was meeting with . Our team will continue to be available.

## 2023-07-25 NOTE — PROGRESS NOTE ADULT - ASSESSMENT
Assessment:     Process of Care  --Reviewed dx/treatment problems and alignment with Goals of Care    Physical Aspects of Care  --Pain  patient denies at this time  - will add PRN IV Tylenol     --Bowel Regimen  denies constipation  risk for constipation d/t immobility  daily dulcolax    --Dyspnea  No SOB at this time  comfortable and in NAD    --Nausea Vomiting  denies    --Weakness  PT as tolerated     Psychological and Psychiatric Aspects of Care:   --Greif/Bereavment: emotional support provided  --Hx of psychiatric dx: none  -Pastoral Care Available PRN     Social Aspects of Care  -SW involved     Cultural Aspects  -Primary Language: English      Ethical and Legal Aspects:   NA        Capacity- pt has capacity     HCP/Surrogate: HCP on one content naming her daughter Pippa     Code Status- DNR/I   MOLST- on chart   Dispo Plan- ongoing patient would like to go home with 8 hours aides in place and daughter present other hours, and her other daugther would like her to go to [facility] to work on getting stronger     Discussed With: Dr. Anderson and Dr. Anderson coordinated with attending and SW and RN     Time Spent: 90 minutes including the care, coordination and counseling of this patient, 50% of which was spent coordinating and counseling.

## 2023-07-25 NOTE — PROGRESS NOTE ADULT - ASSESSMENT
7/21/23:  Pt with above history and severe AS and recurrent sepsis from recurrent UTI's making doing a TAVR impossible at this time.  Continue to treat sepsis and hopefully the rhythm will stabilize.  Bradycardia is usually better for AS than tachycardia.  Continue supportive care and treatment as outlined by medicine etal.  Dr Ferreira will be covering mo over the weekend.    7/22/23:  patient with persistent sepsis with elevated lactate, WBC and with increasing BNP and severe AS playing a complicating role.   F/u ECHO with EF 60%  1-AS-untreatable  2-AGO-nduhgdpwy in nature-on saline drip due to hyponatremia at 75cc/hr-care not to overload patient as BP is stable-should be off inotropic support  3-arrhythmia-EPS involved and felt nothing further to do.    5-unxfqh-lyircgud IV abx, O2 and supportive care  5-HTN-if patient maintains BP elevated in the 150-170's range off pressors, consider restarting hydralazine (was on 25mg TID at home) and eventually, once bradycardia resolve, restarting metoprolol 25mg daily (taking at home as well)    7/23/23:    patient with resolving septic shock  1-AS-not intervenable at this time  2-BP stable and elevated, restart Hydralazine 25mg TID; do not start metoprolol at this time  3-CHF-due to valve disease    7/24/23:  BP running high despite starting Hydralazine 25 mg tid.  (-180's systolic).  Will increase Hydralazine to 50 mg tid.  Continue as per medicine etal.  Will follow.    7/25/23:  Feeling better after IV Lasix for her pulmonary edema.  BP better on Hydralazine.  No anginal chest pains.  NSR on the monitor.  Continue to diurese as outlined by medicine watching her renal function and BP, especially with her severe AS.  Continue as per medicine etal.  Will follow.

## 2023-07-25 NOTE — PROGRESS NOTE ADULT - SUBJECTIVE AND OBJECTIVE BOX
HPI:        PAIN: ( )Yes   (x )No  pt denies   DYSPNEA: ( ) Yes  (x ) No  Level: not at this time     Review of Systems:    Dyspnea- yes  Weakness- yes    All other systems reviewed and negative  Unable to obtain/Limited due to: patient states she is very tired     PHYSICAL EXAM:    Vital Signs Last 24 Hrs  T(C): 36.9 (2023 08:01), Max: 36.9 (2023 08:01)  T(F): 98.5 (2023 08:01), Max: 98.5 (2023 08:01)  HR: 74 (2023 13:53) (59 - 78)  BP: 157/49 (2023 13:53) (120/48 - 196/69)  BP(mean): 78 (2023 13:53) (64 - 91)  RR: 19 (2023 11:16) (10 - 20)  SpO2: 97% (2023 11:16) (90% - 98%)    Parameters below as of 2023 11:16  Patient On (Oxygen Delivery Method): room air    Daily Weight in k.2 (2023 07:12)    PPSV2: 30  %  FAST:    General: elderly pleasant female in bed, NAD   Mental Status: alert and oriented   HEENT: nasal cannula in place   Lungs: cta b/l   Cardiac: s1s2 +   GI: nontender, non distended +BS   : pérez in place   Ext: no edema, LUX   Neuro: weakness     LABS:                        8.5    3.93  )-----------( 120      ( 2023 06:32 )             25.2     07-25    141  |  109<H>  |  26<H>  ----------------------------<  100<H>  4.6   |  30  |  1.33<H>    Ca    8.9      2023 06:32  Phos  3.4     07-24  Mg     2.4     07-24        Albumin: Albumin: 3.0 g/dL ( @ 06:14)      Allergies    Ceftin (Hives; Rash)    Intolerances      MEDICATIONS  (STANDING):  ascorbic acid 500 milliGRAM(s) Oral daily  atorvastatin 20 milliGRAM(s) Oral at bedtime  chlorhexidine 2% Cloths 1 Application(s) Topical <User Schedule>  cholecalciferol 1000 Unit(s) Oral daily  folic acid 1 milliGRAM(s) Oral daily  furosemide   Injectable 40 milliGRAM(s) IV Push every 12 hours  gabapentin 300 milliGRAM(s) Oral two times a day  heparin   Injectable 5000 Unit(s) SubCutaneous every 8 hours  hydrALAZINE 50 milliGRAM(s) Oral three times a day  hydrALAZINE      lidocaine   4% Patch 1 Patch Transdermal every 24 hours  melatonin 3 milliGRAM(s) Oral at bedtime  pantoprazole    Tablet 40 milliGRAM(s) Oral before breakfast  polyethylene glycol 3350 17 Gram(s) Oral two times a day  senna 2 Tablet(s) Oral at bedtime  thiamine 100 milliGRAM(s) Oral daily  valsartan 160 milliGRAM(s) Oral daily    MEDICATIONS  (PRN):  acetaminophen     Tablet .. 650 milliGRAM(s) Oral every 6 hours PRN Temp greater or equal to 38.5C (101.3F), Mild Pain (1 - 3)  aluminum hydroxide/magnesium hydroxide/simethicone Suspension 30 milliLiter(s) Oral every 4 hours PRN Dyspepsia  nystatin    Suspension 821862 Unit(s) Oral four times a day PRN mouth sores  ondansetron Injectable 4 milliGRAM(s) IV Push every 8 hours PRN Nausea and/or Vomiting      RADIOLOGY:    < from: CT Chest No Cont (23 @ 14:26) >    ACC: [id number] EXAM:  CT CHEST   ORDERED BY: CHYNA GU     PROCEDURE DATE:  2023          INTERPRETATION:  HISTORY: Hypoxia.    EXAMINATION: CT CHEST was performed without IV contrast.    COMPARISON: 2023.    FINDINGS:    AIRWAYS, LUNGS, PLEURA: Diffuse peribronchial thickening. Interlobular   septal thickening. Peripheral scarring in the lingula unchanged.   Subsegmental bibasilar atelectasis. Small bilateral pleural effusions   have increased.    MEDIASTINUM: Left ventricle qualitatively enlarged. No pericardial   effusion. Aortic valve calcifications. Normal caliber thoracic aorta. Few   mildly enlarged, but unchanged mediastinal nodes, likely reactive.    IMAGED ABDOMEN: Left renal superior pole lesion measuring 1.6 cm, likely   cysts.    SOFT TISSUES: Unremarkable.    BONES: Unremarkable.      IMPRESSION:.    Pulmonary edema with small bilateral pleural effusions.    < end of copied text >

## 2023-07-25 NOTE — PROGRESS NOTE ADULT - CONVERSATION DETAILS
Alternate HCP was at bedside and requesting information on different levels of home care, including hospice if patient were to decline. Patient wants to go home where she has aides 8 hours a day patient daughter is hopeful that patient will go to [facility] to work on getting her stronger. Will continue to discuss    Emotional Support provided

## 2023-07-26 LAB
ANION GAP SERPL CALC-SCNC: 6 MMOL/L — SIGNIFICANT CHANGE UP (ref 5–17)
BUN SERPL-MCNC: 27 MG/DL — HIGH (ref 7–23)
CALCIUM SERPL-MCNC: 9.4 MG/DL — SIGNIFICANT CHANGE UP (ref 8.5–10.1)
CHLORIDE SERPL-SCNC: 104 MMOL/L — SIGNIFICANT CHANGE UP (ref 96–108)
CO2 SERPL-SCNC: 30 MMOL/L — SIGNIFICANT CHANGE UP (ref 22–31)
CREAT SERPL-MCNC: 1.48 MG/DL — HIGH (ref 0.5–1.3)
EGFR: 33 ML/MIN/1.73M2 — LOW
GLUCOSE SERPL-MCNC: 119 MG/DL — HIGH (ref 70–99)
HCT VFR BLD CALC: 27.1 % — LOW (ref 34.5–45)
HGB BLD-MCNC: 9.1 G/DL — LOW (ref 11.5–15.5)
MCHC RBC-ENTMCNC: 31.1 PG — SIGNIFICANT CHANGE UP (ref 27–34)
MCHC RBC-ENTMCNC: 33.6 GM/DL — SIGNIFICANT CHANGE UP (ref 32–36)
MCV RBC AUTO: 92.5 FL — SIGNIFICANT CHANGE UP (ref 80–100)
PLATELET # BLD AUTO: 124 K/UL — LOW (ref 150–400)
POTASSIUM SERPL-MCNC: 4.1 MMOL/L — SIGNIFICANT CHANGE UP (ref 3.5–5.3)
POTASSIUM SERPL-SCNC: 4.1 MMOL/L — SIGNIFICANT CHANGE UP (ref 3.5–5.3)
RBC # BLD: 2.93 M/UL — LOW (ref 3.8–5.2)
RBC # FLD: 15.1 % — HIGH (ref 10.3–14.5)
SODIUM SERPL-SCNC: 140 MMOL/L — SIGNIFICANT CHANGE UP (ref 135–145)
WBC # BLD: 4.65 K/UL — SIGNIFICANT CHANGE UP (ref 3.8–10.5)
WBC # FLD AUTO: 4.65 K/UL — SIGNIFICANT CHANGE UP (ref 3.8–10.5)

## 2023-07-26 PROCEDURE — 99233 SBSQ HOSP IP/OBS HIGH 50: CPT

## 2023-07-26 RX ORDER — HYDRALAZINE HCL 50 MG
25 TABLET ORAL THREE TIMES A DAY
Refills: 0 | Status: DISCONTINUED | OUTPATIENT
Start: 2023-07-26 | End: 2023-07-27

## 2023-07-26 RX ORDER — FUROSEMIDE 40 MG
40 TABLET ORAL DAILY
Refills: 0 | Status: DISCONTINUED | OUTPATIENT
Start: 2023-07-26 | End: 2023-07-27

## 2023-07-26 RX ORDER — FLUCONAZOLE 150 MG/1
150 TABLET ORAL ONCE
Refills: 0 | Status: DISCONTINUED | OUTPATIENT
Start: 2023-07-26 | End: 2023-07-26

## 2023-07-26 RX ADMIN — VALSARTAN 160 MILLIGRAM(S): 80 TABLET ORAL at 10:36

## 2023-07-26 RX ADMIN — PANTOPRAZOLE SODIUM 40 MILLIGRAM(S): 20 TABLET, DELAYED RELEASE ORAL at 06:14

## 2023-07-26 RX ADMIN — Medication 500 MILLIGRAM(S): at 10:36

## 2023-07-26 RX ADMIN — Medication 50 MILLIGRAM(S): at 06:14

## 2023-07-26 RX ADMIN — HEPARIN SODIUM 5000 UNIT(S): 5000 INJECTION INTRAVENOUS; SUBCUTANEOUS at 14:01

## 2023-07-26 RX ADMIN — ATORVASTATIN CALCIUM 20 MILLIGRAM(S): 80 TABLET, FILM COATED ORAL at 21:48

## 2023-07-26 RX ADMIN — Medication 100 MILLIGRAM(S): at 10:35

## 2023-07-26 RX ADMIN — LIDOCAINE 1 PATCH: 4 CREAM TOPICAL at 10:37

## 2023-07-26 RX ADMIN — POLYETHYLENE GLYCOL 3350 17 GRAM(S): 17 POWDER, FOR SOLUTION ORAL at 10:36

## 2023-07-26 RX ADMIN — LIDOCAINE 1 PATCH: 4 CREAM TOPICAL at 21:50

## 2023-07-26 RX ADMIN — LIDOCAINE 1 PATCH: 4 CREAM TOPICAL at 07:24

## 2023-07-26 RX ADMIN — GABAPENTIN 300 MILLIGRAM(S): 400 CAPSULE ORAL at 10:36

## 2023-07-26 RX ADMIN — Medication 500000 UNIT(S): at 11:52

## 2023-07-26 RX ADMIN — POLYETHYLENE GLYCOL 3350 17 GRAM(S): 17 POWDER, FOR SOLUTION ORAL at 21:50

## 2023-07-26 RX ADMIN — GABAPENTIN 300 MILLIGRAM(S): 400 CAPSULE ORAL at 21:49

## 2023-07-26 RX ADMIN — Medication 40 MILLIGRAM(S): at 06:14

## 2023-07-26 RX ADMIN — Medication 50 MILLIGRAM(S): at 14:01

## 2023-07-26 RX ADMIN — Medication 1000 UNIT(S): at 10:36

## 2023-07-26 RX ADMIN — HEPARIN SODIUM 5000 UNIT(S): 5000 INJECTION INTRAVENOUS; SUBCUTANEOUS at 21:50

## 2023-07-26 RX ADMIN — SENNA PLUS 2 TABLET(S): 8.6 TABLET ORAL at 21:51

## 2023-07-26 RX ADMIN — Medication 25 MILLIGRAM(S): at 21:50

## 2023-07-26 RX ADMIN — Medication 1 MILLIGRAM(S): at 10:35

## 2023-07-26 RX ADMIN — HEPARIN SODIUM 5000 UNIT(S): 5000 INJECTION INTRAVENOUS; SUBCUTANEOUS at 06:14

## 2023-07-26 RX ADMIN — Medication 3 MILLIGRAM(S): at 21:52

## 2023-07-26 NOTE — PROGRESS NOTE ADULT - ASSESSMENT
7/21/23:  Pt with above history and severe AS and recurrent sepsis from recurrent UTI's making doing a TAVR impossible at this time.  Continue to treat sepsis and hopefully the rhythm will stabilize.  Bradycardia is usually better for AS than tachycardia.  Continue supportive care and treatment as outlined by medicine etal.  Dr Ferreira will be covering mo over the weekend.    7/22/23:  patient with persistent sepsis with elevated lactate, WBC and with increasing BNP and severe AS playing a complicating role.   F/u ECHO with EF 60%  1-AS-untreatable  4-RTE-allmoyajm in nature-on saline drip due to hyponatremia at 75cc/hr-care not to overload patient as BP is stable-should be off inotropic support  3-arrhythmia-EPS involved and felt nothing further to do.    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  (-180's systolic).  Will increase Hydralazine to 50 mg tid.  Continue as per medicine etal.  Will follow.    7/25/23:  Feeling better after IV Lasix for her pulmonary edema.  BP better on Hydralazine.  No anginal chest pains.  NSR on the monitor.  Continue to diurese as outlined by medicine watching her renal function and BP, especially with her severe AS.  Continue as per medicine etal.  Will follow.    7/26/23:  Feeling better with less SOB.  Still very weak and needs more PT and strengthening.  NSR on the monitor.  No new cardiac symptoms.  For possible [facility] placement.  Continue as per medicine etal.  Will follow.

## 2023-07-26 NOTE — PROGRESS NOTE ADULT - SUBJECTIVE AND OBJECTIVE BOX
CHIEF COMPLAINT/INTERVAL HISTORY:    Patient is a 89y old  Female who presents with a chief complaint of Weakness (24 Jul 2023 06:34)      HPI:  89 y/o F with PMH HTN, HLD, severe AS, HFpEF, hx of pérez, hx of aspiration pneumonia and UTI presents with weakness. Pt reports feeling cold and shaking when she woke up this morning. She also felt dizzy and felt warm. Pt was recently admitted for UTI and urinary retention. She was discharged on PO antibiotics which were completed and a pérez upon discharge.    ER course: Temp 100.1F, BP 97/41-98/41, SpO2 87% -> 98% on 2L of NS. Labs: WBC 11.21, Hb 10.2 (baseline ~10), , neutrophils 92.1%, CO2 20, Cr 1.45 (baseline ~1.4), glucose 113, AST 38, BNP 2123. UA: moderate leukocyte esterase, small blood, WBC 11-25, RBC 3-5, moderate bacteria. COVID and RVP negative.   EKG: Junctional rhythm with PVCs HR 51 bpm, normal intervals, LVH (personally reviewed).     Imaging:   - CTA: No pulmonary embolism. No pneumonia. New trace pleural effusions. Unchanged anterior mediastinal nodules, largest approximately 1.5 cm. Unchanged soft tissue density along the right wall of the midesophagus, stable dating back to 2/21/2021.   - CXR: increased vascular congestion, no consolidation, no effusion, no pneumothorax (personally reviewed).  - XR right shoulder: Mild CHF increased from prior. Degeneration right shoulder.    Pt was given Levaquin, 500 ml of NS, Tylenol. She is being admitted to med/surg for further management.  (20 Jul 2023 17:26)    Downgraded from ICU  HH dropped  Pt has a cronic Pérez  Pt c/o CP today, TERRENCE - diffuse B lines, CT confirmed pulm edema- started on IV LAsix  7/26: TERRENCE absent; from HTN urgency her BP is borderline after Lasix worked succesfully     Vital Signs Last 24 Hrs  T(C): 36.7 (26 Jul 2023 07:47), Max: 36.9 (25 Jul 2023 20:55)  T(F): 98.1 (26 Jul 2023 07:47), Max: 98.4 (25 Jul 2023 20:55)  HR: 71 (26 Jul 2023 14:01) (71 - 80)  BP: 111/42 (26 Jul 2023 14:01) (111/42 - 160/42)  BP(mean): 81 (26 Jul 2023 06:12) (73 - 81)  RR: 18 (26 Jul 2023 07:47) (15 - 18)  SpO2: 93% (26 Jul 2023 11:00) (91% - 98%)    Parameters below as of 26 Jul 2023 11:00  Patient On (Oxygen Delivery Method): room air                    MEDICATIONS  (STANDING):  ascorbic acid 500 milliGRAM(s) Oral daily  atorvastatin 20 milliGRAM(s) Oral at bedtime  chlorhexidine 2% Cloths 1 Application(s) Topical <User Schedule>  cholecalciferol 1000 Unit(s) Oral daily  folic acid 1 milliGRAM(s) Oral daily  furosemide   Injectable 40 milliGRAM(s) IV Push every 12 hours  gabapentin 300 milliGRAM(s) Oral two times a day  heparin   Injectable 5000 Unit(s) SubCutaneous every 8 hours  hydrALAZINE 50 milliGRAM(s) Oral three times a day  hydrALAZINE      lidocaine   4% Patch 1 Patch Transdermal every 24 hours  melatonin 3 milliGRAM(s) Oral at bedtime  pantoprazole    Tablet 40 milliGRAM(s) Oral before breakfast  polyethylene glycol 3350 17 Gram(s) Oral two times a day  senna 2 Tablet(s) Oral at bedtime  thiamine 100 milliGRAM(s) Oral daily  valsartan 160 milliGRAM(s) Oral daily        PHYSICAL EXAM:    GENERAL: NAD, well-groomed, well-developed  NERVOUS SYSTEM:  Alert & Oriented X3, Motor Strength 5/5 B/L upper and lower extremities; DTRs 2+ intact and symmetric  CHEST/LUNG: Clear to auscultation bilaterally; No rales, rhonchi, wheezing, or rubs  HEART: Regular rate and rhythm; III/VI LIANA rubs, or gallops  ABDOMEN: Soft, Nontender, Nondistended; Bowel sounds present  EXTREMITIES:  2+ Peripheral Pulses, No clubbing, cyanosis, or edema    LABS:                                     8.5    3.93  )-----------( 120      ( 25 Jul 2023 06:32 )             25.2           Acute UTI  Severe aortic stenosis  Mitral regurgitation  Sepsis, unspecified organism  Junctional bradycardia  Supraventricular tachycardia  Pulm edema        started IV Lasix for pulm edema   HTN resolved birderline BP- dc ARB and decrease Hydralazine;  dc lasix for now- change to PO  Continue holding metoprolol for recent junctional bradycardia   Continue statin   dc meropenem   Current BCx, UCx neg   BP running high despite starting Hydralazine 25 mg tid,  increased Hydralazine to 50 mg   added Diovan also  Chronic Pérez (Dr Rojelio Collins)

## 2023-07-26 NOTE — PROGRESS NOTE ADULT - NUTRITIONAL ASSESSMENT
This patient has been assessed with a concern for Malnutrition and has been determined to have a diagnosis/diagnoses of Moderate protein-calorie malnutrition.    This patient is being managed with:   Diet DASH/TLC-  Sodium & Cholesterol Restricted  Supplement Feeding Modality:  Oral  Ensure Plus High Protein Cans or Servings Per Day:  2       Frequency:  Two Times a day  Entered: Jul 21 2023 12:36PM  

## 2023-07-26 NOTE — CHART NOTE - NSCHARTNOTEFT_GEN_A_CORE
This SW met with pts daughter Chanelle to follow up and provide support. She shared how they are trying to decide between pt. going to [facility] VS returning home with CDPAP aide and home hospice. We reviewed in detail home hospice services and philosophy of care. Pts daughter discussed how they know pt. will likely have another infection very shortly and they are considering taking pt. home and keeping her comfortable as they recognize if pt. went to [facility] she would likely be readmitted shortly. Pts daughter will speak with pt. and family today and will make final decision of [facility] VS home with home hospice. If they choose hospice they prefer [facility] as the inpatient facility (if ever needed) is very close to their house.  Spoke with floor SW Natacha Gabriel regarding above and she will follow up with family in the morning. Emotional support provided. Our team will continue to follow.

## 2023-07-26 NOTE — PROGRESS NOTE ADULT - SUBJECTIVE AND OBJECTIVE BOX
CHIEF COMPLAINT: Patient is a 89y old  Female who presents with a chief complaint of Weakness (22 Jul 2023 12:59)      HPI:  89 y/o F with PMH HTN, HLD, severe AS, HFpEF, hx of pérez, hx of aspiration pneumonia and UTI presents with weakness. Pt reports feeling cold and shaking when she woke up this morning. She also felt dizzy and felt warm. Pt was recently admitted for UTI and urinary retention. She was discharged on PO antibiotics which were completed and a pérez upon discharge. Denies fevers, chest pain, SOB, abdominal pain, N/V, diarrhea/constipation.     Prior admission:  - 7/10/23: Weakness -> UTI/urinary retention -> PO Ceftin, pérez was continued upon d/c     ER course: Temp 100.1F, BP 97/41-98/41, SpO2 87% -> 98% on 2L of NS. Labs: WBC 11.21, Hb 10.2 (baseline ~10), , neutrophils 92.1%, CO2 20, Cr 1.45 (baseline ~1.4), glucose 113, AST 38, BNP 2123. UA: moderate leukocyte esterase, small blood, WBC 11-25, RBC 3-5, moderate bacteria. COVID and RVP negative.   EKG: Junctional rhythm with PVCs HR 51 bpm, normal intervals, LVH (personally reviewed).     Imaging:   - CTA: No pulmonary embolism. No pneumonia. New trace pleural effusions. Unchanged anterior mediastinal nodules, largest approximately 1.5 cm. Unchanged soft tissue density along the right wall of the midesophagus, stable dating back to 2/21/2021.   - CXR: increased vascular congestion, no consolidation, no effusion, no pneumothorax (personally reviewed).  - XR right shoulder: Mild CHF increased from prior. Degeneration right shoulder.    Pt was given Levaquin, 500 ml of NS, Tylenol. She is being admitted to med/surg for further management.  (20 Jul 2023 17:26)    7/21/23:  Pt with above history and severe AS and recurrent sepsis from recurrent UTI's making doing a TAVR impossible at this time.  Continue to treat sepsis and hopefully the rhythm will stabilize.  Bradycardia is usually better for AS than tachycardia.  Continue supportive care and treatment as outlined by medicine etal.     7/22/23-EP evaluated patient for junctional bradycardia.  Patient previously started on Levophed gtt but developed tachycardia so it was discontinued. Patient currently on phenylephrine gtt. Patient currently junctional rhythm with rates 35-70.  Denies any CP, palpitations, SOB, dizziness, lightheadedness.  Junctional bradycardia in setting of severe sepsis, severe Aortic stenosis; Phenylephrine likely contributing to junctional rhythm.  Aortic stenosis is primary issue causing the other secondary issues with recurrent UTI/sepsis preventing TAVR.  EP service felt  intervention indicated at this time and to continue to treat underlying infection.        Overnight patient with sinus bradycardia, pauses no longer than 2 seconds with sinus pause.   Sinus bradycardia overnight with brief 5 beat run of afib.  In addition, BP running high now and no signs of shock but still elevated lactate level.  WBC still in the 20's, renal insufficiency, hyponatremia still in play and BNP in the 16000's (was in the 2000's) and f/u ECHO with EF 60% as per report    7/23/23-patient with no further arrhythmia, off pressors and BP stable in the 160's.  No further fluctuations.      7/24/23:  BP running high despite starting Hydralazine 25 mg tid.  (-180's systolic).  Will increase Hydralazine to 50 mg tid.    7/25/23:  Feeling better after IV Lasix for her pulmonary edema.  BP better on Hydralazine.  No anginal chest pains.  NSR on the monitor.    7/26/23:  Feeling better with less SOB.  Still very weak and needs more PT and strengthening.  NSR on the monitor.  No new cardiac symptoms.    PMHx: PAST MEDICAL & SURGICAL HISTORY:  HTN (hypertension)  HLD (hyperlipidemia)  Gout  Osteoarthritis  Acute on chronic diastolic congestive heart failure  Aortic stenosis  H/O CHF  Pneumonia, aspiration  Pneumonia  History of tonsillectomy in childhood      Allergies: Allergies  Ceftin (Hives; Rash)      REVIEW OF SYSTEMS:    CONSTITUTIONAL: weakness, fevers or chills  EYES/ENT: No visual changes;  No vertigo or throat pain   NECK: No pain or stiffness  RESPIRATORY: No cough, wheezing, hemoptysis; No shortness of breath  CARDIOVASCULAR: No chest pain or palpitations  GASTROINTESTINAL: No abdominal or epigastric pain. No nausea, vomiting, or hematemesis; No diarrhea or constipation. No melena or hematochezia.  GENITOURINARY: No dysuria, frequency or hematuria      Vital Signs Last 24 Hrs  T(C): 36.9 (25 Jul 2023 20:55), Max: 36.9 (25 Jul 2023 08:01)  T(F): 98.4 (25 Jul 2023 20:55), Max: 98.5 (25 Jul 2023 08:01)  HR: 73 (26 Jul 2023 06:12) (73 - 80)  BP: 155/56 (26 Jul 2023 06:12) (120/48 - 164/54)  BP(mean): 81 (26 Jul 2023 06:12) (64 - 81)  RR: 15 (26 Jul 2023 06:12) (15 - 19)  SpO2: 98% (26 Jul 2023 06:20) (91% - 98%): nasal cannula  O2 Flow (L/min): 1      I&O's Summary    21 Jul 2023 07:01  -  22 Jul 2023 07:00  --------------------------------------------------------  IN: 1284.1 mL / OUT: 678 mL / NET: 606.1 mL    22 Jul 2023 07:01  -  23 Jul 2023 05:11  --------------------------------------------------------  IN: 0 mL / OUT: 1450 mL / NET: -1450 mL        PHYSICAL EXAM:   Constitutional: NAD, awake and alert, well-developed  HEENT: PERR, EOMI, Normal Hearing, MMM  Neck:  JVD  Respiratory: Breath sounds are clear bilaterally, No wheezing, rales or rhonchi  Cardiovascular: S1 and S2, regular rate and rhythm,  Murmurs, no gallops or rubs  Gastrointestinal: Bowel Sounds present, soft, nontender, nondistended, no guarding, no rebound  Extremities:  peripheral edema      MEDICATIONS  (STANDING):  ascorbic acid 500 milliGRAM(s) Oral daily  atorvastatin 20 milliGRAM(s) Oral at bedtime  chlorhexidine 2% Cloths 1 Application(s) Topical <User Schedule>  cholecalciferol 1000 Unit(s) Oral daily  folic acid 1 milliGRAM(s) Oral daily  furosemide   Injectable 40 milliGRAM(s) IV Push every 12 hours  gabapentin 300 milliGRAM(s) Oral two times a day  heparin   Injectable 5000 Unit(s) SubCutaneous every 8 hours  hydrALAZINE      hydrALAZINE 50 milliGRAM(s) Oral three times a day  lidocaine   4% Patch 1 Patch Transdermal every 24 hours  melatonin 3 milliGRAM(s) Oral at bedtime  pantoprazole    Tablet 40 milliGRAM(s) Oral before breakfast  polyethylene glycol 3350 17 Gram(s) Oral two times a day  senna 2 Tablet(s) Oral at bedtime  thiamine 100 milliGRAM(s) Oral daily  valsartan 160 milliGRAM(s) Oral daily    MEDICATIONS  (PRN):  acetaminophen     Tablet .. 650 milliGRAM(s) Oral every 6 hours PRN Temp greater or equal to 38.5C (101.3F), Mild Pain (1 - 3)  aluminum hydroxide/magnesium hydroxide/simethicone Suspension 30 milliLiter(s) Oral every 4 hours PRN Dyspepsia  nystatin    Suspension 693648 Unit(s) Oral four times a day PRN mouth sores  ondansetron Injectable 4 milliGRAM(s) IV Push every 8 hours PRN Nausea and/or Vomiting      LABS: All Labs Reviewed:                        8.5    3.93  )-----------( 120      ( 25 Jul 2023 06:32 )             25.2                           7.8    3.85  )-----------( 107      ( 24 Jul 2023 05:27 )             23.7                         8.3    6.78  )-----------( 82       ( 22 Jul 2023 08:10 )             24.2                           9.8    23.91 )-----------( 143      ( 21 Jul 2023 06:10 )             29.9       07-24    139  |  111<H>  |  26<H>  ----------------------------<  101<H>  4.3   |  23  |  1.38<H>    Ca    8.2<L>      24 Jul 2023 05:27  Phos  3.4     07-24  Mg     2.4     07-24    TPro  6.1  /  Alb  3.0<L>  /  TBili  0.7  /  DBili  x   /  AST  81<H>  /  ALT  132<H>  /  AlkPhos  81  07-23 07-22    135  |  104  |  39<H>  ----------------------------<  124<H>  4.1   |  23  |  1.85<H>    Ca    8.2<L>      22 Jul 2023 08:10  Phos  2.9     07-22  Mg     2.3     07-22    TPro  6.7  /  Alb  3.1<L>  /  TBili  0.8  /  DBili  x   /  AST  92<H>  /  ALT  79<H>  /  AlkPhos  64  07-21 07-21    128<L>  |  105  |  30<H>  ----------------------------<  159<H>  4.8   |  13<L>  |  2.03<H>    Ca    8.3<L>      21 Jul 2023 06:10  Phos  3.2     07-21  Mg     2.1     07-21    TPro  6.7  /  Alb  3.1<L>  /  TBili  0.8  /  DBili  x   /  AST  92<H>  /  ALT  79<H>  /  AlkPhos  64  07-21    PT/INR - ( 20 Jul 2023 10:40 )   PT: 11.8 sec;   INR: 1.02 ratio       PTT - ( 20 Jul 2023 10:40 )  PTT:25.7 sec    Pro-Brain Natriuretic Peptide (07.21.23 @ 06:10): 80630 pg/mL  Pro-Brain Natriuretic Peptide (07.20.23 @ 10:40): 2123 pg/mL    Troponin I, High Sensitivity (07.20.23 @ 10:40): 17.94      Blood Culture: Organism --  Gram Stain Blood -- Gram Stain --  Specimen Source .Blood None  Culture-Blood --Culture Results: No growth at 72 Hours (07.20.23 @ 11:35)     Organism --  Gram Stain Urine -- Gram Stain --  Specimen Source Clean Catch None  Culture-Urine --Culture Results: No growth      ABG - ( 21 Jul 2023 08:31 )  pH, Arterial: 7.27  pH, Blood: x     /  pCO2: 26    /  pO2: 99    / HCO3: 12    / Base Excess: -13.3 /  SaO2: 99          RADIOLOGY:    CT of Chest: 7/24/23:  FINDINGS:  AIRWAYS, LUNGS, PLEURA: Diffuse peribronchial thickening. Interlobular septal thickening. Peripheral scarring in the lingula unchanged. Subsegmental bibasilar atelectasis. Small bilateral pleural effusions have increased.  MEDIASTINUM: Left ventricle qualitatively enlarged. No pericardial effusion. Aortic valve calcifications. Normal caliber thoracic aorta. Few mildly enlarged, but unchanged mediastinal nodes, likely reactive.  IMAGED ABDOMEN: Left renal superior pole lesion measuring 1.6 cm, likely cysts.  SOFT TISSUES: Unremarkable.  BONES: Unremarkable.  IMPRESSION:.  Pulmonary edema with small bilateral pleural effusions.    Venous Doppler U/S of Legs: 7/22/23:  FINDINGS:  The left internal jugular, subclavian, axillary and brachial veins are patent and compressible where applicable.  The basilic vein (superficial vein) is patent and without thrombus.  The cephalic vein (superficial vein) is patent and without thrombus.  Doppler examination shows normal spontaneous and phasic flow.  IMPRESSION:  No evidence of left upper extremity deep venous thrombosis.    CXR: 7/20/23:  INTERPRETATION:  Chest and right shoulder. Patient has weakness of right shoulder pain.  AP erect chest on July 20, 2023 at 10:05 AM.  Heart magnified by technique.  There is a mild symmetric congestive picture increased from July 5 this year.  Right shoulder. 3 views. There is mild degeneration particularly at the glenohumeral articulation. No fracture.  IMPRESSION: Mild CHF increased from prior. Degeneration right shoulder.      CTA of Chest: 7/20/23:  FINDINGS:  PULMONARY ANGIOGRAM:  No pulmonary embolism.  LUNGS/AIRWAYS/PLEURA: Patent trachea and bronchi. Unchanged mild interlobular septal thickening in both lungs. Mild passive atelectasis in the lower lobes. New small linear subpleural opacities in both upper lobes, likely scarring or atelectasis. New trace pleural effusions. Unchanged partially calcified focal left pleural thickening.  LYMPH NODES/MEDIASTINUM: Unchanged anterior mediastinal nodules, largest approximately 1.5 cm. Unchanged soft tissue density along the right wall of themidesophagus, stable dating back to 2/21/2021. Small hiatal hernia.  HEART/VASCULATURE: Normal heart size. No pericardial effusion. Calcified coronary arteries and aortic valve.  UPPER ABDOMEN: Left renal cyst.  BONES/SOFT TISSUES: Unremarkable.  IMPRESSION:  No pulmonary embolism.  No pneumonia.  New trace pleural effusions.      CT of Abd: 7/20/23:  FINDINGS:  Evaluation of solid organs and vascular structures is limited without intravenous contrast.  LOWER CHEST: Mild fibrotic changes again noted. Trace bilateral pleural effusions. Cardiomegaly. Coronary calcifications. Small hiatal hernia.  LIVER: Within normal limits.  BILE DUCTS: Normal caliber.  GALLBLADDER: Cholelithiasis.. No pericholecystic inflammation.  SPLEEN: Within normal limits.  PANCREAS: Fatty atrophy.  ADRENALS: Within normal limits.  KIDNEYS/URETERS: Excreted contrast within the collecting systems and ureters from recent exam is slightly limited evaluation of. No hydronephrosis or obstructing stone. Left renal cyst.  BLADDER: Collapsed around Pérez catheter, containing excreted contrast material.  REPRODUCTIVE ORGANS: No pelvic mass.  BOWEL: No bowel obstruction. Appendix is normal. Large rectal stool burden and overall moderate colonic stool burden. Mild wall thickening of the ascending colon, transverse colon and proximal descending colon.  PERITONEUM: No ascites.  VESSELS: Atherosclerotic changes.  RETROPERITONEUM/LYMPH NODES: No lymphadenopathy.  ABDOMINAL WALL: Small fat-containing umbilical hernia.  BONES: Degenerative changes. Osteopenia.  IMPRESSION:  No hydronephrosis or obstructing stone.  Mild wall thickening of the ascending colon, transverse colon proximal descending colon may reflect underdistention or colitis. Clinical correlation recommended.      CT of Head: 7/20/23:  FINDINGS:  No acute intracranial abnormalities.  No evidence of acute cortical infarction or hemorrhage.  No mass effect or edema.  Small vessel and atrophic changes.  No sinusitis or mastoiditis.  No skull fracture. Atherosclerotic vascular calcification present skull base.  IMPRESSION:  No acute intracranial abnormalities.  Small vessel and atrophic changes.      EKG:  Junctional bradycardia with LVH and PVC and APC    TELEMETRY:  Junctional bradycardia with LVH and PVC and APC => SVT => junctional mary => SVT => junctional bradycardia    ECHO:  6/9/23:  Findings:  Left Ventricle   Followup study to evaluate for endocarditis.   Valves appear normal - no evidence of endocarditis.    Summary   Followup study to evaluate for endocarditis.   Valves appear normal - no evidence of endocarditis.    Signature   ----------------------------------------------------------------   Electronically signed by Dave Monet MD(Interpreting   physician) on 06/09/2023 05:17 PM   ----------------------------------------------------------------    ECHO:  3/1/23:  M-Mode Measurements (cm)   LVEDd: 3.97 cm            LVESd: 2.55 cm   IVSEd: 1.1 cm   LVPWd: 1.1 cm             AO Root Dimension: 2.8 cm                             LA: 3.5 cm                LVOT: 2 cm  Doppler Measurements:   AV Velocity:434 cm/s                  MV Peak E-Wave: 98.7 cm/s   AV Peak Gradient: 75.34 mmHg          MV Peak A-Wave: 131 cm/s   AV Mean Gradient: 40 mmHg             MV E/A Ratio: 0.75 %   AV Area (Continuity):0.85 cm^2        MV Peak Gradient: 3.9 mmHg   TR Velocity:190 cm/s   TR Gradient:14.44 mmHg   Estimated RAP:5 mmHg   RVSP:27 mmHg    Findings  Mitral Valve   The mitral valve leaflets appear thickened.   EA reversal of the mitral inflow consistent with reduced compliance of the left ventricle.   Mild mitral annular calcification is present.   Mild mitral regurgitation is present.    Aortic Valve   Peak and mean transaortic gradients are 75 and 40mmHg respectively; this finding is consistent with severe aortic stenosis.   Mild (1+) aortic regurgitation is present.    Tricuspid Valve   Trace tricuspid valve regurgitation is present.    Pulmonic Valve   Mild pulmonic valvular regurgitation (1+) is present.    Left Atrium   Normal appearing left atrium.    Left Ventricle   Mild concentric left ventricular hypertrophy is present.   The left ventricle is normal in size.   Estimated left ventricular ejection fraction is 65-70 %.    Right Atrium   Normal appearing right atrium.    Right Ventricle   Normal appearing right ventricle structure and function.    Pericardial Effusion   No evidence of pericardial effusion.    Pleural Effusion   No evidence of pleural effusion.    Miscellaneous   The IVC appears normal.    Summary   The mitral valve leaflets appear thickened.   EA reversal of the mitral inflow consistent with reduced compliance of the left ventricle.   Mild mitral annular calcification is present.   Mild mitral regurgitation is present.   Peak and mean transaortic gradients are 75 and 40mmHg respectively; this finding is consistent with severe aortic stenosis.   Mild (1+) aortic regurgitation is present.   Trace tricuspid valve regurgitation is present.   Mild pulmonic valvular regurgitation (1+) is present.   Normal appearing left atrium.   Mild concentric left ventricular hypertrophy is present.   The left ventricle is normal in size.   Estimated left ventricular ejection fraction is 65-70 %.   Normal appearing right atrium.   Normal appearing right ventricle structure and function.   The IVC appears normal.   No evidence of pericardial effusion.   No evidence of pleural effusion.    Signature   ----------------------------------------------------------------   Electronically signed by Sakina Cheatham MD, Director of   Cardiac Cath Lab(Interpreting physician) on 03/01/2023 06:42   PM   ----------------------------------------------------------------

## 2023-07-27 ENCOUNTER — TRANSCRIPTION ENCOUNTER (OUTPATIENT)
Age: 88
End: 2023-07-27

## 2023-07-27 ENCOUNTER — APPOINTMENT (OUTPATIENT)
Dept: RHEUMATOLOGY | Facility: CLINIC | Age: 88
End: 2023-07-27

## 2023-07-27 VITALS
OXYGEN SATURATION: 97 % | HEART RATE: 72 BPM | RESPIRATION RATE: 19 BRPM | TEMPERATURE: 98 F | DIASTOLIC BLOOD PRESSURE: 54 MMHG | SYSTOLIC BLOOD PRESSURE: 130 MMHG

## 2023-07-27 LAB
HCT VFR BLD CALC: 27.7 % — LOW (ref 34.5–45)
HGB BLD-MCNC: 9.3 G/DL — LOW (ref 11.5–15.5)
MCHC RBC-ENTMCNC: 31 PG — SIGNIFICANT CHANGE UP (ref 27–34)
MCHC RBC-ENTMCNC: 33.6 GM/DL — SIGNIFICANT CHANGE UP (ref 32–36)
MCV RBC AUTO: 92.3 FL — SIGNIFICANT CHANGE UP (ref 80–100)
PLATELET # BLD AUTO: 122 K/UL — LOW (ref 150–400)
RBC # BLD: 3 M/UL — LOW (ref 3.8–5.2)
RBC # FLD: 15.3 % — HIGH (ref 10.3–14.5)
WBC # BLD: 5.35 K/UL — SIGNIFICANT CHANGE UP (ref 3.8–10.5)
WBC # FLD AUTO: 5.35 K/UL — SIGNIFICANT CHANGE UP (ref 3.8–10.5)

## 2023-07-27 PROCEDURE — 99239 HOSP IP/OBS DSCHRG MGMT >30: CPT

## 2023-07-27 RX ORDER — PHENAZOPYRIDINE HCL 100 MG
1 TABLET ORAL
Qty: 4 | Refills: 0
Start: 2023-07-27 | End: 2023-07-30

## 2023-07-27 RX ADMIN — LIDOCAINE 1 PATCH: 4 CREAM TOPICAL at 07:00

## 2023-07-27 RX ADMIN — Medication 40 MILLIGRAM(S): at 11:15

## 2023-07-27 RX ADMIN — Medication 25 MILLIGRAM(S): at 06:10

## 2023-07-27 RX ADMIN — Medication 1 MILLIGRAM(S): at 11:15

## 2023-07-27 RX ADMIN — HEPARIN SODIUM 5000 UNIT(S): 5000 INJECTION INTRAVENOUS; SUBCUTANEOUS at 06:10

## 2023-07-27 RX ADMIN — Medication 100 MILLIGRAM(S): at 11:15

## 2023-07-27 RX ADMIN — PANTOPRAZOLE SODIUM 40 MILLIGRAM(S): 20 TABLET, DELAYED RELEASE ORAL at 06:10

## 2023-07-27 RX ADMIN — Medication 500 MILLIGRAM(S): at 11:16

## 2023-07-27 RX ADMIN — CHLORHEXIDINE GLUCONATE 1 APPLICATION(S): 213 SOLUTION TOPICAL at 11:11

## 2023-07-27 RX ADMIN — LIDOCAINE 1 PATCH: 4 CREAM TOPICAL at 09:14

## 2023-07-27 RX ADMIN — GABAPENTIN 300 MILLIGRAM(S): 400 CAPSULE ORAL at 11:15

## 2023-07-27 RX ADMIN — POLYETHYLENE GLYCOL 3350 17 GRAM(S): 17 POWDER, FOR SOLUTION ORAL at 11:15

## 2023-07-27 RX ADMIN — Medication 1000 UNIT(S): at 11:15

## 2023-07-27 NOTE — DISCHARGE NOTE PROVIDER - HOSPITAL COURSE
Patient is a 89y old  Female who presents with a chief complaint of Weakness (24 Jul 2023 06:34)      HPI:  89 y/o F with PMH HTN, HLD, severe AS, HFpEF, hx of pérez, hx of aspiration pneumonia and UTI presents with weakness. Pt reports feeling cold and shaking when she woke up this morning. She also felt dizzy and felt warm. Pt was recently admitted for UTI and urinary retention. She was discharged on PO antibiotics which were completed and a pérez upon discharge.    ER course: Temp 100.1F, BP 97/41-98/41, SpO2 87% -> 98% on 2L of NS. Labs: WBC 11.21, Hb 10.2 (baseline ~10), , neutrophils 92.1%, CO2 20, Cr 1.45 (baseline ~1.4), glucose 113, AST 38, BNP 2123. UA: moderate leukocyte esterase, small blood, WBC 11-25, RBC 3-5, moderate bacteria. COVID and RVP negative.   EKG: Junctional rhythm with PVCs HR 51 bpm, normal intervals, LVH (personally reviewed).     Imaging:   - CTA: No pulmonary embolism. No pneumonia. New trace pleural effusions. Unchanged anterior mediastinal nodules, largest approximately 1.5 cm. Unchanged soft tissue density along the right wall of the midesophagus, stable dating back to 2/21/2021.   - CXR: increased vascular congestion, no consolidation, no effusion, no pneumothorax (personally reviewed).  - XR right shoulder: Mild CHF increased from prior. Degeneration right shoulder.    Pt was given Levaquin, 500 ml of NS, Tylenol. She is being admitted to med/surg for further management.  (20 Jul 2023 17:26)  Vital Signs Last 24 Hrs  T(C): 36.7 (27 Jul 2023 07:24), Max: 36.9 (26 Jul 2023 20:25)  T(F): 98 (27 Jul 2023 07:24), Max: 98.4 (26 Jul 2023 20:25)  HR: 68 (27 Jul 2023 08:17) (67 - 75)  BP: 118/39 (27 Jul 2023 07:24) (111/42 - 151/45)  BP(mean): --  RR: 18 (27 Jul 2023 08:17) (18 - 19)  SpO2: 94% (27 Jul 2023 08:17) (90% - 94%)    Parameters below as of 27 Jul 2023 08:17  Patient On (Oxygen Delivery Method): room air            Downgraded from ICU  HH dropped  Pt has a chronic Pérez  Pt c/o CP today, TERRENCE - diffuse B lines, CT confirmed pulm edema- started on IV LAsix  7/26: TERRENCE absent; from HTN urgency her BP is borderline after Lasix worked successfully     Vital Signs Last 24 Hrs  T(C): 36.7 (27 Jul 2023 07:24), Max: 36.9 (26 Jul 2023 20:25)  T(F): 98 (27 Jul 2023 07:24), Max: 98.4 (26 Jul 2023 20:25)  HR: 68 (27 Jul 2023 08:17) (67 - 75)  BP: 118/39 (27 Jul 2023 07:24) (111/42 - 151/45)  BP(mean): --  RR: 18 (27 Jul 2023 08:17) (18 - 19)  SpO2: 94% (27 Jul 2023 08:17) (90% - 94%)    Parameters below as of 27 Jul 2023 08:17  Patient On (Oxygen Delivery Method): room air    Acute UTI  Severe aortic stenosis  Mitral regurgitation  Sepsis, unspecified organism  Junctional bradycardia  Supraventricular tachycardia  Pulm edema        started IV Lasix for pulm edema/ uncontrolled HTN- resolved  HTN resolved birderline BP- dc ARB and decrease Hydralazine;  dc lasix for now- change to PO  metoprolol was dc for recent junctional bradycardia   Chronic Diaz (Dr Rojelio Collins)     Will f/up with Cardio re: further diuretic at this time BP is borderline

## 2023-07-27 NOTE — DISCHARGE NOTE PROVIDER - NSDCMRMEDTOKEN_GEN_ALL_CORE_FT
allopurinol 100 mg oral tablet: 1 tab(s) orally once a day  Artificial Tears ophthalmic solution: 1 drop(s) in each eye 2 times a day as needed for  dry eyes  ascorbic acid 500 mg oral tablet: 1 tab(s) orally once a day  cholecalciferol 25 mcg (1000 intl units) oral tablet: 1 tab(s) orally once a day  Colace 100 mg oral capsule: 1 cap(s) orally once a day  folic acid 1 mg oral tablet: 1 tab(s) orally once a day  hydrALAZINE 25 mg oral tablet: 1 tab(s) orally 2 times a day  Lasix 20 mg oral tablet: 1 tab(s) orally once a day  lidocaine 5% topical film: Apply topically to affected area once a day as needed for pain  Neurontin 300 mg oral capsule: 2 cap(s) orally 2 times a day  nystatin 100,000 units/mL oral suspension: 5 milliliter(s) orally 3 times a day as needed  pantoprazole 40 mg oral delayed release tablet: 1 tab(s) orally once a day  rosuvastatin 5 mg oral tablet: 1 tab(s) orally once a day (at bedtime)  senna (sennosides) 8.6 mg oral tablet: 2 tab(s) orally every other day (at bedtime)  Tylenol 8 HR Arthritis Pain 650 mg oral tablet, extended release: 2 tab(s) orally 3 times a day as needed for pain   allopurinol 100 mg oral tablet: 1 tab(s) orally once a day  Artificial Tears ophthalmic solution: 1 drop(s) in each eye 2 times a day as needed for  dry eyes  ascorbic acid 500 mg oral tablet: 1 tab(s) orally once a day  cholecalciferol 25 mcg (1000 intl units) oral tablet: 1 tab(s) orally once a day  Colace 100 mg oral capsule: 1 cap(s) orally once a day  folic acid 1 mg oral tablet: 1 tab(s) orally once a day  hydrALAZINE 25 mg oral tablet: 1 tab(s) orally 2 times a day  Lasix 20 mg oral tablet: 1 tab(s) orally once a day  lidocaine 5% topical film: Apply topically to affected area once a day as needed for pain  Neurontin 300 mg oral capsule: 2 cap(s) orally 2 times a day  nystatin 100,000 units/mL oral suspension: 5 milliliter(s) orally 3 times a day as needed  pantoprazole 40 mg oral delayed release tablet: 1 tab(s) orally once a day  phenazopyridine 200 mg oral tablet: 1 tab(s) orally once a day  rosuvastatin 5 mg oral tablet: 1 tab(s) orally once a day (at bedtime)  senna (sennosides) 8.6 mg oral tablet: 2 tab(s) orally every other day (at bedtime)  Tylenol 8 HR Arthritis Pain 650 mg oral tablet, extended release: 2 tab(s) orally 3 times a day as needed for pain

## 2023-07-27 NOTE — DISCHARGE NOTE NURSING/CASE MANAGEMENT/SOCIAL WORK - NSDCFUADDAPPT_GEN_ALL_CORE_FT
Dr. Collins  Monday, August 07, 2023 at 2:15 p.m. Dr. Collins  Monday, August 07, 2023 at 2:15 p.m.    Faxed discharge papers to Olvin Collins  [phone] Nelia)

## 2023-07-27 NOTE — DISCHARGE NOTE PROVIDER - NSDCCPCAREPLAN_GEN_ALL_CORE_FT
PRINCIPAL DISCHARGE DIAGNOSIS  Diagnosis: Acute UTI  Assessment and Plan of Treatment:       SECONDARY DISCHARGE DIAGNOSES  Diagnosis: Weakness  Assessment and Plan of Treatment: PT as outpatient; see Dr Collins Cardio re: further diuretic use    Diagnosis: Hypoxia  Assessment and Plan of Treatment:

## 2023-07-27 NOTE — DISCHARGE NOTE NURSING/CASE MANAGEMENT/SOCIAL WORK - PATIENT PORTAL LINK FT
You can access the FollowMyHealth Patient Portal offered by [facility] by registering at the following website: [email or web address]. By joining [facility] FollowMyHealth portal, you will also be able to view your health information using other applications (apps) compatible with our system.

## 2023-07-27 NOTE — PROGRESS NOTE ADULT - SUBJECTIVE AND OBJECTIVE BOX
CHIEF COMPLAINT: Patient is a 89y old  Female who presents with a chief complaint of Weakness (22 Jul 2023 12:59)      HPI:  89 y/o F with PMH HTN, HLD, severe AS, HFpEF, hx of pérez, hx of aspiration pneumonia and UTI presents with weakness. Pt reports feeling cold and shaking when she woke up this morning. She also felt dizzy and felt warm. Pt was recently admitted for UTI and urinary retention. She was discharged on PO antibiotics which were completed and a pérez upon discharge. Denies fevers, chest pain, SOB, abdominal pain, N/V, diarrhea/constipation.     Prior admission:  - 7/10/23: Weakness -> UTI/urinary retention -> PO Ceftin, pérez was continued upon d/c     ER course: Temp 100.1F, BP 97/41-98/41, SpO2 87% -> 98% on 2L of NS. Labs: WBC 11.21, Hb 10.2 (baseline ~10), , neutrophils 92.1%, CO2 20, Cr 1.45 (baseline ~1.4), glucose 113, AST 38, BNP 2123. UA: moderate leukocyte esterase, small blood, WBC 11-25, RBC 3-5, moderate bacteria. COVID and RVP negative.   EKG: Junctional rhythm with PVCs HR 51 bpm, normal intervals, LVH (personally reviewed).     Imaging:   - CTA: No pulmonary embolism. No pneumonia. New trace pleural effusions. Unchanged anterior mediastinal nodules, largest approximately 1.5 cm. Unchanged soft tissue density along the right wall of the midesophagus, stable dating back to 2/21/2021.   - CXR: increased vascular congestion, no consolidation, no effusion, no pneumothorax (personally reviewed).  - XR right shoulder: Mild CHF increased from prior. Degeneration right shoulder.    Pt was given Levaquin, 500 ml of NS, Tylenol. She is being admitted to med/surg for further management.  (20 Jul 2023 17:26)    7/21/23:  Pt with above history and severe AS and recurrent sepsis from recurrent UTI's making doing a TAVR impossible at this time.  Continue to treat sepsis and hopefully the rhythm will stabilize.  Bradycardia is usually better for AS than tachycardia.  Continue supportive care and treatment as outlined by medicine etal.     7/22/23-EP evaluated patient for junctional bradycardia.  Patient previously started on Levophed gtt but developed tachycardia so it was discontinued. Patient currently on phenylephrine gtt. Patient currently junctional rhythm with rates 35-70.  Denies any CP, palpitations, SOB, dizziness, lightheadedness.  Junctional bradycardia in setting of severe sepsis, severe Aortic stenosis; Phenylephrine likely contributing to junctional rhythm.  Aortic stenosis is primary issue causing the other secondary issues with recurrent UTI/sepsis preventing TAVR.  EP service felt  intervention indicated at this time and to continue to treat underlying infection.        Overnight patient with sinus bradycardia, pauses no longer than 2 seconds with sinus pause.   Sinus bradycardia overnight with brief 5 beat run of afib.  In addition, BP running high now and no signs of shock but still elevated lactate level.  WBC still in the 20's, renal insufficiency, hyponatremia still in play and BNP in the 16000's (was in the 2000's) and f/u ECHO with EF 60% as per report    7/23/23-patient with no further arrhythmia, off pressors and BP stable in the 160's.  No further fluctuations.      7/24/23:  BP running high despite starting Hydralazine 25 mg tid.  (-180's systolic).  Will increase Hydralazine to 50 mg tid.    7/25/23:  Feeling better after IV Lasix for her pulmonary edema.  BP better on Hydralazine.  No anginal chest pains.  NSR on the monitor.    7/26/23:  Feeling better with less SOB.  Still very weak and needs more PT and strengthening.  NSR on the monitor.  No new cardiac symptoms.    7/27/23:  Resting comfortably without increased SOB or other cardiac symptoms.  Needs more PT and strengthening.  NSR on the monitor.    PMHx: PAST MEDICAL & SURGICAL HISTORY:  HTN (hypertension)  HLD (hyperlipidemia)  Gout  Osteoarthritis  Acute on chronic diastolic congestive heart failure  Aortic stenosis  H/O CHF  Pneumonia, aspiration  Pneumonia  History of tonsillectomy in childhood      Allergies: Allergies  Ceftin (Hives; Rash)      REVIEW OF SYSTEMS:    CONSTITUTIONAL: weakness, fevers or chills  EYES/ENT: No visual changes;  No vertigo or throat pain   NECK: No pain or stiffness  RESPIRATORY: No cough, wheezing, hemoptysis; No shortness of breath  CARDIOVASCULAR: No chest pain or palpitations  GASTROINTESTINAL: No abdominal or epigastric pain. No nausea, vomiting, or hematemesis; No diarrhea or constipation. No melena or hematochezia.  GENITOURINARY: No dysuria, frequency or hematuria      Vital Signs Last 24 Hrs  T(C): 36.9 (26 Jul 2023 20:25), Max: 36.9 (26 Jul 2023 20:25)  T(F): 98.4 (26 Jul 2023 20:25), Max: 98.4 (26 Jul 2023 20:25)  HR: 72 (27 Jul 2023 06:00) (71 - 75)  BP: 151/45 (27 Jul 2023 06:00) (111/42 - 151/45)  RR: 18 (26 Jul 2023 20:25) (18 - 18)  SpO2: 91% (26 Jul 2023 20:25) (91% - 97%): room air      I&O's Summary    21 Jul 2023 07:01  -  22 Jul 2023 07:00  --------------------------------------------------------  IN: 1284.1 mL / OUT: 678 mL / NET: 606.1 mL    22 Jul 2023 07:01  -  23 Jul 2023 05:11  --------------------------------------------------------  IN: 0 mL / OUT: 1450 mL / NET: -1450 mL        PHYSICAL EXAM:   Constitutional: NAD, awake and alert, well-developed  HEENT: PERR, EOMI, Normal Hearing, MMM  Neck:  JVD  Respiratory: Breath sounds are clear bilaterally, No wheezing, rales or rhonchi  Cardiovascular: S1 and S2, regular rate and rhythm,  Murmurs, no gallops or rubs  Gastrointestinal: Bowel Sounds present, soft, nontender, nondistended, no guarding, no rebound  Extremities:  peripheral edema      MEDICATIONS  (STANDING):  ascorbic acid 500 milliGRAM(s) Oral daily  atorvastatin 20 milliGRAM(s) Oral at bedtime  chlorhexidine 2% Cloths 1 Application(s) Topical <User Schedule>  cholecalciferol 1000 Unit(s) Oral daily  folic acid 1 milliGRAM(s) Oral daily  furosemide    Tablet 40 milliGRAM(s) Oral daily  gabapentin 300 milliGRAM(s) Oral two times a day  heparin   Injectable 5000 Unit(s) SubCutaneous every 8 hours  hydrALAZINE 25 milliGRAM(s) Oral three times a day  lidocaine   4% Patch 1 Patch Transdermal every 24 hours  melatonin 3 milliGRAM(s) Oral at bedtime  pantoprazole    Tablet 40 milliGRAM(s) Oral before breakfast  polyethylene glycol 3350 17 Gram(s) Oral two times a day  senna 2 Tablet(s) Oral at bedtime  thiamine 100 milliGRAM(s) Oral daily    MEDICATIONS  (PRN):  acetaminophen     Tablet .. 650 milliGRAM(s) Oral every 6 hours PRN Temp greater or equal to 38.5C (101.3F), Mild Pain (1 - 3)  aluminum hydroxide/magnesium hydroxide/simethicone Suspension 30 milliLiter(s) Oral every 4 hours PRN Dyspepsia  nystatin    Suspension 880417 Unit(s) Oral four times a day PRN mouth sores  ondansetron Injectable 4 milliGRAM(s) IV Push every 8 hours PRN Nausea and/or Vomiting      LABS: All Labs Reviewed:                        9.1    4.65  )-----------( 124      ( 26 Jul 2023 06:40 )             27.1                           8.5    3.93  )-----------( 120      ( 25 Jul 2023 06:32 )             25.2                           7.8    3.85  )-----------( 107      ( 24 Jul 2023 05:27 )             23.7                         8.3    6.78  )-----------( 82       ( 22 Jul 2023 08:10 )             24.2                           9.8    23.91 )-----------( 143      ( 21 Jul 2023 06:10 )             29.9      07-26    140  |  104  |  27<H>  ----------------------------<  119<H>  4.1   |  30  |  1.48<H>    Ca    9.4      26 Jul 2023 06:40       07-24    139  |  111<H>  |  26<H>  ----------------------------<  101<H>  4.3   |  23  |  1.38<H>    Ca    8.2<L>      24 Jul 2023 05:27  Phos  3.4     07-24  Mg     2.4     07-24    TPro  6.1  /  Alb  3.0<L>  /  TBili  0.7  /  DBili  x   /  AST  81<H>  /  ALT  132<H>  /  AlkPhos  81  07-23 07-22    135  |  104  |  39<H>  ----------------------------<  124<H>  4.1   |  23  |  1.85<H>    Ca    8.2<L>      22 Jul 2023 08:10  Phos  2.9     07-22  Mg     2.3     07-22    TPro  6.7  /  Alb  3.1<L>  /  TBili  0.8  /  DBili  x   /  AST  92<H>  /  ALT  79<H>  /  AlkPhos  64  07-21    07-21    128<L>  |  105  |  30<H>  ----------------------------<  159<H>  4.8   |  13<L>  |  2.03<H>    Ca    8.3<L>      21 Jul 2023 06:10  Phos  3.2     07-21  Mg     2.1     07-21    TPro  6.7  /  Alb  3.1<L>  /  TBili  0.8  /  DBili  x   /  AST  92<H>  /  ALT  79<H>  /  AlkPhos  64  07-21    PT/INR - ( 20 Jul 2023 10:40 )   PT: 11.8 sec;   INR: 1.02 ratio       PTT - ( 20 Jul 2023 10:40 )  PTT:25.7 sec    Pro-Brain Natriuretic Peptide (07.21.23 @ 06:10): 91767 pg/mL  Pro-Brain Natriuretic Peptide (07.20.23 @ 10:40): 2123 pg/mL    Troponin I, High Sensitivity (07.20.23 @ 10:40): 17.94      Blood Culture: Organism --  Gram Stain Blood -- Gram Stain --  Specimen Source .Blood None  Culture-Blood --Culture Results: No growth at 72 Hours (07.20.23 @ 11:35)     Organism --  Gram Stain Urine -- Gram Stain --  Specimen Source Clean Catch None  Culture-Urine --Culture Results: No growth      ABG - ( 21 Jul 2023 08:31 )  pH, Arterial: 7.27  pH, Blood: x     /  pCO2: 26    /  pO2: 99    / HCO3: 12    / Base Excess: -13.3 /  SaO2: 99          RADIOLOGY:    CT of Chest: 7/24/23:  FINDINGS:  AIRWAYS, LUNGS, PLEURA: Diffuse peribronchial thickening. Interlobular septal thickening. Peripheral scarring in the lingula unchanged. Subsegmental bibasilar atelectasis. Small bilateral pleural effusions have increased.  MEDIASTINUM: Left ventricle qualitatively enlarged. No pericardial effusion. Aortic valve calcifications. Normal caliber thoracic aorta. Few mildly enlarged, but unchanged mediastinal nodes, likely reactive.  IMAGED ABDOMEN: Left renal superior pole lesion measuring 1.6 cm, likely cysts.  SOFT TISSUES: Unremarkable.  BONES: Unremarkable.  IMPRESSION:.  Pulmonary edema with small bilateral pleural effusions.    Venous Doppler U/S of Legs: 7/22/23:  FINDINGS:  The left internal jugular, subclavian, axillary and brachial veins are patent and compressible where applicable.  The basilic vein (superficial vein) is patent and without thrombus.  The cephalic vein (superficial vein) is patent and without thrombus.  Doppler examination shows normal spontaneous and phasic flow.  IMPRESSION:  No evidence of left upper extremity deep venous thrombosis.    CXR: 7/20/23:  INTERPRETATION:  Chest and right shoulder. Patient has weakness of right shoulder pain.  AP erect chest on July 20, 2023 at 10:05 AM.  Heart magnified by technique.  There is a mild symmetric congestive picture increased from July 5 this year.  Right shoulder. 3 views. There is mild degeneration particularly at the glenohumeral articulation. No fracture.  IMPRESSION: Mild CHF increased from prior. Degeneration right shoulder.      CTA of Chest: 7/20/23:  FINDINGS:  PULMONARY ANGIOGRAM:  No pulmonary embolism.  LUNGS/AIRWAYS/PLEURA: Patent trachea and bronchi. Unchanged mild interlobular septal thickening in both lungs. Mild passive atelectasis in the lower lobes. New small linear subpleural opacities in both upper lobes, likely scarring or atelectasis. New trace pleural effusions. Unchanged partially calcified focal left pleural thickening.  LYMPH NODES/MEDIASTINUM: Unchanged anterior mediastinal nodules, largest approximately 1.5 cm. Unchanged soft tissue density along the right wall of themidesophagus, stable dating back to 2/21/2021. Small hiatal hernia.  HEART/VASCULATURE: Normal heart size. No pericardial effusion. Calcified coronary arteries and aortic valve.  UPPER ABDOMEN: Left renal cyst.  BONES/SOFT TISSUES: Unremarkable.  IMPRESSION:  No pulmonary embolism.  No pneumonia.  New trace pleural effusions.      CT of Abd: 7/20/23:  FINDINGS:  Evaluation of solid organs and vascular structures is limited without intravenous contrast.  LOWER CHEST: Mild fibrotic changes again noted. Trace bilateral pleural effusions. Cardiomegaly. Coronary calcifications. Small hiatal hernia.  LIVER: Within normal limits.  BILE DUCTS: Normal caliber.  GALLBLADDER: Cholelithiasis.. No pericholecystic inflammation.  SPLEEN: Within normal limits.  PANCREAS: Fatty atrophy.  ADRENALS: Within normal limits.  KIDNEYS/URETERS: Excreted contrast within the collecting systems and ureters from recent exam is slightly limited evaluation of. No hydronephrosis or obstructing stone. Left renal cyst.  BLADDER: Collapsed around Pérez catheter, containing excreted contrast material.  REPRODUCTIVE ORGANS: No pelvic mass.  BOWEL: No bowel obstruction. Appendix is normal. Large rectal stool burden and overall moderate colonic stool burden. Mild wall thickening of the ascending colon, transverse colon and proximal descending colon.  PERITONEUM: No ascites.  VESSELS: Atherosclerotic changes.  RETROPERITONEUM/LYMPH NODES: No lymphadenopathy.  ABDOMINAL WALL: Small fat-containing umbilical hernia.  BONES: Degenerative changes. Osteopenia.  IMPRESSION:  No hydronephrosis or obstructing stone.  Mild wall thickening of the ascending colon, transverse colon proximal descending colon may reflect underdistention or colitis. Clinical correlation recommended.      CT of Head: 7/20/23:  FINDINGS:  No acute intracranial abnormalities.  No evidence of acute cortical infarction or hemorrhage.  No mass effect or edema.  Small vessel and atrophic changes.  No sinusitis or mastoiditis.  No skull fracture. Atherosclerotic vascular calcification present skull base.  IMPRESSION:  No acute intracranial abnormalities.  Small vessel and atrophic changes.      EKG:  Junctional bradycardia with LVH and PVC and APC    TELEMETRY:  Junctional bradycardia with LVH and PVC and APC => SVT => junctional mary => SVT => junctional bradycardia    ECHO:  6/9/23:  Findings:  Left Ventricle   Followup study to evaluate for endocarditis.   Valves appear normal - no evidence of endocarditis.    Summary   Followup study to evaluate for endocarditis.   Valves appear normal - no evidence of endocarditis.    Signature   ----------------------------------------------------------------   Electronically signed by Dave Monet MD(Interpreting   physician) on 06/09/2023 05:17 PM   ----------------------------------------------------------------    ECHO:  3/1/23:  M-Mode Measurements (cm)   LVEDd: 3.97 cm            LVESd: 2.55 cm   IVSEd: 1.1 cm   LVPWd: 1.1 cm             AO Root Dimension: 2.8 cm                             LA: 3.5 cm                LVOT: 2 cm  Doppler Measurements:   AV Velocity:434 cm/s                  MV Peak E-Wave: 98.7 cm/s   AV Peak Gradient: 75.34 mmHg          MV Peak A-Wave: 131 cm/s   AV Mean Gradient: 40 mmHg             MV E/A Ratio: 0.75 %   AV Area (Continuity):0.85 cm^2        MV Peak Gradient: 3.9 mmHg   TR Velocity:190 cm/s   TR Gradient:14.44 mmHg   Estimated RAP:5 mmHg   RVSP:27 mmHg    Findings  Mitral Valve   The mitral valve leaflets appear thickened.   EA reversal of the mitral inflow consistent with reduced compliance of the left ventricle.   Mild mitral annular calcification is present.   Mild mitral regurgitation is present.    Aortic Valve   Peak and mean transaortic gradients are 75 and 40mmHg respectively; this finding is consistent with severe aortic stenosis.   Mild (1+) aortic regurgitation is present.    Tricuspid Valve   Trace tricuspid valve regurgitation is present.    Pulmonic Valve   Mild pulmonic valvular regurgitation (1+) is present.    Left Atrium   Normal appearing left atrium.    Left Ventricle   Mild concentric left ventricular hypertrophy is present.   The left ventricle is normal in size.   Estimated left ventricular ejection fraction is 65-70 %.    Right Atrium   Normal appearing right atrium.    Right Ventricle   Normal appearing right ventricle structure and function.    Pericardial Effusion   No evidence of pericardial effusion.    Pleural Effusion   No evidence of pleural effusion.    Miscellaneous   The IVC appears normal.    Summary   The mitral valve leaflets appear thickened.   EA reversal of the mitral inflow consistent with reduced compliance of the left ventricle.   Mild mitral annular calcification is present.   Mild mitral regurgitation is present.   Peak and mean transaortic gradients are 75 and 40mmHg respectively; this finding is consistent with severe aortic stenosis.   Mild (1+) aortic regurgitation is present.   Trace tricuspid valve regurgitation is present.   Mild pulmonic valvular regurgitation (1+) is present.   Normal appearing left atrium.   Mild concentric left ventricular hypertrophy is present.   The left ventricle is normal in size.   Estimated left ventricular ejection fraction is 65-70 %.   Normal appearing right atrium.   Normal appearing right ventricle structure and function.   The IVC appears normal.   No evidence of pericardial effusion.   No evidence of pleural effusion.    Signature   ----------------------------------------------------------------   Electronically signed by Sakina Cheatham MD, Director of   Cardiac Cath Lab(Interpreting physician) on 03/01/2023 06:42   PM   ----------------------------------------------------------------

## 2023-07-27 NOTE — DISCHARGE NOTE PROVIDER - DETAILS OF MALNUTRITION DIAGNOSIS/DIAGNOSES
This patient has been assessed with a concern for Malnutrition and was treated during this hospitalization for the following Nutrition diagnosis/diagnoses:     -  07/21/2023: Moderate protein-calorie malnutrition

## 2023-07-27 NOTE — DISCHARGE NOTE PROVIDER - CARE PROVIDER_API CALL
Olvin Collins  Cardiovascular Disease  [address], [address]  [place] [address]  Phone: [phone]  Fax: [phone]  Follow Up Time: 1-3 days

## 2023-07-27 NOTE — PROGRESS NOTE ADULT - ASSESSMENT
7/21/23:  Pt with above history and severe AS and recurrent sepsis from recurrent UTI's making doing a TAVR impossible at this time.  Continue to treat sepsis and hopefully the rhythm will stabilize.  Bradycardia is usually better for AS than tachycardia.  Continue supportive care and treatment as outlined by medicine etal.  Dr Ferreira will be covering mo over the weekend.    7/22/23:  patient with persistent sepsis with elevated lactate, WBC and with increasing BNP and severe AS playing a complicating role.   F/u ECHO with EF 60%  1-AS-untreatable  1-BWL-izdeilbbr in nature-on saline drip due to hyponatremia at 75cc/hr-care not to overload patient as BP is stable-should be off inotropic support  3-arrhythmia-EPS involved and felt nothing further to do.    9-ujfgdy-ngjposms IV abx, O2 and supportive care  5-HTN-if patient maintains BP elevated in the 150-170's range off pressors, consider restarting hydralazine (was on 25mg TID at home) and eventually, once bradycardia resolve, restarting metoprolol 25mg daily (taking at home as well)    7/23/23:    patient with resolving septic shock  1-AS-not intervenable at this time  2-BP stable and elevated, restart Hydralazine 25mg TID; do not start metoprolol at this time  3-CHF-due to valve disease    7/24/23:  BP running high despite starting Hydralazine 25 mg tid.  (-180's systolic).  Will increase Hydralazine to 50 mg tid.  Continue as per medicine etal.  Will follow.    7/25/23:  Feeling better after IV Lasix for her pulmonary edema.  BP better on Hydralazine.  No anginal chest pains.  NSR on the monitor.  Continue to diurese as outlined by medicine watching her renal function and BP, especially with her severe AS.  Continue as per medicine etal.  Will follow.    7/26/23:  Feeling better with less SOB.  Still very weak and needs more PT and strengthening.  NSR on the monitor.  No new cardiac symptoms.  For possible NAREN placement.  Continue as per medicine etal.  Will follow.    7/27/23:  Resting comfortably without increased SOB or other cardiac symptoms.  Needs more PT and strengthening.  NSR on the monitor.  For possible NAREN placement.  Continue as per medicine etal.  Will follow intermittently prn and as an outpt after discharge.

## 2023-07-27 NOTE — PROGRESS NOTE ADULT - PROVIDER SPECIALTY LIST ADULT
Hospitalist
Hospitalist
MICU
MICU
Cardiology
Cardiology
Hospitalist
Urology
Cardiology
Critical Care
Cardiology
Palliative Care
Critical Care

## 2023-07-28 ENCOUNTER — NON-APPOINTMENT (OUTPATIENT)
Age: 88
End: 2023-07-28

## 2023-07-28 RX ORDER — VIBEGRON 75 MG/1
75 TABLET, FILM COATED ORAL
Qty: 90 | Refills: 3 | Status: ACTIVE | COMMUNITY
Start: 2023-07-28 | End: 1900-01-01

## 2023-08-01 DIAGNOSIS — J96.01 ACUTE RESPIRATORY FAILURE WITH HYPOXIA: ICD-10-CM

## 2023-08-01 DIAGNOSIS — Z79.899 OTHER LONG TERM (CURRENT) DRUG THERAPY: ICD-10-CM

## 2023-08-01 DIAGNOSIS — E78.5 HYPERLIPIDEMIA, UNSPECIFIED: ICD-10-CM

## 2023-08-01 DIAGNOSIS — E87.20 ACIDOSIS, UNSPECIFIED: ICD-10-CM

## 2023-08-01 DIAGNOSIS — I16.0 HYPERTENSIVE URGENCY: ICD-10-CM

## 2023-08-01 DIAGNOSIS — R00.1 BRADYCARDIA, UNSPECIFIED: ICD-10-CM

## 2023-08-01 DIAGNOSIS — Z87.891 PERSONAL HISTORY OF NICOTINE DEPENDENCE: ICD-10-CM

## 2023-08-01 DIAGNOSIS — Z53.09 PROCEDURE AND TREATMENT NOT CARRIED OUT BECAUSE OF OTHER CONTRAINDICATION: ICD-10-CM

## 2023-08-01 DIAGNOSIS — I08.8 OTHER RHEUMATIC MULTIPLE VALVE DISEASES: ICD-10-CM

## 2023-08-01 DIAGNOSIS — J98.59 OTHER DISEASES OF MEDIASTINUM, NOT ELSEWHERE CLASSIFIED: ICD-10-CM

## 2023-08-01 DIAGNOSIS — Z66 DO NOT RESUSCITATE: ICD-10-CM

## 2023-08-01 DIAGNOSIS — N17.0 ACUTE KIDNEY FAILURE WITH TUBULAR NECROSIS: ICD-10-CM

## 2023-08-01 DIAGNOSIS — D64.9 ANEMIA, UNSPECIFIED: ICD-10-CM

## 2023-08-01 DIAGNOSIS — G47.00 INSOMNIA, UNSPECIFIED: ICD-10-CM

## 2023-08-01 DIAGNOSIS — M19.011 PRIMARY OSTEOARTHRITIS, RIGHT SHOULDER: ICD-10-CM

## 2023-08-01 DIAGNOSIS — E87.5 HYPERKALEMIA: ICD-10-CM

## 2023-08-01 DIAGNOSIS — R33.9 RETENTION OF URINE, UNSPECIFIED: ICD-10-CM

## 2023-08-01 DIAGNOSIS — Z88.1 ALLERGY STATUS TO OTHER ANTIBIOTIC AGENTS STATUS: ICD-10-CM

## 2023-08-01 DIAGNOSIS — A41.9 SEPSIS, UNSPECIFIED ORGANISM: ICD-10-CM

## 2023-08-01 DIAGNOSIS — R65.21 SEVERE SEPSIS WITH SEPTIC SHOCK: ICD-10-CM

## 2023-08-01 DIAGNOSIS — Z87.01 PERSONAL HISTORY OF PNEUMONIA (RECURRENT): ICD-10-CM

## 2023-08-01 DIAGNOSIS — I49.3 VENTRICULAR PREMATURE DEPOLARIZATION: ICD-10-CM

## 2023-08-01 DIAGNOSIS — Z79.890 HORMONE REPLACEMENT THERAPY: ICD-10-CM

## 2023-08-01 DIAGNOSIS — I11.0 HYPERTENSIVE HEART DISEASE WITH HEART FAILURE: ICD-10-CM

## 2023-08-01 DIAGNOSIS — I50.32 CHRONIC DIASTOLIC (CONGESTIVE) HEART FAILURE: ICD-10-CM

## 2023-08-01 DIAGNOSIS — Z96.0 PRESENCE OF UROGENITAL IMPLANTS: ICD-10-CM

## 2023-08-01 DIAGNOSIS — Z20.822 CONTACT WITH AND (SUSPECTED) EXPOSURE TO COVID-19: ICD-10-CM

## 2023-08-01 DIAGNOSIS — E87.1 HYPO-OSMOLALITY AND HYPONATREMIA: ICD-10-CM

## 2023-08-01 DIAGNOSIS — N39.0 URINARY TRACT INFECTION, SITE NOT SPECIFIED: ICD-10-CM

## 2023-08-01 DIAGNOSIS — I47.1 SUPRAVENTRICULAR TACHYCARDIA: ICD-10-CM

## 2023-08-01 DIAGNOSIS — E44.0 MODERATE PROTEIN-CALORIE MALNUTRITION: ICD-10-CM

## 2023-08-01 DIAGNOSIS — R53.1 WEAKNESS: ICD-10-CM

## 2023-08-02 RX ORDER — CIPROFLOXACIN HYDROCHLORIDE 250 MG/1
250 TABLET, FILM COATED ORAL
Qty: 14 | Refills: 0 | Status: COMPLETED | COMMUNITY
Start: 2023-07-05 | End: 2023-08-02

## 2023-08-02 RX ORDER — NITROFURANTOIN MACROCRYSTALS 50 MG/1
50 CAPSULE ORAL
Qty: 90 | Refills: 3 | Status: DISCONTINUED | COMMUNITY
Start: 2023-07-18 | End: 2023-08-02

## 2023-08-02 RX ORDER — NITROFURANTOIN (MONOHYDRATE/MACROCRYSTALS) 25; 75 MG/1; MG/1
100 CAPSULE ORAL
Qty: 14 | Refills: 0 | Status: COMPLETED | COMMUNITY
Start: 2023-05-22 | End: 2023-08-02

## 2023-08-09 ENCOUNTER — NON-APPOINTMENT (OUTPATIENT)
Age: 88
End: 2023-08-09

## 2023-08-10 ENCOUNTER — APPOINTMENT (OUTPATIENT)
Dept: UROLOGY | Facility: CLINIC | Age: 88
End: 2023-08-10
Payer: MEDICARE

## 2023-08-10 DIAGNOSIS — R33.9 RETENTION OF URINE, UNSPECIFIED: ICD-10-CM

## 2023-08-10 PROCEDURE — 51702 INSERT TEMP BLADDER CATH: CPT

## 2023-08-10 PROCEDURE — 99213 OFFICE O/P EST LOW 20 MIN: CPT | Mod: 25

## 2023-08-11 NOTE — ADDENDUM
[FreeTextEntry1] : I, Cam Kovacs, acted as a scribe on behalf of Dr. Da Bonilla 08/11/2023. All medical entries made by the scribe were at my, Dr. Da Bonilla's, direction and personally dictated by me on 08/11/2023. I have reviewed the chart and agree that the record accurately reflects my personal performance of the history, physical exam, assessment and plan. I have also personally directed, reviewed, and agreed with the chart.

## 2023-08-11 NOTE — HISTORY OF PRESENT ILLNESS
[FreeTextEntry1] : 89F presents today for a catheterization as her nursing facility can not place a catheter. Pt is here with family has her facility can't place a catheter.

## 2023-08-11 NOTE — PHYSICAL EXAM

## 2023-08-14 PROBLEM — R33.9 RETENTION, URINE: Status: ACTIVE | Noted: 2023-02-22

## 2023-08-17 LAB — BACTERIA UR CULT: ABNORMAL

## 2023-08-29 ENCOUNTER — APPOINTMENT (OUTPATIENT)
Dept: RHEUMATOLOGY | Facility: CLINIC | Age: 88
End: 2023-08-29
Payer: MEDICARE

## 2023-08-29 PROCEDURE — 99214 OFFICE O/P EST MOD 30 MIN: CPT | Mod: 25

## 2023-08-29 PROCEDURE — 20610 DRAIN/INJ JOINT/BURSA W/O US: CPT | Mod: 50

## 2023-08-29 RX ORDER — AMOXICILLIN AND CLAVULANATE POTASSIUM 500; 125 MG/1; MG/1
500-125 TABLET, FILM COATED ORAL
Qty: 14 | Refills: 0 | Status: DISCONTINUED | COMMUNITY
Start: 2023-07-31 | End: 2023-08-29

## 2023-08-29 RX ORDER — MIRABEGRON 50 MG/1
50 TABLET, FILM COATED, EXTENDED RELEASE ORAL
Qty: 30 | Refills: 3 | Status: DISCONTINUED | COMMUNITY
Start: 2023-06-06 | End: 2023-08-29

## 2023-08-29 RX ORDER — MIRABEGRON 25 MG/1
25 TABLET, FILM COATED, EXTENDED RELEASE ORAL DAILY
Qty: 14 | Refills: 0 | Status: DISCONTINUED | OUTPATIENT
Start: 2023-06-01 | End: 2023-08-29

## 2023-08-29 RX ORDER — FUROSEMIDE 20 MG/1
20 TABLET ORAL DAILY
Refills: 0 | Status: DISCONTINUED | COMMUNITY
End: 2023-08-29

## 2023-08-29 NOTE — PHYSICAL EXAM
[TextEntry] :  MSK: SMall effusion left knee.  No erythema, warmth, no TTP.  Right shoulder with decreased ROM, lateral and anterior TTP, +Hawkin's maneuver.

## 2023-08-29 NOTE — PROCEDURE
[FreeTextEntry1] : L knee aspiration and CS injection  The procedure risks was discussed with the patient.  Indications: therapeutic purposes  #1 site identified in the left knee . Verbal consent was obtained.  Anesthesia was with ethyl chloride.  The patient was prepped with betadine solution.  A 21 gauge 1.5 inch needle was used.  13cc of fluid aspirated.  Injectables:80 mg Kenalog  The patient tolerated the procedure well..  There were no complications. Handouts/patient instructions were given to patient . patient was instructed to call if redness at site, a decrease in range of motion or an increase in pain is noted after procedure.  #2 site identified in the Right SAB . Anesthesia was with ethyl chloride. The patient was prepped with betadine solution.  A 25 gauge 1.5 inch needle was used. Injectables: Kenalog 80 mg was injected into the site The Kenalog was mixed with 1mL of xylocaine 1%.  After Kenalog as injected, Supartz was injected.   The patient tolerated the procedure well.. There were no complications. Handouts/patient instructions were given to patient . patient was instructed to call if redness at site, a decrease in range of motion or an increase in pain is noted after procedure.

## 2023-08-29 NOTE — HISTORY OF PRESENT ILLNESS
[FreeTextEntry1] : Patient with OA, Pseudogout, presents for f/u  She has pain in b/L knees L>R, and also right shoulder.   She can't remember when the right shoulder started bothering her.   She recalls receiving CS injection for the R shoulder from an orthopedic, and this was probably around a year ago.  States it was helpful then , she states.   The CS injections always help the knees, she states.  Denies fall.  Left knee hurts more than right knee today.  Pain is worsened on weight bearing and walking.  Has recently had UTI, currently not on antibiotic for any UTI infection.

## 2023-08-29 NOTE — ASSESSMENT
[FreeTextEntry1] : f/u 1 week for  Left knee 2nd Supartz injection and Right knee 1st Supartz injection.   post injection f/u care advised to patient.

## 2023-09-07 ENCOUNTER — APPOINTMENT (OUTPATIENT)
Dept: RHEUMATOLOGY | Facility: CLINIC | Age: 88
End: 2023-09-07
Payer: MEDICARE

## 2023-09-07 VITALS
BODY MASS INDEX: 27.88 KG/M2 | SYSTOLIC BLOOD PRESSURE: 134 MMHG | WEIGHT: 142 LBS | HEART RATE: 69 BPM | DIASTOLIC BLOOD PRESSURE: 74 MMHG | HEIGHT: 60 IN | TEMPERATURE: 97.3 F | OXYGEN SATURATION: 98 %

## 2023-09-07 PROCEDURE — 20610 DRAIN/INJ JOINT/BURSA W/O US: CPT | Mod: 50

## 2023-09-07 RX ORDER — SODIUM HYALURONATE INTRA-ARTICULAR SOLN PREF SYR 25 MG/2.5ML 25/2.5 MG/ML
25 SOLUTION PREFILLED SYRINGE INTRAARTICULAR
Refills: 0 | Status: COMPLETED | OUTPATIENT
Start: 2023-09-07

## 2023-09-07 RX ADMIN — SODIUM HYALURONATE INTRA-ARTICULAR SOLN PREF SYR 25 MG/2.5ML 0 MG/2.5ML: 25/2.5 SOLUTION PREFILLED SYRINGE at 00:00

## 2023-09-07 NOTE — ASSESSMENT
[FreeTextEntry1] : f/u in 1 week for:  Left knee 3rd supartz injection Right knee 2nd supartz injection

## 2023-09-07 NOTE — PROCEDURE
[FreeTextEntry1] : Date: 9/7/23 Procedure: b/L Supartz injections  The procedure risks were discussed with the patient. Indications: Therapeutic purposes #1 site identified in the Right knee.  Verbal consent was obtained. The patient was prepped with povidine solution. Topical Anesthesia was with ethyl chloride. A 21 Gauge 1.5 inch needle was used. 1mL of 1% xylocaine was used for local anesthetic.   Injectables: Supartz 25mg.  Prior to injection, 4 cc was aspirated from right knee The patient tolerated the procedure well.  #2 Identical procedure repeated in left knee, except 5 cc was aspirated.     There were no complications.  Handouts/patient instructions were given to patient.  Patient was instructed to call if redness at site, a decrease in range of motion, or significantly increased amount of pain is noted after the procedure.

## 2023-09-14 ENCOUNTER — APPOINTMENT (OUTPATIENT)
Dept: RHEUMATOLOGY | Facility: CLINIC | Age: 88
End: 2023-09-14
Payer: MEDICARE

## 2023-09-14 VITALS
DIASTOLIC BLOOD PRESSURE: 77 MMHG | WEIGHT: 140 LBS | TEMPERATURE: 97.2 F | BODY MASS INDEX: 27.48 KG/M2 | HEIGHT: 60 IN | OXYGEN SATURATION: 97 % | HEART RATE: 67 BPM | SYSTOLIC BLOOD PRESSURE: 144 MMHG

## 2023-09-14 DIAGNOSIS — M17.12 UNILATERAL PRIMARY OSTEOARTHRITIS, LEFT KNEE: ICD-10-CM

## 2023-09-14 PROCEDURE — 20610 DRAIN/INJ JOINT/BURSA W/O US: CPT | Mod: 50

## 2023-09-14 RX ORDER — SODIUM HYALURONATE INTRA-ARTICULAR SOLN PREF SYR 25 MG/2.5ML 25/2.5 MG/ML
25 SOLUTION PREFILLED SYRINGE INTRAARTICULAR
Refills: 0 | Status: COMPLETED | OUTPATIENT
Start: 2023-09-14

## 2023-09-14 RX ADMIN — SODIUM HYALURONATE INTRA-ARTICULAR SOLN PREF SYR 25 MG/2.5ML 0 MG/2.5ML: 25/2.5 SOLUTION PREFILLED SYRINGE at 00:00

## 2023-09-20 NOTE — PROGRESS NOTE ADULT - SUBJECTIVE AND OBJECTIVE BOX
Patient is a 89y old  Female who presents with a chief complaint of Weakness (21 Jul 2023 17:45)      BRIEF HOSPITAL COURSE: Pt is a 89 y/o F pmhx of HTN, HLD, severe AS, HFpEF, recurrent UTI who presented to  ED w/ complaints of chills and dizziness. In ED pt found to have + UA treated w/ Levaquin (previously admitted for UTI treated w/ meropenem and deescalated to CTX and d/c'ed home w/ chronic pérez and on PO Ceftin on 7/10/23). Went into junctional rhythm w/ PVC's, admitted to medicine.     Events last 24 hours: Pt made DNR/DNI today, remains on phenylephrine, bicarb gtt added for hypotension,     PAST MEDICAL & SURGICAL HISTORY:  HTN (hypertension)      HLD (hyperlipidemia)      Gout      Osteoarthritis      Acute on chronic diastolic congestive heart failure      Aortic stenosis      H/O CHF      Pneumonia, aspiration      Pneumonia      History of tonsillectomy  in childhood          Review of Systems:  CONSTITUTIONAL: No fever, chills, or fatigue  EYES: No eye pain, visual disturbances, or discharge  ENMT:  No difficulty hearing, tinnitus, vertigo; No sinus or throat pain  NECK: No pain or stiffness  RESPIRATORY: No cough, wheezing, chills or hemoptysis; No shortness of breath  CARDIOVASCULAR: No chest pain, palpitations, dizziness, or leg swelling  GASTROINTESTINAL: No abdominal or epigastric pain. No nausea, vomiting, or hematemesis; No diarrhea or constipation. No melena or hematochezia.  GENITOURINARY: No dysuria, frequency, hematuria, or incontinence  NEUROLOGICAL: No headaches, memory loss, loss of strength, numbness, or tremors  SKIN: No itching, burning, rashes, or lesions   MUSCULOSKELETAL: No joint pain or swelling; No muscle, back, or extremity pain  PSYCHIATRIC: No depression, anxiety, mood swings, or difficulty sleeping      Medications:  meropenem  IVPB 500 milliGRAM(s) IV Intermittent every 12 hours    norepinephrine Infusion 0.05 MICROgram(s)/kG/Min IV Continuous <Continuous>  phenylephrine    Infusion 0.5 MICROgram(s)/kG/Min IV Continuous <Continuous>      acetaminophen     Tablet .. 650 milliGRAM(s) Oral every 6 hours PRN  gabapentin 300 milliGRAM(s) Oral two times a day  melatonin 3 milliGRAM(s) Oral at bedtime PRN  ondansetron Injectable 4 milliGRAM(s) IV Push every 8 hours PRN      heparin   Injectable 5000 Unit(s) SubCutaneous every 8 hours    aluminum hydroxide/magnesium hydroxide/simethicone Suspension 30 milliLiter(s) Oral every 4 hours PRN  pantoprazole    Tablet 40 milliGRAM(s) Oral before breakfast  polyethylene glycol 3350 17 Gram(s) Oral two times a day  senna 2 Tablet(s) Oral at bedtime      atorvastatin 20 milliGRAM(s) Oral at bedtime    ascorbic acid 500 milliGRAM(s) Oral daily  cholecalciferol 1000 Unit(s) Oral daily  folic acid 1 milliGRAM(s) Oral daily  sodium bicarbonate  Infusion 0.191 mEq/kG/Hr IV Continuous <Continuous>  sodium chloride 0.9%. 500 milliLiter(s) IV Continuous <Continuous>  thiamine 100 milliGRAM(s) Oral daily      chlorhexidine 2% Cloths 1 Application(s) Topical <User Schedule>  lidocaine   4% Patch 1 Patch Transdermal every 24 hours            ICU Vital Signs Last 24 Hrs  T(C): 38.6 (21 Jul 2023 22:00), Max: 38.6 (21 Jul 2023 22:00)  T(F): 101.4 (21 Jul 2023 22:00), Max: 101.4 (21 Jul 2023 22:00)  HR: 44 (21 Jul 2023 23:30) (36 - 100)  BP: 124/55 (21 Jul 2023 23:30) (75/63 - 232/71)  BP(mean): 72 (21 Jul 2023 23:30) (42 - 113)  ABP: --  ABP(mean): --  RR: 18 (21 Jul 2023 23:30) (13 - 31)  SpO2: 97% (21 Jul 2023 23:30) (85% - 100%)    O2 Parameters below as of 21 Jul 2023 23:00  Patient On (Oxygen Delivery Method): nasal cannula  O2 Flow (L/min): 3          ABG - ( 21 Jul 2023 08:31 )  pH, Arterial: 7.27  pH, Blood: x     /  pCO2: 26    /  pO2: 99    / HCO3: 12    / Base Excess: -13.3 /  SaO2: 99                  I&O's Detail    20 Jul 2023 07:01  -  21 Jul 2023 07:00  --------------------------------------------------------  IN:    Norepinephrine: 50 mL    Oral Fluid: 200 mL    sodium chloride 0.9%: 500 mL  Total IN: 750 mL    OUT:    Indwelling Catheter - Urethral (mL): 310 mL  Total OUT: 310 mL    Total NET: 440 mL      21 Jul 2023 07:01  -  22 Jul 2023 00:29  --------------------------------------------------------  IN:    Phenylephrine: 450 mL  Total IN: 450 mL    OUT:    Indwelling Catheter - Urethral (mL): 300 mL  Total OUT: 300 mL    Total NET: 150 mL            LABS:                        9.8    23.91 )-----------( 143      ( 21 Jul 2023 06:10 )             29.9     07-21    130<L>  |  106  |  36<H>  ----------------------------<  121<H>  5.4<H>   |  16<L>  |  2.04<H>    Ca    8.4<L>      21 Jul 2023 17:16  Phos  3.2     07-21  Mg     2.1     07-21    TPro  6.7  /  Alb  3.1<L>  /  TBili  0.8  /  DBili  x   /  AST  92<H>  /  ALT  79<H>  /  AlkPhos  64  07-21          CAPILLARY BLOOD GLUCOSE        PT/INR - ( 20 Jul 2023 10:40 )   PT: 11.8 sec;   INR: 1.02 ratio         PTT - ( 20 Jul 2023 10:40 )  PTT:25.7 sec  Urinalysis Basic - ( 21 Jul 2023 17:16 )    Color: x / Appearance: x / SG: x / pH: x  Gluc: 121 mg/dL / Ketone: x  / Bili: x / Urobili: x   Blood: x / Protein: x / Nitrite: x   Leuk Esterase: x / RBC: x / WBC x   Sq Epi: x / Non Sq Epi: x / Bacteria: x      CULTURES:  Rapid RVP Result: Raine (07-20-23 @ 13:53)  Culture Results:   No growth at 24 hours (07-20-23 @ 11:35)  Culture Results:   No growth at 24 hours (07-20-23 @ 11:35)  Culture Results:   No growth (07-20-23 @ 10:40)      Physical Examination:    General: Pt laying in hospital bed in no acute distress.  Alert, oriented, interactive, nonfocal    HEENT: Pupils equal, reactive to light.  Symmetric.    PULM: Clear to auscultation bilaterally, no significant sputum production    CVS: Regular rate and rhythm, no murmurs, rubs, or gallops    ABD: Soft, nondistended, nontender, normoactive bowel sounds, no masses    EXT: No edema, nontender    SKIN: Warm and well perfused.       RADIOLOGY:   < from: CT Abdomen and Pelvis No Cont (07.20.23 @ 19:01) >  ACC: [id number] EXAM:  CT ABDOMEN AND PELVIS   ORDERED BY: INOCENCIA NEWMAN     PROCEDURE DATE:  07/20/2023          INTERPRETATION:  CLINICAL INFORMATION: Urinary tract infection.    COMPARISON: 6/8/2023    CONTRAST/COMPLICATIONS:  IV Contrast: NONE  Oral Contrast: None  Complications: None    PROCEDURE:  CT of the Abdomen and Pelvis was performed.  Sagittal and coronal reformats were performed.    FINDINGS:  Evaluation of solid organs and vascular structures is limited without   intravenous contrast.    LOWER CHEST: Mild fibrotic changes again noted. Trace bilateral pleural   effusions. Cardiomegaly. Coronary calcifications. Small hiatal hernia.    LIVER: Within normal limits.  BILE DUCTS: Normal caliber.  GALLBLADDER: Cholelithiasis.. No pericholecystic inflammation.  SPLEEN: Within normal limits.  PANCREAS: Fatty atrophy.  ADRENALS: Within normal limits.  KIDNEYS/URETERS: Excreted contrast within the collecting systems and   ureters from recent exam is slightly limited evaluation of. No   hydronephrosis or obstructing stone. Left renal cyst.    BLADDER: Collapsed around Pérez catheter, containing excreted contrast   material.  REPRODUCTIVE ORGANS: No pelvic mass.    BOWEL: No bowel obstruction. Appendix is normal. Large rectal stool   burden and overall moderate colonic stool burden. Mild wall thickening of   the ascending colon, transverse colon and proximal descending colon.  PERITONEUM: No ascites.  VESSELS: Atherosclerotic changes.  RETROPERITONEUM/LYMPH NODES: No lymphadenopathy.  ABDOMINAL WALL: Small fat-containing umbilical hernia.  BONES: Degenerative changes. Osteopenia.    IMPRESSION:  No hydronephrosis or obstructing stone.    Mild wall thickening of the ascending colon, transverse colon proximal   descending colonmay reflect underdistention or colitis. Clinical   correlation recommended.        --- End of Report ---            ROSIBEL RODRIGUEZ MD; Attending Radiologist  This document has been electronically signed. Jul 20 2023  7:13PM          CRITICAL CARE TIME SPENT: 32 minutes assessing presenting problems of acute illness, which pose high probability of life threatening deterioration or end organ damage/dysfunction, as well as medical decision making including initiating plan of care, reviewing data, reviewing radiologic exams, discussing with multidisciplinary team,  discussing goals of care with patient/family, and writing this note.  Non-inclusive of procedures performed,    sulfa/done

## 2023-09-28 ENCOUNTER — APPOINTMENT (OUTPATIENT)
Dept: RHEUMATOLOGY | Facility: CLINIC | Age: 88
End: 2023-09-28
Payer: MEDICARE

## 2023-09-28 VITALS
BODY MASS INDEX: 28.66 KG/M2 | WEIGHT: 146 LBS | TEMPERATURE: 97 F | HEIGHT: 60 IN | DIASTOLIC BLOOD PRESSURE: 77 MMHG | OXYGEN SATURATION: 97 % | HEART RATE: 79 BPM | SYSTOLIC BLOOD PRESSURE: 146 MMHG

## 2023-09-28 DIAGNOSIS — M17.11 UNILATERAL PRIMARY OSTEOARTHRITIS, RIGHT KNEE: ICD-10-CM

## 2023-09-28 DIAGNOSIS — M70.50 OTHER BURSITIS OF KNEE, UNSPECIFIED KNEE: ICD-10-CM

## 2023-09-28 PROCEDURE — 20610 DRAIN/INJ JOINT/BURSA W/O US: CPT | Mod: RT,59

## 2023-09-28 RX ORDER — SODIUM HYALURONATE INTRA-ARTICULAR SOLN PREF SYR 25 MG/2.5ML 25/2.5 MG/ML
25 SOLUTION PREFILLED SYRINGE INTRAARTICULAR
Refills: 0 | Status: COMPLETED | OUTPATIENT
Start: 2023-09-28

## 2023-09-28 RX ORDER — TRIAMCINOLONE ACETONIDE 80 MG/ML
80 INJECTION, SUSPENSION INTRA-ARTICULAR; INTRAMUSCULAR
Qty: 8 | Refills: 0 | Status: COMPLETED | OUTPATIENT
Start: 2023-09-28

## 2023-09-28 RX ADMIN — TRIAMCINOLONE ACETONIDE 0 MG/ML: 80 INJECTION, SUSPENSION INTRA-ARTICULAR; INTRAMUSCULAR at 00:00

## 2023-09-28 RX ADMIN — SODIUM HYALURONATE INTRA-ARTICULAR SOLN PREF SYR 25 MG/2.5ML 0 MG/2.5ML: 25/2.5 SOLUTION PREFILLED SYRINGE at 00:00

## 2023-10-10 LAB
APPEARANCE: CLEAR
BACTERIA: ABNORMAL /HPF
BILIRUBIN URINE: NEGATIVE
BLOOD URINE: NEGATIVE
COLOR: ABNORMAL
GLUCOSE QUALITATIVE U: NEGATIVE
KETONES URINE: NEGATIVE
LEUKOCYTE ESTERASE URINE: ABNORMAL
MICROSCOPIC-UA: NORMAL
NITRITE URINE: POSITIVE
PH URINE: 5.5
PROTEIN URINE: ABNORMAL
RED BLOOD CELLS URINE: 4 /HPF
SPECIFIC GRAVITY URINE: 1.02
URINE COMMENTS: NORMAL
UROBILINOGEN URINE: NORMAL
WHITE BLOOD CELLS URINE: 178 /HPF

## 2023-10-11 RX ORDER — CIPROFLOXACIN HYDROCHLORIDE 250 MG/1
250 TABLET, FILM COATED ORAL
Qty: 14 | Refills: 0 | Status: ACTIVE | COMMUNITY
Start: 2023-10-11 | End: 1900-01-01

## 2023-10-12 LAB — BACTERIA UR CULT: ABNORMAL

## 2023-11-02 ENCOUNTER — APPOINTMENT (OUTPATIENT)
Dept: RHEUMATOLOGY | Facility: CLINIC | Age: 88
End: 2023-11-02

## 2023-11-30 ENCOUNTER — APPOINTMENT (OUTPATIENT)
Dept: RHEUMATOLOGY | Facility: CLINIC | Age: 88
End: 2023-11-30
Payer: MEDICARE

## 2023-11-30 VITALS
DIASTOLIC BLOOD PRESSURE: 88 MMHG | HEART RATE: 74 BPM | WEIGHT: 142 LBS | TEMPERATURE: 96 F | OXYGEN SATURATION: 97 % | SYSTOLIC BLOOD PRESSURE: 128 MMHG | BODY MASS INDEX: 27.88 KG/M2 | HEIGHT: 60 IN

## 2023-11-30 DIAGNOSIS — M75.41 IMPINGEMENT SYNDROME OF RIGHT SHOULDER: ICD-10-CM

## 2023-11-30 PROCEDURE — 99214 OFFICE O/P EST MOD 30 MIN: CPT | Mod: 25

## 2023-11-30 PROCEDURE — 20610 DRAIN/INJ JOINT/BURSA W/O US: CPT | Mod: RT,59

## 2023-11-30 RX ORDER — TRIAMCINOLONE ACETONIDE 80 MG/ML
80 INJECTION, SUSPENSION INTRA-ARTICULAR; INTRAMUSCULAR
Qty: 8 | Refills: 0 | Status: COMPLETED | OUTPATIENT
Start: 2023-11-30

## 2023-11-30 RX ORDER — GABAPENTIN 300 MG/1
300 CAPSULE ORAL DAILY
Refills: 0 | Status: DISCONTINUED | COMMUNITY
End: 2023-11-30

## 2023-11-30 RX ORDER — TRIAMCINOLONE ACETONIDE 40 MG/ML
40 SUSPENSION INTRA-ARTERIAL; INTRAMUSCULAR
Qty: 4 | Refills: 0 | Status: COMPLETED | OUTPATIENT
Start: 2023-11-30

## 2023-11-30 RX ORDER — MULTIVIT-MIN/IRON/FOLIC ACID/K 18-600-40
50 MCG CAPSULE ORAL
Refills: 0 | Status: DISCONTINUED | COMMUNITY
End: 2023-11-30

## 2023-11-30 RX ADMIN — TRIAMCINOLONE ACETONIDE 0 MG/ML: 80 INJECTION, SUSPENSION INTRA-ARTICULAR; INTRAMUSCULAR at 00:00

## 2023-11-30 RX ADMIN — TRIAMCINOLONE ACETONIDE 0 MG/ML: 40 INJECTION, SUSPENSION INTRA-ARTICULAR; INTRAMUSCULAR at 00:00

## 2023-12-03 LAB
B PERT IGG+IGM PNL SER: CLEAR
COLOR FLD: YELLOW
EOSINOPHIL # FLD MANUAL: 0 %
FLUID INTAKE SUBSTANCE CLASS: NORMAL
LYMPHOCYTES # FLD MANUAL: 34 %
MESOTHL CELL NFR FLD: 0 %
MONOS+MACROS NFR FLD MANUAL: 65 %
NEUTS SEG # FLD MANUAL: 1 %
NRBC # FLD: 0 %
RBC # FLD MANUAL: 1000 /UL
SYCRY CLARITY: CLEAR
SYCRY COLOR: YELLOW
SYCRY ID: NORMAL
SYCRY TUBE: NORMAL
TOTAL CELLS COUNTED FLD: 165 /UL
TUBE TYPE: NORMAL
UNIDENT CELLS NFR FLD MANUAL: 0 %
VARIANT LYMPHS # FLD MANUAL: 0 %

## 2023-12-06 LAB — BACTERIA FLD CULT: NORMAL

## 2024-01-08 RX ORDER — AMOXICILLIN AND CLAVULANATE POTASSIUM 500; 125 MG/1; MG/1
500-125 TABLET, FILM COATED ORAL
Qty: 20 | Refills: 0 | Status: DISCONTINUED | COMMUNITY
Start: 2023-10-06 | End: 2024-01-08

## 2024-01-08 RX ORDER — CIPROFLOXACIN HYDROCHLORIDE 250 MG/1
250 TABLET, FILM COATED ORAL
Qty: 14 | Refills: 0 | Status: DISCONTINUED | COMMUNITY
Start: 2023-10-06 | End: 2024-01-08

## 2024-01-08 RX ORDER — TRIMETHOPRIM 100 MG/1
100 TABLET ORAL
Qty: 90 | Refills: 2 | Status: ACTIVE | COMMUNITY
Start: 2023-08-01 | End: 1900-01-01

## 2024-02-01 DIAGNOSIS — N39.0 URINARY TRACT INFECTION, SITE NOT SPECIFIED: ICD-10-CM

## 2024-02-07 ENCOUNTER — NON-APPOINTMENT (OUTPATIENT)
Age: 89
End: 2024-02-07

## 2024-02-26 NOTE — DIETITIAN INITIAL EVALUATION ADULT - NUTRITION DIAGNOSIS
Thought Content: Thought content normal.         Cognition and Memory: Cognition normal.         Judgment: Judgment normal.         Test Results Section   (All laboratory and radiology results have been personally reviewed by myself)  Labs:  Results for orders placed or performed in visit on 02/26/24   POCT urine pregnancy   Result Value Ref Range    Preg Test, Ur NEG     Control     POCT Urinalysis no Micro   Result Value Ref Range    Color, UA Clear     Clarity, UA Clear     Glucose, UA POC NEG     Bilirubin, UA NEG     Ketones, UA NEG     Spec Grav, UA 1.015     Blood, UA POC NEG     pH, UA 7.5     Protein, UA POC NEG     Urobilinogen, UA 0.2     Leukocytes, UA NEG     Nitrite, UA NEG       Imaging:  All Radiology results interpreted by Radiologist unless otherwise noted.  No results found.    Medical Decision Making   MDM:   20-year-old female presents today for amenorrhea and fatigue.  Last menstrual period was in January 2024.  States she feels like she is able to sleep all day long.  Typically sleeps approximately 8 hours at night.  Has not been taking melatonin daily as instructed.  She only takes it as needed.  Urinalysis normal.  Urine pregnancy negative.  Patient was scheduled with PCP for further workup for fatigue and amenorrhea.  Patient was explicitly instructed on specific signs and symptoms on which to f/u with PCP or return to the office for. Patient was instructed to go to the ER for any new or worsening symptoms, red flag symptoms discussed with patient. Additional discharge instructions were given verbally. All questions were answered. Patient is comfortable and agreeable with discharge plan. Patient in no acute distress and non-toxic in appearance.    Assessment / Plan   Impression(s):  Cheikh was seen today for amenorrhea.    Diagnoses and all orders for this visit:    Amenorrhea  -     POCT urine pregnancy  -     POCT Urinalysis no Micro    Other fatigue    Discharged home.  Patient 
yes...

## 2024-03-26 ENCOUNTER — APPOINTMENT (OUTPATIENT)
Dept: RHEUMATOLOGY | Facility: CLINIC | Age: 89
End: 2024-03-26
Payer: MEDICARE

## 2024-03-26 VITALS
TEMPERATURE: 97.6 F | DIASTOLIC BLOOD PRESSURE: 68 MMHG | OXYGEN SATURATION: 99 % | HEART RATE: 75 BPM | BODY MASS INDEX: 27 KG/M2 | SYSTOLIC BLOOD PRESSURE: 106 MMHG | WEIGHT: 143 LBS | HEIGHT: 61 IN

## 2024-03-26 PROCEDURE — 20610 DRAIN/INJ JOINT/BURSA W/O US: CPT | Mod: 50

## 2024-03-26 PROCEDURE — 99214 OFFICE O/P EST MOD 30 MIN: CPT | Mod: 25

## 2024-03-26 RX ORDER — TRIAMCINOLONE ACETONIDE 80 MG/ML
80 INJECTION, SUSPENSION INTRA-ARTICULAR; INTRAMUSCULAR
Qty: 16 | Refills: 0 | Status: COMPLETED | OUTPATIENT
Start: 2024-03-26

## 2024-03-26 RX ADMIN — TRIAMCINOLONE ACETONIDE 0 MG/ML: 80 INJECTION, SUSPENSION INTRA-ARTICULAR; INTRAMUSCULAR at 00:00

## 2024-03-27 RX ORDER — BETHANECHOL CHLORIDE 50 MG/1
50 TABLET ORAL
Qty: 60 | Refills: 2 | Status: DISCONTINUED | COMMUNITY
Start: 2023-02-22 | End: 2024-03-27

## 2024-03-27 RX ORDER — ALLOPURINOL 100 MG/1
100 TABLET ORAL
Qty: 90 | Refills: 3 | Status: DISCONTINUED | COMMUNITY
Start: 2019-06-13 | End: 2024-03-27

## 2024-03-27 RX ORDER — NITROFURANTOIN (MONOHYDRATE/MACROCRYSTALS) 25; 75 MG/1; MG/1
100 CAPSULE ORAL
Qty: 14 | Refills: 0 | Status: DISCONTINUED | COMMUNITY
Start: 2023-10-30 | End: 2024-03-27

## 2024-03-27 RX ORDER — TAMSULOSIN HYDROCHLORIDE 0.4 MG/1
0.4 CAPSULE ORAL
Qty: 90 | Refills: 3 | Status: DISCONTINUED | COMMUNITY
Start: 2023-06-15 | End: 2024-03-27

## 2024-03-27 NOTE — PHYSICAL EXAM
[TextEntry] : Gen: In NAD HEENT: EOMI Res: Non labored respirations MSK: b/L knee effusion L>R, Decreased ROM, POM.

## 2024-03-27 NOTE — PROCEDURE
[FreeTextEntry1] : b/LL knee aspiration and CS injection  The procedure risks was discussed with the patient.  Indications: therapeutic purposes  #1 site identified in the left knee . Verbal consent was obtained.  Anesthesia was with ethyl chloride.  The patient was prepped with betadine solution.  A 21 gauge 1.5 inch needle was used.  17 cc of fluid aspirated.  Injectables:80 mg Kenalog  The patient tolerated the procedure well..  There were no complications. Handouts/patient instructions were given to patient . patient was instructed to call if redness at site, a decrease in range of motion or an increase in pain is noted after procedure.   #2 identical procedure performed in Right knee, except 12 cc was aspirated.   The patient tolerated the procedure well..  There were no complications. Handouts/patient instructions were given to patient . patient was instructed to call if redness at site, a decrease in range of motion or an increase in pain is noted after procedure.

## 2024-03-27 NOTE — ASSESSMENT
[FreeTextEntry1] : Aspirated and injected b/L knees with 80mg Kenalog 3/26/24 rpa 3 months, sooner if needed

## 2024-03-27 NOTE — HISTORY OF PRESENT ILLNESS
[FreeTextEntry1] : Patient with OA, Pseudogout, presents for f/u  She has pain in b/L knees today.   She received Supartz in 09/2023.  She thinks the CS injections might be more helpful.   She finds the CS injections help.  She has pain on weight bearing.  She feels the knees are swollen.   Denies fall, specific trauma.

## 2024-04-01 NOTE — ED PROVIDER NOTE - NSDCPRINTRESULTS_ED_ALL_ED
[facility]  DRE MORGAN CNP  [facility]  [address] [place] [id number]  Dept: [phone]  Dept Fax: [phone]      Name: Renata Hackett  : 1959         Chief Complaint:     Chief Complaint   Patient presents with    Cough     X 1 week.    Congestion     X 1 week.       History of Present Illness:      Renata Hackett is a 64 y.o.  female who presents with Cough (X 1 week.) and Congestion (X 1 week.)      HPI  Renata is here today for a sick visit.  She has been sick now for a week and feeling worse today.  She has rhinorrhea and thick nasal congestion.  She has sinus pain and pressure with a headache.  She has a productive cough.  She has chest tightness.  She has shortness of breath and wheezing.  She has been running low grade temperatures at home.  She denies ear pain.  She has sore throat.  She has been around her grand kids that have all been sick at home.  She has been taking tessalon perles at home.  Using Tylenol and Ibuprofen at home.    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     CHOLECYSTECTOMY      COLONOSCOPY      ENDOSCOPY, COLON, DIAGNOSTIC      HERNIA REPAIR      HYSTERECTOMY (CERVIX STATUS UNKNOWN)      uterine prolapse, ovaries remain.    [place] COLON CA SCRN NOT HI RSK IND N/A 2018    -normal    TONSILLECTOMY AND ADENOIDECTOMY      UPPER GASTROINTESTINAL ENDOSCOPY  2020    UPPER GASTROINTESTINAL  Patient requests all Lab, Cardiology, and Radiology Results on their Discharge Instructions

## 2024-04-16 NOTE — PATIENT PROFILE ADULT - FALL HARM RISK - CONCLUSION
[facility] transport team about 10 minutes out. Mom and dad at the bedside and remain calm. Patient fussy at times, but consolable. Oxygen remains %97-%100.    Fall with Harm Risk

## 2024-05-06 NOTE — H&P ADULT - NSHPOUTPATIENTPROVIDERS_GEN_ALL_CORE
PCP/Cardio:  DR Collins PCP/Cardio:  DR Collins  renal:                DR. Thompson 4 = No assist / stand by assistance

## 2024-05-12 NOTE — ED PROVIDER NOTE - DISPOSITION TYPE
Problem: PAIN - ADULT  Goal: Verbalizes/displays adequate comfort level or baseline comfort level  Description: Interventions:  - Encourage patient to monitor pain and request assistance  - Assess pain using appropriate pain scale  - Administer analgesics based on type and severity of pain and evaluate response  - Implement non-pharmacological measures as appropriate and evaluate response  - Consider cultural and social influences on pain and pain management  - Notify physician/advanced practitioner if interventions unsuccessful or patient reports new pain  Outcome: Progressing     Problem: INFECTION - ADULT  Goal: Absence or prevention of progression during hospitalization  Description: INTERVENTIONS:  - Assess and monitor for signs and symptoms of infection  - Monitor lab/diagnostic results  - Monitor all insertion sites, i.e. indwelling lines, tubes, and drains  - Monitor endotracheal if appropriate and nasal secretions for changes in amount and color  - [place] appropriate cooling/warming therapies per order  - Administer medications as ordered  - Instruct and encourage patient and family to use good hand hygiene technique  - Identify and instruct in appropriate isolation precautions for identified infection/condition  Outcome: Progressing  Goal: Absence of fever/infection during neutropenic period  Description: INTERVENTIONS:  - Monitor WBC    Outcome: Progressing     Problem: SAFETY ADULT  Goal: Patient will remain free of falls  Description: INTERVENTIONS:  - Educate patient/family on patient safety including physical limitations  - Instruct patient to call for assistance with activity   - Consult OT/PT to assist with strengthening/mobility   - Keep Call bell within reach  - Keep bed low and locked with side rails adjusted as appropriate  - Keep care items and personal belongings within reach  - Initiate and maintain comfort rounds  - Make Fall Risk Sign visible to staff  - Offer Toileting every 3 Hours,  in advance of need  - Initiate/Maintain bed alarm  - Obtain necessary fall risk management equipment:   - Apply yellow socks and bracelet for high fall risk patients  - Consider moving patient to room near nurses station  Outcome: Progressing  Goal: Maintain or return to baseline ADL function  Description: INTERVENTIONS:  -  Assess patient's ability to carry out ADLs; assess patient's baseline for ADL function and identify physical deficits which impact ability to perform ADLs (bathing, care of mouth/teeth, toileting, grooming, dressing, etc.)  - Assess/evaluate cause of self-care deficits   - Assess range of motion  - Assess patient's mobility; develop plan if impaired  - Assess patient's need for assistive devices and provide as appropriate  - Encourage maximum independence but intervene and supervise when necessary  - Involve family in performance of ADLs  - Assess for home care needs following discharge   - Consider OT consult to assist with ADL evaluation and planning for discharge  - Provide patient education as appropriate  Outcome: Progressing  Goal: Maintains/Returns to pre admission functional level  Description: INTERVENTIONS:  - Perform AM-PAC 6 Click Basic Mobility/ Daily Activity assessment daily.  - Set and communicate daily mobility goal to care team and patient/family/caregiver.   - Collaborate with rehabilitation services on mobility goals if consulted  - Perform Range of Motion 3 times a day.  - Reposition patient every 3 hours.  - Dangle patient 3 times a day  - Stand patient 3 times a day  - Ambulate patient 3 times a day  - Out of bed to chair 3 times a day   - Out of bed for meals 3 times a day  - Out of bed for toileting  - Record patient progress and toleration of activity level   Outcome: Progressing     Problem: DISCHARGE PLANNING  Goal: Discharge to home or other facility with appropriate resources  Description: INTERVENTIONS:  - Identify barriers to discharge w/patient and caregiver  -  Arrange for needed discharge resources and transportation as appropriate  - Identify discharge learning needs (meds, wound care, etc.)  - Arrange for interpretive services to assist at discharge as needed  - Refer to Case Management Department for coordinating discharge planning if the patient needs post-hospital services based on physician/advanced practitioner order or complex needs related to functional status, cognitive ability, or social support system  Outcome: Progressing     Problem: Knowledge Deficit  Goal: Patient/family/caregiver demonstrates understanding of disease process, treatment plan, medications, and discharge instructions  Description: Complete learning assessment and assess knowledge base.  Interventions:  - Provide teaching at level of understanding  - Provide teaching via preferred learning methods  Outcome: Progressing      ADMIT

## 2024-05-16 ENCOUNTER — APPOINTMENT (OUTPATIENT)
Dept: RHEUMATOLOGY | Facility: CLINIC | Age: 89
End: 2024-05-16
Payer: MEDICARE

## 2024-05-16 VITALS
TEMPERATURE: 96.8 F | SYSTOLIC BLOOD PRESSURE: 144 MMHG | WEIGHT: 142 LBS | HEIGHT: 61 IN | DIASTOLIC BLOOD PRESSURE: 74 MMHG | OXYGEN SATURATION: 96 % | BODY MASS INDEX: 26.81 KG/M2 | HEART RATE: 70 BPM

## 2024-05-16 PROCEDURE — 20610 DRAIN/INJ JOINT/BURSA W/O US: CPT | Mod: 50

## 2024-05-16 PROCEDURE — 99213 OFFICE O/P EST LOW 20 MIN: CPT | Mod: 25

## 2024-05-16 RX ORDER — SODIUM HYALURONATE INTRA-ARTICULAR SOLN PREF SYR 25 MG/2.5ML 25/2.5 MG/ML
25 SOLUTION PREFILLED SYRINGE INTRAARTICULAR
Qty: 6 | Refills: 0 | Status: ACTIVE | COMMUNITY
Start: 2024-05-16 | End: 1900-01-01

## 2024-05-16 NOTE — PHYSICAL EXAM
[TextEntry] : MSK:  Knees able to extend to 170 degrees,  +Effusion appreciated right knee, no erythema.  Left knee no significant effusion.

## 2024-05-16 NOTE — HISTORY OF PRESENT ILLNESS
[FreeTextEntry1] : Patient with OA, Pseudogout, presents for f/u  She has pain in b/L knees today. She knows it's too soon for cortisone, so requesting to have them aspirated.   She received Supartz in 09/2023. Not sure if it helped.   She has pain on weight bearing. She feels the knees are swollen.  No specific trauma.

## 2024-05-16 NOTE — ASSESSMENT
[FreeTextEntry1] : Plan for CS injection in approximately 1 month Plan for HA injections in 08/2024.  Supartz Rx entered today.

## 2024-05-16 NOTE — PROCEDURE
[FreeTextEntry1] : R  knee aspiration  The procedure risks was discussed with the patient.  Indications: therapeutic purposes  #1 site identified in the Right knee . Verbal consent was obtained.  Anesthesia was with ethyl chloride.  The patient was prepped with betadine solution.  A 21 gauge 1.5 inch needle was used.  7cc of fluid aspirated.  Injectables:1mL xylocaine.  No steroid used.    The patient tolerated the procedure well..  #2 Identical procedure performed in left knee with 0 fluid aspirated.   There were no complications. Handouts/patient instructions were given to patient . patient was instructed to call if redness at site, a decrease in range of motion or an increase in pain is noted after procedure.

## 2024-05-17 LAB
B PERT IGG+IGM PNL SER: CLEAR
COLOR FLD: YELLOW
FLUID INTAKE SUBSTANCE CLASS: NORMAL
LYMPHOCYTES # FLD MANUAL: 30 %
MONOS+MACROS NFR FLD MANUAL: 55 %
NEUTS SEG # FLD MANUAL: 15 %
RBC # FLD MANUAL: 1000 /UL
SYCRY CLARITY: CLEAR
SYCRY COLOR: YELLOW
SYCRY ID: NORMAL
SYCRY TUBE: NORMAL
TOTAL CELLS COUNTED FLD: 43 /UL
TUBE TYPE: NORMAL

## 2024-05-22 RX ORDER — SODIUM HYALURONATE INTRA-ARTICULAR SOLN PREF SYR 25 MG/2.5ML 25/2.5 MG/ML
25 SOLUTION PREFILLED SYRINGE INTRAARTICULAR
Qty: 6 | Refills: 0 | Status: ACTIVE | COMMUNITY
Start: 2023-07-06

## 2024-05-26 NOTE — ED ADULT NURSE NOTE - OBJECTIVE STATEMENT
pt is 89 yo f presents to ED c/o possible fever, pt reports dtr brought pt to ED for possible fever.  dtr states pt tested positive covid x 1 month ago and has been coming to hospital for various complaints, but today, she pt felt warm and brought her to ED.  pt's rectal temp 99.7F.  +indwelling catheter noted upon arrival to Ed.  Dtr states it was changed 1 week ago.  Changed the pérez again, and sent urine to lab.  pt's oxygen 88% RA, pt on NC at 3L, oxygen saturation is 95%.  pt denies any chest pain, sob, or nvd.  pt c/o chronic b/l leg pain, took some tylenol at home this morning.  Received an order for tylenol from MD Morgan, pt tolerated well with apple sauce
Report given to Lizett KESSLER

## 2024-06-06 RX ORDER — FLUCONAZOLE 100 MG/1
100 TABLET ORAL DAILY
Qty: 3 | Refills: 0 | Status: ACTIVE | COMMUNITY
Start: 2024-06-06 | End: 1900-01-01

## 2024-06-26 NOTE — DISCHARGE NOTE NURSING/CASE MANAGEMENT/SOCIAL WORK - NSDCPEPT PROEDMA_GEN_ALL_CORE
[employer] WITH [employer] CALLED AND WOULD LIKE TO KNOW IF THE PATIENT HAS ANY RESTRICTIONS AFTER THE VISIT WITH MONICA 06/25/2024 AND WOULD LIKE THE LAST OVN  AND WORK STATUS.      PLEASE FAX RECORDS TO FAX# [phone]  PLEASE CALL [employer] WITH ANY QUESTIONS AT PHONE NUMBER [phone]    THANK YOU  
Note from 6-25-24 has not been signed yet. Will fax when complete.  
Office visit and work note faxed to number provided.   
Yes

## 2024-06-27 ENCOUNTER — APPOINTMENT (OUTPATIENT)
Dept: RHEUMATOLOGY | Facility: CLINIC | Age: 89
End: 2024-06-27
Payer: MEDICARE

## 2024-06-27 VITALS
HEART RATE: 78 BPM | WEIGHT: 140 LBS | OXYGEN SATURATION: 98 % | HEIGHT: 61 IN | DIASTOLIC BLOOD PRESSURE: 78 MMHG | BODY MASS INDEX: 26.43 KG/M2 | SYSTOLIC BLOOD PRESSURE: 122 MMHG | TEMPERATURE: 97.6 F

## 2024-06-27 DIAGNOSIS — M25.462 EFFUSION, LEFT KNEE: ICD-10-CM

## 2024-06-27 DIAGNOSIS — M17.0 BILATERAL PRIMARY OSTEOARTHRITIS OF KNEE: ICD-10-CM

## 2024-06-27 DIAGNOSIS — M25.461 EFFUSION, RIGHT KNEE: ICD-10-CM

## 2024-06-27 DIAGNOSIS — M11.20 OTHER CHONDROCALCINOSIS, UNSPECIFIED SITE: ICD-10-CM

## 2024-06-27 PROCEDURE — 20610 DRAIN/INJ JOINT/BURSA W/O US: CPT | Mod: 50

## 2024-06-27 PROCEDURE — 99214 OFFICE O/P EST MOD 30 MIN: CPT | Mod: 25

## 2024-07-02 ENCOUNTER — APPOINTMENT (OUTPATIENT)
Dept: RHEUMATOLOGY | Facility: CLINIC | Age: 89
End: 2024-07-02

## 2024-07-29 NOTE — PATIENT PROFILE ADULT - FUNCTIONAL ASSESSMENT - DAILY ACTIVITY 2.
Addended by: MELVIN DEL TORO on: 7/29/2024 11:47 AM     Modules accepted: Orders     3 = A little assistance

## 2024-08-01 ENCOUNTER — APPOINTMENT (OUTPATIENT)
Dept: RHEUMATOLOGY | Facility: CLINIC | Age: 89
End: 2024-08-01
Payer: MEDICARE

## 2024-08-01 VITALS
HEART RATE: 78 BPM | TEMPERATURE: 97.5 F | SYSTOLIC BLOOD PRESSURE: 148 MMHG | BODY MASS INDEX: 26.43 KG/M2 | DIASTOLIC BLOOD PRESSURE: 80 MMHG | WEIGHT: 140 LBS | HEIGHT: 61 IN | OXYGEN SATURATION: 96 %

## 2024-08-01 PROCEDURE — 20610 DRAIN/INJ JOINT/BURSA W/O US: CPT | Mod: 50

## 2024-08-01 RX ORDER — HYALURONATE SODIUM 20 MG/2 ML
20 SYRINGE (ML) INTRAARTICULAR
Refills: 0 | Status: COMPLETED | OUTPATIENT
Start: 2024-08-01

## 2024-08-01 RX ADMIN — Medication 0 MG/2ML: at 00:00

## 2024-08-08 ENCOUNTER — APPOINTMENT (OUTPATIENT)
Dept: RHEUMATOLOGY | Facility: CLINIC | Age: 89
End: 2024-08-08

## 2024-08-08 PROCEDURE — 20610 DRAIN/INJ JOINT/BURSA W/O US: CPT | Mod: 50

## 2024-08-08 NOTE — PROCEDURE
[FreeTextEntry1] : Date: 08/08/24 Procedure: b/L Euflexxa injections  The procedure risks were discussed with the patient. Indications: Therapeutic purposes #1 site identified in the Right knee. Verbal consent was obtained. The patient was prepped with povidine solution. Topical Anesthesia was with ethyl chloride. A 21 Gauge 1.5 inch needle was used. 1mL of 1% xylocaine was used for local anesthetic. Injectables :Euflexxa. Prior to injection, 3cc was aspirated from right knee The patient tolerated the procedure well. #2 Identical procedure repeated in left knee, and 4 cc was aspirated. There were no complications. Handouts/patient instructions were given to patient. Patient was instructed to call if redness at site, a decrease in range of motion, or significantly increased amount of pain is noted after the procedure.

## 2024-08-15 ENCOUNTER — APPOINTMENT (OUTPATIENT)
Dept: RHEUMATOLOGY | Facility: CLINIC | Age: 89
End: 2024-08-15
Payer: MEDICARE

## 2024-08-15 VITALS
WEIGHT: 141 LBS | SYSTOLIC BLOOD PRESSURE: 118 MMHG | HEIGHT: 61 IN | TEMPERATURE: 95.7 F | DIASTOLIC BLOOD PRESSURE: 70 MMHG | OXYGEN SATURATION: 98 % | HEART RATE: 72 BPM | BODY MASS INDEX: 26.62 KG/M2

## 2024-08-15 DIAGNOSIS — M25.461 EFFUSION, RIGHT KNEE: ICD-10-CM

## 2024-08-15 DIAGNOSIS — M25.462 EFFUSION, LEFT KNEE: ICD-10-CM

## 2024-08-15 DIAGNOSIS — M17.0 BILATERAL PRIMARY OSTEOARTHRITIS OF KNEE: ICD-10-CM

## 2024-08-15 PROCEDURE — 20610 DRAIN/INJ JOINT/BURSA W/O US: CPT | Mod: 50

## 2024-08-15 RX ORDER — HYALURONATE SODIUM 20 MG/2 ML
20 SYRINGE (ML) INTRAARTICULAR
Refills: 0 | Status: COMPLETED | OUTPATIENT
Start: 2024-08-15

## 2024-08-15 RX ADMIN — Medication 0 MG/2ML: at 00:00

## 2024-08-20 ENCOUNTER — OFFICE (OUTPATIENT)
Dept: URBAN - METROPOLITAN AREA CLINIC 12 | Facility: CLINIC | Age: 89
Setting detail: OPHTHALMOLOGY
End: 2024-08-20
Payer: MEDICARE

## 2024-08-20 DIAGNOSIS — H01.002: ICD-10-CM

## 2024-08-20 DIAGNOSIS — H18.513: ICD-10-CM

## 2024-08-20 DIAGNOSIS — H33.102: ICD-10-CM

## 2024-08-20 DIAGNOSIS — H35.363: ICD-10-CM

## 2024-08-20 DIAGNOSIS — H25.89: ICD-10-CM

## 2024-08-20 DIAGNOSIS — H43.813: ICD-10-CM

## 2024-08-20 DIAGNOSIS — H16.223: ICD-10-CM

## 2024-08-20 DIAGNOSIS — G43.109: ICD-10-CM

## 2024-08-20 PROCEDURE — 92014 COMPRE OPH EXAM EST PT 1/>: CPT | Performed by: STUDENT IN AN ORGANIZED HEALTH CARE EDUCATION/TRAINING PROGRAM

## 2024-08-20 PROCEDURE — 92250 FUNDUS PHOTOGRAPHY W/I&R: CPT | Performed by: STUDENT IN AN ORGANIZED HEALTH CARE EDUCATION/TRAINING PROGRAM

## 2024-08-20 ASSESSMENT — CONFRONTATIONAL VISUAL FIELD TEST (CVF)
OS_FINDINGS: FULL
OD_FINDINGS: FULL

## 2024-08-20 ASSESSMENT — LID EXAM ASSESSMENTS: OD_BLEPHARITIS: RLL

## 2024-09-09 ENCOUNTER — APPOINTMENT (OUTPATIENT)
Dept: UROLOGY | Facility: CLINIC | Age: 89
End: 2024-09-09
Payer: MEDICARE

## 2024-09-09 VITALS
BODY MASS INDEX: 26.43 KG/M2 | DIASTOLIC BLOOD PRESSURE: 74 MMHG | SYSTOLIC BLOOD PRESSURE: 154 MMHG | WEIGHT: 140 LBS | HEIGHT: 61 IN | HEART RATE: 67 BPM

## 2024-09-09 PROCEDURE — 99213 OFFICE O/P EST LOW 20 MIN: CPT

## 2024-09-09 RX ORDER — ESTRADIOL 0.1 MG/G
0.1 CREAM VAGINAL
Qty: 2 | Refills: 3 | Status: ACTIVE | COMMUNITY
Start: 2024-09-09 | End: 1900-01-01

## 2024-09-09 NOTE — PHYSICAL EXAM

## 2024-09-09 NOTE — HISTORY OF PRESENT ILLNESS
[FreeTextEntry1] : 89 year old woman seen 09/09/2024 for follow up. She reports pelvic pain and urgency with each pérez change. Still with occasional UTI. Doing well with gentamicin irrigation daily.

## 2024-09-09 NOTE — END OF VISIT
[FreeTextEntry3] : Catheter was placed by staff. She can return as needed.  [Time Spent: ___ minutes] : I have spent [unfilled] minutes of time on the encounter which excludes teaching and separately reported services.

## 2024-09-09 NOTE — ASSESSMENT
[FreeTextEntry1] : 89 yo F with urinary retention, frequent UTIs. Pain with pérez change and irrigation likely bladder spasms. Will restart gemtesa. For UTIs, continue bladder irrigation. Will Rx estradiol cream.  Recommended SP tube placement. Pt will think about it.

## 2024-09-11 PROBLEM — H52.03 HYPEROPIA ; BOTH EYES: Status: ACTIVE | Noted: 2024-09-11

## 2024-10-09 ENCOUNTER — OFFICE (OUTPATIENT)
Dept: URBAN - METROPOLITAN AREA CLINIC 103 | Facility: CLINIC | Age: 89
Setting detail: OPHTHALMOLOGY
End: 2024-10-09
Payer: MEDICARE

## 2024-10-09 DIAGNOSIS — H35.363: ICD-10-CM

## 2024-10-09 DIAGNOSIS — H33.102: ICD-10-CM

## 2024-10-09 DIAGNOSIS — H43.813: ICD-10-CM

## 2024-10-09 PROCEDURE — 92012 INTRM OPH EXAM EST PATIENT: CPT | Performed by: OPHTHALMOLOGY

## 2024-10-09 PROCEDURE — 92134 CPTRZ OPH DX IMG PST SGM RTA: CPT | Performed by: OPHTHALMOLOGY

## 2024-10-09 ASSESSMENT — KERATOMETRY
OD_AXISANGLE_DEGREES: 090
OS_K1POWER_DIOPTERS: 45.00
OD_K1POWER_DIOPTERS: 45.50
OD_K2POWER_DIOPTERS: 45.50
OS_AXISANGLE_DEGREES: 166
OS_K2POWER_DIOPTERS: 46.25
METHOD_AUTO_MANUAL: AUTO

## 2024-10-09 ASSESSMENT — CONFRONTATIONAL VISUAL FIELD TEST (CVF)
OD_FINDINGS: FULL
OS_FINDINGS: FULL

## 2024-10-09 ASSESSMENT — CORNEAL DYSTROPHY - POSTERIOR
OD_POSTERIORDYSTROPHY: GUTTATA
OS_POSTERIORDYSTROPHY: GUTTATA

## 2024-10-09 ASSESSMENT — REFRACTION_AUTOREFRACTION
OD_CYLINDER: -0.50
OS_SPHERE: +1.00
OD_AXIS: 086
OS_AXIS: 079
OD_SPHERE: +0.50
OS_CYLINDER: -2.25

## 2024-10-09 ASSESSMENT — TONOMETRY
OS_IOP_MMHG: 13
OD_IOP_MMHG: 16

## 2024-10-09 ASSESSMENT — VISUAL ACUITY
OS_BCVA: 20/30-1
OD_BCVA: 20/80-1

## 2024-10-09 ASSESSMENT — SUPERFICIAL PUNCTATE KERATITIS (SPK): OD_SPK: T

## 2024-10-09 ASSESSMENT — LID EXAM ASSESSMENTS: OD_BLEPHARITIS: RLL

## 2024-10-24 ENCOUNTER — APPOINTMENT (OUTPATIENT)
Dept: RHEUMATOLOGY | Facility: CLINIC | Age: 89
End: 2024-10-24
Payer: MEDICARE

## 2024-10-24 ENCOUNTER — APPOINTMENT (OUTPATIENT)
Dept: DERMATOLOGY | Facility: CLINIC | Age: 89
End: 2024-10-24

## 2024-10-24 VITALS
HEIGHT: 61 IN | WEIGHT: 140 LBS | SYSTOLIC BLOOD PRESSURE: 120 MMHG | HEART RATE: 70 BPM | OXYGEN SATURATION: 97 % | TEMPERATURE: 96.4 F | DIASTOLIC BLOOD PRESSURE: 68 MMHG | BODY MASS INDEX: 26.43 KG/M2

## 2024-10-24 DIAGNOSIS — R21 RASH AND OTHER NONSPECIFIC SKIN ERUPTION: ICD-10-CM

## 2024-10-24 DIAGNOSIS — R70.0 ELEVATED ERYTHROCYTE SEDIMENTATION RATE: ICD-10-CM

## 2024-10-24 DIAGNOSIS — M17.0 BILATERAL PRIMARY OSTEOARTHRITIS OF KNEE: ICD-10-CM

## 2024-10-24 PROCEDURE — 99215 OFFICE O/P EST HI 40 MIN: CPT

## 2024-10-25 NOTE — PROGRESS NOTE ADULT - SUBJECTIVE AND OBJECTIVE BOX
Date of service: 23 @ 13:42    pt seen and examined  feels better  no fevers  no resp distress  no dysuria    ROS: no fever or chills; denies dizziness, no HA, no SOB or cough, no abdominal pain, no diarrhea or constipation; no legs pain, no rashes    MEDICATIONS  (STANDING):  allopurinol 100 milliGRAM(s) Oral daily  atorvastatin 20 milliGRAM(s) Oral at bedtime  enoxaparin Injectable 30 milliGRAM(s) SubCutaneous every 24 hours  folic acid 1 milliGRAM(s) Oral daily  furosemide    Tablet 20 milliGRAM(s) Oral daily  gabapentin 300 milliGRAM(s) Oral two times a day  hydrALAZINE 25 milliGRAM(s) Oral two times a day  lidocaine   4% Patch 3 Patch Transdermal daily  metoprolol tartrate 25 milliGRAM(s) Oral two times a day  nystatin    Suspension 965551 Unit(s) Oral every 6 hours  pantoprazole    Tablet 40 milliGRAM(s) Oral before breakfast  piperacillin/tazobactam IVPB.. 3.375 Gram(s) IV Intermittent every 12 hours  senna 2 Tablet(s) Oral at bedtime    Vital Signs Last 24 Hrs  T(C): 36.3 (28 May 2023 16:35), Max: 36.7 (27 May 2023 21:04)  T(F): 97.3 (28 May 2023 16:35), Max: 98.1 (27 May 2023 21:04)  HR: 65 (28 May 2023 16:35) (65 - 72)  BP: 151/46 (28 May 2023 16:35) (151/46 - 157/51)  BP(mean): 81 (27 May 2023 21:04) (81 - 81)  RR: 17 (28 May 2023 16:35) (16 - 20)  SpO2: 98% (28 May 2023 16:35) (95% - 99%)    Parameters below as of 28 May 2023 16:35  Patient On (Oxygen Delivery Method): nasal cannula      PE:  Constitutional: frail looking  HEENT: NC/AT, EOMI, PERRLA, conjunctivae clear; ears and nose atraumatic; pharynx benign  Neck: supple; thyroid not palpable  Back: no tenderness  Respiratory: respiratory effort normal; clear to auscultation  Cardiovascular: S1S2 regular, no murmurs  Abdomen: soft, not tender, not distended, positive BS; liver and spleen WNL  Genitourinary: no suprapubic tenderness  Lymphatic: no LN palpable  Musculoskeletal: no muscle tenderness, no joint swelling or tenderness  Extremities: no pedal edema  Neurological/ Psychiatric:  moving all extremities  Skin: no rashes; no palpable lesions    Labs: all available labs reviewed                                    8.3    5.61  )-----------( 109      ( 28 May 2023 07:20 )             25.1         141  |  108  |  21  ----------------------------<  113<H>  3.6   |  25  |  1.55<H>    Ca    8.6      28 May 2023 07:20         LIVER FUNCTIONS - ( 24 May 2023 13:29 )  Alb: 3.2 g/dL / Pro: 6.9 gm/dL / ALK PHOS: 81 U/L / ALT: 71 U/L / AST: 90 U/L / GGT: x           Urinalysis Basic - ( 24 May 2023 17:18 )    Color: Yellow / Appearance: Clear / S.010 / pH: x  Gluc: x / Ketone: Negative  / Bili: Negative / Urobili: Negative   Blood: x / Protein: 30 mg/dL / Nitrite: Positive   Leuk Esterase: Moderate / RBC: 6-10 /HPF / WBC >50 /HPF   Sq Epi: x / Non Sq Epi: x / Bacteria: Many    Culture - Urine (23 @ 17:18)   Specimen Source: Clean Catch Clean Catch (Midstream)  Culture Results:   <10,000 CFU/mL Normal Urogenital Kristel  Culture - Blood (23 @ 13:29)   Specimen Source: .Blood None  Culture Results:   No growth to date.  Culture - Blood (23 @ 13:29)   Specimen Source: .Blood None  Culture Results:   No growth to date.    Radiology: all available radiological tests reviewed    ACC: [id number] EXAM:  CT ABDOMEN AND PELVIS   ORDERED BY: SAMREEN CONDON     ACC: [id number] EXAM:  CT CHEST   ORDERED BY: SAMREEN CONDON     PROCEDURE DATE:  2023          INTERPRETATION:  CLINICAL INFORMATION: 89 years  Female with fever.    COMPARISON: Chest CT 2023 and CT abdomen and pelvis 2015    CONTRAST/COMPLICATIONS:  IV Contrast: NONE  Oral Contrast: NONE  Complications: None reported at time of study completion    PROCEDURE:  CT of the Chest, Abdomen and Pelvis was performed.  Sagittal and coronal reformats were performed.    LIMITATIONS: Evaluation of the solid organs, vascular structures and GI   tract is limited without oral and IV contrast.    FINDINGS:  CHEST:  LUNGS AND LARGE AIRWAYS: Tracheal narrowing no longer demonstrated.   Marked improvement in bilateral infiltrates and atelectasis. Mild   residual bibasilar fibrotic changes.  PLEURA: No pleural effusion.  VESSELS: Atherosclerotic aorta without aneurysm. Coronary atherosclerosis.  HEART: Mild cardiomegaly. No pericardial effusion.  MEDIASTINUM AND DIRK: 1.2 x 0.9 cm anterior mediastinal lymph node,   previously 1.7 x 1.1 cm.  CHEST WALL AND LOWER NECK: Within normal limits.    ABDOMEN AND PELVIS:  LIVER: Within normal limits.  BILE DUCTS: Normal caliber.  GALLBLADDER: Cholelithiasis.  SPLEEN: Within normal limits.  PANCREAS: Fatty atrophy.  ADRENALS: Within normal limits.  KIDNEYS/URETERS: Mildly atrophic. No hydronephrosis.    BLADDER: Collapsed around Perales catheter. Expected droplet of   intraluminal air.  REPRODUCTIVE ORGANS: Atrophic uterus versus supracervical hysterectomy.    BOWEL: No bowel obstruction. Appendix is normal.  PERITONEUM: No ascites.  VESSELS: Atherosclerotic changes. Stable infrarenal aortic displaced   calcifications consistent with calcified plaque or chronic focal type B   dissection.  RETROPERITONEUM/LYMPH NODES: No lymphadenopathy.  ABDOMINAL WALL: Minimal fat-containing left inguinal and umbilical   hernias.  BONES: Within normal limits.    IMPRESSION:  Marked improvement in bibasilar infiltrates. Residual bibasilar fibrosis.   Prominent mediastinal lymph node mildly improved.    No acute intra-abdominal pathology.    Cholelithiasis.    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    Other chronic findings as above.        Advanced directives addressed: full resuscitation
Date of service: 23 @ 18:06      pt seen and examined  feels better  sitting in bed  no fevers  no resp distress    ROS: no fever or chills; denies dizziness, no HA, no SOB or cough, no abdominal pain, no diarrhea or constipation; no legs pain, no rashes    MEDICATIONS  (STANDING):  allopurinol 100 milliGRAM(s) Oral daily  atorvastatin 20 milliGRAM(s) Oral at bedtime  enoxaparin Injectable 30 milliGRAM(s) SubCutaneous every 24 hours  folic acid 1 milliGRAM(s) Oral daily  gabapentin 300 milliGRAM(s) Oral two times a day  hydrALAZINE 25 milliGRAM(s) Oral two times a day  lidocaine   4% Patch 3 Patch Transdermal daily  metoprolol tartrate 25 milliGRAM(s) Oral two times a day  pantoprazole    Tablet 40 milliGRAM(s) Oral before breakfast  piperacillin/tazobactam IVPB.. 3.375 Gram(s) IV Intermittent every 12 hours  senna 2 Tablet(s) Oral at bedtime    Vital Signs Last 24 Hrs  T(C): 36.4 (26 May 2023 15:08), Max: 36.6 (26 May 2023 09:25)  T(F): 97.5 (26 May 2023 15:08), Max: 97.8 (26 May 2023 09:25)  HR: 73 (26 May 2023 15:08) (66 - 79)  BP: 154/60 (26 May 2023 15:08) (122/50 - 171/56)  BP(mean): --  RR: 18 (26 May 2023 15:08) (18 - 18)  SpO2: 93% (26 May 2023 15:08) (92% - 94%)    Parameters below as of 26 May 2023 15:08  Patient On (Oxygen Delivery Method): room air      PE:  Constitutional: frail looking  HEENT: NC/AT, EOMI, PERRLA, conjunctivae clear; ears and nose atraumatic; pharynx benign  Neck: supple; thyroid not palpable  Back: no tenderness  Respiratory: respiratory effort normal; clear to auscultation  Cardiovascular: S1S2 regular, no murmurs  Abdomen: soft, not tender, not distended, positive BS; liver and spleen WNL  Genitourinary: no suprapubic tenderness  Lymphatic: no LN palpable  Musculoskeletal: no muscle tenderness, no joint swelling or tenderness  Extremities: no pedal edema  Neurological/ Psychiatric:  moving all extremities  Skin: no rashes; no palpable lesions    Labs: all available labs reviewed                                    7.9    6.69  )-----------( 93       ( 26 May 2023 10:28 )             24.6     05    140  |  115<H>  |  23  ----------------------------<  104<H>  4.1   |  18<L>  |  1.55<H>    Ca    7.5<L>      26 May 2023 10:28         LIVER FUNCTIONS - ( 24 May 2023 13:29 )  Alb: 3.2 g/dL / Pro: 6.9 gm/dL / ALK PHOS: 81 U/L / ALT: 71 U/L / AST: 90 U/L / GGT: x           Urinalysis Basic - ( 24 May 2023 17:18 )    Color: Yellow / Appearance: Clear / S.010 / pH: x  Gluc: x / Ketone: Negative  / Bili: Negative / Urobili: Negative   Blood: x / Protein: 30 mg/dL / Nitrite: Positive   Leuk Esterase: Moderate / RBC: 6-10 /HPF / WBC >50 /HPF   Sq Epi: x / Non Sq Epi: x / Bacteria: Many    Culture - Urine (23 @ 17:18)   Specimen Source: Clean Catch Clean Catch (Midstream)  Culture Results:   <10,000 CFU/mL Normal Urogenital Kristel  Culture - Blood (23 @ 13:29)   Specimen Source: .Blood None  Culture Results:   No growth to date.  Culture - Blood (23 @ 13:29)   Specimen Source: .Blood None  Culture Results:   No growth to date.    Radiology: all available radiological tests reviewed    ACC: [id number] EXAM:  CT ABDOMEN AND PELVIS   ORDERED BY: SAMREEN CONDON     ACC: [id number] EXAM:  CT CHEST   ORDERED BY: SAMREEN CONDON     PROCEDURE DATE:  2023          INTERPRETATION:  CLINICAL INFORMATION: 89 years  Female with fever.    COMPARISON: Chest CT 2023 and CT abdomen and pelvis 2015    CONTRAST/COMPLICATIONS:  IV Contrast: NONE  Oral Contrast: NONE  Complications: None reported at time of study completion    PROCEDURE:  CT of the Chest, Abdomen and Pelvis was performed.  Sagittal and coronal reformats were performed.    LIMITATIONS: Evaluation of the solid organs, vascular structures and GI   tract is limited without oral and IV contrast.    FINDINGS:  CHEST:  LUNGS AND LARGE AIRWAYS: Tracheal narrowing no longer demonstrated.   Marked improvement in bilateral infiltrates and atelectasis. Mild   residual bibasilar fibrotic changes.  PLEURA: No pleural effusion.  VESSELS: Atherosclerotic aorta without aneurysm. Coronary atherosclerosis.  HEART: Mild cardiomegaly. No pericardial effusion.  MEDIASTINUM AND DIRK: 1.2 x 0.9 cm anterior mediastinal lymph node,   previously 1.7 x 1.1 cm.  CHEST WALL AND LOWER NECK: Within normal limits.    ABDOMEN AND PELVIS:  LIVER: Within normal limits.  BILE DUCTS: Normal caliber.  GALLBLADDER: Cholelithiasis.  SPLEEN: Within normal limits.  PANCREAS: Fatty atrophy.  ADRENALS: Within normal limits.  KIDNEYS/URETERS: Mildly atrophic. No hydronephrosis.    BLADDER: Collapsed around Perales catheter. Expected droplet of   intraluminal air.  REPRODUCTIVE ORGANS: Atrophic uterus versus supracervical hysterectomy.    BOWEL: No bowel obstruction. Appendix is normal.  PERITONEUM: No ascites.  VESSELS: Atherosclerotic changes. Stable infrarenal aortic displaced   calcifications consistent with calcified plaque or chronic focal type B   dissection.  RETROPERITONEUM/LYMPH NODES: No lymphadenopathy.  ABDOMINAL WALL: Minimal fat-containing left inguinal and umbilical   hernias.  BONES: Within normal limits.    IMPRESSION:  Marked improvement in bibasilar infiltrates. Residual bibasilar fibrosis.   Prominent mediastinal lymph node mildly improved.    No acute intra-abdominal pathology.    Cholelithiasis.    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    Other chronic findings as above.        Advanced directives addressed: full resuscitation
Patient is a 89y old  Female who presents with a chief complaint of fever (25 May 2023 10:35)      SUBJECTIVE:   HPI:  HPI:  89F w/PMH HTN, HLD, severe AS, HFpEF, chronic pérez for UR, recent admit (-) for asp pna and UTI, presents now, BIB dtr, Theres, available at bedside, for fever and lethargy.  Pt is lethargic, but arousable and answers Qs appropriately and is OX3, follows commands.  Pt states she's here for fevers since last night.  Per dtr, over the weekend had c/o Lower abd pain, which eventually resolved.  On Monday, they called , prescribed macrobid and VNS changed pt's pérez.  Pt went for routine appt w/ PCP and per dtr, she was doing fine and as herself. She started macrobid yesterday, x3 doses.  Last night, pt began to become weak, lethargic and shaking cills.  Family treated w/ tylenol, but his AM they decided to bring her for eval as she became febrile and still lethargic.      In ED, Wbc 21, Na 130, Cr 1.9 (baseline ~1.5), LA 2.5, temp 99.6, hypotensive, given IVF 1L, pérez changed and pt has not had any Urine output to obtain UA, abx held in the meantime.   CT ch/a/p- no acute path    : laying in bed, tired but feeling better. no pain to pérez. no fever. tolerating abx well. BP improving   23: No new issues. BP high now.       REVIEW OF SYSTEMS:    CONSTITUTIONAL: No weakness, fevers or chills  EYES/ENT: No visual changes;  No vertigo or throat pain   NECK: No pain or stiffness  RESPIRATORY: No cough, wheezing, hemoptysis; No shortness of breath  CARDIOVASCULAR: No chest pain or palpitations  GASTROINTESTINAL: No abdominal or epigastric pain. No nausea, vomiting, or hematemesis; No diarrhea or constipation. No melena or hematochezia.  GENITOURINARY: No dysuria, frequency or hematuria  NEUROLOGICAL: No numbness or weakness  SKIN: No itching, burning, rashes, or lesions   All other review of systems is negative unless indicated above        Vital Signs Last 24 Hrs  T(C): 36.6 (26 May 2023 09:25), Max: 36.6 (26 May 2023 09:25)  T(F): 97.8 (26 May 2023 09:25), Max: 97.8 (26 May 2023 09:25)  HR: 79 (26 May 2023 09:25) (66 - 79)  BP: 150/53 (26 May 2023 09:25) (108/43 - 150/53)  BP(mean): 48 (25 May 2023 15:40) (48 - 48)  RR: 18 (26 May 2023 09:25) (18 - 18)  SpO2: 92% (26 May 2023 09:25) (92% - 95%)    Parameters below as of 26 May 2023 09:25  Patient On (Oxygen Delivery Method): room air        PHYSICAL EXAM:    Constitutional: NAD, awake and alert, well-developed  HEENT: PERR, EOMI, Normal Hearing, MMM  Neck: Soft and supple, No LAD, No JVD  Respiratory: Breath sounds are clear bilaterally, No wheezing, rales or rhonchi  Cardiovascular: S1 and S2, regular rate and rhythm, no Murmurs, gallops or rubs  Gastrointestinal: Bowel Sounds present, soft, nontender, nondistended, no guarding, no rebound  Extremities: No peripheral edema  Vascular: 2+ peripheral pulses  Neurological: A/O x 3, no focal deficits  Musculoskeletal: 5/5 strength b/l upper and lower extremities  Skin: No rashes      MEDICATIONS:  MEDICATIONS  (STANDING):  allopurinol 100 milliGRAM(s) Oral daily  atorvastatin 20 milliGRAM(s) Oral at bedtime  enoxaparin Injectable 30 milliGRAM(s) SubCutaneous every 24 hours  gabapentin 300 milliGRAM(s) Oral two times a day  pantoprazole    Tablet 40 milliGRAM(s) Oral before breakfast  piperacillin/tazobactam IVPB.. 3.375 Gram(s) IV Intermittent every 12 hours  sodium chloride 0.9%. 1000 milliLiter(s) (125 mL/Hr) IV Continuous <Continuous>        LABS: All Labs Reviewed:                                     7.9    6.69  )-----------( 93       ( 26 May 2023 10:28 )             24.6     26 May 2023 10:28    140    |  115    |  23     ----------------------------<  104    4.1     |  18     |  1.55     Ca    7.5        26 May 2023 10:28          CAPILLARY BLOOD GLUCOSE            Urinalysis Basic - ( 24 May 2023 17:18 )    Color: Yellow / Appearance: Clear / S.010 / pH: x  Gluc: x / Ketone: Negative  / Bili: Negative / Urobili: Negative   Blood: x / Protein: 30 mg/dL / Nitrite: Positive   Leuk Esterase: Moderate / RBC: 6-10 /HPF / WBC >50 /HPF   Sq Epi: x / Non Sq Epi: x / Bacteria: Many            
Patient is a 89y old  Female who presents with a chief complaint of fever (25 May 2023 10:35)      SUBJECTIVE:   HPI:  HPI:  89F w/PMH HTN, HLD, severe AS, HFpEF, chronic pérez for UR, recent admit (4/4-4/11) for asp pna and UTI, presents now, BIB dtr, Theres, available at bedside, for fever and lethargy.  Pt is lethargic, but arousable and answers Qs appropriately and is OX3, follows commands.  Pt states she's here for fevers since last night.  Per dtr, over the weekend had c/o Lower abd pain, which eventually resolved.  On Monday, they called , prescribed macrobid and VNS changed pt's pérez.  Pt went for routine appt w/ PCP and per dtr, she was doing fine and as herself. She started macrobid yesterday, x3 doses.  Last night, pt began to become weak, lethargic and shaking cills.  Family treated w/ tylenol, but his AM they decided to bring her for eval as she became febrile and still lethargic.      In ED, Wbc 21, Na 130, Cr 1.9 (baseline ~1.5), LA 2.5, temp 99.6, hypotensive, given IVF 1L, pérez changed and pt has not had any Urine output to obtain UA, abx held in the meantime.   CT ch/a/p- no acute path    5/25: laying in bed, tired but feeling better. no pain to pérez. no fever. tolerating abx well. BP improving   05/26/23: No new issues. BP high now.   05/27/23: Patient seen and examined. High BP and was sob last night, received 40 mg IV lasix. Discussed with daughter  at bed side regarding management  and d/c plan.   05/28/23: No sob, feels  somewhat tired.  05/29/23: Sitting in chair, seen by PT and walked 125 feet. No new complaints. Discussed with daughter at bed side regarding management and d/c plan.       REVIEW OF SYSTEMS:    CONSTITUTIONAL: No weakness, fevers or chills  EYES/ENT: No visual changes;  No vertigo or throat pain   NECK: No pain or stiffness  RESPIRATORY: No cough, wheezing, hemoptysis; No shortness of breath  CARDIOVASCULAR: No chest pain or palpitations  GASTROINTESTINAL: No abdominal or epigastric pain. No nausea, vomiting, or hematemesis; No diarrhea or constipation. No melena or hematochezia.  GENITOURINARY: No dysuria, frequency or hematuria  NEUROLOGICAL: No numbness or weakness  SKIN: No itching, burning, rashes, or lesions   All other review of systems is negative unless indicated above        Vital Signs Last 24 Hrs  T(C): 37.1 (29 May 2023 07:53), Max: 37.1 (28 May 2023 21:11)  T(F): 98.8 (29 May 2023 07:53), Max: 98.8 (29 May 2023 07:53)  HR: 64 (29 May 2023 07:53) (64 - 72)  BP: 163/57 (29 May 2023 07:53) (151/46 - 163/57)  BP(mean): 85 (28 May 2023 21:11) (85 - 85)  RR: 18 (29 May 2023 07:53) (17 - 20)  SpO2: 94% (29 May 2023 07:53) (94% - 98%)    Parameters below as of 29 May 2023 07:53  Patient On (Oxygen Delivery Method): nasal cannula  O2 Flow (L/min): 1            PHYSICAL EXAM:    Constitutional: NAD, awake and alert, well-developed  HEENT: PERR, EOMI, Normal Hearing, MMM  Neck: Soft and supple, No LAD, No JVD  Respiratory: Breath sounds are clear bilaterally, No wheezing, rales or rhonchi  Cardiovascular: S1 and S2, regular rate and rhythm, no Murmurs, gallops or rubs  Gastrointestinal: Bowel Sounds present, soft, nontender, nondistended, no guarding, no rebound  Extremities: No peripheral edema  Vascular: 2+ peripheral pulses  Neurological: A/O x 3, no focal deficits  Musculoskeletal: 5/5 strength b/l upper and lower extremities  Skin: No rashes      MEDICATIONS:  MEDICATIONS  (STANDING):  allopurinol 100 milliGRAM(s) Oral daily  atorvastatin 20 milliGRAM(s) Oral at bedtime  enoxaparin Injectable 30 milliGRAM(s) SubCutaneous every 24 hours  gabapentin 300 milliGRAM(s) Oral two times a day  pantoprazole    Tablet 40 milliGRAM(s) Oral before breakfast  piperacillin/tazobactam IVPB.. 3.375 Gram(s) IV Intermittent every 12 hours  sodium chloride 0.9%. 1000 milliLiter(s) (125 mL/Hr) IV Continuous <Continuous>        LABS: All Labs Reviewed:                                                        8.3    5.61  )-----------( 109      ( 28 May 2023 07:20 )             25.1     28 May 2023 07:20    141    |  108    |  21     ----------------------------<  113    3.6     |  25     |  1.55     Ca    8.6        28 May 2023 07:20          CAPILLARY BLOOD GLUCOSE                                            
  CHIEF COMPLAINT:  Patient is a 89y old  Female who presents with a chief complaint of fever (24 May 2023 16:58)    HPI: 23:  89 F, well known to me, w/PMH HTN, HLD, severe AS, HFpEF, chronic Pérez for UR, recent admit (-) for asp PNA and UTI, presents now, BIB dtr, [place], for fever and lethargy.  Pt is lethargic, but arousable and answers Qs appropriately and follows commands.  Pt states she's here for fevers.  Per dtr, over the weekend, she had c/o Lower abd pain, which eventually resolved.  On Monday, they called , prescribed Macrobid and VNS changed pt's pérez.  Pt went for routine appt w/ me and was doing fine and back to herself. She started macrobid on Tuesday, x3 doses.  Tuesday night, pt began to become weak, lethargic and shaking chills.  Family treated w/ Tylenol but yesterday AM they decided to bring her to the ED for eval as she became febrile and still lethargic.      In ED, Wbc 21, Na 130, Cr 1.9 (baseline ~1.5), LA 2.5, temp 99.6, hypotensive, given IVF 1L, Pérez changed and pt has not had any Urine output to obtain UA, abx held in the meantime.   CT ch/a/p- no acute path    Currently she is resting more comfortably without anginal chest pains or increased SOB.  She does have severe AS.  No new cardiac symptoms so far.    23:  Feeling better but right shoulder continues to have stiffness and pain (a chronic problem).  WBC coming down.  Hgb low and may benefit from 1 unit PRBC's.  Possibly aggravated by the chronic hemolysis from the AS (beny  effect on the RBC's).  Should be on Folic Acid and I will order.  Otherwise pt is eager to go home.  No new cardiac symptoms.  Home when ok with medicine etal.        PAST MEDICAL & SURGICAL HISTORY:  severe AS  HFpEF (65%)  chronic Pérez for UR  HTN (hypertension)  HLD (hyperlipidemia)  Gout  Osteoarthritis  History of tonsillectomy in childhood      FAMILY HISTORY:   FAMILY HISTORY:  Family history of hypertension (Father, Mother)      ALLERGIES:  Allergies  Ceftin (Hives; Rash)      REVIEW OF SYSTEMS:  10 point ROS was obtained  Pertinent positives and negatives are as above  All other review of systems is negative unless indicated above      Vital Signs Last 24 Hrs  T(C): 36.3 (25 May 2023 23:23), Max: 36.4 (25 May 2023 15:40)  T(F): 97.4 (25 May 2023 23:23), Max: 97.6 (25 May 2023 15:40)  HR: 66 (25 May 2023 23:23) (66 - 76)  BP: 122/50 (25 May 2023 23:23) (108/43 - 122/50)  BP(mean): 48 (25 May 2023 15:40) (48 - 48)  RR: 18 (25 May 2023 23:23) (18 - 18)  SpO2: 94% (25 May 2023 23:23) (94% - 95%): room air      I&O's Summary    24 May 2023 07:01  -  25 May 2023 06:41  --------------------------------------------------------  IN: 0 mL / OUT: 400 mL / NET: -400 mL      PHYSICAL EXAM:   Constitutional: NAD, awake and alert, well-developed  HEENT: PERR, EOMI, Normal Hearing, MMM  Neck: Soft and supple, No LAD, No JVD  Respiratory: Breath sounds are clear bilaterally, No wheezing, rales or rhonchi  Cardiovascular: S1 and S2, regular rate and rhythm, 3/6 late peaking LIANA at base and soft systolic LLSB as before, +S4 but no S3 gallops or rubs  Gastrointestinal: Bowel Sounds present, soft, nontender, nondistended, no guarding, no rebound  Extremities: No peripheral edema  Vascular: 2+ peripheral pulses  Neurological: no focal deficits  Musculoskeletal: 5/5 strength b/l upper and lower extremities  Skin: No rashes      MEDICATIONS  (STANDING):  allopurinol 100 milliGRAM(s) Oral daily  atorvastatin 20 milliGRAM(s) Oral at bedtime  enoxaparin Injectable 30 milliGRAM(s) SubCutaneous every 24 hours  gabapentin 300 milliGRAM(s) Oral two times a day  lidocaine   4% Patch 3 Patch Transdermal daily  pantoprazole    Tablet 40 milliGRAM(s) Oral before breakfast  piperacillin/tazobactam IVPB.. 3.375 Gram(s) IV Intermittent every 12 hours  sodium chloride 0.9%. 1000 milliLiter(s) (125 mL/Hr) IV Continuous <Continuous>    MEDICATIONS  (PRN):  acetaminophen     Tablet .. 650 milliGRAM(s) Oral every 6 hours PRN Temp greater or equal to 38C (100.4F), Mild Pain (1 - 3)  aluminum hydroxide/magnesium hydroxide/simethicone Suspension 30 milliLiter(s) Oral every 4 hours PRN Dyspepsia  melatonin 3 milliGRAM(s) Oral at bedtime PRN Insomnia  ondansetron Injectable 4 milliGRAM(s) IV Push every 8 hours PRN Nausea and/or Vomiting      LABS: All Labs Reviewed:                        8.2    14.21 )-----------( 82       ( 25 May 2023 07:08 )             26.0                           9.8    20.99 )-----------( 107      ( 24 May 2023 13:29 )             29.5       05-25    133<L>  |  107  |  29<H>  ----------------------------<  96  4.4   |  19<L>  |  1.80<H>    Ca    7.5<L>      25 May 2023 07:08    TPro  6.9  /  Alb  3.2<L>  /  TBili  1.0  /  DBili  x   /  AST  90<H>  /  ALT  71  /  AlkPhos  81  05-24      05-24    130<L>  |  100  |  29<H>  ----------------------------<  116<H>  4.1   |  22  |  1.95<H>    Ca    8.7      24 May 2023 13:29    TPro  6.9  /  Alb  3.2<L>  /  TBili  1.0  /  DBili  x   /  AST  90<H>  /  ALT  71  /  AlkPhos  81  05-24    PT/INR - ( 24 May 2023 13:29 )   PT: 12.8 sec;   INR: 1.10 ratio      PTT - ( 24 May 2023 13:29 )  PTT:27.8 sec    Troponin I, High Sensitivity (23 @ 13:29)   Troponin I, High Sensitivity Result: 11.49    Lactate, Blood (23 @ 17:23)   Lactate, Blood: 1.9 mmol/L      BLOOD CULTURES:   LIPID PROFILE     RADIOLOGY:      EK23:  Normal sinus rhythm  Minimal voltage criteria for LVH, may be normal variant      TELEMETRY:      ECHO:  3/1/23:  M-Mode Measurements (cm)   LVEDd: 3.97 cm            LVESd: 2.55 cm   IVSEd: 1.1 cm   LVPWd: 1.1 cm             AO Root Dimension: 2.8 cm                             LA: 3.5 cm                LVOT: 2 cm  Doppler Measurements:   AV Velocity:434 cm/s                  MV Peak E-Wave: 98.7 cm/s   AV Peak Gradient: 75.34 mmHg          MV Peak A-Wave: 131 cm/s   AV Mean Gradient: 40 mmHg             MV E/A Ratio: 0.75 %   AV Area (Continuity):0.85 cm^2        MV Peak Gradient: 3.9 mmHg   TR Velocity:190 cm/s   TR Gradient:14.44 mmHg   Estimated RAP:5 mmHg   RVSP:27 mmHg    Findings  Mitral Valve   The mitral valve leaflets appear thickened.   EA reversal of the mitral inflow consistent with reduced compliance of the left ventricle.   Mild mitral annular calcification is present.   Mild mitral regurgitation is present.    Aortic Valve   Peak and mean transaortic gradients are 75 and 40mmHg respectively; this finding is consistent with severe aortic stenosis.   Mild (1+) aortic regurgitation is present.    Tricuspid Valve   Trace tricuspid valve regurgitation is present.    Pulmonic Valve   Mild pulmonic valvular regurgitation (1+) is present.    Left Atrium   Normal appearing left atrium.    Left Ventricle   Mild concentric left ventricular hypertrophy is present.   The left ventricle is normal in size.   Estimated left ventricular ejection fraction is 65-70 %.    Right Atrium   Normal appearing right atrium.    Right Ventricle   Normal appearing right ventricle structure and function.    Pericardial Effusion   No evidence of pericardial effusion.    Pleural Effusion   No evidence of pleural effusion.    Miscellaneous   The IVC appears normal.    Summary   The mitral valve leaflets appear thickened.   EA reversal of the mitral inflow consistent with reduced compliance of the left ventricle.   Mild mitral annular calcification is present.   Mild mitral regurgitation is present.   Peak and mean transaortic gradients are 75 and 40mmHg respectively; this finding is consistent with severe aortic stenosis.   Mild (1+) aortic regurgitation is present.   Trace tricuspid valve regurgitation is present.   Mild pulmonic valvular regurgitation (1+) is present.   Normal appearing left atrium.   Mild concentric left ventricular hypertrophy is present.   The left ventricle is normal in size.   Estimated left ventricular ejection fraction is 65-70 %.   Normal appearing right atrium.   Normal appearing right ventricle structure and function.   The IVC appears normal.   No evidence of pericardial effusion.   No evidence of pleural effusion.    Signature   ----------------------------------------------------------------   Electronically signed by Sakina Cheatham MD, Director of   Cardiac Cath Lab(Interpreting physician) on 2023 06:42   PM   ----------------------------------------------------------------     
Patient is a 89y old  Female who presents with a chief complaint of fever (25 May 2023 10:35)      SUBJECTIVE:   HPI:  HPI:  89F w/PMH HTN, HLD, severe AS, HFpEF, chronic pérez for UR, recent admit (4/4-4/11) for asp pna and UTI, presents now, BIB dtr, Theres, available at bedside, for fever and lethargy.  Pt is lethargic, but arousable and answers Qs appropriately and is OX3, follows commands.  Pt states she's here for fevers since last night.  Per dtr, over the weekend had c/o Lower abd pain, which eventually resolved.  On Monday, they called , prescribed macrobid and VNS changed pt's pérez.  Pt went for routine appt w/ PCP and per dtr, she was doing fine and as herself. She started macrobid yesterday, x3 doses.  Last night, pt began to become weak, lethargic and shaking cills.  Family treated w/ tylenol, but his AM they decided to bring her for eval as she became febrile and still lethargic.      In ED, Wbc 21, Na 130, Cr 1.9 (baseline ~1.5), LA 2.5, temp 99.6, hypotensive, given IVF 1L, pérez changed and pt has not had any Urine output to obtain UA, abx held in the meantime.   CT ch/a/p- no acute path    5/25: laying in bed, tired but feeling better. no pain to pérez. no fever. tolerating abx well. BP improving       REVIEW OF SYSTEMS:    CONSTITUTIONAL: No weakness, fevers or chills  EYES/ENT: No visual changes;  No vertigo or throat pain   NECK: No pain or stiffness  RESPIRATORY: No cough, wheezing, hemoptysis; No shortness of breath  CARDIOVASCULAR: No chest pain or palpitations  GASTROINTESTINAL: No abdominal or epigastric pain. No nausea, vomiting, or hematemesis; No diarrhea or constipation. No melena or hematochezia.  GENITOURINARY: No dysuria, frequency or hematuria  NEUROLOGICAL: No numbness or weakness  SKIN: No itching, burning, rashes, or lesions   All other review of systems is negative unless indicated above        Vital Signs Last 24 Hrs  T(C): 36.3 (25 May 2023 06:54), Max: 36.8 (24 May 2023 15:13)  T(F): 97.4 (25 May 2023 06:54), Max: 98.3 (24 May 2023 15:13)  HR: 66 (25 May 2023 06:54) (63 - 75)  BP: 115/34 (25 May 2023 06:54) (102/55 - 125/37)  BP(mean): 65 (24 May 2023 20:10) (60 - 65)  RR: 18 (25 May 2023 06:54) (18 - 22)  SpO2: 98% (25 May 2023 06:54) (97% - 100%)    Parameters below as of 25 May 2023 06:54  Patient On (Oxygen Delivery Method): nasal cannula        PHYSICAL EXAM:    Constitutional: NAD, awake and alert, well-developed  HEENT: PERR, EOMI, Normal Hearing, MMM  Neck: Soft and supple, No LAD, No JVD  Respiratory: Breath sounds are clear bilaterally, No wheezing, rales or rhonchi  Cardiovascular: S1 and S2, regular rate and rhythm, no Murmurs, gallops or rubs  Gastrointestinal: Bowel Sounds present, soft, nontender, nondistended, no guarding, no rebound  Extremities: No peripheral edema  Vascular: 2+ peripheral pulses  Neurological: A/O x 3, no focal deficits  Musculoskeletal: 5/5 strength b/l upper and lower extremities  Skin: No rashes      MEDICATIONS:  MEDICATIONS  (STANDING):  allopurinol 100 milliGRAM(s) Oral daily  atorvastatin 20 milliGRAM(s) Oral at bedtime  enoxaparin Injectable 30 milliGRAM(s) SubCutaneous every 24 hours  gabapentin 300 milliGRAM(s) Oral two times a day  pantoprazole    Tablet 40 milliGRAM(s) Oral before breakfast  piperacillin/tazobactam IVPB.. 3.375 Gram(s) IV Intermittent every 12 hours  sodium chloride 0.9%. 1000 milliLiter(s) (125 mL/Hr) IV Continuous <Continuous>        LABS: All Labs Reviewed:                        8.2    14.21 )-----------( 82       ( 25 May 2023 07:08 )             26.0     05-25    133<L>  |  107  |  29<H>  ----------------------------<  96  4.4   |  19<L>  |  1.80<H>    Ca    7.5<L>      25 May 2023 07:08    TPro  6.9  /  Alb  3.2<L>  /  TBili  1.0  /  DBili  x   /  AST  90<H>  /  ALT  71  /  AlkPhos  81  05-24    PT/INR - ( 24 May 2023 13:29 )   PT: 12.8 sec;   INR: 1.10 ratio  PTT - ( 24 May 2023 13:29 )  PTT:27.8 sec      < from: CT Abdomen and Pelvis No Cont (05.24.23 @ 14:35) >  IMPRESSION:  Marked improvement in bibasilar infiltrates. Residual bibasilar fibrosis.   Prominent mediastinal lymph node mildly improved.    No acute intra-abdominal pathology.    Cholelithiasis.    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    Other chronic findings as above.        --- End of Report ---      PATRIA VEGA MD; Attending Radiologist  This document has been electronically signed. May 24 2023  3:02PM    < end of copied text >          
Patient is a 89y old  Female who presents with a chief complaint of fever (25 May 2023 10:35)      SUBJECTIVE:   HPI:  HPI:  89F w/PMH HTN, HLD, severe AS, HFpEF, chronic pérez for UR, recent admit (4/4-4/11) for asp pna and UTI, presents now, BIB dtr, Theres, available at bedside, for fever and lethargy.  Pt is lethargic, but arousable and answers Qs appropriately and is OX3, follows commands.  Pt states she's here for fevers since last night.  Per dtr, over the weekend had c/o Lower abd pain, which eventually resolved.  On Monday, they called , prescribed macrobid and VNS changed pt's pérez.  Pt went for routine appt w/ PCP and per dtr, she was doing fine and as herself. She started macrobid yesterday, x3 doses.  Last night, pt began to become weak, lethargic and shaking cills.  Family treated w/ tylenol, but his AM they decided to bring her for eval as she became febrile and still lethargic.      In ED, Wbc 21, Na 130, Cr 1.9 (baseline ~1.5), LA 2.5, temp 99.6, hypotensive, given IVF 1L, pérez changed and pt has not had any Urine output to obtain UA, abx held in the meantime.   CT ch/a/p- no acute path    5/25: laying in bed, tired but feeling better. no pain to pérez. no fever. tolerating abx well. BP improving   05/26/23: No new issues. BP high now.   05/27/23: Patient seen and examined. High BP and was sob last night, received 40 mg IV lasix. Discussed with daughter  at bed side regarding management  and d/c plan.       REVIEW OF SYSTEMS:    CONSTITUTIONAL: No weakness, fevers or chills  EYES/ENT: No visual changes;  No vertigo or throat pain   NECK: No pain or stiffness  RESPIRATORY: No cough, wheezing, hemoptysis; No shortness of breath  CARDIOVASCULAR: No chest pain or palpitations  GASTROINTESTINAL: No abdominal or epigastric pain. No nausea, vomiting, or hematemesis; No diarrhea or constipation. No melena or hematochezia.  GENITOURINARY: No dysuria, frequency or hematuria  NEUROLOGICAL: No numbness or weakness  SKIN: No itching, burning, rashes, or lesions   All other review of systems is negative unless indicated above        Vital Signs Last 24 Hrs  T(C): 36.4 (26 May 2023 21:00), Max: 36.7 (26 May 2023 20:44)  T(F): 97.6 (26 May 2023 21:00), Max: 98 (26 May 2023 20:44)  HR: 87 (27 May 2023 06:28) (63 - 94)  BP: 166/65 (27 May 2023 06:28) (154/60 - 200/68)  BP(mean): --  RR: 17 (27 May 2023 06:28) (17 - 20)  SpO2: 99% (27 May 2023 06:28) (89% - 100%)    Parameters below as of 27 May 2023 06:28  Patient On (Oxygen Delivery Method): nasal cannula            PHYSICAL EXAM:    Constitutional: NAD, awake and alert, well-developed  HEENT: PERR, EOMI, Normal Hearing, MMM  Neck: Soft and supple, No LAD, No JVD  Respiratory: Breath sounds are clear bilaterally, No wheezing, rales or rhonchi  Cardiovascular: S1 and S2, regular rate and rhythm, no Murmurs, gallops or rubs  Gastrointestinal: Bowel Sounds present, soft, nontender, nondistended, no guarding, no rebound  Extremities: No peripheral edema  Vascular: 2+ peripheral pulses  Neurological: A/O x 3, no focal deficits  Musculoskeletal: 5/5 strength b/l upper and lower extremities  Skin: No rashes      MEDICATIONS:  MEDICATIONS  (STANDING):  allopurinol 100 milliGRAM(s) Oral daily  atorvastatin 20 milliGRAM(s) Oral at bedtime  enoxaparin Injectable 30 milliGRAM(s) SubCutaneous every 24 hours  gabapentin 300 milliGRAM(s) Oral two times a day  pantoprazole    Tablet 40 milliGRAM(s) Oral before breakfast  piperacillin/tazobactam IVPB.. 3.375 Gram(s) IV Intermittent every 12 hours  sodium chloride 0.9%. 1000 milliLiter(s) (125 mL/Hr) IV Continuous <Continuous>        LABS: All Labs Reviewed:                                                          9.3    7.94  )-----------( 113      ( 27 May 2023 08:10 )             28.2     27 May 2023 08:10    142    |  113    |  21     ----------------------------<  132    3.9     |  21     |  1.49     Ca    9.0        27 May 2023 08:10                          
Patient is a 89y old  Female who presents with a chief complaint of fever (25 May 2023 10:35)      SUBJECTIVE:   HPI:  HPI:  89F w/PMH HTN, HLD, severe AS, HFpEF, chronic pérez for UR, recent admit (4/4-4/11) for asp pna and UTI, presents now, BIB dtr, Theres, available at bedside, for fever and lethargy.  Pt is lethargic, but arousable and answers Qs appropriately and is OX3, follows commands.  Pt states she's here for fevers since last night.  Per dtr, over the weekend had c/o Lower abd pain, which eventually resolved.  On Monday, they called , prescribed macrobid and VNS changed pt's pérez.  Pt went for routine appt w/ PCP and per dtr, she was doing fine and as herself. She started macrobid yesterday, x3 doses.  Last night, pt began to become weak, lethargic and shaking cills.  Family treated w/ tylenol, but his AM they decided to bring her for eval as she became febrile and still lethargic.      In ED, Wbc 21, Na 130, Cr 1.9 (baseline ~1.5), LA 2.5, temp 99.6, hypotensive, given IVF 1L, pérez changed and pt has not had any Urine output to obtain UA, abx held in the meantime.   CT ch/a/p- no acute path    5/25: laying in bed, tired but feeling better. no pain to pérez. no fever. tolerating abx well. BP improving   05/26/23: No new issues. BP high now.   05/27/23: Patient seen and examined. High BP and was sob last night, received 40 mg IV lasix. Discussed with daughter  at bed side regarding management  and d/c plan.   05/28/23: No sob, feels  somewhat tired.      REVIEW OF SYSTEMS:    CONSTITUTIONAL: No weakness, fevers or chills  EYES/ENT: No visual changes;  No vertigo or throat pain   NECK: No pain or stiffness  RESPIRATORY: No cough, wheezing, hemoptysis; No shortness of breath  CARDIOVASCULAR: No chest pain or palpitations  GASTROINTESTINAL: No abdominal or epigastric pain. No nausea, vomiting, or hematemesis; No diarrhea or constipation. No melena or hematochezia.  GENITOURINARY: No dysuria, frequency or hematuria  NEUROLOGICAL: No numbness or weakness  SKIN: No itching, burning, rashes, or lesions   All other review of systems is negative unless indicated above        Vital Signs Last 24 Hrs  T(C): 36.4 (28 May 2023 08:20), Max: 36.7 (27 May 2023 15:26)  T(F): 97.6 (28 May 2023 08:20), Max: 98.1 (27 May 2023 21:04)  HR: 66 (28 May 2023 08:20) (66 - 99)  BP: 157/51 (28 May 2023 08:20) (104/52 - 181/51)  BP(mean): 81 (27 May 2023 21:04) (81 - 81)  RR: 16 (28 May 2023 08:20) (16 - 20)  SpO2: 95% (28 May 2023 08:20) (95% - 100%)    Parameters below as of 28 May 2023 08:20  Patient On (Oxygen Delivery Method): nasal cannula  O2 Flow (L/min): 2          PHYSICAL EXAM:    Constitutional: NAD, awake and alert, well-developed  HEENT: PERR, EOMI, Normal Hearing, MMM  Neck: Soft and supple, No LAD, No JVD  Respiratory: Breath sounds are clear bilaterally, No wheezing, rales or rhonchi  Cardiovascular: S1 and S2, regular rate and rhythm, no Murmurs, gallops or rubs  Gastrointestinal: Bowel Sounds present, soft, nontender, nondistended, no guarding, no rebound  Extremities: No peripheral edema  Vascular: 2+ peripheral pulses  Neurological: A/O x 3, no focal deficits  Musculoskeletal: 5/5 strength b/l upper and lower extremities  Skin: No rashes      MEDICATIONS:  MEDICATIONS  (STANDING):  allopurinol 100 milliGRAM(s) Oral daily  atorvastatin 20 milliGRAM(s) Oral at bedtime  enoxaparin Injectable 30 milliGRAM(s) SubCutaneous every 24 hours  gabapentin 300 milliGRAM(s) Oral two times a day  pantoprazole    Tablet 40 milliGRAM(s) Oral before breakfast  piperacillin/tazobactam IVPB.. 3.375 Gram(s) IV Intermittent every 12 hours  sodium chloride 0.9%. 1000 milliLiter(s) (125 mL/Hr) IV Continuous <Continuous>        LABS: All Labs Reviewed:                                                        8.3    5.61  )-----------( 109      ( 28 May 2023 07:20 )             25.1     28 May 2023 07:20    141    |  108    |  21     ----------------------------<  113    3.6     |  25     |  1.55     Ca    8.6        28 May 2023 07:20                                  
  CHIEF COMPLAINT:  Patient is a 89y old  Female who presents with a chief complaint of fever (24 May 2023 16:58)    HPI: 23:  89 F, well known to me, w/PMH HTN, HLD, severe AS, HFpEF, chronic Pérez for UR, recent admit (-) for asp PNA and UTI, presents now, BIB dtr, [place], for fever and lethargy.  Pt is lethargic, but arousable and answers Qs appropriately and follows commands.  Pt states she's here for fevers.  Per dtr, over the weekend, she had c/o Lower abd pain, which eventually resolved.  On Monday, they called , prescribed Macrobid and VNS changed pt's pérez.  Pt went for routine appt w/ me and was doing fine and back to herself. She started macrobid on Tuesday, x3 doses.  Tuesday night, pt began to become weak, lethargic and shaking chills.  Family treated w/ Tylenol but yesterday AM they decided to bring her to the ED for eval as she became febrile and still lethargic.      In ED, Wbc 21, Na 130, Cr 1.9 (baseline ~1.5), LA 2.5, temp 99.6, hypotensive, given IVF 1L, Pérez changed and pt has not had any Urine output to obtain UA, abx held in the meantime.   CT ch/a/p- no acute path    Currently she is resting more comfortably without anginal chest pains or increased SOB.  She does have severe AS.  No new cardiac symptoms so far.    23:  Feeling better but right shoulder continues to have stiffness and pain (a chronic problem).  WBC coming down.  Hgb low and may benefit from 1 unit PRBC's.  Possibly aggravated by the chronic hemolysis from the AS (beny  effect on the RBC's).  Should be on Folic Acid and I will order.  Otherwise pt is eager to go home.  No new cardiac symptoms.  Home when ok with medicine etal.      23:  Back to her baseline but still somewhat tired but better.  No increased SOB or chest pains.  Still anemic and previously responded to ProCrit and may benefit now as well.  Otherwise no new cardiac issues.  Home soon when ok with medicine.      23:  Resting comfortably without new cardiac symptoms.  For possible discharge home today.      PAST MEDICAL & SURGICAL HISTORY:  severe AS  HFpEF (65%)  chronic Pérez for UR  HTN (hypertension)  HLD (hyperlipidemia)  Gout  Osteoarthritis  History of tonsillectomy in childhood      FAMILY HISTORY:   FAMILY HISTORY:  Family history of hypertension (Father, Mother)      ALLERGIES:  Allergies  Ceftin (Hives; Rash)      REVIEW OF SYSTEMS:  10 point ROS was obtained  Pertinent positives and negatives are as above  All other review of systems is negative unless indicated above      Vital Signs Last 24 Hrs  T(C): 36.8 (30 May 2023 00:24), Max: 37.1 (29 May 2023 07:53)  T(F): 98.2 (30 May 2023 00:24), Max: 98.8 (29 May 2023 07:53)  HR: 69 (30 May 2023 05:19) (64 - 71)  BP: 152/57 (30 May 2023 05:19) (152/57 - 175/50)  RR: 17 (30 May 2023 00:24) (16 - 18)  SpO2: 93% (30 May 2023 00:24) (92% - 98%): room air      I&O's Summary    24 May 2023 07:01  -  25 May 2023 06:41  --------------------------------------------------------  IN: 0 mL / OUT: 400 mL / NET: -400 mL      PHYSICAL EXAM:   Constitutional: NAD, awake and alert, well-developed  HEENT: PERR, EOMI, Normal Hearing, MMM  Neck: Soft and supple, No LAD, No JVD  Respiratory: Breath sounds are clear bilaterally, No wheezing, rales or rhonchi  Cardiovascular: S1 and S2, regular rate and rhythm, 3/6 late peaking LIANA at base and soft systolic LLSB as before, +S4 but no S3 gallops or rubs  Gastrointestinal: Bowel Sounds present, soft, nontender, nondistended, no guarding, no rebound  Extremities: No peripheral edema  Vascular: 2+ peripheral pulses  Neurological: no focal deficits  Musculoskeletal: 5/5 strength b/l upper and lower extremities  Skin: No rashes      MEDICATIONS  (STANDING):  allopurinol 100 milliGRAM(s) Oral daily  atorvastatin 20 milliGRAM(s) Oral at bedtime  enoxaparin Injectable 30 milliGRAM(s) SubCutaneous every 24 hours  folic acid 1 milliGRAM(s) Oral daily  furosemide    Tablet 20 milliGRAM(s) Oral daily  gabapentin 300 milliGRAM(s) Oral two times a day  hydrALAZINE 25 milliGRAM(s) Oral two times a day  lidocaine   4% Patch 3 Patch Transdermal daily  metoprolol tartrate 25 milliGRAM(s) Oral two times a day  nystatin    Suspension 135062 Unit(s) Oral every 6 hours  pantoprazole    Tablet 40 milliGRAM(s) Oral before breakfast  piperacillin/tazobactam IVPB.. 3.375 Gram(s) IV Intermittent every 12 hours  senna 2 Tablet(s) Oral at bedtime    MEDICATIONS  (PRN):  acetaminophen     Tablet .. 650 milliGRAM(s) Oral every 6 hours PRN Temp greater or equal to 38C (100.4F), Mild Pain (1 - 3)  albuterol    0.083% 2.5 milliGRAM(s) Nebulizer every 6 hours PRN Shortness of Breath and/or Wheezing  aluminum hydroxide/magnesium hydroxide/simethicone Suspension 30 milliLiter(s) Oral every 4 hours PRN Dyspepsia  hydrALAZINE Injectable 5 milliGRAM(s) IV Push every 4 hours PRN Systolic BP>160 mm Hg  melatonin 3 milliGRAM(s) Oral at bedtime PRN Insomnia  ondansetron Injectable 4 milliGRAM(s) IV Push every 8 hours PRN Nausea and/or Vomiting  polyethylene glycol 3350 17 Gram(s) Oral daily PRN Constipation      LABS: All Labs Reviewed:                        8.3    5.61  )-----------( 109      ( 28 May 2023 07:20 )             25.1                           8.2    14.21 )-----------( 82       ( 25 May 2023 07:08 )             26.0                           9.8    20.99 )-----------( 107      ( 24 May 2023 13:29 )             29.5      05-28    141  |  108  |  21  ----------------------------<  113<H>  3.6   |  25  |  1.55<H>    Ca    8.6      28 May 2023 07:20       05-25    133<L>  |  107  |  29<H>  ----------------------------<  96  4.4   |  19<L>  |  1.80<H>    Ca    7.5<L>      25 May 2023 07:08    TPro  6.9  /  Alb  3.2<L>  /  TBili  1.0  /  DBili  x   /  AST  90<H>  /  ALT  71  /  AlkPhos  81  05      05-24    130<L>  |  100  |  29<H>  ----------------------------<  116<H>  4.1   |  22  |  1.95<H>    Ca    8.7      24 May 2023 13:29    TPro  6.9  /  Alb  3.2<L>  /  TBili  1.0  /  DBili  x   /  AST  90<H>  /  ALT  71  /  AlkPhos  81      PT/INR - ( 24 May 2023 13:29 )   PT: 12.8 sec;   INR: 1.10 ratio      PTT - ( 24 May 2023 13:29 )  PTT:27.8 sec    Troponin I, High Sensitivity (.23 @ 13:29)   Troponin I, High Sensitivity Result: 11.49    Lactate, Blood (.23 @ 17:23)   Lactate, Blood: 1.9 mmol/L      BLOOD CULTURES:   LIPID PROFILE     RADIOLOGY:    CXR: 23:  FINDINGS:  There is cardiomegaly, vascular congestion and perihilar interstitial infiltrates without gross effusion. Trachea midline. No hilar mass. Visualized osseous structures are intact.  IMPRESSION:   Constellation of findings suggestive of CHF..      CT of Chest and Abd/Pelvis: 23:  FINDINGS:  CHEST:  LUNGS AND LARGE AIRWAYS: Tracheal narrowing no longer demonstrated. Marked improvement in bilateral infiltrates and atelectasis. Mild residual bibasilar fibrotic changes.  PLEURA: No pleural effusion.  VESSELS: Atherosclerotic aorta without aneurysm. Coronary atherosclerosis.  HEART: Mild cardiomegaly. No pericardial effusion.  MEDIASTINUM AND DIRK: 1.2 x 0.9 cm anterior mediastinal lymph node,   previously 1.7 x 1.1 cm.  CHEST WALL AND LOWER NECK: Within normal limits.    ABDOMEN AND PELVIS:  LIVER: Within normal limits.  BILE DUCTS: Normal caliber.  GALLBLADDER: Cholelithiasis.  SPLEEN: Within normal limits.  PANCREAS: Fatty atrophy.  ADRENALS: Within normal limits.  KIDNEYS/URETERS: Mildly atrophic. No hydronephrosis.  BLADDER: Collapsed around Pérez catheter. Expected droplet of   ntraluminal air.  REPRODUCTIVE ORGANS: Atrophic uterus versus supracervical hysterectomy.  BOWEL: No bowel obstruction. Appendix is normal.  PERITONEUM: No ascites.  VESSELS: Atherosclerotic changes. Stable infrarenal aortic displaced calcifications consistent with calcified plaque or chronic focal type B dissection.  RETROPERITONEUM/LYMPH NODES: No lymphadenopathy.  ABDOMINAL WALL: Minimal fat-containing left inguinal and umbilical hernias.  BONES: Within normal limits.  IMPRESSION:  Marked improvement in bibasilar infiltrates. Residual bibasilar fibrosis.   Prominent mediastinal lymph node mildly improved.  No acute intra-abdominal pathology.  Cholelithiasis.  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  Other chronic findings as above.      EK23:  Normal sinus rhythm  Minimal voltage criteria for LVH, may be normal variant      TELEMETRY:      ECHO:  3/1/23:  M-Mode Measurements (cm)   LVEDd: 3.97 cm            LVESd: 2.55 cm   IVSEd: 1.1 cm   LVPWd: 1.1 cm             AO Root Dimension: 2.8 cm                             LA: 3.5 cm                LVOT: 2 cm  Doppler Measurements:   AV Velocity:434 cm/s                  MV Peak E-Wave: 98.7 cm/s   AV Peak Gradient: 75.34 mmHg          MV Peak A-Wave: 131 cm/s   AV Mean Gradient: 40 mmHg             MV E/A Ratio: 0.75 %   AV Area (Continuity):0.85 cm^2        MV Peak Gradient: 3.9 mmHg   TR Velocity:190 cm/s   TR Gradient:14.44 mmHg   Estimated RAP:5 mmHg   RVSP:27 mmHg    Findings  Mitral Valve   The mitral valve leaflets appear thickened.   EA reversal of the mitral inflow consistent with reduced compliance of the left ventricle.   Mild mitral annular calcification is present.   Mild mitral regurgitation is present.    Aortic Valve   Peak and mean transaortic gradients are 75 and 40mmHg respectively; this finding is consistent with severe aortic stenosis.   Mild (1+) aortic regurgitation is present.    Tricuspid Valve   Trace tricuspid valve regurgitation is present.    Pulmonic Valve   Mild pulmonic valvular regurgitation (1+) is present.    Left Atrium   Normal appearing left atrium.    Left Ventricle   Mild concentric left ventricular hypertrophy is present.   The left ventricle is normal in size.   Estimated left ventricular ejection fraction is 65-70 %.    Right Atrium   Normal appearing right atrium.    Right Ventricle   Normal appearing right ventricle structure and function.    Pericardial Effusion   No evidence of pericardial effusion.    Pleural Effusion   No evidence of pleural effusion.    Miscellaneous   The IVC appears normal.    Summary   The mitral valve leaflets appear thickened.   EA reversal of the mitral inflow consistent with reduced compliance of the left ventricle.   Mild mitral annular calcification is present.   Mild mitral regurgitation is present.   Peak and mean transaortic gradients are 75 and 40mmHg respectively; this finding is consistent with severe aortic stenosis.   Mild (1+) aortic regurgitation is present.   Trace tricuspid valve regurgitation is present.   Mild pulmonic valvular regurgitation (1+) is present.   Normal appearing left atrium.   Mild concentric left ventricular hypertrophy is present.   The left ventricle is normal in size.   Estimated left ventricular ejection fraction is 65-70 %.   Normal appearing right atrium.   Normal appearing right ventricle structure and function.   The IVC appears normal.   No evidence of pericardial effusion.   No evidence of pleural effusion.    Signature   ----------------------------------------------------------------   Electronically signed by Sakina Cheatham MD, Director of   Cardiac Cath Lab(Interpreting physician) on 2023 06:42   PM   ----------------------------------------------------------------     
1 week
  CHIEF COMPLAINT:  Patient is a 89y old  Female who presents with a chief complaint of fever (24 May 2023 16:58)    HPI: 23:  89 F, well known to me, w/PMH HTN, HLD, severe AS, HFpEF, chronic Pérez for UR, recent admit (-) for asp PNA and UTI, presents now, BIB dtr, [facility], for fever and lethargy.  Pt is lethargic, but arousable and answers Qs appropriately and follows commands.  Pt states she's here for fevers.  Per dtr, over the weekend, she had c/o Lower abd pain, which eventually resolved.  On Monday, they called , prescribed Macrobid and VNS changed pt's pérez.  Pt went for routine appt w/ me and was doing fine and back to herself. She started macrobid on Tuesday, x3 doses.  Tuesday night, pt began to become weak, lethargic and shaking chills.  Family treated w/ Tylenol but yesterday AM they decided to bring her to the ED for eval as she became febrile and still lethargic.      In ED, Wbc 21, Na 130, Cr 1.9 (baseline ~1.5), LA 2.5, temp 99.6, hypotensive, given IVF 1L, Pérez changed and pt has not had any Urine output to obtain UA, abx held in the meantime.   CT ch/a/p- no acute path    Currently she is resting more comfortably without anginal chest pains or increased SOB.  She does have severe AS.  No new cardiac symptoms so far.    23:  Feeling better but right shoulder continues to have stiffness and pain (a chronic problem).  WBC coming down.  Hgb low and may benefit from 1 unit PRBC's.  Possibly aggravated by the chronic hemolysis from the AS (beny  effect on the RBC's).  Should be on Folic Acid and I will order.  Otherwise pt is eager to go home.  No new cardiac symptoms.  Home when ok with medicine etal.      23:  Back to her baseline but still somewhat tired but better.  No increased SOB or chest pains.  Still anemic and previously responded to ProCrit and may benefit now as well.  Otherwise no new cardiac issues.  Home soon when ok with medicine.        PAST MEDICAL & SURGICAL HISTORY:  severe AS  HFpEF (65%)  chronic Pérez for UR  HTN (hypertension)  HLD (hyperlipidemia)  Gout  Osteoarthritis  History of tonsillectomy in childhood      FAMILY HISTORY:   FAMILY HISTORY:  Family history of hypertension (Father, Mother)      ALLERGIES:  Allergies  Ceftin (Hives; Rash)      REVIEW OF SYSTEMS:  10 point ROS was obtained  Pertinent positives and negatives are as above  All other review of systems is negative unless indicated above      Vital Signs Last 24 Hrs  T(C): 37.1 (28 May 2023 21:11), Max: 37.1 (28 May 2023 21:11)  T(F): 98.7 (28 May 2023 21:11), Max: 98.7 (28 May 2023 21:11)  HR: 72 (28 May 2023 21:11) (65 - 72)  BP: 159/56 (28 May 2023 21:11) (151/46 - 159/56)  BP(mean): 85 (28 May 2023 21:11) (85 - 85)  RR: 20 (28 May 2023 21:11) (16 - 20)  SpO2: 98% (28 May 2023 21:11) (95% - 98%): nasal cannula  O2 Flow (L/min): 1      I&O's Summary    24 May 2023 07:01  -  25 May 2023 06:41  --------------------------------------------------------  IN: 0 mL / OUT: 400 mL / NET: -400 mL      PHYSICAL EXAM:   Constitutional: NAD, awake and alert, well-developed  HEENT: PERR, EOMI, Normal Hearing, MMM  Neck: Soft and supple, No LAD, No JVD  Respiratory: Breath sounds are clear bilaterally, No wheezing, rales or rhonchi  Cardiovascular: S1 and S2, regular rate and rhythm, 3/6 late peaking LIANA at base and soft systolic LLSB as before, +S4 but no S3 gallops or rubs  Gastrointestinal: Bowel Sounds present, soft, nontender, nondistended, no guarding, no rebound  Extremities: No peripheral edema  Vascular: 2+ peripheral pulses  Neurological: no focal deficits  Musculoskeletal: 5/5 strength b/l upper and lower extremities  Skin: No rashes      MEDICATIONS  (STANDING):  allopurinol 100 milliGRAM(s) Oral daily  atorvastatin 20 milliGRAM(s) Oral at bedtime  enoxaparin Injectable 30 milliGRAM(s) SubCutaneous every 24 hours  folic acid 1 milliGRAM(s) Oral daily  furosemide    Tablet 20 milliGRAM(s) Oral daily  gabapentin 300 milliGRAM(s) Oral two times a day  hydrALAZINE 25 milliGRAM(s) Oral two times a day  lidocaine   4% Patch 3 Patch Transdermal daily  metoprolol tartrate 25 milliGRAM(s) Oral two times a day  nystatin    Suspension [id number] Unit(s) Oral every 6 hours  pantoprazole    Tablet 40 milliGRAM(s) Oral before breakfast  piperacillin/tazobactam IVPB.. 3.375 Gram(s) IV Intermittent every 12 hours  senna 2 Tablet(s) Oral at bedtime    MEDICATIONS  (PRN):  acetaminophen     Tablet .. 650 milliGRAM(s) Oral every 6 hours PRN Temp greater or equal to 38C (100.4F), Mild Pain (1 - 3)  albuterol    0.083% 2.5 milliGRAM(s) Nebulizer every 6 hours PRN Shortness of Breath and/or Wheezing  aluminum hydroxide/magnesium hydroxide/simethicone Suspension 30 milliLiter(s) Oral every 4 hours PRN Dyspepsia  hydrALAZINE Injectable 5 milliGRAM(s) IV Push every 4 hours PRN Systolic BP>160 mm Hg  melatonin 3 milliGRAM(s) Oral at bedtime PRN Insomnia  ondansetron Injectable 4 milliGRAM(s) IV Push every 8 hours PRN Nausea and/or Vomiting      LABS: All Labs Reviewed:                        8.3    5.61  )-----------( 109      ( 28 May 2023 07:20 )             25.1                           8.2    14.21 )-----------( 82       ( 25 May 2023 07:08 )             26.0                           9.8    20.99 )-----------( 107      ( 24 May 2023 13:29 )             29.5      05-28    141  |  108  |  21  ----------------------------<  113<H>  3.6   |  25  |  1.55<H>    Ca    8.6      28 May 2023 07:20       05-    133<L>  |  107  |  29<H>  ----------------------------<  96  4.4   |  19<L>  |  1.80<H>    Ca    7.5<L>      25 May 2023 07:08    TPro  6.9  /  Alb  3.2<L>  /  TBili  1.0  /  DBili  x   /  AST  90<H>  /  ALT  71  /  AlkPhos  81        05    130<L>  |  100  |  29<H>  ----------------------------<  116<H>  4.1   |  22  |  1.95<H>    Ca    8.7      24 May 2023 13:29    TPro  6.9  /  Alb  3.2<L>  /  TBili  1.0  /  DBili  x   /  AST  90<H>  /  ALT  71  /  AlkPhos  81      PT/INR - ( 24 May 2023 13:29 )   PT: 12.8 sec;   INR: 1.10 ratio      PTT - ( 24 May 2023 13:29 )  PTT:27.8 sec    Troponin I, High Sensitivity (.23 @ 13:29)   Troponin I, High Sensitivity Result: 11.49    Lactate, Blood (.23 @ 17:23)   Lactate, Blood: 1.9 mmol/L      BLOOD CULTURES:   LIPID PROFILE     RADIOLOGY:    CXR: 23:  FINDINGS:  There is cardiomegaly, vascular congestion and perihilar interstitial infiltrates without gross effusion. Trachea midline. No hilar mass. Visualized osseous structures are intact.  IMPRESSION:   Constellation of findings suggestive of CHF..      CT of Chest and Abd/Pelvis: 23:  FINDINGS:  CHEST:  LUNGS AND LARGE AIRWAYS: Tracheal narrowing no longer demonstrated. Marked improvement in bilateral infiltrates and atelectasis. Mild residual bibasilar fibrotic changes.  PLEURA: No pleural effusion.  VESSELS: Atherosclerotic aorta without aneurysm. Coronary atherosclerosis.  HEART: Mild cardiomegaly. No pericardial effusion.  MEDIASTINUM AND DIRK: 1.2 x 0.9 cm anterior mediastinal lymph node,   previously 1.7 x 1.1 cm.  CHEST WALL AND LOWER NECK: Within normal limits.    ABDOMEN AND PELVIS:  LIVER: Within normal limits.  BILE DUCTS: Normal caliber.  GALLBLADDER: Cholelithiasis.  SPLEEN: Within normal limits.  PANCREAS: Fatty atrophy.  ADRENALS: Within normal limits.  KIDNEYS/URETERS: Mildly atrophic. No hydronephrosis.  BLADDER: Collapsed around Pérez catheter. Expected droplet of   ntraluminal air.  REPRODUCTIVE ORGANS: Atrophic uterus versus supracervical hysterectomy.  BOWEL: No bowel obstruction. Appendix is normal.  PERITONEUM: No ascites.  VESSELS: Atherosclerotic changes. Stable infrarenal aortic displaced calcifications consistent with calcified plaque or chronic focal type B dissection.  RETROPERITONEUM/LYMPH NODES: No lymphadenopathy.  ABDOMINAL WALL: Minimal fat-containing left inguinal and umbilical hernias.  BONES: Within normal limits.  IMPRESSION:  Marked improvement in bibasilar infiltrates. Residual bibasilar fibrosis.   Prominent mediastinal lymph node mildly improved.  No acute intra-abdominal pathology.  Cholelithiasis.  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  Other chronic findings as above.      EK23:  Normal sinus rhythm  Minimal voltage criteria for LVH, may be normal variant      TELEMETRY:      ECHO:  3/1/23:  M-Mode Measurements (cm)   LVEDd: 3.97 cm            LVESd: 2.55 cm   IVSEd: 1.1 cm   LVPWd: 1.1 cm             AO Root Dimension: 2.8 cm                             LA: 3.5 cm                LVOT: 2 cm  Doppler Measurements:   AV Velocity:434 cm/s                  MV Peak E-Wave: 98.7 cm/s   AV Peak Gradient: 75.34 mmHg          MV Peak A-Wave: 131 cm/s   AV Mean Gradient: 40 mmHg             MV E/A Ratio: 0.75 %   AV Area (Continuity):0.85 cm^2        MV Peak Gradient: 3.9 mmHg   TR Velocity:190 cm/s   TR Gradient:14.44 mmHg   Estimated RAP:5 mmHg   RVSP:27 mmHg    Findings  Mitral Valve   The mitral valve leaflets appear thickened.   EA reversal of the mitral inflow consistent with reduced compliance of the left ventricle.   Mild mitral annular calcification is present.   Mild mitral regurgitation is present.    Aortic Valve   Peak and mean transaortic gradients are 75 and 40mmHg respectively; this finding is consistent with severe aortic stenosis.   Mild (1+) aortic regurgitation is present.    Tricuspid Valve   Trace tricuspid valve regurgitation is present.    Pulmonic Valve   Mild pulmonic valvular regurgitation (1+) is present.    Left Atrium   Normal appearing left atrium.    Left Ventricle   Mild concentric left ventricular hypertrophy is present.   The left ventricle is normal in size.   Estimated left ventricular ejection fraction is 65-70 %.    Right Atrium   Normal appearing right atrium.    Right Ventricle   Normal appearing right ventricle structure and function.    Pericardial Effusion   No evidence of pericardial effusion.    Pleural Effusion   No evidence of pleural effusion.    Miscellaneous   The IVC appears normal.    Summary   The mitral valve leaflets appear thickened.   EA reversal of the mitral inflow consistent with reduced compliance of the left ventricle.   Mild mitral annular calcification is present.   Mild mitral regurgitation is present.   Peak and mean transaortic gradients are 75 and 40mmHg respectively; this finding is consistent with severe aortic stenosis.   Mild (1+) aortic regurgitation is present.   Trace tricuspid valve regurgitation is present.   Mild pulmonic valvular regurgitation (1+) is present.   Normal appearing left atrium.   Mild concentric left ventricular hypertrophy is present.   The left ventricle is normal in size.   Estimated left ventricular ejection fraction is 65-70 %.   Normal appearing right atrium.   Normal appearing right ventricle structure and function.   The IVC appears normal.   No evidence of pericardial effusion.   No evidence of pleural effusion.    Signature   ----------------------------------------------------------------   Electronically signed by Sakina Cheatham MD, Director of   Cardiac Cath Lab(Interpreting physician) on 2023 06:42   PM   ----------------------------------------------------------------

## 2024-10-27 RX ORDER — TRIAMCINOLONE ACETONIDE 1 MG/G
0.1 OINTMENT TOPICAL
Qty: 80 | Refills: 0 | Status: ACTIVE | COMMUNITY
Start: 2024-10-11

## 2024-10-27 RX ORDER — SELENIUM SULFIDE 25 MG/ML
2.5 LOTION TOPICAL
Qty: 120 | Refills: 0 | Status: ACTIVE | COMMUNITY
Start: 2024-09-18

## 2024-10-27 RX ORDER — ALBUTEROL SULFATE 90 UG/1
108 (90 BASE) INHALANT RESPIRATORY (INHALATION)
Qty: 18 | Refills: 0 | Status: ACTIVE | COMMUNITY
Start: 2024-09-28

## 2024-10-27 RX ORDER — LIFITEGRAST 50 MG/ML
5 SOLUTION/ DROPS OPHTHALMIC
Qty: 180 | Refills: 0 | Status: ACTIVE | COMMUNITY
Start: 2024-08-20

## 2024-10-29 ENCOUNTER — LABORATORY RESULT (OUTPATIENT)
Age: 89
End: 2024-10-29

## 2024-12-20 RX ORDER — METHENAMINE HIPPURATE 1 G/1
1 TABLET ORAL TWICE DAILY
Qty: 180 | Refills: 2 | Status: ACTIVE | COMMUNITY
Start: 2024-12-20 | End: 1900-01-01

## 2025-02-14 ENCOUNTER — APPOINTMENT (OUTPATIENT)
Dept: UROLOGY | Facility: CLINIC | Age: 89
End: 2025-02-14
Payer: MEDICARE

## 2025-02-14 DIAGNOSIS — R33.9 RETENTION OF URINE, UNSPECIFIED: ICD-10-CM

## 2025-02-14 PROCEDURE — 51702 INSERT TEMP BLADDER CATH: CPT

## 2025-02-25 ENCOUNTER — APPOINTMENT (OUTPATIENT)
Dept: RHEUMATOLOGY | Facility: CLINIC | Age: 89
End: 2025-02-25

## 2025-03-17 NOTE — ED ADULT TRIAGE NOTE - AS TEMP SITE
Spoke with Caregiver Grace via phone regarding anticoagulation monitoring.  Last INR on 2/18/25 was 2.3.  Dose maintained.   Today's INR is 3.5 and is above goal range.    Current warfarin total weekly dose of 32.5 mg verified.  Informed the INR result is above therapeutic range and instructed to hold today on 3/17/25 and then maintain current dose per protocol. Discussed dose and return date of 3/31/25 for next INR.     See Anticoagulation flowsheet.    Dr. Hamilton  is in the office today supervising the treatment.    Call your physician or seek medical care immediately if you notice any of the following symptoms of a bleed:   Red, dark, coffee or cola colored urine  Red or tar like stools  Excessive bleeding from gums or nose  Vomiting coffee colored or bright red material  Coughing up red tinged sputum  Severe or unprovoked pain (ex: severe Headache or Abdominal pain)  Sudden, spontaneous bruising for no reason  For female patients:  Excessive menstrual bleeding  A cut that will not stop bleeding within 10-15 mins  Symptoms associated with abnormal bleeding/high INR reviewed.    Encouraged to avoid activities that may result in a serious fall or injury and verbalizes understanding.    Instructed to contact the clinic with any unusual bleeding or bruising, any changes in medications, diet, health status, lifestyle, or any other changes, questions or concerns. Verbalized understanding of all discussed.   
oral

## 2025-03-18 ENCOUNTER — OFFICE (OUTPATIENT)
Dept: URBAN - METROPOLITAN AREA CLINIC 12 | Facility: CLINIC | Age: OVER 89
Setting detail: OPHTHALMOLOGY
End: 2025-03-18
Payer: MEDICARE

## 2025-03-18 DIAGNOSIS — Z96.1: ICD-10-CM

## 2025-03-18 DIAGNOSIS — H35.363: ICD-10-CM

## 2025-03-18 DIAGNOSIS — H01.002: ICD-10-CM

## 2025-03-18 DIAGNOSIS — G43.109: ICD-10-CM

## 2025-03-18 DIAGNOSIS — H18.513: ICD-10-CM

## 2025-03-18 DIAGNOSIS — H16.223: ICD-10-CM

## 2025-03-18 DIAGNOSIS — H43.813: ICD-10-CM

## 2025-03-18 DIAGNOSIS — H25.89: ICD-10-CM

## 2025-03-18 DIAGNOSIS — H33.102: ICD-10-CM

## 2025-03-18 PROCEDURE — 92250 FUNDUS PHOTOGRAPHY W/I&R: CPT | Performed by: STUDENT IN AN ORGANIZED HEALTH CARE EDUCATION/TRAINING PROGRAM

## 2025-03-18 PROCEDURE — 92286 ANT SGM IMG I&R SPECLR MIC: CPT | Performed by: STUDENT IN AN ORGANIZED HEALTH CARE EDUCATION/TRAINING PROGRAM

## 2025-03-18 PROCEDURE — 92014 COMPRE OPH EXAM EST PT 1/>: CPT | Performed by: STUDENT IN AN ORGANIZED HEALTH CARE EDUCATION/TRAINING PROGRAM

## 2025-03-18 ASSESSMENT — TONOMETRY
OD_IOP_MMHG: 17
OS_IOP_MMHG: 17

## 2025-03-18 ASSESSMENT — LID EXAM ASSESSMENTS: OD_BLEPHARITIS: RLL

## 2025-03-18 ASSESSMENT — KERATOMETRY
OS_AXISANGLE_DEGREES: 174
METHOD_AUTO_MANUAL: AUTO
OD_K2POWER_DIOPTERS: 46.00
OS_K2POWER_DIOPTERS: 46.25
OS_K1POWER_DIOPTERS: 45.25
OD_AXISANGLE_DEGREES: 144
OD_K1POWER_DIOPTERS: 45.50

## 2025-03-18 ASSESSMENT — SUPERFICIAL PUNCTATE KERATITIS (SPK): OD_SPK: T

## 2025-03-18 ASSESSMENT — REFRACTION_AUTOREFRACTION
OD_CYLINDER: -1.00
OS_SPHERE: +0.75
OD_AXIS: 083
OS_AXIS: 072
OD_SPHERE: +0.50
OS_CYLINDER: -2.00

## 2025-03-18 ASSESSMENT — CORNEAL DYSTROPHY - POSTERIOR
OS_POSTERIORDYSTROPHY: GUTTATA
OD_POSTERIORDYSTROPHY: GUTTATA

## 2025-03-18 ASSESSMENT — CONFRONTATIONAL VISUAL FIELD TEST (CVF)
OS_FINDINGS: FULL
OD_FINDINGS: FULL

## 2025-03-18 ASSESSMENT — VISUAL ACUITY
OS_BCVA: 20/25-
OD_BCVA: 20/60+2

## 2025-04-08 ENCOUNTER — APPOINTMENT (OUTPATIENT)
Dept: RHEUMATOLOGY | Facility: CLINIC | Age: 89
End: 2025-04-08
Payer: MEDICARE

## 2025-04-08 VITALS
SYSTOLIC BLOOD PRESSURE: 122 MMHG | DIASTOLIC BLOOD PRESSURE: 72 MMHG | OXYGEN SATURATION: 97 % | HEART RATE: 77 BPM | WEIGHT: 140 LBS | BODY MASS INDEX: 26.43 KG/M2 | TEMPERATURE: 97.1 F | HEIGHT: 61 IN

## 2025-04-08 DIAGNOSIS — M17.0 BILATERAL PRIMARY OSTEOARTHRITIS OF KNEE: ICD-10-CM

## 2025-04-08 DIAGNOSIS — M25.462 EFFUSION, LEFT KNEE: ICD-10-CM

## 2025-04-08 DIAGNOSIS — M10.9 GOUT, UNSPECIFIED: ICD-10-CM

## 2025-04-08 DIAGNOSIS — M25.461 EFFUSION, RIGHT KNEE: ICD-10-CM

## 2025-04-08 DIAGNOSIS — M11.20 OTHER CHONDROCALCINOSIS, UNSPECIFIED SITE: ICD-10-CM

## 2025-04-08 PROCEDURE — 20610 DRAIN/INJ JOINT/BURSA W/O US: CPT | Mod: 50

## 2025-04-08 PROCEDURE — 99214 OFFICE O/P EST MOD 30 MIN: CPT | Mod: 25

## 2025-04-08 RX ORDER — TRIAMCINOLONE ACETONIDE 80 MG/ML
80 INJECTION, SUSPENSION INTRA-ARTICULAR; INTRAMUSCULAR
Qty: 16 | Refills: 0 | Status: COMPLETED | OUTPATIENT
Start: 2025-04-08

## 2025-04-08 RX ADMIN — TRIAMCINOLONE ACETONIDE 0 MG/ML: 80 INJECTION, SUSPENSION INTRA-ARTICULAR; INTRAMUSCULAR at 00:00

## 2025-04-09 LAB
B PERT IGG+IGM PNL SER: ABNORMAL
COLOR FLD: YELLOW
EOSINOPHIL # FLD MANUAL: 0 %
FLUID INTAKE SUBSTANCE CLASS: NORMAL
LYMPHOCYTES # FLD MANUAL: 50 %
MESOTHL CELL NFR FLD: 0 %
MONOS+MACROS NFR FLD MANUAL: 45 %
NEUTS SEG # FLD MANUAL: 5 %
NRBC # FLD: 0 %
RBC # FLD MANUAL: 4000 CELLS/UL
SYCRY CLARITY: ABNORMAL
SYCRY COLOR: YELLOW
SYCRY ID: NORMAL
SYCRY TUBE: NORMAL
TOTAL CELLS COUNTED FLD: 395 CELLS/UL
TUBE TYPE: NORMAL
UNIDENT CELLS NFR FLD MANUAL: 0 %
VARIANT LYMPHS # FLD MANUAL: 0 %
WBC COUNT: 365 CELLS/UL

## 2025-05-09 NOTE — PHYSICAL THERAPY INITIAL EVALUATION ADULT - GENERAL OBSERVATIONS, REHAB EVAL
Encounter Date: 5/8/2025       History     Chief Complaint   Patient presents with    Fatigue     Pt is being treated for suspected pna, pt started levoquin and is not feeling well. States she feels weak.      Chief complaint is cough.  The patient on April 25th was seen at an urgent care .  She had flu strep and COVID test done all negative.  She was given a steroid shot at that time.  Under 30 that she was seen for a bad sore throat and all 3 tests were negative again.  She was then placed on prednisone and Augmentin.  On the 7th she was told to continue Augmentin after she was seen again and Levaquin was started of which she took 1 dose at 10:00 a.m. today.  On her visit on the 7th the patient did get a Rocephin shot as well        Review of patient's allergies indicates:   Allergen Reactions    Augmentin [amoxicillin-pot clavulanate]     Oxycodone Itching     Past Medical History:   Diagnosis Date    Anxiety     Arrhythmia     Depression      Past Surgical History:   Procedure Laterality Date    BUNIONECTOMY      TONSILLECTOMY      WISDOM TOOTH EXTRACTION       No family history on file.  Social History[1]  Review of Systems   Constitutional:  Negative for chills and fever.   HENT:  Negative for ear pain, rhinorrhea and sore throat.    Eyes:  Negative for pain and visual disturbance.   Respiratory:  Positive for cough. Negative for shortness of breath.    Cardiovascular:  Negative for chest pain and palpitations.   Gastrointestinal:  Negative for abdominal pain, constipation, diarrhea, nausea and vomiting.   Genitourinary:  Negative for dysuria, frequency, hematuria and urgency.   Musculoskeletal:  Negative for back pain, joint swelling and myalgias.   Skin:  Negative for rash.   Neurological:  Negative for dizziness, seizures, weakness and headaches.   Psychiatric/Behavioral:  Negative for dysphoric mood. The patient is not nervous/anxious.        Physical Exam     Initial Vitals [05/08/25 1732]   BP Pulse Resp  Temp SpO2   138/82 (!) 114 18 98.2 °F (36.8 °C) 100 %      MAP       --         Physical Exam    Nursing note and vitals reviewed.  Constitutional: She appears well-developed and well-nourished.   HENT:   Head: Normocephalic and atraumatic.   Eyes: Conjunctivae, EOM and lids are normal. Pupils are equal, round, and reactive to light.   Neck: Trachea normal. Neck supple. No thyroid mass and no thyromegaly present.   Normal range of motion.  Cardiovascular:  Regular rhythm and normal heart sounds.           Tachycardic   Pulmonary/Chest: Effort normal and breath sounds normal.   Abdominal: Abdomen is soft. There is no abdominal tenderness.   Musculoskeletal:         General: Normal range of motion.      Cervical back: Normal range of motion and neck supple.     Neurological: She is alert and oriented to person, place, and time. She has normal strength and normal reflexes. No cranial nerve deficit or sensory deficit.   Skin: Skin is warm and dry.   Psychiatric: She has a normal mood and affect. Her speech is normal and behavior is normal. Judgment and thought content normal.         ED Course   Procedures  Labs Reviewed   RESPIRATORY INFECTION PANEL (PCR), NASOPHARYNGEAL - Abnormal       Result Value    Respiratory Infection Panel Source Nasopharyngeal Swab      Adenovirus Not Detected      Coronavirus 229E, Common Cold Virus Not Detected      Coronavirus HKU1, Common Cold Virus Not Detected      Coronavirus NL63, Common Cold Virus Not Detected      Coronavirus OC43, Common Cold Virus Not Detected      SARS-CoV2 (COVID-19) Qualitative PCR Not Detected      Human Metapneumovirus Detected (*)     Human Rhinovirus/Enterovirus Not Detected      Influenza A (subtypes H1, H1-2009,H3) Not Detected      Influenza B Not Detected      Parainfluenza Virus 1 Not Detected      Parainfluenza Virus 2 Not Detected      Parainfluenza Virus 3 Not Detected      Parainfluenza Virus 4 Not Detected      Respiratory Syncytial Virus Not  Detected      Bordetella Parapertussis ([id number]) Not Detected      Bordetella pertussis (ptxP) Not Detected      Chlamydia pneumoniae Not Detected      Mycoplasma pneumoniae Not Detected     COMPREHENSIVE METABOLIC PANEL - Abnormal    Sodium 137      Potassium 3.3 (*)     Chloride 104      CO2 23      Glucose 105      BUN 9      Creatinine 0.7      Calcium 9.7      Protein Total 8.6 (*)     Albumin 4.5      Bilirubin Total 0.3      ALP 70      AST 22      ALT 28      Anion Gap 10      eGFR >60     CBC WITH DIFFERENTIAL - Abnormal    WBC 12.10      RBC 5.07      Hgb 13.9      Hct 42.5      MCV 84      MCH 27.4      MCHC 32.7      RDW 12.9      Platelet Count 260      MPV 9.9      Nucleated RBC 0      Neut % 62.4      Lymph % 22.4      Mono % 11.3      Eos % 1.9      Basophil % 0.6      Imm Grans % 1.4 (*)     Neut # 7.6      Lymph # 2.71      Mono # 1.37 (*)     Eos # 0.23      Baso # 0.07      Imm Grans # 0.17 (*)    D DIMER, QUANTITATIVE - Normal    D-Dimer 0.40     CBC W/ AUTO DIFFERENTIAL    Narrative:     The following orders were created for panel order CBC auto differential.  Procedure                               Abnormality         Status                     ---------                               -----------         ------                     CBC with Differential[[id number]]       Abnormal            Final result                 Please view results for these tests on the individual orders.   HEPATITIS C ANTIBODY   HIV 1 / 2 ANTIBODY   POCT URINE PREGNANCY    POC Preg Test, Ur Negative       Acceptable Yes       EKG Readings: (Independently Interpreted)   EKG reveals sinus tachycardia heart rate 110 no ST elevation AL interval normal       Imaging Results              X-Ray Chest PA And Lateral (Final result)  Result time 05/08/25 20:46:20      Final result by Gaby Sigala MD (05/08/25 20:46:20)                   Impression:      No acute findings      Electronically signed  by: Durga-Brenton Sigala  Date:    05/08/2025  Time:    20:46               Narrative:    EXAMINATION:  XR CHEST PA AND LATERAL    CLINICAL HISTORY:  Other specified cough    TECHNIQUE:  PA and lateral views of the chest were performed.    COMPARISON:  07/07/2023    FINDINGS:  Lungs are clear. No focal consolidation. No pleural effusion. No pneumothorax. Normal heart size.                                       Medications - No data to display  Medical Decision Making  The patient has meta pneumo virus and mildly low potassium.  Will give potassium instruct her on viral symptoms and discharged with instructions    Risk  Prescription drug management.                                      Clinical Impression:  Final diagnoses:  [R00.0] Tachycardia  [R05.8] Productive cough  [B34.9] Viral syndrome (Primary)          ED Disposition Condition    Discharge Stable          ED Prescriptions       Medication Sig Dispense Start Date End Date Auth. Provider    benzonatate (TESSALON) 100 MG capsule Take 2 capsules (200 mg total) by mouth 3 (three) times daily as needed for Cough. 15 capsule 5/8/2025 5/13/2025 William Capps MD          Follow-up Information    None              [1]   Social History  Tobacco Use    Smoking status: Never    Smokeless tobacco: Never        William Capps MD  05/09/25 0107     Pt. received in bed CCU + CM + pérez +O2 nc + IV Pt. with hypotension with mobility BP 98/67 satys on bed as per Mimi PALMER

## 2025-05-19 RX ORDER — HYALURONATE SODIUM 20 MG/2 ML
20 SYRINGE (ML) INTRAARTICULAR
Qty: 6 | Refills: 0 | Status: ACTIVE | COMMUNITY
Start: 2025-05-19 | End: 1900-01-01

## 2025-06-11 ENCOUNTER — NON-APPOINTMENT (OUTPATIENT)
Age: 89
End: 2025-06-11

## 2025-06-11 ENCOUNTER — APPOINTMENT (OUTPATIENT)
Dept: RHEUMATOLOGY | Facility: CLINIC | Age: 89
End: 2025-06-11
Payer: MEDICARE

## 2025-06-11 VITALS
TEMPERATURE: 98.5 F | OXYGEN SATURATION: 94 % | HEART RATE: 70 BPM | DIASTOLIC BLOOD PRESSURE: 60 MMHG | HEIGHT: 61 IN | BODY MASS INDEX: 26.24 KG/M2 | SYSTOLIC BLOOD PRESSURE: 134 MMHG | WEIGHT: 139 LBS

## 2025-06-11 LAB
APPEARANCE: ABNORMAL
BACTERIA: ABNORMAL /HPF
BILIRUBIN URINE: NEGATIVE
BLOOD URINE: ABNORMAL
COLOR: YELLOW
GLUCOSE QUALITATIVE U: NEGATIVE MG/DL
KETONES URINE: NEGATIVE MG/DL
LEUKOCYTE ESTERASE URINE: ABNORMAL
MICROSCOPIC-UA: NORMAL
NITRITE URINE: NEGATIVE
PH URINE: 6
PROTEIN URINE: 100 MG/DL
RED BLOOD CELLS URINE: 7 /HPF
SPECIFIC GRAVITY URINE: 1.02
SQUAMOUS EPITHELIAL CELLS: PRESENT
UROBILINOGEN URINE: 0.2 MG/DL
WHITE BLOOD CELLS URINE: 350 /HPF

## 2025-06-11 PROCEDURE — 20610 DRAIN/INJ JOINT/BURSA W/O US: CPT | Mod: 50

## 2025-06-11 RX ORDER — HYALURONATE SODIUM 20 MG/2 ML
20 SYRINGE (ML) INTRAARTICULAR
Refills: 0 | Status: COMPLETED | OUTPATIENT
Start: 2025-06-11

## 2025-06-11 RX ADMIN — Medication 0 MG/2ML: at 00:00

## 2025-06-12 LAB
B PERT IGG+IGM PNL SER: CLEAR
COLOR FLD: YELLOW
EOSINOPHIL # FLD MANUAL: 0 %
FLUID INTAKE SUBSTANCE CLASS: NORMAL
LYMPHOCYTES # FLD MANUAL: 4 %
MESOTHL CELL NFR FLD: 0 %
MONOS+MACROS NFR FLD MANUAL: 87 %
NEUTS SEG # FLD MANUAL: 9 %
NRBC # FLD: 0 %
RBC # FLD MANUAL: 2000 CELLS/UL
SYCRY CLARITY: ABNORMAL
SYCRY COLOR: YELLOW
SYCRY ID: NORMAL
SYCRY TUBE: NORMAL
TOTAL CELLS COUNTED FLD: 95 CELLS/UL
TUBE TYPE: NORMAL
UNIDENT CELLS NFR FLD MANUAL: 0 %
VARIANT LYMPHS # FLD MANUAL: 0 %
WBC COUNT: 80 CELLS/UL

## 2025-06-17 LAB — BACTERIA UR CULT: ABNORMAL

## 2025-06-17 RX ORDER — SULFAMETHOXAZOLE AND TRIMETHOPRIM 400; 80 MG/1; MG/1
400-80 TABLET ORAL TWICE DAILY
Qty: 10 | Refills: 0 | Status: ACTIVE | COMMUNITY
Start: 2025-06-17 | End: 1900-01-01

## 2025-06-18 ENCOUNTER — APPOINTMENT (OUTPATIENT)
Dept: RHEUMATOLOGY | Facility: CLINIC | Age: 89
End: 2025-06-18
Payer: MEDICARE

## 2025-06-18 VITALS
SYSTOLIC BLOOD PRESSURE: 150 MMHG | HEIGHT: 61 IN | DIASTOLIC BLOOD PRESSURE: 70 MMHG | OXYGEN SATURATION: 94 % | WEIGHT: 139 LBS | BODY MASS INDEX: 26.24 KG/M2 | HEART RATE: 74 BPM

## 2025-06-18 PROCEDURE — 20610 DRAIN/INJ JOINT/BURSA W/O US: CPT | Mod: 50

## 2025-06-18 RX ORDER — HYALURONATE SODIUM 20 MG/2 ML
20 SYRINGE (ML) INTRAARTICULAR
Refills: 0 | Status: COMPLETED | OUTPATIENT
Start: 2025-06-18

## 2025-06-18 RX ADMIN — Medication 0 MG/2ML: at 00:00

## 2025-06-24 ENCOUNTER — APPOINTMENT (OUTPATIENT)
Dept: RHEUMATOLOGY | Facility: CLINIC | Age: 89
End: 2025-06-24
Payer: MEDICARE

## 2025-06-24 VITALS
WEIGHT: 139 LBS | OXYGEN SATURATION: 96 % | SYSTOLIC BLOOD PRESSURE: 148 MMHG | HEART RATE: 69 BPM | BODY MASS INDEX: 26.24 KG/M2 | DIASTOLIC BLOOD PRESSURE: 71 MMHG | HEIGHT: 61 IN

## 2025-06-24 PROCEDURE — 20610 DRAIN/INJ JOINT/BURSA W/O US: CPT | Mod: 50

## 2025-06-24 RX ORDER — HYALURONATE SODIUM 20 MG/2 ML
20 SYRINGE (ML) INTRAARTICULAR
Refills: 0 | Status: COMPLETED | OUTPATIENT
Start: 2025-06-24

## 2025-06-24 RX ADMIN — Medication 0 MG/2ML: at 00:00

## 2025-07-21 ENCOUNTER — OFFICE (OUTPATIENT)
Dept: URBAN - METROPOLITAN AREA CLINIC 88 | Facility: CLINIC | Age: OVER 89
Setting detail: OPHTHALMOLOGY
End: 2025-07-21
Payer: MEDICARE

## 2025-07-21 DIAGNOSIS — H35.363: ICD-10-CM

## 2025-07-21 DIAGNOSIS — H33.102: ICD-10-CM

## 2025-07-21 DIAGNOSIS — H43.813: ICD-10-CM

## 2025-07-21 PROCEDURE — 92250 FUNDUS PHOTOGRAPHY W/I&R: CPT | Performed by: OPHTHALMOLOGY

## 2025-07-21 PROCEDURE — 92014 COMPRE OPH EXAM EST PT 1/>: CPT | Performed by: OPHTHALMOLOGY

## 2025-07-21 PROCEDURE — 92134 CPTRZ OPH DX IMG PST SGM RTA: CPT | Performed by: OPHTHALMOLOGY

## 2025-07-21 ASSESSMENT — KERATOMETRY
OS_K2POWER_DIOPTERS: 46.25
OD_K2POWER_DIOPTERS: 46.00
OS_AXISANGLE_DEGREES: 174
OD_K1POWER_DIOPTERS: 45.50
METHOD_AUTO_MANUAL: AUTO
OD_AXISANGLE_DEGREES: 144
OS_K1POWER_DIOPTERS: 45.25

## 2025-07-21 ASSESSMENT — REFRACTION_AUTOREFRACTION
OD_CYLINDER: -1.00
OD_AXIS: 083
OS_AXIS: 072
OS_CYLINDER: -2.00
OD_SPHERE: +0.50
OS_SPHERE: +0.75

## 2025-07-21 ASSESSMENT — TONOMETRY
OD_IOP_MMHG: 18
OS_IOP_MMHG: 15

## 2025-07-21 ASSESSMENT — LID EXAM ASSESSMENTS: OD_BLEPHARITIS: RLL

## 2025-07-21 ASSESSMENT — CORNEAL DYSTROPHY - POSTERIOR
OS_POSTERIORDYSTROPHY: GUTTATA
OD_POSTERIORDYSTROPHY: GUTTATA

## 2025-07-21 ASSESSMENT — VISUAL ACUITY
OS_BCVA: 20/30
OD_BCVA: 20/200

## 2025-07-21 ASSESSMENT — SUPERFICIAL PUNCTATE KERATITIS (SPK): OD_SPK: T

## 2025-07-21 ASSESSMENT — CONFRONTATIONAL VISUAL FIELD TEST (CVF)
OD_FINDINGS: FULL
OS_FINDINGS: FULL